# Patient Record
Sex: FEMALE | Race: WHITE | NOT HISPANIC OR LATINO | Employment: OTHER | URBAN - METROPOLITAN AREA
[De-identification: names, ages, dates, MRNs, and addresses within clinical notes are randomized per-mention and may not be internally consistent; named-entity substitution may affect disease eponyms.]

---

## 2017-01-10 RX ORDER — ALPRAZOLAM 0.5 MG/1
0.5 TABLET ORAL 2 TIMES DAILY PRN
Qty: 60 TABLET | Refills: 0 | Status: SHIPPED | OUTPATIENT
Start: 2017-01-10 | End: 2017-02-08 | Stop reason: SDUPTHER

## 2017-01-27 ENCOUNTER — TELEPHONE (OUTPATIENT)
Dept: INTERNAL MEDICINE | Facility: CLINIC | Age: 82
End: 2017-01-27

## 2017-01-27 NOTE — TELEPHONE ENCOUNTER
----- Message from Kayley James sent at 1/27/2017 10:46 AM CST -----  Contact: self/[phone]  Pt called in regards to getting a Rx for diarrhea. She did not want to make and appointment.        Please advise

## 2017-01-27 NOTE — TELEPHONE ENCOUNTER
----- Message from Kayley James sent at 1/27/2017 10:46 AM CST -----  Contact: self/[phone]  Pt called in regards to getting a Rx for diarrhea. She did not want to make and appointment.        Please advise

## 2017-01-27 NOTE — TELEPHONE ENCOUNTER
Pt states she started having diarrhea on yesterday and would like something to be sent to pharmacy . Pt states she had 4 watery specimens . Pt states she tried kaopectate  And pepto bismol, nothing is helping . Pt refused urgent care appt

## 2017-02-08 RX ORDER — ALPRAZOLAM 0.5 MG/1
0.5 TABLET ORAL 2 TIMES DAILY PRN
Qty: 60 TABLET | Refills: 0 | Status: SHIPPED | OUTPATIENT
Start: 2017-02-08 | End: 2017-03-08 | Stop reason: SDUPTHER

## 2017-03-08 RX ORDER — ALPRAZOLAM 0.5 MG/1
0.5 TABLET ORAL 2 TIMES DAILY PRN
Qty: 60 TABLET | Refills: 0 | Status: SHIPPED | OUTPATIENT
Start: 2017-03-08 | End: 2017-04-13 | Stop reason: SDUPTHER

## 2017-03-20 ENCOUNTER — HOSPITAL ENCOUNTER (INPATIENT)
Facility: HOSPITAL | Age: 82
LOS: 1 days | Discharge: HOME HEALTH | DRG: 065 | End: 2017-03-21
Attending: EMERGENCY MEDICINE | Admitting: PSYCHIATRY & NEUROLOGY
Payer: MEDICARE

## 2017-03-20 DIAGNOSIS — I63.341 STROKE DUE TO THROMBOSIS OF RIGHT CEREBELLAR ARTERY: ICD-10-CM

## 2017-03-20 DIAGNOSIS — R42 VERTIGO: Primary | ICD-10-CM

## 2017-03-20 DIAGNOSIS — R42 DIZZINESS: ICD-10-CM

## 2017-03-20 DIAGNOSIS — R11.2 NAUSEA AND VOMITING, INTRACTABILITY OF VOMITING NOT SPECIFIED, UNSPECIFIED VOMITING TYPE: ICD-10-CM

## 2017-03-20 LAB
ABO + RH BLD: NORMAL
ALBUMIN SERPL BCP-MCNC: 3.6 G/DL
ALP SERPL-CCNC: 66 U/L
ALT SERPL W/O P-5'-P-CCNC: 21 U/L
ANION GAP SERPL CALC-SCNC: 12 MMOL/L
AORTIC VALVE REGURGITATION: ABNORMAL
AORTIC VALVE STENOSIS: ABNORMAL
AST SERPL-CCNC: 23 U/L
BACTERIA #/AREA URNS AUTO: ABNORMAL /HPF
BASOPHILS # BLD AUTO: 0.05 K/UL
BASOPHILS NFR BLD: 0.4 %
BILIRUB SERPL-MCNC: 0.2 MG/DL
BILIRUB UR QL STRIP: NEGATIVE
BLD GP AB SCN CELLS X3 SERPL QL: NORMAL
BUN SERPL-MCNC: 31 MG/DL
CALCIUM SERPL-MCNC: 9.3 MG/DL
CHLORIDE SERPL-SCNC: 106 MMOL/L
CHOLEST/HDLC SERPL: 6.9 {RATIO}
CLARITY UR REFRACT.AUTO: ABNORMAL
CO2 SERPL-SCNC: 19 MMOL/L
COLOR UR AUTO: YELLOW
CREAT SERPL-MCNC: 0.8 MG/DL (ref 0.5–1.4)
CREAT SERPL-MCNC: 1 MG/DL
DIASTOLIC DYSFUNCTION: YES
DIFFERENTIAL METHOD: NORMAL
EOSINOPHIL # BLD AUTO: 0.2 K/UL
EOSINOPHIL NFR BLD: 1.9 %
ERYTHROCYTE [DISTWIDTH] IN BLOOD BY AUTOMATED COUNT: 13.9 %
EST. GFR  (AFRICAN AMERICAN): >60 ML/MIN/1.73 M^2
EST. GFR  (NON AFRICAN AMERICAN): 52.6 ML/MIN/1.73 M^2
ESTIMATED AVG GLUCOSE: 120 MG/DL
ESTIMATED PA SYSTOLIC PRESSURE: 27
GLUCOSE SERPL-MCNC: 125 MG/DL
GLUCOSE UR QL STRIP: NEGATIVE
HBA1C MFR BLD HPLC: 5.8 %
HCT VFR BLD AUTO: 37.4 %
HDL/CHOLESTEROL RATIO: 14.4 %
HDLC SERPL-MCNC: 229 MG/DL
HDLC SERPL-MCNC: 33 MG/DL
HGB BLD-MCNC: 12.6 G/DL
HGB UR QL STRIP: ABNORMAL
INR PPP: 0.9
KETONES UR QL STRIP: NEGATIVE
LDLC SERPL CALC-MCNC: 138.6 MG/DL
LEUKOCYTE ESTERASE UR QL STRIP: ABNORMAL
LIPASE SERPL-CCNC: 43 U/L
LYMPHOCYTES # BLD AUTO: 2.9 K/UL
LYMPHOCYTES NFR BLD: 25.2 %
MAGNESIUM SERPL-MCNC: 2 MG/DL
MCH RBC QN AUTO: 29.1 PG
MCHC RBC AUTO-ENTMCNC: 33.7 %
MCV RBC AUTO: 86 FL
MICROSCOPIC COMMENT: ABNORMAL
MITRAL VALVE MOBILITY: ABNORMAL
MITRAL VALVE REGURGITATION: ABNORMAL
MONOCYTES # BLD AUTO: 0.8 K/UL
MONOCYTES NFR BLD: 6.9 %
NEUTROPHILS # BLD AUTO: 7.4 K/UL
NEUTROPHILS NFR BLD: 64 %
NITRITE UR QL STRIP: POSITIVE
NONHDLC SERPL-MCNC: 196 MG/DL
PH UR STRIP: 5 [PH] (ref 5–8)
PLATELET # BLD AUTO: 250 K/UL
PMV BLD AUTO: 10.3 FL
POC PTINR: 1 (ref 0.9–1.2)
POC PTWBT: 12.3 SEC (ref 9.7–14.3)
POCT GLUCOSE: 123 MG/DL (ref 70–110)
POTASSIUM SERPL-SCNC: 4.7 MMOL/L
PROT SERPL-MCNC: 7.9 G/DL
PROT UR QL STRIP: NEGATIVE
PROTHROMBIN TIME: 10.1 SEC
RBC # BLD AUTO: 4.33 M/UL
RBC #/AREA URNS AUTO: 3 /HPF (ref 0–4)
RETIRED EF AND QEF - SEE NOTES: 68 (ref 55–65)
SAMPLE: NORMAL
SAMPLE: NORMAL
SODIUM SERPL-SCNC: 137 MMOL/L
SP GR UR STRIP: 1.02 (ref 1–1.03)
SQUAMOUS #/AREA URNS AUTO: 2 /HPF
TRICUSPID VALVE REGURGITATION: ABNORMAL
TRIGL SERPL-MCNC: 287 MG/DL
URN SPEC COLLECT METH UR: ABNORMAL
UROBILINOGEN UR STRIP-ACNC: NEGATIVE EU/DL
WBC # BLD AUTO: 11.58 K/UL
WBC #/AREA URNS AUTO: 23 /HPF (ref 0–5)

## 2017-03-20 PROCEDURE — 93306 TTE W/DOPPLER COMPLETE: CPT | Mod: 26,,, | Performed by: INTERNAL MEDICINE

## 2017-03-20 PROCEDURE — G8988 SELF CARE GOAL STATUS: HCPCS | Mod: CK

## 2017-03-20 PROCEDURE — 99291 CRITICAL CARE FIRST HOUR: CPT

## 2017-03-20 PROCEDURE — 83690 ASSAY OF LIPASE: CPT

## 2017-03-20 PROCEDURE — 80061 LIPID PANEL: CPT

## 2017-03-20 PROCEDURE — 85610 PROTHROMBIN TIME: CPT

## 2017-03-20 PROCEDURE — 86900 BLOOD TYPING SEROLOGIC ABO: CPT

## 2017-03-20 PROCEDURE — 96374 THER/PROPH/DIAG INJ IV PUSH: CPT

## 2017-03-20 PROCEDURE — 25000003 PHARM REV CODE 250: Performed by: NURSE PRACTITIONER

## 2017-03-20 PROCEDURE — 86850 RBC ANTIBODY SCREEN: CPT

## 2017-03-20 PROCEDURE — 20600001 HC STEP DOWN PRIVATE ROOM

## 2017-03-20 PROCEDURE — 63700000 PHARM REV CODE 250 ALT 637 W/O HCPCS: Performed by: NURSE PRACTITIONER

## 2017-03-20 PROCEDURE — 83036 HEMOGLOBIN GLYCOSYLATED A1C: CPT

## 2017-03-20 PROCEDURE — 93010 ELECTROCARDIOGRAM REPORT: CPT | Mod: ,,, | Performed by: INTERNAL MEDICINE

## 2017-03-20 PROCEDURE — 97165 OT EVAL LOW COMPLEX 30 MIN: CPT

## 2017-03-20 PROCEDURE — 63600175 PHARM REV CODE 636 W HCPCS: Performed by: NURSE PRACTITIONER

## 2017-03-20 PROCEDURE — 25000003 PHARM REV CODE 250: Performed by: STUDENT IN AN ORGANIZED HEALTH CARE EDUCATION/TRAINING PROGRAM

## 2017-03-20 PROCEDURE — 96375 TX/PRO/DX INJ NEW DRUG ADDON: CPT

## 2017-03-20 PROCEDURE — 96361 HYDRATE IV INFUSION ADD-ON: CPT

## 2017-03-20 PROCEDURE — 81001 URINALYSIS AUTO W/SCOPE: CPT

## 2017-03-20 PROCEDURE — A9585 GADOBUTROL INJECTION: HCPCS | Performed by: EMERGENCY MEDICINE

## 2017-03-20 PROCEDURE — G8987 SELF CARE CURRENT STATUS: HCPCS | Mod: CN

## 2017-03-20 PROCEDURE — 25500020 PHARM REV CODE 255: Performed by: EMERGENCY MEDICINE

## 2017-03-20 PROCEDURE — 82565 ASSAY OF CREATININE: CPT

## 2017-03-20 PROCEDURE — 93005 ELECTROCARDIOGRAM TRACING: CPT

## 2017-03-20 PROCEDURE — 97162 PT EVAL MOD COMPLEX 30 MIN: CPT

## 2017-03-20 PROCEDURE — 99223 1ST HOSP IP/OBS HIGH 75: CPT | Mod: ,,, | Performed by: PSYCHIATRY & NEUROLOGY

## 2017-03-20 PROCEDURE — 85025 COMPLETE CBC W/AUTO DIFF WBC: CPT

## 2017-03-20 PROCEDURE — 99291 CRITICAL CARE FIRST HOUR: CPT | Mod: ,,, | Performed by: EMERGENCY MEDICINE

## 2017-03-20 PROCEDURE — 83735 ASSAY OF MAGNESIUM: CPT

## 2017-03-20 PROCEDURE — 80053 COMPREHEN METABOLIC PANEL: CPT

## 2017-03-20 PROCEDURE — 93306 TTE W/DOPPLER COMPLETE: CPT

## 2017-03-20 PROCEDURE — 82962 GLUCOSE BLOOD TEST: CPT

## 2017-03-20 PROCEDURE — 63600175 PHARM REV CODE 636 W HCPCS

## 2017-03-20 RX ORDER — FERROUS SULFATE 325(65) MG
325 TABLET, DELAYED RELEASE (ENTERIC COATED) ORAL DAILY
Status: DISCONTINUED | OUTPATIENT
Start: 2017-03-20 | End: 2017-03-21 | Stop reason: HOSPADM

## 2017-03-20 RX ORDER — PANTOPRAZOLE SODIUM 40 MG/1
40 TABLET, DELAYED RELEASE ORAL DAILY
Status: DISCONTINUED | OUTPATIENT
Start: 2017-03-20 | End: 2017-03-21 | Stop reason: HOSPADM

## 2017-03-20 RX ORDER — ATORVASTATIN CALCIUM 20 MG/1
40 TABLET, FILM COATED ORAL DAILY
Status: DISCONTINUED | OUTPATIENT
Start: 2017-03-20 | End: 2017-03-21 | Stop reason: HOSPADM

## 2017-03-20 RX ORDER — ALPRAZOLAM 0.5 MG/1
0.5 TABLET ORAL 2 TIMES DAILY PRN
Status: DISCONTINUED | OUTPATIENT
Start: 2017-03-20 | End: 2017-03-21 | Stop reason: HOSPADM

## 2017-03-20 RX ORDER — FENOFIBRATE 160 MG/1
160 TABLET ORAL DAILY
Status: DISCONTINUED | OUTPATIENT
Start: 2017-03-20 | End: 2017-03-21 | Stop reason: HOSPADM

## 2017-03-20 RX ORDER — ASPIRIN 325 MG
325 TABLET, DELAYED RELEASE (ENTERIC COATED) ORAL DAILY
Status: DISCONTINUED | OUTPATIENT
Start: 2017-03-20 | End: 2017-03-21 | Stop reason: HOSPADM

## 2017-03-20 RX ORDER — LABETALOL HYDROCHLORIDE 5 MG/ML
10 INJECTION, SOLUTION INTRAVENOUS
Status: DISCONTINUED | OUTPATIENT
Start: 2017-03-20 | End: 2017-03-20

## 2017-03-20 RX ORDER — ONDANSETRON 2 MG/ML
8 INJECTION INTRAMUSCULAR; INTRAVENOUS
Status: COMPLETED | OUTPATIENT
Start: 2017-03-20 | End: 2017-03-20

## 2017-03-20 RX ORDER — LEVOTHYROXINE SODIUM 75 UG/1
75 TABLET ORAL DAILY
Status: DISCONTINUED | OUTPATIENT
Start: 2017-03-20 | End: 2017-03-21 | Stop reason: HOSPADM

## 2017-03-20 RX ORDER — SODIUM CHLORIDE 0.9 % (FLUSH) 0.9 %
3 SYRINGE (ML) INJECTION EVERY 8 HOURS
Status: DISCONTINUED | OUTPATIENT
Start: 2017-03-20 | End: 2017-03-21 | Stop reason: HOSPADM

## 2017-03-20 RX ORDER — GADOBUTROL 604.72 MG/ML
10 INJECTION INTRAVENOUS
Status: COMPLETED | OUTPATIENT
Start: 2017-03-20 | End: 2017-03-20

## 2017-03-20 RX ORDER — MECLIZINE HYDROCHLORIDE 25 MG/1
25 TABLET ORAL 3 TIMES DAILY PRN
Status: DISCONTINUED | OUTPATIENT
Start: 2017-03-20 | End: 2017-03-21 | Stop reason: HOSPADM

## 2017-03-20 RX ORDER — SODIUM CHLORIDE 9 MG/ML
INJECTION, SOLUTION INTRAVENOUS CONTINUOUS
Status: DISCONTINUED | OUTPATIENT
Start: 2017-03-20 | End: 2017-03-21 | Stop reason: HOSPADM

## 2017-03-20 RX ORDER — CHOLECALCIFEROL (VITAMIN D3) 25 MCG
5000 TABLET ORAL DAILY
Status: DISCONTINUED | OUTPATIENT
Start: 2017-03-20 | End: 2017-03-21 | Stop reason: HOSPADM

## 2017-03-20 RX ORDER — ENOXAPARIN SODIUM 100 MG/ML
40 INJECTION SUBCUTANEOUS EVERY 24 HOURS
Status: DISCONTINUED | OUTPATIENT
Start: 2017-03-20 | End: 2017-03-21 | Stop reason: HOSPADM

## 2017-03-20 RX ORDER — ACETAMINOPHEN 325 MG/1
650 TABLET ORAL EVERY 6 HOURS PRN
Status: DISCONTINUED | OUTPATIENT
Start: 2017-03-20 | End: 2017-03-21 | Stop reason: HOSPADM

## 2017-03-20 RX ORDER — ONDANSETRON 8 MG/1
8 TABLET, ORALLY DISINTEGRATING ORAL EVERY 8 HOURS PRN
Status: DISCONTINUED | OUTPATIENT
Start: 2017-03-20 | End: 2017-03-21 | Stop reason: HOSPADM

## 2017-03-20 RX ADMIN — FENOFIBRATE 160 MG: 160 TABLET ORAL at 03:03

## 2017-03-20 RX ADMIN — MECLIZINE 25 MG: 12.5 TABLET ORAL at 05:03

## 2017-03-20 RX ADMIN — ALPRAZOLAM 0.5 MG: 0.5 TABLET ORAL at 09:03

## 2017-03-20 RX ADMIN — ATORVASTATIN CALCIUM 40 MG: 20 TABLET, FILM COATED ORAL at 09:03

## 2017-03-20 RX ADMIN — VITAMIN D, TAB 1000IU (100/BT) 5000 UNITS: 25 TAB at 09:03

## 2017-03-20 RX ADMIN — SODIUM CHLORIDE, PRESERVATIVE FREE 3 ML: 5 INJECTION INTRAVENOUS at 10:03

## 2017-03-20 RX ADMIN — ACETAMINOPHEN 650 MG: 325 TABLET ORAL at 07:03

## 2017-03-20 RX ADMIN — PANTOPRAZOLE SODIUM 40 MG: 40 TABLET, DELAYED RELEASE ORAL at 09:03

## 2017-03-20 RX ADMIN — REGULAR STRENGTH 325 MG: 325 TABLET ORAL at 09:03

## 2017-03-20 RX ADMIN — MECLIZINE 25 MG: 12.5 TABLET ORAL at 09:03

## 2017-03-20 RX ADMIN — ONDANSETRON 4 MG: 2 INJECTION INTRAMUSCULAR; INTRAVENOUS at 07:03

## 2017-03-20 RX ADMIN — ONDANSETRON 8 MG: 8 TABLET, ORALLY DISINTEGRATING ORAL at 03:03

## 2017-03-20 RX ADMIN — SODIUM CHLORIDE: 0.9 INJECTION, SOLUTION INTRAVENOUS at 09:03

## 2017-03-20 RX ADMIN — ALPRAZOLAM 0.5 MG: 0.5 TABLET ORAL at 11:03

## 2017-03-20 RX ADMIN — GADOBUTROL 10 ML: 604.72 INJECTION INTRAVENOUS at 10:03

## 2017-03-20 RX ADMIN — LEVOTHYROXINE SODIUM 75 MCG: 75 TABLET ORAL at 09:03

## 2017-03-20 RX ADMIN — ENOXAPARIN SODIUM 40 MG: 100 INJECTION SUBCUTANEOUS at 04:03

## 2017-03-20 RX ADMIN — FERROUS SULFATE TAB EC 325 MG (65 MG FE EQUIVALENT) 325 MG: 325 (65 FE) TABLET DELAYED RESPONSE at 09:03

## 2017-03-20 NOTE — ED NOTES
MRI called to report patient is refusing to lay back on MRI table. Myah ADAMS Called, aware and is on her way to speak to patient.

## 2017-03-20 NOTE — IP AVS SNAPSHOT
[facility]  [address]  [facility] [address]  Phone: [phone]           Patient Discharge Instructions     Our goal is to set you up for success. This packet includes information on your condition, medications, and your home care. It will help you to care for yourself so you don't get sicker and need to go back to the hospital.     Please ask your nurse if you have any questions.        There are many details to remember when preparing to leave the hospital. Here is what you will need to do:    1. Take your medicine. If you are prescribed medications, review your Medication List in the following pages. You may have new medications to  at the pharmacy and others that you'll need to stop taking. Review the instructions for how and when to take your medications. Talk with your doctor or nurses if you are unsure of what to do.     2. Go to your follow-up appointments. Specific follow-up information is listed in the following pages. Your may be contacted by a transition nurse or clinical provider about future appointments. Be sure we have all of the phone numbers to reach you, if needed. Please contact your provider's office if you are unable to make an appointment.     3. Watch for warning signs. Your doctor or nurse will give you detailed warning signs to watch for and when to call for assistance. These instructions may also include educational information about your condition. If you experience any of warning signs to your health, call your doctor.               [facility]  Unless otherwise directed by your provider, please contact [facility], our nurse care line that is available for 24/7 assistance.     [phone] (toll-free)    Registered nurses in the [facility] provide clinical advisement, health education, appointment booking, and other advisory services.                    ** Verify the list of medication(s) below is accurate and up  to date. Carry this with you in case of emergency. If your medications have changed, please notify your healthcare provider.             Medication List      START taking these medications        Additional Info                      aspirin 325 MG EC tablet   Commonly known as:  ECOTRIN   Refills:  0   Dose:  325 mg    Last time this was given:  325 mg on 3/21/2017  9:03 AM   Instructions:  Take 1 tablet (325 mg total) by mouth once daily.     Begin Date    AM    Noon    PM    Bedtime       atorvastatin 40 MG tablet   Commonly known as:  LIPITOR   Quantity:  30 tablet   Refills:  3   Dose:  40 mg    Last time this was given:  40 mg on 3/21/2017  9:03 AM   Instructions:  Take 1 tablet (40 mg total) by mouth once daily.     Begin Date    AM    Noon    PM    Bedtime       ciprofloxacin HCl 500 MG tablet   Commonly known as:  CIPRO   Quantity:  13 tablet   Refills:  0   Dose:  500 mg   Indications:  Urinary Tract Infection    Last time this was given:  500 mg on 3/21/2017 10:39 AM   Instructions:  Take 1 tablet (500 mg total) by mouth every 12 (twelve) hours.     Begin Date    AM    Noon    PM    Bedtime       meclizine 25 mg tablet   Commonly known as:  ANTIVERT   Quantity:  10 tablet   Refills:  0   Dose:  25 mg    Last time this was given:  25 mg on 3/20/2017  5:29 PM   Instructions:  Take 1 tablet (25 mg total) by mouth 3 (three) times daily as needed for Dizziness.     Begin Date    AM    Noon    PM    Bedtime       ondansetron 8 MG Tbdl   Commonly known as:  ZOFRAN-ODT   Quantity:  10 tablet   Refills:  0   Dose:  8 mg    Last time this was given:  8 mg on 3/20/2017  3:22 PM   Instructions:  Take 1 tablet (8 mg total) by mouth every 8 (eight) hours as needed.     Begin Date    AM    Noon    PM    Bedtime         CONTINUE taking these medications        Additional Info                      alprazolam 0.5 MG tablet   Commonly known as:  XANAX   Quantity:  60 tablet   Refills:  0   Dose:  0.5 mg    Last time this was  given:  0.5 mg on 3/21/2017  9:09 AM   Instructions:  Take 1 tablet (0.5 mg total) by mouth 2 (two) times daily as needed for Anxiety.     Begin Date    AM    Noon    PM    Bedtime       amlodipine 5 MG tablet   Commonly known as:  NORVASC   Refills:  3    Instructions:  TAKE 1 TABLET DAILY AS DIRECTED.     Begin Date    AM    Noon    PM    Bedtime       cholecalciferol (vitamin D3) 5,000 unit Tab   Refills:  0   Dose:  5000 Units    Last time this was given:  5,000 Units on 3/21/2017  9:03 AM   Instructions:  Take 5,000 Units by mouth once daily.     Begin Date    AM    Noon    PM    Bedtime       fenofibrate 160 MG Tab   Quantity:  90 tablet   Refills:  3   Dose:  160 mg    Last time this was given:  160 mg on 3/21/2017  9:03 AM   Instructions:  Take 1 tablet (160 mg total) by mouth once daily.     Begin Date    AM    Noon    PM    Bedtime       ferrous sulfate 325 (65 FE) MG EC tablet   Refills:  0   Dose:  325 mg    Last time this was given:  325 mg on 3/21/2017  9:03 AM   Instructions:  Take 325 mg by mouth once daily.     Begin Date    AM    Noon    PM    Bedtime       levothyroxine 75 MCG tablet   Commonly known as:  SYNTHROID   Refills:  1   Dose:  75 mcg    Last time this was given:  75 mcg on 3/21/2017  6:04 AM   Instructions:  Take 75 mcg by mouth once daily.     Begin Date    AM    Noon    PM    Bedtime       lisinopril 40 MG tablet   Commonly known as:  PRINIVIL,ZESTRIL   Refills:  0   Dose:  40 mg    Instructions:  Take 40 mg by mouth once daily.     Begin Date    AM    Noon    PM    Bedtime       metoprolol succinate 50 MG 24 hr tablet   Commonly known as:  TOPROL-XL   Refills:  0   Dose:  50 mg    Instructions:  Take 50 mg by mouth once daily.     Begin Date    AM    Noon    PM    Bedtime       pantoprazole 40 MG tablet   Commonly known as:  PROTONIX   Refills:  0   Dose:  40 mg    Last time this was given:  40 mg on 3/21/2017  9:03 AM   Instructions:  Take 40 mg by mouth once daily.     Begin Date     AM    Noon    PM    Bedtime            Where to Get Your Medications      These medications were sent to [facility]/pharmacy #[id number] - [place] - [address] AT [facility]  [address] [place] [address]     Phone:  [phone]     atorvastatin 40 MG tablet    ciprofloxacin HCl 500 MG tablet    meclizine 25 mg tablet    ondansetron 8 MG Tbdl         You can get these medications from any pharmacy     You don't need a prescription for these medications     aspirin 325 MG EC tablet                  Please bring to all follow up appointments:    1. A copy of your discharge instructions.  2. All medicines you are currently taking in their original bottles.  3. Identification and insurance card.    Please arrive 15 minutes ahead of scheduled appointment time.    Please call 24 hours in advance if you must reschedule your appointment and/or time.        Your Scheduled Appointments     Apr 21, 2017  2:00 PM CDT   New Patient with AMBER Dasilva - Neurology ([address] )    [address]  [facility] [address] [phone]              Follow-up Information     Follow up with Sherlyn Taylor PA-C On 4/21/2017.    Specialty:  Neurology    Why:  @ 2:00    Contact information:    [address]  [facility] [id number] [phone]          Follow up with [facility].    Specialty:  Home Health Services    Why:  Home Health    Contact information:    [address] [facility]  [address]  [facility] [id number] [phone]          Discharge Instructions     Future Orders    Activity as tolerated     Call 911 for any of the following:     Comments:    Call 911  right away if any of the following warning signs come on suddenly, even if the symptoms only last for a few minutes. With stroke, timing is very important.   - Warning Signs of Stroke:  - Weakness: You may feel a sudden weakness, tingling or loss of feeling on one side of your face or body.  - Vision  "Problems: You may have sudden double vision or trouble seeing in one or both eyes.  - Speech Problems: You may have sudden trouble talking, slured speech, or problems understanding others.  - Headache: You may have sudden, severe headache.  - Movement Problems: You may experience dizziness, a feeling of spinning, a loss of balance, a feeling of falling or blackouts.    Diet Cardiac     Comments:    See Stroke Patient Education Guide Booklet for details.        Primary Diagnosis     Your primary diagnosis was:  Stroke      Admission Information     Date & Time Provider Department CSN    3/20/2017  6:44 AM Keith Del Valle MD [facility] [id number]      Care Providers     Provider Role Specialty Primary office phone    Keith Del Valle MD Attending Provider Interventional Neurology [phone]    Octaviano Santos MD Team Attending  Neuro Critical Care [phone]    Percy Weir MD Team Attending  Neuro Critical Care [phone]    Keith Del Valle MD Team Attending  Interventional Neurology [phone]    Guille Haynes MD Team Attending  Neurology [phone]      Your Vitals Were     BP Pulse Temp Resp Height Weight    145/65 (BP Location: Left arm, Patient Position: Lying, BP Method: Automatic) 77 98.5 °F (36.9 °C) (Oral) 18 4' 9" (1.448 m) 67.3 kg (148 lb 5.9 oz)    SpO2 BMI             94% 32.11 kg/m2         Recent Lab Values        3/20/2017                           7:04 AM           A1C 5.8           Comment for A1C at  7:04 AM on 3/20/2017:  According to ADA guidelines, hemoglobin A1C <7.0% represents  optimal control in non-pregnant diabetic patients.  Different  metrics may apply to specific populations.   Standards of Medical Care in Diabetes - 2016.  For the purpose of screening for the presence of diabetes:  <5.7%     Consistent with the absence of diabetes  5.7-6.4%  Consistent with increasing risk for diabetes   (prediabetes)  >or=6.5%  Consistent with " diabetes  Currently no consensus exists for use of hemoglobin A1C  for diagnosis of diabetes for children.        Allergies as of 3/21/2017        Reactions    Sulfa (Sulfonamide Antibiotics) Hives      Advance Directives     An advance directive is a document which, in the event you are no longer able to make decisions for yourself, tells your healthcare team what kind of treatment you do or do not want to receive, or who you would like to make those decisions for you.  If you do not currently have an advance directive, [facility] encourages you to create one.  For more information call:  [phone] ([phone]), [phone] ([phone]),  or log on to [email or web address].        Language Assistance Services     ATTENTION: Language assistance services are available, free of charge. Please call [phone].      ATENCIÓN: Si habla español, tiene a leon disposición servicios gratuitos de asistencia lingüística. Llame al [phone].     CHÚ Ý: N?u b?n nói Ti?ng Vi?t, có các d?ch v? h? tr? ngôn ng? mi?n phí dành cho b?n. G?i s? [phone].        Stroke Education              [facility] Sign-Up     Activating your [facility] account is as easy as 1-2-3!     1) Visit [email or web address], select Sign Up Now, enter this activation code and your date of birth, then select Next.  [email or web address]  Expires: 5/5/2017  4:26 PM      2) Create a username and password to use when you visit [facility] in the future and select a security question in case you lose your password and select Next.    3) Enter your e-mail address and click Sign Up!    Additional Information  If you have questions, please e-mail [email or web address] or call [phone] to talk to our [facility] staff. Remember, [facility] is NOT to be used for urgent needs. For medical emergencies, dial 911.          [facility] complies with applicable Federal civil rights laws and does not discriminate on the basis of race, color, national  origin, age, disability, or sex.

## 2017-03-20 NOTE — ED PROVIDER NOTES
Encounter Date: 3/20/2017    SCRIBE #1 NOTE: I, Henny Jodie , am scribing for, and in the presence of,  Dr. Tang . I have scribed the following portions of the note - the EKG reading and the Resident attestation.       History     Chief Complaint   Patient presents with    Vomiting     pt presents to the ed with nausea emesis and syncope      Review of patient's allergies indicates:   Allergen Reactions    Sulfa (sulfonamide antibiotics) Hives     HPI Comments:  81 yo female with medical history of HTN, aortic stenosis,  hypothyroidism, sciatica presents to ED via EMS with dizziness, nausea and vomiting. Patient not responding to questions for 2 minutes. Once patient aroused, she became dizzy at 5am today while walking to go use the bathroom. After using the bathroom, patient started vomiting. Patient complaining of weakness, dizziness, nausea and vomiting. Patient denies chest pain and shortness of breath. Denies fever, chills, chest pain, cough, congestion, back pain.     The history is provided by the patient.     Past Medical History:   Diagnosis Date    Aortic stenosis     History of bleeding ulcers 2015    Hypertension     Hypothyroidism     Sciatica of left side     Vitamin D deficiency      Past Surgical History:   Procedure Laterality Date    APPENDECTOMY      [facility]/12/2016    [facility]    PARATHYROIDECTOMY Right 2012    THYROID SURGERY Right 2012    Partial thyroidectomy     Family History   Problem Relation Age of Onset    Heart disease Father     Heart disease Sister     Heart disease Brother     Diabetes Son      Social History   Substance Use Topics    Smoking status: Never Smoker    Smokeless tobacco: None    Alcohol use None     Review of Systems   Constitutional: Positive for fatigue. Negative for fever.   HENT: Negative for congestion.    Eyes: Negative for visual disturbance.   Respiratory: Negative for cough and shortness of breath.    Cardiovascular: Negative for  chest pain.   Gastrointestinal: Positive for nausea and vomiting.   Genitourinary: Negative for dysuria and hematuria.   Musculoskeletal: Negative for back pain.   Skin: Negative for rash.   Neurological: Positive for dizziness.   Psychiatric/Behavioral: The patient is not nervous/anxious.        Physical Exam   Initial Vitals   BP Pulse Resp Temp SpO2   03/20/17 0643 03/20/17 0643 03/20/17 0643 03/20/17 0643 03/20/17 0643   178/110 64 18 98.5 °F (36.9 °C) 98 %     Physical Exam    Vitals reviewed.  Constitutional: She appears well-developed and well-nourished. She appears lethargic.   HENT:   Head: Normocephalic and atraumatic.   Right Ear: External ear normal.   Left Ear: External ear normal.   Mouth/Throat: Oropharynx is clear and moist.   Eyes: Conjunctivae and EOM are normal. Pupils are equal, round, and reactive to light.   Nystagmus on right   Neck: Neck supple. No thyromegaly present.   Cardiovascular: Normal rate and intact distal pulses.   Pulmonary/Chest: No respiratory distress. She has no wheezes. She has no rhonchi. She exhibits no tenderness.   Abdominal: Soft. Bowel sounds are normal. She exhibits no distension. There is no tenderness. There is no guarding.   Lymphadenopathy:     She has no cervical adenopathy.   Neurological: She appears lethargic. No sensory deficit.   Patient did not respond to commands and stimulation for 2 minutes.  Decreased strength vs. Fatigued/not wanting to perform commands   Skin: Skin is warm. No rash noted.   Psychiatric: She is slowed and withdrawn.         ED Course   Procedures  Labs Reviewed   COMPREHENSIVE METABOLIC PANEL - Abnormal; Notable for the following:        Result Value    CO2 19 (*)     Glucose 125 (*)     BUN, Bld 31 (*)     eGFR if non  52.6 (*)     All other components within normal limits   LIPID PANEL - Abnormal; Notable for the following:     Cholesterol 229 (*)     Triglycerides 287 (*)     HDL 33 (*)     HDL/Chol Ratio 14.4 (*)      Total Cholesterol/HDL Ratio 6.9 (*)     All other components within normal limits    Narrative:     ADD ON PER DR BARAJAS LIPID, ORDER #[id number]   09:13  03/20/2017   .  ADD ON PER MALIKA BELTREGB, ORDER #[id number]   09:32  03/20/2017    CBC W/ AUTO DIFFERENTIAL   LIPASE   PROTIME-INR   MAGNESIUM   LIPID PANEL   HEMOGLOBIN A1C   HEMOGLOBIN A1C    Narrative:     ADD ON PER DR BARAJAS LIPID, ORDER #[id number]   09:13  03/20/2017   .  ADD ON PER CESAR BELTRE, ORDER #[id number]   09:32  03/20/2017    URINALYSIS   POCT GLUCOSE   TYPE & SCREEN   ISTAT PROCEDURE   ISTAT CREATININE     EKG Readings: (Independently Interpreted)   Sinus rhythm of 60 with no ischemic changes.      Imaging Results         MRI Brain W WO Contrast (Final result) Result time:  03/20/17 13:39:26    Procedure changed from MRI Brain Without Contrast        Final result by JAVED Linares (03/20/17 13:39:26)    Impression:        0.7 cm focus of restricted diffusion at the inferior margin of the vermis, consistent with an acute infarction.    Suboptimal MR angiogram degraded by motion as well as bolus timing.    Severe stenosis and/or focal occlusion involving the distal right vertebral artery as well as at least 2 segments of the proximal basilar artery. Further evaluation with CTA of the head and neck is recommended.    Mild chronic microvascular ischemic changes.    Preliminary report discussed with Dr. Franco by Dr. Ng on behalf of Dr. Liliane Carbajal at 11:20:34 on Monday, March 20, 2017.  ______________________________________     Electronically signed by resident: CLAYTON GN MD  Date:     03/20/17  Time:    11:32            As the supervising and teaching physician, I personally reviewed the images and resident's interpretation and I agree with the findings.          Electronically signed by: LILIANE CARBAJAL MD  Date:     03/20/17  Time:    13:39     Narrative:    MRI brain, MRA head, MRA neck    03/20/17 10:26:36    CLINICAL  INDICATION: 82 year old F with dizziness     TECHNIQUE: Multiplanar multisequence MR imaging of the brain was performed before and after the administration of 10 mL of gadolinium intravenous contrast.  Examination performed in conjunction with a 3-D time-of-flight noncontrast MR angiogram of the intracranial vasculature and a contrast enhanced MRA of the neck.  Multiple MIP reconstructions were performed. Examination mildly degraded by patient motion artifact.    COMPARISON: CT head 3/20/2017.    FINDINGS:    The ventricles are normal in size for age, without evidence of hydrocephalus.    There is a 0.7 cm focus of restricted diffusion with corresponding low ADC signal at the inferior margin of the vermis concerning for acute infarct. There is subtle T2/FLAIR hyperintensity at this site.    No additional areas of recent infarction identified. Mild chronic microvascular ischemic changes in the supratentorial white matter. No remote major vascular distribution infarct. No parenchymal mass lesion or hemorrhage.    No extra-axial blood or fluid collections.    The T2 skull base flow voids are preserved. Bone marrow signal intensity is unremarkable.    MRA:     Both examinations limited by patient motion artifact. There is also suboptimal bolus timing of contrast on the contrast enhanced MRA of the neck. Common and internal carotid arteries are normal in caliber.  No significant stenosis at either carotid bifurcation.    The cervical vertebral arteries are not well assessed on the MRA of the neck. The left vertebral artery appears dominant. Significant loss of flow related signal in the right vertebral artery with apparent high-grade stenosis on postcontrast images. There are 2 additional foci of focal signal loss within the proximal basilar artery concerning for an underlying severe stenosis or occlusion. There is reconstitution of enhancement in the distal aspect of the basal artery, likely retrograde flow via the  posterior communicating arteries.     The ACAs, MCAs and PCAs demonstrate no compelling evidence of high-grade stenosis, focal occlusion or intracranial aneurysm.            MRA Neck (Final result) Result time:  03/20/17 13:39:50    Final result by JAVED Linares (03/20/17 13:39:50)    Impression:        0.7 cm focus of restricted diffusion at the inferior margin of the vermis, consistent with an acute infarction.    Suboptimal MR angiogram degraded by motion as well as bolus timing.    Severe stenosis and/or focal occlusion involving the distal right vertebral artery as well as at least 2 segments of the proximal basilar artery. Further evaluation with CTA of the head and neck is recommended.    Mild chronic microvascular ischemic changes.    Preliminary report discussed with Dr. Franco by Dr. Ng on behalf of Dr. Liliane Carabjal at 11:20:34 on Monday, March 20, 2017.    ______________________________________     Electronically signed by resident: CLAYTON NG MD  Date:     03/20/17  Time:    11:32            As the supervising and teaching physician, I personally reviewed the images and resident's interpretation and I agree with the findings.          Electronically signed by: LILIANE CARBAJAL MD  Date:     03/20/17  Time:    13:39     Narrative:    MRI brain, MRA head, MRA neck    03/20/17 10:26:36    CLINICAL INDICATION: 82 year old F with dizziness     TECHNIQUE: Multiplanar multisequence MR imaging of the brain was performed before and after the administration of 10 mL of gadolinium intravenous contrast.  Examination performed in conjunction with a 3-D time-of-flight noncontrast MR angiogram of the intracranial vasculature and a contrast enhanced MRA of the neck.  Multiple MIP reconstructions were performed. Examination mildly degraded by patient motion artifact.    COMPARISON: CT head 3/20/2017.    FINDINGS:    The ventricles are normal in size for age, without evidence of  hydrocephalus.    There is a 0.7 cm focus of restricted diffusion with corresponding low ADC signal at the inferior margin of the vermis concerning for acute infarct. There is subtle T2/FLAIR hyperintensity at this site.    No additional areas of recent infarction identified. Mild chronic microvascular ischemic changes in the supratentorial white matter. No remote major vascular distribution infarct. No parenchymal mass lesion or hemorrhage.    No extra-axial blood or fluid collections.    The T2 skull base flow voids are preserved. Bone marrow signal intensity is unremarkable.    MRA:     Both examinations limited by patient motion artifact. There is also suboptimal bolus timing of contrast on the contrast enhanced MRA of the neck. Common and internal carotid arteries are normal in caliber.  No significant stenosis at either carotid bifurcation.    The cervical vertebral arteries are not well assessed on the MRA of the neck. The left vertebral artery appears dominant. Significant loss of flow related signal in the right vertebral artery with apparent high-grade stenosis on postcontrast images. There are 2 additional foci of focal signal loss within the proximal basilar artery concerning for an underlying severe stenosis or occlusion. There is reconstitution of enhancement in the distal aspect of the basal artery, likely retrograde flow via the posterior communicating arteries.     The ACAs, MCAs and PCAs demonstrate no compelling evidence of high-grade stenosis, focal occlusion or intracranial aneurysm.            MRA Brain (Final result) Result time:  03/20/17 13:40:04    Final result by JAVED Linares (03/20/17 13:40:04)    Impression:        0.7 cm focus of restricted diffusion at the inferior margin of the vermis, consistent with an acute infarction.    Suboptimal MR angiogram degraded by motion as well as bolus timing.    Severe stenosis and/or focal occlusion involving the distal right vertebral  artery as well as at least 2 segments of the proximal basilar artery. Further evaluation with CTA of the head and neck is recommended.    Mild chronic microvascular ischemic changes.    Preliminary report discussed with Dr. Franco by Dr. Ng on behalf of Dr. Liliane Carbajal at 11:20:34 on Monday, March 20, 2017.    ______________________________________     Electronically signed by resident: CLAYTON NG MD  Date:     03/20/17  Time:    11:32            As the supervising and teaching physician, I personally reviewed the images and resident's interpretation and I agree with the findings.          Electronically signed by: LILIANE CARBAJAL MD  Date:     03/20/17  Time:    13:40     Narrative:    MRI brain, MRA head, MRA neck    03/20/17 10:26:36    CLINICAL INDICATION: 82 year old F with dizziness     TECHNIQUE: Multiplanar multisequence MR imaging of the brain was performed before and after the administration of 10 mL of gadolinium intravenous contrast.  Examination performed in conjunction with a 3-D time-of-flight noncontrast MR angiogram of the intracranial vasculature and a contrast enhanced MRA of the neck.  Multiple MIP reconstructions were performed. Examination mildly degraded by patient motion artifact.    COMPARISON: CT head 3/20/2017.    FINDINGS:    The ventricles are normal in size for age, without evidence of hydrocephalus.    There is a 0.7 cm focus of restricted diffusion with corresponding low ADC signal at the inferior margin of the vermis concerning for acute infarct. There is subtle T2/FLAIR hyperintensity at this site.    No additional areas of recent infarction identified. Mild chronic microvascular ischemic changes in the supratentorial white matter. No remote major vascular distribution infarct. No parenchymal mass lesion or hemorrhage.    No extra-axial blood or fluid collections.    The T2 skull base flow voids are preserved. Bone marrow signal intensity is unremarkable.    MRA:      Both examinations limited by patient motion artifact. There is also suboptimal bolus timing of contrast on the contrast enhanced MRA of the neck. Common and internal carotid arteries are normal in caliber.  No significant stenosis at either carotid bifurcation.    The cervical vertebral arteries are not well assessed on the MRA of the neck. The left vertebral artery appears dominant. Significant loss of flow related signal in the right vertebral artery with apparent high-grade stenosis on postcontrast images. There are 2 additional foci of focal signal loss within the proximal basilar artery concerning for an underlying severe stenosis or occlusion. There is reconstitution of enhancement in the distal aspect of the basal artery, likely retrograde flow via the posterior communicating arteries.     The ACAs, MCAs and PCAs demonstrate no compelling evidence of high-grade stenosis, focal occlusion or intracranial aneurysm.            X-Ray Chest AP Portable (Final result) Result time:  03/20/17 08:10:50    Final result by Gale Escobar MD (03/20/17 08:10:50)    Impression:      Marked pulmonary underinflation.  RIGHT upper lung zone not well seen.  Atherosclerosis without convincing evidence of cardiac decompensation.  Please see above for further details.      Electronically signed by: Gale Escobar MD  Date:     03/20/17  Time:    08:10     Narrative:    Time of Procedure: 03/20/17 07:47:00  Accession # [id number]    Comparison:   Chest radiograph: 10/18/2007.  2/6/2007.  Chest CT: 8/30/2007.    Number of views: 1.    Findings:  X-ray beam attenuation and scatter occur in generous overlying soft tissues.  Intrathoracic structures are magnified by the patient's thick body wall.      Lungs are underinflated compared to chest radiographs of 2007 perhaps due in part to the patient's known kyphotic posture as seen on lateral chest radiograph of 2/2007.  True supine AP bedside chest radiograph or PA and lateral views  in the radiology department might provide additional information.    Study was obtained with the patient in RIGHT posterior oblique rotation.  Calcific atherosclerosis is present in the aorta and its branches.    Mediastinal structures obscure detail in the RIGHT upper lung zone.  Allowing for patient's position and under inflation I detect no convincing evidence of pulmonary disease elsewhere.  Pulmonary nodules have been previously identified, measuring up to 0.5 cm (chest CT 8/2007: RIGHT upper lobe apical segment, axial series 2 image 8 ).    I detect no pleural fluid, pneumothorax or cardiac decompensation.    Changes are present at the RIGHT acromioclavicular and glenohumeral joints LEFT shoulder is incompletely included on the study.            CT Head Without Contrast (Final result) Result time:  03/20/17 07:39:17    Final result by Jessica Newby MD (03/20/17 07:39:17)    Impression:        No acute intracranial abnormalities.  Specifically, no intracranial hemorrhage.    Chronic microvascular ischemic changes.      Electronically signed by: JESSICA NEWBY MD  Date:     03/20/17  Time:    07:39     Narrative:    Technique: 5-mm axial images were obtained through the head without the use of IV contrast. Coronal and sagittal reformats were performed.    Comparison: None.    Findings:    No CT findings to suggest acute major vascular distribution infarction.  No intra-or extra-axial hemorrhage.  No space-occupying mass lesion.  No midline shift or mass effect.  Ventricular system is normal in size and attenuation without evidence for hydrocephalus.  There is patchy periventricular white matter hypoattenuation, likely chronic microvascular ischemic changes.  Sellar region is unremarkable.  There is intracranial atherosclerotic disease.    Paranasal sinuses and mastoid air cells are clear. No osseous destruction.  Orbits are within normal limits.                 Medical Decision Making:   History:   Old  "Medical Records: I decided to obtain old medical records.  Old Records Summarized: records from clinic visits.  Clinical Tests:   Lab Tests: Ordered and Reviewed  Radiological Study: Ordered and Reviewed  Medical Tests: Ordered and Reviewed       APC / Resident Notes:   81yo female with medical history of HTN, aortic stenosis, hypothyroidism, sciatica presents to ED via EMS with dizziness, nausea and vomiting. Cardiac exam reveals regular rate. No wheezing, no rales, no rhonchi on auscultation. Abdomen is soft, non tender, non distended with normal bowel sounds x 4. Distal pulses intact. PERRL and EOM. Nystagmus noted to right eye. Oropharynx clear and moist. Distal pulses intact. Vitals stable.     Labs and imaging reviewed.   CBC wnl. BUN 30. Lipase 43. PT 10.1. INR .9. MG 2.0.     CT without contrast shows, "No acute intracranial abnormalities.  Specifically, no intracranial hemorrhage. Chronic microvascular ischemic changes."    Portable CXR showed, "Marked pulmonary underinflation.  RIGHT upper lung zone not well seen.  Atherosclerosis without convincing evidence of cardiac decompensation."    7:31 AM  Vascular neurology consulted.     DDX includes but is not limited CVA, gastritis, altered mental status, electrolyte abnormality.     Patient admitted to vascular neurology.     I have discussed and reviewed with my supervising physician.       Scribe Attestation:   Scribe #1: I performed the above scribed service and the documentation accurately describes the services I performed. I attest to the accuracy of the note.    Attending Attestation:   Physician Attestation Statement for Resident:  As the supervising MD   Physician Attestation Statement: I have personally seen and examined this patient.   I agree with the above history. -: 82 y.o. Female brought in by EMS for sudden onset of room spinning, N/V upon waking up.    As the supervising MD I agree with the above PE.   -: Patient has horizontal nystagmus when " looking to the right on testing. PERRL. Pt appears to have a non focal neuro exam although she is not very cooperative with this exam and when asked to hold her arms against gravity, she held them for about 1 second and then let them fall back on the stretcher. Wiggles all 10 toes to command but will not lift leg off the stretcher. Patient is somnolent but arousable, pale, holding an emesis bag.    As the supervising MD I agree with the above treatment, course, plan, and disposition.   -: Stroke code was called immediately for concern about cerebellar stroke vs vertebrobasilar syndrome. Discussed case with radiology who saw no acute findings on head CT. Discussed case with stroke team who arrived to evaluate he pt. in ED. It is unclear whether this represents a central vertigo or CVA. Vascular neurology plans to admit.   I have reviewed the following: CT reports, x-ray reports and EKG reports.        Attending Critical Care:   Critical Care Times:   Direct Patient Care (initial evaluation, reassessments, and time considering the case)................................................................15 minutes.   Additional History from reviewing old medical records or taking additional history from the family, EMS, PCP, etc.......................10 minutes.   Ordering, Reviewing, and Interpreting Diagnostic Studies...............................................................................................................5 minutes.   Documentation..................................................................................................................................................................................5 minutes.   Consultation with other Physicians. .................................................................................................................................................6 minutes.   ==============================================================  · Total Critical Care Time -  exclusive of procedural time: 41 minutes.  ==============================================================    Physician Attestation for Scribe:  Physician Attestation Statement for Scribe #1: I, Dr. Tang , reviewed documentation, as scribed by Henny Feliciano  in my presence, and it is both accurate and complete.                 ED Course     Clinical Impression:   The primary encounter diagnosis was Vertigo. Diagnoses of Dizziness and Nausea and vomiting, intractability of vomiting not specified, unspecified vomiting type were also pertinent to this visit.    Disposition:   Disposition: Admitted  Condition: Stable       Clara Roy PA-C  03/20/17 0765

## 2017-03-20 NOTE — ED NOTES
Pt brought in by EMS. EMS was called out for nausea, vomiting, syncopal episode. Pt was alert and talking to EMS upon arrival. Upon arrival to ED, pt is responsive to painful stimuli. Pt unable to answer questions, pt covered in feces and vomit. Pt gray in color.

## 2017-03-20 NOTE — PLAN OF CARE
Problem: Patient Care Overview  Goal: Plan of Care Review  Outcome: Ongoing (interventions implemented as appropriate)  Resting in bed. Alert, complaining of dizziness and mild nausea. Fall precautions maintained at all times. No falls/ injuries.  Patient passed OLY evaluation, cardiac diet ordered by MD.  Skin kept clean and dry

## 2017-03-20 NOTE — PT/OT/SLP EVAL
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       Past Medical History:   Diagnosis Date    Aortic stenosis     History of bleeding ulcers     Hypertension     Hypothyroidism     Sciatica of left side     Vitamin D deficiency       Past Surgical History:   Procedure Laterality Date    APPENDECTOMY      [facility]    PARATHYROIDECTOMY Right 2012    THYROID SURGERY Right     Partial thyroidectomy       Referring physician: FOREST Hudson  Date referred to OT: 3/20  General Precautions: Standard, aspiration, fall  Orthopedic Precautions: N/A  Braces: N/A    Do you have any cultural, spiritual, Advent conflicts, given your current situation?: Moravian     Patient History:  Prior level of function:   Patient resides in [place] with son in 2 story home with bedroom on the 2nd floor (since left hip replacement--2016).  PTA patient independent with feeding, grooming, toileting.  Patient was independent with dressing however describes it as being a struggle.  Assistance provided by an aide 2x week for sponge baths (Bathtub on 2nd floor).  Patient is right handed.  DME:  rolling walker and cane (reports no longer using the cane).  Hobbies:  reading, computer games.     Subjective:  Communicated with nurse prior to session.  Patient:  "I can't get up now.  I am so dizzy and nauseated.  I feel terrible.  I fell like I want to throw up but can't."  Pain Ratin/10   Pain Rating Post-Intervention: 0/10    Objective:  Patient found with: peripheral IV  Family not present.    Cognitive Exam:  Oriented to: Person, Place, Time and Situation  Follows Commands/attention: Follows " "one-step commands  Communication: clear/fluent  Memory:  No Deficits noted    Physical Exam:  Postural examination/scapula alignment: Rounded shoulder  Skin integrity: Visible skin intact  Edema: None noted     Sensation:   Intact  Light touch; bilaterally    Upper Extremity Range of Motion:  Right Upper Extremity: WNL  Left Upper Extremity: WNL    Upper Extremity Strength:  Right Upper Extremity: WNL  Left Upper Extremity: WNL    Functional Mobility:  Bed Mobility:  Rolling/Turning to Left: Modified independent  Rolling/Turning Right: Modified independent  Supine to Sit:  (Refused 2* nauseated and dizzy)    Transfers:  Sit <> Stand Assistance:  (Refused 2* nauseated and dizzy)    Activities of Daily Living:  Unable to participate in ADLs 2* dizziness and nauseated.     Additional Treatment:   Patient education provided on role of OT and need for further assessment for ADLs and transfers.  Patient alert and oriented x 3; able to follow 4/4 one step commands.  Patient attentive and interactive throughout the session.   Continued education, patient/ family training recommended.  Patient's functional status and disposition recommendation discussed with nurse.  White board updated in patient's room.  OT asked if there were any other questions; patient/ family had no further questions.      AM-PAC 6 CLICK ADL  How much help from another person does this patient currently need?  1 = Unable, Total/Dependent Assistance  2 = A lot, Maximum/Moderate Assistance  3 = A little, Minimum/Contact Guard/Supervision  4 = None, Modified [place]/Independent    Putting on and taking off regular lower body clothing? : 1  Bathing (including washing, rinsing, drying)?: 1  Toileting, which includes using toilet, bedpan, or urinal? : 1  Putting on and taking off regular upper body clothing?: 1  Taking care of personal grooming such as brushing teeth?: 1  Eating meals?: 1  Total Score: 6    AM-PAC Raw Score CMS "G-Code Modifier Level of " Impairment Assistance   6 % Total / Unable   7 - 9 CM 80 - 100% Maximal Assist   10 - 14 CL 60 - 80% Moderate Assist   15 - 19 CK 40 - 60% Moderate Assist   20 - 22 CJ 20 - 40% Minimal Assist   23 CI 1-20% SBA / CGA   24 CH 0% Independent/ Mod I       Patient left supine with all lines intact and call button in reach    Assessment:  Linda Hylton is a 82 y.o. 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 These performance deficits have resulted in activity limitations including but not limited to:  bed mobility, transfers, ascending/ descending stairs, walking short and long distances, walking around obstacles, transitional movement patterns (kneeling, bending); eating, upper body dressing, lower body dressing, brushing teeth, toileting, and bathing.   Patient's role as caretaker for self has been affected. Patient will benefit from skilled OT services to maximize level of independence with self-care skills and functional mobility.  Will benefit from further assessment of OOB activity, transfers and ADLs.    Rehab identified problem list/impairments: Rehab identified problem list/impairments: impaired functional mobilty, impaired balance    Rehab potential is good.    Activity tolerance: Good    Discharge recommendations: Discharge Facility/Level Of Care Needs:  (TBD once OOB activity assessed)     Barriers to discharge: Barriers to Discharge: None    Equipment recommendations: none     GOALS:   Occupational Therapy Goals        Problem: Occupational Therapy Goal    Goal Priority Disciplines Outcome Interventions   Occupational Therapy Goal     OT, PT/OT     Description:  Goals set 3/20 to be addressed for 7 days with expiration date, 3/27:  Patient will increase functional independence with ADLs by performing:    Patient will demonstrate rolling to the  right with modified independence.  Not met   Patient will demonstrate rolling to the left with modified independence.   Not met  Patient will demonstrate supine -sit with SBA.   Not met  Patient will demonstrate stand pivot transfers with SBA.   Not met  Patient will demonstrate grooming while standing with SBA.   Not met  Patient will demonstrate upper body dressing with SBA.   Not met  Patient will demonstrate lower body dressing with SBA.   Not met  Patient's family / caregiver will demonstrate independence and safety with assisting patient with self-care skills and functional mobility.     Not met                    PLAN:  Patient to be seen 6 x/week to address the above listed problems via self-care/home management, therapeutic activities, therapeutic exercises, sensory integration, cognitive retraining, neuromuscular re-education  Plan of Care expires: 04/18/17  Plan of Care reviewed with: patient    OT G-codes  Functional Assessment Tool Used: FIM  Score: 1  Functional Limitation: Self care (grooming seated)  Self Care Current Status (): CN  Self Care Goal Status (): EMILY Engel  03/20/2017

## 2017-03-20 NOTE — NURSING
Received patient from ED per stretcher. Alert and oriented x 4. Oriented to room/ unit. Call light placed within easy reach.

## 2017-03-20 NOTE — CONSULTS
PM&R consult received.  Reviewed patient history and current admission.  Full consult to follow.    JIM Carroll, FNP-C  Physical Medicine & Rehabilitation   03/20/2017  Spectralink: [id number]

## 2017-03-20 NOTE — PLAN OF CARE
PT Evaluation Complete. Pt will benefit from continued PT services; however, limited due to dizziness/nausea. Recommendations pending gait assessment/standing balance trial. Anticipate  PT.    Rochelle Ordaz, PT, DPT  [phone]  3/20/2017

## 2017-03-20 NOTE — PLAN OF CARE
Problem: Occupational Therapy Goal  Goal: Occupational Therapy Goal  Goals set 3/20 to be addressed for 7 days with expiration date, 3/27:  Patient will increase functional independence with ADLs by performing:    Patient will demonstrate rolling to the right with modified independence. Not met   Patient will demonstrate rolling to the left with modified independence. Not met  Patient will demonstrate supine -sit with SBA. Not met  Patient will demonstrate stand pivot transfers with SBA. Not met  Patient will demonstrate grooming while standing with SBA. Not met  Patient will demonstrate upper body dressing with SBA. Not met  Patient will demonstrate lower body dressing with SBA. Not met  Patients family / caregiver will demonstrate independence and safety with assisting patient with self-care skills and functional mobility. Not met  OT evaluation initiated.  Limited by dizziness and nausea.  EMILY Smart  3/20/2017

## 2017-03-20 NOTE — PT/OT/SLP EVAL
Speech Language Pathology      Linda Hylton  MRN: [id number]    Patient not seen today secondary to Unavailable (Echo at bedside). Will follow-up.    SANTIAGO Lynn, CCC-SLP

## 2017-03-20 NOTE — H&P
[facility]  Vascular Neurology  Comprehensive Stroke Center  History & Physical    Subjective:     History of Present Illness:  81 y/o female with history of Aortic Stenosis, HTN, Hypothyroidism, and bleeding ulcers who presents with acute onset of dizziness, nausea, and vomiting since 5:00am this morning.  The patient awoke normal and was able to go to bathroom without issues.  The symptoms started when she got up from the toilet.  She was able to use her walker and make it to the bed where she called 911.  In the ER, there was an episode where the patient was not communicating or responding.  Not clear if this was a true unresponsive episode or the patient's unwillingness to participate in exam.  Upon my exam, patient is awake alert and oriented participating in exam.  Patient denies weakness, numbness, speech, or vision symptoms.  She describes the dizziness as the room spinning.  Symptoms seem to improve when eyes are closed.         Past Medical History:   Diagnosis Date    Aortic stenosis     History of bleeding ulcers 2015    Hypertension     Hypothyroidism     Sciatica of left side     Vitamin D deficiency      Past Surgical History:   Procedure Laterality Date    APPENDECTOMY      [facility]    PARATHYROIDECTOMY Right 2012    THYROID SURGERY Right 2012    Partial thyroidectomy     Family History   Problem Relation Age of Onset    Heart disease Father     Heart disease Sister     Heart disease Brother     Diabetes Son      Social History   Substance Use Topics    Smoking status: Never Smoker    Smokeless tobacco: None    Alcohol use None     Review of patient's allergies indicates:   Allergen Reactions    Sulfa (sulfonamide antibiotics) Hives     Medications: I have reviewed the current medication administration record.      (Not in a hospital admission)    Review of Systems   Constitutional: Negative for chills and fever.   HENT: Negative for  congestion and sore throat.    Eyes: Negative for visual disturbance.   Respiratory: Negative for cough and shortness of breath.    Cardiovascular: Negative for chest pain.   Gastrointestinal: Positive for diarrhea, nausea and vomiting. Negative for abdominal pain.   Genitourinary: Negative for dysuria and frequency.   Musculoskeletal: Negative for arthralgias.   Neurological: Positive for dizziness. Negative for facial asymmetry, speech difficulty, weakness, numbness and headaches.   Psychiatric/Behavioral: Negative for agitation.     Objective:     Vital Signs (Most Recent):  Temp: 98.5 °F (36.9 °C) (03/20/17 0643)  Pulse: (!) 59 (03/20/17 0845)  Resp: 16 (03/20/17 0845)  BP: (!) 159/68 (03/20/17 0845)  SpO2: 98 % (03/20/17 0845)    Vital Signs Range (Last 24H):  Temp:  [98.5 °F (36.9 °C)]   Pulse:  [59-69]   Resp:  [16-18]   BP: (141-178)/()   SpO2:  [93 %-100 %]     Physical Exam   Constitutional: She appears well-developed and well-nourished.   HENT:   Head: Normocephalic and atraumatic.   Neck: Normal range of motion. Neck supple.   Cardiovascular: Normal rate.    Pulmonary/Chest: Effort normal.   Abdominal: Soft. There is no tenderness.   Neurological: GCS eye subscore is 4 - spontaneous. GCS verbal subscore is 5 - oriented. GCS motor subscore is 6 - obeys commands.   Skin: Skin is warm and dry.   Psychiatric: Her behavior is normal. Thought content normal.       Neurological Exam:   LOC: alert and follows requests  Language: No aphasia  Speech: No dysarthria  Orientation: Person, Place, Time  Memory: Age correct, Month correct  Visual Fields (CN II): Full  EOM (CN III, IV, VI): Full/intact  Pupils (CN III, IV, VI): PERRL  Facial Sensation (CN V): Symmetric  Facial Movement (CN VII): symmetric facial expression  Tongue (CN XII): to midline  Motor*: Arm Left:  Normal (5/5), Leg Left:   Normal (5/5), Arm Right:   Normal (5/5), Leg Right:   Normal (5/5)  Cerebellar*: Normal limb  Sensation: intact to light  touch, temperature and vibration  Tone: Arm-Left: normal; Leg-Left: normal; Arm-Right: normal; Leg-Right: normal    Stroke Team Times:   Last Known Normal Date and Time : 3/20/638208:00  Symptom Onset Date and Time:3/20/442826:00  Stroke Team Called Date and Time: 3/20/091339:50  Stroke Team Arrived Date and Time: 3/20/813262:00  CT Interpretation Time: 08:00    NIH Stroke Scale:  Interval: baseline (upon arrival/admit)  Level of Consciousness: 0 - alert  LOC Questions: 0 - answers both correctly  LOC Commands: 0 - performs both correctly  Best Gaze: 0 - normal  Visual: 0 - no visual loss  Facial Palsy: 0 - normal  Motor Left Arm: 0 - no drift  Motor Right Arm: 0 - no drift  Motor Left Le - no drift  Motor Right Le - no drift  Limb Ataxia: 0 - absent  Sensory: 0 - normal  Best Language: 0 - no aphasia  Dysarthria: 0 - normal articulation  Extinction and Inattention: 0 - no neglect  NIH Stroke Scale Total: 0  Miranda Coma Scale:  Best Eye Response: 4 - spontaneous  Best Motor Response: 6 - obeys commands  Best Verbal Response: 5 - oriented  Miranda Coma Scale Total: 15  Modified Valley Scale:   Timeline: Prior to symptoms onset  Modified Valley Score: 0 - no symptoms        Laboratory:  CMP:   Recent Labs  Lab 17  0704   CALCIUM 9.3   ALBUMIN 3.6   PROT 7.9      K 4.7   CO2 19*      BUN 31*   CREATININE 1.0   ALKPHOS 66   ALT 21   AST 23   BILITOT 0.2     CBC:   Recent Labs  Lab 17  0704   WBC 11.58   RBC 4.33   HGB 12.6   HCT 37.4      MCV 86   MCH 29.1   MCHC 33.7     Lipid Panel:   Recent Labs  Lab 17  0704   CHOL 229*   LDLCALC 138.6   HDL 33*   TRIG 287*     Coagulation:   Recent Labs  Lab 17  0704   INR 0.9     Hgb A1C: No results for input(s): HGBA1C in the last 168 hours.  TSH: No results for input(s): TSH in the last 168 hours.    Diagnostic Results:  Brain Imaging: CT Head. Date: 3/20/2017   No early infarct signs.  No hemorrhage.    MRI  Pending    Assessment/Plan:     83 y/o female with history of Aortic Stenosis, HTN, Hypothyroidism, and bleeding ulcers who presents with acute onset of dizziness, nausea, and vomiting since 5:00am this morning.  CT non contrast with no early infarct signs or hemorrhage.  Unclear of etiology at this time; difficult to distinguish if this is peripheral or central.  High suspicion of posterior circulation syndrome.  MRI Pending.  TPA not recommended given mild symptoms and no focal symptoms on exam.  We do not suspect a large vessel occlusion at this time.    Hypertension  -Stroke Risk factor  -BP Goals: < 220 SBP; < 100 DBP      Hypothyroidism  -Labs pending  -Continue home med Levothyroxine 75mcg daily      Dizziness  -ASA 325mg daily  -Atorvastatin 40mg daily (.6)  -PT/OT/SLP Evaluate and treat  -Imaging:  MRI/MRA head and neck; 2D Echo  -Labs: TSH, A1C  -VTE:  Enoxaparin 40mg SQ daily; SCDs  -Nursing:  Vital signs and Neuro checks every 4 hours; swallowing evaluation prior to PO  -IVF:  NS 75cc/Hr  -Meclizine 25mg 3 times daily as needed for dizziness      Nausea & vomiting  -Ondansetron 8mg every 8 hours as needed for nausea & vomiting        Thrombolysis Candidate? No  1. Contraindications: Mild symptoms; NIH 0; no focal deficits      Interventional Revascularization Candidate?  No; No large vessel occlusion    Research Candidate? Yes:  Stroke Mobile    Myah Hudson NP  [facility]  Department of Vascular Neurology   [facility]

## 2017-03-20 NOTE — PT/OT/SLP PROGRESS
Occupational Therapy      Linda Hylton  MRN: [id number]    Patient not seen today secondary to unavailable; away at MRI.     EMILY Wheeler  3/20/2017

## 2017-03-20 NOTE — ASSESSMENT & PLAN NOTE
-ASA 325mg daily  -Atorvastatin 40mg daily (.6)  -PT/OT/SLP Evaluate and treat  -Imaging:  MRI/MRA head and neck; 2D Echo  -Labs: TSH, A1C  -VTE:  Enoxaparin 40mg SQ daily; SCDs  -Nursing:  Vital signs and Neuro checks every 4 hours; swallowing evaluation prior to PO  -IVF:  NS 75cc/Hr  -Meclizine 25mg 3 times daily as needed for dizziness

## 2017-03-20 NOTE — PLAN OF CARE
Problem: Physical Therapy Goal  Goal: Physical Therapy Goal  Goals to be met by: 3/28/2017     Patient will increase functional independence with mobility by performin. Supine to sit with Stand-by Assistance  2. Sit to supine with Stand-by Assistance  3. Sit to stand transfer with Minimal Assistance  4. Bed to chair transfer with Minimal Assistance using Rolling Walker  5. Gait x25 feet with Stand-by Assistance using Rolling Walker.   6. Sitting at edge of bed x10 minutes with [place]  7. Stand for x10 minutes with Stand-by Assistance using Rolling Walker  8. Lower extremity exercise program x15 reps per handout, with supervision  Outcome: Ongoing (interventions implemented as appropriate)     PT Evaluation complete. Recommending  PT upon D/C.     Rochelle Ordaz, PT, DPT  [phone]  10/5/2016

## 2017-03-20 NOTE — PT/OT/SLP EVAL
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    Past Medical History:   Diagnosis Date    Aortic stenosis     History of bleeding ulcers     Hypertension     Hypothyroidism     Sciatica of left side     Vitamin D deficiency       Past Surgical History:   Procedure Laterality Date    APPENDECTOMY      [facility]GERY Left 2016    [facility]    PARATHYROIDECTOMY Right 2012    THYROID SURGERY Right 2012    Partial thyroidectomy     Referring physician: [place]  Date referred to PT: 3/20/2017    General Precautions: Standard, aspiration, fall    Do you have any cultural, spiritual, Scientology conflicts, given your current situation?: Voodoo    Patient History:  Lives With: child(smitha), adult  Living Arrangements: house  Living Environment Comment: Pt lives in 2SH with single step to enter with adult son. PTA pt was (I) with mobility and ADLs; pt reports her bedroom was moved to the first floor and she has access to a bathroom down the hallway. Pt reports no falls in the home. Pt uses a RW for all mobility. She has HH assist for bathing x2 times/week.  Equipment Currently Used at Home: none  DME owned (not currently used): single point cane    Previous Level of Function:  Ambulation Skills: needs device  Transfer Skills: needs device  ADL Skills: needs device  Work/Leisure Activity: needs device    Subjective:  Communicated with RN (Tanya) prior to session. PT attempted to see pt x3 instances this date.    Chief Complaint: "I am just too dizzy to walk. I don't think I will fall, but I am not sure. I don't want to throw up again."  Patient goals: "To go home."    Pain Ratin/10 (nausea)   Pain Rating " Post-Intervention: 0/10 (nausea)    Objective:   Patient found with: peripheral IV, telemetry     Cognitive Exam:  Oriented to: Person, Place and Situation    Follows Commands/attention: Follows multistep  commands  Communication: clear/fluent  Safety awareness/insight to disability: intact    Physical Exam:  Postural examination/scapula alignment: Rounded shoulder, Head forward and Posterior pelvic tilt    Skin integrity: Visible skin intact, Thin and Dry  Edema: None noted    Sensation:   Intact ; upon evaluation and per pt report, no changes in sensation to B LE.    Lower Extremity Range of Motion:  Right Lower Extremity: WFL  Left Lower Extremity: WFL    Lower Extremity Strength: No focal weakness noted  Right Lower Extremity: WFL  Left Lower Extremity: WFL     Gross motor coordination: WFL    Functional Mobility:  Bed Mobility:  Rolling/Turning Right: Minimum assistance  Scooting/Bridging: Minimum Assistance  Supine to Sit: Contact Guard Assistance (pt demo poor body mechanics; req increased assist)  Sit to Supine: Stand by Assistance    Transfers:  Sit <> Stand Assistance: Activity did not occur (pt declined 2/2 dizziness)    Gait:   Gait Distance: Deferred for subsequent session    Balance:   Static Sit: GOOD-: Takes MODERATE challenges from all directions but inconsistently  Dynamic Sit: FAIR+: Maintains balance through MINIMAL excursions of active trunk motion  Static Stand: ABHILASH    Pt tolerated sitting EOB x8 min with close SBA from PT; no evidence of postural instability noted. Pt attempting to sit still due to dizziness. Unable to assess head turns or postural control sitting EOB.    Therapeutic Activities and Exercises:  Pt agreeable to EOB sitting x2 min with PT for evaluation. Pt tolerated as above. PT and pt discussed mobility needs, recommendations and plan for follow up session. Pt in agreement for HH recommendations and Rehab/SNF recommendations if she is unable to walk without assist.  Questions/concerns addressed within PT scope of practice.     AM-PAC 6 CLICK MOBILITY  How much help from another person does this patient currently need?   1 = Unable, Total/Dependent Assistance  2 = A lot, Maximum/Moderate Assistance  3 = A little, Minimum/Contact Guard/Supervision  4 = None, Modified [place]/Independent    Turning over in bed (including adjusting bedclothes, sheets and blankets)?: 3  Sitting down on and standing up from a chair with arms (e.g., wheelchair, bedside commode, etc.): 3  Moving from lying on back to sitting on the side of the bed?: 3  Moving to and from a bed to a chair (including a wheelchair)?: 3  Need to walk in hospital room?: 3  Climbing 3-5 steps with a railing?: 3  Total Score: 18     AM-PAC Raw Score CMS G-Code Modifier Level of Impairment Assistance   6 % Total / Unable   7 - 9 CM 80 - 100% Maximal Assist   10 - 14 CL 60 - 80% Moderate Assist   15 - 19 CK 40 - 60% Moderate Assist   20 - 22 CJ 20 - 40% Minimal Assist   23 CI 1-20% SBA / CGA   24 CH 0% Independent/ Mod I     Patient left HOB elevated with all lines intact, call button in reach, bed alarm on and RN notified.    Assessment:   Linda Hylton is a 82 y.o. 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 PTA pt was mod (I) using RW for mobility and accepting intermittent assist for ADLs. Upon evaluation, pt presents with generalized instability and impaired balance 2/2 dizziness. Pt is not at her baseline and will benefit from continues PT services. Pt limited upon evaluation and unable to perform standing/gait assessment per pt preference. Patient will benefit from continued PT services to address:    Rehab identified problem list/impairments: Rehab identified problem list/impairments: impaired functional mobilty, gait instability, impaired balance    Rehab potential is good.    Activity tolerance: Good    Discharge recommendations: Discharge  Facility/Level Of Care Needs:  (Rehab vs HH pending gait assessment; anticipate HH; however, pt severely limited by nausea this date)     Barriers to discharge: Barriers to Discharge: None    Equipment recommendations: Equipment Needed After Discharge: none     GOALS:   Physical Therapy Goals        Problem: Physical Therapy Goal    Goal Priority Disciplines Outcome Goal Variances Interventions   Physical Therapy Goal     PT/OT, PT Ongoing (interventions implemented as appropriate)     Description:  Goals to be met by: 3/28/2017     Patient will increase functional independence with mobility by performin. Supine to sit with Stand-by Assistance  2. Sit to supine with Stand-by Assistance  3. Sit to stand transfer with Minimal Assistance  4. Bed to chair transfer with Minimal Assistance using Rolling Walker  5. Gait x25 feet with Stand-by Assistance using Rolling Walker.   6. Sitting at edge of bed x10 minutes with [place]  7. Stand for x10 minutes with Stand-by Assistance using Rolling Walker  8. Lower extremity exercise program x15 reps per handout, with supervision            PLAN:    Patient to be seen 6 x/week to address the above listed problems via gait training, therapeutic activities, therapeutic exercises, neuromuscular re-education  Plan of Care expires: 17  Plan of Care reviewed with: patient     Rochelle BRYAN Ordaz, PT, DPT  [phone]  3/20/2017

## 2017-03-20 NOTE — PLAN OF CARE
Patient resting in bed. Medicated with Zofran and Meclizine for complaints of nausea and dizziness with some relief of symptoms reported. She refused to get out of bed due to dizziness. Safety maintained at all times.  Afebrile. No complaints of pain. Kept rested and comfortable.

## 2017-03-20 NOTE — SUBJECTIVE & OBJECTIVE
Past Medical History:   Diagnosis Date    Aortic stenosis     History of bleeding ulcers 2015    Hypertension     Hypothyroidism     Sciatica of left side     Vitamin D deficiency      Past Surgical History:   Procedure Laterality Date    APPENDECTOMY      [facility]    PARATHYROIDECTOMY Right 2012    THYROID SURGERY Right 2012    Partial thyroidectomy     Family History   Problem Relation Age of Onset    Heart disease Father     Heart disease Sister     Heart disease Brother     Diabetes Son      Social History   Substance Use Topics    Smoking status: Never Smoker    Smokeless tobacco: None    Alcohol use None     Review of patient's allergies indicates:   Allergen Reactions    Sulfa (sulfonamide antibiotics) Hives     Medications: I have reviewed the current medication administration record.      (Not in a hospital admission)    Review of Systems   Constitutional: Negative for chills and fever.   HENT: Negative for congestion and sore throat.    Eyes: Negative for visual disturbance.   Respiratory: Negative for cough and shortness of breath.    Cardiovascular: Negative for chest pain.   Gastrointestinal: Positive for diarrhea, nausea and vomiting. Negative for abdominal pain.   Genitourinary: Negative for dysuria and frequency.   Musculoskeletal: Negative for arthralgias.   Neurological: Positive for dizziness. Negative for facial asymmetry, speech difficulty, weakness, numbness and headaches.   Psychiatric/Behavioral: Negative for agitation.     Objective:     Vital Signs (Most Recent):  Temp: 98.5 °F (36.9 °C) (03/20/17 0643)  Pulse: (!) 59 (03/20/17 0845)  Resp: 16 (03/20/17 0845)  BP: (!) 159/68 (03/20/17 0845)  SpO2: 98 % (03/20/17 0845)    Vital Signs Range (Last 24H):  Temp:  [98.5 °F (36.9 °C)]   Pulse:  [59-69]   Resp:  [16-18]   BP: (141-178)/()   SpO2:  [93 %-100 %]     Physical Exam   Constitutional: She appears well-developed and well-nourished.    HENT:   Head: Normocephalic and atraumatic.   Neck: Normal range of motion. Neck supple.   Cardiovascular: Normal rate.    Pulmonary/Chest: Effort normal.   Abdominal: Soft. There is no tenderness.   Neurological: GCS eye subscore is 4 - spontaneous. GCS verbal subscore is 5 - oriented. GCS motor subscore is 6 - obeys commands.   Skin: Skin is warm and dry.   Psychiatric: Her behavior is normal. Thought content normal.       Neurological Exam:   LOC: alert and follows requests  Language: No aphasia  Speech: No dysarthria  Orientation: Person, Place, Time  Memory: Age correct, Month correct  Visual Fields (CN II): Full  EOM (CN III, IV, VI): Full/intact  Pupils (CN III, IV, VI): PERRL  Facial Sensation (CN V): Symmetric  Facial Movement (CN VII): symmetric facial expression  Tongue (CN XII): to midline  Motor*: Arm Left:  Normal (5/5), Leg Left:   Normal (5/5), Arm Right:   Normal (5/5), Leg Right:   Normal (5/5)  Cerebellar*: Normal limb  Sensation: intact to light touch, temperature and vibration  Tone: Arm-Left: normal; Leg-Left: normal; Arm-Right: normal; Leg-Right: normal    Stroke Team Times:   Last Known Normal Date and Time : 3/20/272804:00  Symptom Onset Date and Time:3/20/701255:00  Stroke Team Called Date and Time: 3/20/330351:50  Stroke Team Arrived Date and Time: 3/20/055393:00  CT Interpretation Time: 08:00    NIH Stroke Scale:  Interval: baseline (upon arrival/admit)  Level of Consciousness: 0 - alert  LOC Questions: 0 - answers both correctly  LOC Commands: 0 - performs both correctly  Best Gaze: 0 - normal  Visual: 0 - no visual loss  Facial Palsy: 0 - normal  Motor Left Arm: 0 - no drift  Motor Right Arm: 0 - no drift  Motor Left Le - no drift  Motor Right Le - no drift  Limb Ataxia: 0 - absent  Sensory: 0 - normal  Best Language: 0 - no aphasia  Dysarthria: 0 - normal articulation  Extinction and Inattention: 0 - no neglect  NIH Stroke Scale Total: 0  Miranda Coma Scale:  Best Eye  Response: 4 - spontaneous  Best Motor Response: 6 - obeys commands  Best Verbal Response: 5 - oriented  Miranda Coma Scale Total: 15  Modified Perkins Scale:   Timeline: Prior to symptoms onset  Modified Ericka Score: 0 - no symptoms        Laboratory:  CMP:   Recent Labs  Lab 03/20/17  0704   CALCIUM 9.3   ALBUMIN 3.6   PROT 7.9      K 4.7   CO2 19*      BUN 31*   CREATININE 1.0   ALKPHOS 66   ALT 21   AST 23   BILITOT 0.2     CBC:   Recent Labs  Lab 03/20/17  0704   WBC 11.58   RBC 4.33   HGB 12.6   HCT 37.4      MCV 86   MCH 29.1   MCHC 33.7     Lipid Panel:   Recent Labs  Lab 03/20/17  0704   CHOL 229*   LDLCALC 138.6   HDL 33*   TRIG 287*     Coagulation:   Recent Labs  Lab 03/20/17  0704   INR 0.9     Hgb A1C: No results for input(s): HGBA1C in the last 168 hours.  TSH: No results for input(s): TSH in the last 168 hours.    Diagnostic Results:  Brain Imaging: CT Head. Date: 3/20/2017   No early infarct signs.  No hemorrhage.    MRI Pending

## 2017-03-21 VITALS
WEIGHT: 148.38 LBS | OXYGEN SATURATION: 94 % | RESPIRATION RATE: 18 BRPM | TEMPERATURE: 99 F | HEIGHT: 57 IN | BODY MASS INDEX: 32.01 KG/M2 | SYSTOLIC BLOOD PRESSURE: 145 MMHG | DIASTOLIC BLOOD PRESSURE: 65 MMHG | HEART RATE: 77 BPM

## 2017-03-21 LAB
ALBUMIN SERPL BCP-MCNC: 3.5 G/DL
ALP SERPL-CCNC: 63 U/L
ALT SERPL W/O P-5'-P-CCNC: 13 U/L
ANION GAP SERPL CALC-SCNC: 14 MMOL/L
APTT BLDCRRT: 25.1 SEC
AST SERPL-CCNC: 17 U/L
BASOPHILS # BLD AUTO: 0.02 K/UL
BASOPHILS NFR BLD: 0.2 %
BILIRUB SERPL-MCNC: 0.4 MG/DL
BUN SERPL-MCNC: 23 MG/DL
CALCIUM SERPL-MCNC: 9.6 MG/DL
CHLORIDE SERPL-SCNC: 106 MMOL/L
CK MB SERPL-MCNC: 5.1 NG/ML
CK MB SERPL-RTO: 4.9 %
CK SERPL-CCNC: 104 U/L
CO2 SERPL-SCNC: 21 MMOL/L
CREAT SERPL-MCNC: 0.8 MG/DL
DIFFERENTIAL METHOD: ABNORMAL
EOSINOPHIL # BLD AUTO: 0.1 K/UL
EOSINOPHIL NFR BLD: 1.1 %
ERYTHROCYTE [DISTWIDTH] IN BLOOD BY AUTOMATED COUNT: 14.1 %
EST. GFR  (AFRICAN AMERICAN): >60 ML/MIN/1.73 M^2
EST. GFR  (NON AFRICAN AMERICAN): >60 ML/MIN/1.73 M^2
GLUCOSE SERPL-MCNC: 77 MG/DL
HCT VFR BLD AUTO: 36.7 %
HGB BLD-MCNC: 11.7 G/DL
INR PPP: 0.9
LYMPHOCYTES # BLD AUTO: 2.2 K/UL
LYMPHOCYTES NFR BLD: 20.9 %
MAGNESIUM SERPL-MCNC: 1.8 MG/DL
MCH RBC QN AUTO: 28.1 PG
MCHC RBC AUTO-ENTMCNC: 31.9 %
MCV RBC AUTO: 88 FL
MONOCYTES # BLD AUTO: 0.8 K/UL
MONOCYTES NFR BLD: 7.8 %
NEUTROPHILS # BLD AUTO: 7.2 K/UL
NEUTROPHILS NFR BLD: 69.4 %
PHOSPHATE SERPL-MCNC: 3.7 MG/DL
PLATELET # BLD AUTO: 232 K/UL
PMV BLD AUTO: 10.5 FL
POTASSIUM SERPL-SCNC: 4.1 MMOL/L
PROT SERPL-MCNC: 7.3 G/DL
PROTHROMBIN TIME: 10.1 SEC
RBC # BLD AUTO: 4.16 M/UL
SODIUM SERPL-SCNC: 141 MMOL/L
TROPONIN I SERPL DL<=0.01 NG/ML-MCNC: 0.01 NG/ML
WBC # BLD AUTO: 10.32 K/UL

## 2017-03-21 PROCEDURE — 36415 COLL VENOUS BLD VENIPUNCTURE: CPT

## 2017-03-21 PROCEDURE — G8998 SWALLOW D/C STATUS: HCPCS | Mod: CH

## 2017-03-21 PROCEDURE — G8988 SELF CARE GOAL STATUS: HCPCS | Mod: CJ

## 2017-03-21 PROCEDURE — G8997 SWALLOW GOAL STATUS: HCPCS | Mod: CH

## 2017-03-21 PROCEDURE — 99232 SBSQ HOSP IP/OBS MODERATE 35: CPT | Mod: ,,, | Performed by: PSYCHIATRY & NEUROLOGY

## 2017-03-21 PROCEDURE — 85610 PROTHROMBIN TIME: CPT

## 2017-03-21 PROCEDURE — 63700000 PHARM REV CODE 250 ALT 637 W/O HCPCS: Performed by: NURSE PRACTITIONER

## 2017-03-21 PROCEDURE — 84484 ASSAY OF TROPONIN QUANT: CPT

## 2017-03-21 PROCEDURE — 25000003 PHARM REV CODE 250: Performed by: STUDENT IN AN ORGANIZED HEALTH CARE EDUCATION/TRAINING PROGRAM

## 2017-03-21 PROCEDURE — 85025 COMPLETE CBC W/AUTO DIFF WBC: CPT

## 2017-03-21 PROCEDURE — 99222 1ST HOSP IP/OBS MODERATE 55: CPT | Mod: ,,, | Performed by: NURSE PRACTITIONER

## 2017-03-21 PROCEDURE — 80053 COMPREHEN METABOLIC PANEL: CPT

## 2017-03-21 PROCEDURE — 25000003 PHARM REV CODE 250: Performed by: NURSE PRACTITIONER

## 2017-03-21 PROCEDURE — 85730 THROMBOPLASTIN TIME PARTIAL: CPT

## 2017-03-21 PROCEDURE — 83735 ASSAY OF MAGNESIUM: CPT

## 2017-03-21 PROCEDURE — 84100 ASSAY OF PHOSPHORUS: CPT

## 2017-03-21 PROCEDURE — 97535 SELF CARE MNGMENT TRAINING: CPT

## 2017-03-21 PROCEDURE — 92610 EVALUATE SWALLOWING FUNCTION: CPT

## 2017-03-21 PROCEDURE — 97530 THERAPEUTIC ACTIVITIES: CPT

## 2017-03-21 PROCEDURE — 82553 CREATINE MB FRACTION: CPT

## 2017-03-21 PROCEDURE — G8996 SWALLOW CURRENT STATUS: HCPCS | Mod: CH

## 2017-03-21 PROCEDURE — G8989 SELF CARE D/C STATUS: HCPCS | Mod: CK

## 2017-03-21 PROCEDURE — 92523 SPEECH SOUND LANG COMPREHEN: CPT

## 2017-03-21 PROCEDURE — 97802 MEDICAL NUTRITION INDIV IN: CPT

## 2017-03-21 PROCEDURE — 97112 NEUROMUSCULAR REEDUCATION: CPT

## 2017-03-21 RX ORDER — ASPIRIN 325 MG
325 TABLET, DELAYED RELEASE (ENTERIC COATED) ORAL DAILY
Refills: 0 | Status: ON HOLD | COMMUNITY
Start: 2017-03-21 | End: 2019-03-04 | Stop reason: HOSPADM

## 2017-03-21 RX ORDER — ONDANSETRON 8 MG/1
8 TABLET, ORALLY DISINTEGRATING ORAL EVERY 8 HOURS PRN
Qty: 10 TABLET | Refills: 0 | Status: SHIPPED | OUTPATIENT
Start: 2017-03-21 | End: 2017-04-10 | Stop reason: SDUPTHER

## 2017-03-21 RX ORDER — CIPROFLOXACIN 500 MG/1
500 TABLET ORAL EVERY 12 HOURS
Status: DISCONTINUED | OUTPATIENT
Start: 2017-03-21 | End: 2017-03-21 | Stop reason: HOSPADM

## 2017-03-21 RX ORDER — ATORVASTATIN CALCIUM 40 MG/1
40 TABLET, FILM COATED ORAL DAILY
Qty: 30 TABLET | Refills: 3 | Status: ON HOLD | OUTPATIENT
Start: 2017-03-21 | End: 2019-03-04 | Stop reason: HOSPADM

## 2017-03-21 RX ORDER — MECLIZINE HYDROCHLORIDE 25 MG/1
25 TABLET ORAL 3 TIMES DAILY PRN
Qty: 10 TABLET | Refills: 0 | Status: SHIPPED | OUTPATIENT
Start: 2017-03-21 | End: 2017-04-10 | Stop reason: SDUPTHER

## 2017-03-21 RX ORDER — CIPROFLOXACIN 500 MG/1
500 TABLET ORAL EVERY 12 HOURS
Qty: 13 TABLET | Refills: 0 | Status: SHIPPED | OUTPATIENT
Start: 2017-03-21 | End: 2017-03-28

## 2017-03-21 RX ADMIN — VITAMIN D, TAB 1000IU (100/BT) 5000 UNITS: 25 TAB at 09:03

## 2017-03-21 RX ADMIN — LEVOTHYROXINE SODIUM 75 MCG: 75 TABLET ORAL at 06:03

## 2017-03-21 RX ADMIN — ALPRAZOLAM 0.5 MG: 0.5 TABLET ORAL at 09:03

## 2017-03-21 RX ADMIN — ATORVASTATIN CALCIUM 40 MG: 20 TABLET, FILM COATED ORAL at 09:03

## 2017-03-21 RX ADMIN — FERROUS SULFATE TAB EC 325 MG (65 MG FE EQUIVALENT) 325 MG: 325 (65 FE) TABLET DELAYED RESPONSE at 09:03

## 2017-03-21 RX ADMIN — SODIUM CHLORIDE, PRESERVATIVE FREE 3 ML: 5 INJECTION INTRAVENOUS at 06:03

## 2017-03-21 RX ADMIN — ACETAMINOPHEN 650 MG: 325 TABLET ORAL at 09:03

## 2017-03-21 RX ADMIN — CIPROFLOXACIN HYDROCHLORIDE 500 MG: 500 TABLET, FILM COATED ORAL at 10:03

## 2017-03-21 RX ADMIN — REGULAR STRENGTH 325 MG: 325 TABLET ORAL at 09:03

## 2017-03-21 RX ADMIN — FENOFIBRATE 160 MG: 160 TABLET ORAL at 09:03

## 2017-03-21 RX ADMIN — PANTOPRAZOLE SODIUM 40 MG: 40 TABLET, DELAYED RELEASE ORAL at 09:03

## 2017-03-21 NOTE — PLAN OF CARE
RN: Patient free from falls Met      RN: Medication route is accurately determined Met      RN: Patient is free from risk of subluxation Met      RN: Diet order updated and/or patient verified to remain NPO within 6 hours of admit Met

## 2017-03-21 NOTE — PLAN OF CARE
Problem: SLP Goal  Goal: SLP Goal     Speech,lang, cognitive and swallow eval completed. Recommend continue regular diet and thin liquids. No further speech tx recommended.      SANTIAGO Grier, CCC-SLP  3/21/2017]

## 2017-03-21 NOTE — PLAN OF CARE
Problem: Patient Care Overview  Goal: Plan of Care Review  Recommendations     Recommendation/Intervention: Provided nutrition education and stroke nutrition therapy.   1. PO intake >/=75%   2. continue cardiac diet.   RD following     Goals: Pt to meet >/=75% of energy needs.  Nutrition Goal Status: new  Communication of RD Recs: reviewed with RN

## 2017-03-21 NOTE — PLAN OF CARE
03/21/17 1416   Discharge Assessment   Assessment Type Discharge Planning Assessment   Confirmed/corrected address and phone number on facesheet? Yes   Assessment information obtained from? Patient   Communicated expected length of stay with patient/caregiver yes   Prior to hospitilization cognitive status: Alert/Oriented   Prior to hospitalization functional status: Independent   Current Functional Status: Independent   Arrived From home or self-care   Lives With child(smitha), adult   Able to Return to Prior Arrangements yes   Is patient able to care for self after discharge? Yes   How many people do you have in your home that can help with your care after discharge? 1   Who are your caregiver(s) and their phone number(s)? (son Michael [phone] if needed)   Readmission Within The Last 30 Days no previous admission in last 30 days   Patient currently being followed by outpatient case management? No   Patient currently receives home health services? No   Does the patient currently use HME? No   Patient currently receives private duty nursing? N/A   Equipment Currently Used at Home walker, rolling   Do you have any problems affording any of your prescribed medications? No   Is the patient taking medications as prescribed? yes   Do you have any financial concerns preventing you from receiving the healthcare you need? No   Does the patient have transportation to healthcare appointments? Yes   Transportation Available car   On Dialysis? No   Does the patient receive services at the Coumadin Clinic? No   Are there any open cases? No   Discharge Plan A Home with family  ([facility])   Discharge Plan B Home with family   Patient/Family In Agreement With Plan yes

## 2017-03-21 NOTE — SUBJECTIVE & OBJECTIVE
NIH Stroke Scale:  Interval: 7 days or at discharge (whichever comes first)  Level of Consciousness: 0 - alert  LOC Questions: 0 - answers both correctly  LOC Commands: 0 - performs both correctly  Best Gaze: 0 - normal  Visual: 0 - no visual loss  Facial Palsy: 0 - normal  Motor Left Arm: 0 - no drift  Motor Right Arm: 0 - no drift  Motor Left Le - no drift  Motor Right Le - no drift  Limb Ataxia: 0 - absent  Sensory: 0 - normal  Best Language: 0 - no aphasia  Dysarthria: 0 - normal articulation  Extinction and Inattention: 0 - no neglect  NIH Stroke Scale Total: 0  Miranda Coma Scale:  Best Eye Response: 4 - spontaneous  Best Motor Response: 6 - obeys commands  Best Verbal Response: 5 - oriented  [place] Coma Scale Total: 15  Modified Geneva Scale:   Timeline:  Modified Geneva Score: 0 - no symptoms

## 2017-03-21 NOTE — PLAN OF CARE
Problem: Physical Therapy Goal  Goal: Physical Therapy Goal  Goals to be met by: 3/28/2017     Patient will increase functional independence with mobility by performin. Supine to sit with Stand-by Assistance  2. Sit to supine with Stand-by Assistance  3. Sit to stand transfer with Minimal Assistance  4. Bed to chair transfer with Minimal Assistance using Rolling Walker  5. Gait x25 feet with Stand-by Assistance using Rolling Walker.   6. Sitting at edge of bed x10 minutes with [place]  7. Stand for x10 minutes with Stand-by Assistance using Rolling Walker  8. Lower extremity exercise program x15 reps per handout, with supervision   Outcome: Outcome(s) achieved Date Met:  17     Pt has met all the above goals with the exception of #8. Pt to D/C home with  this date. D/C Acute PT services 3/21/2017.     Rochelle Ordaz, PT, DPT  [phone]  3/21/2017

## 2017-03-21 NOTE — PT/OT/SLP PROGRESS
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  Patient:  "Thank you for your help."  "My throat is sore.  I was throwing up so much yesterday."  "I want to go home but I am not comfortable going home today."  "My son lives with me and can help.  He works from 2am-6am."  "My daughter is a ; she usually comes over around 3:30.  My other son and his wife will be moving in with me at the end of the month and staying with me until they find a house."  "I would like to use [email or web address]; but I think the name of that company has changed."  Pain Ratin/10   Pain Rating Post-Intervention: 0/10    Objective:  Patient found with: peripheral IV, telemetry  Family not present.     Functional Mobility:  Bed Mobility:  Rolling/Turning to Left: Modified independent  Rolling/Turning Right: Modified independent  Scooting/Bridging: Supervision  Supine to Sit: Minimum Assistance (from the right side)  Sit to Supine: Stand by Assistance    Transfers:   Sit <> Stand Assistance: Contact Guard Assistance  Sit <> Stand Assistive Device: No Assistive Device  Bed <> Chair Technique: Stand Pivot  Bed <> Chair Transfer Assistance: Minimum Assistance    Activities of Daily Living:  Feeding Level of Assistance: Independent  UE Dressing Level of Assistance: Stand by assistance  LE Dressing Level of Assistance: Minimum assistance  Grooming Position: Standing  Grooming Level of Assistance: Minimum " assistance     Additional Treatment:   Patient education provided on role of OT and need for HH upon discharge.  Patient education provided on bed mobility, ADLs and transfers. Patient verbalizing understanding via teach back method. Continued education, patient/ family training recommended. SBA with postural control while seated EOB. SBA with bridging while supine.  Patient's functional status and disposition recommendation discussed with stroke team in daily rounds.  White board updated in patient's room.  OT asked if there were any other questions; patient/ family had no further questions.      AM-PAC 6 CLICK ADL   How much help from another person does this patient currently need?   1 = Unable, Total/Dependent Assistance  2 = A lot, Maximum/Moderate Assistance  3 = A little, Minimum/Contact Guard/Supervision  4 = None, Modified [place]/Independent    Putting on and taking off regular lower body clothing? : 3  Bathing (including washing, rinsing, drying)?: 3  Toileting, which includes using toilet, bedpan, or urinal? : 3  Putting on and taking off regular upper body clothing?: 3  Taking care of personal grooming such as brushing teeth?: 3  Eating meals?: 4  Total Score: 19     AM-PAC Raw Score CMS G-Code Modifier Level of Impairment Assistance   6 % Total / Unable   7 - 9 CM 80 - 100% Maximal Assist   10 - 14 CL 60 - 80% Moderate Assist   15 - 19 CK 40 - 60% Moderate Assist   20 - 22 CJ 20 - 40% Minimal Assist   23 CI 1-20% SBA / CGA   24 CH 0% Independent/ Mod I     Patient left supine with all lines intact and call button in reach    Assessment:  Linda Hylton is a 82 y.o. 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 These performance deficits have resulted in activity limitations including but not limited to: bed mobility, transfers, ascending/  descending stairs, walking short and long distances, walking around obstacles, transitional movement patterns (kneeling, bending); eating, upper body dressing, lower body dressing, brushing teeth, toileting, and bathing. Patient's role as mother, grand mother and caretaker for self has been affected. Patient will benefit from skilled OT services to maximize level of independence with self-care skills and functional mobility. Will benefit from HH.  Improvements noted with tolerance for mobility.    Rehab identified problem list/impairments: Rehab identified problem list/impairments: impaired sensation, impaired balance, gait instability, impaired functional mobilty, impaired self care skills    Rehab potential is good.    Activity tolerance: Good    Discharge recommendations: Discharge Facility/Level Of Care Needs: home health OT     Barriers to discharge: Barriers to Discharge: Decreased caregiver support, Inaccessible home environment    Equipment recommendations: 3-in-1 commode, bath bench     GOALS:   Occupational Therapy Goals        Problem: Occupational Therapy Goal    Goal Priority Disciplines Outcome Interventions   Occupational Therapy Goal     OT, PT/OT Ongoing (interventions implemented as appropriate)    Description:  Goals set 3/20 to be addressed for 7 days with expiration date, 3/27:  Patient will increase functional independence with ADLs by performing:    Patient will demonstrate rolling to the right with modified independence.  Not met   Patient will demonstrate rolling to the left with modified independence.   Not met  Patient will demonstrate supine -sit with SBA.   Not met  Patient will demonstrate stand pivot transfers with SBA.   Not met  Patient will demonstrate grooming while standing with SBA.   Not met  Patient will demonstrate upper body dressing with SBA.   Not met  Patient will demonstrate lower body dressing with SBA.   Not met  Patient's family / caregiver will demonstrate  independence and safety with assisting patient with self-care skills and functional mobility.     Not met                    Plan:  Patient to be seen 6 x/week to address the above listed problems via self-care/home management, therapeutic activities, therapeutic exercises, neuromuscular re-education, sensory integration  Plan of Care expires: 04/18/17  Plan of Care reviewed with: patient         Camille EMILY Guillermo  03/21/2017

## 2017-03-21 NOTE — SUBJECTIVE & OBJECTIVE
Neurologic Chief Complaint: Dizziness    Subjective:     Interval History: Patient is seen for follow-up neurological assessment and treatment recommendations: No acute issues or events overnight.  Symptoms resolved this morning.      HPI, Past Medical, Family, and Social History remains the same as documented in the initial encounter.     Review of Systems   Constitutional: Negative for fever.   Eyes: Negative for visual disturbance.   Respiratory: Negative for shortness of breath.    Cardiovascular: Negative for chest pain.   Gastrointestinal: Negative for nausea and vomiting.   Genitourinary: Negative for dysuria, frequency and hematuria.   Neurological: Negative for dizziness, speech difficulty and weakness.     Scheduled Meds:   aspirin  325 mg Oral Daily    atorvastatin  40 mg Oral Daily    ciprofloxacin HCl  500 mg Oral Q12H    enoxaparin  40 mg Subcutaneous Daily    fenofibrate  160 mg Oral Daily    ferrous sulfate  325 mg Oral Daily    levothyroxine  75 mcg Oral Daily    pantoprazole  40 mg Oral Daily    sodium chloride 0.9%  3 mL Intravenous Q8H    vitamin D  5,000 Units Oral Daily     Continuous Infusions:   sodium chloride 0.9% 75 mL/hr at 03/21/17 1600    sodium chloride 0.9%       PRN Meds:acetaminophen, alprazolam, meclizine, ondansetron, sodium chloride 0.9%    Objective:     Vital Signs (Most Recent):  Temp: 98.5 °F (36.9 °C) (03/21/17 1618)  Pulse: 77 (03/21/17 1618)  Resp: 18 (03/21/17 1618)  BP: (!) 145/65 (03/21/17 1618)  SpO2: (!) 94 % (03/21/17 1618)  BP Location: Left arm    Vital Signs Range (Last 24H):  Temp:  [98.2 °F (36.8 °C)-99.1 °F (37.3 °C)]   Pulse:  [62-90]   Resp:  [16-18]   BP: (132-169)/(58-73)   SpO2:  [94 %-96 %]   BP Location: Left arm    Physical Exam   Constitutional: She appears well-developed and well-nourished.   Cardiovascular: Normal rate.    Pulmonary/Chest: Effort normal.   Neurological: GCS eye subscore is 4 - spontaneous. GCS verbal subscore is 5 -  oriented. GCS motor subscore is 6 - obeys commands.   Skin: Skin is warm and dry.   Psychiatric: She has a normal mood and affect. Her behavior is normal. Judgment and thought content normal.       Neurological Exam:   LOC: alert and follows requests  Language: No aphasia  Speech: No dysarthria  Visual Fields (CN II): Full  EOM (CN III, IV, VI): Full/intact  Pupils (CN III, IV, VI): PERRL  Facial Sensation (CN V): Symmetric, Corneal reflex intact  Facial Movement (CN VII): symmetric facial expression  Motor*: Arm Left:  Normal (5/5), Leg Left:   Normal (5/5), Arm Right:   Normal (5/5), Leg Right:   Normal (5/5)  Sensation: intact to light touch, temperature and vibration    NIH Stroke Scale:    Level of Consciousness: 0 - alert  LOC Questions: 0 - answers both correctly  LOC Commands: 0 - performs both correctly  Best Gaze: 0 - normal  Visual: 0 - no visual loss  Facial Palsy: 0 - normal  Motor Left Arm: 0 - no drift  Motor Right Arm: 0 - no drift  Motor Left Le - no drift  Motor Right Le - no drift  Limb Ataxia: 0 - absent  Sensory: 0 - normal  Best Language: 0 - no aphasia  Dysarthria: 0 - normal articulation  Extinction and Inattention: 0 - no neglect  NIH Stroke Scale Total: 0  [place] Coma Scale:  Best Eye Response: 4 - spontaneous  Best Motor Response: 6 - obeys commands  Best Verbal Response: 5 - oriented  Miranda Coma Scale Total: 15      Laboratory:  CMP:   Recent Labs  Lab 17  0434   CALCIUM 9.6   ALBUMIN 3.5   PROT 7.3      K 4.1   CO2 21*      BUN 23   CREATININE 0.8   ALKPHOS 63   ALT 13   AST 17   BILITOT 0.4     CBC:   Recent Labs  Lab 17  0434   WBC 10.32   RBC 4.16   HGB 11.7*   HCT 36.7*      MCV 88   MCH 28.1   MCHC 31.9*     Lipid Panel:   Recent Labs  Lab 17  0704   CHOL 229*   LDLCALC 138.6   HDL 33*   TRIG 287*     Coagulation:   Recent Labs  Lab 17  0434   INR 0.9   APTT 25.1     Hgb A1C:   Recent Labs  Lab 17  0704   HGBA1C 5.8      TSH: No results for input(s): TSH in the last 168 hours.    Diagnostic Results:  I have personally reviewed: MRI Head. Date: 3/20/2017  Findings: Diffusion restricted lesion in the right vermis.  No hemorrhage    2D echo 3/20/2017    1 - Normal left ventricular systolic function (EF 65-70%).     2 - Left ventricular diastolic dysfunction.     3 - Normal right ventricular systolic function .     4 - Trivial to mild aortic stenosis, EDOUARD = 1.96 cm2, peak velocity = 2.3 m/s, mean gradient = 11.0 mmHg.     5 - Mild aortic regurgitation.     6 - The mitral valve is moderately sclerotic with evidence of chordal MARSHAL.     7 - Much of the gradient across the aortic valve is likely LVOT gradient.     8 - Moderate to severe mitral regurgitation.     9 - Trivial to mild tricuspid regurgitation.     10 - Trivial to mild pulmonic regurgitation.     11 - The estimated PA systolic pressure is greater than 27 mmHg.

## 2017-03-21 NOTE — PLAN OF CARE
POC reviewed with patient. Verbalized understanding. AAOx4. Pt remained free from falls and injuries this shift. No new skin breakdown noted. VSS. Afebrile. Dizziness and nausea subsided overnight. NS @ 75 cc/hr. Safety maintained. Telemetry monitoring in progress- NSR. Stroke booklet reviewed. Questions and concerns addressed. No acute events overnight. Pt progressing towards goals. Will continue to monitor.

## 2017-03-21 NOTE — CONSULTS
Consult Note  Physical Medicine & Rehab    Consult Requested By:  Keith Del Valle MD      Admit Date:  3/20/2017  Admitting Diagnosis:  Dizziness [R42], Vertigo [R42], Nausea and vomiting, intractability of vomiting not specified, unspecified vomiting type [R11.2]    Reason for Consult:  Assess rehab needs    SUBJECTIVE:   History of Present Illness:  Linda Hylton is an 82-year-old female with PMHx of vitamin D deficiency, HTN, aortic stenosis, hypothyroidism, hip replacement, and sciatica.  Patient [place] [facility] [place] with dizziness, weakness, and N/V.  CTH without acute pathology.  Not tPA candidate 2/2 mid symptoms.  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    Patient continues to complain of dizziness, which improves when closing her eyes.  Reports spots in vision and intermittent black zig zag lines in vision on R.   Denies diplopia and spinning sensation/room spinning.      Current Functional Status:  Evaluated by therapy.  Bed mobility mod I-SBA.  Sit to stand CGA and transfers Mouna.  No gait 2/2 dizziness.  UBD SBA and LBD Mouna.     Functional History: Patient lives in [place] with her son in a 2 story home with one step to enter.  Prior to admission, she required assistance with ADLs and bathing (home health aid twice per week) and ambulated with RW.  DME: RW.    Review of Systems  Constitutional: No fever or chills.  No malaise or fatigue.  Skin: No rashes or lesions.   Eyes: Positive visual changes.  ENT: No nasal congestion, sore throat, or ear pain.  No difficulty swallowing.   Respiratory: No shortness of breath or wheezing.  No cough.  Cardiovascular: No chest pain or palpitations.  No edema.   Gl: No nausea or vomiting.  No abdominal pain.  No incontinence of bowel.  No constipation.   : No incontinence of bladder.   Musculoskeletal: Positive arthralgias.  No bone pain.  No muscle weakness.  Neurological: No seizures or tremors.  No  weakness.  No sensory impairment.  Positive dizziness.   Behavioral/Psych: No changes in mood or hallucinations.    Past Medical History:   Diagnosis Date    Aortic stenosis     History of bleeding ulcers 2015    Hypertension     Hypothyroidism     Sciatica of left side     Vitamin D deficiency      Past Surgical History:   Procedure Laterality Date    APPENDECTOMY      [facility]    PARATHYROIDECTOMY Right 2012    THYROID SURGERY Right 2012    Partial thyroidectomy     Family History   Problem Relation Age of Onset    Heart disease Father     Heart disease Sister     Heart disease Brother     Diabetes Son      Social History   Substance Use Topics    Smoking status: Never Smoker    Smokeless tobacco: None    Alcohol use None     Review of patient's allergies indicates:   Allergen Reactions    Sulfa (sulfonamide antibiotics) Hives        Scheduled Medications:   aspirin  325 mg Oral Daily    atorvastatin  40 mg Oral Daily    ciprofloxacin HCl  500 mg Oral Q12H    enoxaparin  40 mg Subcutaneous Daily    fenofibrate  160 mg Oral Daily    ferrous sulfate  325 mg Oral Daily    levothyroxine  75 mcg Oral Daily    pantoprazole  40 mg Oral Daily    sodium chloride 0.9%  3 mL Intravenous Q8H    vitamin D  5,000 Units Oral Daily     PRN Medications:  acetaminophen, alprazolam, meclizine, ondansetron, sodium chloride 0.9%    OBJECTIVE:     Vital Signs (Most Recent)  Temp: 98.2 °F (36.8 °C) (03/21/17 0750)  Pulse: 84 (03/21/17 0900)  Resp: 18 (03/21/17 0750)  BP: (!) 169/73 (03/21/17 0750)  SpO2: (!) 94 % (03/21/17 0750)    Vital Signs Range (Last 24H):  Temp:  [96.3 °F (35.7 °C)-99.1 °F (37.3 °C)]   Pulse:  [60-84]   Resp:  [13-22]   BP: (124-169)/(52-73)   SpO2:  [92 %-99 %]     Physical Exam:  Vital signs reviewed.   General appearance:  No apparent distress.  Appears well-developed and well-nourished.  Skin:  Color and texture normal.  Warm and dry.  No jaundice.  No  visible rashes or visible lesions.  HEENT:  Normocephalic and atraumatic.  No scleral icterus.  No difficulty hearing.  No nasal discharge.  No dysphonia.  Pulmonary:  Normal respiratory effort.  No cough.  Cardiac:  Normal heart rate.  Extremities: No calf tenderness.  Distal pulses intact.  No edema.    Abdomen:  Soft, non-tender and non-distended.  Musculoskeletal:  Moves all extremities spontaneously.  ROM: Normal.    Neurologic:  -  Mental Status:  AAOx3.  Follows commands.  Answers correct age and .  Recent and remote memory intact.  Recalls 3/3 objects.  No neglect.    -  Speech and language:  No aphasia or dysarthria.    -  Vision:  No hemianopsia or ptosis.    -  Facial movement (CN VII): symmetrical.   -  Coordination:  Finger to nose exam:  RUE normal, LUE normal.   -  Motor:  No pronator drift. RUE: 5/5.  LUE: 5/5.  RLE: 5/5.  LLE: 5/5.  -  Tone:  Normal.   -  Sensory:  Intact to light touch and pin prick.  Behavioral/Psych: Calm and cooperative.    Diagnostic Results:  CT: Reviewed  MRI: Reviewed  Labs: Reviewed    ASSESSMENT/PLAN:      Linda Hylton is a 82 y.o. female admitted on 3/20/2017 for dizziness, weakness, and N/V.  CTH without acute pathology.  Not tPA candidate 2/2 mid symptoms.  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    PM&R consulted to assess rehab needs.     Functional status: Bed mobility mod I-SBA.  Sit to stand CGA and transfers Mouna.  No gait 2/2 dizziness.  UBD SBA and LBD Mouna.   Cognitive/Speech/Language status:  Evaluation pending.   Nutrition/Swallow Status:  Tolerating regaulr diet and thin liquids.    -  Reviewed discharge options with patient (inpatient rehab, SNF, home health therapy, and outpatient therapy).  Encourage participation with therapy.  -  Recommendations  · PT and OT evaluate and treat.   · SLP cognitive and speech evaluate and treat.   · Mobility, OOB in chair at least 3 hours per day, and early  ambulation as appropriate.  · Establish consistent sleep-wake cycle and monitor for sleep disturbances.  · DVT prophylaxis:  Lovenox 40 mg daily scheduled.    Patient with rehab goals; however, likely near baseline (required assistance with ADLs and ambulated house hold distances with walker).  Will follow patient's progress for final recommendations.    Thank you for consult.    JIM Carroll, FNP-C    Physical Medicine & Rehabilitation   03/21/2017  Spectralink: [id number]    Collaborating Physician : Dana Baldwin MD

## 2017-03-21 NOTE — PT/OT/SLP EVAL
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 : Yes  Keep patient NPO?: No   General Precautions: Standard, fall, aspiration          Social Hx: Patient lives with her son    Prior diet: regular/thin    Occupational/hobbies/homemaking: retired ; 2 years of college    Subjective:  " My throat hurts when I swallow"  Patient goals: to go home      Pain Ratin/10              Pain Rating Post-Intervention: 0/10    Objective:        Oral Musculature Evaluation  Oral Musculature: WFL  Dentition: present and adequate  Mucosal Quality: good  Mandibular Strength and Mobility: WFL  Oral Labial Strength and Mobility: WFL  Lingual Strength and Mobility: WFL  Buccal Strength and Mobility: WFL  Volitional Cough: strong  Voice Prior to PO Intake: clear     Cognitive Status:  Behavioral Observations: alert and appropriate-  Memory and Orientation: ox4; good recall of recent/remote events; recalled up to 5 digits/words; after 5 min, recalled 3/3 objects  Attention: good  Problem " Solving: wfl and able to generate multiple solutions to problems; compared.contrasted items; completed categorizational task and sequenced up to 4 items  Pragmatics: wfl  Executive Function: wfl    Language: yes    Auditory Comprehension: wfl for complex questions/commands    Verbal Expression: wfl; namned 20 items during word fluency task which is wnl    Motor Speech: wfl    Voice: wfl    Augmentative Alternative Communication: no    Reading: wfl    Writing: wfl    Visual-Spatial: none    Bedside Swallow Eval:  Consistencies Assessed: Thin liquids cup of coffee, Soft solids eggs and Solids hash browns  Oral Phase: WFL  Pharyngeal Phase: no overt clinical signs/symptoms of pharyngeal dysphagia    White board updated.    FIM:  Social Interaction: 6 Complete [place]--The patient interacts appropriately with staff, other patients, and family members (e.g., controls temper, accepts criticism, is aware that words and actions have an impact on others), and does not require medication for   Problem Solvin Modified [place]--In most situations, the patient recognizes a present problem, and with only mild difficulty makes appropriate decisions, initiates and carries out a sequence of steps to solve complex problems, or requires more than a   Comprehension: 7 Complete [place]--The patient understand complex or abstract directions and conversation, and understand either spoken or written language (not necessarily English).   Expression: 6 Modified [place]--In most situations, the patient expresses complex or abstract ideas relatively clearly or with only jimmy difficulty.  The patient does not need any prompting, but (s)he may require an augmentative communication device or system.   Memory: 6 Modified [place]--The patient appears to have only mild difficulty recognizing people frequently encountered, remembering daily routines, and responding to requests of others.  The patient may use   self-initiated or environmental cues, prompts,     Assessment:  Linda Hylton is a 82 y.o. 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 She present with no deficits in speech,lang, cognition or swallowing. No further Speech tx recommended.          Discharge recommendations: Discharge Facility/Level Of Care Needs: home     Goals:   SLP Goals        Problem: SLP Goal    Goal Priority Disciplines Outcome   SLP Goal     SLP               Plan:   Patient to be seen     Plan of Care expires:    Plan of Care reviewed with: patient  SLP Follow-up?: No              SANTIAGO Grier, CCC-SLP  03/21/2017

## 2017-03-21 NOTE — CONSULTS
[facility]  Adult Nutrition  Consult Note    SUMMARY     Recommendations    Recommendation/Intervention: Provided nutrition education and stroke nutrition therapy.   1. PO intake >/=75%   2. continue cardiac diet.   RD following    Goals: Pt to meet >/=75% of energy needs.  Nutrition Goal Status: new  Communication of RD Recs: reviewed with RN    Reason for Assessment    Reason for Assessment: nurse/nurse practitioner consult  Diagnosis: stroke/CVA  Relevent Medical History: HTN, aortic stenosis, hypothyroidism, vitamin D deficiency   Interdisciplinary Rounds: attended     General Information Comments: Family present in the room. Daughter in law states pt's son prepares meals. Provided stroke MNT, pt and family verbalized understanding.     Nutrition Prescription Ordered    Current Diet Order: cardiac diet   Nutrition Order Comments: hyperlipidemia      Evaluation of Received Nutrients/Fluid Intake    Oral Fluid (mL): 360      Nutrition Risk Screen     Nutrition Risk Screen: no indicators present    Nutrition/Diet History    Patient Reported Diet/Restrictions/Preferences: low salt  Typical Food/Fluid Intake: Pt reports good appetite.   Food Preferences:  (no cultural or Gnosticism needs identified at this time. )  Factors Affecting Nutritional Intake:  (slight dizziness)    Labs/Tests/Procedures/Meds    Pertinent Labs Reviewed: reviewed  Pertinent Labs Comments: chol 22, HDL 33,   Pertinent Medications Reviewed: reviewed  Pertinent Medications Comments: statin, Vitamin D, ferrous sulfate    Physical Findings    Overall Physical Appearance: obese  Skin: intact    Anthropometrics    Height (inches): 56.69 in  Weight Method: Bed Scale  Weight (kg): 67.3 kg     Ideal Body Weight (IBW), Female: 83.45 lb  % Ideal Body Weight, Female (lb): 177.8 lb     BMI (kg/m2): 32.46  BMI Grade: 30 - 34.9- obesity - grade I    Estimated/Assessed Needs    Weight Used For Calorie Calculations: 67.3 kg (148 lb 5.9  oz)   Height (cm): 144 cm     Energy Need Method: Kcal/kg (8023-0452 (20-25kcal/kg))  RMR ([place]-St. Jeor Equation): 1007.89     Weight Used For Protein Calculations: 67.3 kg (148 lb 5.9 oz)  Protein Requirements: 67  Fluid Need Method: RDA Method    Monitor and Evaluation    Food and Nutrient Intake: food and beverage intake  Food and Nutrient Adminstration: diet order  Knowledge/Beliefs/Attitudes: food and nutrition knowledge/skill, beliefs and attitudes (pt believes she consumes too little sodium. )  Physical Activity and Function: nutrition-related ADLs and IADLs  Anthropometric Measurements: weight change  Biochemical Data, Medical Tests and Procedures: electrolyte and renal panel, gastrointestinal profile, lipid profile, glucose/endocrine profile  Nutrition-Focused Physical Findings: overall appearance    Nutrition Risk    Level of Risk: moderate    Nutrition Follow-Up    RD Follow-up?: Yes    Nutrition Diagnosis    Altered nutrition relate labs r/t hyperlipemia AEB chol 229 and .     Rochelle Valencia, RD, LDN

## 2017-03-21 NOTE — PLAN OF CARE
Problem: Occupational Therapy Goal  Goal: Occupational Therapy Goal  Goals set 3/20 to be addressed for 7 days with expiration date, 3/27:  Patient will increase functional independence with ADLs by performing:    Patient will demonstrate rolling to the right with modified independence. Not met   Patient will demonstrate rolling to the left with modified independence. Not met  Patient will demonstrate supine -sit with SBA. Not met  Patient will demonstrate stand pivot transfers with SBA. Not met  Patient will demonstrate grooming while standing with SBA. Not met  Patient will demonstrate upper body dressing with SBA. Not met  Patient will demonstrate lower body dressing with SBA. Not met  Patients family / caregiver will demonstrate independence and safety with assisting patient with self-care skills and functional mobility. Not met       Goals remain appropriate.  EMILY Smart  3/21/2017

## 2017-03-21 NOTE — PT/OT/SLP DISCHARGE
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     Assessment:   Patient appropriate for care in another setting.  GOALS:   Occupational Therapy Goals        Problem: Occupational Therapy Goal    Goal Priority Disciplines Outcome Interventions   Occupational Therapy Goal     OT, PT/OT Ongoing (interventions implemented as appropriate)    Description:  Goals set 3/20 to be addressed for 7 days with expiration date, 3/27:  Patient will increase functional independence with ADLs by performing:    Patient will demonstrate rolling to the right with modified independence.  Not met   Patient will demonstrate rolling to the left with modified independence.   Not met  Patient will demonstrate supine -sit with SBA.   Not met  Patient will demonstrate stand pivot transfers with SBA.   Not met  Patient will demonstrate grooming while standing with SBA.   Not met  Patient will demonstrate upper body dressing with SBA.   Not met  Patient will demonstrate lower body dressing with SBA.   Not met  Patient's family / caregiver will demonstrate independence and safety with assisting patient with self-care skills and functional mobility.     Not met                  Reasons for Discontinuation of Therapy Services  Transfer to alternate level of care.      Plan:  Patient Discharged to: Home with Home Health Service   EMILY Smart  3/21/2017    .

## 2017-03-21 NOTE — DISCHARGE SUMMARY
[facility]  Vascular Neurology  Comprehensive Stroke Center  Discharge Summary     Summary:     Admit Date: 3/20/2017  6:44 AM    Discharge Date and Time: 3/21/2017  6:07 PM    Attending Physician: No att. providers found     Discharge Provider: Myah Hudson NP    History of Present Illness: 81 y/o female with history of Aortic Stenosis, HTN, Hypothyroidism, and bleeding ulcers who presents with acute onset of dizziness, nausea, and vomiting since 5:00am this morning.  The patient awoke normal and was able to go to bathroom without issues.  The symptoms started when she got up from the toilet.  She was able to use her walker and make it to the bed where she called 911.  In the ER, there was an episode where the patient was not communicating or responding.  Not clear if this was a true unresponsive episode or the patient's unwillingness to participate in exam.  Upon my exam, patient is awake alert and oriented participating in exam.  Patient denies weakness, numbness, speech, or vision symptoms.  She describes the dizziness as the room spinning.  Symptoms seem to improve when eyes are closed.    Hospital Course (synopsis of major diagnoses, care, treatment, and services provided during the course of the hospital stay): 81 y/o female with history of Aortic Stenosis, HTN, Hypothyroidism, and bleeding ulcers who presents with acute onset of dizziness, nausea, and vomiting.  MRI positive for acute infarct in the right vermis of the cerebellum.  Symptoms resolved this morning.  Suspect small vessel disease.  Patient seen by PT/OT/SLP who recommend home with home health.  She was discharged with meclizine and zofran to manage symptoms at home if they return.  She was instructed to take  and Atorvastatin 40mg.  Patient also noted to have UTI.  She was started on Cipro 500mg x 7 days.    NIH Stroke Scale:  Interval: 7 days or at discharge (whichever comes first)  Level of Consciousness: 0 -  alert  LOC Questions: 0 - answers both correctly  LOC Commands: 0 - performs both correctly  Best Gaze: 0 - normal  Visual: 0 - no visual loss  Facial Palsy: 0 - normal  Motor Left Arm: 0 - no drift  Motor Right Arm: 0 - no drift  Motor Left Le - no drift  Motor Right Le - no drift  Limb Ataxia: 0 - absent  Sensory: 0 - normal  Best Language: 0 - no aphasia  Dysarthria: 0 - normal articulation  Extinction and Inattention: 0 - no neglect  NIH Stroke Scale Total: 0  [facility] Coma Scale:  Best Eye Response: 4 - spontaneous  Best Motor Response: 6 - obeys commands  Best Verbal Response: 5 - oriented  [facility] Coma Scale Total: 15  Modified Ericka Scale:   Timeline:  Modified Ericka Score: 0 - no symptoms          Assessment/Plan:     Interventions: None    Complications: None    Research Candidate?:  Yes:  Stroke Mobile    Neurological deficit at discharge: None     Disposition: [facility]-Health Care [facility]    Final Active Diagnoses:    Diagnosis Date Noted POA    PRINCIPAL PROBLEM:  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  Service: Vascular Neurology      Recommendations:     Post-discharge complication risks: Falls    Stroke Education given to: patient and family    Follow-up in Stroke Clinic in 30 days    Discharge Plan:  Antithrombotics: Aspirin 325mg  Statin: Atorvastatin 40mg  Aggresive risk factor modification:  Hypertension    Follow Up:  Follow-up Information     Follow up with Sherlyn Taylor PA-C On 2017.    Specialty:  Neurology    Why:  @ 2:00    Contact information:    [address]  [place] [id number] [phone]          Follow up with [facility]  [place].    Specialty:  Home Health Services    Why:  Home Health    Contact information:    [address]  [address]  [place] [address]  [phone]          Patient Instructions:     Diet Cardiac   Order Comments: See Stroke Patient Education Guide Booklet for details.     Call 911 for any of the following:   Order Comments: Call 911  right away if any of the following warning signs come on suddenly, even if the symptoms only last for a few minutes. With stroke, timing is very important.   - Warning Signs of Stroke:  - Weakness: You may feel a sudden weakness, tingling or loss of feeling on one side of your face or body.  - Vision Problems: You may have sudden double vision or trouble seeing in one or both eyes.  - Speech Problems: You may have sudden trouble talking, slured speech, or problems understanding others.  - Headache: You may have sudden, severe headache.  - Movement Problems: You may experience dizziness, a feeling of spinning, a loss of balance, a feeling of falling or blackouts.     Activity as tolerated       Medications:  Reconciled Home Medications:   Discharge Medication List as of 3/21/2017  4:26 PM      START taking these medications    Details   aspirin (ECOTRIN) 325 MG EC tablet Take 1 tablet (325 mg total) by mouth once daily., Starting 3/21/2017, Until Wed 3/21/18, OTC      atorvastatin (LIPITOR) 40 MG tablet Take 1 tablet (40 mg total) by mouth once daily., Starting 3/21/2017, Until Wed 3/21/18, Normal      ciprofloxacin HCl (CIPRO) 500 MG tablet Take 1 tablet (500 mg total) by mouth every 12 (twelve) hours., Starting 3/21/2017, Until Tue 3/28/17, Normal      meclizine (ANTIVERT) 25 mg tablet Take 1 tablet (25 mg total) by mouth 3 (three) times daily as needed for Dizziness., Starting 3/21/2017, Until Discontinued, Normal      ondansetron (ZOFRAN-ODT) 8 MG TbDL Take 1 tablet (8 mg total) by mouth every 8 (eight) hours as needed., Starting 3/21/2017, Until Discontinued, Normal          CONTINUE these medications which have NOT CHANGED    Details   alprazolam (XANAX) 0.5 MG tablet Take 1 tablet (0.5 mg total) by mouth 2 (two) times daily as needed for Anxiety., Starting 3/8/2017, Until Fri 4/7/17, Normal      amlodipine (NORVASC) 5 MG tablet TAKE 1 TABLET DAILY AS DIRECTED., Historical Med      cholecalciferol, vitamin D3, 5,000 unit Tab Take 5,000 Units by mouth once daily., Until Discontinued, Historical Med      fenofibrate 160 MG Tab Take 1 tablet (160 mg total) by mouth once daily., Starting 11/29/2016, Until Wed 11/29/17, Normal      ferrous sulfate 325 (65 FE) MG EC tablet Take 325 mg by mouth once daily., Until Discontinued, Historical Med      levothyroxine (SYNTHROID) 75 MCG tablet Take 75 mcg by mouth once daily., Starting 8/28/2016, Until Discontinued, Historical Med      lisinopril (PRINIVIL,ZESTRIL) 40 MG tablet Take 40 mg by mouth once daily. , Starting 7/5/2016, Until Discontinued, Historical Med      metoprolol succinate (TOPROL-XL) 50 MG 24 hr tablet Take 50 mg by mouth once daily. , Starting 7/25/2016, Until Discontinued, Historical Med      pantoprazole (PROTONIX) 40 MG tablet Take 40 mg by mouth once daily., Until Discontinued, Historical Med             Myah Hudson NP  [facility] Stroke Center  Department of Vascular Neurology   [facility]

## 2017-03-21 NOTE — PLAN OF CARE
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       Future Appointments  Date Time Provider Department Center   4/21/2017 2:00 PM Sherlyn Taylor PA-C [facility] NEURO [place]     Follow-up Information     Follow up with Sherlyn Taylor PA-C On 4/21/2017.    Specialty:  Neurology    Why:  @ 2:00    Contact information:    [address]  [facility] [address]  [phone]          [facility]    Specialty:  Home Health Services    Why:  Home Health    Contact information:    [address]  [address]  [place] [address]  [phone]              I have seen and examined this patient face to face today. My clinical findings that support the need for the home health skilled services and home bound status are the following:  Weakness/numbness causing balance and gait disturbance due to Stroke making it taxing to leave home.    Allergies:  Review of patient's allergies indicates:   Allergen Reactions    Sulfa (sulfonamide antibiotics) Hives       Diet: cardiac diet    Activities: activity as tolerated    Nursing:   SN to complete comprehensive assessment including routine vital signs. Instruct on disease process and s/s of complications to report to MD. Review/verify medication list sent home with the patient at time of discharge  and instruct patient/caregiver as needed. Frequency may be adjusted depending on start of care date.    Notify  MD if SBP > 160 or < 90; DBP > 90 or < 50; HR > 120 or < 50; Temp > 101;       CONSULTS:    Physical Therapy to evaluate and treat. Evaluate for home safety and equipment needs; Establish/upgrade home exercise program. Perform / instruct on therapeutic exercises, gait training, transfer training, and Range of Motion.  Occupational Therapy to evaluate and treat. Evaluate home environment for safety and equipment needs. Perform/Instruct on transfers, ADL training, ROM, and therapeutic exercises.      Medications: Review discharge medications with patient and family and provide education.      Current Discharge Medication List      START taking these medications    Details   aspirin (ECOTRIN) 325 MG EC tablet Take 1 tablet (325 mg total) by mouth once daily.  Refills: 0      atorvastatin (LIPITOR) 40 MG tablet Take 1 tablet (40 mg total) by mouth once daily.  Qty: 30 tablet, Refills: 3      ciprofloxacin HCl (CIPRO) 500 MG tablet Take 1 tablet (500 mg total) by mouth every 12 (twelve) hours.  Qty: 13 tablet, Refills: 0      meclizine (ANTIVERT) 25 mg tablet Take 1 tablet (25 mg total) by mouth 3 (three) times daily as needed for Dizziness.  Qty: 10 tablet, Refills: 0      ondansetron (ZOFRAN-ODT) 8 MG TbDL Take 1 tablet (8 mg total) by mouth every 8 (eight) hours as needed.  Qty: 10 tablet, Refills: 0         CONTINUE these medications which have NOT CHANGED    Details   alprazolam (XANAX) 0.5 MG tablet Take 1 tablet (0.5 mg total) by mouth 2 (two) times daily as needed for Anxiety.  Qty: 60 tablet, Refills: 0      amlodipine (NORVASC) 5 MG tablet TAKE 1 TABLET DAILY AS DIRECTED.  Refills: 3      cholecalciferol, vitamin D3, 5,000 unit Tab Take 5,000 Units by mouth once daily.      fenofibrate 160 MG Tab Take 1 tablet (160 mg total) by mouth once daily.  Qty: 90 tablet, Refills: 3    Associated Diagnoses: Elevated triglycerides with high cholesterol      ferrous sulfate 325 (65 FE) MG EC tablet Take 325 mg by mouth  once daily.      levothyroxine (SYNTHROID) 75 MCG tablet Take 75 mcg by mouth once daily.  Refills: 1      lisinopril (PRINIVIL,ZESTRIL) 40 MG tablet Take 40 mg by mouth once daily.       metoprolol succinate (TOPROL-XL) 50 MG 24 hr tablet Take 50 mg by mouth once daily.       pantoprazole (PROTONIX) 40 MG tablet Take 40 mg by mouth once daily.             I certify that this patient is confined to her home and needs intermittent skilled nursing care, physical therapy and occupational therapy.

## 2017-03-21 NOTE — PROGRESS NOTES
[facility]  Vascular Neurology  Comprehensive Stroke Center  Progress Note      Neurologic Chief Complaint: Dizziness    Subjective:     Interval History: Patient is seen for follow-up neurological assessment and treatment recommendations: No acute issues or events overnight.  Symptoms resolved this morning.      HPI, Past Medical, Family, and Social History remains the same as documented in the initial encounter.     Review of Systems   Constitutional: Negative for fever.   Eyes: Negative for visual disturbance.   Respiratory: Negative for shortness of breath.    Cardiovascular: Negative for chest pain.   Gastrointestinal: Negative for nausea and vomiting.   Genitourinary: Negative for dysuria, frequency and hematuria.   Neurological: Negative for dizziness, speech difficulty and weakness.     Scheduled Meds:   aspirin  325 mg Oral Daily    atorvastatin  40 mg Oral Daily    ciprofloxacin HCl  500 mg Oral Q12H    enoxaparin  40 mg Subcutaneous Daily    fenofibrate  160 mg Oral Daily    ferrous sulfate  325 mg Oral Daily    levothyroxine  75 mcg Oral Daily    pantoprazole  40 mg Oral Daily    sodium chloride 0.9%  3 mL Intravenous Q8H    vitamin D  5,000 Units Oral Daily     Continuous Infusions:   sodium chloride 0.9% 75 mL/hr at 03/21/17 1600    sodium chloride 0.9%       PRN Meds:acetaminophen, alprazolam, meclizine, ondansetron, sodium chloride 0.9%    Objective:     Vital Signs (Most Recent):  Temp: 98.5 °F (36.9 °C) (03/21/17 1618)  Pulse: 77 (03/21/17 1618)  Resp: 18 (03/21/17 1618)  BP: (!) 145/65 (03/21/17 1618)  SpO2: (!) 94 % (03/21/17 1618)  BP Location: Left arm    Vital Signs Range (Last 24H):  Temp:  [98.2 °F (36.8 °C)-99.1 °F (37.3 °C)]   Pulse:  [62-90]   Resp:  [16-18]   BP: (132-169)/(58-73)   SpO2:  [94 %-96 %]   BP Location: Left arm    Physical Exam   Constitutional: She appears well-developed and well-nourished.   Cardiovascular: Normal rate.    Pulmonary/Chest:  Effort normal.   Neurological: GCS eye subscore is 4 - spontaneous. GCS verbal subscore is 5 - oriented. GCS motor subscore is 6 - obeys commands.   Skin: Skin is warm and dry.   Psychiatric: She has a normal mood and affect. Her behavior is normal. Judgment and thought content normal.       Neurological Exam:   LOC: alert and follows requests  Language: No aphasia  Speech: No dysarthria  Visual Fields (CN II): Full  EOM (CN III, IV, VI): Full/intact  Pupils (CN III, IV, VI): PERRL  Facial Sensation (CN V): Symmetric, Corneal reflex intact  Facial Movement (CN VII): symmetric facial expression  Motor*: Arm Left:  Normal (5/5), Leg Left:   Normal (5/5), Arm Right:   Normal (5/5), Leg Right:   Normal (5/5)  Sensation: intact to light touch, temperature and vibration    NIH Stroke Scale:    Level of Consciousness: 0 - alert  LOC Questions: 0 - answers both correctly  LOC Commands: 0 - performs both correctly  Best Gaze: 0 - normal  Visual: 0 - no visual loss  Facial Palsy: 0 - normal  Motor Left Arm: 0 - no drift  Motor Right Arm: 0 - no drift  Motor Left Le - no drift  Motor Right Le - no drift  Limb Ataxia: 0 - absent  Sensory: 0 - normal  Best Language: 0 - no aphasia  Dysarthria: 0 - normal articulation  Extinction and Inattention: 0 - no neglect  NIH Stroke Scale Total: 0  [place] Coma Scale:  Best Eye Response: 4 - spontaneous  Best Motor Response: 6 - obeys commands  Best Verbal Response: 5 - oriented  [place] Coma Scale Total: 15      Laboratory:  CMP:   Recent Labs  Lab 17  0434   CALCIUM 9.6   ALBUMIN 3.5   PROT 7.3      K 4.1   CO2 21*      BUN 23   CREATININE 0.8   ALKPHOS 63   ALT 13   AST 17   BILITOT 0.4     CBC:   Recent Labs  Lab 17  0434   WBC 10.32   RBC 4.16   HGB 11.7*   HCT 36.7*      MCV 88   MCH 28.1   MCHC 31.9*     Lipid Panel:   Recent Labs  Lab 17  0704   CHOL 229*   LDLCALC 138.6   HDL 33*   TRIG 287*     Coagulation:   Recent Labs  Lab  03/21/17  0434   INR 0.9   APTT 25.1     Hgb A1C:   Recent Labs  Lab 03/20/17  0704   HGBA1C 5.8     TSH: No results for input(s): TSH in the last 168 hours.    Diagnostic Results:  I have personally reviewed: MRI Head. Date: 3/20/2017  Findings: Diffusion restricted lesion in the right vermis.  No hemorrhage    2D echo 3/20/2017    1 - Normal left ventricular systolic function (EF 65-70%).     2 - Left ventricular diastolic dysfunction.     3 - Normal right ventricular systolic function .     4 - Trivial to mild aortic stenosis, EDOUARD = 1.96 cm2, peak velocity = 2.3 m/s, mean gradient = 11.0 mmHg.     5 - Mild aortic regurgitation.     6 - The mitral valve is moderately sclerotic with evidence of chordal MARSHAL.     7 - Much of the gradient across the aortic valve is likely LVOT gradient.     8 - Moderate to severe mitral regurgitation.     9 - Trivial to mild tricuspid regurgitation.     10 - Trivial to mild pulmonic regurgitation.     11 - The estimated PA systolic pressure is greater than 27 mmHg.     Assessment/Plan:     81 y/o female with history of Aortic Stenosis, HTN, Hypothyroidism, and bleeding ulcers who presents with acute onset of dizziness, nausea, and vomiting.  MRI positive for acute infarct in the right vermis of the cerebellum.  Symptoms improved this morning.  Suspect small vessel disease.  Patient seen by PT/OT/SLP who recommend home with home health.    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  -ASA 325mg daily  -Atorvastatin 40mg daily (.6)  -PT/OT/SLP Evaluate and treat  -Imaging:  MRI/MRA head and neck; 2D Echo  -Labs: TSH, A1C  -VTE:  Enoxaparin 40mg SQ daily; SCDs  -Nursing:  Vital signs and Neuro checks every 4 hours; swallowing evaluation prior to PO  -IVF:  NS 75cc/Hr  -Meclizine 25mg 3 times daily as needed for dizziness      Hypertension  -Stroke Risk factor  -BP Goals: < 220 SBP; < 100 DBP      Hypothyroidism  -Continue home med Levothyroxine 75mcg daily      Nausea &  vomiting  -Ondansetron 8mg every 8 hours as needed for nausea & vomiting        Myah Hudson NP  [facility] Stroke Center  Department of Vascular Neurology   [facility]

## 2017-03-21 NOTE — PT/OT/SLP PROGRESS
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    "I am doing better. I haven't been up to walk. I am afraid of falling." pt states "It feels good to be up, but I am nervous."    Pain Ratin/10  Pain Rating Post-Intervention: 0/10    Objective:   Patient found with: telemetry, peripheral IV    Functional Mobility:  Bed Mobility:   Rolling/Turning Right: Modified independent  Scooting/Bridging: Modified Independent  Supine to Sit: Modified Independent  Sit to Supine: Stand by Assistance (VCs for sequencing; pt demo fear of falling; reqs encouragement)    Transfers:  Sit <> Stand Assistance: Modified Independent (use of RW; from EOB x2 trials; from toilet)  Sit <> Stand Assistive Device: Rolling Walker    Gait:   Gait Distance: x30 feet in hospital room into bathroom with mod (I)-SBA for directional guidance. VCs for relaxed shoulders/upright posture and increased step length; pt reports baseline gait.  Assistance 1: Modified Independent, Stand by Assistance  Gait Assistive Device: Rolling walker  Gait Pattern: reciprocal  Gait Deviation(s): decreased elicia, decreased weight-shifting ability, forward lean    Balance:   Static Sit: GOOD-: Takes MODERATE challenges from all directions but inconsistently  Dynamic Sit: GOOD: Maintains balance through MODERATE excursions of active trunk movement  Static Stand: FAIR+: Takes MINIMAL challenges from all directions  Dynamic stand: FAIR+: " Needs CLOSE SUPERVISION during gait and is able to right self with minor LOB using RW    Pt tolerated EOB sitting x10 min with ability to don socks with supervision and good technique. No instability noted; however, pt reports fear of falling with static sitting and scooting. Education provided on appropriate scooting mechanics.    Therapeutic Activities and Exercises:  PT arrived to pt's room to find pt resting in bed. Agreeable to PT session. Pt assisted to bathroom and able to perform self care with supervision only and good safety awareness. Pt returned to EOB. Requesting to eat lunch. PT reviewed need to remain sitting up during day; pt with preference to return to bed to eat. Questions/concerns addressed within PT scope of practice.     PT communicated with RN and Stroke NP (Tristan) regarding mobility needs, performance, and expectations of motor recovery and HH needs.     AM-PAC 6 CLICK MOBILITY  How much help from another person does this patient currently need?   1 = Unable, Total/Dependent Assistance  2 = A lot, Maximum/Moderate Assistance  3 = A little, Minimum/Contact Guard/Supervision  4 = None, Modified Becker/Independent    Turning over in bed (including adjusting bedclothes, sheets and blankets)?: 4  Sitting down on and standing up from a chair with arms (e.g., wheelchair, bedside commode, etc.): 4  Moving from lying on back to sitting on the side of the bed?: 4  Moving to and from a bed to a chair (including a wheelchair)?: 4  Need to walk in hospital room?: 3  Climbing 3-5 steps with a railing?: 3  Total Score: 22    AM-PAC Raw Score CMS G-Code Modifier Level of Impairment Assistance   6 % Total / Unable   7 - 9 CM 80 - 100% Maximal Assist   10 - 14 CL 60 - 80% Moderate Assist   15 - 19 CK 40 - 60% Moderate Assist   20 - 22 CJ 20 - 40% Minimal Assist   23 CI 1-20% SBA / CGA   24 CH 0% Independent/ Mod I     Patient left HOB elevated with all lines intact, call button in reach and RN  notified. NP notified.    Assessment:  Linda Hylton is a 82 y.o. 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 No overt LOB noted throughout session. Pt reports fear of falling and is a high fall risk due to this and visual impairments. Pt appropriate for D/C home with HH this date. D/C Acute PT services 3/21/2017.    Rehab identified problem list/impairments: Rehab identified problem list/impairments: impaired endurance, gait instability, impaired functional mobilty, impaired balance, visual deficits    Rehab potential is good.    Activity tolerance: Good    Discharge recommendations: Discharge Facility/Level Of Care Needs: home health PT     Barriers to discharge: Barriers to Discharge: None    Equipment recommendations: Equipment Needed After Discharge: 3-in-1 commode, bath bench     GOALS:   Physical Therapy Goals     Not on file      Multidisciplinary Problems (Resolved)        Problem: Physical Therapy Goal    Goal Priority Disciplines Outcome Goal Variances Interventions   Physical Therapy Goal   (Resolved)     PT/OT, PT Outcome(s) achieved     Description:  Goals to be met by: 3/28/2017     Patient will increase functional independence with mobility by performin. Supine to sit with Stand-by Assistance  2. Sit to supine with Stand-by Assistance  3. Sit to stand transfer with Minimal Assistance  4. Bed to chair transfer with Minimal Assistance using Rolling Walker  5. Gait x25 feet with Stand-by Assistance using Rolling Walker.   6. Sitting at edge of bed x10 minutes with Teller  7. Stand for x10 minutes with Stand-by Assistance using Rolling Walker  8. Lower extremity exercise program x15 reps per handout, with supervision              PLAN:    D/C Acute PT services 3/21/2017. Home with HH and family support.  Plan of  Care reviewed with: patient     Rochelle Ordaz, PT, DPT  [phone]  3/21/2017

## 2017-03-23 ENCOUNTER — TELEPHONE (OUTPATIENT)
Dept: NEUROSURGERY | Facility: HOSPITAL | Age: 82
End: 2017-03-23

## 2017-03-23 ENCOUNTER — PATIENT OUTREACH (OUTPATIENT)
Dept: ADMINISTRATIVE | Facility: CLINIC | Age: 82
End: 2017-03-23
Payer: MEDICARE

## 2017-03-23 NOTE — TELEPHONE ENCOUNTER
Called number on file.  Spoke with patient.  Risk factors specific to patient for stroke discussed with teach back implemented.  Patient verbalized understanding of discharge instructions and medications.  Patient was asked about discharge appointments and follow up care.  Follow appointment scheduled for 4/21/2017 at 1400. Warning sings discussed with teach back discussion method implemented.  Notified to seek immediate medical help via 911 if new or worsening stroke symptoms occur.  Patient relayed no new questions or comments at this time.  Instructed to call [facility] with any further questions.

## 2017-03-23 NOTE — PATIENT INSTRUCTIONS
Discharge Instructions for Stroke  You have been diagnosed with a stroke, or with a TIA (transient ischemic attack). Or you have been identified as having a high risk for stroke. During a stroke, blood stops flowing to part of your brain. This can damage areas in the brain that control other parts of the body. Symptoms after a stroke depend on which part of the brain has been affected.  Stroke risk factors  Once youve had a stroke, youre at greater risk for another one. Listed below are some other factors that can increase your risk for a stroke:  · High blood pressure  · High cholesterol  · Cigarette or cigar smoking  · Diabetes  · Carotid or other artery disease  · Atrial fibrillation, atrial flutter, or other heart disease  · Not being physically active  · Obesity  · Certain blood disorders (such as sickle cell anemia)  · Excessive alcohol use  · Abuse of street drugs  · Race  · Gender  · Family history of stroke  · Diet high in salty, fried, or greasy foods  Changes in daily living  Doing your regular tasks may be difficult after youve had a stroke, but you can learn new ways to manage your daily activities. In fact, doing daily activities may help you to regain muscle strength and bring back function to affected limbs. Be patient, give yourself time to adjust, and appreciate the progress you make.  Daily activities  You may be at risk of falling. Make changes to your home to help you walk more easily. A therapist will decide if you need an assistive device to walk safely.  You may need to see an occupational therapist or physical therapist to learn new ways of doing things. For example, you may need to make adjustments when bathing or dressing:  Tips for showering or bathing  · Test the water temperature with a hand or foot that was not affected by the stroke.  · Use grab bars, a shower seat, a hand-held showerhead, and a long-handled brush.  Tips for getting dressed  · Dress while sitting, starting with  the affected side or limb.  · Wear shirts that pull easily over your head. Wear pants or skirts with elastic waistbands.  · Use zippers with loops attached to the pull tabs.  Lifestyle changes  · Take your medicines exactly as directed. Dont skip doses.  · Begin an exercise program. Ask your provider how to get started. Also ask how much activity you should try to get on a daily or weekly basis. You can benefit from simple activities such as walking or gardening.  · Limit alcohol intake. Men should have no more than 2 alcoholic drinks a day. Women should limit themselves to 1 alcoholic drink per day.  · Know your cholesterol level. Follow your providers recommendations about how to keep cholesterol under control.  · If you are a smoker, quit now. Join a stop-smoking program to improve your chances of success. Ask your provider about medicines or other methods to help you quit.  · Learn stress management techniques to help you deal with stress in your home and work life.  Diet  Your healthcare provider will give you information on dietary changes that you may need to make, based on your situation. Your provider may recommend that you see a registered dietitian for help with diet changes. Changes may include:  · Reducing fat and cholesterol intake  · Reducing salt (sodium) intake, especially if you have high blood pressure  · Eating more fresh vegetables and fruits  · Eating more lean proteins, such as fish, poultry, and beans and peas (legumes)  · Eating less red meat and processed meats  · Using low-fat dairy products  · Limiting vegetable oils and nut oils  · Limiting sweets and processed foods such as chips, cookies, and baked goods  Follow-up care  · Keep your medical appointments. Close follow-up is important to stroke rehabilitation and recovery.  · Some medicines require blood tests to check for progress or problems. Keep follow-up appointments for any blood tests ordered by your providers.  When to call  911  Call 911 right away if you have any of the following symptoms of stroke:  · Weakness, tingling, or loss of feeling on one side of your face or body  · Sudden double vision or trouble seeing in one or both eyes  · Sudden trouble talking or slurred speech  · Trouble understanding others  · Sudden, severe headache  · Dizziness, loss of balance, or a sense of falling  · Blackouts or seizures      F.A.S.T. is an easy way to remember the signs of stroke. When you see these signs, you know that you need to call 911 fast.  F.A.S.T. stands for:  · F is for face drooping. One side of the face is drooping or numb. When the person smiles, the smile is uneven.  · A is for arm weakness. One arm is weak or numb. When the person lifts both arms at the same time, one arm may drift downward.  · S is for speech difficulty. You may notice slurred speech or trouble speaking. The person can't repeat a simple sentence correctly when asked.  · T is for time to call 911. If someone shows any of these symptoms, even if they go away, call 911 immediately. Make note of the time the symptoms first appeared.  Date Last Reviewed: 8/26/2015 © 2000-2016 [employer]. [address] [place] [address], [place] [address]. All rights reserved. This information is not intended as a substitute for professional medical care. Always follow your healthcare professional's instructions.

## 2017-03-24 ENCOUNTER — HOSPITAL ENCOUNTER (OUTPATIENT)
Facility: HOSPITAL | Age: 82
Discharge: HOME | End: 2017-03-25
Attending: EMERGENCY MEDICINE | Admitting: PSYCHIATRY & NEUROLOGY
Payer: MEDICARE

## 2017-03-24 DIAGNOSIS — I65.23 ASYMPTOMATIC BILATERAL CAROTID ARTERY STENOSIS: Chronic | ICD-10-CM

## 2017-03-24 DIAGNOSIS — G45.9 TRANSIENT ISCHEMIC ATTACK: ICD-10-CM

## 2017-03-24 DIAGNOSIS — I10 ESSENTIAL HYPERTENSION: Primary | ICD-10-CM

## 2017-03-24 PROBLEM — G45.8 ACUTE CEREBROVASCULAR INSUFFICIENCY TRANSIENT FOCAL NEUROLOGIC DEFICIT: Status: ACTIVE | Noted: 2017-03-24

## 2017-03-24 PROBLEM — I70.0 AORTIC ARCH ATHEROSCLEROSIS: Chronic | Status: ACTIVE | Noted: 2017-03-24

## 2017-03-24 PROBLEM — I67.2 INTRACRANIAL ATHEROSCLEROSIS: Chronic | Status: ACTIVE | Noted: 2017-03-24

## 2017-03-24 LAB
ABO + RH BLD: NORMAL
ALBUMIN SERPL BCP-MCNC: 3.4 G/DL
ALP SERPL-CCNC: 54 U/L
ALT SERPL W/O P-5'-P-CCNC: 17 U/L
ANION GAP SERPL CALC-SCNC: 10 MMOL/L
AST SERPL-CCNC: 18 U/L
BASOPHILS # BLD AUTO: 0.07 K/UL
BASOPHILS NFR BLD: 0.7 %
BILIRUB SERPL-MCNC: 0.3 MG/DL
BILIRUB UR QL STRIP: NEGATIVE
BLD GP AB SCN CELLS X3 SERPL QL: NORMAL
BUN SERPL-MCNC: 23 MG/DL
CALCIUM SERPL-MCNC: 8.7 MG/DL
CHLORIDE SERPL-SCNC: 106 MMOL/L
CHOLEST/HDLC SERPL: 4.8 {RATIO}
CLARITY UR REFRACT.AUTO: CLEAR
CO2 SERPL-SCNC: 23 MMOL/L
COLOR UR AUTO: ABNORMAL
CREAT SERPL-MCNC: 1.1 MG/DL
CREAT SERPL-MCNC: 1.1 MG/DL (ref 0.5–1.4)
DIFFERENTIAL METHOD: ABNORMAL
EOSINOPHIL # BLD AUTO: 0.2 K/UL
EOSINOPHIL NFR BLD: 1.8 %
ERYTHROCYTE [DISTWIDTH] IN BLOOD BY AUTOMATED COUNT: 14.1 %
EST. GFR  (AFRICAN AMERICAN): 54 ML/MIN/1.73 M^2
EST. GFR  (NON AFRICAN AMERICAN): 46.9 ML/MIN/1.73 M^2
GLUCOSE SERPL-MCNC: 100 MG/DL
GLUCOSE UR QL STRIP: NEGATIVE
HCT VFR BLD AUTO: 34.6 %
HDL/CHOLESTEROL RATIO: 20.7 %
HDLC SERPL-MCNC: 140 MG/DL
HDLC SERPL-MCNC: 29 MG/DL
HGB BLD-MCNC: 11.6 G/DL
HGB UR QL STRIP: NEGATIVE
INR PPP: 1
KETONES UR QL STRIP: NEGATIVE
LDLC SERPL CALC-MCNC: 85.2 MG/DL
LEUKOCYTE ESTERASE UR QL STRIP: NEGATIVE
LYMPHOCYTES # BLD AUTO: 1.9 K/UL
LYMPHOCYTES NFR BLD: 20.2 %
MCH RBC QN AUTO: 28.6 PG
MCHC RBC AUTO-ENTMCNC: 33.5 %
MCV RBC AUTO: 85 FL
MONOCYTES # BLD AUTO: 0.9 K/UL
MONOCYTES NFR BLD: 9.8 %
NEUTROPHILS # BLD AUTO: 6.2 K/UL
NEUTROPHILS NFR BLD: 66.3 %
NITRITE UR QL STRIP: NEGATIVE
NONHDLC SERPL-MCNC: 111 MG/DL
PH UR STRIP: 5 [PH] (ref 5–8)
PLATELET # BLD AUTO: 225 K/UL
PMV BLD AUTO: 10.1 FL
POC PTINR: 1 (ref 0.9–1.2)
POC PTWBT: 12.4 SEC (ref 9.7–14.3)
POCT GLUCOSE: 107 MG/DL (ref 70–110)
POTASSIUM SERPL-SCNC: 4 MMOL/L
PROT SERPL-MCNC: 7 G/DL
PROT UR QL STRIP: NEGATIVE
PROTHROMBIN TIME: 10.9 SEC
RBC # BLD AUTO: 4.05 M/UL
SAMPLE: NORMAL
SAMPLE: NORMAL
SODIUM SERPL-SCNC: 139 MMOL/L
SP GR UR STRIP: >1.03 (ref 1–1.03)
TRIGL SERPL-MCNC: 129 MG/DL
TSH SERPL DL<=0.005 MIU/L-ACNC: 2.15 UIU/ML
URN SPEC COLLECT METH UR: ABNORMAL
UROBILINOGEN UR STRIP-ACNC: NEGATIVE EU/DL
WBC # BLD AUTO: 9.36 K/UL

## 2017-03-24 PROCEDURE — A9585 GADOBUTROL INJECTION: HCPCS | Performed by: PSYCHIATRY & NEUROLOGY

## 2017-03-24 PROCEDURE — 85610 PROTHROMBIN TIME: CPT | Mod: 91

## 2017-03-24 PROCEDURE — 25000003 PHARM REV CODE 250: Performed by: NURSE PRACTITIONER

## 2017-03-24 PROCEDURE — 81003 URINALYSIS AUTO W/O SCOPE: CPT

## 2017-03-24 PROCEDURE — 96374 THER/PROPH/DIAG INJ IV PUSH: CPT

## 2017-03-24 PROCEDURE — G8978 MOBILITY CURRENT STATUS: HCPCS | Mod: CK

## 2017-03-24 PROCEDURE — 87086 URINE CULTURE/COLONY COUNT: CPT

## 2017-03-24 PROCEDURE — 92523 SPEECH SOUND LANG COMPREHEN: CPT

## 2017-03-24 PROCEDURE — G8996 SWALLOW CURRENT STATUS: HCPCS | Mod: CH

## 2017-03-24 PROCEDURE — 99220 PR INITIAL OBSERVATION CARE,LEVL III: CPT | Mod: GC,,, | Performed by: PSYCHIATRY & NEUROLOGY

## 2017-03-24 PROCEDURE — 93010 ELECTROCARDIOGRAM REPORT: CPT | Mod: 76,,, | Performed by: INTERNAL MEDICINE

## 2017-03-24 PROCEDURE — 63700000 PHARM REV CODE 250 ALT 637 W/O HCPCS: Performed by: PHYSICIAN ASSISTANT

## 2017-03-24 PROCEDURE — P9612 CATHETERIZE FOR URINE SPEC: HCPCS

## 2017-03-24 PROCEDURE — 82962 GLUCOSE BLOOD TEST: CPT

## 2017-03-24 PROCEDURE — 80061 LIPID PANEL: CPT

## 2017-03-24 PROCEDURE — 80053 COMPREHEN METABOLIC PANEL: CPT

## 2017-03-24 PROCEDURE — 93005 ELECTROCARDIOGRAM TRACING: CPT

## 2017-03-24 PROCEDURE — 97161 PT EVAL LOW COMPLEX 20 MIN: CPT

## 2017-03-24 PROCEDURE — 82565 ASSAY OF CREATININE: CPT

## 2017-03-24 PROCEDURE — G8997 SWALLOW GOAL STATUS: HCPCS | Mod: CH

## 2017-03-24 PROCEDURE — 85025 COMPLETE CBC W/AUTO DIFF WBC: CPT

## 2017-03-24 PROCEDURE — 93010 ELECTROCARDIOGRAM REPORT: CPT | Mod: ,,, | Performed by: INTERNAL MEDICINE

## 2017-03-24 PROCEDURE — 86901 BLOOD TYPING SEROLOGIC RH(D): CPT

## 2017-03-24 PROCEDURE — 25500020 PHARM REV CODE 255: Performed by: PSYCHIATRY & NEUROLOGY

## 2017-03-24 PROCEDURE — 84443 ASSAY THYROID STIM HORMONE: CPT

## 2017-03-24 PROCEDURE — 99291 CRITICAL CARE FIRST HOUR: CPT | Mod: ,,, | Performed by: EMERGENCY MEDICINE

## 2017-03-24 PROCEDURE — 25000003 PHARM REV CODE 250: Performed by: PHYSICIAN ASSISTANT

## 2017-03-24 PROCEDURE — G0378 HOSPITAL OBSERVATION PER HR: HCPCS

## 2017-03-24 PROCEDURE — 25500020 PHARM REV CODE 255: Performed by: EMERGENCY MEDICINE

## 2017-03-24 PROCEDURE — 97530 THERAPEUTIC ACTIVITIES: CPT

## 2017-03-24 PROCEDURE — 99285 EMERGENCY DEPT VISIT HI MDM: CPT

## 2017-03-24 PROCEDURE — G8979 MOBILITY GOAL STATUS: HCPCS | Mod: CJ

## 2017-03-24 PROCEDURE — 99900035 HC TECH TIME PER 15 MIN (STAT)

## 2017-03-24 PROCEDURE — 86900 BLOOD TYPING SEROLOGIC ABO: CPT

## 2017-03-24 PROCEDURE — 85610 PROTHROMBIN TIME: CPT

## 2017-03-24 PROCEDURE — 63600175 PHARM REV CODE 636 W HCPCS: Performed by: EMERGENCY MEDICINE

## 2017-03-24 PROCEDURE — 92610 EVALUATE SWALLOWING FUNCTION: CPT

## 2017-03-24 RX ORDER — FENOFIBRATE 160 MG/1
160 TABLET ORAL DAILY
Status: DISCONTINUED | OUTPATIENT
Start: 2017-03-24 | End: 2017-03-25 | Stop reason: HOSPADM

## 2017-03-24 RX ORDER — ONDANSETRON 2 MG/ML
4 INJECTION INTRAMUSCULAR; INTRAVENOUS
Status: COMPLETED | OUTPATIENT
Start: 2017-03-24 | End: 2017-03-24

## 2017-03-24 RX ORDER — ATORVASTATIN CALCIUM 20 MG/1
40 TABLET, FILM COATED ORAL DAILY
Status: DISCONTINUED | OUTPATIENT
Start: 2017-03-24 | End: 2017-03-25 | Stop reason: HOSPADM

## 2017-03-24 RX ORDER — HEPARIN SODIUM 5000 [USP'U]/ML
5000 INJECTION, SOLUTION INTRAVENOUS; SUBCUTANEOUS EVERY 8 HOURS
Status: DISCONTINUED | OUTPATIENT
Start: 2017-03-24 | End: 2017-03-25 | Stop reason: HOSPADM

## 2017-03-24 RX ORDER — ONDANSETRON 8 MG/1
8 TABLET, ORALLY DISINTEGRATING ORAL EVERY 8 HOURS PRN
Status: DISCONTINUED | OUTPATIENT
Start: 2017-03-24 | End: 2017-03-25 | Stop reason: HOSPADM

## 2017-03-24 RX ORDER — ATORVASTATIN CALCIUM 20 MG/1
40 TABLET, FILM COATED ORAL DAILY
Status: DISCONTINUED | OUTPATIENT
Start: 2017-03-24 | End: 2017-03-24

## 2017-03-24 RX ORDER — GADOBUTROL 604.72 MG/ML
7 INJECTION INTRAVENOUS
Status: COMPLETED | OUTPATIENT
Start: 2017-03-24 | End: 2017-03-24

## 2017-03-24 RX ORDER — SODIUM CHLORIDE 0.9 % (FLUSH) 0.9 %
3 SYRINGE (ML) INJECTION EVERY 8 HOURS
Status: DISCONTINUED | OUTPATIENT
Start: 2017-03-24 | End: 2017-03-25 | Stop reason: HOSPADM

## 2017-03-24 RX ORDER — MECLIZINE HYDROCHLORIDE 25 MG/1
25 TABLET ORAL 3 TIMES DAILY PRN
Status: DISCONTINUED | OUTPATIENT
Start: 2017-03-24 | End: 2017-03-25 | Stop reason: HOSPADM

## 2017-03-24 RX ORDER — ONDANSETRON 2 MG/ML
8 INJECTION INTRAMUSCULAR; INTRAVENOUS
Status: DISCONTINUED | OUTPATIENT
Start: 2017-03-24 | End: 2017-03-24

## 2017-03-24 RX ORDER — CIPROFLOXACIN 500 MG/1
500 TABLET ORAL EVERY 12 HOURS
Status: DISCONTINUED | OUTPATIENT
Start: 2017-03-24 | End: 2017-03-25 | Stop reason: HOSPADM

## 2017-03-24 RX ORDER — ASPIRIN 325 MG
325 TABLET, DELAYED RELEASE (ENTERIC COATED) ORAL DAILY
Status: DISCONTINUED | OUTPATIENT
Start: 2017-03-24 | End: 2017-03-25 | Stop reason: HOSPADM

## 2017-03-24 RX ORDER — ASPIRIN 325 MG
325 TABLET, DELAYED RELEASE (ENTERIC COATED) ORAL DAILY
Status: DISCONTINUED | OUTPATIENT
Start: 2017-03-24 | End: 2017-03-24

## 2017-03-24 RX ORDER — ACETAMINOPHEN 325 MG/1
650 TABLET ORAL EVERY 6 HOURS PRN
Status: DISCONTINUED | OUTPATIENT
Start: 2017-03-24 | End: 2017-03-25 | Stop reason: HOSPADM

## 2017-03-24 RX ORDER — ASPIRIN 81 MG/1
81 TABLET ORAL DAILY
Status: DISCONTINUED | OUTPATIENT
Start: 2017-03-24 | End: 2017-03-24

## 2017-03-24 RX ORDER — ONDANSETRON 2 MG/ML
4 INJECTION INTRAMUSCULAR; INTRAVENOUS
Status: DISCONTINUED | OUTPATIENT
Start: 2017-03-24 | End: 2017-03-24

## 2017-03-24 RX ORDER — LABETALOL HYDROCHLORIDE 5 MG/ML
10 INJECTION, SOLUTION INTRAVENOUS
Status: DISCONTINUED | OUTPATIENT
Start: 2017-03-24 | End: 2017-03-25 | Stop reason: HOSPADM

## 2017-03-24 RX ORDER — ONDANSETRON 2 MG/ML
4 INJECTION INTRAMUSCULAR; INTRAVENOUS
Status: ACTIVE | OUTPATIENT
Start: 2017-03-24 | End: 2017-03-24

## 2017-03-24 RX ORDER — CLOPIDOGREL BISULFATE 75 MG/1
75 TABLET ORAL DAILY
Status: DISCONTINUED | OUTPATIENT
Start: 2017-03-24 | End: 2017-03-25 | Stop reason: HOSPADM

## 2017-03-24 RX ADMIN — FENOFIBRATE 160 MG: 160 TABLET ORAL at 11:03

## 2017-03-24 RX ADMIN — CIPROFLOXACIN HYDROCHLORIDE 500 MG: 500 TABLET, FILM COATED ORAL at 09:03

## 2017-03-24 RX ADMIN — CLOPIDOGREL 75 MG: 75 TABLET, FILM COATED ORAL at 11:03

## 2017-03-24 RX ADMIN — IOHEXOL 100 ML: 350 INJECTION, SOLUTION INTRAVENOUS at 09:03

## 2017-03-24 RX ADMIN — REGULAR STRENGTH 325 MG: 325 TABLET ORAL at 11:03

## 2017-03-24 RX ADMIN — GADOBUTROL 7 ML: 604.72 INJECTION INTRAVENOUS at 04:03

## 2017-03-24 RX ADMIN — SODIUM CHLORIDE, PRESERVATIVE FREE 3 ML: 5 INJECTION INTRAVENOUS at 02:03

## 2017-03-24 RX ADMIN — ONDANSETRON 8 MG: 8 TABLET, ORALLY DISINTEGRATING ORAL at 11:03

## 2017-03-24 RX ADMIN — ACETAMINOPHEN 650 MG: 325 TABLET ORAL at 11:03

## 2017-03-24 RX ADMIN — ONDANSETRON 4 MG: 2 INJECTION INTRAMUSCULAR; INTRAVENOUS at 08:03

## 2017-03-24 RX ADMIN — CIPROFLOXACIN HYDROCHLORIDE 500 MG: 500 TABLET, FILM COATED ORAL at 11:03

## 2017-03-24 RX ADMIN — HEPARIN SODIUM 5000 UNITS: 5000 INJECTION, SOLUTION INTRAVENOUS; SUBCUTANEOUS at 02:03

## 2017-03-24 RX ADMIN — MECLIZINE HYDROCHLORIDE 25 MG: 25 TABLET ORAL at 05:03

## 2017-03-24 RX ADMIN — SODIUM CHLORIDE, PRESERVATIVE FREE 3 ML: 5 INJECTION INTRAVENOUS at 09:03

## 2017-03-24 RX ADMIN — HEPARIN SODIUM 5000 UNITS: 5000 INJECTION, SOLUTION INTRAVENOUS; SUBCUTANEOUS at 09:03

## 2017-03-24 RX ADMIN — ATORVASTATIN CALCIUM 40 MG: 20 TABLET, FILM COATED ORAL at 11:03

## 2017-03-24 NOTE — PROGRESS NOTES
Notified PAUL Salgado of pt left dorsalis pedis pulse absent even with doppler, right foot 2+ . Tibial pulse 2+ with doppler on L foot. L foot cooler than R foot. Pt does not c/o any numbess/tingling to left foot. Pt able to move left foot appropriately. No new orders given. Will continue to monitor.    1525: PAUL Meza checked pt left dorsalis pedis pulse in MRI-reporting pt has very faint pulse in left foot. Will order U/S of lower extremity to follow up. This RN will assess pulse when pt arrives back from MRI.

## 2017-03-24 NOTE — ED NOTES
DONALD Hernandez aware pt did not receive PO meds due to drowsiness/ failed OLY at this time, and that MRI is postponed.

## 2017-03-24 NOTE — PLAN OF CARE
RD provided patient with diet education however on her way for MRI and could not provide full education. Left handout with patient's belongings and asked patient to review information; informed pt RD will follow-up/be available to address any knowledge deficits as appropriate. Pt may not be appropriate for education (81 yo) but will follow-up with patient.

## 2017-03-24 NOTE — PT/OT/SLP EVAL
"Physical Therapy  Evaluation    Linda Hylton   MRN: [id number]   Admitting Diagnosis: Transient ischemic attack    PT Received On: 17  PT Start Time: 1325     PT Stop Time: 1350    PT Total Time (min): 25 min       Billable Minutes:  Evaluation 15 and Therapeutic Activity 10    Diagnosis: Transient ischemic attack    Past Medical History:   Diagnosis Date    Aortic stenosis     History of bleeding ulcers     Hypertension     Hypothyroidism     Sciatica of left side     Vitamin D deficiency       Past Surgical History:   Procedure Laterality Date    APPENDECTOMY      [facility]GERY Left 2016    [facility]    PARATHYROIDECTOMY Right 2012    THYROID SURGERY Right     Partial thyroidectomy       Referring physician: Rose Navarro PA-C  Date referred to PT: 3/34/17    General Precautions: Standard, fall  Orthopedic Precautions: N/A   Braces: N/A       Patient History:  Pt states living in [place], [place] in a [facility] with no STEVE with her adult son. Pt states being independent with all ADLs and functional mobility PTA with use of RW. Pt states her bed and half bathroom are on the 1st floor; she has a HH aide/RN to provide bathing 2x/wk and she washes off the other days. Pt states getting HHPT.   Equipment used at home:  Rolling Walker and 4 wheeled walker.  Equipment owned but not using:  Straight Cane and wheelchair.  Activity level: sedentary.   Work: retired.   Drive: no.   Cooking/Cleaning/Managing Home: no.   Hand dominance: right.   Richelle practiced: [employer].  Hobbies: reading    Previous Level of Function:  Ambulation Skills: needs device  Transfer Skills: needs device  ADL Skills: independent    Subjective:  Communicated with DONALD Hernandez prior to session.  Pt agrees to participate in session.  Chief Complaint: none stated  Patient goals: "I want to know my results, I don't want to go home too soon again."    Pain Ratin/10   Pain Rating Post-Intervention: 0/10    Objective:   Patient " found with: telemetry, oxygen (2L nasal cannula)     Cognitive Exam:  Oriented to: Person, Place, Time and Situation    Follows Commands/attention: Follows multistep  commands  Communication: clear/fluent  Safety awareness/insight to disability: intact    Physical Exam:  Postural examination/scapula alignment: Rounded shoulder, Head forward and Kyphosis    Skin integrity: Visible skin intact  Edema: None noted    Sensation:   Intact  light/touch BLE    Lower Extremity Range of Motion:  Right Lower Extremity: WFL  Left Lower Extremity: WFL    Lower Extremity Strength:  Right Lower Extremity: grossly 4/5  Left Lower Extremity: grossly 4/5     Fine motor coordination:  Intact  Left hand, finger to nose and Right hand, finger to nose    Functional Mobility:  Bed Mobility:    Rolling to the right: use of bedrails Stand-by Assistance   Supine <> Sit: From right sidelying. Stand-by Assistance   Sit <> Supine: To right sidelying. Stand-by Assistance   Scooting to HOB: Stand-by Assistance via supine bridges     Transfers:    Sit <> Stand: x 1 from EOB, x1 from toilet with Contact Guard Assistance with Rolling Walker    Stand <> Sit: x 1 to toilet x1 to EOB with Contact Guard Assistance with Rolling Walker   Comments: Pt with forward flexed posture.     Gait:   Distance Ambulated: 20ft within room (to bathroom and back to bed)    Assistance level: Contact Guard Assistance   Assistive Device used:  Rolling Walker   Gait Pattern: 3-point gait and swing-through gait   Gait Deviation(s): decreased elicia   Impairments due to: decreased strength and balance.    Therapeutic Activities and Exercises:  Pt requesting to use bathroom, pt able to toilet and perform perineal care independently.     AM-PAC 6 CLICK MOBILITY  How much help from another person does this patient currently need?   1 = Unable, Total/Dependent Assistance  2 = A lot, Maximum/Moderate Assistance  3 = A little, Minimum/Contact Guard/Supervision  4 = None, Modified  [place]/Independent    Turning over in bed (including adjusting bedclothes, sheets and blankets)?: 3  Sitting down on and standing up from a chair with arms (e.g., wheelchair, bedside commode, etc.): 3  Moving from lying on back to sitting on the side of the bed?: 3  Moving to and from a bed to a chair (including a wheelchair)?: 3  Need to walk in hospital room?: 3  Climbing 3-5 steps with a railing?: 3  Total Score: 18     AM-PAC Raw Score CMS G-Code Modifier Level of Impairment Assistance   6 % Total / Unable   7 - 9 CM 80 - 100% Maximal Assist   10 - 14 CL 60 - 80% Moderate Assist   15 - 19 CK 40 - 60% Moderate Assist   20 - 22 CJ 20 - 40% Minimal Assist   23 CI 1-20% SBA / CGA   24 CH 0% Independent/ Mod I     Patient left supine with all lines intact, call button in reach and RN notified.    Assessment:   Linda Hylton is a 82 y.o. female with a medical diagnosis of Transient ischemic attack. Pt presents with decreased functional mobility and balance. Ms. Hylton's deficits affect their ability to safely and independently participate in self-care tasks and functional mobility. Due to her physical therapy diagnosis of debility and deconditioning, they continue to benefit from acute PT services to address the following limitations: high fall risk, weakness, instability, the need for supervised instructions of exercise, and the decreased ability to perform functional activities which they were able to complete PTA. Education was provided to patient regarding importance of continued participation in therapy, patient's progress and discharge disposition. Pt would benefit from HHPT upon discharge to improve quality of life and focus on recovery of impairments.     Rehab identified problem list/impairments: Rehab identified problem list/impairments: weakness, impaired functional mobilty, gait instability, impaired balance    Rehab potential is good.    Activity tolerance: Good    Discharge  recommendations: Discharge Facility/Level Of Care Needs: home health PT     Barriers to discharge: Barriers to Discharge: None    Equipment recommendations: Equipment Needed After Discharge: none (pt has needed equipment)     GOALS:   Physical Therapy Goals        Problem: Physical Therapy Goal    Goal Priority Disciplines Outcome Goal Variances Interventions   Physical Therapy Goal     PT/OT, PT Ongoing (interventions implemented as appropriate)     Description:  Goals to be met by: 3/31/17     Patient will increase functional independence with mobility by performing:    Supine to sit with Modified [place].  Sit to supine with Modified [place].   Sit to stand transfer with Modified [place] using Rolling Walker.  Bed to chair transfer via Stand Pivot with Modified [place] using Rolling Walker.  Gait  x 150 feet with Supervision using Rolling Walker.    Dynamic standing for 8 minutes with Supervision using No Assistive Device and Rolling Walker.  Able to tolerate exercise for 15-20 reps with independence.  Patient and family able to teachback stroke & positioning education independently.                PLAN:    Patient to be seen 5 x/week to address the above listed problems via gait training, therapeutic activities, therapeutic exercises, neuromuscular re-education  Plan of Care expires: 04/23/17  Plan of Care reviewed with: patient    Personal factors/comorbidities: prior stroke, obesity. Due to the listed comorbidities the patient cannot fully participate in PT POC.  Body systems elements affected: lower extremities and upper extremities, trunk; neuromuscular system  Clinical Presentation: stable  Functional Outcome Tools: AMPAC, ROM, MMT  Evaluation Complexity Level: Low    Kianna Roe PT, DPT  3/24/2017  [phone]

## 2017-03-24 NOTE — PLAN OF CARE
Problem: Physical Therapy Goal  Goal: Physical Therapy Goal  Goals to be met by: 3/31/17     Patient will increase functional independence with mobility by performing:    Supine to sit with Modified [place].  Sit to supine with Modified [place].   Sit to stand transfer with Modified [place] using Rolling Walker.  Bed to chair transfer via Stand Pivot with Modified [place] using Rolling Walker.  Gait x 150 feet with Supervision using Rolling Walker.   Dynamic standing for 8 minutes with Supervision using No Assistive Device and Rolling Walker.  Able to tolerate exercise for 15-20 reps with independence.  Patient and family able to teachback stroke & positioning education independently.  Outcome: Ongoing (interventions implemented as appropriate)  Goals initiated at [facility]. Pt would benefit from HHPT upon discharge.  Kianna Roe PT, DPT  3/24/2017  [phone]

## 2017-03-24 NOTE — ED TRIAGE NOTES
"Pt reports dizziness and not feeling well this AM, called EMS. EMS reports slurred speech and L sided facial droop. These symptoms have resolved. Pt reports feeling nauseated and that she tries to speak, but it "doesn't come out right."  "

## 2017-03-24 NOTE — H&P
[facility]  Vascular Neurology  Comprehensive Stroke Center  History & Physical    Subjective:     History of Present Illness:  Patient is a 82 year old female with pmh of HTN, HLD, aortic stenosis, mitral valve regurgitation and prior stroke (03/20/17) who presented to the ED with dizziness.  Symptoms started at 6am.  She took zofran and meclizine that was prescribed to her at discharge for a cerebellar stroke.  This is the first time she needed to take this medicine since discharge.  Symptoms didn't resolve.  EMS said during transport she began to have aphasia, dysarthria and right facial droop lasting 5 minutes.  She continues to have vertigo and nausea.  She denies headache, trauma and seizure      At baseline patient uses a walker after hip surgery 1 year ago but can take care of her own affairs.    On prior admission cipro started for UTI.             Past Medical History:   Diagnosis Date    Aortic stenosis     History of bleeding ulcers 2015    Hypertension     Hypothyroidism     Sciatica of left side     Vitamin D deficiency      Past Surgical History:   Procedure Laterality Date    APPENDECTOMY      [facility]    PARATHYROIDECTOMY Right 2012    THYROID SURGERY Right 2012    Partial thyroidectomy     Family History   Problem Relation Age of Onset    Heart disease Father     Heart disease Sister     Heart disease Brother     Diabetes Son      Social History   Substance Use Topics    Smoking status: Never Smoker    Smokeless tobacco: None    Alcohol use None     Review of patient's allergies indicates:   Allergen Reactions    Sulfa (sulfonamide antibiotics) Hives     Medications: I have reviewed the current medication administration record.      (Not in a hospital admission)    Review of Systems   Constitutional: Negative for chills and fever.   HENT: Negative for drooling and rhinorrhea.    Eyes: Negative for photophobia and visual disturbance.    Respiratory: Negative for cough and shortness of breath.    Cardiovascular: Negative for chest pain and palpitations.   Gastrointestinal: Negative for diarrhea and vomiting.   Endocrine: Negative for polyuria.   Genitourinary: Negative for frequency and urgency.   Musculoskeletal: Positive for arthralgias and gait problem.   Skin: Negative for pallor and rash.   Neurological: Negative for speech difficulty, weakness and headaches.   Psychiatric/Behavioral: Negative for agitation and behavioral problems.     Objective:     Vital Signs (Most Recent):  Temp: 98.1 °F (36.7 °C) (03/24/17 0741)  Pulse: (!) 53 (03/24/17 0756)  Resp: 16 (03/24/17 0756)  BP: 110/80 (03/24/17 0741)  SpO2: (!) 91 % (03/24/17 0756)    Vital Signs Range (Last 24H):  Temp:  [98.1 °F (36.7 °C)]   Pulse:  [53-54]   Resp:  [16]   BP: (110)/(80)   SpO2:  [91 %-99 %]     Physical Exam   Constitutional: She appears well-developed and well-nourished.   HENT:   Head: Normocephalic and atraumatic.   Eyes: EOM are normal. Pupils are equal, round, and reactive to light.   Neck: Normal range of motion.   Cardiovascular: Normal rate.    Pulmonary/Chest: Effort normal. No respiratory distress.   Abdominal: Soft. She exhibits no distension.   Musculoskeletal:   Decreased ROM in BLE    Neurological:   See below     Skin: No rash noted. No erythema.   Psychiatric: She has a normal mood and affect.       Neurological Exam:   LOC: alert and follows requests  Language: No aphasia  Speech: No dysarthria  Orientation: Person, Place, Time  Memory: Recent memory intact, Remote memory intact, Age correct, Month correct  Visual Fields (CN II): Full  EOM (CN III, IV, VI): Full/intact  Oculocephalics: normal  Pupils (CN III, IV, VI): PERRL  Facial Sensation (CN V): Symmetric  Facial Movement (CN VII): symmetric facial expression  Hearing (CN VIII): intact bilaterally  Gag Reflex (CN IX, X): no gag reflex  Shoulder/Neck (CN XI): SCM-Left: Normal ; SCM-Right: Normal ;  Shoulder Shrug: Normal/Symetric  Tongue (CN XII): not examined   Reflexes: not examined  Motor*: Arm Left:  Normal (5/5), Leg Left:   Paretic:  4/5, Arm Right:   Normal (5/5), Leg Right:   Paretic:  4/5  Cerebellar*: normal finger to nose  Sensation: intact to light touch, temperature and vibration  Tone: Arm-Left: normal; Leg-Left: normal; Arm-Right: normal; Leg-Right: normal    NIH Stroke Scale:  Interval: baseline (upon arrival/admit)  Level of Consciousness: 0 - alert  LOC Questions: 0 - answers both correctly  LOC Commands: 0 - performs both correctly  Best Gaze: 0 - normal  Visual: 0 - no visual loss  Facial Palsy: 0 - normal  Motor Left Arm: 0 - no drift  Motor Right Arm: 0 - no drift  Motor Left Le - no drift  Motor Right Le - no drift  Limb Ataxia: 0 - absent  Sensory: 0 - normal  Best Language: 0 - no aphasia  Dysarthria: 0 - normal articulation  Extinction and Inattention: 0 - no neglect  NIH Stroke Scale Total: 0  Modified [place] Scale:   Timeline: Prior to symptoms onset  Modified [place] Score: 2 - slight disability    ABCD2 Scale for TIA:   Age > or = 60: 1 - yes  B/P or = 140/9 at Initial Evaluation: 0 - no  Clinical Features of TIA: 1 - speech disturbance without weakness  Duration of Symptoms: 0 - < 10 minutes  Diabetes Mellitus in History: 0 - no  ABCD2 Scale Total: 2      Laboratory:  CMP:   Recent Labs  Lab 17  0759   CALCIUM 8.7   ALBUMIN 3.4*   PROT 7.0      K 4.0   CO2 23      BUN 23   CREATININE 1.1   ALKPHOS 54*   ALT 17   AST 18   BILITOT 0.3     CBC:   Recent Labs  Lab 17  0759   WBC 9.36   RBC 4.05   HGB 11.6*   HCT 34.6*      MCV 85   MCH 28.6   MCHC 33.5     Lipid Panel:   Recent Labs  Lab 17  0759   CHOL 140   LDLCALC 85.2   HDL 29*   TRIG 129     Hgb A1C:   Recent Labs  Lab 17  0704   HGBA1C 5.8     TSH:   Recent Labs  Lab 17  0759   TSH 2.147       Diagnostic Results:  Brain Imaging:   CT head 17  -No acute intracranial  abnormalities     Echo 03/20/17  - normal [place]  -aortic stenosis  -severe mitral regurgitation        Assessment/Plan:   Patient is a 82 year old female with pmh of HTN, HLD, aortic stenosis, mitral valve regurgitation and prior stroke (03/20/17) who presented to the ED with dizziness.  Symptoms started at 6am this morning.  Patient had recent cerebellum stroke.  No limb ataxia on exam.        * Transient ischemic attack  Dizziness initial symptom  ABCD2 score 2   EMS noticed dysarthria and aphasia lasting five minutes   Admitting for further imaging of blood vessels     Antithrombotics: ASA 81mgqd and plavix 75mgqd  Atorvastatin 40mg qhs  DVT: heparin 5000units q 8hours  Diagnostic images pending: CTA head/neck, MRI brain         Essential hypertension  Risk factor for stroke     Hypothyroidism  Risk factor TSH     Aortic stenosis  Evident on echo    Nausea & vomiting  On meclizine and zofran      Thrombolysis Candidate? Interventional Revascularization Candidate? No symptoms resolved      Research Candidate? No     Rose Navarro PA-C  Comprehensive Stroke Center  Department of Vascular Neurology   [facility]

## 2017-03-24 NOTE — ED PROVIDER NOTES
Encounter Date: 3/24/2017    SCRIBE #1 NOTE: I, Henny August, am scribing for, and in the presence of, Dr. Gibson.       History     Chief Complaint   Patient presents with    Transient Ischemic Attack     Patient with report of dizziness.  Upon EMS arrival, patient speach slurred with word finding issues and left side weakness/facial droop.     Review of patient's allergies indicates:   Allergen Reactions    Sulfa (sulfonamide antibiotics) Hives     HPI Comments: Time patient was seen by the provider: 7:41 AM      The patient is a 82 y.o. female with hx of: HTN, aortic stenosis that presents to the ED with a complaint of dizziness. Pt was recently treated with tPA (4 days ago) for stroke symptoms and was also experiencing dizziness at that time. Per EMS, after their arrival and initial examination, pt exhibited slurred speech, facial droop, and word-finding difficulty. These resolved after approximately 5 minutes and are not present at time of arrival. Pt is without significant complaint at the time of my exam.         The history is provided by the patient.     Past Medical History:   Diagnosis Date    Aortic stenosis     History of bleeding ulcers 2015    Hypertension     Hypothyroidism     Sciatica of left side     Vitamin D deficiency      Past Surgical History:   Procedure Laterality Date    APPENDECTOMY      [facility]12[facility]    PARATHYROIDECTOMY Right 2012    THYROID SURGERY Right 2012    Partial thyroidectomy     Family History   Problem Relation Age of Onset    Heart disease Father     Heart disease Sister     Heart disease Brother     Diabetes Son      Social History   Substance Use Topics    Smoking status: Never Smoker    Smokeless tobacco: None    Alcohol use None     Review of Systems   Constitutional: Negative for fever.   HENT: Negative for nosebleeds.    Eyes: Negative for visual disturbance.   Respiratory: Negative for shortness of breath.    Cardiovascular:  Negative for chest pain.   Gastrointestinal: Negative for vomiting.   Genitourinary: Negative for flank pain.   Musculoskeletal: Negative for neck pain.   Skin: Negative for rash.   Neurological: Positive for dizziness.        Facial droop, slurred speech, and word finding difficulty en route per EMS. Now resolved.       Physical Exam   Initial Vitals   BP Pulse Resp Temp SpO2   -- -- -- -- --            Physical Exam    Nursing note and vitals reviewed.  Constitutional: She appears well-developed and well-nourished.   81 yo  F in no acute distress.   HENT:   Head: Normocephalic and atraumatic.   No intraoral lesions identified.   Eyes: EOM are normal. Pupils are equal, round, and reactive to light.   Neck: No tracheal deviation present.   Cardiovascular: Bradycardia present.    Pulmonary/Chest: No stridor. No respiratory distress.   Abdominal: Soft. She exhibits no distension. There is no tenderness.   Obese   Neurological: She is alert and oriented to person, place, and time. No cranial nerve deficit.   BLE weakness noted, consistent with chronic hx. No word finding difficulty.   Skin: Skin is warm and dry.         ED Course   Critical Care  Date/Time: 3/24/2017 7:40 AM  Performed by: MELANIA CASANOVA.  Authorized by: MELANIA CASANOVA   Direct patient critical care time: 15 minutes  Additional history critical care time: 5 minutes  Ordering / reviewing critical care time: 10 minutes  Documentation critical care time: 5 minutes  Consulting other physicians critical care time: 10 minutes  Total critical care time (exclusive of procedural time) : 45 minutes  Critical care was necessary to treat or prevent imminent or life-threatening deterioration of the following conditions: CNS failure or compromise.  Critical care was time spent personally by me on the following activities: pulse oximetry, obtaining history from patient or surrogate, ordering and performing treatments and interventions,  re-evaluation of patient's condition, interpretation of cardiac output measurements, development of treatment plan with patient or surrogate, discussions with consultants, evaluation of patient's response to treatment, ordering and review of laboratory studies, review of old charts, ordering and review of radiographic studies and examination of patient.        Labs Reviewed   CBC W/ AUTO DIFFERENTIAL - Abnormal; Notable for the following:        Result Value    Hemoglobin 11.6 (*)     Hematocrit 34.6 (*)     All other components within normal limits   COMPREHENSIVE METABOLIC PANEL - Abnormal; Notable for the following:     Albumin 3.4 (*)     Alkaline Phosphatase 54 (*)     eGFR if  54.0 (*)     eGFR if non  46.9 (*)     All other components within normal limits   LIPID PANEL - Abnormal; Notable for the following:     HDL 29 (*)     All other components within normal limits   PROTIME-INR   TSH   POCT GLUCOSE   TYPE & SCREEN   POCT GLUCOSE   ISTAT PROCEDURE   ISTAT CREATININE     EKG Readings: (Independently Interpreted)   Sinus bradycardia, LAD, Incomplete RBBB. Normal ST segments. 51 BPM.     Imaging Results         US Lower Extremity Arteries Bilateral (Final result) Result time:  03/24/17 19:42:49    Final result by Ye Wagner MD (03/24/17 19:42:49)    Impression:        Biphasic/biphasic waveforms throughout both lower extremities with doubling of velocities between the proximal and mid SFA on the left suggesting focal area of significant stenosis in the range of 50-70%.    ______________________________________     Electronically signed by resident: YE WAGNER MD  Date:     03/24/17  Time:    19:38            As the supervising and teaching physician, I personally reviewed the images and resident's interpretation and I agree with the findings.          Electronically signed by: MARYCARMEN RUANO MD  Date:     03/24/17  Time:    19:42     Narrative:    Clinical indication: Evaluate  for arterial stenosis.    Comparison: None.    Technique: Color Doppler evaluation of the arterial structures of the lower extremities was performed.    Findings:    Biphasic waveforms are identified throughout the arterial vasculature of both lower chest images.  Velocities are as follows given in centimeters per second:    Right lower extremity:    CFA: 117  SFA proximal: 112  SFA mid: 106  SFA distal: 107  Popliteal: 91  PTA: 63  RICH: 97    Left lower extremity:    CFA: 102  SFA proximal: 80  SFA mid: 180  SFA distal: 113  Popliteal: 93  PTA: 86  RICH: 80    There is a doubling of velocities between the SFA proximal and SFA mid segment on the left concerning for significant stenosis noting mild to moderate plaque identified in this region extending to the calf vessels without evidence of distal waveform dampening.    No incidental fluid collections.            MRI Brain W WO Contrast (Final result) Result time:  03/24/17 16:25:55    Final result by Juan Pablo Johnson DO (03/24/17 16:25:55)    Impression:     Evolving recent small right paramedian inferior cerebellar vermian infarction.      Superimposed age-appropriate generalized volume loss with scattered foci of T2/FLAIR signal abnormality within the supratentorial white matter  while nonspecific suggestive for moderate  degree of  chronic microvascular ischemic change.    No definite new infarction or enhancing mass lesion.    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    Technique: Sagittal and axial T1, axial T2, axial FLAIR, axial gradient, axial diffusion, and axial, sagittal, and coronal postcontrast T1 images of the whole brain. 7 ml of Gadavist injected intravenously.    Comparison: 03/20/2017    Findings: Age-appropriate generalized cerebral volume loss. Evolving small  size recent infarction right paramedian inferior cerebellar vermis. There is punctate diffusion hyperintensity in the left aspect of the bekah of the splenic same level which is likely artifactual without corresponding signal abnormality on additional sequences within this region. No definite new area restricted diffusion to suggest acute infarction. There are continued scattered numerous foci of  T2/FLAIR signal hyperintensity in the supratentorial white matter without corresponding diffusion signal abnormality or enhancement. These are nonspecific and suggestive for moderate degree of chronic microvascular ischemic change. There is stable tubular enhancement right superior frontal gyrus compatible with developmental venous anomaly. Ventricles stable in size and configuration without hydrocephalus. No midline shift or mass effect. No abnormal parenchymal gradient susceptibility. The major intracranial T2 flow-voids are present.            CTA Head and Neck (xpd) (Final result) Result time:  03/24/17 10:03:24    Procedure changed from CTA Head        Final result by Juan Pablo Johnson DO (03/24/17 10:03:24)    Impression:    Postcontrast CT head: No significant change. Evolving hypodensity in the right paramedian inferior cerebellar vermis compatible with known infarction. No evidence for definite abnormal parenchymal enhancement.      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    Developmental variant with persistent fetal circulation of the PCAs bilaterally.    CTA neck: Occlusion right vertebral artery origin with reconstitution of the V2 segment with interrupted high-grade stenosis or occlusion. Mild stenosis left vertebral artery origin and distal left vertebral artery although patent  throughout its course.    Atherosclerotic disease of the carotid bifurcations and proximal ICAs with less than 50% proximal ICA stenosis by NASCET criteria.    9 mm indeterminate soft tissue density nodule within the right upper lobe. According revised Fleischer Society criteria followup CT, PET CT or consider tissue sampling biopsy in 3 months recommended    Please see above for additional details.            Electronically signed by: ELIANA CR LIU  Date:     03/24/17  Time:    10:03     Narrative:       Procedure: Postcontrast CTA of the head and neck    Technique:5-mm axial images the head  postcontrast in addition 0.5 mm axial CTA images of the head and neck postcontrast with coronal and sagittal reformatted imaging. 100 cc of Omnipaque 350 IV contrast administered.       Comparison:Noncontrast CT head earlier today and MRI/ MRA 03/20/2017          Results:    CT head without contrast:  No new abnormal parenchymal attenuation or sulcal effacement.  Ventricles are stable in size and configuration without evidence for hydrocephalus.  There is no midline shift or mass-effect.  Allowing for limitation by CT technique there is no abnormal parenchymal enhancement.   Mild ethmoid air cell opacification with mild/moderate mucosal thickening inferior right maxillary antra. Remaining Visualized paranasal sinuses and mastoid air cells are clear.    CTA head:  Anterior circulation: The bilateral distal cervical, petrous, cavernous and supraclinoid segments of the ICAs are patent without significant focal stenosis or aneurysm.      Atherosclerotic plaquing of the cavernous segments of the ICAs.  The anterior and middle cerebral arteries are patent without significant focal stenosis or aneurysm.    Posterior circulation: The distal vertebral arteries are diminutive in caliber the right essentially ends in PICA. The left distal vertebral artery is small in caliber with superimposed atherosclerotic plaquing and mild stenosis.  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 Please note there is persistent fetal circulation the PCAs bilaterally without PCA significant stenosis or lesion..    CTA Neck:  Atherosclerotic plaquing of the arch and origin of the great vessels without significant focal stenosis. The origins of the right brachycephalic,  left common carotid and left subclavian arteries from the arch are patent with mild narrowing of the left subclavian artery origin.. The left vertebral artery origin is identified with atherosclerotic plaque and mild stenosis. Otherwise left vertebral artery is patent with mild distal stenosis proximal V4 segment. There is lack of visualization of the origin the right vertebral artery with occlusion, reconstituted proximal V2 segment with interrupted occlusion in the V2 segment and diminutive flow in the V3 and V4 segments ending in PICA..      Right carotid: The right common carotid artery, carotid bifurcation and extracranial portions of the internal carotid artery are patent without evidence for focal stenosis or occlusion.    Left carotid: The left common carotid artery, carotid bifurcation and extra cranial portions of the internal carotid artery are patent without evidence for focal stenosis or occlusion.    atherosclerotic plaquing of the carotid bifurcations and proximal ICAs with less  than 50% proximal ICA stenosis by NASCET criteria.    Pharynx/larynx: Please note evaluation somewhat limited by dental metal..  Allowing for limitation the nasopharynx, oral pharynx,, hypopharynx, larynx and visualized portion of the trachea are within normal limits.    The oral cavity and buccal space are  limited by artifact from dental metal.    Glands: The bilateral parotid, submandibular glands are  within normal limits. Several subcentimeter hypodense foci within the right lobe of the thyroid with absent left lobe of thyroid which likely postoperative.    No evidence for adenopathy throughout the neck by size criteria.    The evidence for acute fracture of the cervical spine. Multilevel degenerative change. Probable heterotopic ossification along the posterior soft tissues at the level the tips of the C7 and T1 spinous processes.    Atherosclerotic calcification of the aorta and in the distribution of the visualized coronary arteries.    Solitary soft tissue density nodule right upper lobe measuring 9 mm. According to provide Fleischer society criteria followup CT, PET CT or consider tissue sampling biopsy in 3 months.            X-Ray Chest AP Portable (Final result) Result time:  03/24/17 08:27:20    Final result by Arturo Mariano MD (03/24/17 08:27:20)    Impression:     No significant detrimental interval change in the appearance of the chest since 3/20/17.      Electronically signed by: Arturo Mariano MD  Date:     03/24/17  Time:    08:27     Narrative:    Comparison is made to 3/20/17.    Cardiomediastinal silhouette is magnified both by projection and by a poor inspiratory depth.  Allowing for these technical factors the heart is at the upper limit of normal to minimally enlarged, and the cardiomediastinal silhouette demonstrates no detrimental change since the examination referenced above.  Pulmonary vascularity is normal.  Lung zones are stable, and free of superimposed air space consolidation or volume loss.  No pleural fluid of any substantial volume is seen on either side.  Calcification in the wall of the thoracic aorta is again incidentally observed.            CT Head Without Contrast (Final result) Result time:  03/24/17 08:04:38    Final result by Juan Pablo Johnson DO (03/24/17 08:04:38)    Impression:     Subtle hypodensity right paramedian inferior cerebellar vermis most compatible with edema  related to recent to subacute age infarction as identified on recent MRI. No evidence for hemorrhagic conversion.    Superimposed Age-appropriate generalized cerebral volume loss with mild/moderate  degree of patchy decreased attenuation supratentorial white matter suggestive for chronic microvascular ischemic change similarly seen on the prior.     No evidence for acute intracranial hemorrhage . Clinical correlation and followup advised.      Electronically signed by: ELIANA HANKINS DO  Date:     03/24/17  Time:    08:04     Narrative:    CT brain without contrast.    Comparison: CT and MRI 03/20/2017    Technique: Multiple 5 mm axial images of the head were obtained without intravenous contrast.    Results: There is subtle hypodensity within the right paramedian inferior cerebellar vermis suggestive for edema related to small recent infarction identified on MRI. No evidence for hemorrhagic conversion. Superimposed Generalized cerebral volume loss appropriate for age. There is limitation by patient motion. Continued mild/moderate  degree of patchy decreased attenuation throughout the supratentorial white matter suggestive for chronic microvascular ischemic change.  No evidence for acute intracranial hemorrhage or sulcal effacement.  Ventricles are stable in size and configuration without evidence for hydrocephalus. No midline shift or mass effect.  Visualized paranasal sinuses and mastoid air cells are clear.                 Medical Decision Making:   History:   Old Medical Records: I decided to obtain old medical records.  Initial Assessment:   My initial differential diagnoses include, but are not limited to: ICH, TIA, ischemic stroke, hypertensive encephalopathy.  Independently Interpreted Test(s):   I have ordered and independently interpreted EKG Reading(s) - see prior notes  Clinical Tests:   Lab Tests: Reviewed and Ordered  Radiological Study: Reviewed and Ordered  Medical Tests: Reviewed and Ordered  Other:    I have discussed this case with another health care provider.            Scribe Attestation:   Scribe #1: I performed the above scribed service and the documentation accurately describes the services I performed. I attest to the accuracy of the note.    Attending Attestation:           Physician Attestation for Scribe:  Physician Attestation Statement for Scribe #1: I, Dr. Gibson, reviewed documentation, as scribed by Henny August in my presence, and it is both accurate and complete.                 ED Course     Clinical Impression:   The encounter diagnosis was Transient ischemic attack.    Disposition:   Disposition: Placed in Observation  Condition: Stable  Vascular neurology       Andrew Gibson MD  04/02/17 1624

## 2017-03-24 NOTE — ASSESSMENT & PLAN NOTE
Dizziness initial symptom  EMS noticed dysarthria and aphasia lasting five minutes   Admitting for further imaging of blood vessels     Antithrombotics: ASA 81mgqd and plavix 75mgqd  Atorvastatin 40mg qhs  DVT: heparin 5000units q 8hours  Diagnostic images pending: CTA head/neck, MRI brain

## 2017-03-24 NOTE — PT/OT/SLP EVAL
"Speech Language Pathology Evaluation    Linda Hylton   MRN: [id number]   Admitting Diagnosis: Transient ischemic attack    Diet recommendations: Solid Diet Level: Regular  Liquid Diet Level: Thin     SLP Treatment Date: 17  Speech Start Time: 1215     Speech Stop Time: 1231     Speech Total (min): 16 min       TREATMENT BILLABLE MINUTES:  Eval 8  and Eval Swallow and Oral Function 8    Diagnosis: Transient ischemic attack      Past Medical History:   Diagnosis Date    Aortic stenosis     History of bleeding ulcers 2015    Hypertension     Hypothyroidism     Sciatica of left side     Vitamin D deficiency      Past Surgical History:   Procedure Laterality Date    APPENDECTOMY      [facility]    PARATHYROIDECTOMY Right 2012    THYROID SURGERY Right     Partial thyroidectomy       Has the patient been evaluated by SLP for swallowing? : Yes  Keep patient NPO?: No   General Precautions: Standard,            Social Hx: Patient lives with son.    Prior diet: Regular.      Subjective:  "I could not talk for a while...now its fine"  Pain Ratin/10              Pain Rating Post-Intervention: 0/10    Objective:        Oral Musculature Evaluation  Oral Musculature: WFL  Dentition: present and adequate  Mucosal Quality: good  Mandibular Strength and Mobility: WFL  Oral Labial Strength and Mobility: WFL  Lingual Strength and Mobility: WFL  Velar Elevation: WFL  Buccal Strength and Mobility: WFL  Volitional Cough: strong  Volitional Swallow: no delay  Voice Prior to PO Intake: wfl     Cognitive Status:  Behavioral Observations: alert and appropriate-  Memory and Orientation: Pt. was oriented x3 with good recall of recent and remote temporal and general information.  Immediate/delayed verbal recall was wfl with pt. Repeating 7 digits and 5 words without difficulty.  Pt. Recalled 2/3 objects after a 5 minute delay    Attention: wfl.  Problem Solving: Responses to hypothetical verbal " problem solving tasks were accurate and complete.  Pt. Compared and contrasted objects and generated multiple solutions to problems.  Thought organization and categorization skills were wfl with pt. Naming 18 animals given one minute when 15-20 are wnl.  Pt. Responded to functional math and time calculations with 90% accuracy and sequenced 4 words presented auditorily on 2/2 trials.    Pragmatics: wfl  Executive Function: wfl        Auditory Comprehension: Pt. Responded to complex yes/no questions and multi step commands with 100% accuracy and no delays in responding.        Verbal Expression: Verbal language skills were wfl with no evidence of aphasia.  Pt. Expressed their thoughts coherently in conversation with no evidence of confusion or word finding deficits      Motor Speech: [facility]    Voice: [facility]    Reading: [facility]    Writing: [facility]      Bedside Swallow Eval:  Consistencies Assessed: Thin liquids 1/2 cup, Puree 1 teaspoon and Solids 1 cracker  Oral Phase: WFL  Pharyngeal Phase: no overt clinical  signs/symptoms of aspiration and no overt clinical signs/symptoms of pharyngeal dysphagia    Additional Treatment:      FIM:  Social Interaction: 7 Complete [place]--The patient interacts appropriately with staff, other patients, and family members (e.g., controls temper, accepts criticism, is aware that words and actions have an impact on others), and does not require medication for   Problem Solvin Complete [place]--The patient consistently recognizes problems when present, makes appropriate decisions, initiates and carries out a sequence of steps to solve complex problems until the task is completed, and self-corrects if errors are made.   Comprehension: 7 Complete [place]--The patient understand complex or abstract directions and conversation, and understand either spoken or written language (not necessarily English).   Expression: 7 Complete [place]--The patient expresses complex or abstract ideas  clearly and fluently (not necessarily in English).   Memory: 7 Complete [place]--The patient recognizes people frequently encountered, remebers daily routines, and executes requests of others without need for repetition.     Assessment:  Linda Hylton is a 82 y.o. female with speech, language and cognitive skills wfl.  Oral and pharyngeal phases of swallow were wnl.    Do you have any cultural, spiritual, Confucianist conflicts, given your current situation?: no    Discharge recommendations: Discharge Facility/Level Of Care Needs: home     Goals:   SLP Goals        Problem: SLP Goal    Goal Priority Disciplines Outcome   SLP Goal     SLP Ongoing (interventions implemented as appropriate)              Plan:   Patient to be seen     Plan of Care expires:    Plan of Care reviewed with: patient  SLP Follow-up?: No              Claudine Mensah MA, CCC-SLP  03/24/2017

## 2017-03-24 NOTE — IP AVS SNAPSHOT
[facility]  [address]  [facility] [address]  Phone: [phone]           Patient Discharge Instructions     Our goal is to set you up for success. This packet includes information on your condition, medications, and your home care. It will help you to care for yourself so you don't get sicker and need to go back to the hospital.     Please ask your nurse if you have any questions.        There are many details to remember when preparing to leave the hospital. Here is what you will need to do:    1. Take your medicine. If you are prescribed medications, review your Medication List in the following pages. You may have new medications to  at the pharmacy and others that you'll need to stop taking. Review the instructions for how and when to take your medications. Talk with your doctor or nurses if you are unsure of what to do.     2. Go to your follow-up appointments. Specific follow-up information is listed in the following pages. Your may be contacted by a transition nurse or clinical provider about future appointments. Be sure we have all of the phone numbers to reach you, if needed. Please contact your provider's office if you are unable to make an appointment.     3. Watch for warning signs. Your doctor or nurse will give you detailed warning signs to watch for and when to call for assistance. These instructions may also include educational information about your condition. If you experience any of warning signs to your health, call your doctor.               [facility]  Unless otherwise directed by your provider, please contact [facility], our nurse care line that is available for 24/7 assistance.     [phone] (toll-free)    Registered nurses in the [facility] provide clinical advisement, health education, appointment booking, and other advisory services.                    ** Verify the list of medication(s) below is accurate and up  to date. Carry this with you in case of emergency. If your medications have changed, please notify your healthcare provider.             Medication List      START taking these medications        Additional Info                      clopidogrel 75 mg tablet   Commonly known as:  PLAVIX   Quantity:  90 tablet   Refills:  3   Dose:  75 mg    Last time this was given:  75 mg on 3/25/2017  8:14 AM   Instructions:  Take 1 tablet (75 mg total) by mouth once daily.     Begin Date    AM    Noon    PM    Bedtime       scopolamine 1.5 mg (1 mg over 3 days)   Commonly known as:  TRANSDERM-SCOP   Quantity:  4 patch   Refills:  1   Dose:  1 patch    Instructions:  Place 1 patch (1.5 mg total) onto the skin every 72 hours. As needed for dizziness.     Begin Date    AM    Noon    PM    Bedtime         CONTINUE taking these medications        Additional Info                      alprazolam 0.5 MG tablet   Commonly known as:  XANAX   Quantity:  60 tablet   Refills:  0   Dose:  0.5 mg    Instructions:  Take 1 tablet (0.5 mg total) by mouth 2 (two) times daily as needed for Anxiety.     Begin Date    AM    Noon    PM    Bedtime       amlodipine 5 MG tablet   Commonly known as:  NORVASC   Refills:  3    Instructions:  TAKE 1 TABLET DAILY AS DIRECTED.     Begin Date    AM    Noon    PM    Bedtime       aspirin 325 MG EC tablet   Commonly known as:  ECOTRIN   Refills:  0   Dose:  325 mg    Last time this was given:  325 mg on 3/25/2017  8:13 AM   Instructions:  Take 1 tablet (325 mg total) by mouth once daily.     Begin Date    AM    Noon    PM    Bedtime       atorvastatin 40 MG tablet   Commonly known as:  LIPITOR   Quantity:  30 tablet   Refills:  3   Dose:  40 mg    Last time this was given:  40 mg on 3/25/2017  8:14 AM   Instructions:  Take 1 tablet (40 mg total) by mouth once daily.     Begin Date    AM    Noon    PM    Bedtime       cholecalciferol (vitamin D3) 5,000 unit Tab   Refills:  0   Dose:  5000 Units    Instructions:   Take 5,000 Units by mouth once daily.     Begin Date    AM    Noon    PM    Bedtime       ciprofloxacin HCl 500 MG tablet   Commonly known as:  CIPRO   Quantity:  13 tablet   Refills:  0   Dose:  500 mg   Indications:  Urinary Tract Infection    Last time this was given:  500 mg on 3/24/2017  9:40 PM   Instructions:  Take 1 tablet (500 mg total) by mouth every 12 (twelve) hours.     Begin Date    AM    Noon    PM    Bedtime       fenofibrate 160 MG Tab   Quantity:  90 tablet   Refills:  3   Dose:  160 mg    Last time this was given:  160 mg on 3/25/2017  8:13 AM   Instructions:  Take 1 tablet (160 mg total) by mouth once daily.     Begin Date    AM    Noon    PM    Bedtime       ferrous sulfate 325 (65 FE) MG EC tablet   Refills:  0   Dose:  325 mg    Instructions:  Take 325 mg by mouth once daily.     Begin Date    AM    Noon    PM    Bedtime       levothyroxine 75 MCG tablet   Commonly known as:  SYNTHROID   Refills:  1   Dose:  75 mcg    Instructions:  Take 75 mcg by mouth once daily.     Begin Date    AM    Noon    PM    Bedtime       lisinopril 40 MG tablet   Commonly known as:  PRINIVIL,ZESTRIL   Refills:  0   Dose:  40 mg    Instructions:  Take 40 mg by mouth once daily.     Begin Date    AM    Noon    PM    Bedtime       meclizine 25 mg tablet   Commonly known as:  ANTIVERT   Quantity:  10 tablet   Refills:  0   Dose:  25 mg    Last time this was given:  25 mg on 3/25/2017 12:50 PM   Instructions:  Take 1 tablet (25 mg total) by mouth 3 (three) times daily as needed for Dizziness.     Begin Date    AM    Noon    PM    Bedtime       metoprolol succinate 50 MG 24 hr tablet   Commonly known as:  TOPROL-XL   Refills:  0   Dose:  50 mg    Instructions:  Take 50 mg by mouth once daily.     Begin Date    AM    Noon    PM    Bedtime       ondansetron 8 MG Tbdl   Commonly known as:  ZOFRAN-ODT   Quantity:  10 tablet   Refills:  0   Dose:  8 mg    Last time this was given:  8 mg on 3/24/2017 11:25 PM   Instructions:   Take 1 tablet (8 mg total) by mouth every 8 (eight) hours as needed.     Begin Date    AM    Noon    PM    Bedtime            Where to Get Your Medications      These medications were sent to [facility]/pharmacy #[id number] - [place] - [address] [facility]  [address] [facility] [address]     Phone:  [phone]     clopidogrel 75 mg tablet    scopolamine 1.5 mg (1 mg over 3 days)                  Please bring to all follow up appointments:    1. A copy of your discharge instructions.  2. All medicines you are currently taking in their original bottles.  3. Identification and insurance card.    Please arrive 15 minutes ahead of scheduled appointment time.    Please call 24 hours in advance if you must reschedule your appointment and/or time.        Your Scheduled Appointments     Apr 21, 2017  2:00 PM CDT   New Patient with AMBER Dasilva - Neurology ([facility] )    [address]  [facility] [address] [phone]              Follow-up Information     Follow up with Carlos Gil MD In 4 weeks.    Specialty:  Neurology    Contact information:    [address]  [facility] [address] [phone]          [facility]    Specialty:  Home Health Services    Why:  Home Health     Contact information:    [address]  [address]  [facility] [address] [phone]          Follow up with Valerio Clinton MD In 2 weeks.    Specialty:  Vascular Surgery    Why:  with ISELA/arterial US/carotid US    Contact information:    [address]  [facility] [address] [phone]        Referrals     Future Orders    Ambulatory Referral to Vascular Surgery         Discharge Instructions     Future Orders    Activity as tolerated     Call 911 for any of the following:     Comments:    Call 911  right away if any of the following warning signs come on suddenly, even if the symptoms only last for a few minutes. With stroke, timing is  very important.   - Warning Signs of Stroke:  - Weakness: You may feel a sudden weakness, tingling or loss of feeling on one side of your face or body.  - Vision Problems: You may have sudden double vision or trouble seeing in one or both eyes.  - Speech Problems: You may have sudden trouble talking, slured speech, or problems understanding others.  - Headache: You may have sudden, severe headache.  - Movement Problems: You may experience dizziness, a feeling of spinning, a loss of balance, a feeling of falling or blackouts.    Diet Cardiac     Comments:    See Stroke Patient Education Guide Booklet for details.        Primary Diagnosis     Your primary diagnosis was:  Temporary Loss Of Blood Supply To Brain      Admission Information     Date & Time Provider Department CSN    3/24/2017  7:43 AM Carlos Gil MD [facility] [id number]      Care Providers     Provider Role Specialty Primary office phone    Carlos Gil MD Attending Provider Neurology [phone]    Octaviano Santos MD Team Attending  Neuro Critical Care [phone]    Percy Weir MD Team Attending  Neuro Critical Care [phone]    Keith Del Valle MD Team Attending  Interventional Neurology [phone]    Guille Haynes MD Team Attending  Neurology [phone]    Valerio Clinton MD Consulting Physician  Vascular Surgery  [phone]      Your Vitals Were     BP Pulse Temp Resp Last Period SpO2    117/57 (BP Location: Right arm, Patient Position: Lying, BP Method: Automatic) 113 98.4 °F (36.9 °C) (Oral) 18 (LMP Unknown) 95%      Recent Lab Values        3/20/2017                           7:04 AM           A1C 5.8           Comment for A1C at  7:04 AM on 3/20/2017:  According to ADA guidelines, hemoglobin A1C <7.0% represents  optimal control in non-pregnant diabetic patients.  Different  metrics may apply to specific populations.   Standards of Medical Care in Diabetes - 2016.  For the purpose of  screening for the presence of diabetes:  <5.7%     Consistent with the absence of diabetes  5.7-6.4%  Consistent with increasing risk for diabetes   (prediabetes)  >or=6.5%  Consistent with diabetes  Currently no consensus exists for use of hemoglobin A1C  for diagnosis of diabetes for children.        Pending Labs     Order Current Status    Urine culture In process      Allergies as of 3/25/2017        Reactions    Sulfa (Sulfonamide Antibiotics) Hives      Advance Directives     An advance directive is a document which, in the event you are no longer able to make decisions for yourself, tells your healthcare team what kind of treatment you do or do not want to receive, or who you would like to make those decisions for you.  If you do not currently have an advance directive, [facility] encourages you to create one.  For more information call:  [phone]), [phone] ([phone]),  or log on to [email or web address].        Language Assistance Services     ATTENTION: Language assistance services are available, free of charge. Please call [phone].      ATENCIÓN: Si habla español, tiene a leon disposición servicios gratuitos de asistencia lingüística. Llame al [phone].     CHÚ Ý: N?u b?n nói Ti?ng Vi?t, có các d?ch v? h? tr? ngôn ng? mi?n phí dành cho b?n. G?i s? [phone].        Stroke Education              [facility] Sign-Up     Activating your [facility] account is as easy as 1-2-3!     1) Visit [email or web address], select Sign Up Now, enter this activation code and your date of birth, then select Next.  [email or web address]  Expires: 5/5/2017  4:26 PM      2) Create a username and password to use when you visit [facility] in the future and select a security question in case you lose your password and select Next.    3) Enter your e-mail address and click Sign Up!    Additional Information  If you have questions, please e-mail [email or web address] or call [phone] to talk to our  [facility] staff. Remember, [facility] is NOT to be used for urgent needs. For medical emergencies, dial 911.          [facility] complies with applicable Federal civil rights laws and does not discriminate on the basis of race, color, national origin, age, disability, or sex.

## 2017-03-24 NOTE — SUBJECTIVE & OBJECTIVE
Past Medical History:   Diagnosis Date    Aortic stenosis     History of bleeding ulcers 2015    Hypertension     Hypothyroidism     Sciatica of left side     Vitamin D deficiency      Past Surgical History:   Procedure Laterality Date    APPENDECTOMY      [facility]    PARATHYROIDECTOMY Right 2012    THYROID SURGERY Right 2012    Partial thyroidectomy     Family History   Problem Relation Age of Onset    Heart disease Father     Heart disease Sister     Heart disease Brother     Diabetes Son      Social History   Substance Use Topics    Smoking status: Never Smoker    Smokeless tobacco: None    Alcohol use None     Review of patient's allergies indicates:   Allergen Reactions    Sulfa (sulfonamide antibiotics) Hives     Medications: I have reviewed the current medication administration record.      (Not in a hospital admission)    Review of Systems   Constitutional: Negative for chills and fever.   HENT: Negative for drooling and rhinorrhea.    Eyes: Negative for photophobia and visual disturbance.   Respiratory: Negative for cough and shortness of breath.    Cardiovascular: Negative for chest pain and palpitations.   Gastrointestinal: Negative for diarrhea and vomiting.   Endocrine: Negative for polyuria.   Genitourinary: Negative for frequency and urgency.   Musculoskeletal: Positive for arthralgias and gait problem.   Skin: Negative for pallor and rash.   Neurological: Negative for speech difficulty, weakness and headaches.   Psychiatric/Behavioral: Negative for agitation and behavioral problems.     Objective:     Vital Signs (Most Recent):  Temp: 98.1 °F (36.7 °C) (03/24/17 0741)  Pulse: (!) 53 (03/24/17 0756)  Resp: 16 (03/24/17 0756)  BP: 110/80 (03/24/17 0741)  SpO2: (!) 91 % (03/24/17 0756)    Vital Signs Range (Last 24H):  Temp:  [98.1 °F (36.7 °C)]   Pulse:  [53-54]   Resp:  [16]   BP: (110)/(80)   SpO2:  [91 %-99 %]     Physical Exam   Constitutional: She  appears well-developed and well-nourished.   HENT:   Head: Normocephalic and atraumatic.   Eyes: EOM are normal. Pupils are equal, round, and reactive to light.   Neck: Normal range of motion.   Cardiovascular: Normal rate.    Pulmonary/Chest: Effort normal. No respiratory distress.   Abdominal: Soft. She exhibits no distension.   Musculoskeletal:   Decreased ROM in BLE    Neurological:   See below     Skin: No rash noted. No erythema.   Psychiatric: She has a normal mood and affect.       Neurological Exam:   LOC: alert and follows requests  Language: No aphasia  Speech: No dysarthria  Orientation: Person, Place, Time  Memory: Recent memory intact, Remote memory intact, Age correct, Month correct  Visual Fields (CN II): Full  EOM (CN III, IV, VI): Full/intact  Oculocephalics: normal  Pupils (CN III, IV, VI): PERRL  Facial Sensation (CN V): Symmetric  Facial Movement (CN VII): symmetric facial expression  Hearing (CN VIII): intact bilaterally  Gag Reflex (CN IX, X): no gag reflex  Shoulder/Neck (CN XI): SCM-Left: Normal ; SCM-Right: Normal ; Shoulder Shrug: Normal/Symetric  Tongue (CN XII): not examined   Reflexes: not examined  Motor*: Arm Left:  Normal (5/5), Leg Left:   Paretic:  4/5, Arm Right:   Normal (5/5), Leg Right:   Paretic:  4/5  Cerebellar*: normal finger to nose  Sensation: intact to light touch, temperature and vibration  Tone: Arm-Left: normal; Leg-Left: normal; Arm-Right: normal; Leg-Right: normal    NIH Stroke Scale:  Interval: baseline (upon arrival/admit)  Level of Consciousness: 0 - alert  LOC Questions: 0 - answers both correctly  LOC Commands: 0 - performs both correctly  Best Gaze: 0 - normal  Visual: 0 - no visual loss  Facial Palsy: 0 - normal  Motor Left Arm: 0 - no drift  Motor Right Arm: 0 - no drift  Motor Left Le - no drift  Motor Right Le - no drift  Limb Ataxia: 0 - absent  Sensory: 0 - normal  Best Language: 0 - no aphasia  Dysarthria: 0 - normal articulation  Extinction and  Inattention: 0 - no neglect  NIH Stroke Scale Total: 0  Modified Ericka Scale:   Timeline: Prior to symptoms onset  Modified Ericka Score: 2 - slight disability    ABCD2 Scale for TIA:   Age > or = 60: 1 - yes  B/P or = 140/9 at Initial Evaluation: 0 - no  Clinical Features of TIA: 1 - speech disturbance without weakness  Duration of Symptoms: 0 - < 10 minutes  Diabetes Mellitus in History: 0 - no  ABCD2 Scale Total: 2      Laboratory:  CMP:   Recent Labs  Lab 03/24/17  0759   CALCIUM 8.7   ALBUMIN 3.4*   PROT 7.0      K 4.0   CO2 23      BUN 23   CREATININE 1.1   ALKPHOS 54*   ALT 17   AST 18   BILITOT 0.3     CBC:   Recent Labs  Lab 03/24/17  0759   WBC 9.36   RBC 4.05   HGB 11.6*   HCT 34.6*      MCV 85   MCH 28.6   MCHC 33.5     Lipid Panel:   Recent Labs  Lab 03/24/17  0759   CHOL 140   LDLCALC 85.2   HDL 29*   TRIG 129     Hgb A1C:   Recent Labs  Lab 03/20/17  0704   HGBA1C 5.8     TSH:   Recent Labs  Lab 03/24/17  0759   TSH 2.147       Diagnostic Results:  Brain Imaging:   CT head 03/24/17  -No acute intracranial abnormalities     Echo 03/20/17  - normal [place]  -aortic stenosis  -severe mitral regurgitation

## 2017-03-24 NOTE — PLAN OF CARE
Problem: Patient Care Overview  Goal: Plan of Care Review  Outcome: Ongoing (interventions implemented as appropriate)  POC reviewed with pt. Pt verbalized understanding. Pt remained free from falls, skin breakdown, and injury. Call light remained within reach and side rails up x2. Neuro checks and vital signs done every 4 hours. Refer to flowsheets for full assessment and VS info. Pt tolerating diet. No acute events this shift. Will continue to monitor.

## 2017-03-24 NOTE — PLAN OF CARE
Problem: SLP Goal  Goal: SLP Goal  Outcome: Ongoing (interventions implemented as appropriate)  Regular diet with thin liquids recommended.    Claudine Mensah MA/LORA-SLP  Speech Language Pathologist  Pager [phone]  3/24/2017

## 2017-03-25 VITALS
DIASTOLIC BLOOD PRESSURE: 57 MMHG | SYSTOLIC BLOOD PRESSURE: 117 MMHG | TEMPERATURE: 98 F | OXYGEN SATURATION: 95 % | HEART RATE: 113 BPM | RESPIRATION RATE: 18 BRPM

## 2017-03-25 LAB
ALBUMIN SERPL BCP-MCNC: 3.5 G/DL
ALP SERPL-CCNC: 70 U/L
ALT SERPL W/O P-5'-P-CCNC: 16 U/L
ANION GAP SERPL CALC-SCNC: 15 MMOL/L
APTT BLDCRRT: 23.6 SEC
AST SERPL-CCNC: 17 U/L
BASOPHILS # BLD AUTO: 0.06 K/UL
BASOPHILS NFR BLD: 0.6 %
BILIRUB SERPL-MCNC: 0.3 MG/DL
BUN SERPL-MCNC: 21 MG/DL
CALCIUM SERPL-MCNC: 9 MG/DL
CHLORIDE SERPL-SCNC: 107 MMOL/L
CK MB SERPL-MCNC: 1.6 NG/ML
CK MB SERPL-RTO: 2.1 %
CK SERPL-CCNC: 76 U/L
CO2 SERPL-SCNC: 20 MMOL/L
CREAT SERPL-MCNC: 1 MG/DL
DIFFERENTIAL METHOD: ABNORMAL
EOSINOPHIL # BLD AUTO: 0.2 K/UL
EOSINOPHIL NFR BLD: 2.5 %
ERYTHROCYTE [DISTWIDTH] IN BLOOD BY AUTOMATED COUNT: 14.3 %
EST. GFR  (AFRICAN AMERICAN): >60 ML/MIN/1.73 M^2
EST. GFR  (NON AFRICAN AMERICAN): 52.6 ML/MIN/1.73 M^2
GLUCOSE SERPL-MCNC: 90 MG/DL
HCT VFR BLD AUTO: 36.2 %
HGB BLD-MCNC: 12.1 G/DL
INR PPP: 1
LYMPHOCYTES # BLD AUTO: 1.9 K/UL
LYMPHOCYTES NFR BLD: 20.8 %
MAGNESIUM SERPL-MCNC: 1.7 MG/DL
MCH RBC QN AUTO: 28.8 PG
MCHC RBC AUTO-ENTMCNC: 33.4 %
MCV RBC AUTO: 86 FL
MONOCYTES # BLD AUTO: 1.1 K/UL
MONOCYTES NFR BLD: 11.6 %
NEUTROPHILS # BLD AUTO: 5.9 K/UL
NEUTROPHILS NFR BLD: 63.4 %
PHOSPHATE SERPL-MCNC: 3.4 MG/DL
PLATELET # BLD AUTO: 269 K/UL
PMV BLD AUTO: 10.4 FL
POCT GLUCOSE: 117 MG/DL (ref 70–110)
POTASSIUM SERPL-SCNC: 3.6 MMOL/L
PROT SERPL-MCNC: 7.4 G/DL
PROTHROMBIN TIME: 10.4 SEC
RBC # BLD AUTO: 4.2 M/UL
SODIUM SERPL-SCNC: 142 MMOL/L
TROPONIN I SERPL DL<=0.01 NG/ML-MCNC: 0.01 NG/ML
WBC # BLD AUTO: 9.3 K/UL

## 2017-03-25 PROCEDURE — 97165 OT EVAL LOW COMPLEX 30 MIN: CPT

## 2017-03-25 PROCEDURE — 90670 PCV13 VACCINE IM: CPT | Performed by: PSYCHIATRY & NEUROLOGY

## 2017-03-25 PROCEDURE — 99226 PR SUBSEQUENT OBSERVATION CARE,LEVEL III: CPT | Mod: GC,,, | Performed by: PSYCHIATRY & NEUROLOGY

## 2017-03-25 PROCEDURE — 84484 ASSAY OF TROPONIN QUANT: CPT

## 2017-03-25 PROCEDURE — 85610 PROTHROMBIN TIME: CPT

## 2017-03-25 PROCEDURE — 63700000 PHARM REV CODE 250 ALT 637 W/O HCPCS: Performed by: PHYSICIAN ASSISTANT

## 2017-03-25 PROCEDURE — 97116 GAIT TRAINING THERAPY: CPT

## 2017-03-25 PROCEDURE — 25000003 PHARM REV CODE 250: Performed by: PHYSICIAN ASSISTANT

## 2017-03-25 PROCEDURE — 63600175 PHARM REV CODE 636 W HCPCS: Performed by: PSYCHIATRY & NEUROLOGY

## 2017-03-25 PROCEDURE — G0009 ADMIN PNEUMOCOCCAL VACCINE: HCPCS | Performed by: PSYCHIATRY & NEUROLOGY

## 2017-03-25 PROCEDURE — 90471 IMMUNIZATION ADMIN: CPT | Performed by: PSYCHIATRY & NEUROLOGY

## 2017-03-25 PROCEDURE — 83735 ASSAY OF MAGNESIUM: CPT

## 2017-03-25 PROCEDURE — G8979 MOBILITY GOAL STATUS: HCPCS | Mod: CJ

## 2017-03-25 PROCEDURE — 85730 THROMBOPLASTIN TIME PARTIAL: CPT

## 2017-03-25 PROCEDURE — 84100 ASSAY OF PHOSPHORUS: CPT

## 2017-03-25 PROCEDURE — G8987 SELF CARE CURRENT STATUS: HCPCS | Mod: CK

## 2017-03-25 PROCEDURE — G8989 SELF CARE D/C STATUS: HCPCS | Mod: CK

## 2017-03-25 PROCEDURE — 85025 COMPLETE CBC W/AUTO DIFF WBC: CPT

## 2017-03-25 PROCEDURE — G8980 MOBILITY D/C STATUS: HCPCS | Mod: CK

## 2017-03-25 PROCEDURE — 36415 COLL VENOUS BLD VENIPUNCTURE: CPT

## 2017-03-25 PROCEDURE — 80053 COMPREHEN METABOLIC PANEL: CPT

## 2017-03-25 PROCEDURE — 82553 CREATINE MB FRACTION: CPT

## 2017-03-25 PROCEDURE — G8988 SELF CARE GOAL STATUS: HCPCS | Mod: CJ

## 2017-03-25 PROCEDURE — 97535 SELF CARE MNGMENT TRAINING: CPT

## 2017-03-25 PROCEDURE — G0378 HOSPITAL OBSERVATION PER HR: HCPCS

## 2017-03-25 RX ORDER — CLOPIDOGREL BISULFATE 75 MG/1
75 TABLET ORAL DAILY
Qty: 90 TABLET | Refills: 3 | Status: SHIPPED | OUTPATIENT
Start: 2017-03-25 | End: 2018-03-28 | Stop reason: SDUPTHER

## 2017-03-25 RX ORDER — SCOLOPAMINE TRANSDERMAL SYSTEM 1 MG/1
1 PATCH, EXTENDED RELEASE TRANSDERMAL
Qty: 4 PATCH | Refills: 1 | Status: SHIPPED | OUTPATIENT
Start: 2017-03-25 | End: 2017-04-10

## 2017-03-25 RX ADMIN — MECLIZINE HYDROCHLORIDE 25 MG: 25 TABLET ORAL at 03:03

## 2017-03-25 RX ADMIN — FENOFIBRATE 160 MG: 160 TABLET ORAL at 08:03

## 2017-03-25 RX ADMIN — SODIUM CHLORIDE, PRESERVATIVE FREE 3 ML: 5 INJECTION INTRAVENOUS at 01:03

## 2017-03-25 RX ADMIN — PNEUMOCOCCAL 13-VALENT CONJUGATE VACCINE 0.5 ML: 2.2; 2.2; 2.2; 2.2; 2.2; 4.4; 2.2; 2.2; 2.2; 2.2; 2.2; 2.2; 2.2 INJECTION, SUSPENSION INTRAMUSCULAR at 04:03

## 2017-03-25 RX ADMIN — CLOPIDOGREL 75 MG: 75 TABLET, FILM COATED ORAL at 08:03

## 2017-03-25 RX ADMIN — MECLIZINE HYDROCHLORIDE 25 MG: 25 TABLET ORAL at 12:03

## 2017-03-25 RX ADMIN — REGULAR STRENGTH 325 MG: 325 TABLET ORAL at 08:03

## 2017-03-25 RX ADMIN — ATORVASTATIN CALCIUM 40 MG: 20 TABLET, FILM COATED ORAL at 08:03

## 2017-03-25 RX ADMIN — HEPARIN SODIUM 5000 UNITS: 5000 INJECTION, SOLUTION INTRAVENOUS; SUBCUTANEOUS at 01:03

## 2017-03-25 RX ADMIN — SODIUM CHLORIDE, PRESERVATIVE FREE 3 ML: 5 INJECTION INTRAVENOUS at 05:03

## 2017-03-25 RX ADMIN — HEPARIN SODIUM 5000 UNITS: 5000 INJECTION, SOLUTION INTRAVENOUS; SUBCUTANEOUS at 05:03

## 2017-03-25 NOTE — PT/OT/SLP PROGRESS
"Physical Therapy  Treatment    Linda Hylton   MRN: [id number]   Admitting Diagnosis: Acute cerebrovascular insufficiency transient focal neurologic deficit    PT Received On: 17  PT Start Time: 1011     PT Stop Time: 1032    PT Total Time (min): 21 min       Billable Minutes:  Gait [id number]    Treatment Type: Treatment  PT/PTA: PT       General Precautions: Standard, fall  Orthopedic Precautions: N/A   Braces: N/A    Subjective:  Communicated with DONALD Corcoran prior to session.  "I've been running back and forth to the bathroom all day."    Pain Ratin/10  Pain Rating Post-Intervention: 0/10    Objective:   Patient found with: telemetry    Functional Mobility:  Bed Mobility:    Supine <> Sit: From right sidelying: Modified independence (increased time)   Sit <> Supine: To right sidelying. Modified [place] (increased time)     Transfers:    Sit <> Stand: x 1 from EOB, x1 from toilet with Standby Assistance with Rolling Walker    Stand <> Sit: x1 to EOB, x1 to toilet with Standby Assistance with Rolling Walker    Gait:   Distance Ambulated: 150ft    Assistance level: Contact Guard Assistance   Assistive Device used:  Rolling Walker   Gait Pattern: 3-point gait   Gait Deviation(s): decreased elicia and decreased step length   Impairments due to: decreased balance   Comments: pt with short step length of RLE. At end of gait trial, pt with increased RR and c/o SOB. Once sitting pt able to recover.     Therapeutic Activities and Exercises:  Pt requesting to use bathroom. Pt able to toilet and perform perineal care with independence.      AM-PAC 6 CLICK MOBILITY  How much help from another person does this patient currently need?   1 = Unable, Total/Dependent Assistance  2 = A lot, Maximum/Moderate Assistance  3 = A little, Minimum/Contact Guard/Supervision  4 = None, Modified [place]/Independent    Turning over in bed (including adjusting bedclothes, sheets and blankets)?: 3  Sitting down on and " standing up from a chair with arms (e.g., wheelchair, bedside commode, etc.): 3  Moving from lying on back to sitting on the side of the bed?: 3  Moving to and from a bed to a chair (including a wheelchair)?: 3  Need to walk in hospital room?: 3  Climbing 3-5 steps with a railing?: 3  Total Score: 18    AM-PAC Raw Score CMS G-Code Modifier Level of Impairment Assistance   6 % Total / Unable   7 - 9 CM 80 - 100% Maximal Assist   10 - 14 CL 60 - 80% Moderate Assist   15 - 19 CK 40 - 60% Moderate Assist   20 - 22 CJ 20 - 40% Minimal Assist   23 CI 1-20% SBA / CGA   24 CH 0% Independent/ Mod I     Patient left supine with all lines intact, call button in reach, RN notified and family present.    Assessment:  Linda Hylton is a 82 y.o. female with a medical diagnosis of Acute cerebrovascular insufficiency transient focal neurologic deficit. Patient is making progress towards goals, participating well in this session. Pt with noted improvement with mobility and ambulation distance. Ms. Hylton's deficits affect their ability to safely and independently participate in self-care tasks and functional mobility. Due to her physical therapy diagnosis of debility and deconditioning, they continue to benefit from acute PT services to address the following limitations: high fall risk, weakness, instability, the need for supervised instructions of exercise, and the decreased ability to perform functional activities which they were able to complete PTA. Education was provided to patient & family regarding importance of continued participation in therapy, patient's progress and discharge disposition. Pt would benefit from HHPT upon discharge to improve quality of life and focus on recovery of impairments.     Rehab identified problem list/impairments: Rehab identified problem list/impairments: weakness, impaired endurance, impaired functional mobilty, gait instability, impaired balance    Rehab potential is good.    Activity  tolerance: Good    Discharge recommendations: Discharge Facility/Level Of Care Needs: home health PT     Barriers to discharge: Barriers to Discharge: None    Equipment recommendations: Equipment Needed After Discharge: none     GOALS:   Physical Therapy Goals        Problem: Physical Therapy Goal    Goal Priority Disciplines Outcome Goal Variances Interventions   Physical Therapy Goal     PT/OT, PT Ongoing (interventions implemented as appropriate)     Description:  Goals to be met by: 3/31/17     Patient will increase functional independence with mobility by performing:    Supine to sit with Modified [place].  Sit to supine with Modified [place].   Sit to stand transfer with Modified [place] using Rolling Walker.  Bed to chair transfer via Stand Pivot with Modified [place] using Rolling Walker.  Gait  x 150 feet with Supervision using Rolling Walker.    Dynamic standing for 8 minutes with Supervision using No Assistive Device and Rolling Walker.  Able to tolerate exercise for 15-20 reps with independence.  Patient and family able to teachback stroke & positioning education independently.                PLAN:    Patient to be seen 5 x/week  to address the above listed problems via gait training, therapeutic activities, therapeutic exercises, neuromuscular re-education  Plan of Care expires: 04/23/17  Plan of Care reviewed with: patient, family     Kianna Roe PT, DPT  3/25/2017  [phone]

## 2017-03-25 NOTE — PLAN OF CARE
Plan of care reviewed with patient on day of care. Vital signs stable. No neurologic changes during shift. Patient remains free of falls, trauma, and injury. Bed in lowest position, wheels locked, call light within reach, side rails x3 belongings within reach. Patient oriented to unit and plan of care board. Stroke education provided. Telemetry monitoring continued. Patient educated to safety measures. No acute distress noted.

## 2017-03-25 NOTE — PLAN OF CARE
[facility]    HOME HEALTH ORDERS  FACE TO FACE ENCOUNTER    Patient Name: Linda Hylton  YOB: 1935    PCP: Primary Doctor No   PCP Address: None  PCP Phone Number: None  PCP Fax: None    Encounter Date: 03/25/2017    Admit to Home Health    Diagnoses:  Active Hospital Problems    Diagnosis  POA    *Acute cerebrovascular insufficiency transient focal neurologic deficit [G45.8]  Yes    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]  Yes     Chronic    Asymptomatic bilateral carotid artery stenosis [I65.23]  Yes     Chronic    Intracranial atherosclerosis [I67.2]  Yes     Chronic    Nausea & vomiting [R11.2]  Yes    Essential hypertension [I10]  Yes    Hypothyroidism [E03.9]  Yes    Aortic stenosis [I35.0]  Yes      Resolved Hospital Problems    Diagnosis Date Resolved POA   No resolved problems to display.       Future Appointments  Date Time Provider Department Center   4/21/2017 2:00 PM Sherlyn Taylor PA-C [facility] NEURO Christopher Bruno     Follow-up Information     Follow up with Carlos Gil MD In 4 weeks.    Specialty:  Neurology    Contact information:    [address]  [facility] [id number] [phone]              I have seen and examined this patient face to face today. My clinical findings that support the need for the home health skilled services and home bound status are the following:  Weakness/numbness causing balance and gait disturbance due to Stroke making it taxing to leave home.    Allergies:  Review of patient's allergies indicates:   Allergen Reactions    Sulfa (sulfonamide antibiotics) Hives       Diet: cardiac diet    Activities: activity as tolerated    Nursing:   SN to complete comprehensive assessment including routine vital signs. Instruct on disease process and s/s of complications to report to MD. Review/verify medication list sent home with the patient at time of discharge  and instruct patient/caregiver as needed. Frequency may be adjusted depending  on start of care date.    Notify MD if SBP > 160 or < 90; DBP > 90 or < 50; HR > 120 or < 50; Temp > 101;      CONSULTS:    Physical Therapy to evaluate and treat. Evaluate for home safety and equipment needs; Establish/upgrade home exercise program. Perform / instruct on therapeutic exercises, gait training, transfer training, and Range of Motion.  Occupational Therapy to evaluate and treat. Evaluate home environment for safety and equipment needs. Perform/Instruct on transfers, ADL training, ROM, and therapeutic exercises.    MISCELLANEOUS CARE:  N/A    WOUND CARE ORDERS  n/a      Medications: Review discharge medications with patient and family and provide education.      Current Discharge Medication List      START taking these medications    Details   clopidogrel (PLAVIX) 75 mg tablet Take 1 tablet (75 mg total) by mouth once daily.  Qty: 90 tablet, Refills: 3      scopolamine (TRANSDERM-SCOP) 1.5 mg (1 mg over 3 days) Place 1 patch (1.5 mg total) onto the skin every 72 hours. As needed for dizziness.  Qty: 4 patch, Refills: 1         CONTINUE these medications which have NOT CHANGED    Details   alprazolam (XANAX) 0.5 MG tablet Take 1 tablet (0.5 mg total) by mouth 2 (two) times daily as needed for Anxiety.  Qty: 60 tablet, Refills: 0      aspirin (ECOTRIN) 325 MG EC tablet Take 1 tablet (325 mg total) by mouth once daily.  Refills: 0      atorvastatin (LIPITOR) 40 MG tablet Take 1 tablet (40 mg total) by mouth once daily.  Qty: 30 tablet, Refills: 3      ciprofloxacin HCl (CIPRO) 500 MG tablet Take 1 tablet (500 mg total) by mouth every 12 (twelve) hours.  Qty: 13 tablet, Refills: 0      fenofibrate 160 MG Tab Take 1 tablet (160 mg total) by mouth once daily.  Qty: 90 tablet, Refills: 3    Associated Diagnoses: Elevated triglycerides with high cholesterol      meclizine (ANTIVERT) 25 mg tablet Take 1 tablet (25 mg total) by mouth 3 (three) times daily as needed for Dizziness.  Qty: 10 tablet, Refills: 0       ondansetron (ZOFRAN-ODT) 8 MG TbDL Take 1 tablet (8 mg total) by mouth every 8 (eight) hours as needed.  Qty: 10 tablet, Refills: 0      amlodipine (NORVASC) 5 MG tablet TAKE 1 TABLET DAILY AS DIRECTED.  Refills: 3      cholecalciferol, vitamin D3, 5,000 unit Tab Take 5,000 Units by mouth once daily.      ferrous sulfate 325 (65 FE) MG EC tablet Take 325 mg by mouth once daily.      levothyroxine (SYNTHROID) 75 MCG tablet Take 75 mcg by mouth once daily.  Refills: 1      lisinopril (PRINIVIL,ZESTRIL) 40 MG tablet Take 40 mg by mouth once daily.       metoprolol succinate (TOPROL-XL) 50 MG 24 hr tablet Take 50 mg by mouth once daily.              I certify that this patient is confined to her home and needs physical therapy and occupational therapy.

## 2017-03-25 NOTE — PLAN OF CARE
JAZMIN placed call to pt's son, Michael [phone] and he confirmed that she had [facility] prior to admission.     JAZMIN placed call to [facility] [phone], spoke to Delia who connected JAZMIN to on-call nurse Arthur confirmed that pt will be seen tomorrow and requested that orders be sent to [phone]. JAZMIN also sent HH order via [facility].

## 2017-03-25 NOTE — PT/OT/SLP EVAL
"Occupational Therapy  Evaluation    Linda Hylton   MRN: [id number]   Admitting Diagnosis: Acute cerebrovascular insufficiency transient focal neurologic deficit    OT Date of Treatment: 17   OT Start Time: 1320  OT Stop Time: 1337  OT Total Time (min): 17 min    Billable Minutes:  Evaluation 8  Self Care/Home Management 9    Diagnosis: Acute cerebrovascular insufficiency transient focal neurologic deficit       Past Medical History:   Diagnosis Date    Aortic stenosis     History of bleeding ulcers 2015    Hypertension     Hypothyroidism     Sciatica of left side     Vitamin D deficiency       Past Surgical History:   Procedure Laterality Date    APPENDECTOMY      [facility]GERY [facility] 2016    [facility]    PARATHYROIDECTOMY Right 2012    THYROID SURGERY Right 2012    Partial thyroidectomy       General Precautions: Standard, fall  Orthopedic Precautions: N/A  Braces: N/A    Do you have any cultural, spiritual, Sikh conflicts, given your current situation?: No     Patient History:  Living Environment  Lives With: child(smitha), adult  Living Arrangements: house  Living Environment Comment:  (Pt lives in a 2SH with single step to enter with adult son. PTA pt was I with mobility and ADLs, pt reports her bedroom is on the first floor and she has bathroom access on 1st floor.  She uses RW for all mobility.  Pt has HH assist with bathing 2x/week )  Equipment Currently Used at Home: walker, rolling    Prior level of function:   Bed Mobility/Transfers: independent  Grooming: independent  Bathing: needs device and assist  Upper Body Dressing: independent  Lower Body Dressing: independent  Toileting: independent  Home Management Skills: independent    Subjective:  Communicated with RN prior to session.  Pt agreeable to therapy.  Chief Complaint: "I am so nervous."  "I just want to go home and be in my little cubby."  Patient/Family stated goals: To go home    Pain Ratin/10       Pain Rating " Post-Intervention: 0/10    Objective:  Patient found with: telemetry    Cognitive Exam:  Oriented to: Person, Place, Time and Situation  Follows Commands/attention: Follows one-step commands  Communication: clear/fluent  Memory:  No Deficits noted  Safety awareness/insight to disability: intact  Coping skills/emotional control: very nervous, visibly shaking     Visual/perceptual:  Intact    Physical Exam:  Postural examination/scapula alignment: forward head, rounded shoulders  Skin integrity: Visible skin intact  Edema: None noted     Sensation:   Intact    Upper Extremity Range of Motion:  Right Upper Extremity: WFL   Left Upper Extremity: WFL     Upper Extremity Strength:  Right Upper Extremity: WFL 4/5  Left Upper Extremity: WFL 4/5   Strength: WFL    Fine motor coordination:   Intact    Gross motor coordination: WFL    Functional Mobility:  Bed Mobility:  Supine to Sit: Independent  Sit to Supine: Independent    Transfers:  Sit <> Stand Assistance: Stand By Assistance  Sit <> Stand Assistive Device: No Assistive Device  Toilet Transfer Technique: Other (see comments) (ambulated)  Toilet Transfer Assistance: Supervision  Toilet Transfer Assistive Device: Rolling Walker    Functional Ambulation: Supervision with FWW from bed to bathroom    Activities of Daily Living:  Feeding Level of Assistance: Independent  UE Dressing Level of Assistance: Set-up Assistance  LE Dressing Level of Assistance: Minimum assistance  Grooming Position: Standing at sink  Grooming Level of Assistance: Stand by assistance (increasing levels of nervousnes causing pt to shake)  Toileting level of assistance: supervision with use of RW and grab bar (pt has raised commode at home)    Balance:   Static Sit: NORMAL: No deviations seen in posture held statically  Dynamic Sit: NORMAL: No deviations seen in posture held dynamically  Static Stand: FAIR: Maintains without assist but unable to take challenges  Dynamic stand: POOR: N/A    AM-PAC  "6 CLICK ADL  How much help from another person does this patient currently need?  1 = Unable, Total/Dependent Assistance  2 = A lot, Maximum/Moderate Assistance  3 = A little, Minimum/Contact Guard/Supervision  4 = None, Modified [place]/Independent    Putting on and taking off regular lower body clothing? : 3  Bathing (including washing, rinsing, drying)?: 2  Toileting, which includes using toilet, bedpan, or urinal? : 3  Putting on and taking off regular upper body clothing?: 3  Taking care of personal grooming such as brushing teeth?: 3  Eating meals?: 3  Total Score: 17    AM-PAC Raw Score CMS "G-Code Modifier Level of Impairment Assistance   6 % Total / Unable   7 - 9 CM 80 - 100% Maximal Assist   10 - 14 CL 60 - 80% Moderate Assist   15 - 19 CK 40 - 60% Moderate Assist   20 - 22 CJ 20 - 40% Minimal Assist   23 CI 1-20% SBA / CGA   24 CH 0% Independent/ Mod I       Patient left up in chair with call button in reach    Assessment:  Linda Hylton is a 82 y.o. female with a medical diagnosis of Acute cerebrovascular insufficiency transient focal neurologic deficit and presents with decline in ADLs and functional mobility. Pt would benefit from skilled OT services in order to maximize independence with ADLs and facilitate safe discharge.    Rehab identified problem list/impairments: Rehab identified problem list/impairments: weakness, impaired endurance, impaired sensation, impaired self care skills, impaired functional mobilty, gait instability, impaired balance, visual deficits, impaired cognition, decreased coordination, decreased upper extremity function, decreased lower extremity function, decreased safety awareness, pain, abnormal tone, decreased ROM    Rehab potential is good.    Activity tolerance: Good    Discharge recommendations: Discharge Facility/Level Of Care Needs: home health OT     Barriers to discharge: Barriers to Discharge: None    Equipment recommendations: none     GOALS: "   Occupational Therapy Goals        Problem: Occupational Therapy Goal    Goal Priority Disciplines Outcome Interventions   Occupational Therapy Goal     OT, PT/OT Ongoing (interventions implemented as appropriate)    Description:  Goals to be met by: 4/25/2017    Patient will increase functional independence with ADLs by performing:    Feeding with Set-up Assistance.  UE Dressing with Set-up Assistance.  LE Dressing with Modified [place].  Grooming while standing with Minimal Assistance.  Toileting from toilet with modified [place] for hygiene and clothing management.   Stand pivot transfers with Modified [place].                PLAN:  Patient to be seen 5 x/week (Mon-Fri) to address the above listed problems via self-care/home management, therapeutic activities, therapeutic exercises, neuromuscular re-education  Plan of Care expires:  3/25/2017  Plan of Care reviewed with: patient    EMILY Quintero  03/25/2017

## 2017-03-25 NOTE — CONSULTS
Consult Note  Vascular Surgery    Consult Requested By: [facility] neuro  Reason for Consult: weaker left sided lower extremity pulses    SUBJECTIVE:     History of Present Illness:  Patient is a 82 y.o. female presents with h/o HTN, AS with dizziness and slurred speech facial droop and word-finding difficulty. She reports that she felt dizzy then experienced symptoms- which resolved within a few minutes.  Patient was recently admitted on 3/20-3/21 for dizziness, nausea, inability to speak (resolved by time admitted to hospital) was found to have a acute infarct in right vemis of cerebellum.  She was able to be discharged home the next day, but then re-presented to [facility] on 3/24 with the above symptoms: CTA of neck demonstrated occlusion of right vertebral artery origin with reconstitution of V2 segment, and mild stenosis of left vertebral artery at origine; <50% carotid stenosis bilaterally.  Her stroke is thought to be secondary to small vessel disease.  On exam yesterday patient was noted to have decreased LLE pulses, US done showing 50-75% stenosis of Left SFA thus vascular surgery consulted.  Patient walks with a walker short distances secondary to history of hip surgery; denies claudication, denies rest pain.  Was never a smoker, denies MI.    Scheduled Meds:   aspirin  325 mg Oral Daily    atorvastatin  40 mg Oral Daily    ciprofloxacin HCl  500 mg Oral Q12H    clopidogrel  75 mg Oral Daily    fenofibrate  160 mg Oral Daily    heparin (porcine)  5,000 Units Subcutaneous Q8H    sodium chloride 0.9%  3 mL Intravenous Q8H     Continuous Infusions:   sodium chloride 0.9%       PRN Meds:acetaminophen, labetalol, meclizine, ondansetron, pneumoc 13-juan conj-dip cr(PF), sodium chloride 0.9%    Review of patient's allergies indicates:   Allergen Reactions    Sulfa (sulfonamide antibiotics) Hives       Past Medical History:   Diagnosis Date    Aortic stenosis     History of bleeding ulcers 2015    Hypertension      Hypothyroidism     Sciatica of left side     Vitamin D deficiency      Past Surgical History:   Procedure Laterality Date    APPENDECTOMY      [facility]    PARATHYROIDECTOMY Right 2012    THYROID SURGERY Right 2012    Partial thyroidectomy     Family History   Problem Relation Age of Onset    Heart disease Father     Heart disease Sister     Heart disease Brother     Diabetes Son      Social History   Substance Use Topics    Smoking status: Never Smoker    Smokeless tobacco: None    Alcohol use None        Review of Systems:  Peripheral Vascular: Leg Claudication: none  Constitutional: no fever or chills  Eyes: no visual changes  Cardiovascular: no chest pain or palpitations  Gastrointestinal: no nausea or vomiting, tolerating diet  Genitourinary: no hematuria or dysuria  Hematologic/Lymphatic: no easy bruising or lymphadenopathy    OBJECTIVE:     Vital Signs (Most Recent)  Temp: 98.8 °F (37.1 °C) (03/25/17 1200)  Pulse: (!) 113 (03/25/17 1200)  Resp: 16 (03/25/17 1200)  BP: (!) 144/76 (03/25/17 1200)  SpO2: (!) 94 % (03/25/17 1200)    Vital Signs Range (Last 24H):  Temp:  [97.9 °F (36.6 °C)-98.8 °F (37.1 °C)]   Pulse:  []   Resp:  [16-18]   BP: (111-144)/(55-76)   SpO2:  [94 %-98 %]     Physical Exam:  General: well developed, well nourished, appears stated age, no distress  Head: normocephalic, atraumatic  Eyes:  conjunctivae/corneas clear.  Neck: supple, symmetrical, trachea midline and no carotid bruit  Lungs:  clear to auscultation bilaterally and normal respiratory effort  Heart: regular rate and rhythm, S1, S2 normal, no murmur, rub or gallop and systolic ejection murmur  Abdomen: soft, non-tender non-distented; bowel sounds normal; no masses,  no organomegaly and cannot palpate aorta  Extremities: warm, well perfused and no cyanosis or edema, or clubbing  Pulses: left lower extremity: 2+ femoral, weak 1+ PT, biphasic DP; right lower extremity: 2+ femoral, 2+  DP, biphasic PT  Neurologic:  No focal numbness or weakness  Alert and oriented. Thought content appropriate    Laboratory:  CBC:   Recent Labs  Lab 03/25/17 0426   WBC 9.30   RBC 4.20   HGB 12.1   HCT 36.2*        CMP:   Recent Labs  Lab 03/25/17 0426   GLU 90   CALCIUM 9.0   ALBUMIN 3.5   PROT 7.4      K 3.6   CO2 20*      BUN 21   CREATININE 1.0   ALKPHOS 70   ALT 16   AST 17   BILITOT 0.3     Coagulation:   Recent Labs  Lab 03/25/17 0426   INR 1.0   APTT 23.6       Diagnostic Results:  Procedure: Postcontrast CTA of the head and neck    Technique:5-mm axial images the head  postcontrast in addition 0.5 mm axial CTA images of the head and neck postcontrast with coronal and sagittal reformatted imaging. 100 cc of Omnipaque 350 IV contrast administered.     Comparison:Noncontrast CT head earlier today and MRI/ MRA 03/20/2017          Results:    CT head without contrast:  No new abnormal parenchymal attenuation or sulcal effacement.  Ventricles are stable in size and configuration without evidence for hydrocephalus.  There is no midline shift or mass-effect.  Allowing for limitation by CT technique there is no abnormal parenchymal enhancement.   Mild ethmoid air cell opacification with mild/moderate mucosal thickening inferior right maxillary antra. Remaining Visualized paranasal sinuses and mastoid air cells are clear.    CTA head:  Anterior circulation: The bilateral distal cervical, petrous, cavernous and supraclinoid segments of the ICAs are patent without significant focal stenosis or aneurysm.      Atherosclerotic plaquing of the cavernous segments of the ICAs.  The anterior and middle cerebral arteries are patent without significant focal stenosis or aneurysm.    Posterior circulation: The distal vertebral arteries are diminutive in caliber the right essentially ends in PICA. The left distal vertebral artery is small in caliber with superimposed atherosclerotic plaquing and mild  stenosis. 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 Please note there is persistent fetal circulation the PCAs bilaterally without PCA significant stenosis or lesion..    CTA Neck:  Atherosclerotic plaquing of the arch and origin of the great vessels without significant focal stenosis. The origins of the right brachycephalic,  left common carotid and left subclavian arteries from the arch are patent with mild narrowing of the left subclavian artery origin.. The left vertebral artery origin is identified with atherosclerotic plaque and mild stenosis. Otherwise left vertebral artery is patent with mild distal stenosis proximal V4 segment. There is lack of visualization of the origin the right vertebral artery with occlusion, reconstituted proximal V2 segment with interrupted occlusion in the V2 segment and diminutive flow in the V3 and V4 segments ending in PICA..      Right carotid: The right common carotid artery, carotid bifurcation and extracranial portions of the internal carotid artery are patent without evidence for focal stenosis or occlusion.    Left carotid: The left common carotid artery, carotid bifurcation and extra cranial portions of the internal carotid artery are patent without evidence for focal stenosis or occlusion.    atherosclerotic plaquing of the carotid bifurcations and proximal ICAs with less  than 50% proximal ICA stenosis by NASCET criteria.    Pharynx/larynx: Please note evaluation somewhat limited by dental metal..  Allowing for limitation the nasopharynx, oral pharynx,, hypopharynx, larynx and visualized portion of the trachea are within normal limits.    The oral cavity and buccal space are  limited by artifact from dental metal.    Glands: The bilateral parotid, submandibular glands  are within normal limits. Several subcentimeter hypodense foci within the right lobe of the thyroid with absent left lobe of thyroid which likely postoperative.    No evidence for adenopathy throughout the neck by size criteria.    The evidence for acute fracture of the cervical spine. Multilevel degenerative change. Probable heterotopic ossification along the posterior soft tissues at the level the tips of the C7 and T1 spinous processes.    Atherosclerotic calcification of the aorta and in the distribution of the visualized coronary arteries.    Solitary soft tissue density nodule right upper lobe measuring 9 mm. According to provide Fleischer society criteria followup CT, PET CT or consider tissue sampling biopsy in 3 months.       Impression      Postcontrast CT head: No significant change. Evolving hypodensity in the right paramedian inferior cerebellar vermis compatible with known infarction. No evidence for definite abnormal parenchymal enhancement.      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    Developmental variant with persistent fetal circulation of the PCAs bilaterally.    CTA neck: Occlusion right vertebral artery origin with reconstitution of the V2 segment with interrupted high-grade stenosis or occlusion. Mild stenosis left vertebral artery origin and distal left vertebral artery although patent throughout its course.    Atherosclerotic disease of the carotid bifurcations and proximal ICAs with less than 50% proximal ICA stenosis by NASCET criteria.    9 mm indeterminate soft tissue density nodule within the right upper lobe. According revised Fleischer Society criteria followup CT, PET CT or consider tissue sampling biopsy in 3 months recommended    Please see above for  additional details.            Electronically signed by: ELIANA HANKINS DO  Date: 03/24/17  Time: 10:03                  ASSESSMENT/PLAN:   Patient is a 82 y.o. female presents with h/o HTN, AS with dizziness and slurred speech facial droop and word-finding difficulty with h/o recent right vermis cerebellum stroke with 50-75% stenosis of L SFA, asymptomatic.    Patient with no symptoms from L SFA stenosis; no claudication, no rest pain  Recommend ASA, statin  Recommend exercise regimen  No acute intervention in hospital; can follow up with Dr. Clinton as an outpatient    Key Arevalo DO  PGY-6, Vascular fellow   [phone]

## 2017-03-25 NOTE — PLAN OF CARE
POC reviewed with pt, pt able to verbalize understanding and acceptance, aaox4. No acute changes this shift, pt free from fall or injury, no new skin breakdown noted. Pt having c/o anxiety and expresses concern about her HR being in the 90s, no available prns to give. Pt having watery loose green diarrhea and states it started from before her admission into the hospital. Attempting to collected stool sample, pt has been on abx for UTI. Pt having c/o HA, prn acetaminophen administered. VSS throughout shift, will continue to monitor.    Temp:  [97.9 °F (36.6 °C)-98.7 °F (37.1 °C)]   Pulse:  []   Resp:  [18]   BP: (118-124)/(55-59)   SpO2:  [94 %-97 %]

## 2017-03-25 NOTE — PROGRESS NOTES
[facility]  Vascular Neurology  Comprehensive Stroke Center  Progress Note      Neurologic Chief Complaint: dizziness s/p R paramedian cerebellar AIS    Subjective:     Interval History: Patient is seen for follow-up neurological assessment and treatment recommendations: Acute onset, transient blurred vision (which did not resolve with one or the other eye closed), as well as numbness and tingling to the second-fourth L fingers.  Symptoms resolved in approximately twenty minutes.    HPI, Past Medical, Family, and Social History remains the same as documented in the initial encounter.     Review of Systems   Constitutional: Negative for appetite change.   Respiratory: Negative for cough and shortness of breath.    Cardiovascular: Negative for chest pain.   Gastrointestinal: Positive for diarrhea. Negative for vomiting.   Skin: Negative for wound.   Neurological: Positive for dizziness and numbness. Negative for speech difficulty and weakness.   Psychiatric/Behavioral: Negative for agitation and behavioral problems.     Scheduled Meds:   aspirin  325 mg Oral Daily    atorvastatin  40 mg Oral Daily    ciprofloxacin HCl  500 mg Oral Q12H    clopidogrel  75 mg Oral Daily    fenofibrate  160 mg Oral Daily    heparin (porcine)  5,000 Units Subcutaneous Q8H    sodium chloride 0.9%  3 mL Intravenous Q8H     Continuous Infusions:   sodium chloride 0.9%       PRN Meds:acetaminophen, labetalol, meclizine, ondansetron, pneumoc 13-juan conj-dip cr(PF), sodium chloride 0.9%    Objective:     Vital Signs (Most Recent):  Temp: 98.8 °F (37.1 °C) (03/25/17 1200)  Pulse: (!) 112 (03/25/17 1400)  Resp: 16 (03/25/17 1200)  BP: (!) 144/76 (03/25/17 1200)  SpO2: (!) 94 % (03/25/17 1200)  BP Location: Right arm    Vital Signs Range (Last 24H):  Temp:  [97.9 °F (36.6 °C)-98.8 °F (37.1 °C)]   Pulse:  []   Resp:  [16-18]   BP: (111-144)/(55-76)   SpO2:  [94 %-98 %]   BP Location: Right arm    Physical Exam    Constitutional: She is oriented to person, place, and time. She appears well-developed and well-nourished.   HENT:   Head: Normocephalic and atraumatic.   Eyes: EOM are normal.   Pulmonary/Chest: Effort normal.   Musculoskeletal: Normal range of motion.   Neurological: She is alert and oriented to person, place, and time.   Skin: Skin is warm and dry.   Psychiatric: She has a normal mood and affect. Her behavior is normal. Judgment and thought content normal.   Nursing note and vitals reviewed.      Neurological Exam:   LOC: alert and follows requests  Language: No aphasia  Speech: No dysarthria  Orientation: Person, Place, Time  Memory: Recent memory intact, Remote memory intact, Age correct, Month correct  Visual Fields (CN II): Full  EOM (CN III, IV, VI): Full/intact  Oculocephalics: normal  Facial Sensation (CN V): Symmetric  Facial Movement (CN VII): symmetric facial expression  Hearing (CN VIII): intact bilaterally  Shoulder/Neck (CN XI): SCM-Left: Normal ; SCM-Right: Normal ; Shoulder Shrug: Normal/Symetric  Cerebellar*: normal finger to nose and heel to shin bilaterally  Sensation: intact to light touch throughout  Tone: Arm-Left: normal; Leg-Left: normal; Arm-Right: normal; Leg-Right: normal    NIH Stroke Scale:    Level of Consciousness: 0 - alert  LOC Questions: 0 - answers both correctly  LOC Commands: 0 - performs both correctly  Best Gaze: 0 - normal  Visual: 0 - no visual loss  Facial Palsy: 0 - normal  Motor Left Arm: 0 - no drift  Motor Right Arm: 0 - no drift  Motor Left Le - no drift  Motor Right Le - no drift  Limb Ataxia: 0 - absent  Sensory: 1 - mild to moderate loss  Best Language: 0 - no aphasia  Dysarthria: 0 - normal articulation  Extinction and Inattention: 0 - no neglect  NIH Stroke Scale Total: 1      Laboratory:  CMP:   Recent Labs  Lab 17  0426   CALCIUM 9.0   ALBUMIN 3.5   PROT 7.4      K 3.6   CO2 20*      BUN 21   CREATININE 1.0   ALKPHOS 70   ALT 16    AST 17   BILITOT 0.3     BMP:   Recent Labs  Lab 03/25/17  0426      K 3.6      CO2 20*   BUN 21   CREATININE 1.0   CALCIUM 9.0     CBC:   Recent Labs  Lab 03/25/17  0426   WBC 9.30   RBC 4.20   HGB 12.1   HCT 36.2*      MCV 86   MCH 28.8   MCHC 33.4     Lipid Panel:   Recent Labs  Lab 03/24/17  0759   CHOL 140   LDLCALC 85.2   HDL 29*   TRIG 129     Coagulation:   Recent Labs  Lab 03/25/17  0426   INR 1.0   APTT 23.6     Hgb A1C:   Recent Labs  Lab 03/20/17  0704   HGBA1C 5.8     TSH:   Recent Labs  Lab 03/24/17  0759   TSH 2.147       Diagnostic Results:  MRI Brain 3/24  Evolving recent small right paramedian inferior cerebellar vermian infarction.     US LE 3/24/17  Biphasic/biphasic waveforms throughout both lower extremities with doubling of velocities between the proximal and mid SFA on the left suggesting focal area of significant stenosis in the range of 50-70%.    Assessment/Plan:     Patient is a 82 year old female with pmh of HTN, HLD, aortic stenosis, mitral valve regurgitation and prior stroke (03/20/17) who presented to the ED with dizziness.  Symptoms started at 6am this morning.  Patient had recent cerebellum stroke.  No limb ataxia on exam.         * Acute cerebrovascular insufficiency transient focal neurologic deficit  Numerous events, including blurred vision/LUE numbness this am  EMS noticed dysarthria and aphasia, which lasted five minutes   Medical mangament    Antithrombotics: ASA 81mgqd and plavix 75mgqd  Atorvastatin 40mg qhs  DVT: heparin 5000units q 8hours    Essential hypertension  Risk factor for stroke   SBP <150; at goal    Hypothyroidism  Stroke RF  Continue synthroid    Aortic stenosis  Evident on echo    Nausea & vomiting  2/2 stroke  Improving  On meclizine and zofran for symptomatic improvement      Drea Crawford MD  Comprehensive Stroke Center  Department of Vascular Neurology   [facility]

## 2017-03-25 NOTE — PLAN OF CARE
Problem: Physical Therapy Goal  Goal: Physical Therapy Goal  Goals to be met by: 3/31/17     Patient will increase functional independence with mobility by performing:    Supine to sit with Modified [place].  Sit to supine with Modified [place].   Sit to stand transfer with Modified [place] using Rolling Walker.  Bed to chair transfer via Stand Pivot with Modified [place] using Rolling Walker.  Gait x 150 feet with Supervision using Rolling Walker.   Dynamic standing for 8 minutes with Supervision using No Assistive Device and Rolling Walker.  Able to tolerate exercise for 15-20 reps with independence.  Patient and family able to teachback stroke & positioning education independently.   Outcome: Ongoing (interventions implemented as appropriate)  Pt would benefit from HHPT upon discharge. Pt progressing well towards goals.  Kianna Roe PT, DPT  3/25/2017  [phone]

## 2017-03-25 NOTE — PROGRESS NOTES
Pt having c/o anxiety, states she usually takes xanax at home. Home medication reconciliation shows she has 0.5mg bid prn. Stroke team contacted to request a prn order for xanax, request denied. Pt given cool rag, lights dimmed, deep breaths encouraged, prn acetaminophen administered for HA. WCTM

## 2017-03-25 NOTE — NURSING
Patient is adequate for discharge. IV access has been discontinued, telemetry has been collected, and education has been provided. Discharge instructions have been reviewed and all questions answered. Patient will be transported via wheelchair. Will continue to monitor.

## 2017-03-25 NOTE — NURSING
Patient complains of tingling in left fingers (all four except for thumb) and blurred vision in left eye that has new onset as of this morning. Dr. Crawford with stroke notified and at bedside. . No complaints of headache. Patient oriented x4. VSS. Will continue to monitor.

## 2017-03-25 NOTE — ASSESSMENT & PLAN NOTE
Numerous events, including blurred vision/LUE numbness this am  EMS noticed dysarthria and aphasia, which lasted five minutes   Medical mangament    Antithrombotics: ASA 81mgqd and plavix 75mgqd  Atorvastatin 40mg qhs  DVT: heparin 5000units q 8hours

## 2017-03-25 NOTE — SUBJECTIVE & OBJECTIVE
Neurologic Chief Complaint: dizziness s/p R paramedian cerebellar AIS    Subjective:     Interval History: Patient is seen for follow-up neurological assessment and treatment recommendations: Acute onset, transient blurred vision (which did not resolve with one or the other eye closed), as well as numbness and tingling to the second-fourth L fingers.  Symptoms resolved in approximately twenty minutes.    HPI, Past Medical, Family, and Social History remains the same as documented in the initial encounter.     Review of Systems   Constitutional: Negative for appetite change.   Respiratory: Negative for cough and shortness of breath.    Cardiovascular: Negative for chest pain.   Gastrointestinal: Positive for diarrhea. Negative for vomiting.   Skin: Negative for wound.   Neurological: Positive for dizziness and numbness. Negative for speech difficulty and weakness.   Psychiatric/Behavioral: Negative for agitation and behavioral problems.     Scheduled Meds:   aspirin  325 mg Oral Daily    atorvastatin  40 mg Oral Daily    ciprofloxacin HCl  500 mg Oral Q12H    clopidogrel  75 mg Oral Daily    fenofibrate  160 mg Oral Daily    heparin (porcine)  5,000 Units Subcutaneous Q8H    sodium chloride 0.9%  3 mL Intravenous Q8H     Continuous Infusions:   sodium chloride 0.9%       PRN Meds:acetaminophen, labetalol, meclizine, ondansetron, pneumoc 13-juan conj-dip cr(PF), sodium chloride 0.9%    Objective:     Vital Signs (Most Recent):  Temp: 98.8 °F (37.1 °C) (03/25/17 1200)  Pulse: (!) 112 (03/25/17 1400)  Resp: 16 (03/25/17 1200)  BP: (!) 144/76 (03/25/17 1200)  SpO2: (!) 94 % (03/25/17 1200)  BP Location: Right arm    Vital Signs Range (Last 24H):  Temp:  [97.9 °F (36.6 °C)-98.8 °F (37.1 °C)]   Pulse:  []   Resp:  [16-18]   BP: (111-144)/(55-76)   SpO2:  [94 %-98 %]   BP Location: Right arm    Physical Exam   Constitutional: She is oriented to person, place, and time. She appears well-developed and  well-nourished.   HENT:   Head: Normocephalic and atraumatic.   Eyes: EOM are normal.   Pulmonary/Chest: Effort normal.   Musculoskeletal: Normal range of motion.   Neurological: She is alert and oriented to person, place, and time.   Skin: Skin is warm and dry.   Psychiatric: She has a normal mood and affect. Her behavior is normal. Judgment and thought content normal.   Nursing note and vitals reviewed.      Neurological Exam:   LOC: alert and follows requests  Language: No aphasia  Speech: No dysarthria  Orientation: Person, Place, Time  Memory: Recent memory intact, Remote memory intact, Age correct, Month correct  Visual Fields (CN II): Full  EOM (CN III, IV, VI): Full/intact  Oculocephalics: normal  Facial Sensation (CN V): Symmetric  Facial Movement (CN VII): symmetric facial expression  Hearing (CN VIII): intact bilaterally  Shoulder/Neck (CN XI): SCM-Left: Normal ; SCM-Right: Normal ; Shoulder Shrug: Normal/Symetric  Cerebellar*: normal finger to nose and heel to shin bilaterally  Sensation: intact to light touch throughout  Tone: Arm-Left: normal; Leg-Left: normal; Arm-Right: normal; Leg-Right: normal    NIH Stroke Scale:    Level of Consciousness: 0 - alert  LOC Questions: 0 - answers both correctly  LOC Commands: 0 - performs both correctly  Best Gaze: 0 - normal  Visual: 0 - no visual loss  Facial Palsy: 0 - normal  Motor Left Arm: 0 - no drift  Motor Right Arm: 0 - no drift  Motor Left Le - no drift  Motor Right Le - no drift  Limb Ataxia: 0 - absent  Sensory: 1 - mild to moderate loss  Best Language: 0 - no aphasia  Dysarthria: 0 - normal articulation  Extinction and Inattention: 0 - no neglect  NIH Stroke Scale Total: 1      Laboratory:  CMP:   Recent Labs  Lab 17  042   CALCIUM 9.0   ALBUMIN 3.5   PROT 7.4      K 3.6   CO2 20*      BUN 21   CREATININE 1.0   ALKPHOS 70   ALT 16   AST 17   BILITOT 0.3     BMP:   Recent Labs  Lab 17  0426      K 3.6      CO2  20*   BUN 21   CREATININE 1.0   CALCIUM 9.0     CBC:   Recent Labs  Lab 03/25/17  0426   WBC 9.30   RBC 4.20   HGB 12.1   HCT 36.2*      MCV 86   MCH 28.8   MCHC 33.4     Lipid Panel:   Recent Labs  Lab 03/24/17  0759   CHOL 140   LDLCALC 85.2   HDL 29*   TRIG 129     Coagulation:   Recent Labs  Lab 03/25/17  0426   INR 1.0   APTT 23.6     Hgb A1C:   Recent Labs  Lab 03/20/17  0704   HGBA1C 5.8     TSH:   Recent Labs  Lab 03/24/17  0759   TSH 2.147       Diagnostic Results:  MRI Brain 3/24  Evolving recent small right paramedian inferior cerebellar vermian infarction.     US LE 3/24/17  Biphasic/biphasic waveforms throughout both lower extremities with doubling of velocities between the proximal and mid SFA on the left suggesting focal area of significant stenosis in the range of 50-70%.

## 2017-03-25 NOTE — DISCHARGE SUMMARY
[facility]  Vascular Neurology  Comprehensive Stroke Center  Discharge Summary     Summary:     Admit Date: 3/24/2017  7:43 AM    Discharge Date and Time:  03/25/2017    Attending Physician: Carlos Gil MD     Discharge Provider: Drea Crawford MD    History of Present Illness: Patient is a 82 year old female with pmh of HTN, HLD, aortic stenosis, mitral valve regurgitation and prior stroke (03/20/17) who presented to the ED with dizziness. Symptoms started at 6am. She took zofran and meclizine that was prescribed to her at discharge for a cerebellar stroke. This is the first time she needed to take this medicine since discharge. Symptoms didn't resolve. EMS said during transport she began to have aphasia, dysarthria and right facial droop lasting 5 minutes. She continues to have vertigo and nausea. She denies headache, trauma and seizure      At baseline patient uses a walker after hip surgery 1 year ago but can take care of her own affairs.   On prior admission cipro started for UTI.     Hospital Course (synopsis of major diagnoses, care, treatment, and services provided during the course of the hospital stay): Patient was monitored overnight. She worked well with therapy.  There was some difficulty palpating L pedal pulse, and a LE US was performed, showing focal  50-70% stenosis of the L SFA.  Vascular surgery was consulted, and will f/u outpatient.  This morning, the patient experienced transient blurred vision and numbness and tingling to the second-fourth fingers of her L hand; the episode lasted approximately 20 minutes, during which time she was able to accurately count fingers on clinical exam.  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  The MRI was negative for new areas of ischemia.  She is stable for DC home with HH PT/OT.    Stroke Scales  At DC  Level of Consciousness: 0 - alert  LOC Questions: 0 - answers both correctly  LOC Commands: 0 - performs both correctly  Best Gaze: 0 - normal  Visual: 0 - no visual loss  Facial Palsy: 0 - normal  Motor Left Arm: 0 - no drift  Motor Right Arm: 0 - no drift  Motor Left Le - no drift  Motor Right Le - no drift  Limb Ataxia: 0 - absent  Sensory: 0 - normal  Best Language: 0 - no aphasia  Dysarthria: 0 - normal articulation  Extinction and Inattention: 0 - no neglect  NIH Stroke Scale Total: 0  Modified Ericka Scale:   Timeline: Prior to symptoms onset  Modified Cubero Score: 2 - slight disability     ABCD2 Scale for TIA:   Age > or = 60: 1 - yes  B/P or = 140/9 at Initial Evaluation: 0 - no  Clinical Features of TIA: 1 - speech disturbance without weakness  Duration of Symptoms: 0 - < 10 minutes  Diabetes Mellitus in History: 0 - no  ABCD2 Scale Total: 2  Assessment/Plan:     Interventions: None    Complications: None    Research Candidate?:  No-    Neurological deficit at discharge: None     Disposition: Home or Self Care    Final Active Diagnoses:    Diagnosis Date Noted POA    PRINCIPAL PROBLEM:  Acute cerebrovascular insufficiency transient focal neurologic deficit [G45.8] 2017 Yes    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] 2017 Yes     Chronic    Asymptomatic bilateral carotid artery stenosis [I65.23] 2017 Yes     Chronic    Intracranial atherosclerosis [I67.2] 2017 Yes     Chronic    Nausea & vomiting [R11.2] 2017 Yes    Essential hypertension [I10]  Yes    Hypothyroidism [E03.9]  Yes    Aortic stenosis [I35.0]  Yes      Problems Resolved During this Admission:    Diagnosis Date Noted  Date Resolved POA       Recommendations:     Post-discharge complication risks: None    Stroke Education given to: patient and family    Follow-up in Stroke Clinic in 30 days    Discharge Plan:  Antithrombotics: Aspirin 325mg, Clopidogrel 75mg  Statin: Atorvastatin 40mg  Aggressive risk factor modification:  Hypertension, High Cholesterol, Diet, Exercise and Obesity    Follow Up:  Follow-up Information     Follow up with Carlos Gil MD In 4 weeks.    Specialty:  Neurology    Contact information:    Lien BRUNO  [facility] [id number] [phone]          Patient Instructions:     Ambulatory Referral to Vascular Surgery   Referral Priority: Routine Referral Type: Consultation   Referral Reason: Specialty Services Required    Requested Specialty: Vascular Surgery    Number of Visits Requested: 1      Diet Cardiac   Order Comments: See Stroke Patient Education Guide Booklet for details.     Call 911 for any of the following:   Order Comments: Call 911  right away if any of the following warning signs come on suddenly, even if the symptoms only last for a few minutes. With stroke, timing is very important.   - Warning Signs of Stroke:  - Weakness: You may feel a sudden weakness, tingling or loss of feeling on one side of your face or body.  - Vision Problems: You may have sudden double vision or trouble seeing in one or both eyes.  - Speech Problems: You may have sudden trouble talking, slured speech, or problems understanding others.  - Headache: You may have sudden, severe headache.  - Movement Problems: You may experience dizziness, a feeling of spinning, a loss of balance, a feeling of falling or blackouts.     Activity as tolerated       Medications:  Reconciled Home Medications:   Current Discharge Medication List      START taking these medications    Details   clopidogrel (PLAVIX) 75 mg tablet Take 1 tablet (75 mg total) by mouth once daily.  Qty: 90 tablet, Refills: 3      scopolamine (TRANSDERM-SCOP)  1.5 mg (1 mg over 3 days) Place 1 patch (1.5 mg total) onto the skin every 72 hours. As needed for dizziness.  Qty: 4 patch, Refills: 1         CONTINUE these medications which have NOT CHANGED    Details   alprazolam (XANAX) 0.5 MG tablet Take 1 tablet (0.5 mg total) by mouth 2 (two) times daily as needed for Anxiety.  Qty: 60 tablet, Refills: 0      aspirin (ECOTRIN) 325 MG EC tablet Take 1 tablet (325 mg total) by mouth once daily.  Refills: 0      atorvastatin (LIPITOR) 40 MG tablet Take 1 tablet (40 mg total) by mouth once daily.  Qty: 30 tablet, Refills: 3      ciprofloxacin HCl (CIPRO) 500 MG tablet Take 1 tablet (500 mg total) by mouth every 12 (twelve) hours.  Qty: 13 tablet, Refills: 0      fenofibrate 160 MG Tab Take 1 tablet (160 mg total) by mouth once daily.  Qty: 90 tablet, Refills: 3    Associated Diagnoses: Elevated triglycerides with high cholesterol      meclizine (ANTIVERT) 25 mg tablet Take 1 tablet (25 mg total) by mouth 3 (three) times daily as needed for Dizziness.  Qty: 10 tablet, Refills: 0      ondansetron (ZOFRAN-ODT) 8 MG TbDL Take 1 tablet (8 mg total) by mouth every 8 (eight) hours as needed.  Qty: 10 tablet, Refills: 0      amlodipine (NORVASC) 5 MG tablet TAKE 1 TABLET DAILY AS DIRECTED.  Refills: 3      cholecalciferol, vitamin D3, 5,000 unit Tab Take 5,000 Units by mouth once daily.      ferrous sulfate 325 (65 FE) MG EC tablet Take 325 mg by mouth once daily.      levothyroxine (SYNTHROID) 75 MCG tablet Take 75 mcg by mouth once daily.  Refills: 1      lisinopril (PRINIVIL,ZESTRIL) 40 MG tablet Take 40 mg by mouth once daily.       metoprolol succinate (TOPROL-XL) 50 MG 24 hr tablet Take 50 mg by mouth once daily.              Drea Crawford MD  Comprehensive Stroke Center  Department of Vascular Neurology   [facility]

## 2017-03-25 NOTE — PLAN OF CARE
Problem: Occupational Therapy Goal  Goal: Occupational Therapy Goal  Goals to be met by: 4/25/2017    Patient will increase functional independence with ADLs by performing:    Feeding with Set-up Assistance.  UE Dressing with Set-up Assistance.  LE Dressing with Modified [place].  Grooming while standing with Minimal Assistance.  Toileting from toilet with modified [place] for hygiene and clothing management.   Stand pivot transfers with Modified [place].  Outcome: Ongoing (interventions implemented as appropriate)  Evaluation completed.  OT plan of care developed and reviewed with patient.

## 2017-03-26 LAB — BACTERIA UR CULT: NORMAL

## 2017-03-27 ENCOUNTER — TELEPHONE (OUTPATIENT)
Dept: NEUROSURGERY | Facility: HOSPITAL | Age: 82
End: 2017-03-27

## 2017-03-27 NOTE — TELEPHONE ENCOUNTER
Called number on file.  Spoke with patient.  Risk factors specific to patient for stroke discussed with teach back implemented.  Patient verbalized understanding of discharge instructions and medications.  Patient was asked about discharge appointments and follow up care.  Follow appointment scheduled for 4/10/2017 at 1400. Warning sings discussed with teach back discussion method implemented.  Notified to seek immediate medical help via 911 if new or worsening stroke symptoms occur.  Patient relayed no new questions or comments at this time.  Instructed to call [facility] with any further questions.

## 2017-03-28 NOTE — PT/OT/SLP DISCHARGE
Physical Therapy Discharge Summary    Linda Hylton  MRN: [id number]   Acute cerebrovascular insufficiency transient focal neurologic deficit   Patient Discharged from acute Physical Therapy on 3/28/17.  Please refer to prior PT noted date on 3/25/17 for functional status.     Assessment:   Patient appropriate for care in another setting.  GOALS:   Physical Therapy Goals        Problem: Physical Therapy Goal    Goal Priority Disciplines Outcome Goal Variances Interventions   Physical Therapy Goal     PT/OT, PT Ongoing (interventions implemented as appropriate)     Description:  Goals to be met by: 3/31/17     Patient will increase functional independence with mobility by performing:    Supine to sit with Modified [place].  Sit to supine with Modified [place].   Sit to stand transfer with Modified [place] using Rolling Walker.  Bed to chair transfer via Stand Pivot with Modified [place] using Rolling Walker.  Gait  x 150 feet with Supervision using Rolling Walker.    Dynamic standing for 8 minutes with Supervision using No Assistive Device and Rolling Walker.  Able to tolerate exercise for 15-20 reps with independence.  Patient and family able to teachback stroke & positioning education independently.              Reasons for Discontinuation of Therapy Services  Transfer to alternate level of care.      Plan:  Patient Discharged to: Home with Home Health Service.  Kianna Roe PT, DPT  3/28/2017  [phone]

## 2017-04-10 ENCOUNTER — OFFICE VISIT (OUTPATIENT)
Dept: NEUROLOGY | Facility: CLINIC | Age: 82
End: 2017-04-10
Payer: MEDICARE

## 2017-04-10 VITALS
BODY MASS INDEX: 32.01 KG/M2 | HEART RATE: 88 BPM | HEIGHT: 57 IN | SYSTOLIC BLOOD PRESSURE: 119 MMHG | DIASTOLIC BLOOD PRESSURE: 61 MMHG | WEIGHT: 148.38 LBS

## 2017-04-10 DIAGNOSIS — I67.2 INTRACRANIAL ATHEROSCLEROSIS: Chronic | ICD-10-CM

## 2017-04-10 DIAGNOSIS — I65.23 ASYMPTOMATIC BILATERAL CAROTID ARTERY STENOSIS: Chronic | ICD-10-CM

## 2017-04-10 DIAGNOSIS — I63.341 STROKE DUE TO THROMBOSIS OF RIGHT CEREBELLAR ARTERY: Primary | ICD-10-CM

## 2017-04-10 DIAGNOSIS — I10 ESSENTIAL HYPERTENSION: ICD-10-CM

## 2017-04-10 DIAGNOSIS — I70.0 AORTIC ARCH ATHEROSCLEROSIS: Chronic | ICD-10-CM

## 2017-04-10 DIAGNOSIS — G45.8 ACUTE CEREBROVASCULAR INSUFFICIENCY TRANSIENT FOCAL NEUROLOGIC DEFICIT: ICD-10-CM

## 2017-04-10 PROCEDURE — 99999 PR PBB SHADOW E&M-EST. PATIENT-LVL III: CPT | Mod: PBBFAC,,, | Performed by: PSYCHIATRY & NEUROLOGY

## 2017-04-10 PROCEDURE — 99214 OFFICE O/P EST MOD 30 MIN: CPT | Mod: S$PBB,,, | Performed by: PSYCHIATRY & NEUROLOGY

## 2017-04-10 PROCEDURE — 99213 OFFICE O/P EST LOW 20 MIN: CPT | Mod: PBBFAC | Performed by: PSYCHIATRY & NEUROLOGY

## 2017-04-10 RX ORDER — ONDANSETRON 8 MG/1
8 TABLET, ORALLY DISINTEGRATING ORAL EVERY 8 HOURS PRN
Qty: 20 TABLET | Refills: 0 | Status: SHIPPED | OUTPATIENT
Start: 2017-04-10 | End: 2018-01-10 | Stop reason: SDUPTHER

## 2017-04-10 RX ORDER — MECLIZINE HYDROCHLORIDE 25 MG/1
25 TABLET ORAL 3 TIMES DAILY PRN
Qty: 20 TABLET | Refills: 0 | Status: SHIPPED | OUTPATIENT
Start: 2017-04-10 | End: 2018-01-10 | Stop reason: SDUPTHER

## 2017-04-10 NOTE — PROGRESS NOTES
Vascular Neurology  Clinic Note    Reason For Visit (Chief Complaint): follow-up for stroke (3/20)  and subsequent TIA 3/24)    HPI: 82 y.o. right handed female with vertigo and dizziness after having a R vermal ischemic stroke on 3/20/17. Workup reveals extensive vertebrobasilar disease w/ anterior dominant circulation. She is currently on aspirin and plavix for secondary stroke prevention and blood pressure is well controlled    She currently is having improved but persistent dizzy episodes that have been present since the stroke. Her second episode diagnosed as TIA several days later may have been an exacerbation or severe attack of these sytmpoms or perhaps a new event consistent w/ TIA. Other than the dizziness she has no other complaints and is doing well.    Patient's son is present and they informed me that she has been doing well. He had questions regarding the need for blood pressure medications daily or whether they could be used PRN.     Brain Imaging:  MRI 3/20 - acute infarct of R vermis; 3/24 - negative  Vessel Imaging:  CTA -  aortic arch atherosclerosis, extracranial atherosclerotic disease, intracranial atherosclerotic disease. Anterior predominant intracranial circulation w/ bilateral fetal PCA  Hypoplastic R vertebral artery with disease throughout extracranial portion appears to end in PICADominant L vertebral artery w/ stenosis at origin  Basilar artery is diminutive w/ occlusive disease throughout course  Cardiac Evaluation:  TTE - 3/20 - mod/sev MR; EF 68% , nl LA  Other:   None  Relevant Labwork:    Recent Labs  Lab 03/20/17  0704 03/24/17  0759   HEMOGLOBIN A1C 5.8  --    LDL CHOLESTEROL 138.6 85.2   HDL 33 L 29 L   TRIGLYCERIDES 287 H 129   CHOLESTEROL 229 H 140       I independently viewed the above imaging studies in addition to reviewing the report.  I reviewed the above labwork.    Review of Systems  Msk: negative for muscle pain  Skin: negative for pruritis  Neuro: negative for  headache  All others negative    Past Medical History  Past Medical History:   Diagnosis Date    Aortic stenosis     History of bleeding ulcers 2015    Hypertension     Hypothyroidism     Sciatica of left side     Vitamin D deficiency      Family History  No relevant history   Social History  Never Smoker     Medication List with Changes/Refills   Current Medications    ALPRAZOLAM (XANAX) 0.5 MG TABLET    Take 1 tablet (0.5 mg total) by mouth 2 (two) times daily as needed for Anxiety.    AMLODIPINE (NORVASC) 5 MG TABLET    TAKE 1 TABLET DAILY AS DIRECTED.    ASPIRIN (ECOTRIN) 325 MG EC TABLET    Take 1 tablet (325 mg total) by mouth once daily.    ATORVASTATIN (LIPITOR) 40 MG TABLET    Take 1 tablet (40 mg total) by mouth once daily.    CHOLECALCIFEROL, VITAMIN D3, 5,000 UNIT TAB    Take 5,000 Units by mouth once daily.    CLOPIDOGREL (PLAVIX) 75 MG TABLET    Take 1 tablet (75 mg total) by mouth once daily.    FENOFIBRATE 160 MG TAB    Take 1 tablet (160 mg total) by mouth once daily.    FERROUS SULFATE 325 (65 FE) MG EC TABLET    Take 325 mg by mouth once daily.    LEVOTHYROXINE (SYNTHROID) 75 MCG TABLET    Take 75 mcg by mouth once daily.    LISINOPRIL (PRINIVIL,ZESTRIL) 40 MG TABLET    Take 40 mg by mouth once daily.     METOPROLOL SUCCINATE (TOPROL-XL) 50 MG 24 HR TABLET    Take 50 mg by mouth once daily.    Changed and/or Refilled Medications    Modified Medication Previous Medication    MECLIZINE (ANTIVERT) 25 MG TABLET meclizine (ANTIVERT) 25 mg tablet       Take 1 tablet (25 mg total) by mouth 3 (three) times daily as needed for Dizziness.    Take 1 tablet (25 mg total) by mouth 3 (three) times daily as needed for Dizziness.    ONDANSETRON (ZOFRAN-ODT) 8 MG TBDL ondansetron (ZOFRAN-ODT) 8 MG TbDL       Take 1 tablet (8 mg total) by mouth every 8 (eight) hours as needed.    Take 1 tablet (8 mg total) by mouth every 8 (eight) hours as needed.   Discontinued Medications    SCOPOLAMINE (TRANSDERM-SCOP) 1.5  "MG (1 MG OVER 3 DAYS)    Place 1 patch (1.5 mg total) onto the skin every 72 hours. As needed for dizziness.       EXAM  Vital Signs:Blood pressure 119/61, pulse 88, height 4' 9" (1.448 m), weight 67.3 kg (148 lb 5.9 oz).  General: well appearing without discomfort   Mental Status:alert, oriented to person - place - age - month   Language: able to name, repeat, comprehend commands   Cranial Nerves: EOMI, PERRL, no facial asymmetry, tongue to midline, palate midline  Motor: 5/5 power in all extremities, normal tone  Sensory: intact light touch bilaterally, intact proprioception bilaterally  Coordination: no ataxia on finger-to-nose or heel-to-shin testing; no truncal ataxia  Gait & Stance: normal    NIH Stroke Scale:  Interval: 3 week follow up.  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  Dispense: 20 tablet; Refill: " 0  -     ondansetron (ZOFRAN-ODT) 8 MG TbDL; Take 1 tablet (8 mg total) by mouth every 8 (eight) hours as needed.  Dispense: 20 tablet; Refill: 0    Intracranial atherosclerosis  Dual antiplatelet therapy and statin    Aortic arch atherosclerosis  Dual antiplatelet therapy and statin    Asymptomatic bilateral carotid artery stenosis  Dual antiplatelet therapy and statin    Essential hypertension  Goal BP < 140/90    MD Padmini  Vascular Neurology  Office [phone]  Fax [phone]

## 2017-04-13 RX ORDER — ALPRAZOLAM 0.5 MG/1
0.5 TABLET ORAL 2 TIMES DAILY PRN
Qty: 60 TABLET | Refills: 0 | Status: SHIPPED | OUTPATIENT
Start: 2017-04-13 | End: 2017-05-15 | Stop reason: SDUPTHER

## 2017-04-18 ENCOUNTER — HOME CARE VISIT (OUTPATIENT)
Dept: NEUROLOGY | Facility: HOSPITAL | Age: 82
End: 2017-04-18

## 2017-04-28 ENCOUNTER — HOSPITAL ENCOUNTER (OUTPATIENT)
Dept: VASCULAR SURGERY | Facility: CLINIC | Age: 82
Discharge: HOME | End: 2017-04-28
Attending: SURGERY
Payer: MEDICARE

## 2017-04-28 ENCOUNTER — OFFICE VISIT (OUTPATIENT)
Dept: VASCULAR SURGERY | Facility: CLINIC | Age: 82
End: 2017-04-28
Payer: MEDICARE

## 2017-04-28 VITALS
TEMPERATURE: 98 F | DIASTOLIC BLOOD PRESSURE: 58 MMHG | SYSTOLIC BLOOD PRESSURE: 111 MMHG | BODY MASS INDEX: 33.44 KG/M2 | WEIGHT: 155 LBS | HEART RATE: 65 BPM | HEIGHT: 57 IN

## 2017-04-28 DIAGNOSIS — I67.81 ACUTE CEREBROVASCULAR INSUFFICIENCY: ICD-10-CM

## 2017-04-28 DIAGNOSIS — I77.1 ARTERIAL STENOSIS: ICD-10-CM

## 2017-04-28 DIAGNOSIS — I65.09 VERTEBRAL ARTERY OCCLUSION, UNSPECIFIED LATERALITY: Primary | ICD-10-CM

## 2017-04-28 DIAGNOSIS — I63.9 CEREBROVASCULAR ACCIDENT (CVA), UNSPECIFIED MECHANISM: ICD-10-CM

## 2017-04-28 DIAGNOSIS — I77.9 PAOD (PERIPHERAL ARTERIAL OCCLUSIVE DISEASE): ICD-10-CM

## 2017-04-28 PROCEDURE — 93926 LOWER EXTREMITY STUDY: CPT | Mod: 26,S$PBB,, | Performed by: SURGERY

## 2017-04-28 PROCEDURE — 99204 OFFICE O/P NEW MOD 45 MIN: CPT | Mod: S$PBB,,, | Performed by: SURGERY

## 2017-04-28 PROCEDURE — 93880 EXTRACRANIAL BILAT STUDY: CPT | Mod: 26,S$PBB,, | Performed by: SURGERY

## 2017-04-28 PROCEDURE — 99213 OFFICE O/P EST LOW 20 MIN: CPT | Mod: PBBFAC,25 | Performed by: SURGERY

## 2017-04-28 PROCEDURE — 93923 UPR/LXTR ART STDY 3+ LVLS: CPT | Mod: 26,S$PBB,, | Performed by: SURGERY

## 2017-04-28 PROCEDURE — 99999 PR PBB SHADOW E&M-EST. PATIENT-LVL III: CPT | Mod: PBBFAC,,, | Performed by: SURGERY

## 2017-04-28 NOTE — PROGRESS NOTES
Linda Soria Concetta  04/28/2017    HPI:  Patient is a 82 y.o. female who is here today for a post admission follow up. She presented on 3/25/17 with dizziness, slurred speech, facial droop and word-finding difficulty. She reported that she felt dizzy then experienced symptoms- which resolved within a few minutes. Patient was recently admitted on 3/20-3/21 for dizziness, nausea, inability to speak (resolved by time admitted to hospital) was found to have a acute infarct in right vemis of cerebellum. She was able to be discharged home the next day, but then re-present[facility] to [facility] [place] [facility]/24 with the above symptoms: CTA of neck demonstrated occlusion of right vertebral artery origin with reconstitution of V2 segment, and mild stenosis of left vertebral artery at origine; <50% carotid stenosis bilaterally. Her stroke was thought to be secondary to small vessel disease. On her initial exam patient, was noted to have decreased LLE pulses, US done showing 50-75% stenosis of Left SFA thus vascular surgery consulted. Patient walks with a walker short distances secondary to history of hip surgery; denies claudication, denies rest pain. She has done well since her discharge, with no interval development of any claudication symptoms.     MI/stroke: CVA in 3/2017, likely multiple TIAs   Tobacco use: Never smoker.     Past Medical History:   Diagnosis Date    Aortic stenosis     History of bleeding ulcers 2015    Hypertension     Hypothyroidism     Sciatica of left side     Vitamin D deficiency      Past Surgical History:   Procedure Laterality Date    APPENDECTOMY      [facility]12[facility]2016    [facility]    PARATHYROIDECTOMY Right 2012    THYROID SURGERY Right 2012    Partial thyroidectomy     Family History   Problem Relation Age of Onset    Heart disease Father     Heart disease Sister     Heart disease Brother     Diabetes Son      Social History     Social History    Marital status:      Spouse name: N/A     Number of children: N/A    Years of education: N/A     Occupational History    Not on file.     Social History Main Topics    Smoking status: Never Smoker    Smokeless tobacco: Not on file    Alcohol use Not on file    Drug use: Not on file    Sexual activity: No     Other Topics Concern    Not on file     Social History Narrative     Current Outpatient Prescriptions on File Prior to Visit   Medication Sig    alprazolam (XANAX) 0.5 MG tablet Take 1 tablet (0.5 mg total) by mouth 2 (two) times daily as needed for Anxiety.    amlodipine (NORVASC) 5 MG tablet TAKE 1 TABLET DAILY AS DIRECTED.    aspirin (ECOTRIN) 325 MG EC tablet Take 1 tablet (325 mg total) by mouth once daily.    atorvastatin (LIPITOR) 40 MG tablet Take 1 tablet (40 mg total) by mouth once daily.    cholecalciferol, vitamin D3, 5,000 unit Tab Take 5,000 Units by mouth once daily.    clopidogrel (PLAVIX) 75 mg tablet Take 1 tablet (75 mg total) by mouth once daily.    fenofibrate 160 MG Tab Take 1 tablet (160 mg total) by mouth once daily.    ferrous sulfate 325 (65 FE) MG EC tablet Take 325 mg by mouth once daily.    levothyroxine (SYNTHROID) 75 MCG tablet Take 75 mcg by mouth once daily.    lisinopril (PRINIVIL,ZESTRIL) 40 MG tablet Take 40 mg by mouth once daily.     meclizine (ANTIVERT) 25 mg tablet Take 1 tablet (25 mg total) by mouth 3 (three) times daily as needed for Dizziness.    metoprolol succinate (TOPROL-XL) 50 MG 24 hr tablet Take 50 mg by mouth once daily.     ondansetron (ZOFRAN-ODT) 8 MG TbDL Take 1 tablet (8 mg total) by mouth every 8 (eight) hours as needed.     No current facility-administered medications on file prior to visit.        REVIEW OF SYSTEMS:  General: negative; ENT: negative; Allergy and Immunology: negative; Hematological and Lymphatic: Negative; Endocrine: negative; Respiratory: no cough, shortness of breath, or wheezing; Cardiovascular: no chest pain or dyspnea on exertion; Gastrointestinal:  no abdominal pain/back, change in bowel habits, or bloody stools; Genito-Urinary: no dysuria, trouble voiding, or hematuria; Musculoskeletal: negative  Neurological: no TIA or stroke symptoms    PHYSICAL EXAM:   Right Arm BP - Sittin/58 (17 1552)  Left Arm BP - Sittin/56 (17 1552)  Pulse: 65  Temp: 98.3 °F (36.8 °C)      General appearance:  Alert, well-appearing, and in no distress.  Oriented to person, place, and time   Neurological: Normal speech, no focal findings noted; CN II - XII grossly intact           Musculoskeletal: Digits/nail without cyanosis/clubbing.  Normal muscle strength/tone.                 Neck: Supple, no significant adenopathy; thyroid is not enlarged                  No carotid bruit can be auscultated                Chest:  Clear to auscultation, no wheezes, rales or rhonchi, symmetric air entry     No use of accessory muscles             Cardiac: Normal rate and regular rhythm, S1 and S2 normal; PMI non-displaced          Abdomen: Soft, nontender, nondistended, no masses or organomegaly     No rebound tenderness noted; bowel sounds normal     Pulsatile aortic mass is not palpable.     No groin adenopathy      Extremities: 1+ femoral pulses bilaterally; 1+ pedal pulses palpable.     No pedal edema     No ulcerations    LAB RESULTS:  Lab Results   Component Value Date    K 3.6 2017    K 4.0 2017    K 4.1 2017    CREATININE 1.0 2017    CREATININE 1.1 2017    CREATININE 0.8 2017     Lab Results   Component Value Date    WBC 9.30 2017    WBC 9.36 2017    WBC 10.32 2017    HCT 36.2 (L) 2017    HCT 34.6 (L) 2017    HCT 36.7 (L) 2017     2017     2017     2017     Lab Results   Component Value Date    HGBA1C 5.8 2017     IMAGING:  LLE Arterial duplex: SFA PSV = 102, 248, 116 cm/s in prox, mid, and distal segments respectively. - Suggestive of moderate  disease.   ABIs: Left - 0.98; Right - 1.31 with strong biphasic waveforms bilaterally - Suggestive mild PAD.     IMP/PLAN:  82 y.o. female with a history of HTN, CVA, and AS here for evaluation on PAD.     - Pt is asymptomatic with imaging suggestive of minimal to mild PAD bilaterally. Recommend continued medical management with ASA and statin.       STAFF ADDENDUM    I have reviewed the relevant data and the resident's assessment and agree with the findings and plan as outlined above.  No clinical indication for intervention.  The patient is symptom free.    Valerio Clinton MD  Vascular & Endovascular Surgery

## 2017-05-16 RX ORDER — ALPRAZOLAM 0.5 MG/1
0.5 TABLET ORAL 2 TIMES DAILY PRN
Qty: 60 TABLET | Refills: 0 | Status: SHIPPED | OUTPATIENT
Start: 2017-05-16 | End: 2017-06-15 | Stop reason: SDUPTHER

## 2017-06-06 ENCOUNTER — HOME CARE VISIT (OUTPATIENT)
Dept: NEUROLOGY | Facility: HOSPITAL | Age: 82
End: 2017-06-06

## 2017-06-16 RX ORDER — ALPRAZOLAM 0.5 MG/1
0.5 TABLET ORAL 2 TIMES DAILY PRN
Qty: 60 TABLET | Refills: 0 | Status: SHIPPED | OUTPATIENT
Start: 2017-06-16 | End: 2017-07-17 | Stop reason: SDUPTHER

## 2017-06-21 VITALS
OXYGEN SATURATION: 95 % | HEART RATE: 66 BPM | DIASTOLIC BLOOD PRESSURE: 62 MMHG | SYSTOLIC BLOOD PRESSURE: 102 MMHG | RESPIRATION RATE: 20 BRPM

## 2017-06-22 NOTE — PROGRESS NOTES
AAOx3.  Denies discomfort.  Lives w/ son who was not present for visit.  NIHSS-0; does not smoke; refrains from adding salt, salty and fast food; compliant w/ all medications; walks the mall for exercise.  Education provided on goal of stroke mobile, s/s of stroke, diet, exercise, medications.  Client and spouse verbalized understanding of all instructions provided.  Encouraged to utilize stroke team for any concern.

## 2017-07-05 RX ORDER — LEVOTHYROXINE SODIUM 75 UG/1
75 TABLET ORAL DAILY
Qty: 30 TABLET | Refills: 0 | Status: SHIPPED | OUTPATIENT
Start: 2017-07-05 | End: 2017-08-02 | Stop reason: SDUPTHER

## 2017-07-05 NOTE — TELEPHONE ENCOUNTER
----- Message from Ji Scruggs sent at 7/5/2017 10:25 AM CDT -----  Contact: self [phone]  Type: Rx    Name of medication(s): levothyroxine (SYNTHROID) 75 MCG tablet     Is this a refill? New rx? New     Who prescribed medication?    Pharmacy Name, Phone, & Location:  [facility] on file     Comments: please advise , Thanks !

## 2017-07-07 ENCOUNTER — HOME CARE VISIT (OUTPATIENT)
Dept: NEUROLOGY | Facility: HOSPITAL | Age: 82
End: 2017-07-07

## 2017-07-13 VITALS
OXYGEN SATURATION: 98 % | HEART RATE: 66 BPM | SYSTOLIC BLOOD PRESSURE: 104 MMHG | RESPIRATION RATE: 20 BRPM | DIASTOLIC BLOOD PRESSURE: 68 MMHG

## 2017-07-14 NOTE — PROGRESS NOTES
AAOx3 but forgetful.  Denies discomfort.  Lives w/ son who was not present for visit.  NIHSS-0; does not smoke; compliant w/ all medications; refrains from adding salt, but has salty and fast food occasionally; performs chair exercises 3x/wk.  Education provided on goal of stroke mobile, s/s of stroke, diet, exercise, medications.  Client verbalized understanding of all instructions provided.  Encouraged to utilize stroke team for any concern.

## 2017-07-18 RX ORDER — ALPRAZOLAM 0.5 MG/1
0.5 TABLET ORAL 2 TIMES DAILY PRN
Qty: 60 TABLET | Refills: 0 | Status: SHIPPED | OUTPATIENT
Start: 2017-07-18 | End: 2017-08-16 | Stop reason: SDUPTHER

## 2017-08-02 RX ORDER — LEVOTHYROXINE SODIUM 75 UG/1
75 TABLET ORAL DAILY
Qty: 30 TABLET | Refills: 6 | Status: SHIPPED | OUTPATIENT
Start: 2017-08-02 | End: 2017-08-07 | Stop reason: SDUPTHER

## 2017-08-04 ENCOUNTER — HOME CARE VISIT (OUTPATIENT)
Dept: NEUROLOGY | Facility: HOSPITAL | Age: 82
End: 2017-08-04

## 2017-08-07 RX ORDER — LEVOTHYROXINE SODIUM 75 UG/1
75 TABLET ORAL DAILY
Qty: 30 TABLET | Refills: 6 | Status: SHIPPED | OUTPATIENT
Start: 2017-08-07 | End: 2017-08-24 | Stop reason: SDUPTHER

## 2017-08-17 RX ORDER — ALPRAZOLAM 0.5 MG/1
0.5 TABLET ORAL 2 TIMES DAILY PRN
Qty: 60 TABLET | Refills: 0 | Status: SHIPPED | OUTPATIENT
Start: 2017-08-17 | End: 2017-09-18 | Stop reason: SDUPTHER

## 2017-08-22 NOTE — PROGRESS NOTES
"AAO w/ effective communication via phone.  Reports "feeling well".  Denies discomfort-relief measures reviewed. Education provided on goal of stroke mobile, s/s of stroke, diet, chair exercises, ambulation safety.  Denies use of added salt but does eat salty and fast food occasionally due to her "love of pizza".  Verbalized understanding of all instructions provided. Encouraged use of stroke team for any concern.    "

## 2017-08-24 RX ORDER — LEVOTHYROXINE SODIUM 75 UG/1
75 TABLET ORAL DAILY
Qty: 90 TABLET | Refills: 0 | Status: SHIPPED | OUTPATIENT
Start: 2017-08-24 | End: 2017-11-23 | Stop reason: SDUPTHER

## 2017-09-11 VITALS
SYSTOLIC BLOOD PRESSURE: 134 MMHG | RESPIRATION RATE: 20 BRPM | HEART RATE: 72 BPM | OXYGEN SATURATION: 96 % | DIASTOLIC BLOOD PRESSURE: 72 MMHG

## 2017-09-11 NOTE — PROGRESS NOTES
AAOx3 but forgetful.  Denies discomfort.  Lives w/ son but visit occurred alone.  NIHSS-0; does not smoke; refrains from adding salt, but has salty and fast food occasionally; compliant w/ all meds; chair exercises daily. Education provided on goal of stroke mobile, s/s of stroke, diet, exercise, medications.  Client verbalized understanding of all instructions provided.  Encouraged to utilize stroke team for any concern.

## 2017-09-18 RX ORDER — ALPRAZOLAM 0.5 MG/1
0.5 TABLET ORAL 2 TIMES DAILY PRN
Qty: 60 TABLET | Refills: 0 | Status: SHIPPED | OUTPATIENT
Start: 2017-09-18 | End: 2017-10-18 | Stop reason: SDUPTHER

## 2017-09-19 ENCOUNTER — HOME CARE VISIT (OUTPATIENT)
Dept: NEUROLOGY | Facility: HOSPITAL | Age: 82
End: 2017-09-19

## 2017-10-18 RX ORDER — ALPRAZOLAM 0.5 MG/1
0.5 TABLET ORAL 2 TIMES DAILY PRN
Qty: 60 TABLET | Refills: 0 | Status: SHIPPED | OUTPATIENT
Start: 2017-10-18 | End: 2017-11-17 | Stop reason: SDUPTHER

## 2017-10-25 VITALS
RESPIRATION RATE: 20 BRPM | HEART RATE: 72 BPM | SYSTOLIC BLOOD PRESSURE: 108 MMHG | OXYGEN SATURATION: 98 % | DIASTOLIC BLOOD PRESSURE: 62 MMHG

## 2017-10-26 NOTE — PROGRESS NOTES
AAO but forgetful.  Lives w/ son but paid caregiver was present for visit.  NIHSS-0; does not smoke; refrains from adding salt but has salty and fast food occasionally; compliant w/ all meds; HH PT for exercises w/ additional chair exercises daily.  Education provided on goal of stroke mobile, s/s of stroke, diet, exercise, medications.  Client verbalized understanding of all instructions provided. Encouraged to utilize stroke team for any concern but discharged from program.

## 2017-11-15 ENCOUNTER — TELEPHONE (OUTPATIENT)
Dept: INTERNAL MEDICINE | Facility: CLINIC | Age: 82
End: 2017-11-15

## 2017-11-15 NOTE — TELEPHONE ENCOUNTER
----- Message from Teresita Dawn sent at 11/14/2017  3:35 PM CST -----  Contact: self/[phone]  Patient called in regards needing to talk with Dr Barrett about if she can get a f/u appointment from her urgent care visit (earliest that I found was until January /18) . Please call and advise.       Thank you

## 2017-11-17 RX ORDER — ALPRAZOLAM 0.5 MG/1
0.5 TABLET ORAL 2 TIMES DAILY PRN
Qty: 60 TABLET | Refills: 0 | Status: SHIPPED | OUTPATIENT
Start: 2017-11-17 | End: 2017-12-20 | Stop reason: SDUPTHER

## 2017-11-23 RX ORDER — LEVOTHYROXINE SODIUM 75 UG/1
75 TABLET ORAL DAILY
Qty: 90 TABLET | Refills: 0 | Status: SHIPPED | OUTPATIENT
Start: 2017-11-23 | End: 2018-01-10 | Stop reason: SDUPTHER

## 2017-12-15 PROBLEM — R42 DIZZINESS: Status: ACTIVE | Noted: 2017-12-15

## 2017-12-18 ENCOUNTER — HOSPITAL ENCOUNTER (INPATIENT)
Facility: HOSPITAL | Age: 82
LOS: 2 days | Discharge: HOME HEALTH | DRG: 071 | End: 2017-12-20
Attending: EMERGENCY MEDICINE | Admitting: EMERGENCY MEDICINE
Payer: MEDICARE

## 2017-12-18 DIAGNOSIS — E78.2 MIXED HYPERLIPIDEMIA: ICD-10-CM

## 2017-12-18 DIAGNOSIS — E03.9 ACQUIRED HYPOTHYROIDISM: ICD-10-CM

## 2017-12-18 DIAGNOSIS — R19.7 DIARRHEA, UNSPECIFIED TYPE: ICD-10-CM

## 2017-12-18 DIAGNOSIS — D72.829 LEUKOCYTOSIS, UNSPECIFIED TYPE: ICD-10-CM

## 2017-12-18 DIAGNOSIS — E87.1 DEHYDRATION WITH HYPONATREMIA: ICD-10-CM

## 2017-12-18 DIAGNOSIS — R41.82 ALTERED MENTAL STATUS, UNSPECIFIED ALTERED MENTAL STATUS TYPE: ICD-10-CM

## 2017-12-18 DIAGNOSIS — E86.0 DEHYDRATION WITH HYPONATREMIA: ICD-10-CM

## 2017-12-18 DIAGNOSIS — I10 ESSENTIAL HYPERTENSION: ICD-10-CM

## 2017-12-18 DIAGNOSIS — G45.0 VERTEBRAL BASILAR INSUFFICIENCY: ICD-10-CM

## 2017-12-18 DIAGNOSIS — I67.2 INTRACRANIAL ATHEROSCLEROSIS: ICD-10-CM

## 2017-12-18 DIAGNOSIS — G93.40 ACUTE ENCEPHALOPATHY: Primary | ICD-10-CM

## 2017-12-18 DIAGNOSIS — R74.8 ELEVATED LIPASE: ICD-10-CM

## 2017-12-18 DIAGNOSIS — R41.82 ALTERED MENTAL STATUS: ICD-10-CM

## 2017-12-18 LAB
ALBUMIN SERPL BCP-MCNC: 3.6 G/DL
ALP SERPL-CCNC: 69 U/L
ALT SERPL W/O P-5'-P-CCNC: 11 U/L
AMPHET+METHAMPHET UR QL: NEGATIVE
ANION GAP SERPL CALC-SCNC: 8 MMOL/L
AST SERPL-CCNC: 15 U/L
BACTERIA #/AREA URNS AUTO: NORMAL /HPF
BARBITURATES UR QL SCN>200 NG/ML: NEGATIVE
BASOPHILS # BLD AUTO: 0.18 K/UL
BASOPHILS NFR BLD: 1.3 %
BENZODIAZ UR QL SCN>200 NG/ML: NORMAL
BILIRUB SERPL-MCNC: 0.2 MG/DL
BILIRUB UR QL STRIP: NEGATIVE
BNP SERPL-MCNC: 79 PG/ML
BUN SERPL-MCNC: 24 MG/DL
BZE UR QL SCN: NEGATIVE
CALCIUM SERPL-MCNC: 9.1 MG/DL
CANNABINOIDS UR QL SCN: NEGATIVE
CHLORIDE SERPL-SCNC: 100 MMOL/L
CLARITY UR REFRACT.AUTO: CLEAR
CO2 SERPL-SCNC: 24 MMOL/L
COLOR UR AUTO: YELLOW
CREAT SERPL-MCNC: 0.9 MG/DL
CREAT UR-MCNC: 46 MG/DL
DIFFERENTIAL METHOD: ABNORMAL
EOSINOPHIL # BLD AUTO: 0.4 K/UL
EOSINOPHIL NFR BLD: 2.8 %
ERYTHROCYTE [DISTWIDTH] IN BLOOD BY AUTOMATED COUNT: 14.2 %
EST. GFR  (AFRICAN AMERICAN): >60 ML/MIN/1.73 M^2
EST. GFR  (NON AFRICAN AMERICAN): 59.7 ML/MIN/1.73 M^2
ESTIMATED AVG GLUCOSE: 111 MG/DL
ETHANOL SERPL-MCNC: <10 MG/DL
GLUCOSE SERPL-MCNC: 102 MG/DL
GLUCOSE UR QL STRIP: NEGATIVE
HBA1C MFR BLD HPLC: 5.5 %
HCT VFR BLD AUTO: 34.1 %
HGB BLD-MCNC: 11.4 G/DL
HGB UR QL STRIP: ABNORMAL
IMM GRANULOCYTES # BLD AUTO: 0.22 K/UL
IMM GRANULOCYTES NFR BLD AUTO: 1.6 %
INR PPP: 1
KETONES UR QL STRIP: NEGATIVE
LACTATE SERPL-SCNC: 1.1 MMOL/L
LEUKOCYTE ESTERASE UR QL STRIP: ABNORMAL
LIPASE SERPL-CCNC: 327 U/L
LYMPHOCYTES # BLD AUTO: 2.5 K/UL
LYMPHOCYTES NFR BLD: 18.1 %
MAGNESIUM SERPL-MCNC: 1.9 MG/DL
MCH RBC QN AUTO: 28.3 PG
MCHC RBC AUTO-ENTMCNC: 33.4 G/DL
MCV RBC AUTO: 85 FL
METHADONE UR QL SCN>300 NG/ML: NEGATIVE
MICROSCOPIC COMMENT: NORMAL
MONOCYTES # BLD AUTO: 1.1 K/UL
MONOCYTES NFR BLD: 8.2 %
NEUTROPHILS # BLD AUTO: 9.3 K/UL
NEUTROPHILS NFR BLD: 68 %
NITRITE UR QL STRIP: NEGATIVE
NRBC BLD-RTO: 0 /100 WBC
OPIATES UR QL SCN: NEGATIVE
PCP UR QL SCN>25 NG/ML: NEGATIVE
PH UR STRIP: 5 [PH] (ref 5–8)
PLATELET # BLD AUTO: 325 K/UL
PMV BLD AUTO: 10.5 FL
POTASSIUM SERPL-SCNC: 4.3 MMOL/L
PROCALCITONIN SERPL IA-MCNC: 0.06 NG/ML
PROT SERPL-MCNC: 7.7 G/DL
PROT UR QL STRIP: NEGATIVE
PROTHROMBIN TIME: 10.5 SEC
RBC # BLD AUTO: 4.03 M/UL
RBC #/AREA URNS AUTO: 3 /HPF (ref 0–4)
SODIUM SERPL-SCNC: 132 MMOL/L
SP GR UR STRIP: 1.01 (ref 1–1.03)
SQUAMOUS #/AREA URNS AUTO: 1 /HPF
T4 FREE SERPL-MCNC: 1.09 NG/DL
TOXICOLOGY INFORMATION: NORMAL
TROPONIN I SERPL DL<=0.01 NG/ML-MCNC: 0.01 NG/ML
TSH SERPL DL<=0.005 MIU/L-ACNC: 4.72 UIU/ML
URN SPEC COLLECT METH UR: ABNORMAL
UROBILINOGEN UR STRIP-ACNC: NEGATIVE EU/DL
WBC # BLD AUTO: 13.67 K/UL
WBC #/AREA URNS AUTO: 2 /HPF (ref 0–5)

## 2017-12-18 PROCEDURE — 25000003 PHARM REV CODE 250: Performed by: HOSPITALIST

## 2017-12-18 PROCEDURE — 63600175 PHARM REV CODE 636 W HCPCS: Performed by: STUDENT IN AN ORGANIZED HEALTH CARE EDUCATION/TRAINING PROGRAM

## 2017-12-18 PROCEDURE — 84484 ASSAY OF TROPONIN QUANT: CPT

## 2017-12-18 PROCEDURE — 84439 ASSAY OF FREE THYROXINE: CPT

## 2017-12-18 PROCEDURE — 96367 TX/PROPH/DG ADDL SEQ IV INF: CPT

## 2017-12-18 PROCEDURE — 83605 ASSAY OF LACTIC ACID: CPT

## 2017-12-18 PROCEDURE — 84145 PROCALCITONIN (PCT): CPT

## 2017-12-18 PROCEDURE — 83735 ASSAY OF MAGNESIUM: CPT

## 2017-12-18 PROCEDURE — 83690 ASSAY OF LIPASE: CPT

## 2017-12-18 PROCEDURE — 93010 ELECTROCARDIOGRAM REPORT: CPT | Mod: ,,, | Performed by: INTERNAL MEDICINE

## 2017-12-18 PROCEDURE — 85610 PROTHROMBIN TIME: CPT

## 2017-12-18 PROCEDURE — 99285 EMERGENCY DEPT VISIT HI MDM: CPT | Mod: 25

## 2017-12-18 PROCEDURE — 36415 COLL VENOUS BLD VENIPUNCTURE: CPT

## 2017-12-18 PROCEDURE — 25000003 PHARM REV CODE 250: Performed by: STUDENT IN AN ORGANIZED HEALTH CARE EDUCATION/TRAINING PROGRAM

## 2017-12-18 PROCEDURE — 87040 BLOOD CULTURE FOR BACTERIA: CPT | Mod: 59

## 2017-12-18 PROCEDURE — 96365 THER/PROPH/DIAG IV INF INIT: CPT

## 2017-12-18 PROCEDURE — 4A00X4Z MEASUREMENT OF CENTRAL NERVOUS ELECTRICAL ACTIVITY, EXTERNAL APPROACH: ICD-10-PCS | Performed by: PSYCHIATRY & NEUROLOGY

## 2017-12-18 PROCEDURE — 93005 ELECTROCARDIOGRAM TRACING: CPT

## 2017-12-18 PROCEDURE — 83036 HEMOGLOBIN GLYCOSYLATED A1C: CPT

## 2017-12-18 PROCEDURE — 96366 THER/PROPH/DIAG IV INF ADDON: CPT

## 2017-12-18 PROCEDURE — 80307 DRUG TEST PRSMV CHEM ANLYZR: CPT

## 2017-12-18 PROCEDURE — 95816 EEG AWAKE AND DROWSY: CPT | Mod: 26,,, | Performed by: PSYCHIATRY & NEUROLOGY

## 2017-12-18 PROCEDURE — 80053 COMPREHEN METABOLIC PANEL: CPT

## 2017-12-18 PROCEDURE — 99285 EMERGENCY DEPT VISIT HI MDM: CPT | Mod: ,,, | Performed by: EMERGENCY MEDICINE

## 2017-12-18 PROCEDURE — 99222 1ST HOSP IP/OBS MODERATE 55: CPT | Mod: ,,, | Performed by: HOSPITALIST

## 2017-12-18 PROCEDURE — 81001 URINALYSIS AUTO W/SCOPE: CPT

## 2017-12-18 PROCEDURE — 95816 EEG AWAKE AND DROWSY: CPT

## 2017-12-18 PROCEDURE — 85025 COMPLETE CBC W/AUTO DIFF WBC: CPT

## 2017-12-18 PROCEDURE — 83880 ASSAY OF NATRIURETIC PEPTIDE: CPT

## 2017-12-18 PROCEDURE — 84443 ASSAY THYROID STIM HORMONE: CPT

## 2017-12-18 PROCEDURE — 80320 DRUG SCREEN QUANTALCOHOLS: CPT

## 2017-12-18 PROCEDURE — 11000001 HC ACUTE MED/SURG PRIVATE ROOM

## 2017-12-18 RX ORDER — SODIUM CHLORIDE 0.9 % (FLUSH) 0.9 %
5 SYRINGE (ML) INJECTION
Status: DISCONTINUED | OUTPATIENT
Start: 2017-12-18 | End: 2017-12-20 | Stop reason: HOSPADM

## 2017-12-18 RX ORDER — IBUPROFEN 200 MG
16 TABLET ORAL
Status: DISCONTINUED | OUTPATIENT
Start: 2017-12-18 | End: 2017-12-20 | Stop reason: HOSPADM

## 2017-12-18 RX ORDER — ASPIRIN 325 MG
325 TABLET, DELAYED RELEASE (ENTERIC COATED) ORAL DAILY
Status: DISCONTINUED | OUTPATIENT
Start: 2017-12-18 | End: 2017-12-20 | Stop reason: HOSPADM

## 2017-12-18 RX ORDER — IBUPROFEN 200 MG
24 TABLET ORAL
Status: DISCONTINUED | OUTPATIENT
Start: 2017-12-18 | End: 2017-12-20 | Stop reason: HOSPADM

## 2017-12-18 RX ORDER — CLOPIDOGREL BISULFATE 75 MG/1
75 TABLET ORAL DAILY
Status: DISCONTINUED | OUTPATIENT
Start: 2017-12-18 | End: 2017-12-20 | Stop reason: HOSPADM

## 2017-12-18 RX ORDER — ACETAMINOPHEN 500 MG
1000 TABLET ORAL EVERY 8 HOURS PRN
Status: DISCONTINUED | OUTPATIENT
Start: 2017-12-18 | End: 2017-12-20 | Stop reason: HOSPADM

## 2017-12-18 RX ORDER — SODIUM CHLORIDE 9 MG/ML
INJECTION, SOLUTION INTRAVENOUS CONTINUOUS
Status: DISCONTINUED | OUTPATIENT
Start: 2017-12-18 | End: 2017-12-19

## 2017-12-18 RX ORDER — GLUCAGON 1 MG
1 KIT INJECTION
Status: DISCONTINUED | OUTPATIENT
Start: 2017-12-18 | End: 2017-12-20 | Stop reason: HOSPADM

## 2017-12-18 RX ORDER — METOPROLOL SUCCINATE 50 MG/1
50 TABLET, EXTENDED RELEASE ORAL DAILY
Status: DISCONTINUED | OUTPATIENT
Start: 2017-12-18 | End: 2017-12-20 | Stop reason: HOSPADM

## 2017-12-18 RX ORDER — ONDANSETRON 4 MG/1
4 TABLET, ORALLY DISINTEGRATING ORAL EVERY 8 HOURS PRN
Status: DISCONTINUED | OUTPATIENT
Start: 2017-12-18 | End: 2017-12-20 | Stop reason: HOSPADM

## 2017-12-18 RX ORDER — ENOXAPARIN SODIUM 100 MG/ML
40 INJECTION SUBCUTANEOUS EVERY 24 HOURS
Status: DISCONTINUED | OUTPATIENT
Start: 2017-12-19 | End: 2017-12-20 | Stop reason: HOSPADM

## 2017-12-18 RX ORDER — SODIUM CHLORIDE 9 MG/ML
1000 INJECTION, SOLUTION INTRAVENOUS
Status: COMPLETED | OUTPATIENT
Start: 2017-12-18 | End: 2017-12-18

## 2017-12-18 RX ORDER — RAMELTEON 8 MG/1
8 TABLET ORAL NIGHTLY PRN
Status: DISCONTINUED | OUTPATIENT
Start: 2017-12-18 | End: 2017-12-20 | Stop reason: HOSPADM

## 2017-12-18 RX ORDER — LEVOTHYROXINE SODIUM 75 UG/1
75 TABLET ORAL DAILY
Status: DISCONTINUED | OUTPATIENT
Start: 2017-12-18 | End: 2017-12-20 | Stop reason: HOSPADM

## 2017-12-18 RX ORDER — ATORVASTATIN CALCIUM 20 MG/1
40 TABLET, FILM COATED ORAL DAILY
Status: DISCONTINUED | OUTPATIENT
Start: 2017-12-18 | End: 2017-12-20 | Stop reason: HOSPADM

## 2017-12-18 RX ADMIN — SODIUM CHLORIDE 1000 ML: 0.9 INJECTION, SOLUTION INTRAVENOUS at 08:12

## 2017-12-18 RX ADMIN — LEVOTHYROXINE SODIUM 75 MCG: 75 TABLET ORAL at 01:12

## 2017-12-18 RX ADMIN — SODIUM CHLORIDE 500 ML: 900 INJECTION, SOLUTION INTRAVENOUS at 01:12

## 2017-12-18 RX ADMIN — CEFTRIAXONE SODIUM 1 G: 500 INJECTION, POWDER, FOR SOLUTION INTRAMUSCULAR; INTRAVENOUS at 08:12

## 2017-12-18 RX ADMIN — METOPROLOL SUCCINATE 50 MG: 50 TABLET, EXTENDED RELEASE ORAL at 01:12

## 2017-12-18 RX ADMIN — ATORVASTATIN CALCIUM 40 MG: 20 TABLET, FILM COATED ORAL at 01:12

## 2017-12-18 RX ADMIN — ASPIRIN 325 MG: 325 TABLET, DELAYED RELEASE ORAL at 01:12

## 2017-12-18 RX ADMIN — SODIUM CHLORIDE: 0.9 INJECTION, SOLUTION INTRAVENOUS at 08:12

## 2017-12-18 RX ADMIN — ONDANSETRON 4 MG: 4 TABLET, ORALLY DISINTEGRATING ORAL at 02:12

## 2017-12-18 RX ADMIN — CLOPIDOGREL 75 MG: 75 TABLET, FILM COATED ORAL at 01:12

## 2017-12-18 RX ADMIN — VANCOMYCIN HYDROCHLORIDE 1000 MG: 1 INJECTION, POWDER, LYOPHILIZED, FOR SOLUTION INTRAVENOUS at 10:12

## 2017-12-18 NOTE — ED PROVIDER NOTES
Encounter Date: 12/18/2017       History     Chief Complaint   Patient presents with    Altered Mental Status     EMS reports pt son called 911 because he couldn't wake up his mother. Pt was laying on couch and uncooperative with EMS assessments; pt not answering questions or following commands while peering throught the side of both eyes      83 yo F w/ history of right sided cerebellar stroke w/ risidual deficits, HTN, AS, Hypothyroidism, just seen Friday for headache and vertigo and discharged with Augmentin representing w/ altered mental status after son found her lethargic and unreponsive this morning. Patient is difficult to assess given inconsistent history and short verbal responses to questions. Attempted to contact son. Other son by bedside to provide previous history collateral information. Patient denies any f/a/c, cp/sob. States she has had headache, blurry vision, nausea and non-bloody diarrhea (although reported by one of her sons to have intermittent, chronic issues with diarrhea). She denies any abdominal tenderness, urinary changes. She denies any new focal weakness, numbness or tingling.           Review of patient's allergies indicates:   Allergen Reactions    Sulfa (sulfonamide antibiotics) Hives     Past Medical History:   Diagnosis Date    Aortic stenosis     Clot 04/2017    blood clot in back of neck from CVA    History of bleeding ulcers 2015    Hypertension     Hypothyroidism     Sciatica of left side     Stroke     Vitamin D deficiency      Past Surgical History:   Procedure Laterality Date    APPENDECTOMY      [facility]    PARATHYROIDECTOMY Right 2012    THYROID SURGERY Right 2012    Partial thyroidectomy     Family History   Problem Relation Age of Onset    Heart disease Father     Heart disease Sister     Heart disease Brother     Diabetes Son      Social History   Substance Use Topics    Smoking status: Never Smoker    Smokeless tobacco:  Never Used    Alcohol use Not on file     Review of Systems   Unable to perform ROS: Mental status change   Constitutional: Negative for chills, diaphoresis, fatigue and fever.   HENT: Positive for sinus pressure.    Eyes: Positive for visual disturbance (blurry vision).   Respiratory: Negative for cough, choking, chest tightness, shortness of breath, wheezing and stridor.    Cardiovascular: Negative for chest pain, palpitations and leg swelling.   Gastrointestinal: Positive for diarrhea and nausea. Negative for abdominal pain, blood in stool and constipation.   Genitourinary: Negative for hematuria.   Neurological: Positive for headaches. Negative for dizziness, tremors, syncope and facial asymmetry.       Physical Exam     Initial Vitals [12/18/17 0634]   BP Pulse Resp Temp SpO2   (!) 222/100 96 18 97.5 °F (36.4 °C) 97 %      MAP       140.67         Physical Exam    Constitutional: She appears well-developed and well-nourished. She is not diaphoretic. She appears distressed (mild distress).   HENT:   Head: Normocephalic and atraumatic.   Nose: Nose normal.   Mouth/Throat: Oropharynx is clear and moist. No oropharyngeal exudate.   Eyes: Conjunctivae and EOM are normal. Pupils are equal, round, and reactive to light. No scleral icterus.   Neck: Normal range of motion. Neck supple.   No signs of meningeal irritation   Cardiovascular: Normal rate, regular rhythm and intact distal pulses. Exam reveals no gallop and no friction rub.    No murmur heard.  Grade 3 AS murmur   Pulmonary/Chest: Breath sounds normal. No respiratory distress. She has no wheezes. She has no rhonchi. She has no rales. She exhibits no tenderness.   Abdominal: Soft. Bowel sounds are normal. She exhibits no distension. There is no tenderness. There is no rebound and no guarding.   Musculoskeletal: Normal range of motion.   Neurological: She is alert. She has normal strength and normal reflexes. She displays normal reflexes. No cranial nerve  deficit or sensory deficit.   Oriented to person and place but not year. Following some commands properly. Does not squeeze hands to command but lifts arms and resists appropriately. Ataxia of upper and lower right side limbs   Skin: Skin is warm and dry. No erythema.   Psychiatric: She has a normal mood and affect.         ED Course   Procedures  Labs Reviewed   CBC W/ AUTO DIFFERENTIAL - Abnormal; Notable for the following:        Result Value    WBC 13.67 (*)     Hemoglobin 11.4 (*)     Hematocrit 34.1 (*)     Immature Granulocytes 1.6 (*)     Gran # 9.3 (*)     Immature Grans (Abs) 0.22 (*)     Mono # 1.1 (*)     All other components within normal limits   COMPREHENSIVE METABOLIC PANEL - Abnormal; Notable for the following:     Sodium 132 (*)     BUN, Bld 24 (*)     eGFR if non  59.7 (*)     All other components within normal limits   LIPASE - Abnormal; Notable for the following:     Lipase 327 (*)     All other components within normal limits   TSH - Abnormal; Notable for the following:     TSH 4.719 (*)     All other components within normal limits   URINALYSIS, REFLEX TO URINE CULTURE - Abnormal; Notable for the following:     Occult Blood UA 2+ (*)     Leukocytes, UA Trace (*)     All other components within normal limits    Narrative:     Preferred Collection Type->Urine, Clean Catch   B-TYPE NATRIURETIC PEPTIDE   LACTIC ACID, PLASMA   MAGNESIUM   PROTIME-INR   TROPONIN I   DRUG SCREEN PANEL, URINE EMERGENCY    Narrative:     Preferred Collection Type->Urine, Clean Catch   ALCOHOL,MEDICAL (ETHANOL)   T4, FREE   URINALYSIS MICROSCOPIC    Narrative:     Preferred Collection Type->Urine, Clean Catch   PROCALCITONIN   HEMOGLOBIN A1C          X-Rays:   Independently Interpreted Readings:   Head CT: No hemorrhage.  No skull fracture.  No acute stroke.     Medical Decision Making:   Initial Assessment:   83 yo F represents after discharge from ED on Friday for headache and dizziness related to  "sinus infection. She states that since then she has had worsening "ill" feelings and is altered on exam today. Per patient's family by bedside she is not at baseline. History and ROS is difficult to assess given patient's altered status. She intermittently will respond in full deliberate sentences and at other times will stare off and / or respond in 2 word responses. She and her family reports no history of seizures. She has no new focal deficits on exam. CT head is unremarkable. Cardiac enzymes are not elevated. Her urine is clean. CBC returned with a leukocytosis and lipase is elevated. However, she has no abdominal findings on exam and reports no abdominal tenderness. Beyond her diarrhea she has no GI symptoms and it is unclear how much of the diarrhea is pre-existing and how much is recent. At present we do not have a clear cause of her altered mental status and lab findings but given her recent sinus infection and potential for sepsis we have empirically treated her with vanc and rocephin and fluid resuscitated her. We will admit to inpatient medicine for further workup.   Differential Diagnosis:   Sepsis vs CVA vs Cardiac vs Seizure vs pancreatitis? vs other              Attending Attestation:   Physician Attestation Statement for Resident:  As the supervising MD   Physician Attestation Statement: I have personally seen and examined this patient.   I agree with the above history. -:   As the supervising MD I agree with the above PE.    As the supervising MD I agree with the above treatment, course, plan, and disposition.  I have reviewed and agree with the residents interpretation of the following: lab data and CT scans.  I have reviewed the following: old records at this facility.            Attending ED Notes:   Evaluation of AMS. Patient had recent evaluation and stroke work up that was negative. On exam, patient's mental status seems to wax/wane. No focal neuro deficits, low suspicion for stroke. She " follows commands and talks to her family. They report that this is not her baseline. Lab work shows a mildly elevated WBC and lipase level. Repeated abdominal exams are benign and she has no apparent tenderness. Consider cholangitis as a source, recommend US abdomen. Empiric antibiotics have been given. Will admit for further evaluation.          ED Course as of Dec 18 1326   Mon Dec 18, 2017   0810 Leukocytosis noted WBC: (!) 13.67 [MO]   0810 Lipase elevated  Lipase: (!) 327 [MO]   0811 hyponatremic Sodium: (!) 132 [MO]   0858 Rocephin and vanc started as suspicion of infection given leukocytosis. Source unclear at this time. Cholangitis? given elevated lipase.   [MO]      ED Course User Index  [MO] Lucio Contreras MD     Clinical Impression:   The primary encounter diagnosis was Altered mental status. Diagnoses of Leukocytosis, unspecified type and Elevated lipase were also pertinent to this visit.    Disposition:   Disposition: Admitted                        Lv Pan MD  12/18/17 1326

## 2017-12-18 NOTE — HPI
82 y.o. woman with hx of R cerebral vermal ischemic stroke in 3/2017 and extrensive vertebral basilar disease who presents with AMS. Per report, at baseline, pt is AO x 4, up to date on current events, reads daily ( avid reader per family), ambulates with a walker. Of note, was evaluated in the ED 3 days ago for worsening dizziness and vertigo.  She was ruled out for stoke but her mental status was intact. She was sent home with augmentin for sinusitis vs. URI. She was stable at home until this morning  when her son, whom she lives with, was unable t to arouse her from sleep. He then activated EMS as she was unarousable.  Per report, pt was uncooperative with EMS. During evaluation by ED staff, pt was noted to be lethargic but only answering questions with 1-2 word answers. Upon my evaluation, pt reports she does not remember events from this morning but remembers being in the ambulance. She reports a headache and blurry vision in both eyes (states she cannot make out my facial features but was able to count fingers). She also reports nausea and diarrhea x 3 days - last since stating abx. Otherwise, she denies any  focal weakness, numbness, tingling, abd pain, urinary symptoms, fevers, chills, cough.    Of note, prior to me entering the room, the family reports she has had 4-5 episodes of shaking her arms and legs followed by staring. No bladder or bowel incontinence during these episodes. Did not witness this upon my evaluation. Family reported, she has also been repeating her name intermittently like its rehearsed which was witnessed by me. .

## 2017-12-18 NOTE — ED NOTES
Patient assisted on and off of bed pan. Patient cleaned, pull up placed back on patient per request. Patient family updated on care.

## 2017-12-18 NOTE — ED TRIAGE NOTES
Linda Hylton, a 82 y.o. female presents to the ED after being uncooperative with ems and unarousable by the son this AM. Pt states that she has not been feeling well for the past several days and has been more tired. She is lethargic and oriented x4, however only answering in short 1-2 word responses. Denies any CP or SOB.      Chief Complaint   Patient presents with    Altered Mental Status     EMS reports pt son called 911 because he couldn't wake up his mother. Pt was laying on couch and uncooperative with EMS assessments; pt not answering questions or following commands while peering throught the side of both eyes      Review of patient's allergies indicates:   Allergen Reactions    Sulfa (sulfonamide antibiotics) Hives     Past Medical History:   Diagnosis Date    Aortic stenosis     History of bleeding ulcers 2015    Hypertension     Hypothyroidism     Sciatica of left side     Stroke     Vitamin D deficiency      Adult Physical Assessment  LOC: Linda Hylton, 82 y.o. female verified via two identifiers.  The patient is awake, lethargic, oriented and speaking appropriately at this time.  APPEARANCE: Patient resting comfortably and appears to be in no acute distress at this time. Patient is clean and well groomed, patient's clothing is properly fastened.  SKIN:The skin is warm and dry, color consistent with ethnicity, patient has normal skin turgor and moist mucus membranes, skin intact, no breakdown or brusing noted.  MUSCULOSKELETAL: Patient moving all extremities well, however very weak, no obvious swelling or deformities noted.  RESPIRATORY: Airway is open and patent, respirations are spontaneous, patient has a normal effort and rate, no accessory muscle use noted.  CARDIAC: Patient has a normal rate and rhythm, no periphreal edema noted in any extremity, capillary refill < 3 seconds in all extremities  ABDOMEN: Soft and non tender to palpation, no abdominal distention noted. Bowel sounds  present in all four quadrants.  NEUROLOGIC: Eyes open spontaneously, behavior appropriate to situation, follows commands, facial expression symmetrical, bilateral hand grasp equal and even, purposeful motor response noted, normal sensation in all extremities when touched with a finger.

## 2017-12-18 NOTE — ED NOTES
Bed: [facility]  Expected date:   Expected time:   Means of arrival:   Comments:

## 2017-12-19 PROBLEM — E86.0 DEHYDRATION WITH HYPONATREMIA: Status: ACTIVE | Noted: 2017-12-18

## 2017-12-19 PROBLEM — G45.8 ACUTE CEREBROVASCULAR INSUFFICIENCY TRANSIENT FOCAL NEUROLOGIC DEFICIT: Status: RESOLVED | Noted: 2017-03-24 | Resolved: 2017-12-19

## 2017-12-19 PROBLEM — I63.341 STROKE DUE TO THROMBOSIS OF RIGHT CEREBELLAR ARTERY: Status: RESOLVED | Noted: 2017-03-20 | Resolved: 2017-12-19

## 2017-12-19 PROBLEM — G93.40 ACUTE ENCEPHALOPATHY: Status: ACTIVE | Noted: 2017-12-18

## 2017-12-19 PROBLEM — G45.0 VERTEBRAL BASILAR INSUFFICIENCY: Status: ACTIVE | Noted: 2017-12-19

## 2017-12-19 PROBLEM — I67.2 INTRACRANIAL ATHEROSCLEROSIS: Status: ACTIVE | Noted: 2017-03-24

## 2017-12-19 LAB
ALBUMIN SERPL BCP-MCNC: 3.3 G/DL
ALBUMIN SERPL BCP-MCNC: 3.3 G/DL
ALP SERPL-CCNC: 54 U/L
ALP SERPL-CCNC: 54 U/L
ALT SERPL W/O P-5'-P-CCNC: 10 U/L
ALT SERPL W/O P-5'-P-CCNC: 10 U/L
ANION GAP SERPL CALC-SCNC: 7 MMOL/L
ANION GAP SERPL CALC-SCNC: 7 MMOL/L
AST SERPL-CCNC: 15 U/L
AST SERPL-CCNC: 15 U/L
BASOPHILS # BLD AUTO: 0.14 K/UL
BASOPHILS NFR BLD: 1.2 %
BILIRUB SERPL-MCNC: 0.3 MG/DL
BILIRUB SERPL-MCNC: 0.3 MG/DL
BUN SERPL-MCNC: 16 MG/DL
BUN SERPL-MCNC: 16 MG/DL
CALCIUM SERPL-MCNC: 8.7 MG/DL
CALCIUM SERPL-MCNC: 8.7 MG/DL
CHLORIDE SERPL-SCNC: 102 MMOL/L
CHLORIDE SERPL-SCNC: 102 MMOL/L
CO2 SERPL-SCNC: 25 MMOL/L
CO2 SERPL-SCNC: 25 MMOL/L
CREAT SERPL-MCNC: 0.7 MG/DL
CREAT SERPL-MCNC: 0.7 MG/DL
DIFFERENTIAL METHOD: ABNORMAL
EOSINOPHIL # BLD AUTO: 0.3 K/UL
EOSINOPHIL NFR BLD: 2.8 %
ERYTHROCYTE [DISTWIDTH] IN BLOOD BY AUTOMATED COUNT: 13.9 %
EST. GFR  (AFRICAN AMERICAN): >60 ML/MIN/1.73 M^2
EST. GFR  (AFRICAN AMERICAN): >60 ML/MIN/1.73 M^2
EST. GFR  (NON AFRICAN AMERICAN): >60 ML/MIN/1.73 M^2
EST. GFR  (NON AFRICAN AMERICAN): >60 ML/MIN/1.73 M^2
GLUCOSE SERPL-MCNC: 108 MG/DL
GLUCOSE SERPL-MCNC: 108 MG/DL
HCT VFR BLD AUTO: 32.2 %
HGB BLD-MCNC: 10.3 G/DL
IMM GRANULOCYTES # BLD AUTO: 0.19 K/UL
IMM GRANULOCYTES NFR BLD AUTO: 1.6 %
LYMPHOCYTES # BLD AUTO: 1.9 K/UL
LYMPHOCYTES NFR BLD: 15.9 %
MAGNESIUM SERPL-MCNC: 1.8 MG/DL
MCH RBC QN AUTO: 27.7 PG
MCHC RBC AUTO-ENTMCNC: 32 G/DL
MCV RBC AUTO: 87 FL
MONOCYTES # BLD AUTO: 0.9 K/UL
MONOCYTES NFR BLD: 7.9 %
NEUTROPHILS # BLD AUTO: 8.2 K/UL
NEUTROPHILS NFR BLD: 70.6 %
NRBC BLD-RTO: 0 /100 WBC
PLATELET # BLD AUTO: 278 K/UL
PMV BLD AUTO: 10.5 FL
POTASSIUM SERPL-SCNC: 4.4 MMOL/L
POTASSIUM SERPL-SCNC: 4.4 MMOL/L
PROT SERPL-MCNC: 7 G/DL
PROT SERPL-MCNC: 7 G/DL
RBC # BLD AUTO: 3.72 M/UL
SODIUM SERPL-SCNC: 134 MMOL/L
SODIUM SERPL-SCNC: 134 MMOL/L
WBC # BLD AUTO: 11.6 K/UL

## 2017-12-19 PROCEDURE — 11000001 HC ACUTE MED/SURG PRIVATE ROOM

## 2017-12-19 PROCEDURE — 99232 SBSQ HOSP IP/OBS MODERATE 35: CPT | Mod: ,,, | Performed by: INTERNAL MEDICINE

## 2017-12-19 PROCEDURE — 85025 COMPLETE CBC W/AUTO DIFF WBC: CPT

## 2017-12-19 PROCEDURE — 99223 1ST HOSP IP/OBS HIGH 75: CPT | Mod: GC,,, | Performed by: PSYCHIATRY & NEUROLOGY

## 2017-12-19 PROCEDURE — 80053 COMPREHEN METABOLIC PANEL: CPT

## 2017-12-19 PROCEDURE — 25500020 PHARM REV CODE 255: Performed by: INTERNAL MEDICINE

## 2017-12-19 PROCEDURE — A9585 GADOBUTROL INJECTION: HCPCS | Performed by: INTERNAL MEDICINE

## 2017-12-19 PROCEDURE — 25000003 PHARM REV CODE 250: Performed by: HOSPITALIST

## 2017-12-19 PROCEDURE — 36415 COLL VENOUS BLD VENIPUNCTURE: CPT

## 2017-12-19 PROCEDURE — 83735 ASSAY OF MAGNESIUM: CPT

## 2017-12-19 PROCEDURE — 63600175 PHARM REV CODE 636 W HCPCS: Performed by: HOSPITALIST

## 2017-12-19 RX ORDER — GADOBUTROL 604.72 MG/ML
8 INJECTION INTRAVENOUS
Status: COMPLETED | OUTPATIENT
Start: 2017-12-19 | End: 2017-12-19

## 2017-12-19 RX ORDER — LISINOPRIL 20 MG/1
20 TABLET ORAL DAILY
Status: DISCONTINUED | OUTPATIENT
Start: 2017-12-20 | End: 2017-12-19

## 2017-12-19 RX ORDER — LISINOPRIL 20 MG/1
40 TABLET ORAL DAILY
Status: DISCONTINUED | OUTPATIENT
Start: 2017-12-20 | End: 2017-12-20 | Stop reason: HOSPADM

## 2017-12-19 RX ADMIN — ASPIRIN 325 MG: 325 TABLET, DELAYED RELEASE ORAL at 08:12

## 2017-12-19 RX ADMIN — ONDANSETRON 4 MG: 4 TABLET, ORALLY DISINTEGRATING ORAL at 01:12

## 2017-12-19 RX ADMIN — GADOBUTROL 8 ML: 604.72 INJECTION INTRAVENOUS at 10:12

## 2017-12-19 RX ADMIN — ENOXAPARIN SODIUM 40 MG: 100 INJECTION SUBCUTANEOUS at 04:12

## 2017-12-19 RX ADMIN — METOPROLOL SUCCINATE 50 MG: 50 TABLET, EXTENDED RELEASE ORAL at 08:12

## 2017-12-19 RX ADMIN — ACETAMINOPHEN 1000 MG: 500 TABLET ORAL at 03:12

## 2017-12-19 RX ADMIN — CLOPIDOGREL 75 MG: 75 TABLET, FILM COATED ORAL at 08:12

## 2017-12-19 RX ADMIN — ACETAMINOPHEN 1000 MG: 500 TABLET ORAL at 11:12

## 2017-12-19 RX ADMIN — ATORVASTATIN CALCIUM 40 MG: 20 TABLET, FILM COATED ORAL at 08:12

## 2017-12-19 RX ADMIN — LEVOTHYROXINE SODIUM 75 MCG: 75 TABLET ORAL at 08:12

## 2017-12-19 NOTE — ASSESSMENT & PLAN NOTE
· Patient's blood pressure mildly elevated but patient's home medications of Lisinopril and Norvasc held on admit due to concern for dehydration.  · Goal for blood pressure is SBP < 150 and DBP < 90 as patient > or = 60 years of age with no diabetes or advanced kidney disease based on JNC 8 guidelines.   · Continue current treatment regimen of Toprol XL 50 mg po daily and restart Lisinopril 40 mg po daily in the am and see how BP responds prior to restarting home dose of Norvasc.  · Plan is to monitor patient's blood pressure routinely while patient is hospitalized.

## 2017-12-19 NOTE — ASSESSMENT & PLAN NOTE
Ms. 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 Initially not oriented or following commands in ED. Symptoms largely improved on my exam, pt oriented, following commands, and endorsing dizziness, HA, and blurry vision.    In setting of new onset confusion, blurry vision, and persistent dizziness, recommend repeat MRI Brain to definitively rule out acute infarct. Previous CTA with extensive atherosclerotic/stenotic disease, recommend repeat CTA H/N. Etiology possibly acute stroke vs TIA vs recrudescence of prior stroke. Continue DAPT and risk factor modification for secondary stroke prevention. Will follow.

## 2017-12-19 NOTE — SUBJECTIVE & OBJECTIVE
Past Medical History:   Diagnosis Date    Aortic stenosis     Clot 04/2017    blood clot in back of neck from CVA    History of bleeding ulcers 2015    Hypertension     Hypothyroidism     Sciatica of left side     Stroke     Vitamin D deficiency      Past Surgical History:   Procedure Laterality Date    APPENDECTOMY      [facility]    PARATHYROIDECTOMY Right 2012    THYROID SURGERY Right 2012    Partial thyroidectomy     Family History   Problem Relation Age of Onset    Heart disease Father     Heart disease Sister     Heart disease Brother     Diabetes Son      Social History   Substance Use Topics    Smoking status: Never Smoker    Smokeless tobacco: Never Used    Alcohol use Not on file     Review of patient's allergies indicates:   Allergen Reactions    Sulfa (sulfonamide antibiotics) Hives       Medications: I have reviewed the current medication administration record.    Prescriptions Prior to Admission   Medication Sig Dispense Refill Last Dose    ALPRAZolam (XANAX) 0.5 MG tablet TAKE 1 TABLET (0.5 MG TOTAL) BY MOUTH 2 (TWO) TIMES DAILY AS NEEDED FOR ANXIETY. 60 tablet 0 12/14/2017    amlodipine (NORVASC) 5 MG tablet TAKE 1 TABLET DAILY AS DIRECTED.  3 12/14/2017    amoxicillin-clavulanate 875-125mg (AUGMENTIN) 875-125 mg per tablet Take 1 tablet by mouth 2 (two) times daily. 20 tablet 0     aspirin (ECOTRIN) 325 MG EC tablet Take 1 tablet (325 mg total) by mouth once daily.  0 12/14/2017    atorvastatin (LIPITOR) 40 MG tablet Take 1 tablet (40 mg total) by mouth once daily. 30 tablet 3 Taking    cholecalciferol, vitamin D3, 5,000 unit Tab Take 5,000 Units by mouth once daily.   12/14/2017    clopidogrel (PLAVIX) 75 mg tablet Take 1 tablet (75 mg total) by mouth once daily. 90 tablet 3 12/14/2017    fenofibrate 160 MG Tab Take 1 tablet (160 mg total) by mouth once daily. 90 tablet 3 Taking    ferrous sulfate 325 (65 FE) MG EC tablet Take 325 mg by  mouth once daily.   Taking    levothyroxine (SYNTHROID) 75 MCG tablet TAKE 1 TABLET (75 MCG TOTAL) BY MOUTH ONCE DAILY. 90 tablet 0 12/14/2017    lisinopril (PRINIVIL,ZESTRIL) 40 MG tablet Take 40 mg by mouth once daily.    12/14/2017    meclizine (ANTIVERT) 25 mg tablet Take 1 tablet (25 mg total) by mouth 3 (three) times daily as needed for Dizziness. 20 tablet 0 12/15/2017    metoprolol succinate (TOPROL-XL) 50 MG 24 hr tablet Take 50 mg by mouth once daily.    12/14/2017    ondansetron (ZOFRAN-ODT) 8 MG TbDL Take 1 tablet (8 mg total) by mouth every 8 (eight) hours as needed. 20 tablet 0 12/14/2017       Review of Systems   Constitutional: Negative for diaphoresis and fever.   HENT: Negative for drooling and nosebleeds.    Eyes: Positive for visual disturbance. Negative for discharge.   Respiratory: Negative for choking and stridor.    Cardiovascular: Negative for chest pain and leg swelling.   Gastrointestinal: Positive for diarrhea. Negative for vomiting.   Genitourinary: Negative for frequency and hematuria.   Musculoskeletal: Negative for joint swelling and neck stiffness.   Skin: Negative for pallor and rash.   Neurological: Positive for dizziness and headaches. Negative for facial asymmetry, speech difficulty and weakness.   Psychiatric/Behavioral: Positive for confusion. Negative for agitation and behavioral problems.     Objective:     Vital Signs (Most Recent):  Temp: 97.2 °F (36.2 °C) (12/18/17 1652)  Pulse: 69 (12/18/17 1652)  Resp: 18 (12/18/17 1652)  BP: (!) 153/63 (12/18/17 1652)  SpO2: (!) 93 % (12/18/17 1652)    Vital Signs Range (Last 24H):  Temp:  [97.2 °F (36.2 °C)-98.7 °F (37.1 °C)]   Pulse:  [69-96]   Resp:  [14-18]   BP: (143-222)/()   SpO2:  [93 %-97 %]     Physical Exam   Constitutional: She is oriented to person, place, and time. She appears well-developed and well-nourished. No distress.   HENT:   Head: Normocephalic and atraumatic.   Eyes: Conjunctivae and EOM are normal.    Pulmonary/Chest: Effort normal. No respiratory distress.   Abdominal: There is tenderness.   Musculoskeletal: Normal range of motion. She exhibits no edema.   Neurological: She is alert and oriented to person, place, and time. No cranial nerve deficit or sensory deficit.   Skin: Skin is warm and dry.   Psychiatric: She has a normal mood and affect. Her behavior is normal. Judgment and thought content normal.       Neurological Exam:   LOC: alert  Attention Span: Good   Language: No aphasia  Articulation: No dysarthria  Orientation: Person, Place, Time   Visual Fields: Full  EOM (CN III, IV, VI): Full/intact  Facial Sensation (CN V): Normal  Facial Movement (CN VII): Symmetric facial expression    Motor: 5/5 in all extremities  Cebellar: No evidence of appendicular or axial ataxia  Sensation: Intact to light touch  Tone: Normal tone throughout      Laboratory:  CMP:   Recent Labs  Lab 12/18/17  0647   CALCIUM 9.1   ALBUMIN 3.6   PROT 7.7   *   K 4.3   CO2 24      BUN 24*   CREATININE 0.9   ALKPHOS 69   ALT 11   AST 15   BILITOT 0.2     CBC:   Recent Labs  Lab 12/18/17  0647   WBC 13.67*   RBC 4.03   HGB 11.4*   HCT 34.1*      MCV 85   MCH 28.3   MCHC 33.4     Lipid Panel: No results for input(s): CHOL, LDLCALC, HDL, TRIG in the last 168 hours.  Coagulation:   Recent Labs  Lab 12/18/17  0647   INR 1.0     Hgb A1C:   Recent Labs  Lab 12/18/17  1638   HGBA1C 5.5     TSH:   Recent Labs  Lab 12/18/17  0647   TSH 4.719*         Diagnostic Results:      Brain imaging:    CT Head 12/18/17  Noncontrast CT demonstrates no evidence of acute intracranial pathology.    MRI Brain PENDING      Vessel Imaging:    CTA H/N PENDING

## 2017-12-19 NOTE — PROGRESS NOTES
[facility] Hospital Medicine  Progress Note    Patient Name: Linda Hylton  MRN: [id number]  Patient Class: IP- Inpatient   Admission Date: 12/18/2017  Length of Stay: 1 days  Attending Physician: Altagracia Yang MD  Primary Care Provider: Primary Doctor [facility] Medicine Team: [facility] HOSP MED K Altagracia Yang MD    Subjective:     Principal Problem:Acute encephalopathy    HPI:  82 y.o. woman with hx of R cerebral vermal ischemic stroke in 3/2017 and extrensive vertebral basilar disease who presents with AMS. Per report, at baseline, pt is AO x 4, up to date on current events, reads daily ( avid reader per family), ambulates with a walker. Of note, was evaluated in the ED 3 days ago for worsening dizziness and vertigo.  She was ruled out for stoke but her mental status was intact. She was sent home with augmentin for sinusitis vs. URI. She was stable at home until this morning  when her son, whom she lives with, was unable t to arouse her from sleep. He then activated EMS as she was unarousable.  Per report, pt was uncooperative with EMS. During evaluation by ED staff, pt was noted to be lethargic but only answering questions with 1-2 word answers. Upon my evaluation, pt reports she does not remember events from this morning but remembers being in the ambulance. She reports a headache and blurry vision in both eyes (states she cannot make out my facial features but was able to count fingers). She also reports nausea and diarrhea x 3 days - last since stating abx. Otherwise, she denies any  focal weakness, numbness, tingling, abd pain, urinary symptoms, fevers, chills, cough.    Of note, prior to me entering the room, the family reports she has had 4-5 episodes of shaking her arms and legs followed by staring. No bladder or bowel incontinence during these episodes. Did not witness this upon my evaluation. Family reported, she has also been repeating her name intermittently like  its rehearsed which was witnessed by me. .       Hospital Course:  Patient started on IVF's due to concern for dehydration as patient was having nausea and diarrhea and poor po intake prior to admit. Vascular [facility] was consulted and concerned for a possible another acute stroke vs. Seizure vs. TIA. Patient also underwent infectious work-up with U/A, CXR and blood cultures and pro calcitonin drawn and all were negative to suggest infectious cause. Augmentin patient was on as outpatient was discontinued. Patient had EEG ordered and done on 12/18 that showed mild generalized slowing seen, suggestive of mild diffuse or multifocal cerebral dysfunction but no epileptiform activity or electrographic seizures seen. Patient underwent MRI of brain with and without contrast that showed no evidence of recent infarction or other acute intracranial pathology. Generalized cerebral volume loss with mild to moderate chronic microvascular ischemic change. The vertebrobasilar system is not well-evaluated although appears small in caliber with intermittent enhancement compatible with vertebrobasilar atherosclerotic disease as seen on prior CTA. Patient on am of 12/19 was back to normal cognitive baseline. Spoke with family at bedside on afternoon on 12/19 who also confirmed patient was back to normal in her cognitive function. Patient continued to have nausea and diarrhea on 12/19 and felt relate to Augmentin patient was on as outpatient but was starting to improve and responding to symtpomatic treatment with anti-emetics and anti-diarrheal. Vascular Neurology after reviewing MRI and EEG felt transient neurological symptoms related to possible vertebrobasilar TIA vs. presyncope but recommended no change in her stroke management and to continue patient on her home DAPT and statin with no changes. Patient most likely had presyncope as prior to admit patient was having poor oral intake with nausea and diarrhea and likely got  hypovolemic and with her known vertebrobasilar insufficiency likely triggered a neurologic response due to brain hypoperfusion After hydration, patient's neurologic symptoms improved suggesting this was most likely diagnosis. Findings discussed with patient and family and patient to be observed overnight and if neurologic symptoms do not return then plan for home discharge on 12/20/2017.         Interval History: Patient reports she continues to have nausea and diarrhea but improved from yesterday. Patient denies any dizziness. Patient following commands and oriented x 4 this am.     Review of Systems   Constitutional: Negative for chills and fever.   Respiratory: Negative for cough and shortness of breath.    Cardiovascular: Negative for chest pain and palpitations.   Gastrointestinal: Positive for diarrhea and nausea. Negative for abdominal pain and vomiting.   Genitourinary: Negative for dysuria.   Neurological: Negative for dizziness, speech difficulty and light-headedness.   Psychiatric/Behavioral: Negative for confusion and hallucinations.     Objective:     Vital Signs (Most Recent):  Temp: 98.2 °F (36.8 °C) (12/19/17 1533)  Pulse: 68 (12/19/17 1535)  Resp: 18 (12/19/17 1533)  BP: (!) 173/74 (12/19/17 1533)  SpO2: 95 % (12/19/17 1533) Vital Signs (24h Range):  Temp:  [97.2 °F (36.2 °C)-99.5 °F (37.5 °C)] 98.2 °F (36.8 °C)  Pulse:  [68-89] 68  Resp:  [14-18] 18  SpO2:  [93 %-98 %] 95 %  BP: (123-180)/() 173/74     Weight: 73.3 kg (161 lb 9.6 oz)  Body mass index is 34.97 kg/m².    Intake/Output Summary (Last 24 hours) at 12/19/17 1616  Last data filed at 12/19/17 1334   Gross per 24 hour   Intake              500 ml   Output                0 ml   Net              500 ml      Physical Exam   Constitutional: She is oriented to person, place, and time. She appears well-developed and well-nourished. No distress.   HENT:   Mouth/Throat: Oropharynx is clear and moist.   Eyes: Conjunctivae are normal.   Neck:  Neck supple. No JVD present.   Cardiovascular: Normal rate, regular rhythm and normal heart sounds.  Exam reveals no gallop and no friction rub.    No murmur heard.  Pulmonary/Chest: Effort normal and breath sounds normal. She has no wheezes.   Abdominal: Soft. Bowel sounds are normal. She exhibits no distension. There is no tenderness.   Musculoskeletal: She exhibits no edema.   Neurological: She is alert and oriented to person, place, and time.   Skin: Skin is warm. Capillary refill takes less than 2 seconds. No erythema.   Psychiatric: She has a normal mood and affect. Her behavior is normal. Thought content normal.   Nursing note and vitals reviewed.      Significant Labs:   Blood Culture:   Recent Labs  Lab 12/18/17 1945 12/18/17 1946   LABBLOO No Growth to date No Growth to date     CBC:   Recent Labs  Lab 12/18/17  0647 12/19/17  0452   WBC 13.67* 11.60   HGB 11.4* 10.3*   HCT 34.1* 32.2*    278     CMP:   Recent Labs  Lab 12/18/17  0647 12/19/17  0452   * 134*  134*   K 4.3 4.4  4.4    102  102   CO2 24 25  25    108  108   BUN 24* 16  16   CREATININE 0.9 0.7  0.7   CALCIUM 9.1 8.7  8.7   PROT 7.7 7.0  7.0   ALBUMIN 3.6 3.3*  3.3*   BILITOT 0.2 0.3  0.3   ALKPHOS 69 54*  54*   AST 15 15  15   ALT 11 10  10   ANIONGAP 8 7*  7*   EGFRNONAA 59.7* >60.0  >60.0     Urine Studies:   Recent Labs  Lab 12/18/17  0732   COLORU Yellow   APPEARANCEUA Clear   PHUR 5.0   SPECGRAV 1.015   PROTEINUA Negative   GLUCUA Negative   KETONESU Negative   BILIRUBINUA Negative   OCCULTUA 2+*   NITRITE Negative   UROBILINOGEN Negative   LEUKOCYTESUR Trace*   RBCUA 3   WBCUA 2   BACTERIA Rare   SQUAMEPITHEL 1       Significant Imaging: I have reviewed all pertinent imaging results/findings within the past 24 hours.    Assessment/Plan:      * Acute encephalopathy    Much improved. Uncertain etiology but suspect presyncope related to dehydration in patient with known vertebrobasilar  insufficiency causing transient neurologic symptoms.  No evidence to suggest infectious cause as U/A and CXR with no evidence of infection and patient with normal pro calcitonin level. So far blood cultures are also no growth and patient afebrile and no systemic symptoms to suggest infectious cause.   - Vascular neurology consulted and MRI and EEG ordered as per their recs and EEG with no evidence of seizure and MRI of brain with no evidence of acute stroke and vascular Neurology recommends to continue patient on statin therapy and DART as taking prior to this admit with no changes.   - Delirium precautions.           Dehydration with hyponatremia    Improved. Patient with mild hyponatremia noted on admit and related to recent diarrhea, nausea and poor oral intake and likely that was caused by Augmentin as Augmentin known to cause GI side effects. Sodium improved from admit with IVF's from 132 on 12/18 to 134 on 12/19. IVF's discontinued and patient encouraged to drink oral fluids to maintain hydration and treating nausea and diarrhea symptomatically.         Diarrhea    Improved. Likely related to Augmentin as side effect. Augmentin discontinued and patient is symptomatic improved in past 24 hours. If patient has further diarrhea then will collect stool for C. Difficile.         Vertebral basilar insufficiency    Intracranial atherosclerosis    Patient with known VBI and intracranial stenosis. Do not want tight BP control in patient with known VBI and okay to allow permissive HTN with -170. Continue ASA and Plavix and Lipitor 40 mg po daily to treat.           Essential hypertension    · Patient's blood pressure mildly elevated but patient's home medications of Lisinopril and Norvasc held on admit due to concern for dehydration.  · Goal for blood pressure is SBP < 150 and DBP < 90 as patient > or = 60 years of age with no diabetes or advanced kidney disease based on JNC 8 guidelines.   · Continue current  treatment regimen of Toprol XL 50 mg po daily and restart Lisinopril 40 mg po daily in the am and see how BP responds prior to restarting home dose of Norvasc.  · Plan is to monitor patient's blood pressure routinely while patient is hospitalized.           Acquired hypothyroidism    Controlled. Continue Levothyroxine 75 mcg po daily to treat.           Mixed hyperlipidemia    Controlled. Continue Lipitor and Fenofibrate to treat.            VTE Risk Mitigation         Ordered     enoxaparin injection 40 mg  Daily     Route:  Subcutaneous        12/18/17 1213     Medium Risk of VTE  Once      12/18/17 1213            Altagracia Yang MD  Department of Hospital Medicine   [facility]

## 2017-12-19 NOTE — H&P
[facility] Hospital Medicine  History & Physical    Patient Name: Linda Hylton  MRN: [id number]  Admission Date: 12/18/2017  Attending Physician: Annette Haro MD   Primary Care Provider: Primary Doctor [facility] Medicine Team: [facility] MED  Annette Haro MD     Patient information was obtained from patient, relative(s) and ER records.     Subjective:     Principal Problem:Altered mental status    Chief Complaint:   Chief Complaint   Patient presents with    Altered Mental Status     EMS reports pt son called 911 because he couldn't wake up his mother. Pt was laying on couch and uncooperative with EMS assessments; pt not answering questions or following commands while peering throught the side of both eyes         HPI: 82 y.o. woman with hx of R cerebral vermal ischemic stroke in 3/2017 and extrensive vertebral basilar disease who presents with AMS. Per report, at baseline, pt is AO x 4, up to date on current events, reads daily ( avid reader per family), ambulates with a walker. Of note, was evaluated in the ED 3 days ago for worsening dizziness and vertigo.  She was ruled out for stoke but her mental status was intact. She was sent home with augmentin for sinusitis vs. URI. She was stable at home until this morning  when her son, whom she lives with, was unable t to arouse her from sleep. He then activated EMS as she was unarousable.  Per report, pt was uncooperative with EMS. During evaluation by ED staff, pt was noted to be lethargic but only answering questions with 1-2 word answers. Upon my evaluation, pt reports she does not remember events from this morning but remembers being in the ambulance. She reports a headache and blurry vision in both eyes (states she cannot make out my facial features but was able to count fingers). She also reports nausea and diarrhea x 3 days - last since stating abx. Otherwise, she denies any  focal weakness, numbness, tingling, abd pain,  urinary symptoms, fevers, chills, cough.    Of note, prior to me entering the room, the family reports she has had 4-5 episodes of shaking her arms and legs followed by staring. No bladder or bowel incontinence during these episodes. Did not witness this upon my evaluation. Family reported, she has also been repeating her name intermittently like its rehearsed which was witnessed by me. .       Past Medical History:   Diagnosis Date    Aortic stenosis     Clot 04/2017    blood clot in back of neck from CVA    History of bleeding ulcers 2015    Hypertension     Hypothyroidism     Sciatica of left side     Stroke     Vitamin D deficiency        Past Surgical History:   Procedure Laterality Date    APPENDECTOMY      [facility]12/2016    [facility]    PARATHYROIDECTOMY Right 2012    THYROID SURGERY Right 2012    Partial thyroidectomy       Review of patient's allergies indicates:   Allergen Reactions    Sulfa (sulfonamide antibiotics) Hives       No current facility-administered medications on file prior to encounter.      Current Outpatient Prescriptions on File Prior to Encounter   Medication Sig    ALPRAZolam (XANAX) 0.5 MG tablet TAKE 1 TABLET (0.5 MG TOTAL) BY MOUTH 2 (TWO) TIMES DAILY AS NEEDED FOR ANXIETY.    amlodipine (NORVASC) 5 MG tablet TAKE 1 TABLET DAILY AS DIRECTED.    amoxicillin-clavulanate 875-125mg (AUGMENTIN) 875-125 mg per tablet Take 1 tablet by mouth 2 (two) times daily.    aspirin (ECOTRIN) 325 MG EC tablet Take 1 tablet (325 mg total) by mouth once daily.    atorvastatin (LIPITOR) 40 MG tablet Take 1 tablet (40 mg total) by mouth once daily.    cholecalciferol, vitamin D3, 5,000 unit Tab Take 5,000 Units by mouth once daily.    clopidogrel (PLAVIX) 75 mg tablet Take 1 tablet (75 mg total) by mouth once daily.    fenofibrate 160 MG Tab Take 1 tablet (160 mg total) by mouth once daily.    ferrous sulfate 325 (65 FE) MG EC tablet Take 325 mg by mouth once daily.     levothyroxine (SYNTHROID) 75 MCG tablet TAKE 1 TABLET (75 MCG TOTAL) BY MOUTH ONCE DAILY.    lisinopril (PRINIVIL,ZESTRIL) 40 MG tablet Take 40 mg by mouth once daily.     meclizine (ANTIVERT) 25 mg tablet Take 1 tablet (25 mg total) by mouth 3 (three) times daily as needed for Dizziness.    metoprolol succinate (TOPROL-XL) 50 MG 24 hr tablet Take 50 mg by mouth once daily.     ondansetron (ZOFRAN-ODT) 8 MG TbDL Take 1 tablet (8 mg total) by mouth every 8 (eight) hours as needed.     Family History     Problem Relation (Age of Onset)    Diabetes Son    Heart disease Father, Sister, Brother        Social History Main Topics    Smoking status: Never Smoker    Smokeless tobacco: Never Used    Alcohol use Not on file    Drug use: Unknown    Sexual activity: No     Review of Systems   Constitutional: Negative for activity change, appetite change, chills, fever and unexpected weight change.   HENT: Negative for rhinorrhea and sore throat.    Eyes: Positive for visual disturbance. Negative for pain.   Respiratory: Negative for cough and shortness of breath.    Cardiovascular: Negative for chest pain and palpitations.   Gastrointestinal: Negative for abdominal pain, diarrhea and nausea.   Genitourinary: Negative for dysuria, hematuria and urgency.   Musculoskeletal: Negative for gait problem and neck stiffness.   Skin: Negative for rash.   Neurological: Positive for dizziness, weakness, light-headedness and headaches. Negative for syncope.     Objective:     Vital Signs (Most Recent):  Temp: 98.9 °F (37.2 °C) (12/18/17 2000)  Pulse: 73 (12/18/17 2000)  Resp: 16 (12/18/17 2000)  BP: (!) 123/58 (12/18/17 2000)  SpO2: 96 % (12/18/17 2000) Vital Signs (24h Range):  Temp:  [97.2 °F (36.2 °C)-98.9 °F (37.2 °C)] 98.9 °F (37.2 °C)  Pulse:  [69-96] 73  Resp:  [14-18] 16  SpO2:  [93 %-97 %] 96 %  BP: (123-222)/() 123/58     Weight: 73.3 kg (161 lb 9.6 oz)  Body mass index is 34.97 kg/m².    Physical Exam    Constitutional: She is oriented to person, place, and time. She appears well-developed and well-nourished.   Cardiovascular: Normal rate, regular rhythm and normal heart sounds.  Exam reveals no gallop and no friction rub.    No murmur heard.  Pulmonary/Chest: Effort normal and breath sounds normal. No respiratory distress. She has no wheezes. She has no rales.   Abdominal: Soft. Bowel sounds are normal. She exhibits no distension. There is no tenderness.   Musculoskeletal: Normal range of motion.   Neurological: She is alert and oriented to person, place, and time. She displays normal reflexes. No cranial nerve deficit (able to count fingers, visual fields grossly intact, diffculty reading piece of paper close up  ). Coordination normal.   Skin: Skin is warm and dry.           Significant Labs:   CBC:   Recent Labs  Lab 12/18/17  0647   WBC 13.67*   HGB 11.4*   HCT 34.1*        CMP:   Recent Labs  Lab 12/18/17  0647   *   K 4.3      CO2 24      BUN 24*   CREATININE 0.9   CALCIUM 9.1   PROT 7.7   ALBUMIN 3.6   BILITOT 0.2   ALKPHOS 69   AST 15   ALT 11   ANIONGAP 8   EGFRNONAA 59.7*       Significant Imaging: I have reviewed and interpreted all pertinent imaging results/findings within the past 24 hours.    Assessment/Plan:     * Altered mental status    Unclear etiology. Pt with hx of stroke and extensive vertebral basilar disease. AMS has improved throughout the day. Pt's symptoms may be new acute stroke vs. TIA vs. Re-expression of her previous stroke 2/2 to dehydration vs. Seizure with previous infarct as a risk factor/focus vs. Med related ( pt on xanax)  - will r/o infection - but procal negative thus less likely   - f/u urine cx, blood cxs  - vascular neurology consult; appreciate recs   - MRI brain,  CTA head/neck pending   - IVF for dehydration ( elevated BUN/Cr ratio, hyponatremia)   - EEG         Hyponatremia    Mildly hyponatremic   Likely related to dehydration context of  diarrhea   Will given IVF         Diarrhea    Pt reprots much diareha in setting of abx use   - will check c diff given abx use   - may be side effect of abx as pt suggests         Mixed hyperlipidemia    Cont fenofibrate         Intracranial atherosclerosis    Cont DAPT with ASA, plavix         Hypothyroidism    TFT's acceptable   Cont levothyroxine 75 qd           Essential hypertension    Continue home meds - amlodipine 5 , toprol xl, lisinopril 40           VTE Risk Mitigation         Ordered     enoxaparin injection 40 mg  Daily     Route:  Subcutaneous        12/18/17 1213     Medium Risk of VTE  Once      12/18/17 1213             Annette Haro MD  Department of Hospital Medicine   [facility]

## 2017-12-19 NOTE — SUBJECTIVE & OBJECTIVE
Interval History: Patient reports she continues to have nausea and diarrhea but improved from yesterday. Patient denies any dizziness. Patient following commands and oriented x 4 this am.     Review of Systems   Constitutional: Negative for chills and fever.   Respiratory: Negative for cough and shortness of breath.    Cardiovascular: Negative for chest pain and palpitations.   Gastrointestinal: Positive for diarrhea and nausea. Negative for abdominal pain and vomiting.   Genitourinary: Negative for dysuria.   Neurological: Negative for dizziness, speech difficulty and light-headedness.   Psychiatric/Behavioral: Negative for confusion and hallucinations.     Objective:     Vital Signs (Most Recent):  Temp: 98.2 °F (36.8 °C) (12/19/17 1533)  Pulse: 68 (12/19/17 1535)  Resp: 18 (12/19/17 1533)  BP: (!) 173/74 (12/19/17 1533)  SpO2: 95 % (12/19/17 1533) Vital Signs (24h Range):  Temp:  [97.2 °F (36.2 °C)-99.5 °F (37.5 °C)] 98.2 °F (36.8 °C)  Pulse:  [68-89] 68  Resp:  [14-18] 18  SpO2:  [93 %-98 %] 95 %  BP: (123-180)/() 173/74     Weight: 73.3 kg (161 lb 9.6 oz)  Body mass index is 34.97 kg/m².    Intake/Output Summary (Last 24 hours) at 12/19/17 1616  Last data filed at 12/19/17 1334   Gross per 24 hour   Intake              500 ml   Output                0 ml   Net              500 ml      Physical Exam   Constitutional: She is oriented to person, place, and time. She appears well-developed and well-nourished. No distress.   HENT:   Mouth/Throat: Oropharynx is clear and moist.   Eyes: Conjunctivae are normal.   Neck: Neck supple. No JVD present.   Cardiovascular: Normal rate, regular rhythm and normal heart sounds.  Exam reveals no gallop and no friction rub.    No murmur heard.  Pulmonary/Chest: Effort normal and breath sounds normal. She has no wheezes.   Abdominal: Soft. Bowel sounds are normal. She exhibits no distension. There is no tenderness.   Musculoskeletal: She exhibits no edema.   Neurological: She  is alert and oriented to person, place, and time.   Skin: Skin is warm. Capillary refill takes less than 2 seconds. No erythema.   Psychiatric: She has a normal mood and affect. Her behavior is normal. Thought content normal.   Nursing note and vitals reviewed.      Significant Labs:   Blood Culture:   Recent Labs  Lab 12/18/17 1945 12/18/17 1946   LABBLOO No Growth to date No Growth to date     CBC:   Recent Labs  Lab 12/18/17 0647 12/19/17  0452   WBC 13.67* 11.60   HGB 11.4* 10.3*   HCT 34.1* 32.2*    278     CMP:   Recent Labs  Lab 12/18/17 0647 12/19/17  0452   * 134*  134*   K 4.3 4.4  4.4    102  102   CO2 24 25  25    108  108   BUN 24* 16  16   CREATININE 0.9 0.7  0.7   CALCIUM 9.1 8.7  8.7   PROT 7.7 7.0  7.0   ALBUMIN 3.6 3.3*  3.3*   BILITOT 0.2 0.3  0.3   ALKPHOS 69 54*  54*   AST 15 15  15   ALT 11 10  10   ANIONGAP 8 7*  7*   EGFRNONAA 59.7* >60.0  >60.0     Urine Studies:   Recent Labs  Lab 12/18/17  0732   COLORU Yellow   APPEARANCEUA Clear   PHUR 5.0   SPECGRAV 1.015   PROTEINUA Negative   GLUCUA Negative   KETONESU Negative   BILIRUBINUA Negative   OCCULTUA 2+*   NITRITE Negative   UROBILINOGEN Negative   LEUKOCYTESUR Trace*   RBCUA 3   WBCUA 2   BACTERIA Rare   SQUAMEPITHEL 1       Significant Imaging: I have reviewed all pertinent imaging results/findings within the past 24 hours.

## 2017-12-19 NOTE — HPI
Ms. Hylton is an 81 yo woman with PMHx Right cerebellar stroke (3/20/17) with associated intermittent dizziness and subsequent TIA (3/24/17), extensive vertebrobasilar disease w/ anterior dominant circulation, HTN, HLD, Hypothyroidism, and anemia, recently consulted to our service on 12/15/17 for dizziness more persistent than her baseline in setting of URI symptoms, recently treated with abx and steroids. MRI at that time negative for acute stroke, pt was d/cd with script for Augmentin.     Ms. Hylton represents today after son could not arouse her at home this morning and called EMS. In ED, pt was not oriented, not responding appropriately or following commands, would halt speech and simply repeat her own name. Family reported some BUE movements concerning for seizure-like activity to Medicine staff as well. At that time, pt with c/o headache, blurry vision, nausea, and non-bloody diarrhea.     On my interview, pt does not recall the mornings events, however is pleasant, able to give a history. She endorses dizziness, HA, and blurry vision, but denies weakness, numbness/tingling, speech changes. Pt amenable to MRI. She is on DAPT for secondary stroke prevention at home. Denies alcohol, tobacco, or drug abuse.

## 2017-12-19 NOTE — ASSESSMENT & PLAN NOTE
Intracranial atherosclerosis    Patient with known VBI and intracranial stenosis. Do not want tight BP control in patient with known VBI and okay to allow permissive HTN with -170. Continue ASA and Plavix and Lipitor 40 mg po daily to treat.

## 2017-12-19 NOTE — CONSULTS
[facility]  Vascular Neurology  Comprehensive Stroke Center  Consult Note    Inpatient consult to Vascular (Stroke) Neurology  Consult performed by: TRINIDAD GOMEZ  Consult ordered by: SILVIO CARLOS        Assessment/Plan:     Patient is a 82 y.o. year old female with:    Intracranial atherosclerosis    Ms. 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 Initially not oriented or following commands in ED. Symptoms largely improved on my exam, pt oriented, following commands, and endorsing dizziness, HA, and blurry vision.    In setting of new onset confusion, blurry vision, and persistent dizziness, recommend repeat MRI Brain to definitively rule out acute infarct. Previous CTA with extensive  atherosclerotic/stenotic disease, recommend repeat CTA H/N. Etiology possibly acute stroke vs TIA vs recrudescence of prior stroke. Continue DAPT and risk factor modification for secondary stroke prevention. Will follow.        Mixed hyperlipidemia    Stroke risk factor  Lipid panel  Atorvastatin 40        Essential hypertension    Stroke risk factor            STROKE DOCUMENTATION          NIH Scale:  1a. Level Of Consciousness: 0-->Alert: keenly responsive  1b. LOC Questions: 0-->Answers both questions correctly  1c. LOC Commands: 0-->Performs both tasks correctly  2. Best Gaze: 0-->Normal  3. Visual: 0-->No visual loss  4. Facial Palsy: 0-->Normal symmetrical movements  5a. Motor Arm, Left: 0-->No drift: limb holds 90 (or 45) degrees for full 10 secs  5b. Motor Arm, Right: 0-->No drift: limb holds 90 (or 45) degrees for full 10 secs  6a. Motor Leg, Left: 0-->No drift: leg holds 30 degree position for full 5 secs  6b. Motor Leg, Right:  0-->No drift: leg holds 30 degree position for full 5 secs  7. Limb Ataxia: 0-->Absent  8. Sensory: 0-->Normal: no sensory loss  9. Best Language: 0-->No aphasia: normal  10. Dysarthria: 0-->Normal  11. Extinction and Inattention (formerly Neglect): 0-->No abnormality  Total (NIH Stroke Scale): 0    Modified Ericka Score: 2  Miranda Coma Scale:    ABCD2 Score:    [id number] Score:   HAS -BLED Score:   ICH Score:   Hunt & De Classification:       Thrombolysis Candidate? No, Strong suspicion for stroke mimic or alternative diagnosis       Interventional Revascularization Candidate?   Is the patient eligible for mechanical endovascular reperfusion (TIGIST)?  No; No significant neurological deficit      Hemorrhagic change of an Ischemic Stroke: Does this patient have an ischemic stroke with hemorrhagic changes? No     Subjective:     History of Present Illness:  Ms. 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 MRI at that time negative for acute stroke, pt was d/cd with script for Augmentin.     Ms. Hylton represents today after son could not arouse her at home this morning and called EMS. In ED, pt was not oriented, not responding appropriately or following commands, would halt speech and simply repeat her own name. Family reported some BUE movements concerning for seizure-like activity to Medicine staff as well. At that time, pt with c/o headache, blurry vision, nausea, and non-bloody diarrhea.     On my interview, pt does not recall the mornings events, however is pleasant, able to give a history. She endorses dizziness, HA, and blurry vision, but denies weakness,  numbness/tingling, speech changes. Pt amenable to MRI. She is on DAPT for secondary stroke prevention at home. Denies alcohol, tobacco, or drug abuse.        Past Medical History:   Diagnosis Date    Aortic stenosis     Clot 04/2017    blood clot in back of neck from CVA    History of bleeding ulcers 2015    Hypertension     Hypothyroidism     Sciatica of left side     Stroke     Vitamin D deficiency      Past Surgical History:   Procedure Laterality Date    APPENDECTOMY      [facility]    PARATHYROIDECTOMY Right 2012    THYROID SURGERY Right 2012    Partial thyroidectomy     Family History   Problem Relation Age of Onset    Heart disease Father     Heart disease Sister     Heart disease Brother     Diabetes Son      Social History   Substance Use Topics    Smoking status: Never Smoker    Smokeless tobacco: Never Used    Alcohol use Not on file     Review of patient's allergies indicates:   Allergen Reactions    Sulfa (sulfonamide antibiotics) Hives       Medications: I have reviewed the current medication administration record.    Prescriptions Prior to Admission   Medication Sig Dispense Refill Last Dose    ALPRAZolam (XANAX) 0.5 MG tablet TAKE 1 TABLET (0.5 MG TOTAL) BY MOUTH 2 (TWO) TIMES DAILY AS NEEDED FOR ANXIETY. 60 tablet 0 12/14/2017    amlodipine (NORVASC) 5 MG tablet TAKE 1 TABLET DAILY AS DIRECTED.  3 12/14/2017    amoxicillin-clavulanate 875-125mg (AUGMENTIN) 875-125 mg per tablet Take 1 tablet by mouth 2 (two) times daily. 20 tablet 0     aspirin (ECOTRIN) 325 MG EC tablet Take 1 tablet (325 mg total) by mouth once daily.  0 12/14/2017    atorvastatin (LIPITOR) 40 MG tablet Take 1 tablet (40 mg total) by mouth once daily. 30 tablet 3 Taking    cholecalciferol, vitamin D3, 5,000 unit Tab Take 5,000 Units by mouth once daily.   12/14/2017    clopidogrel (PLAVIX) 75 mg tablet Take 1 tablet (75 mg total) by mouth once daily. 90 tablet 3 12/14/2017     fenofibrate 160 MG Tab Take 1 tablet (160 mg total) by mouth once daily. 90 tablet 3 Taking    ferrous sulfate 325 (65 FE) MG EC tablet Take 325 mg by mouth once daily.   Taking    levothyroxine (SYNTHROID) 75 MCG tablet TAKE 1 TABLET (75 MCG TOTAL) BY MOUTH ONCE DAILY. 90 tablet 0 12/14/2017    lisinopril (PRINIVIL,ZESTRIL) 40 MG tablet Take 40 mg by mouth once daily.    12/14/2017    meclizine (ANTIVERT) 25 mg tablet Take 1 tablet (25 mg total) by mouth 3 (three) times daily as needed for Dizziness. 20 tablet 0 12/15/2017    metoprolol succinate (TOPROL-XL) 50 MG 24 hr tablet Take 50 mg by mouth once daily.    12/14/2017    ondansetron (ZOFRAN-ODT) 8 MG TbDL Take 1 tablet (8 mg total) by mouth every 8 (eight) hours as needed. 20 tablet 0 12/14/2017       Review of Systems   Constitutional: Negative for diaphoresis and fever.   HENT: Negative for drooling and nosebleeds.    Eyes: Positive for visual disturbance. Negative for discharge.   Respiratory: Negative for choking and stridor.    Cardiovascular: Negative for chest pain and leg swelling.   Gastrointestinal: Positive for diarrhea. Negative for vomiting.   Genitourinary: Negative for frequency and hematuria.   Musculoskeletal: Negative for joint swelling and neck stiffness.   Skin: Negative for pallor and rash.   Neurological: Positive for dizziness and headaches. Negative for facial asymmetry, speech difficulty and weakness.   Psychiatric/Behavioral: Positive for confusion. Negative for agitation and behavioral problems.     Objective:     Vital Signs (Most Recent):  Temp: 97.2 °F (36.2 °C) (12/18/17 1652)  Pulse: 69 (12/18/17 1652)  Resp: 18 (12/18/17 1652)  BP: (!) 153/63 (12/18/17 1652)  SpO2: (!) 93 % (12/18/17 1652)    Vital Signs Range (Last 24H):  Temp:  [97.2 °F (36.2 °C)-98.7 °F (37.1 °C)]   Pulse:  [69-96]   Resp:  [14-18]   BP: (143-222)/()   SpO2:  [93 %-97 %]     Physical Exam   Constitutional: She is oriented to person, place, and  time. She appears well-developed and well-nourished. No distress.   HENT:   Head: Normocephalic and atraumatic.   Eyes: Conjunctivae and EOM are normal.   Pulmonary/Chest: Effort normal. No respiratory distress.   Abdominal: There is tenderness.   Musculoskeletal: Normal range of motion. She exhibits no edema.   Neurological: She is alert and oriented to person, place, and time. No cranial nerve deficit or sensory deficit.   Skin: Skin is warm and dry.   Psychiatric: She has a normal mood and affect. Her behavior is normal. Judgment and thought content normal.       Neurological Exam:   LOC: alert  Attention Span: Good   Language: No aphasia  Articulation: No dysarthria  Orientation: Person, Place, Time   Visual Fields: Full  EOM (CN III, IV, VI): Full/intact  Facial Sensation (CN V): Normal  Facial Movement (CN VII): Symmetric facial expression    Motor: 5/5 in all extremities  Cebellar: No evidence of appendicular or axial ataxia  Sensation: Intact to light touch  Tone: Normal tone throughout      Laboratory:  CMP:   Recent Labs  Lab 12/18/17  0647   CALCIUM 9.1   ALBUMIN 3.6   PROT 7.7   *   K 4.3   CO2 24      BUN 24*   CREATININE 0.9   ALKPHOS 69   ALT 11   AST 15   BILITOT 0.2     CBC:   Recent Labs  Lab 12/18/17  0647   WBC 13.67*   RBC 4.03   HGB 11.4*   HCT 34.1*      MCV 85   MCH 28.3   MCHC 33.4     Lipid Panel: No results for input(s): CHOL, LDLCALC, HDL, TRIG in the last 168 hours.  Coagulation:   Recent Labs  Lab 12/18/17  0647   INR 1.0     Hgb A1C:   Recent Labs  Lab 12/18/17  1638   HGBA1C 5.5     TSH:   Recent Labs  Lab 12/18/17  0647   TSH 4.719*         Diagnostic Results:      Brain imaging:    CT Head 12/18/17  Noncontrast CT demonstrates no evidence of acute intracranial pathology.    MRI Brain PENDING      Vessel Imaging:    CTA H/N PENDING      Melissa Butler PA-C  [facility]  Department of Vascular Neurology   [facility]

## 2017-12-19 NOTE — PROGRESS NOTES
"Pt having diarrhea.  States she started taking antibiotics for sinusitis on Saturday.  States "I always get diarrhea when I take antibiotics."  Dr. Haro paged to notify.  "

## 2017-12-19 NOTE — PLAN OF CARE
Extended Emergency Contact Information  Primary Emergency Contact: Michael Hylton   [place]  Mobile Phone: [phone]  Relation: Son    Primary Doctor No    Future Appointments  Date Time Provider Department Center   3/9/2018 11:00 AM Yakov Barrett MD [facility] Christopher ZAMAN     Payor: MEDICARE / Plan: MEDICARE PART A & B / Product Type: Government /       [facility]/pharmacy #[id number] - [place], [place] - [address] [facility]  [address]  [facility] [address]  Phone: [phone] Fax: [phone]       12/19/17 1622   Discharge Assessment   Assessment Type Discharge Planning Assessment   Confirmed/corrected address and phone number on facesheet? Yes   Assessment information obtained from? Caregiver;Medical Record   Expected Length of Stay (days) 2   Communicated expected length of stay with patient/caregiver yes   Prior to hospitilization cognitive status: Alert/Oriented   Prior to hospitalization functional status: Assistive Equipment   Current cognitive status: Alert/Oriented   Current Functional Status: Assistive Equipment   Lives With child(smitha), adult   Able to Return to Prior Arrangements yes   Is patient able to care for self after discharge? Yes   Patient's perception of discharge disposition home health   Readmission Within The Last 30 Days no previous admission in last 30 days   Patient currently being followed by outpatient case management? No   Patient currently receives any other outside agency services? No   Equipment Currently Used at Home walker, rolling   Do you have any problems affording any of your prescribed medications? No   Is the patient taking medications as prescribed? yes   Does the patient have transportation home? No   Does the patient receive services at the [facility]madin Clinic? No   Discharge Plan A Home with family;Home Health   Discharge Plan B Home with family   Patient/Family In Agreement With Plan yes

## 2017-12-19 NOTE — ASSESSMENT & PLAN NOTE
Improved. Patient with mild hyponatremia noted on admit and related to recent diarrhea, nausea and poor oral intake and likely that was caused by Augmentin as Augmentin known to cause GI side effects. Sodium improved from admit with IVF's from 132 on 12/18 to 134 on 12/19. IVF's discontinued and patient encouraged to drink oral fluids to maintain hydration and treating nausea and diarrhea symptomatically.

## 2017-12-19 NOTE — PROGRESS NOTES
Pt arrived to MRI via stretcher on continuous tele, HR 73, pt on RA, pt alert, tele room notified that pt will be off portable tele & placed on MRI compatible monitor, will place pt back on portable tele post MRI

## 2017-12-19 NOTE — ASSESSMENT & PLAN NOTE
Improved. Likely related to Augmentin as side effect. Augmentin discontinued and patient is symptomatic improved in past 24 hours. If patient has further diarrhea then will collect stool for C. Difficile.

## 2017-12-19 NOTE — PROCEDURES
ROUTINE ELECTROENCEPHALOGRAM REPORT      Linda Hylton  [id number]  1935    DATE OF SERVICE: 12/18/17    REQUESTING PROVIDER: Annette Haro MD    REASON FOR CONSULT: 81yo F with multiple medical co-morbidities, including prior CVA, admitted with AMS    PRIOR EEG: none    MEDICATIONS:   Current Facility-Administered Medications   Medication    acetaminophen tablet 1,000 mg    aspirin EC tablet 325 mg    atorvastatin tablet 40 mg    clopidogrel tablet 75 mg    dextrose 50% injection 12.5 g    dextrose 50% injection 25 g    enoxaparin injection 40 mg    glucagon (human recombinant) injection 1 mg    glucose chewable tablet 16 g    glucose chewable tablet 24 g    influenza (FLUZONE HIGH-DOSE) vaccine 0.5 mL    levothyroxine tablet 75 mcg    metoprolol succinate (TOPROL-XL) 24 hr tablet 50 mg    ondansetron disintegrating tablet 4 mg    ramelteon tablet 8 mg    sodium chloride 0.9% flush 5 mL     METHODOLOGY   Electroencephalographic (EEG) recording is with electrodes placed according to the International 10-20 placement system.  Thirty two (32) channels of digital signal (sampling rate of 512/sec) including T1 and T2 was simultaneously recorded from the scalp and may include  EKG, EMG, and/or eye monitors.  Recording band pass was 0.1 to 512 hz.  Digital video recording of the patient is simultaneously recorded with the EEG.  The patient is instructed report clinical symptoms which may occur during the recording session.  EEG and video recording is stored and archived in digital format. Activation procedures which include photic stimulation, hyperventilation and instructing patients to perform simple task are done in selected patients.    The EEG is displayed on a monitor screen and can be reviewed using different montages.  Computer assisted analysis is employed to detect spike and electrographic seizure activity.   The entire record is submitted for computer analysis.  The entire recording is  visually reviewed and the times identified by computer analysis as being spikes or seizures are reviewed again.  Compresses spectral analysis (CSA) is also performed on the activity recorded from each individual channel.  This is displayed as a power display of frequencies from 0 to 30 Hz over time.   The CSA is reviewed looking for asymmetries in power between homologous areas of the scalp and then compared with the original EEG recording.     [employer] software was also utilized in the review of this study.  This software suite analyzes the EEG recording in multiple domains.  Coherence and rhythmicity is computed to identify EEG sections which may contain organized seizures.  Each channel undergoes analysis to detect presence of spike and sharp waves which have special and morphological characteristic of epileptic activity.  The routine EEG recording is converted from spacial into frequency domain.  This is then displayed comparing homologous areas to identify areas of significant asymmetry.  Algorithm to identify non-cortically generated artifact is used to separate eye movement, EMG and other artifact from the EEG    EEG FINGINGS  Background activity:   The background rhythm was characterized by theta-alpha (7-8 Hz) activity with a 8 Hz posterior dominant alpha rhythm at 30-70 microvolts.   Symmetry and continuity: The background was continuous and symmetric    Sleep:   Not seen.    Activation procedures:  Not done; responsive to verbal stimulation    Abnormal activity:   No epileptiform discharges, periodic discharges, lateralized rhythmic delta activity or electrographic seizures were seen.    IMPRESSION:   This is an abnormal routine EEG due to the mild generalized slowing seen, suggestive of mild diffuse or multifocal cerebral dysfunction.    No epileptiform activity or electrographic seizures seen.    CLINICAL CORRELATION IS RECOMMENDED.    Shweta Contreras MD, KATHLEEN(), [facility]  Neurology-Epilepsy.

## 2017-12-19 NOTE — HOSPITAL COURSE
Patient started on IVF's due to concern for dehydration as patient was having nausea and diarrhea and poor po intake prior to admit. Vascular Nueurollogy was consulted and concerned for a possible another acute stroke vs. Seizure vs. TIA. Patient also underwent infectious work-up with U/A, CXR and blood cultures and pro calcitonin drawn and all were negative to suggest infectious cause. Augmentin patient was on as outpatient was discontinued. Patient had EEG ordered and done on 12/18 that showed mild generalized slowing seen, suggestive of mild diffuse or multifocal cerebral dysfunction but no epileptiform activity or electrographic seizures seen. Patient underwent MRI of brain with and without contrast that showed no evidence of recent infarction or other acute intracranial pathology. Generalized cerebral volume loss with mild to moderate chronic microvascular ischemic change. The vertebrobasilar system is not well-evaluated although appears small in caliber with intermittent enhancement compatible with vertebrobasilar atherosclerotic disease as seen on prior CTA. Patient on am of 12/19 was back to normal cognitive baseline. Spoke with family at bedside on afternoon on 12/19 who also confirmed patient was back to normal in her cognitive function. Patient continued to have nausea and diarrhea on 12/19 and felt relate to Augmentin patient was on as outpatient but was starting to improve and responding to symtpomatic treatment with anti-emetics and anti-diarrheal. Vascular Neurology after reviewing MRI and EEG felt transient neurological symptoms related to possible vertebrobasilar TIA vs. presyncope but recommended no change in her stroke management and to continue patient on her home DAPT and statin with no changes. Patient most likely had presyncope as prior to admit patient was having poor oral intake with nausea and diarrhea and likely got hypovolemic and with her known vertebrobasilar insufficiency likely  triggered a neurologic response due to brain hypoperfusion After hydration, patient's neurologic symptoms improved suggesting this was most likely diagnosis. Findings discussed with patient and family and patient to be observed overnight and if neurologic symptoms do not return then plan for home discharge on 12/20/2017. Patient noted to have mild confusion overnight on 12/19 to 12/20 but able to answer orientation questions nad mild confusion felt related to hospitalization and not manifestation of new neurologic symptoms as was present on admit. Patient able to relate who the President of [place] was and able to report to me date and year and why in hospital but was confused about time as she thought it was 6:00 pm and was actually 10:30 am. Patient also perseverating about a party planned for tonight and worried about getting home for party. Overall patient neurologically much improved from admit and plan home discharge on 12/20 with reduction in BP medications to allow for more permissive HTN in patient with known vertebrobasilar insufficiency. Will discontinue Norvasc on admit and continue Lisinopril 40 mg po daily + Toprol Xl 50 mg po daily with goal of -160 range. Diarrhea resolved on discharge after stopping Augmentin.

## 2017-12-19 NOTE — ASSESSMENT & PLAN NOTE
Pt reprots much diareha in setting of abx use   - will check c diff given abx use   - may be side effect of abx as pt suggests

## 2017-12-19 NOTE — SUBJECTIVE & OBJECTIVE
Past Medical History:   Diagnosis Date    Aortic stenosis     Clot 04/2017    blood clot in back of neck from CVA    History of bleeding ulcers 2015    Hypertension     Hypothyroidism     Sciatica of left side     Stroke     Vitamin D deficiency        Past Surgical History:   Procedure Laterality Date    APPENDECTOMY      [facility]/12/2016    [facility]    PARATHYROIDECTOMY Right 2012    THYROID SURGERY Right 2012    Partial thyroidectomy       Review of patient's allergies indicates:   Allergen Reactions    Sulfa (sulfonamide antibiotics) Hives       No current facility-administered medications on file prior to encounter.      Current Outpatient Prescriptions on File Prior to Encounter   Medication Sig    ALPRAZolam (XANAX) 0.5 MG tablet TAKE 1 TABLET (0.5 MG TOTAL) BY MOUTH 2 (TWO) TIMES DAILY AS NEEDED FOR ANXIETY.    amlodipine (NORVASC) 5 MG tablet TAKE 1 TABLET DAILY AS DIRECTED.    amoxicillin-clavulanate 875-125mg (AUGMENTIN) 875-125 mg per tablet Take 1 tablet by mouth 2 (two) times daily.    aspirin (ECOTRIN) 325 MG EC tablet Take 1 tablet (325 mg total) by mouth once daily.    atorvastatin (LIPITOR) 40 MG tablet Take 1 tablet (40 mg total) by mouth once daily.    cholecalciferol, vitamin D3, 5,000 unit Tab Take 5,000 Units by mouth once daily.    clopidogrel (PLAVIX) 75 mg tablet Take 1 tablet (75 mg total) by mouth once daily.    fenofibrate 160 MG Tab Take 1 tablet (160 mg total) by mouth once daily.    ferrous sulfate 325 (65 FE) MG EC tablet Take 325 mg by mouth once daily.    levothyroxine (SYNTHROID) 75 MCG tablet TAKE 1 TABLET (75 MCG TOTAL) BY MOUTH ONCE DAILY.    lisinopril (PRINIVIL,ZESTRIL) 40 MG tablet Take 40 mg by mouth once daily.     meclizine (ANTIVERT) 25 mg tablet Take 1 tablet (25 mg total) by mouth 3 (three) times daily as needed for Dizziness.    metoprolol succinate (TOPROL-XL) 50 MG 24 hr tablet Take 50 mg by mouth once daily.     ondansetron  (ZOFRAN-ODT) 8 MG TbDL Take 1 tablet (8 mg total) by mouth every 8 (eight) hours as needed.     Family History     Problem Relation (Age of Onset)    Diabetes Son    Heart disease Father, Sister, Brother        Social History Main Topics    Smoking status: Never Smoker    Smokeless tobacco: Never Used    Alcohol use Not on file    Drug use: Unknown    Sexual activity: No     Review of Systems   Constitutional: Negative for activity change, appetite change, chills, fever and unexpected weight change.   HENT: Negative for rhinorrhea and sore throat.    Eyes: Positive for visual disturbance. Negative for pain.   Respiratory: Negative for cough and shortness of breath.    Cardiovascular: Negative for chest pain and palpitations.   Gastrointestinal: Negative for abdominal pain, diarrhea and nausea.   Genitourinary: Negative for dysuria, hematuria and urgency.   Musculoskeletal: Negative for gait problem and neck stiffness.   Skin: Negative for rash.   Neurological: Positive for dizziness, weakness, light-headedness and headaches. Negative for syncope.     Objective:     Vital Signs (Most Recent):  Temp: 98.9 °F (37.2 °C) (12/18/17 2000)  Pulse: 73 (12/18/17 2000)  Resp: 16 (12/18/17 2000)  BP: (!) 123/58 (12/18/17 2000)  SpO2: 96 % (12/18/17 2000) Vital Signs (24h Range):  Temp:  [97.2 °F (36.2 °C)-98.9 °F (37.2 °C)] 98.9 °F (37.2 °C)  Pulse:  [69-96] 73  Resp:  [14-18] 16  SpO2:  [93 %-97 %] 96 %  BP: (123-222)/() 123/58     Weight: 73.3 kg (161 lb 9.6 oz)  Body mass index is 34.97 kg/m².    Physical Exam   Constitutional: She is oriented to person, place, and time. She appears well-developed and well-nourished.   Cardiovascular: Normal rate, regular rhythm and normal heart sounds.  Exam reveals no gallop and no friction rub.    No murmur heard.  Pulmonary/Chest: Effort normal and breath sounds normal. No respiratory distress. She has no wheezes. She has no rales.   Abdominal: Soft. Bowel sounds are normal.  She exhibits no distension. There is no tenderness.   Musculoskeletal: Normal range of motion.   Neurological: She is alert and oriented to person, place, and time. She displays normal reflexes. No cranial nerve deficit (able to count fingers, visual fields grossly intact, diffculty reading piece of paper close up  ). Coordination normal.   Skin: Skin is warm and dry.           Significant Labs:   CBC:   Recent Labs  Lab 12/18/17  0647   WBC 13.67*   HGB 11.4*   HCT 34.1*        CMP:   Recent Labs  Lab 12/18/17  0647   *   K 4.3      CO2 24      BUN 24*   CREATININE 0.9   CALCIUM 9.1   PROT 7.7   ALBUMIN 3.6   BILITOT 0.2   ALKPHOS 69   AST 15   ALT 11   ANIONGAP 8   EGFRNONAA 59.7*       Significant Imaging: I have reviewed and interpreted all pertinent imaging results/findings within the past 24 hours.

## 2017-12-19 NOTE — ASSESSMENT & PLAN NOTE
Much improved. Uncertain etiology but suspect presyncope related to dehydration in patient with known vertebrobasilar insufficiency causing transient neurologic symptoms.  No evidence to suggest infectious cause as U/A and CXR with no evidence of infection and patient with normal pro calcitonin level. So far blood cultures are also no growth and patient afebrile and no systemic symptoms to suggest infectious cause.   - Vascular neurology consulted and MRI and EEG ordered as per their recs and EEG with no evidence of seizure and MRI of brain with no evidence of acute stroke and vascular Neurology recommends to continue patient on statin therapy and DART as taking prior to this admit with no changes.   - Delirium precautions.

## 2017-12-19 NOTE — ASSESSMENT & PLAN NOTE
Unclear etiology. Pt with hx of stroke and extensive vertebral basilar disease. AMS has improved throughout the day. Pt's symptoms may be new acute stroke vs. TIA vs. Re-expression of her previous stroke 2/2 to dehydration vs. Seizure with previous infarct as a risk factor/focus vs. Med related ( pt on xanax)  - will r/o infection - but procal negative thus less likely   - f/u urine cx, blood cxs  - vascular neurology consult; appreciate recs   - MRI brain,  CTA head/neck pending   - IVF for dehydration ( elevated BUN/Cr ratio, hyponatremia)   - EEG

## 2017-12-19 NOTE — PLAN OF CARE
Problem: Patient Care Overview  Goal: Plan of Care Review  Outcome: Ongoing (interventions implemented as appropriate)  Patient remained free from falls or injury. All care explained. Tele intact. Patient confusion lessened throughout shift. Questions addressed. Bed in low and locked position. Call light within reach. Will continue to monitor.

## 2017-12-20 VITALS
HEART RATE: 76 BPM | WEIGHT: 161.63 LBS | DIASTOLIC BLOOD PRESSURE: 67 MMHG | TEMPERATURE: 99 F | SYSTOLIC BLOOD PRESSURE: 150 MMHG | RESPIRATION RATE: 18 BRPM | BODY MASS INDEX: 34.87 KG/M2 | HEIGHT: 57 IN | OXYGEN SATURATION: 98 %

## 2017-12-20 PROBLEM — R19.7 DIARRHEA: Status: RESOLVED | Noted: 2017-12-18 | Resolved: 2017-12-20

## 2017-12-20 PROBLEM — E86.0 DEHYDRATION WITH HYPONATREMIA: Status: RESOLVED | Noted: 2017-12-18 | Resolved: 2017-12-20

## 2017-12-20 PROBLEM — E87.1 DEHYDRATION WITH HYPONATREMIA: Status: RESOLVED | Noted: 2017-12-18 | Resolved: 2017-12-20

## 2017-12-20 LAB
ALBUMIN SERPL BCP-MCNC: 3.6 G/DL
ALP SERPL-CCNC: 53 U/L
ALT SERPL W/O P-5'-P-CCNC: 9 U/L
ANION GAP SERPL CALC-SCNC: 10 MMOL/L
AST SERPL-CCNC: 17 U/L
BASOPHILS # BLD AUTO: 0.1 K/UL
BASOPHILS NFR BLD: 0.7 %
BILIRUB SERPL-MCNC: 0.3 MG/DL
BUN SERPL-MCNC: 15 MG/DL
CALCIUM SERPL-MCNC: 9.2 MG/DL
CHLORIDE SERPL-SCNC: 98 MMOL/L
CO2 SERPL-SCNC: 24 MMOL/L
CREAT SERPL-MCNC: 0.7 MG/DL
DIFFERENTIAL METHOD: ABNORMAL
EOSINOPHIL # BLD AUTO: 0.2 K/UL
EOSINOPHIL NFR BLD: 1.1 %
ERYTHROCYTE [DISTWIDTH] IN BLOOD BY AUTOMATED COUNT: 13.7 %
EST. GFR  (AFRICAN AMERICAN): >60 ML/MIN/1.73 M^2
EST. GFR  (NON AFRICAN AMERICAN): >60 ML/MIN/1.73 M^2
GLUCOSE SERPL-MCNC: 99 MG/DL
HCT VFR BLD AUTO: 32 %
HGB BLD-MCNC: 10.7 G/DL
IMM GRANULOCYTES # BLD AUTO: 0.25 K/UL
IMM GRANULOCYTES NFR BLD AUTO: 1.8 %
LYMPHOCYTES # BLD AUTO: 2.2 K/UL
LYMPHOCYTES NFR BLD: 15.2 %
MCH RBC QN AUTO: 27.8 PG
MCHC RBC AUTO-ENTMCNC: 33.4 G/DL
MCV RBC AUTO: 83 FL
MONOCYTES # BLD AUTO: 0.9 K/UL
MONOCYTES NFR BLD: 6.4 %
NEUTROPHILS # BLD AUTO: 10.6 K/UL
NEUTROPHILS NFR BLD: 74.8 %
NRBC BLD-RTO: 0 /100 WBC
PLATELET # BLD AUTO: 294 K/UL
PMV BLD AUTO: 10.7 FL
POTASSIUM SERPL-SCNC: 3.9 MMOL/L
PROT SERPL-MCNC: 7.6 G/DL
RBC # BLD AUTO: 3.85 M/UL
SODIUM SERPL-SCNC: 132 MMOL/L
WBC # BLD AUTO: 14.15 K/UL

## 2017-12-20 PROCEDURE — 63600175 PHARM REV CODE 636 W HCPCS: Performed by: HOSPITALIST

## 2017-12-20 PROCEDURE — 85025 COMPLETE CBC W/AUTO DIFF WBC: CPT

## 2017-12-20 PROCEDURE — 25000003 PHARM REV CODE 250: Performed by: NURSE PRACTITIONER

## 2017-12-20 PROCEDURE — 25000003 PHARM REV CODE 250: Performed by: INTERNAL MEDICINE

## 2017-12-20 PROCEDURE — 36415 COLL VENOUS BLD VENIPUNCTURE: CPT

## 2017-12-20 PROCEDURE — 90471 IMMUNIZATION ADMIN: CPT | Performed by: HOSPITALIST

## 2017-12-20 PROCEDURE — 90662 IIV NO PRSV INCREASED AG IM: CPT | Performed by: HOSPITALIST

## 2017-12-20 PROCEDURE — 99239 HOSP IP/OBS DSCHRG MGMT >30: CPT | Mod: ,,, | Performed by: INTERNAL MEDICINE

## 2017-12-20 PROCEDURE — 80053 COMPREHEN METABOLIC PANEL: CPT

## 2017-12-20 PROCEDURE — 25000003 PHARM REV CODE 250: Performed by: HOSPITALIST

## 2017-12-20 PROCEDURE — 3E0234Z INTRODUCTION OF SERUM, TOXOID AND VACCINE INTO MUSCLE, PERCUTANEOUS APPROACH: ICD-10-PCS | Performed by: INTERNAL MEDICINE

## 2017-12-20 PROCEDURE — G0008 ADMIN INFLUENZA VIRUS VAC: HCPCS | Performed by: HOSPITALIST

## 2017-12-20 RX ORDER — SIMETHICONE 80 MG
1 TABLET,CHEWABLE ORAL 3 TIMES DAILY PRN
Status: DISCONTINUED | OUTPATIENT
Start: 2017-12-20 | End: 2017-12-20 | Stop reason: HOSPADM

## 2017-12-20 RX ADMIN — SIMETHICONE CHEW TAB 80 MG 80 MG: 80 TABLET ORAL at 06:12

## 2017-12-20 RX ADMIN — INFLUENZA A VIRUSA/MICHIGAN/45/2015 X-275 (H1N1) ANTIGEN (FORMALDEHYDE INACTIVATED), INFLUENZA A VIRUS A/HONG KONG/4801/2014 X-263B (H3N2) ANTIGEN (FORMALDEHYDE INACTIVATED), AND INFLUENZA B VIRUS B/BRISBANE/60/2008 ANTIGEN (FORMALDEHYDE INACTIVATED) 0.5 ML: 60; 60; 60 INJECTION, SUSPENSION INTRAMUSCULAR at 11:12

## 2017-12-20 NOTE — PLAN OF CARE
12/20/17 1350   Final Note   Assessment Type Final Discharge Note   Discharge Disposition Home-Health   What phone number can be called within the next 1-3 days to see how you are doing after discharge? [phone]   Hospital Follow Up  Appt(s) scheduled? Yes   Discharge plans and expectations educations in teach back method with documentation complete? Yes   Right Care Referral Info   Post Acute Recommendation Home-care

## 2017-12-20 NOTE — ASSESSMENT & PLAN NOTE
Ms. 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 Initially not oriented or following commands in ED. Symptoms largely improved on my exam.    MRI and EEG negative, pt with nonfocal neuro exam. Differential inclues transient neurological symptoms vs possible vertebrobasilar TIA vs seizure vs Presyncope. Presyncope seems most likely as prior to admit, pt with poor PO intake, nausea, diarrhea. Was likely volume down in setting of known vertebrobasilar insufficiency which triggered a neurologic response due to brain hypoperfusion. Upon rehydration, pt's neurologic symptoms drastically improved.   Continue maximal medical mgmt with DAPT and statin, as well as risk factor modification for secondary stroke prevention. Encourage adequate hydration and less strict BP control.   Will sign off at this time. Please call with any questions or if pt develops new neurologic symptoms.

## 2017-12-20 NOTE — ASSESSMENT & PLAN NOTE
Ms. 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 Initially not oriented or following commands in ED. Symptoms largely improved on my exam, pt oriented, following commands, and endorsing dizziness, HA, and blurry vision.    In setting of new onset confusion, blurry vision, and persistent dizziness, recommend repeat MRI Brain to definitively rule out acute infarct. Previous CTA with extensive atherosclerotic/stenotic disease, recommend repeat CTA H/N. Etiology possibly acute stroke vs TIA vs recrudescence of prior stroke. Continue DAPT and risk factor modification for secondary stroke prevention. Will follow.      ................Vascular Neurology after reviewing MRI and EEG felt transient neurological symptoms related to possible vertebrobasilar TIA vs. presyncope but recommended no change in her stroke management and to continue patient on her home DAPT and statin with no changes. Patient most likely had presyncope as prior to admit patient was having poor oral intake with nausea and diarrhea and likely got hypovolemic and with her known vertebrobasilar insufficiency likely triggered a neurologic response due to brain hypoperfusion After hydration, patient's neurologic symptoms improved suggesting this was most likely diagnosis. Findings discussed with patient and family and patient to be observed overnight and if neurologic symptoms do not return then plan for home discharge on 12/20/2017.

## 2017-12-20 NOTE — ASSESSMENT & PLAN NOTE
· Patient's blood pressure mildly elevated but patient's on Lisinopril and Toprol XL but okay with Vascular Neurology as okay with allowing permissive HTN in patient with known VBI.   · Goal for blood pressure is -160 range as do not want tight BP control.   · Continue current treatment regimen of Toprol XL 50 mg po daily and Lisinopril 40 mg po daily on discharge as taking at home previously but will discontinue Norvasc on discharge.

## 2017-12-20 NOTE — PLAN OF CARE
"Problem: Patient Care Overview  Goal: Plan of Care Review  Outcome: Ongoing (interventions implemented as appropriate)  SR up x2, call bell in reach, bed in low locked position, skid proof socks on. Patient is confused stating " I need to get to the bed" when she is laying in the hospital bed, she states she is at home on sofa. Later patient was getting ready for a party which is about to start and she ordered food already. Patient is reoriented that she is at the hospital.Patient remained free of fall and injuries during the shift.  Care plan explained to patient. Will continue to monitor.       "

## 2017-12-20 NOTE — ASSESSMENT & PLAN NOTE
Much improved from admission but patient still with mild disorientation but no further lethargy as on initial presentation to hospital. Uncertain etiology but suspect presyncope related to dehydration in patient with known vertebrobasilar insufficiency causing transient neurologic symptoms. No evidence to suggest infectious cause as U/A and CXR with no evidence of infection and patient with normal pro calcitonin level. So far blood cultures are also no growth and patient afebrile and no systemic symptoms to suggest infectious cause.   - Vascular neurology consulted and MRI and EEG ordered as per their recs and EEG with no evidence of seizure and MRI of brain with no evidence of acute stroke and Vascular Neurology recommends to continue patient on statin therapy and DAPT as taking prior to this admit with no changes and to allow patient to have permissive HTN with -160 range on discharge sio plan to discontinue Norvasc for HTN on discharge.

## 2017-12-20 NOTE — SUBJECTIVE & OBJECTIVE
Neurologic Chief Complaint: Encephalopathy in setting of Vertebrobasilar disease    Subjective:     Interval History: Patient is seen for follow-up neurological assessment and treatment recommendations: RODOLFO. MRI with no acute infarct. Pt's neurologic symptoms improved, back to baseline per family.    HPI, Past Medical, Family, and Social History remains the same as documented in the initial encounter.     Review of Systems   Constitutional: Negative for chills and fever.   Gastrointestinal: Positive for diarrhea and nausea.   Neurological: Negative for dizziness, facial asymmetry, speech difficulty and weakness.   Psychiatric/Behavioral: Negative for behavioral problems and confusion.     Scheduled Meds:   aspirin  325 mg Oral Daily    atorvastatin  40 mg Oral Daily    clopidogrel  75 mg Oral Daily    enoxaparin  40 mg Subcutaneous Daily    levothyroxine  75 mcg Oral Daily    [START ON 12/20/2017] lisinopril  40 mg Oral Daily    metoprolol succinate  50 mg Oral Daily     Continuous Infusions:  PRN Meds:acetaminophen, dextrose 50%, dextrose 50%, glucagon (human recombinant), glucose, glucose, influenza, ondansetron, ramelteon, sodium chloride 0.9%    Objective:     Vital Signs (Most Recent):  Temp: 98.2 °F (36.8 °C) (12/19/17 1533)  Pulse: 68 (12/19/17 1535)  Resp: 18 (12/19/17 1533)  BP: (!) 173/74 (12/19/17 1533)  SpO2: 95 % (12/19/17 1533)  BP Location: Left arm    Vital Signs Range (Last 24H):  Temp:  [98.2 °F (36.8 °C)-99.5 °F (37.5 °C)]   Pulse:  [68-89]   Resp:  [14-18]   BP: (123-180)/()   SpO2:  [94 %-98 %]   BP Location: Left arm    Physical Exam   Constitutional: She is oriented to person, place, and time. She appears well-developed and well-nourished. No distress.   HENT:   Head: Normocephalic and atraumatic.   Pulmonary/Chest: Effort normal. No respiratory distress.   Musculoskeletal: Normal range of motion. She exhibits no edema.   Neurological: She is alert and oriented to person, place,  and time. No cranial nerve deficit or sensory deficit. Coordination normal.   Skin: Skin is warm and dry.   Psychiatric: She has a normal mood and affect. Her behavior is normal.   Vitals reviewed.      Neurological Exam:   LOC: alert  Attention Span: Good   Language: No aphasia  Articulation: No dysarthria  Orientation: Person, Place, Time   Visual Fields: Full  EOM (CN III, IV, VI): Full/intact  Facial Sensation (CN V): Normal  Facial Movement (CN VII): Symmetric facial expression    Motor: 5/5 in all extremities  Cebellar: No evidence of appendicular or axial ataxia  Sensation: Intact to light touch  Tone: Normal tone throughout    Laboratory:  CMP:   Recent Labs  Lab 12/19/17  0452   CALCIUM 8.7  8.7   ALBUMIN 3.3*  3.3*   PROT 7.0  7.0   *  134*   K 4.4  4.4   CO2 25  25     102   BUN 16  16   CREATININE 0.7  0.7   ALKPHOS 54*  54*   ALT 10  10   AST 15  15   BILITOT 0.3  0.3     CBC:   Recent Labs  Lab 12/19/17  0452   WBC 11.60   RBC 3.72*   HGB 10.3*   HCT 32.2*      MCV 87   MCH 27.7   MCHC 32.0     Lipid Panel: No results for input(s): CHOL, LDLCALC, HDL, TRIG in the last 168 hours.     Coagulation:   Recent Labs  Lab 12/18/17  0647   INR 1.0     Platelet Aggregation Study: No results for input(s): PLTAGG, PLTAGINTERP, PLTAGREGLACO, ADPPLTAGGREG in the last 168 hours.     Hgb A1C:   Recent Labs  Lab 12/18/17  1638   HGBA1C 5.5     TSH:   Recent Labs  Lab 12/18/17  0647   TSH 4.719*       Diagnostic Results:        Brain imaging:     CT Head 12/18/17  Noncontrast CT demonstrates no evidence of acute intracranial pathology.     MRI Brain 12/19/17  No evidence of recent infarction or other acute intracranial pathology.  Generalized cerebral volume loss with mild to moderate chronic microvascular ischemic change.  The vertebrobasilar system is not well-evaluated although appears small in caliber with intermittent enhancement compatible with vertebrobasilar atherosclerotic  disease as seen on prior CTA.        Vessel Imaging:    No new imaging

## 2017-12-20 NOTE — ASSESSMENT & PLAN NOTE
Hyponatremia improved and dehydration resolved. Patient with mild hyponatremia noted on admit and related to recent diarrhea, nausea and poor oral intake and likely that was caused by Augmentin as Augmentin known to cause GI side effects. Sodium improved from admit with IVF's from 132 on 12/18 to 134 on 12/19. IVF's discontinued and patient encouraged to drink oral fluids to maintain hydration. Patient on discharge encouraged to make sure drinks adequate fluids at least 1.5 liters a day on discharge.

## 2017-12-20 NOTE — PROGRESS NOTES
[facility] Hospital Medicine  Progress Note    Patient Name: Linda Hylton  MRN: [id number]  Patient Class: IP- Inpatient   Admission Date: 12/18/2017  Length of Stay: 2 days  Attending Physician: Altagracia Yang MD  Primary Care Provider: Ykaov Barrett MD    [facility] Medicine Team: [facility] K Altagracia Yang MD    Subjective:     Principal Problem:Acute encephalopathy    HPI:  82 y.o. woman with hx of R cerebral vermal ischemic stroke in 3/2017 and extrensive vertebral basilar disease who presents with AMS. Per report, at baseline, pt is AO x 4, up to date on current events, reads daily ( avid reader per family), ambulates with a walker. Of note, was evaluated in the ED 3 days ago for worsening dizziness and vertigo.  She was ruled out for stoke but her mental status was intact. She was sent home with augmentin for sinusitis vs. URI. She was stable at home until this morning  when her son, whom she lives with, was unable t to arouse her from sleep. He then activated EMS as she was unarousable.  Per report, pt was uncooperative with EMS. During evaluation by ED staff, pt was noted to be lethargic but only answering questions with 1-2 word answers. Upon my evaluation, pt reports she does not remember events from this morning but remembers being in the ambulance. She reports a headache and blurry vision in both eyes (states she cannot make out my facial features but was able to count fingers). She also reports nausea and diarrhea x 3 days - last since stating abx. Otherwise, she denies any  focal weakness, numbness, tingling, abd pain, urinary symptoms, fevers, chills, cough.    Of note, prior to me entering the room, the family reports she has had 4-5 episodes of shaking her arms and legs followed by staring. No bladder or bowel incontinence during these episodes. Did not witness this upon my evaluation. Family reported, she has also been repeating her name intermittently like  its rehearsed which was witnessed by me. .       Hospital Course:  Patient started on IVF's due to concern for dehydration as patient was having nausea and diarrhea and poor po intake prior to admit. Vascular Nueurollogy was consulted and concerned for a possible another acute stroke vs. Seizure vs. TIA. Patient also underwent infectious work-up with U/A, CXR and blood cultures and pro calcitonin drawn and all were negative to suggest infectious cause. Augmentin patient was on as outpatient was discontinued. Patient had EEG ordered and done on 12/18 that showed mild generalized slowing seen, suggestive of mild diffuse or multifocal cerebral dysfunction but no epileptiform activity or electrographic seizures seen. Patient underwent MRI of brain with and without contrast that showed no evidence of recent infarction or other acute intracranial pathology. Generalized cerebral volume loss with mild to moderate chronic microvascular ischemic change. The vertebrobasilar system is not well-evaluated although appears small in caliber with intermittent enhancement compatible with vertebrobasilar atherosclerotic disease as seen on prior CTA. Patient on am of 12/19 was back to normal cognitive baseline. Spoke with family at bedside on afternoon on 12/19 who also confirmed patient was back to normal in her cognitive function. Patient continued to have nausea and diarrhea on 12/19 and felt relate to Augmentin patient was on as outpatient but was starting to improve and responding to symtpomatic treatment with anti-emetics and anti-diarrheal. Vascular Neurology after reviewing MRI and EEG felt transient neurological symptoms related to possible vertebrobasilar TIA vs. presyncope but recommended no change in her stroke management and to continue patient on her home DAPT and statin with no changes. Patient most likely had presyncope as prior to admit patient was having poor oral intake with nausea and diarrhea and likely got  hypovolemic and with her known vertebrobasilar insufficiency likely triggered a neurologic response due to brain hypoperfusion After hydration, patient's neurologic symptoms improved suggesting this was most likely diagnosis. Findings discussed with patient and family and patient to be observed overnight and if neurologic symptoms do not return then plan for home discharge on 12/20/2017. Patient noted to have mild confusion overnight on 12/19 to 12/20 but able to answer orientation questions nad mild confusion felt related to hospitalization and not manifestation of new neurologic symptoms as was present on admit. Patient able to relate who the President [employer] was and able to report to me date and year and why in hospital but was confused about time as she thought it was 6:00 pm and was actually 10:30 am. Patient also perseverating about a party planned for tonight and worried about getting home for party. Overall patient neurologically much improved from admit and plan home discharge on 12/20 with reduction in BP medications to allow for more permissive HTN in patient with known vertebrobasilar insufficiency. Will discontinue Norvasc on admit and continue Lisinopril 40 mg po daily + Toprol Xl 50 mg po daily with goal of -160 range. Diarrhea resolved on discharge after stopping Augmentin.       Interval History: Patient mildly confused this am as thought it was 6:00 pm and worried a party this evening she had to go to. After focusing patient, she was able to respond correctly to date and year and able to state why she was in hospital. Patient denies any further diarrhea and states nausea improved.     Review of Systems   Constitutional: Negative for chills and fever.   Respiratory: Negative for cough and shortness of breath.    Cardiovascular: Negative for chest pain and palpitations.   Gastrointestinal: Positive for nausea. Negative for abdominal pain and diarrhea.   Genitourinary: Negative for dysuria.    Musculoskeletal: Negative for back pain.   Neurological: Negative for dizziness, speech difficulty and light-headedness.   Psychiatric/Behavioral: Positive for confusion. Negative for hallucinations.     Objective:     Vital Signs (Most Recent):  Temp: 99.2 °F (37.3 °C) (12/20/17 0825)  Pulse: 77 (12/20/17 0900)  Resp: 20 (12/20/17 0825)  BP: (!) 151/67 (12/20/17 0825)  SpO2: 95 % (12/20/17 0825) Vital Signs (24h Range):  Temp:  [98.2 °F (36.8 °C)-99.2 °F (37.3 °C)] 99.2 °F (37.3 °C)  Pulse:  [68-77] 77  Resp:  [16-20] 20  SpO2:  [93 %-95 %] 95 %  BP: (133-175)/(63-77) 151/67     Weight: 73.3 kg (161 lb 9.6 oz)  Body mass index is 34.97 kg/m².    Intake/Output Summary (Last 24 hours) at 12/20/17 1122  Last data filed at 12/19/17 1334   Gross per 24 hour   Intake              200 ml   Output                0 ml   Net              200 ml      Physical Exam   Constitutional: She appears well-developed and well-nourished. No distress.   HENT:   Mouth/Throat: Oropharynx is clear and moist.   Eyes: Conjunctivae are normal.   Neck: No JVD present.   Cardiovascular: Normal rate, regular rhythm and normal heart sounds.  Exam reveals no gallop and no friction rub.    No murmur heard.  Pulmonary/Chest: Effort normal and breath sounds normal. She has no wheezes.   Abdominal: Soft. Bowel sounds are normal. She exhibits no distension. There is no tenderness.   Musculoskeletal: She exhibits no edema.   Neurological: She is alert.   Patient oriented to person, place, situation but not time   Skin: Skin is warm. Capillary refill takes less than 2 seconds. No erythema.   Psychiatric: She has a normal mood and affect. Her behavior is normal.   Nursing note and vitals reviewed.      Significant Labs:   Blood Culture:   Recent Labs  Lab 12/18/17 1945 12/18/17 1946   LABBLOO No Growth to date  No Growth to date No Growth to date  No Growth to date     CBC:   Recent Labs  Lab 12/19/17  0452 12/20/17  0647   WBC 11.60 14.15*   HGB  10.3* 10.7*   HCT 32.2* 32.0*    294     CMP:   Recent Labs  Lab 12/19/17  0452 12/20/17  0647   *  134* 132*   K 4.4  4.4 3.9     102 98   CO2 25  25 24     108 99   BUN 16  16 15   CREATININE 0.7  0.7 0.7   CALCIUM 8.7  8.7 9.2   PROT 7.0  7.0 7.6   ALBUMIN 3.3*  3.3* 3.6   BILITOT 0.3  0.3 0.3   ALKPHOS 54*  54* 53*   AST 15  15 17   ALT 10  10 9*   ANIONGAP 7*  7* 10   EGFRNONAA >60.0  >60.0 >60.0     Significant Imaging: I have reviewed all pertinent imaging results/findings within the past 24 hours.    Assessment/Plan:      * Acute encephalopathy    Much improved from admission but patient still with mild disorientation but no further lethargy as on initial presentation to hospital. Uncertain etiology but suspect presyncope related to dehydration in patient with known vertebrobasilar insufficiency causing transient neurologic symptoms. No evidence to suggest infectious cause as U/A and CXR with no evidence of infection and patient with normal pro calcitonin level. So far blood cultures are also no growth and patient afebrile and no systemic symptoms to suggest infectious cause.   - Vascular neurology consulted and MRI and EEG ordered as per their recs and EEG with no evidence of seizure and MRI of brain with no evidence of acute stroke and Vascular Neurology recommends to continue patient on statin therapy and DAPT as taking prior to this admit with no changes and to allow patient to have permissive HTN with -160 range on discharge sio plan to discontinue Norvasc for HTN on discharge.           Dehydration with hyponatremia    Hyponatremia improved and dehydration resolved. Patient with mild hyponatremia noted on admit and related to recent diarrhea, nausea and poor oral intake and likely that was caused by Augmentin as Augmentin known to cause GI side effects. Sodium improved from admit with IVF's from 132 on 12/18 to 134 on 12/19. IVF's discontinued and  patient encouraged to drink oral fluids to maintain hydration. Patient on discharge encouraged to make sure drinks adequate fluids at least 1.5 liters a day on discharge.         Vertebral basilar insufficiency    Intracranial atherosclerosis  Patient with known VBI and intracranial stenosis. Do not want tight BP control in patient with known VBI and okay to allow permissive HTN with -160. Continue ASA and Plavix and Lipitor 40 mg po daily to treat on discharge.           Essential hypertension    · Patient's blood pressure mildly elevated but patient's on Lisinopril and Toprol XL but okay with Vascular Neurology as okay with allowing permissive HTN in patient with known VBI.   · Goal for blood pressure is -160 range as do not want tight BP control.   · Continue current treatment regimen of Toprol XL 50 mg po daily and Lisinopril 40 mg po daily on discharge as taking at home previously but will discontinue Norvasc on discharge.         Acquired hypothyroidism    Controlled. Continue Levothyroxine 75 mcg po daily to treat on discharge.           Mixed hyperlipidemia    Controlled. Continue Lipitor and Fenofibrate to treat on discharge.        Intracranial atherosclerosis    Cont DAPT with ASA, plavix           VTE Risk Mitigation         Ordered     enoxaparin injection 40 mg  Daily     Route:  Subcutaneous        12/18/17 1213     Medium Risk of VTE  Once      12/18/17 1213        DISCHARGE PLANNING:  Future Appointments  Date Time Provider Department Center   3/9/2018 11:00 AM Yakov Barrett MD [facility]       Discharge Disposition: Home-Health Care [facility]    TIME SPENT: I spent 32 minutes evaluating, treating, counseling, and coordinating care for the patient for discharge.       Altagracia Yang MD  Department of Hospital Medicine   [facility]

## 2017-12-20 NOTE — PROGRESS NOTES
[facility]  Vascular Neurology  Comprehensive Stroke Center  Progress Note    Assessment/Plan:     * Acute encephalopathy    Ms. 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 Initially not oriented or following commands in ED. Symptoms largely improved on my exam.    MRI and EEG negative, pt with nonfocal neuro exam. Differential inclues transient neurological symptoms vs possible vertebrobasilar TIA vs seizure vs Presyncope. Presyncope seems most likely as prior to admit, pt with poor PO intake, nausea, diarrhea. Was likely volume down in setting of known vertebrobasilar insufficiency which triggered a neurologic response due to brain hypoperfusion. Upon rehydration, pt's neurologic symptoms drastically improved.   Continue maximal medical mgmt with DAPT and statin, as well as risk factor modification for secondary stroke prevention. Encourage adequate hydration and less strict BP control.   Will sign off at this time. Please call with any questions or if pt develops new neurologic symptoms.         Mixed hyperlipidemia    Stroke risk factor  Atorvastatin 40        Intracranial atherosclerosis    Vertebrobasilar dz and intracranial stenosis seen on CTA from 4/2017   Continue DAPT and Lipitor 40 mg po daily        Essential hypertension    Stroke risk factor  Known vertebrobasilar dz and intracranial stenosis, Recommend permissive HTN  Management per primary             12-19-17: MRI with no acute infarct. Pt's neurologic symptoms improved, back to baseline per family.    STROKE DOCUMENTATION        NIH Scale:  1a. Level Of Consciousness: 0-->Alert: keenly responsive  1b. LOC Questions: 0-->Answers both questions correctly  1c. LOC  Commands: 0-->Performs both tasks correctly  2. Best Gaze: 0-->Normal  3. Visual: 0-->No visual loss  4. Facial Palsy: 0-->Normal symmetrical movements  5a. Motor Arm, Left: 0-->No drift: limb holds 90 (or 45) degrees for full 10 secs  5b. Motor Arm, Right: 0-->No drift: limb holds 90 (or 45) degrees for full 10 secs  6a. Motor Leg, Left: 0-->No drift: leg holds 30 degree position for full 5 secs  6b. Motor Leg, Right: 0-->No drift: leg holds 30 degree position for full 5 secs  7. Limb Ataxia: 0-->Absent  8. Sensory: 0-->Normal: no sensory loss  9. Best Language: 0-->No aphasia: normal  10. Dysarthria: 0-->Normal  11. Extinction and Inattention (formerly Neglect): 0-->No abnormality  Total (NIH Stroke Scale): 0       Modified [place] Score: 2  Imranda Coma Scale:    ABCD2 Score:    [id number] Score:   HAS -BLED Score:   ICH Score:   Hunt & De Classification:      Hemorrhagic change of an Ischemic Stroke: Does this patient have an ischemic stroke with hemorrhagic changes? No     Neurologic Chief Complaint: Encephalopathy in setting of Vertebrobasilar disease    Subjective:     Interval History: Patient is seen for follow-up neurological assessment and treatment recommendations: [place]. MRI with no acute infarct. Pt's neurologic symptoms improved, back to baseline per family.    HPI, Past Medical, Family, and Social History remains the same as documented in the initial encounter.     Review of Systems   Constitutional: Negative for chills and fever.   Gastrointestinal: Positive for diarrhea and nausea.   Neurological: Negative for dizziness, facial asymmetry, speech difficulty and weakness.   Psychiatric/Behavioral: Negative for behavioral problems and confusion.     Scheduled Meds:   aspirin  325 mg Oral Daily    atorvastatin  40 mg Oral Daily    clopidogrel  75 mg Oral Daily    enoxaparin  40 mg Subcutaneous Daily    levothyroxine  75 mcg Oral Daily    [START ON 12/20/2017] lisinopril  40 mg Oral Daily     metoprolol succinate  50 mg Oral Daily     Continuous Infusions:  PRN Meds:acetaminophen, dextrose 50%, dextrose 50%, glucagon (human recombinant), glucose, glucose, influenza, ondansetron, ramelteon, sodium chloride 0.9%    Objective:     Vital Signs (Most Recent):  Temp: 98.2 °F (36.8 °C) (12/19/17 1533)  Pulse: 68 (12/19/17 1535)  Resp: 18 (12/19/17 1533)  BP: (!) 173/74 (12/19/17 1533)  SpO2: 95 % (12/19/17 1533)  BP Location: Left arm    Vital Signs Range (Last 24H):  Temp:  [98.2 °F (36.8 °C)-99.5 °F (37.5 °C)]   Pulse:  [68-89]   Resp:  [14-18]   BP: (123-180)/()   SpO2:  [94 %-98 %]   BP Location: Left arm    Physical Exam   Constitutional: She is oriented to person, place, and time. She appears well-developed and well-nourished. No distress.   HENT:   Head: Normocephalic and atraumatic.   Pulmonary/Chest: Effort normal. No respiratory distress.   Musculoskeletal: Normal range of motion. She exhibits no edema.   Neurological: She is alert and oriented to person, place, and time. No cranial nerve deficit or sensory deficit. Coordination normal.   Skin: Skin is warm and dry.   Psychiatric: She has a normal mood and affect. Her behavior is normal.   Vitals reviewed.      Neurological Exam:   LOC: alert  Attention Span: Good   Language: No aphasia  Articulation: No dysarthria  Orientation: Person, Place, Time   Visual Fields: Full  EOM (CN III, IV, VI): Full/intact  Facial Sensation (CN V): Normal  Facial Movement (CN VII): Symmetric facial expression    Motor: 5/5 in all extremities  Cebellar: No evidence of appendicular or axial ataxia  Sensation: Intact to light touch  Tone: Normal tone throughout    Laboratory:  CMP:   Recent Labs  Lab 12/19/17  0452   CALCIUM 8.7  8.7   ALBUMIN 3.3*  3.3*   PROT 7.0  7.0   *  134*   K 4.4  4.4   CO2 25  25     102   BUN 16  16   CREATININE 0.7  0.7   ALKPHOS 54*  54*   ALT 10  10   AST 15  15   BILITOT 0.3  0.3     CBC:   Recent Labs  Lab  12/19/17  0452   WBC 11.60   RBC 3.72*   HGB 10.3*   HCT 32.2*      MCV 87   MCH 27.7   MCHC 32.0     Lipid Panel: No results for input(s): CHOL, LDLCALC, HDL, TRIG in the last 168 hours.     Coagulation:   Recent Labs  Lab 12/18/17  0647   INR 1.0     Platelet Aggregation Study: No results for input(s): PLTAGG, PLTAGINTERP, PLTAGREGLACO, ADPPLTAGGREG in the last 168 hours.     Hgb A1C:   Recent Labs  Lab 12/18/17  1638   HGBA1C 5.5     TSH:   Recent Labs  Lab 12/18/17  0647   TSH 4.719*       Diagnostic Results:        Brain imaging:     CT Head 12/18/17  Noncontrast CT demonstrates no evidence of acute intracranial pathology.     MRI Brain 12/19/17  No evidence of recent infarction or other acute intracranial pathology.  Generalized cerebral volume loss with mild to moderate chronic microvascular ischemic change.  The vertebrobasilar system is not well-evaluated although appears small in caliber with intermittent enhancement compatible with vertebrobasilar atherosclerotic disease as seen on prior CTA.        Vessel Imaging:    No new imaging      Melissa Butler PA-C  Comprehensive Stroke Center  Department of Vascular Neurology   [facility]

## 2017-12-20 NOTE — PLAN OF CARE
2:06 PM  SW received home health orders. Pt's preference is [facility]. Faxed orders to [facility]. Called [facility] and informed them pt discharged today.     Roxann Waters LMSW   [facility]  Ext [phone]

## 2017-12-20 NOTE — ASSESSMENT & PLAN NOTE
Resolved. Likely related to Augmentin as side effect as diarrhea resolved after Augmentin discontinued.

## 2017-12-20 NOTE — DISCHARGE SUMMARY
[facility] Hospital Medicine  Discharge Summary      Patient Name: Linda Hylton  MRN: [id number]  Admission Date: 12/18/2017  Hospital Length of Stay: 2 days  Discharge Date and Time: 12/20/2017 12:33 PM  Attending Physician: Altagracia Yang MD   Discharging Provider: Altagracia Yang MD  Primary Care Provider: Yakov Barrett MD  Hospital Medicine Team: [facility] MED K Altagracia Yang MD    HPI:   82 y.o. woman with hx of R cerebral vermal ischemic stroke in 3/2017 and extrensive vertebral basilar disease who presents with AMS. Per report, at baseline, pt is AO x 4, up to date on current events, reads daily ( avid reader per family), ambulates with a walker. Of note, was evaluated in the ED 3 days ago for worsening dizziness and vertigo.  She was ruled out for stoke but her mental status was intact. She was sent home with augmentin for sinusitis vs. URI. She was stable at home until this morning  when her son, whom she lives with, was unable t to arouse her from sleep. He then activated EMS as she was unarousable.  Per report, pt was uncooperative with EMS. During evaluation by ED staff, pt was noted to be lethargic but only answering questions with 1-2 word answers. Upon my evaluation, pt reports she does not remember events from this morning but remembers being in the ambulance. She reports a headache and blurry vision in both eyes (states she cannot make out my facial features but was able to count fingers). She also reports nausea and diarrhea x 3 days - last since stating abx. Otherwise, she denies any  focal weakness, numbness, tingling, abd pain, urinary symptoms, fevers, chills, cough.    Of note, prior to me entering the room, the family reports she has had 4-5 episodes of shaking her arms and legs followed by staring. No bladder or bowel incontinence during these episodes. Did not witness this upon my evaluation. Family reported, she has also been repeating her name  intermittently like its rehearsed which was witnessed by me. .         Hospital Course:   Patient started on IVF's due to concern for dehydration as patient was having nausea and diarrhea and poor po intake prior to admit. Vascular Nueurollogy was consulted and concerned for a possible another acute stroke vs. Seizure vs. TIA. Patient also underwent infectious work-up with U/A, CXR and blood cultures and pro calcitonin drawn and all were negative to suggest infectious cause. Augmentin patient was on as outpatient was discontinued. Patient had EEG ordered and done on 12/18 that showed mild generalized slowing seen, suggestive of mild diffuse or multifocal cerebral dysfunction but no epileptiform activity or electrographic seizures seen. Patient underwent MRI of brain with and without contrast that showed no evidence of recent infarction or other acute intracranial pathology. Generalized cerebral volume loss with mild to moderate chronic microvascular ischemic change. The vertebrobasilar system is not well-evaluated although appears small in caliber with intermittent enhancement compatible with vertebrobasilar atherosclerotic disease as seen on prior CTA. Patient on am of 12/19 was back to normal cognitive baseline. Spoke with family at bedside on afternoon on 12/19 who also confirmed patient was back to normal in her cognitive function. Patient continued to have nausea and diarrhea on 12/19 and felt relate to Augmentin patient was on as outpatient but was starting to improve and responding to symtpomatic treatment with anti-emetics and anti-diarrheal. Vascular Neurology after reviewing MRI and EEG felt transient neurological symptoms related to possible vertebrobasilar TIA vs. presyncope but recommended no change in her stroke management and to continue patient on her home DAPT and statin with no changes. Patient most likely had presyncope as prior to admit patient was having poor oral intake with nausea and  diarrhea and likely got hypovolemic and with her known vertebrobasilar insufficiency likely triggered a neurologic response due to brain hypoperfusion After hydration, patient's neurologic symptoms improved suggesting this was most likely diagnosis. Findings discussed with patient and family and patient to be observed overnight and if neurologic symptoms do not return then plan for home discharge on 12/20/2017. Patient noted to have mild confusion overnight on 12/19 to 12/20 but able to answer orientation questions nad mild confusion felt related to hospitalization and not manifestation of new neurologic symptoms as was present on admit. Patient able to relate who the President of [place] was and able to report to me date and year and why in hospital but was confused about time as she thought it was 6:00 pm and was actually 10:30 am. Patient also perseverating about a party planned for tonight and worried about getting home for party. Overall patient neurologically much improved from admit and plan home discharge on 12/20 with reduction in BP medications to allow for more permissive HTN in patient with known vertebrobasilar insufficiency. Will discontinue Norvasc on admit and continue Lisinopril 40 mg po daily + Toprol Xl 50 mg po daily with goal of -160 range. Diarrhea resolved on discharge after stopping Augmentin.        Consults:   Consults         Status Ordering Provider     Inpatient consult to Vascular (Stroke) Neurology  Once     Provider:  (Not yet assigned)    SILVIO Machuca          * Acute encephalopathy    Much improved from admission but patient still with mild disorientation but no further lethargy as on initial presentation to hospital. Uncertain etiology but suspect presyncope related to dehydration in patient with known vertebrobasilar insufficiency causing transient neurologic symptoms. No evidence to suggest infectious cause as U/A and CXR with no evidence of infection and patient  with normal pro calcitonin level. So far blood cultures are also no growth and patient afebrile and no systemic symptoms to suggest infectious cause.   - Vascular neurology consulted and MRI and EEG ordered as per their recs and EEG with no evidence of seizure and MRI of brain with no evidence of acute stroke and Vascular Neurology recommends to continue patient on statin therapy and DAPT as taking prior to this admit with no changes and to allow patient to have permissive HTN with -160 range on discharge sio plan to discontinue Norvasc for HTN on discharge.           Dehydration with hyponatremia    Hyponatremia improved and dehydration resolved. Patient with mild hyponatremia noted on admit and related to recent diarrhea, nausea and poor oral intake and likely that was caused by Augmentin as Augmentin known to cause GI side effects. Sodium improved from admit with IVF's from 132 on 12/18 to 134 on 12/19. IVF's discontinued and patient encouraged to drink oral fluids to maintain hydration. Patient on discharge encouraged to make sure drinks adequate fluids at least 1.5 liters a day on discharge.         Diarrhea-resolved as of 12/20/2017    Resolved. Likely related to Augmentin as side effect as diarrhea resolved after Augmentin discontinued.         Vertebral basilar insufficiency    Intracranial atherosclerosis  Patient with known VBI and intracranial stenosis. Do not want tight BP control in patient with known VBI and okay to allow permissive HTN with -160. Continue ASA and Plavix and Lipitor 40 mg po daily to treat on discharge.           Essential hypertension    · Patient's blood pressure mildly elevated but patient's on Lisinopril and Toprol XL but okay with Vascular Neurology as okay with allowing permissive HTN in patient with known VBI.   · Goal for blood pressure is -160 range as do not want tight BP control.   · Continue current treatment regimen of Toprol XL 50 mg po daily and  Lisinopril 40 mg po daily on discharge as taking at home previously but will discontinue Norvasc on discharge.         Acquired hypothyroidism    Controlled. Continue Levothyroxine 75 mcg po daily to treat on discharge.           Mixed hyperlipidemia    Controlled. Continue Lipitor and Fenofibrate to treat on discharge.        Intracranial atherosclerosis    Cont DAPT with ASA, plavix           Final Active Diagnoses:    Diagnosis Date Noted POA    PRINCIPAL PROBLEM:  Acute encephalopathy [G93.40] 12/18/2017 Yes    Dehydration with hyponatremia [E87.1] 12/18/2017 Yes    Vertebral basilar insufficiency [G45.0] 12/19/2017 Yes    Essential hypertension [I10]  Yes    Acquired hypothyroidism [E03.9]  Yes    Mixed hyperlipidemia [E78.2] 12/18/2017 Yes    Intracranial atherosclerosis [I67.2] 03/24/2017 Yes      Problems Resolved During this Admission:    Diagnosis Date Noted Date Resolved POA    Diarrhea [R19.7] 12/18/2017 12/20/2017 Yes       Discharged Condition: good    Disposition: Home-Health Care [facility]    Follow Up:  Follow-up Information     Yakov Barrett MD On 12/28/2017.    Specialty:  Internal Medicine  Why:  At 10:00 AM, Hospital Follow-up with PCP  Contact information:  [address] [facility] [address]  [phone]             [facility]    Specialty:  Home Health Services  Contact information:  [address]  [facility] [address]  [facility] [id number] [phone]                 Patient Instructions:     Diet general     Activity as tolerated     Call MD for:  temperature >100.4     Call MD for:  persistent nausea and vomiting or diarrhea     Call MD for:  severe uncontrolled pain     Call MD for:  redness, tenderness, or signs of infection (pain, swelling, redness, odor or green/yellow discharge around incision site)     Call MD for:  difficulty breathing or increased cough     Call MD for:  severe persistent headache     Call MD for:  worsening rash     Call MD for:  persistent  dizziness, light-headedness, or visual disturbances     Call MD for:  increased confusion or weakness         Significant Diagnostic Studies: Labs:   CMP   Recent Labs  Lab 12/19/17  0452 12/20/17  0647   *  134* 132*   K 4.4  4.4 3.9     102 98   CO2 25  25 24     108 99   BUN 16  16 15   CREATININE 0.7  0.7 0.7   CALCIUM 8.7  8.7 9.2   PROT 7.0  7.0 7.6   ALBUMIN 3.3*  3.3* 3.6   BILITOT 0.3  0.3 0.3   ALKPHOS 54*  54* 53*   AST 15  15 17   ALT 10  10 9*   ANIONGAP 7*  7* 10   ESTGFRAFRICA >60.0  >60.0 >60.0   EGFRNONAA >60.0  >60.0 >60.0    and CBC   Recent Labs  Lab 12/19/17  0452 12/20/17  0647   WBC 11.60 14.15*   HGB 10.3* 10.7*   HCT 32.2* 32.0*    294     Microbiology:   Blood Culture   Lab Results   Component Value Date    LABBLOO No Growth to date 12/18/2017    LABBLOO No Growth to date 12/18/2017     Recent Labs      12/18/17   0732   COLORU  Yellow   APPEARANCEUA  Clear   PHUR  5.0   PROTEINUA  Negative   GLUCUA  Negative   KETONESU  Negative   BILIRUBINUA  Negative   OCCULTUA  2+*   NITRITE  Negative   UROBILINOGEN  Negative   LEUKOCYTESUR  Trace*   RBCUA  3   WBCUA  2   BACTERIA  Rare   SQUAMEPITHEL  1        12/18/17  US Abdomen Limited   Cholelithiasis without evidence for acute cholecystitis.  Hepatomegaly and hepatic steatosis.       12/19/2017  MRI Brain W WO Contrast   No evidence of recent infarction or other acute intracranial pathology.    Generalized cerebral volume loss with mild to moderate chronic microvascular ischemic change.    The vertebrobasilar system is not well-evaluated although appears small in caliber with intermittent enhancement compatible with vertebrobasilar atherosclerotic disease as seen on prior CTA.      12/18/17  EEG  This is an abnormal routine EEG due to the mild generalized   slowing seen, suggestive of mild diffuse or multifocal cerebral   dysfunction.    No epileptiform activity or electrographic seizures  seen.    Medications:  Reconciled Home Medications:   Discharge Medication List as of 12/20/2017 11:51 AM      CONTINUE these medications which have NOT CHANGED    Details   ALPRAZolam (XANAX) 0.5 MG tablet TAKE 1 TABLET (0.5 MG TOTAL) BY MOUTH 2 (TWO) TIMES DAILY AS NEEDED FOR ANXIETY., Starting Fri 11/17/2017, Until Sun 12/17/2017, Normal      aspirin (ECOTRIN) 325 MG EC tablet Take 1 tablet (325 mg total) by mouth once daily., Starting 3/21/2017, Until Wed 3/21/18, OTC      atorvastatin (LIPITOR) 40 MG tablet Take 1 tablet (40 mg total) by mouth once daily., Starting 3/21/2017, Until Wed 3/21/18, Normal      cholecalciferol, vitamin D3, 5,000 unit Tab Take 5,000 Units by mouth once daily., Until Discontinued, Historical Med      clopidogrel (PLAVIX) 75 mg tablet Take 1 tablet (75 mg total) by mouth once daily., Starting 3/25/2017, Until Sun 3/25/18, Normal      fenofibrate 160 MG Tab Take 1 tablet (160 mg total) by mouth once daily., Starting 11/29/2016, Until Wed 11/29/17, Normal      ferrous sulfate 325 (65 FE) MG EC tablet Take 325 mg by mouth once daily., Until Discontinued, Historical Med      levothyroxine (SYNTHROID) 75 MCG tablet TAKE 1 TABLET (75 MCG TOTAL) BY MOUTH ONCE DAILY., Starting Thu 11/23/2017, Normal      lisinopril (PRINIVIL,ZESTRIL) 40 MG tablet Take 40 mg by mouth once daily. , Starting 7/5/2016, Until Discontinued, Historical Med      meclizine (ANTIVERT) 25 mg tablet Take 1 tablet (25 mg total) by mouth 3 (three) times daily as needed for Dizziness., Starting 4/10/2017, Until Discontinued, Normal      metoprolol succinate (TOPROL-XL) 50 MG 24 hr tablet Take 50 mg by mouth once daily. , Starting 7/25/2016, Until Discontinued, Historical Med      ondansetron (ZOFRAN-ODT) 8 MG TbDL Take 1 tablet (8 mg total) by mouth every 8 (eight) hours as needed., Starting 4/10/2017, Until Discontinued, Normal         STOP taking these medications       amlodipine (NORVASC) 5 MG tablet Comments:   Reason  for Stopping:         amoxicillin-clavulanate 875-125mg (AUGMENTIN) 875-125 mg per tablet Comments:   Reason for Stopping:               Indwelling Lines/Drains at time of discharge:   Lines/Drains/Airways          No matching active lines, drains, or airways          Time spent on the discharge of patient: 34 minutes  Patient was seen and examined on the date of discharge and determined to be suitable for discharge.         Altagracia Yang MD  Department of Hospital Medicine  [facility]

## 2017-12-20 NOTE — PROGRESS NOTES
Patient and son given written and verbal discharge instructions. IV discontinued with catheter intact. Pressure dressing applied. Telemetry unit removed. Transportation ordered for DC.

## 2017-12-20 NOTE — ASSESSMENT & PLAN NOTE
Stroke risk factor  Known vertebrobasilar dz and intracranial stenosis, Recommend permissive HTN  Management per primary

## 2017-12-20 NOTE — HOSPITAL COURSE
12-19-17: MRI with no acute infarct. Pt's neurologic symptoms improved, back to baseline per family.

## 2017-12-20 NOTE — ASSESSMENT & PLAN NOTE
Intracranial atherosclerosis  Patient with known VBI and intracranial stenosis. Do not want tight BP control in patient with known VBI and okay to allow permissive HTN with -160. Continue ASA and Plavix and Lipitor 40 mg po daily to treat on discharge.

## 2017-12-20 NOTE — ASSESSMENT & PLAN NOTE
Vertebrobasilar dz and intracranial stenosis seen on CTA from 4/2017   Continue DAPT and Lipitor 40 mg po daily

## 2017-12-20 NOTE — PLAN OF CARE
[facility]    HOME HEALTH ORDERS  FACE TO FACE ENCOUNTER    Patient Name: Linda Hylton   YOB: 1935     PCP: Yakov Barrett MD   PCP Address: [address] / [place] [address]   PCP Phone Number: [phone]   PCP Fax: [phone]     Encounter Date: 12/20/2017     Discharging Team: [facility]    Admit to Home Health    Diagnoses:  Active Hospital Problems    Diagnosis  POA    *Acute encephalopathy [G93.40]  Yes     Priority: 1 - High    Dehydration with hyponatremia [E87.1]  Yes     Priority: 2     Vertebral basilar insufficiency [G45.0]  Yes     Priority: 4     Essential hypertension [I10]  Yes     Priority: 5     Acquired hypothyroidism [E03.9]  Yes     Priority: 6     Mixed hyperlipidemia [E78.2]  Yes     Priority: 7     Intracranial atherosclerosis [I67.2]  Yes      Resolved Hospital Problems    Diagnosis Date Resolved POA    Diarrhea [R19.7] 12/20/2017 Yes     Priority: 3         Future Appointments  Date Time Provider Department Center   3/9/2018 11:00 AM Yakov Barrett MD [facility]        My clinical findings that support the need for the home health skilled services and home bound status are the following:  Weakness/numbness causing balance and gait disturbance due to Dehydration making it taxing to leave home.    Allergies:   Review of patient's allergies indicates:   Allergen Reactions    Sulfa (sulfonamide antibiotics) Hives        Diet: regular diet    Activities: activity as tolerated    Nursing:   SN to complete comprehensive assessment including routine vital signs. Instruct on disease process and s/s of complications to report to MD. Review/verify medication list sent home with the patient at time of discharge  and instruct patient/caregiver as needed. Frequency may be adjusted depending on start of care date.    Notify MD if SBP > 160 or < 90; DBP > 90 or < 50; HR > 120 or < 50; Temp > 101    CONSULTS:    Aide to provide assistance  with personal care, ADLs, and vital signs.    MISCELLANEOUS CARE:  N/A    WOUND CARE ORDERS  n/a    Medications: Review discharge medications with patient and family and provide education.      Current Discharge Medication List      CONTINUE these medications which have NOT CHANGED    Details   ALPRAZolam (XANAX) 0.5 MG tablet TAKE 1 TABLET (0.5 MG TOTAL) BY MOUTH 2 (TWO) TIMES DAILY AS NEEDED FOR ANXIETY.  Qty: 60 tablet, Refills: 0    Comments: Not to exceed 5 additional fills before 04/16/2018      aspirin (ECOTRIN) 325 MG EC tablet Take 1 tablet (325 mg total) by mouth once daily.  Refills: 0      atorvastatin (LIPITOR) 40 MG tablet Take 1 tablet (40 mg total) by mouth once daily.  Qty: 30 tablet, Refills: 3      cholecalciferol, vitamin D3, 5,000 unit Tab Take 5,000 Units by mouth once daily.      clopidogrel (PLAVIX) 75 mg tablet Take 1 tablet (75 mg total) by mouth once daily.  Qty: 90 tablet, Refills: 3      fenofibrate 160 MG Tab Take 1 tablet (160 mg total) by mouth once daily.  Qty: 90 tablet, Refills: 3    Associated Diagnoses: Elevated triglycerides with high cholesterol      ferrous sulfate 325 (65 FE) MG EC tablet Take 325 mg by mouth once daily.      levothyroxine (SYNTHROID) 75 MCG tablet TAKE 1 TABLET (75 MCG TOTAL) BY MOUTH ONCE DAILY.  Qty: 90 tablet, Refills: 0      lisinopril (PRINIVIL,ZESTRIL) 40 MG tablet Take 40 mg by mouth once daily.       meclizine (ANTIVERT) 25 mg tablet Take 1 tablet (25 mg total) by mouth 3 (three) times daily as needed for Dizziness.  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       ondansetron (ZOFRAN-ODT) 8 MG TbDL Take 1 tablet (8 mg total) by mouth every 8 (eight) hours as needed.  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    _________________________________  Altagracia Yang MD  12/20/2017

## 2017-12-20 NOTE — SUBJECTIVE & OBJECTIVE
Interval History: Patient mildly confused this am as thought it was 6:00 pm and worried a party this evening she had to go to. After focusing patient, she was able to respond correctly to date and year and able to state why she was in hospital. Patient denies any further diarrhea and states nausea improved.     Review of Systems   Constitutional: Negative for chills and fever.   Respiratory: Negative for cough and shortness of breath.    Cardiovascular: Negative for chest pain and palpitations.   Gastrointestinal: Positive for nausea. Negative for abdominal pain and diarrhea.   Genitourinary: Negative for dysuria.   Musculoskeletal: Negative for back pain.   Neurological: Negative for dizziness, speech difficulty and light-headedness.   Psychiatric/Behavioral: Positive for confusion. Negative for hallucinations.     Objective:     Vital Signs (Most Recent):  Temp: 99.2 °F (37.3 °C) (12/20/17 0825)  Pulse: 77 (12/20/17 0900)  Resp: 20 (12/20/17 0825)  BP: (!) 151/67 (12/20/17 0825)  SpO2: 95 % (12/20/17 0825) Vital Signs (24h Range):  Temp:  [98.2 °F (36.8 °C)-99.2 °F (37.3 °C)] 99.2 °F (37.3 °C)  Pulse:  [68-77] 77  Resp:  [16-20] 20  SpO2:  [93 %-95 %] 95 %  BP: (133-175)/(63-77) 151/67     Weight: 73.3 kg (161 lb 9.6 oz)  Body mass index is 34.97 kg/m².    Intake/Output Summary (Last 24 hours) at 12/20/17 1122  Last data filed at 12/19/17 1334   Gross per 24 hour   Intake              200 ml   Output                0 ml   Net              200 ml      Physical Exam   Constitutional: She appears well-developed and well-nourished. No distress.   HENT:   Mouth/Throat: Oropharynx is clear and moist.   Eyes: Conjunctivae are normal.   Neck: No JVD present.   Cardiovascular: Normal rate, regular rhythm and normal heart sounds.  Exam reveals no gallop and no friction rub.    No murmur heard.  Pulmonary/Chest: Effort normal and breath sounds normal. She has no wheezes.   Abdominal: Soft. Bowel sounds are normal. She  exhibits no distension. There is no tenderness.   Musculoskeletal: She exhibits no edema.   Neurological: She is alert.   Patient oriented to person, place, situation but not time   Skin: Skin is warm. Capillary refill takes less than 2 seconds. No erythema.   Psychiatric: She has a normal mood and affect. Her behavior is normal.   Nursing note and vitals reviewed.      Significant Labs:   Blood Culture:   Recent Labs  Lab 12/18/17 1945 12/18/17 1946   LABBLOO No Growth to date  No Growth to date No Growth to date  No Growth to date     CBC:   Recent Labs  Lab 12/19/17 0452 12/20/17  0647   WBC 11.60 14.15*   HGB 10.3* 10.7*   HCT 32.2* 32.0*    294     CMP:   Recent Labs  Lab 12/19/17 0452 12/20/17  0647   *  134* 132*   K 4.4  4.4 3.9     102 98   CO2 25  25 24     108 99   BUN 16  16 15   CREATININE 0.7  0.7 0.7   CALCIUM 8.7  8.7 9.2   PROT 7.0  7.0 7.6   ALBUMIN 3.3*  3.3* 3.6   BILITOT 0.3  0.3 0.3   ALKPHOS 54*  54* 53*   AST 15  15 17   ALT 10  10 9*   ANIONGAP 7*  7* 10   EGFRNONAA >60.0  >60.0 >60.0     Significant Imaging: I have reviewed all pertinent imaging results/findings within the past 24 hours.

## 2017-12-21 RX ORDER — ALPRAZOLAM 0.5 MG/1
0.5 TABLET ORAL 2 TIMES DAILY PRN
Qty: 60 TABLET | Refills: 0 | Status: SHIPPED | OUTPATIENT
Start: 2017-12-21 | End: 2018-01-22 | Stop reason: SDUPTHER

## 2017-12-22 ENCOUNTER — PATIENT OUTREACH (OUTPATIENT)
Dept: ADMINISTRATIVE | Facility: CLINIC | Age: 82
End: 2017-12-22

## 2017-12-22 NOTE — PATIENT INSTRUCTIONS
Discharge Instructions for Stroke  You have been diagnosed with a stroke, or with a TIA (transient ischemic attack). Or you have been identified as having a high risk for stroke. During a stroke, blood stops flowing to part of your brain. This can damage areas in the brain that control other parts of the body. Symptoms after a stroke depend on which part of the brain has been affected.  Stroke risk factors  Once youve had a stroke, youre at greater risk for another one. Listed below are some other factors that can increase your risk for a stroke:  · High blood pressure  · High cholesterol  · Cigarette or cigar smoking  · Diabetes  · Carotid or other artery disease  · Atrial fibrillation, atrial flutter, or other heart disease  · Not being physically active  · Obesity  · Certain blood disorders (such as sickle cell anemia)  · Excessive alcohol use  · Abuse of street drugs  · Race  · Gender  · Family history of stroke  · Diet high in salty, fried, or greasy foods  Changes in daily living  Doing your regular tasks may be difficult after youve had a stroke, but you can learn new ways to manage your daily activities. In fact, doing daily activities may help you to regain muscle strength and bring back function to affected limbs. Be patient, give yourself time to adjust, and appreciate the progress you make.  Daily activities  You may be at risk of falling. Make changes to your home to help you walk more easily. A therapist will decide if you need an assistive device to walk safely.  You may need to see an occupational therapist or physical therapist to learn new ways of doing things. For example, you may need to make adjustments when bathing or dressing:  Tips for showering or bathing  · Test the water temperature with a hand or foot that was not affected by the stroke.  · Use grab bars, a shower seat, a hand-held showerhead, and a long-handled brush.  Tips for getting dressed  · Dress while sitting, starting with  the affected side or limb.  · Wear shirts that pull easily over your head. Wear pants or skirts with elastic waistbands.  · Use zippers with loops attached to the pull tabs.  Lifestyle changes  · Take your medicines exactly as directed. Dont skip doses.  · Begin an exercise program. Ask your provider how to get started. Also ask how much activity you should try to get on a daily or weekly basis. You can benefit from simple activities such as walking or gardening.  · Limit alcohol intake. Men should have no more than 2 alcoholic drinks a day. Women should limit themselves to 1 alcoholic drink per day.  · Know your cholesterol level. Follow your providers recommendations about how to keep cholesterol under control.  · If you are a smoker, quit now. Join a stop-smoking program to improve your chances of success. Ask your provider about medicines or other methods to help you quit.  · Learn stress management techniques to help you deal with stress in your home and work life.  Diet  Your healthcare provider will give you information on dietary changes that you may need to make, based on your situation. Your provider may recommend that you see a registered dietitian for help with diet changes. Changes may include:  · Reducing fat and cholesterol intake  · Reducing salt (sodium) intake, especially if you have high blood pressure  · Eating more fresh vegetables and fruits  · Eating more lean proteins, such as fish, poultry, and beans and peas (legumes)  · Eating less red meat and processed meats  · Using low-fat dairy products  · Limiting vegetable oils and nut oils  · Limiting sweets and processed foods such as chips, cookies, and baked goods  Follow-up care  · Keep your medical appointments. Close follow-up is important to stroke rehabilitation and recovery.  · Some medicines require blood tests to check for progress or problems. Keep follow-up appointments for any blood tests ordered by your providers.  When to call  911  Call 911 right away if you have any of the following symptoms of stroke:  · Weakness, tingling, or loss of feeling on one side of your face or body  · Sudden double vision or trouble seeing in one or both eyes  · Sudden trouble talking or slurred speech  · Trouble understanding others  · Sudden, severe headache  · Dizziness, loss of balance, or a sense of falling  · Blackouts or seizures      F.A.S.T. is an easy way to remember the signs of stroke. When you see these signs, you know that you need to call 911 fast.  F.A.S.T. stands for:  · F is for face drooping. One side of the face is drooping or numb. When the person smiles, the smile is uneven.  · A is for arm weakness. One arm is weak or numb. When the person lifts both arms at the same time, one arm may drift downward.  · S is for speech difficulty. You may notice slurred speech or trouble speaking. The person can't repeat a simple sentence correctly when asked.  · T is for time to call 911. If someone shows any of these symptoms, even if they go away, call 911 immediately. Make note of the time the symptoms first appeared.  Date Last Reviewed: 8/26/2015 © 2000-2017 [employer]. [address] [place] [address], [place] [address]. All rights reserved. This information is not intended as a substitute for professional medical care. Always follow your healthcare professional's instructions.

## 2017-12-23 LAB
BACTERIA BLD CULT: NORMAL
BACTERIA BLD CULT: NORMAL

## 2017-12-26 PROCEDURE — 99285 EMERGENCY DEPT VISIT HI MDM: CPT | Mod: 25

## 2017-12-26 PROCEDURE — 96374 THER/PROPH/DIAG INJ IV PUSH: CPT

## 2017-12-26 PROCEDURE — 96361 HYDRATE IV INFUSION ADD-ON: CPT

## 2017-12-26 PROCEDURE — 99285 EMERGENCY DEPT VISIT HI MDM: CPT | Mod: ,,, | Performed by: PHYSICIAN ASSISTANT

## 2017-12-27 ENCOUNTER — HOSPITAL ENCOUNTER (OUTPATIENT)
Facility: HOSPITAL | Age: 82
Discharge: HOME | End: 2017-12-29
Attending: EMERGENCY MEDICINE | Admitting: HOSPITALIST
Payer: MEDICARE

## 2017-12-27 DIAGNOSIS — I10 ESSENTIAL HYPERTENSION: ICD-10-CM

## 2017-12-27 DIAGNOSIS — E78.2 MIXED HYPERLIPIDEMIA: ICD-10-CM

## 2017-12-27 DIAGNOSIS — G45.0 VERTEBRAL BASILAR INSUFFICIENCY: ICD-10-CM

## 2017-12-27 DIAGNOSIS — I67.2 INTRACRANIAL ATHEROSCLEROSIS: ICD-10-CM

## 2017-12-27 DIAGNOSIS — D50.9 IRON DEFICIENCY ANEMIA, UNSPECIFIED IRON DEFICIENCY ANEMIA TYPE: ICD-10-CM

## 2017-12-27 DIAGNOSIS — N30.00 ACUTE CYSTITIS WITHOUT HEMATURIA: Primary | ICD-10-CM

## 2017-12-27 DIAGNOSIS — E03.9 ACQUIRED HYPOTHYROIDISM: ICD-10-CM

## 2017-12-27 DIAGNOSIS — N39.0 URINARY TRACT INFECTION WITHOUT HEMATURIA, SITE UNSPECIFIED: ICD-10-CM

## 2017-12-27 DIAGNOSIS — R41.0 CONFUSION: ICD-10-CM

## 2017-12-27 DIAGNOSIS — R53.83 FATIGUE, UNSPECIFIED TYPE: ICD-10-CM

## 2017-12-27 DIAGNOSIS — R53.83 LETHARGY: ICD-10-CM

## 2017-12-27 LAB
ALBUMIN SERPL BCP-MCNC: 3.3 G/DL
ALP SERPL-CCNC: 56 U/L
ALT SERPL W/O P-5'-P-CCNC: 14 U/L
ANION GAP SERPL CALC-SCNC: 9 MMOL/L
AST SERPL-CCNC: 20 U/L
BACTERIA #/AREA URNS AUTO: ABNORMAL /HPF
BASOPHILS # BLD AUTO: 0.07 K/UL
BASOPHILS NFR BLD: 1 %
BILIRUB SERPL-MCNC: 0.6 MG/DL
BILIRUB UR QL STRIP: NEGATIVE
BUN SERPL-MCNC: 13 MG/DL
CALCIUM SERPL-MCNC: 8.8 MG/DL
CHLORIDE SERPL-SCNC: 98 MMOL/L
CLARITY UR REFRACT.AUTO: ABNORMAL
CO2 SERPL-SCNC: 27 MMOL/L
COLOR UR AUTO: YELLOW
CREAT SERPL-MCNC: 0.8 MG/DL
DIFFERENTIAL METHOD: ABNORMAL
EOSINOPHIL # BLD AUTO: 0.2 K/UL
EOSINOPHIL NFR BLD: 3.1 %
ERYTHROCYTE [DISTWIDTH] IN BLOOD BY AUTOMATED COUNT: 14.6 %
EST. GFR  (AFRICAN AMERICAN): >60 ML/MIN/1.73 M^2
EST. GFR  (NON AFRICAN AMERICAN): >60 ML/MIN/1.73 M^2
GLUCOSE SERPL-MCNC: 94 MG/DL
GLUCOSE UR QL STRIP: NEGATIVE
HCT VFR BLD AUTO: 30.5 %
HGB BLD-MCNC: 9.9 G/DL
HGB UR QL STRIP: ABNORMAL
HYALINE CASTS UR QL AUTO: 1 /LPF
IMM GRANULOCYTES # BLD AUTO: 0.04 K/UL
IMM GRANULOCYTES NFR BLD AUTO: 0.5 %
KETONES UR QL STRIP: NEGATIVE
LEUKOCYTE ESTERASE UR QL STRIP: ABNORMAL
LYMPHOCYTES # BLD AUTO: 1.7 K/UL
LYMPHOCYTES NFR BLD: 22.6 %
MCH RBC QN AUTO: 27.4 PG
MCHC RBC AUTO-ENTMCNC: 32.5 G/DL
MCV RBC AUTO: 85 FL
MICROSCOPIC COMMENT: ABNORMAL
MONOCYTES # BLD AUTO: 0.7 K/UL
MONOCYTES NFR BLD: 9.8 %
NEUTROPHILS # BLD AUTO: 4.6 K/UL
NEUTROPHILS NFR BLD: 63 %
NITRITE UR QL STRIP: NEGATIVE
NRBC BLD-RTO: 0 /100 WBC
PH UR STRIP: 6 [PH] (ref 5–8)
PLATELET # BLD AUTO: 235 K/UL
PMV BLD AUTO: 10.8 FL
POTASSIUM SERPL-SCNC: 3.5 MMOL/L
PROCALCITONIN SERPL IA-MCNC: 0.06 NG/ML
PROT SERPL-MCNC: 7 G/DL
PROT UR QL STRIP: NEGATIVE
RBC # BLD AUTO: 3.61 M/UL
RBC #/AREA URNS AUTO: 1 /HPF (ref 0–4)
SODIUM SERPL-SCNC: 134 MMOL/L
SP GR UR STRIP: 1.01 (ref 1–1.03)
SQUAMOUS #/AREA URNS AUTO: 8 /HPF
URN SPEC COLLECT METH UR: ABNORMAL
UROBILINOGEN UR STRIP-ACNC: 4 EU/DL
WBC # BLD AUTO: 7.34 K/UL
WBC #/AREA URNS AUTO: 20 /HPF (ref 0–5)

## 2017-12-27 PROCEDURE — 87427 SHIGA-LIKE TOXIN AG IA: CPT

## 2017-12-27 PROCEDURE — 63600175 PHARM REV CODE 636 W HCPCS: Performed by: PHYSICIAN ASSISTANT

## 2017-12-27 PROCEDURE — G0378 HOSPITAL OBSERVATION PER HR: HCPCS

## 2017-12-27 PROCEDURE — 80053 COMPREHEN METABOLIC PANEL: CPT

## 2017-12-27 PROCEDURE — 25000003 PHARM REV CODE 250: Performed by: PHYSICIAN ASSISTANT

## 2017-12-27 PROCEDURE — 87088 URINE BACTERIA CULTURE: CPT

## 2017-12-27 PROCEDURE — 87077 CULTURE AEROBIC IDENTIFY: CPT

## 2017-12-27 PROCEDURE — 85025 COMPLETE CBC W/AUTO DIFF WBC: CPT

## 2017-12-27 PROCEDURE — 36415 COLL VENOUS BLD VENIPUNCTURE: CPT

## 2017-12-27 PROCEDURE — 87186 SC STD MICRODIL/AGAR DIL: CPT | Mod: 59

## 2017-12-27 PROCEDURE — 87086 URINE CULTURE/COLONY COUNT: CPT

## 2017-12-27 PROCEDURE — 81001 URINALYSIS AUTO W/SCOPE: CPT

## 2017-12-27 PROCEDURE — 84145 PROCALCITONIN (PCT): CPT

## 2017-12-27 PROCEDURE — 87046 STOOL CULTR AEROBIC BACT EA: CPT

## 2017-12-27 PROCEDURE — 99219 PR INITIAL OBSERVATION CARE,LEVL II: CPT | Mod: ,,, | Performed by: PHYSICIAN ASSISTANT

## 2017-12-27 PROCEDURE — 87045 FECES CULTURE AEROBIC BACT: CPT

## 2017-12-27 PROCEDURE — A4216 STERILE WATER/SALINE, 10 ML: HCPCS | Performed by: PHYSICIAN ASSISTANT

## 2017-12-27 PROCEDURE — 87449 NOS EACH ORGANISM AG IA: CPT

## 2017-12-27 RX ORDER — LISINOPRIL 20 MG/1
40 TABLET ORAL
Status: COMPLETED | OUTPATIENT
Start: 2017-12-27 | End: 2017-12-27

## 2017-12-27 RX ORDER — SODIUM CHLORIDE 0.9 % (FLUSH) 0.9 %
3 SYRINGE (ML) INJECTION EVERY 8 HOURS
Status: DISCONTINUED | OUTPATIENT
Start: 2017-12-27 | End: 2017-12-29 | Stop reason: HOSPADM

## 2017-12-27 RX ORDER — ONDANSETRON 2 MG/ML
4 INJECTION INTRAMUSCULAR; INTRAVENOUS EVERY 8 HOURS PRN
Status: DISCONTINUED | OUTPATIENT
Start: 2017-12-27 | End: 2017-12-29 | Stop reason: HOSPADM

## 2017-12-27 RX ORDER — CLOPIDOGREL BISULFATE 75 MG/1
75 TABLET ORAL DAILY
Status: DISCONTINUED | OUTPATIENT
Start: 2017-12-27 | End: 2017-12-29 | Stop reason: HOSPADM

## 2017-12-27 RX ORDER — CEFTRIAXONE 1 G/1
1 INJECTION, POWDER, FOR SOLUTION INTRAMUSCULAR; INTRAVENOUS
Status: COMPLETED | OUTPATIENT
Start: 2017-12-27 | End: 2017-12-27

## 2017-12-27 RX ORDER — METOPROLOL SUCCINATE 50 MG/1
50 TABLET, EXTENDED RELEASE ORAL DAILY
Status: DISCONTINUED | OUTPATIENT
Start: 2017-12-27 | End: 2017-12-29 | Stop reason: HOSPADM

## 2017-12-27 RX ORDER — HYDRALAZINE HYDROCHLORIDE 25 MG/1
25 TABLET, FILM COATED ORAL EVERY 6 HOURS PRN
Status: DISCONTINUED | OUTPATIENT
Start: 2017-12-27 | End: 2017-12-29 | Stop reason: HOSPADM

## 2017-12-27 RX ORDER — ATORVASTATIN CALCIUM 20 MG/1
40 TABLET, FILM COATED ORAL DAILY
Status: DISCONTINUED | OUTPATIENT
Start: 2017-12-27 | End: 2017-12-29 | Stop reason: HOSPADM

## 2017-12-27 RX ORDER — ENOXAPARIN SODIUM 100 MG/ML
40 INJECTION SUBCUTANEOUS EVERY 24 HOURS
Status: DISCONTINUED | OUTPATIENT
Start: 2017-12-27 | End: 2017-12-29 | Stop reason: HOSPADM

## 2017-12-27 RX ORDER — LISINOPRIL 20 MG/1
40 TABLET ORAL DAILY
Status: DISCONTINUED | OUTPATIENT
Start: 2017-12-28 | End: 2017-12-29 | Stop reason: HOSPADM

## 2017-12-27 RX ORDER — ACETAMINOPHEN 325 MG/1
650 TABLET ORAL EVERY 6 HOURS PRN
Status: DISCONTINUED | OUTPATIENT
Start: 2017-12-27 | End: 2017-12-29 | Stop reason: HOSPADM

## 2017-12-27 RX ORDER — ASPIRIN 325 MG
325 TABLET, DELAYED RELEASE (ENTERIC COATED) ORAL DAILY
Status: DISCONTINUED | OUTPATIENT
Start: 2017-12-27 | End: 2017-12-29 | Stop reason: HOSPADM

## 2017-12-27 RX ORDER — METOPROLOL SUCCINATE 50 MG/1
50 TABLET, EXTENDED RELEASE ORAL DAILY
Status: DISCONTINUED | OUTPATIENT
Start: 2017-12-27 | End: 2017-12-27

## 2017-12-27 RX ORDER — FERROUS SULFATE 325(65) MG
325 TABLET, DELAYED RELEASE (ENTERIC COATED) ORAL DAILY
Status: DISCONTINUED | OUTPATIENT
Start: 2017-12-27 | End: 2017-12-29 | Stop reason: HOSPADM

## 2017-12-27 RX ORDER — ACETAMINOPHEN 500 MG
5000 TABLET ORAL DAILY
Status: DISCONTINUED | OUTPATIENT
Start: 2017-12-27 | End: 2017-12-29 | Stop reason: HOSPADM

## 2017-12-27 RX ORDER — LEVOTHYROXINE SODIUM 75 UG/1
75 TABLET ORAL DAILY
Status: DISCONTINUED | OUTPATIENT
Start: 2017-12-28 | End: 2017-12-29 | Stop reason: HOSPADM

## 2017-12-27 RX ORDER — ONDANSETRON 8 MG/1
8 TABLET, ORALLY DISINTEGRATING ORAL EVERY 8 HOURS PRN
Status: DISCONTINUED | OUTPATIENT
Start: 2017-12-27 | End: 2017-12-29 | Stop reason: HOSPADM

## 2017-12-27 RX ORDER — MECLIZINE HYDROCHLORIDE 25 MG/1
25 TABLET ORAL 3 TIMES DAILY PRN
Status: DISCONTINUED | OUTPATIENT
Start: 2017-12-27 | End: 2017-12-29 | Stop reason: HOSPADM

## 2017-12-27 RX ADMIN — CEFTRIAXONE SODIUM 1 G: 1 INJECTION, POWDER, FOR SOLUTION INTRAMUSCULAR; INTRAVENOUS at 06:12

## 2017-12-27 RX ADMIN — MECLIZINE HYDROCHLORIDE 25 MG: 25 TABLET ORAL at 10:12

## 2017-12-27 RX ADMIN — ENOXAPARIN SODIUM 40 MG: 100 INJECTION SUBCUTANEOUS at 10:12

## 2017-12-27 RX ADMIN — SODIUM CHLORIDE 500 ML: 0.9 INJECTION, SOLUTION INTRAVENOUS at 03:12

## 2017-12-27 RX ADMIN — REGULAR STRENGTH 325 MG: 325 TABLET ORAL at 12:12

## 2017-12-27 RX ADMIN — SODIUM CHLORIDE 500 ML: 900 INJECTION, SOLUTION INTRAVENOUS at 04:12

## 2017-12-27 RX ADMIN — CLOPIDOGREL 75 MG: 75 TABLET, FILM COATED ORAL at 12:12

## 2017-12-27 RX ADMIN — ATORVASTATIN CALCIUM 40 MG: 20 TABLET, FILM COATED ORAL at 12:12

## 2017-12-27 RX ADMIN — SODIUM CHLORIDE, PRESERVATIVE FREE 3 ML: 5 INJECTION INTRAVENOUS at 10:12

## 2017-12-27 RX ADMIN — FERROUS SULFATE TAB EC 325 MG (65 MG FE EQUIVALENT) 325 MG: 325 (65 FE) TABLET DELAYED RESPONSE at 12:12

## 2017-12-27 RX ADMIN — LISINOPRIL 40 MG: 20 TABLET ORAL at 04:12

## 2017-12-27 RX ADMIN — ACETAMINOPHEN 650 MG: 325 TABLET ORAL at 10:12

## 2017-12-27 RX ADMIN — METOPROLOL SUCCINATE 50 MG: 50 TABLET, EXTENDED RELEASE ORAL at 12:12

## 2017-12-27 NOTE — ED NOTES
Pt resting in bed. No acute distress noted. Respirations even and unlabored. Remains on cardiac monitor with continuous pulse ox and BP rounding. No new complaints voiced. Side rails up x2. Call light within reach. Will continue to monitor.

## 2017-12-27 NOTE — ASSESSMENT & PLAN NOTE
Known vertebral basilar insufficiency vs failure to thrive  - Alert and oriented x4; unable to report how/why she got to hospital  - CTH shows no evidence of acute hemorrhage or major vascular territory infarction; generalized cerebral volume loss with moderate chronic microvascular ischemic changes similar to prior exam.  - Afebrile without leukocytosis  - UA unremarkable  - Nonfocal neuro exam  - Able to follow commands  - Recent admission 12/18-12/20 with similar presentation: mild disorientation and lethargy on admit suspected to be 2/2 dehydration in patient with known vertebrobasilar insufficiency causing transient neurologic symptoms. EEG with no evidence of seizure and MRI of brain with no evidence of acute stroke   - CXR, utox, procalcitonin pending  - Neuro checks q4  - PT/OT consulted

## 2017-12-27 NOTE — HPI
"82 year old female with a PMHx of HTN, HLD, R cerebral vermal ischemic stroke in 3/2017, extrensive vertebral basilar disease, hypothyroidism, and iron deficiency anemia presenting to the ED for evaluation of fatigue. Pt c/o fatigue, weakness, blurry vision, and urinary frequency. Pt cannot recall how she ended up in ER. Pt alert and oriented to self, place, year and president. Pt reports her son lives with her. Pt has no other complaints; denies chest pain, SOB, abdominal pain, N/V/D, fever, chills, decreased PO intake, headache, and recent illness.    Of note, patient was recently admitted 12/18-12/20 with similar presentation and was evaluated by vascular neurology. Per notes, suspect patient's symptoms were 2/2 "patient most likely had presyncope as prior to admit patient was having poor oral intake with nausea and diarrhea and likely got hypovolemic and with her known vertebrobasilar insufficiency likely triggered a neurologic response due to brain hypoperfusion". Sx resolved with fluid resuscitation. No acute findings of labs or imaging.    In the ED, pt is hypertensive. Afebrile without leukocytosis. >95% on RA. UA shows 3+ leukocytes and 20 WBC. Pt received 1 dose of CTX and 1L of IVF.  "

## 2017-12-27 NOTE — SUBJECTIVE & OBJECTIVE
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    PARATHYROIDECTOMY Right 2012    THYROID SURGERY Right 2012    Partial thyroidectomy       Review of patient's allergies indicates:   Allergen Reactions    Sulfa (sulfonamide antibiotics) Hives       No current facility-administered medications on file prior to encounter.      Current Outpatient Prescriptions on File Prior to Encounter   Medication Sig    ALPRAZolam (XANAX) 0.5 MG tablet TAKE 1 TABLET (0.5 MG TOTAL) BY MOUTH 2 (TWO) TIMES DAILY AS NEEDED FOR ANXIETY.    aspirin (ECOTRIN) 325 MG EC tablet Take 1 tablet (325 mg total) by mouth once daily.    atorvastatin (LIPITOR) 40 MG tablet Take 1 tablet (40 mg total) by mouth once daily.    cholecalciferol, vitamin D3, 5,000 unit Tab Take 5,000 Units by mouth once daily.    clopidogrel (PLAVIX) 75 mg tablet Take 1 tablet (75 mg total) by mouth once daily.    fenofibrate 160 MG Tab Take 1 tablet (160 mg total) by mouth once daily.    ferrous sulfate 325 (65 FE) MG EC tablet Take 325 mg by mouth once daily.    levothyroxine (SYNTHROID) 75 MCG tablet TAKE 1 TABLET (75 MCG TOTAL) BY MOUTH ONCE DAILY.    lisinopril (PRINIVIL,ZESTRIL) 40 MG tablet Take 40 mg by mouth once daily.     meclizine (ANTIVERT) 25 mg tablet Take 1 tablet (25 mg total) by mouth 3 (three) times daily as needed for Dizziness.    metoprolol succinate (TOPROL-XL) 50 MG 24 hr tablet Take 50 mg by mouth once daily.     ondansetron (ZOFRAN-ODT) 8 MG TbDL Take 1 tablet (8 mg total) by mouth every 8 (eight) hours  as needed.     Family History     Problem Relation (Age of Onset)    Diabetes Son    Heart disease Father, Sister, Brother        Social History Main Topics    Smoking status: Never Smoker    Smokeless tobacco: Never Used    Alcohol use Not on file    Drug use: Unknown    Sexual activity: No     Review of Systems   Constitutional: Positive for fatigue. Negative for chills, diaphoresis and fever.   HENT: Negative for congestion, hearing loss, sore throat and trouble swallowing.    Eyes: Positive for visual disturbance. Negative for photophobia.   Respiratory: Negative for cough, chest tightness, shortness of breath and wheezing.    Cardiovascular: Negative for chest pain, palpitations and leg swelling.   Gastrointestinal: Negative for abdominal distention, abdominal pain, diarrhea, nausea and vomiting.   Endocrine: Negative for cold intolerance and heat intolerance.   Genitourinary: Positive for frequency. Negative for difficulty urinating, dysuria, flank pain and hematuria.   Musculoskeletal: Negative for back pain, gait problem, myalgias and neck pain.   Skin: Negative for rash and wound.   Allergic/Immunologic: Negative for immunocompromised state.   Neurological: Positive for weakness. Negative for dizziness, speech difficulty, light-headedness, numbness and headaches.   Psychiatric/Behavioral: Negative for agitation, confusion and dysphoric mood. The patient is not nervous/anxious.      Objective:     Vital Signs (Most Recent):  Temp: 97.7 °F (36.5 °C) (12/27/17 1645)  Pulse: 74 (12/27/17 1645)  Resp: 14 (12/27/17 1645)  BP: 129/68 (12/27/17 1645)  SpO2: (!) 94 % (12/27/17 1645) Vital Signs (24h Range):  Temp:  [97.7 °F (36.5 °C)-98.4 °F (36.9 °C)] 97.7 °F (36.5 °C)  Pulse:  [60-90] 74  Resp:  [14-23] 14  SpO2:  [94 %-100 %] 94 %  BP: (129-212)/() 129/68     Weight: 108.9 kg (240 lb)  Body mass index is 51.94 kg/m².    Physical Exam   Constitutional: She is oriented to person, place, and time. She  appears well-developed and well-nourished. No distress.   HENT:   Head: Normocephalic and atraumatic.   Eyes: Conjunctivae and EOM are normal. Right eye exhibits no discharge. Left eye exhibits no discharge. No scleral icterus.   Neck: Normal range of motion. Neck supple. No JVD present. No tracheal deviation present.   Cardiovascular: Normal rate, regular rhythm and intact distal pulses.    Pulmonary/Chest: Effort normal and breath sounds normal. No respiratory distress. She has no wheezes.   Abdominal: Soft. Bowel sounds are normal. She exhibits no distension and no mass. There is no tenderness. There is no guarding.   Musculoskeletal: Normal range of motion. She exhibits no edema, tenderness or deformity.   Neurological: She is alert and oriented to person, place, and time. No cranial nerve deficit. She exhibits normal muscle tone.   nonfocal neuro exam   Skin: Skin is warm and dry. She is not diaphoretic. No erythema.   Psychiatric: She has a normal mood and affect. Her speech is normal and behavior is normal. Thought content normal. Cognition and memory are impaired.   Vitals reviewed.        CRANIAL NERVES     CN III, IV, VI   Extraocular motions are normal.        Significant Labs: All pertinent labs within the past 24 hours have been reviewed.    Significant Imaging: I have reviewed all pertinent imaging results/findings within the past 24 hours.

## 2017-12-27 NOTE — ED NOTES
The patient was incontinent of urine, linens changed and perineal care given. Patient repositioned for comfort, will continue to monitor.

## 2017-12-27 NOTE — ED TRIAGE NOTES
Patient presents to the ED c/o fatigue. Patient denies any chest pain, SOB or any other symptom. Patient falling asleep while talking. Reports that she lives at home with her son and has been tired. Denies any other symptoms.

## 2017-12-27 NOTE — ED NOTES
Placed in EMS hallway per CN to wait for arrival of transport. All personal belongings w/ pt. Son notified of room placement

## 2017-12-27 NOTE — PROGRESS NOTES
Patient arrived to OBS #7. Patient transferred from stretcher to bed. 2LNC in place. Patient stated that she needed to have a BM. Patient placed on bedpan. Bed light provided and patient instructed to call when done.

## 2017-12-27 NOTE — ED PROVIDER NOTES
"Encounter Date: 12/26/2017       History     Chief Complaint   Patient presents with    Fatigue     Fatigue x1. Pt is awake and orietned x4.      82-year-old female presents to the ER via EMS for evaluation of fatigue, and lethargy.  She was here within the past week for similar symptoms.  She was admitted and evaluated by vascular neurology and internal medicine.    Her the discharge summary, the thought process was that the patient's symptoms were secondary to      "Patient most likely had presyncope as prior to admit patient was having poor oral intake with nausea and diarrhea and likely got hypovolemic and with her known vertebrobasilar insufficiency likely triggered a neurologic response due to brain hypoperfusion".     Her symptoms resolved after fluid resuscitation.  No acute on labs or imaging.     She presents tonight in similar fashion.  On my assessment she is arousable, but fall asleep mid conversation.  Alert and oriented.           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    Vertebral basilar insufficiency 12/19/2017    Vitamin D deficiency      Past Surgical History:   Procedure Laterality Date    APPENDECTOMY      [facility]    PARATHYROIDECTOMY Right 2012    THYROID SURGERY Right 2012    Partial thyroidectomy     Family History   Problem Relation Age of Onset    Heart disease Father     Heart disease Sister     Heart disease Brother     Diabetes Son      Social History   Substance Use Topics    Smoking status: Never Smoker    Smokeless tobacco: Never Used    Alcohol use Not on file     Review of Systems "   Constitutional: Positive for fatigue. Negative for fever.   HENT: Negative for sore throat.    Respiratory: Negative for shortness of breath.    Cardiovascular: Negative for chest pain.   Gastrointestinal: Negative for nausea.   Genitourinary: Negative for dysuria.   Musculoskeletal: Negative for back pain.   Skin: Negative for rash.   Neurological: Negative for weakness.   Hematological: Does not bruise/bleed easily.       Physical Exam     Initial Vitals [12/26/17 2210]   BP Pulse Resp Temp SpO2   134/78 84 18 98.4 °F (36.9 °C) 99 %      MAP       96.67         Physical Exam    Constitutional: Vital signs are normal. She appears well-developed and well-nourished.   HENT:   Head: Normocephalic and atraumatic.   Eyes: Conjunctivae are normal.   Cardiovascular: Normal rate and regular rhythm. Exam reveals no gallop and no friction rub.    No murmur heard.  Pulmonary/Chest: No respiratory distress. She has no wheezes. She has no rhonchi. She has no rales. She exhibits no tenderness.   Abdominal: Soft. Normal appearance and bowel sounds are normal.   Musculoskeletal: Normal range of motion.   Neurological: She is alert and oriented to person, place, and time.   No acute neuro deficits   Skin: Skin is warm and intact.   Psychiatric: She has a normal mood and affect. Her speech is normal and behavior is normal. Cognition and memory are normal.         ED Course   Procedures  Labs Reviewed   CBC W/ AUTO DIFFERENTIAL - Abnormal; Notable for the following:        Result Value    RBC 3.61 (*)     Hemoglobin 9.9 (*)     Hematocrit 30.5 (*)     RDW 14.6 (*)     All other components within normal limits   URINALYSIS, REFLEX TO URINE CULTURE - Abnormal; Notable for the following:     Appearance, UA Hazy (*)     Occult Blood UA 2+ (*)     Leukocytes, UA 3+ (*)     All other components within normal limits   URINALYSIS MICROSCOPIC - Abnormal; Notable for the following:     WBC, UA 20 (*)     All other components within normal  limits   COMPREHENSIVE METABOLIC PANEL - Abnormal; Notable for the following:     Sodium 134 (*)     Albumin 3.3 (*)     All other components within normal limits   CULTURE, URINE             Medical Decision Making:   ED Management:  82-year-old female with lethargy and fatigue.   Laboratory evaluation in the ER significant for a UTI  Plan: Will treat with Rocephin in the ER.   Unable to contact patient's family to get the story of how the patient has been doing at home since her discharge and the reason for her arrival to the ER tonight.   EMS reported that the patient had been doing well and then became confused tonight.   After receiving 1 L fluids, the patient is still arousable, but will fall asleep mid conversation.  She is alert and oriented to have random episodes of tangential speech that was observed by me.   To admit to internal medicine                   ED Course      Clinical Impression:   The primary encounter diagnosis was Confusion. Diagnoses of Urinary tract infection without hematuria, site unspecified and Lethargy were also pertinent to this visit.    Disposition:   Disposition: Placed in Observation  Condition: Stable                        Jacob Timmons PA-C  12/27/17 0623

## 2017-12-27 NOTE — ED NOTES
Pt resting in bed. No acute distress noted. Respirations even and unlabored. Remains on cardiac monitor with continuous pulse ox and BP rounding. Side rails up x2. Call light within reach. Will continue to monitor.

## 2017-12-27 NOTE — ASSESSMENT & PLAN NOTE
- Hypertensive on admit  - Resumed home Toprol 50 mg daily and lisinopril 40 mg daily  - Per [facility] neuro notes from last admission, allow for permissive HTN 2/2 known vertebrobasilar dz and intracranial stenosis  - Will continue to monitor

## 2017-12-27 NOTE — PLAN OF CARE
Problem: Patient Care Overview  Goal: Plan of Care Review  Outcome: Ongoing (interventions implemented as appropriate)  POC reviewed with pt. Pt progressing toward goals

## 2017-12-27 NOTE — NURSING
Pt arrived to OBS 7 from ED via stretcher. Pt aao x 3. Safety precautions maintained. Bed in low position, wheels locked. Side rails up x 2. Call light within reach. Pt oriented to  and call light bell.

## 2017-12-27 NOTE — ED NOTES
Patient assisted on bedpan to urinate. Patient tearful and states that it feels like she always has to urinate.

## 2017-12-27 NOTE — PROGRESS NOTES
Patient removed from bedpan. Patient cleaned. Barrier cream applied. Blue pads changed. Diaper placed per patient request. Bed locked in low with 2 side rails up. Call light within reach. Patient complaint of abdominal pain.

## 2017-12-27 NOTE — ED NOTES
Patient identifiers have been checked and are correct.      LOC: The patient is lethargic but arouses to speech. The patient is oriented x 3.  APPEARANCE: No acute distress noted.   PSYCHOSOCIAL: Patient is calm and cooperative. Patients insight and judgement are appropriate to situation. Appears clean, well maintained, with clothing appropriate to environment. No evidence of delusions, hallucinations, or psychosis.  SKIN: The skin is warm, dry, and intact. No bruising/discolorations noted.  RESPIRATORY: Airway is open and patent. Bilateral chest rise and fall. Respirations are spontaneous, even and unlabored. Normal effort and rate noted. No accessory muscle use noted.   CARDIAC: Patient has a normal rate. No peripheral edema noted. Capillary refill <3 seconds.   ABDOMEN: Soft and non tender to palpation. No distention noted. Bowel sounds present in all 4 quadrants.   URINARY: Patient reports routine urination without pain, frequency, or urgency. Voids independently. Reports urine appears rajiv/yellow in color.  EXTREMITIES: No redness, heat, swelling, deformity, or pain.  PULSES: 2+ and equal in all extremities.   NEUROLOGIC: Eyes open spontaneously. Tolerating saliva secretions well. Able to follow commands, demonstrating ability to actively and appropriately communicate within context of current conversation. Symmetrical facial muscles. Moving all extremities well with no noted weakness. Adequate muscle tone present.

## 2017-12-27 NOTE — ED NOTES
Waiting for OBS room assignment. Pt lying in bed, Pt remains  on cardiac monitor, continuous pulse ox, cycling blood pressures. Side rails up x2, call bell in reach, bed in low position with brake engaged. VS's stable . Offers no c/o's at this time.

## 2017-12-28 PROBLEM — N30.00 ACUTE CYSTITIS WITHOUT HEMATURIA: Status: ACTIVE | Noted: 2017-12-28

## 2017-12-28 LAB
ALBUMIN SERPL BCP-MCNC: 3.2 G/DL
ALP SERPL-CCNC: 57 U/L
ALT SERPL W/O P-5'-P-CCNC: 15 U/L
ANION GAP SERPL CALC-SCNC: 12 MMOL/L
AST SERPL-CCNC: 17 U/L
BASOPHILS # BLD AUTO: 0.1 K/UL
BASOPHILS NFR BLD: 1.2 %
BILIRUB SERPL-MCNC: 0.5 MG/DL
BUN SERPL-MCNC: 13 MG/DL
C DIFF GDH STL QL: NEGATIVE
C DIFF TOX A+B STL QL IA: NEGATIVE
CALCIUM SERPL-MCNC: 8.9 MG/DL
CHLORIDE SERPL-SCNC: 101 MMOL/L
CO2 SERPL-SCNC: 22 MMOL/L
CREAT SERPL-MCNC: 0.7 MG/DL
DIFFERENTIAL METHOD: ABNORMAL
E COLI SXT1 STL QL IA: NEGATIVE
E COLI SXT2 STL QL IA: NEGATIVE
EOSINOPHIL # BLD AUTO: 0.3 K/UL
EOSINOPHIL NFR BLD: 3.9 %
ERYTHROCYTE [DISTWIDTH] IN BLOOD BY AUTOMATED COUNT: 14.2 %
EST. GFR  (AFRICAN AMERICAN): >60 ML/MIN/1.73 M^2
EST. GFR  (NON AFRICAN AMERICAN): >60 ML/MIN/1.73 M^2
GLUCOSE SERPL-MCNC: 87 MG/DL
HCT VFR BLD AUTO: 32.4 %
HGB BLD-MCNC: 10.5 G/DL
IMM GRANULOCYTES # BLD AUTO: 0.06 K/UL
IMM GRANULOCYTES NFR BLD AUTO: 0.7 %
LYMPHOCYTES # BLD AUTO: 1.4 K/UL
LYMPHOCYTES NFR BLD: 16.6 %
MAGNESIUM SERPL-MCNC: 1.8 MG/DL
MCH RBC QN AUTO: 27.6 PG
MCHC RBC AUTO-ENTMCNC: 32.4 G/DL
MCV RBC AUTO: 85 FL
MONOCYTES # BLD AUTO: 0.7 K/UL
MONOCYTES NFR BLD: 7.7 %
NEUTROPHILS # BLD AUTO: 5.9 K/UL
NEUTROPHILS NFR BLD: 69.9 %
NRBC BLD-RTO: 0 /100 WBC
PHOSPHATE SERPL-MCNC: 2.7 MG/DL
PLATELET # BLD AUTO: 236 K/UL
PMV BLD AUTO: 10.6 FL
POTASSIUM SERPL-SCNC: 3.8 MMOL/L
PROT SERPL-MCNC: 6.9 G/DL
RBC # BLD AUTO: 3.81 M/UL
SODIUM SERPL-SCNC: 135 MMOL/L
WBC # BLD AUTO: 8.42 K/UL

## 2017-12-28 PROCEDURE — 80053 COMPREHEN METABOLIC PANEL: CPT

## 2017-12-28 PROCEDURE — 83735 ASSAY OF MAGNESIUM: CPT

## 2017-12-28 PROCEDURE — 97165 OT EVAL LOW COMPLEX 30 MIN: CPT

## 2017-12-28 PROCEDURE — G0378 HOSPITAL OBSERVATION PER HR: HCPCS

## 2017-12-28 PROCEDURE — 97116 GAIT TRAINING THERAPY: CPT

## 2017-12-28 PROCEDURE — 36415 COLL VENOUS BLD VENIPUNCTURE: CPT

## 2017-12-28 PROCEDURE — 97535 SELF CARE MNGMENT TRAINING: CPT

## 2017-12-28 PROCEDURE — 63600175 PHARM REV CODE 636 W HCPCS: Performed by: PHYSICIAN ASSISTANT

## 2017-12-28 PROCEDURE — 25000003 PHARM REV CODE 250: Performed by: HOSPITALIST

## 2017-12-28 PROCEDURE — 84100 ASSAY OF PHOSPHORUS: CPT

## 2017-12-28 PROCEDURE — 97161 PT EVAL LOW COMPLEX 20 MIN: CPT

## 2017-12-28 PROCEDURE — 85025 COMPLETE CBC W/AUTO DIFF WBC: CPT

## 2017-12-28 PROCEDURE — 99225 PR SUBSEQUENT OBSERVATION CARE,LEVEL II: CPT | Mod: ,,, | Performed by: PHYSICIAN ASSISTANT

## 2017-12-28 PROCEDURE — G8980 MOBILITY D/C STATUS: HCPCS | Mod: CK

## 2017-12-28 PROCEDURE — G8979 MOBILITY GOAL STATUS: HCPCS | Mod: CJ

## 2017-12-28 PROCEDURE — A4216 STERILE WATER/SALINE, 10 ML: HCPCS | Performed by: PHYSICIAN ASSISTANT

## 2017-12-28 PROCEDURE — G8978 MOBILITY CURRENT STATUS: HCPCS | Mod: CK

## 2017-12-28 PROCEDURE — G8987 SELF CARE CURRENT STATUS: HCPCS | Mod: CK

## 2017-12-28 PROCEDURE — 63600175 PHARM REV CODE 636 W HCPCS: Performed by: HOSPITALIST

## 2017-12-28 PROCEDURE — G8988 SELF CARE GOAL STATUS: HCPCS | Mod: CJ

## 2017-12-28 PROCEDURE — 25000003 PHARM REV CODE 250: Performed by: PHYSICIAN ASSISTANT

## 2017-12-28 RX ORDER — CEFTRIAXONE 1 G/1
1 INJECTION, POWDER, FOR SOLUTION INTRAMUSCULAR; INTRAVENOUS
Status: DISCONTINUED | OUTPATIENT
Start: 2017-12-28 | End: 2017-12-28

## 2017-12-28 RX ORDER — CEFTRIAXONE 1 G/1
1 INJECTION, POWDER, FOR SOLUTION INTRAMUSCULAR; INTRAVENOUS
Status: DISCONTINUED | OUTPATIENT
Start: 2017-12-28 | End: 2017-12-28 | Stop reason: SDUPTHER

## 2017-12-28 RX ORDER — CEFTRIAXONE 1 G/50ML
1 INJECTION, SOLUTION INTRAVENOUS
Status: DISCONTINUED | OUTPATIENT
Start: 2017-12-28 | End: 2017-12-29

## 2017-12-28 RX ADMIN — MECLIZINE HYDROCHLORIDE 25 MG: 25 TABLET ORAL at 10:12

## 2017-12-28 RX ADMIN — ATORVASTATIN CALCIUM 40 MG: 20 TABLET, FILM COATED ORAL at 08:12

## 2017-12-28 RX ADMIN — FERROUS SULFATE TAB EC 325 MG (65 MG FE EQUIVALENT) 325 MG: 325 (65 FE) TABLET DELAYED RESPONSE at 08:12

## 2017-12-28 RX ADMIN — LISINOPRIL 40 MG: 20 TABLET ORAL at 08:12

## 2017-12-28 RX ADMIN — ACETAMINOPHEN 650 MG: 325 TABLET ORAL at 09:12

## 2017-12-28 RX ADMIN — REGULAR STRENGTH 325 MG: 325 TABLET ORAL at 08:12

## 2017-12-28 RX ADMIN — LEVOTHYROXINE SODIUM 75 MCG: 75 TABLET ORAL at 08:12

## 2017-12-28 RX ADMIN — SODIUM CHLORIDE, PRESERVATIVE FREE 3 ML: 5 INJECTION INTRAVENOUS at 09:12

## 2017-12-28 RX ADMIN — ENOXAPARIN SODIUM 40 MG: 100 INJECTION SUBCUTANEOUS at 09:12

## 2017-12-28 RX ADMIN — CEFTRIAXONE SODIUM 1 G: 1 INJECTION, POWDER, FOR SOLUTION INTRAMUSCULAR; INTRAVENOUS at 09:12

## 2017-12-28 RX ADMIN — CLOPIDOGREL 75 MG: 75 TABLET, FILM COATED ORAL at 08:12

## 2017-12-28 RX ADMIN — CHOLECALCIFEROL TAB 125 MCG (5000 UNIT) 5000 UNITS: 125 TAB at 08:12

## 2017-12-28 RX ADMIN — SODIUM CHLORIDE, PRESERVATIVE FREE 3 ML: 5 INJECTION INTRAVENOUS at 06:12

## 2017-12-28 RX ADMIN — METOPROLOL SUCCINATE 50 MG: 50 TABLET, EXTENDED RELEASE ORAL at 08:12

## 2017-12-28 NOTE — PLAN OF CARE
Voicemail message left for the patient's son, Angel Hylton ([phone]), requestding a return call to discuss the patient's discharge plan. Will continue to follow.

## 2017-12-28 NOTE — ASSESSMENT & PLAN NOTE
- UA reveals 20 WBC, rare bacteria  - Received CTX x1 in the ED  - Urine cx showing Morganella Morganii and Ecoli (<50,000)  - Remains afebrile without leukocytosis   - Continue CTX while sensitivities pending

## 2017-12-28 NOTE — ASSESSMENT & PLAN NOTE
- Hypertensive on admit  - Resumed home Toprol 50 mg daily and lisinopril 40 mg daily  - Per [facility] neuro notes from last admission, allow for permissive HTN 2/2 known vertebrobasilar dz and intracranial stenosis  - Will continue to monitor

## 2017-12-28 NOTE — PT/OT/SLP EVAL
Occupational Therapy   Evaluation    Name: Linda Hylton  MRN: [id number]  Admitting Diagnosis:  Fatigue      Recommendations:     Discharge Recommendations: home health PT, home health OT  Discharge Equipment Recommendations:  none  Barriers to discharge:  None    History:     Occupational Profile:  Living Environment: Pt reports she lives with her son in a 2  with no STEVE. Pt's bed/bathroom are on 2nd level of home but pt has a day bed and half bath available on 1st level. Pt reported that HH RN comes to pt's home to assist pt with bathing and dressing. 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 Pt reports no recent falls.   Previous level of function: PTA, pt is able to dress self (I) at time but is difficult and requires assistance at times. Pt uses RW for functional mobility.   Roles and Routines: pt is a mother. Pt does not work or drive. 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  Pt found  "resting quietly in bed. Pt agreeable to OT session.   Pt stated, " I'm scared to stand. I feel like I'll fall."     Pain/Comfort:  · Pain Rating 1: 4/10  · Location 1:  (stomach )  · Pain Addressed 1: Pre-medicate for activity, Reposition, Distraction, Cessation of Activity  · Pain Rating Post-Intervention 1: 4/10    Objective:     Patient found with: telemetry, peripheral IV    General Precautions: Standard, fall, contact   Orthopedic Precautions:N/A   Braces: N/A     Occupational Performance:    Bed Mobility:    · Patient completed Rolling/Turning to Right with minimum assistance and with side rail  · Patient completed Scooting/Bridging with contact guard assistance and towards EOB   · Patient completed Supine to Sit with minimum assistance and with side rail  · Patient completed Sit to Supine with minimum assistance    Functional Mobility/Transfers:  · Patient completed Sit <> Stand Transfer with minimum assistance and HHA from therapist   with  no assistive device   · Patient completed Bed <> Chair Transfer using Stand Pivot technique with minimum assistance with HHA    · Pt declined to perform functional mobility within room 2/2 weakness and pain.     Activities of Daily Living:  · Grooming: minimum assistance Pt required (A) to open toothpaste cap. Pt able to apply toothpaste and complete oral care sitting UI with set-up   · UB Dressing: Pt declined to don/[facility] gown.    · LB Dressing: moderate assistance to don/dof socks sitting UIC. Pt able to bring B LE into figure 4 position with min A pt required mod A to don socks on B feet.   · Toileting: maximal assistance Pt found wearing briefs. Pt declined to ambulated to toilet. OT found clean BSC to leave in pt's room for staff to assist pt to BSC.     Cognitive/Visual Perceptual:  Cognitive/Psychosocial Skills:     -       Oriented to: Person, Place, Time and Situation   -       Follows Commands/attention:Follows two-step commands  -       Communication: " clear/fluent  -       Memory: No Deficits noted  -       Safety awareness/insight to disability: intact   -       Mood/Affect/Coping skills/emotional control: Appropriate to situation and Anxious  Visual/Perceptual:      -Impaired  acuity    Physical Exam:  Balance:    - Static sit: SBA   -  Dynamic sit: SBA  - Static standing: min A   - Dynamic Standing: mod A    Postural examination/scapula alignment:    -       Rounded shoulders  -       Forward head  -       Abnormal trunk flexion  Skin integrity: Visible skin intact  Edema:  None noted  Sensation:    -       Intact  light/touch BUEs  Dominant hand:    -       right  Upper Extremity Range of Motion:     -       Right Upper Extremity: WFL  -       Left Upper Extremity: WFL  Upper Extremity Strength:    -       Right Upper Extremity: Deficits: grossly 3+/5   -       Left Upper Extremity: Deficits: grossly 3+/5    Strength:  grossly 3+/5   Fine Motor Coordination:    -       Intact  Left hand thumb/finger opposition skills and Right hand thumb/finger opposition skills  Gross motor coordination:   WFL    Patient left up in chair with all lines intact, call button in reach and RN notified    AMPA 6 Click:  AMPA Total Score: 15    Treatment & Education:  Pt educated by therapist on:   - Pt educated on role of OT, POC, and goals for therapy.    -  Patient agreed to participate in session.   - Patient and family aware of patient's deficits and therapy progression.   -Educated pt on being appropriate to transfer with nsg and PCT x 1 person assist for stand pivot transfers  - Time provided for therapeutic counseling and discussion of health disposition.   - Importance of OOB ax's with staff member assistance and sitting OOB majority of day.   - Pt completed ADLs and functional mobility for treatment session as noted above   - Pt verbalized understanding. Pt expressed no further concerns/questions.  - whiteboard updated   Education:    Assessment:     Linda Soria  "Concetta is a 82 y.o. female with a medical diagnosis of Fatigue.  She presents with performance deficits affecting function are weakness, impaired endurance, impaired functional mobilty, impaired self care skills, pain, impaired cardiopulmonary response to activity, gait instability, impaired balance. Pt tolerated session fair. Pt refused to perform functional mobility within room due to pain and feeling anxious. At baseline pt reports she requires assist for ADLs and mod (I) for functional mobility. Pt will continue to benefit from skilled OT in order to maximize pt's safety and (I) with functional mobility and self-care tasks prior to returning to the home environment.       Rehab Prognosis:  Good; patient would benefit from acute skilled OT services to address these deficits and reach maximum level of function.         Clinical Decision Makin.  OT Low:  "Pt evaluation falls under low complexity for evaluation coding due to performance deficits noted in 1-3 areas as stated above and 0 co-morbities affecting current functional status. Data obtained from problem focused assessments. No modifications or assistance was required for completion of evaluation. Only brief occupational profile and history review completed."     Plan:     Patient to be seen 2 x/week to address the above listed problems via self-care/home management, community/work re-entry, therapeutic activities, therapeutic exercises  · Plan of Care Expires: 18  · Plan of Care Reviewed with: patient    This Plan of care has been discussed with the patient who was involved in its development and understands and is in agreement with the identified goals and treatment plan    GOALS:    Occupational Therapy Goals        Problem: Occupational Therapy Goal    Goal Priority Disciplines Outcome Interventions   Occupational Therapy Goal     OT, PT/OT Ongoing (interventions implemented as appropriate)    Description:  Goals to be met by: 18 "     Patient will increase functional independence with ADLs by performing:    UE Dressing with Supervision.  LE Dressing with Supervision.  Grooming while standing at sink with Stand-by Assistance.  Toileting from toilet with Stand-by Assistance for hygiene and clothing management.   Toilet transfer to toilet with Stand-by Assistance.                      Time Tracking:     OT Date of Treatment: 12/28/17  OT Start Time: 0901  OT Stop Time: 0925  OT Total Time (min): 24 min    Billable Minutes:Evaluation 14  Self Care/Home Management 10    Monica Cuellar OT  12/28/2017

## 2017-12-28 NOTE — SUBJECTIVE & OBJECTIVE
Interval History: No acute events overnight. This afternoon, pt sitting upright in bed eating lunch. Pt alert and oriented x4, able to answer detailed questions about home life, and report home medications. Discussed plan of care with family at bedside.     Review of Systems   Constitutional: Positive for fatigue. Negative for chills, diaphoresis and fever.   HENT: Negative for congestion, hearing loss, sore throat and trouble swallowing.    Eyes: Positive for visual disturbance. Negative for photophobia.   Respiratory: Negative for cough, chest tightness, shortness of breath and wheezing.    Cardiovascular: Negative for chest pain, palpitations and leg swelling.   Gastrointestinal: Negative for abdominal distention, abdominal pain, diarrhea, nausea and vomiting.   Genitourinary: Positive for frequency. Negative for difficulty urinating, dysuria, flank pain and hematuria.   Musculoskeletal: Negative for back pain, gait problem, myalgias and neck pain.   Neurological: Positive for weakness. Negative for dizziness, speech difficulty, light-headedness, numbness and headaches.   Psychiatric/Behavioral: Positive for confusion (intermittent). Negative for agitation and dysphoric mood. The patient is not nervous/anxious.      Objective:     Vital Signs (Most Recent):  Temp: 98.4 °F (36.9 °C) (12/28/17 1601)  Pulse: 66 (12/28/17 1602)  Resp: 17 (12/28/17 1601)  BP: (!) 117/56 (12/28/17 1602)  SpO2: 96 % (12/28/17 1602) Vital Signs (24h Range):  Temp:  [97.1 °F (36.2 °C)-98.7 °F (37.1 °C)] 98.4 °F (36.9 °C)  Pulse:  [61-79] 66  Resp:  [14-18] 17  SpO2:  [94 %-97 %] 96 %  BP: (100-180)/(49-95) 117/56     Weight: 108.9 kg (240 lb)  Body mass index is 51.94 kg/m².    Intake/Output Summary (Last 24 hours) at 12/28/17 1616  Last data filed at 12/28/17 0600   Gross per 24 hour   Intake              240 ml   Output                0 ml   Net              240 ml      Physical Exam   Constitutional: She is oriented to person, place,  and time. She appears well-developed and well-nourished. No distress.   Cardiovascular: Normal rate, regular rhythm and intact distal pulses.    Pulmonary/Chest: Effort normal and breath sounds normal. No respiratory distress. She has no wheezes.   Abdominal: Soft. Bowel sounds are normal. She exhibits no distension and no mass. There is no tenderness. There is no guarding.   Musculoskeletal: Normal range of motion. She exhibits no edema, tenderness or deformity.   Neurological: She is alert and oriented to person, place, and time. No cranial nerve deficit. She exhibits normal muscle tone.   nonfocal neuro exam   Skin: Skin is warm and dry. She is not diaphoretic. No erythema.   Psychiatric: She has a normal mood and affect. Her speech is normal and behavior is normal. Thought content normal. Cognition and memory are normal.   Vitals reviewed.      Significant Labs: All pertinent labs within the past 24 hours have been reviewed.    Significant Imaging: I have reviewed all pertinent imaging results/findings within the past 24 hours.

## 2017-12-28 NOTE — PLAN OF CARE
CM received a return call from the patient's son Angel Hylton ([phone]). Angel stated that he would like the patient to return home with [facility] but would like the patient to have an aide to assist with bathing. ORQUIDEA informed PAUL Vivar ([id number]) of above. Will continue to follow.

## 2017-12-28 NOTE — HOSPITAL COURSE
Pt admitted to observation for fatigue/confusion found to have UTI. Recent admission 12/18-12/20 with similar presentation: mild disorientation and lethargy on admit suspected to be 2/2 dehydration in patient with known vertebrobasilar insufficiency causing transient neurologic symptoms. EEG with no evidence of seizure and MRI of brain with no evidence of acute stroke. CTH on admit shows no evidence of acute hemorrhage or major vascular territory infarction; generalized cerebral volume loss with moderate chronic microvascular ischemic changes similar to prior exam. CXR unremarkable. Procalcitonin WNL. UA reveals 20 WBC, rare bacteria. Urine cx showing Morganella Morganii and Ecoli. Cdiff and stool cx negative. Pt discharged home with [facility] to complete 7 day course of cipro. Pt to follow up with PCP as an outpatient.

## 2017-12-28 NOTE — PLAN OF CARE
Problem: Occupational Therapy Goal  Goal: Occupational Therapy Goal  Goals to be met by: 1/11/18     Patient will increase functional independence with ADLs by performing:    UE Dressing with Supervision.  LE Dressing with Supervision.  Grooming while standing at sink with Stand-by Assistance.  Toileting from toilet with Stand-by Assistance for hygiene and clothing management.   Toilet transfer to toilet with Stand-by Assistance.    Outcome: Ongoing (interventions implemented as appropriate)  Initial eval completed   POC implemented and goals established     Monica Cuellar OT  12/28/2017

## 2017-12-28 NOTE — PLAN OF CARE
JAZMIN spoke with the pt's son peer the PA request.  They would like an aide added to HH.  JAZMIN called [facility] and the pt only has nursing.  JAZMIN updated the PA.  JAZMIN will f/u tomorrow.    Nicky Caro, [facility] [phone]

## 2017-12-28 NOTE — PLAN OF CARE
12/28/17 1005   Discharge Assessment   Assessment Type Discharge Planning Assessment   Confirmed/corrected address and phone number on facesheet? Yes   Assessment information obtained from? Patient   Expected Length of Stay (days) 2   Communicated expected length of stay with patient/caregiver yes   Prior to hospitilization cognitive status: Alert/Oriented   Prior to hospitalization functional status: Assistive Equipment   Current cognitive status: Alert/Oriented   Current Functional Status: Needs Assistance   Lives With child(smitha), adult  (sonAngel ([phone]))   Able to Return to Prior Arrangements unable to determine at this time (comments)   Is patient able to care for self after discharge? Unable to determine at this time (comments)   Patient's perception of discharge disposition home health   Readmission Within The Last 30 Days current reason for admission unrelated to previous admission   [place] [facility] [place] currently being followed by outpatient case management? No   Patient currently receives any other outside agency services? No   Equipment Currently Used at Home walker, rolling;rollator;bedside commode;shower chair;wheelchair   Do you have any problems affording any of your prescribed medications? No   Is the patient taking medications as prescribed? yes   Does the patient have transportation home? Yes   Transportation Available family or friend will provide  (sonAngel ([phone]))   Does the patient receive [facility] at [facility]? No   Discharge Plan A Home Health   Discharge Plan B Skilled Nursing Facility   Patient/Family In Agreement With Plan yes   Readmission Questionnaire   At the time of your discharge, did someone talk to you about what your health problems were? Yes   At the time of discharge, did someone talk to you about what to watch out for regarding worsening of your health problem? Yes   At the time of discharge, did someone talk  to you about what to do if you experienced worsening of your health problem? Yes   At the time of discharge, did someone talk to you about which medication to take when you left the hospital and which ones to stop taking? Yes   At the time of discharge, did someone talk to you about when and where to follow up with a doctor after you left the hospital? Yes   What do you believe caused you to be sick enough to be re-admitted? fatigue   How often do you need to have someone help you when you read instructions, pamphlets, or other written material from your doctor or pharmacy? Often   Do you have problems taking your medications as prescribed? No   Do you have any problems affording any of  your prescribed medications? No   Do you have problems obtaining/receiving your medications? No   Does the patient have transportation to healthcare appointments? Yes   Living Arrangements house   Does the patient have family/friends to help with healtcare needs after discharge? yes   Does your caregiver provide all the help you need? Yes   Are you currently feeling confused? No   Are you currently having problems thinking? No   Are you currently having memory problems? No   Have you felt down, depressed, or hopeless? 0   Have you felt little interest or pleasure in doing things? 0   In the last 7 days, my sleep quality was: fair     Patient awake & alert sitting in recliner when CM rounded. No family at the bedside when CM rounded. Patient was admitted with confusion. Patient lives with her son, Angel Hylton ([phone]), has equipment to assist with ambulation, & is currently receiving home health care (NSG) from [facility]. CM informed Anna ([phone]) w/[facility] of patient's hospitalization. Plan to discharge patient home with [facility] or SNF placement when medically stable. Awaiting PT/OT recommendations for discharge needs. Patient stated that Angel will provide transportation at time of discharge. Previously scheduled  appointment with Dr. Yakov Barrett (PCP) on 1/10/18 at 1000 noted. Will continue to follow.

## 2017-12-28 NOTE — PROGRESS NOTES
"[facility] Hospital Medicine  Progress Note    Patient Name: Linda Hylton  MRN: [id number]  Patient Class: OP- Observation   Admission Date: 12/27/2017  Length of Stay: 0 days  Attending Physician: Bere Landa MD  Primary Care Provider: Yakov Barrett MD    [facility] Medicine Team: [facility] MED F Angella Vivar PA-C    Subjective:     Principal Problem:Acute cystitis without hematuria    HPI:  82 year old female with a PMHx of HTN, HLD, R cerebral vermal ischemic stroke in 3/2017, extrensive vertebral basilar disease, hypothyroidism, and iron deficiency anemia presenting to the ED for evaluation of fatigue. Pt c/o fatigue, weakness, blurry vision, and urinary frequency. Pt cannot recall how she ended up in ER. Pt alert and oriented to self, place, year and president. Pt reports her son lives with her. Pt has no other complaints; denies chest pain, SOB, abdominal pain, N/V/D, fever, chills, decreased PO intake, headache, and recent illness.    Of note, patient was recently admitted 12/18-12/20 with similar presentation and was evaluated by vascular neurology. Per notes, suspect patient's symptoms were 2/2 "patient most likely had presyncope as prior to admit patient was having poor oral intake with nausea and diarrhea and likely got hypovolemic and with her known vertebrobasilar insufficiency likely triggered a neurologic response due to brain hypoperfusion". Sx resolved with fluid resuscitation. No acute findings of labs or imaging.    In the ED, pt is hypertensive. Afebrile without leukocytosis. >95% on RA. UA shows 3+ leukocytes and 20 WBC. Pt received 1 dose of CTX and 1L of IVF.    Hospital Course:  Pt admitted to observation for fatigue/confusion. Recent admission 12/18-12/20 with similar presentation: mild disorientation and lethargy on admit suspected to be 2/2 dehydration in patient with known vertebrobasilar insufficiency causing transient neurologic symptoms. EEG with no evidence " of seizure and MRI of brain with no evidence of acute stroke. CTH on admit shows no evidence of acute hemorrhage or major vascular territory infarction; generalized cerebral volume loss with moderate chronic microvascular ischemic changes similar to prior exam. Remains afebrile without leukocytosis. CXR unremarkable. Procalcitonin WNL. UA reveals 20 WBC, rare bacteria. Urine cx showing Morganella Morganii and Ecoli (<50,000). Cdiff and stool cx pending. Continue CTX. PT/OT consulted and rec HHC.     Interval History: No acute events overnight. This afternoon, pt sitting upright in bed eating lunch. Pt alert and oriented x4, able to answer detailed questions about home life, and report home medications. Discussed plan of care with family at bedside.     Review of Systems   Constitutional: Positive for fatigue. Negative for chills, diaphoresis and fever.   HENT: Negative for congestion, hearing loss, sore throat and trouble swallowing.    Eyes: Positive for visual disturbance. Negative for photophobia.   Respiratory: Negative for cough, chest tightness, shortness of breath and wheezing.    Cardiovascular: Negative for chest pain, palpitations and leg swelling.   Gastrointestinal: Negative for abdominal distention, abdominal pain, diarrhea, nausea and vomiting.   Genitourinary: Positive for frequency. Negative for difficulty urinating, dysuria, flank pain and hematuria.   Musculoskeletal: Negative for back pain, gait problem, myalgias and neck pain.   Neurological: Positive for weakness. Negative for dizziness, speech difficulty, light-headedness, numbness and headaches.   Psychiatric/Behavioral: Positive for confusion (intermittent). Negative for agitation and dysphoric mood. The patient is not nervous/anxious.      Objective:     Vital Signs (Most Recent):  Temp: 98.4 °F (36.9 °C) (12/28/17 1601)  Pulse: 66 (12/28/17 1602)  Resp: 17 (12/28/17 1601)  BP: (!) 117/56 (12/28/17 1602)  SpO2: 96 % (12/28/17 1602) Vital  Signs (24h Range):  Temp:  [97.1 °F (36.2 °C)-98.7 °F (37.1 °C)] 98.4 °F (36.9 °C)  Pulse:  [61-79] 66  Resp:  [14-18] 17  SpO2:  [94 %-97 %] 96 %  BP: (100-180)/(49-95) 117/56     Weight: 108.9 kg (240 lb)  Body mass index is 51.94 kg/m².    Intake/Output Summary (Last 24 hours) at 12/28/17 1616  Last data filed at 12/28/17 0600   Gross per 24 hour   Intake              240 ml   Output                0 ml   Net              240 ml      Physical Exam   Constitutional: She is oriented to person, place, and time. She appears well-developed and well-nourished. No distress.   Cardiovascular: Normal rate, regular rhythm and intact distal pulses.    Pulmonary/Chest: Effort normal and breath sounds normal. No respiratory distress. She has no wheezes.   Abdominal: Soft. Bowel sounds are normal. She exhibits no distension and no mass. There is no tenderness. There is no guarding.   Musculoskeletal: Normal range of motion. She exhibits no edema, tenderness or deformity.   Neurological: She is alert and oriented to person, place, and time. No cranial nerve deficit. She exhibits normal muscle tone.   nonfocal neuro exam   Skin: Skin is warm and dry. She is not diaphoretic. No erythema.   Psychiatric: She has a normal mood and affect. Her speech is normal and behavior is normal. Thought content normal. Cognition and memory are normal.   Vitals reviewed.      Significant Labs: All pertinent labs within the past 24 hours have been reviewed.    Significant Imaging: I have reviewed all pertinent imaging results/findings within the past 24 hours.    Assessment/Plan:      * Acute cystitis without hematuria    - UA reveals 20 WBC, rare bacteria  - Received CTX x1 in the ED  - Urine cx showing Morganella Morganii and Ecoli (<50,000)  - Remains afebrile without leukocytosis   - Continue CTX while sensitivities pending        Fatigue    - Alert and oriented x4; unable to report how/why she got to hospital  - CTH shows no evidence of acute  hemorrhage or major vascular territory infarction; generalized cerebral volume loss with moderate chronic microvascular ischemic changes similar to prior exam.  - Afebrile without leukocytosis  - Nonfocal neuro exam  - Able to follow commands  - Recent admission 12/18-12/20 with similar presentation: mild disorientation and lethargy on admit suspected to be 2/2 dehydration in patient with known vertebrobasilar insufficiency causing transient neurologic symptoms. EEG with no evidence of seizure and MRI of brain with no evidence of acute stroke   - CXR unremarkable, procalcitonin WNL  - Resolved 12/28  - Neuro checks q4        Vertebral basilar insufficiency    - Known VBI and intracranial stenosis  - Allow permissive HTN with -160        Intracranial atherosclerosis    - Continue DAPT with ASA, plavix         Essential hypertension    - Hypertensive on admit  - Resumed home Toprol 50 mg daily and lisinopril 40 mg daily  - Per [facility] neuro notes from last admission, allow for permissive HTN 2/2 known vertebrobasilar dz and intracranial stenosis  - Will continue to monitor        Iron deficiency anemia    - H/H stable  - No s/s overt bleeding  - Continue home iron supplementation        Mixed hyperlipidemia    - Continue home lipitor 40 mg daily        Acquired hypothyroidism    - Continue home synthroid          VTE Risk Mitigation         Ordered     enoxaparin injection 40 mg  Daily     Route:  Subcutaneous        12/27/17 1119     High Risk of VTE  Once      12/27/17 1119     Place sequential compression device  Until discontinued      12/27/17 1119     Place SEAN hose  Until discontinued      12/27/17 0856              Angella Vivar PA-C  Department of Hospital Medicine   [facility]  Discussed with staff: Dr. Landa

## 2017-12-28 NOTE — ASSESSMENT & PLAN NOTE
- Alert and oriented x4; unable to report how/why she got to hospital  - CTH shows no evidence of acute hemorrhage or major vascular territory infarction; generalized cerebral volume loss with moderate chronic microvascular ischemic changes similar to prior exam.  - Afebrile without leukocytosis  - Nonfocal neuro exam  - Able to follow commands  - Recent admission 12/18-12/20 with similar presentation: mild disorientation and lethargy on admit suspected to be 2/2 dehydration in patient with known vertebrobasilar insufficiency causing transient neurologic symptoms. EEG with no evidence of seizure and MRI of brain with no evidence of acute stroke   - CXR unremarkable, procalcitonin WNL  - Resolved 12/28  - Neuro checks q4

## 2017-12-28 NOTE — PLAN OF CARE
Problem: Physical Therapy Goal  Goal: Physical Therapy Goal  Goals to be met by: 18     Patient will increase functional independence with mobility by performin. Supine to sit with [place].  2. Sit to supine with [place].  3. Sit to stand transfer with [place].  4. Bed to chair transfer with Modified [place] using Rolling Walker.  5. Gait  x 300'' feet with Supervision using Rolling Walker.   6. Lower extremity exercise program x 20 reps per handout, with assistance as needed.    Outcome: Ongoing (interventions implemented as appropriate)  PT goals established.

## 2017-12-28 NOTE — PLAN OF CARE
Problem: Patient Care Overview  Goal: Plan of Care Review  Outcome: Ongoing (interventions implemented as appropriate)  Plan of care reviewed with patient. No distress noted at this time. Peripheral IV saline locked. Contact precautions maintained. No diarrhea noted so far this shift. Will continue to monitor closely.

## 2017-12-28 NOTE — PT/OT/SLP EVAL
"Physical Therapy Evaluation    Patient Name:  Linda Hylton   MRN:  [id number]    Recommendations:     Discharge Recommendations:  home health PT   Discharge Equipment Recommendations: none   Barriers to discharge: None    Assessment:     Linda Hylton is a 82 y.o. female admitted with a medical diagnosis of Acute cystitis without hematuria.  She presents with the following impairments/functional limitations:  weakness, impaired endurance, impaired cognition, impaired self care skills, impaired functional mobilty, gait instability, impaired balance, impaired joint extensibility, decreased lower extremity function, decreased safety awareness, impaired cardiopulmonary response to activity.  Pt presents with a PMHx of HTN, HLD, R cerebral vermal ischemic stroke in 3/2017, extrensive vertebral basilar disease, hypothyroidism, and iron deficiency anemia presenting to the ED for evaluation of fatigue. Pt c/o fatigue, weakness, blurry vision, and urinary frequency.  Upon evaluation, pt stated that she was hallucinating, though AxO x 4.  Pt currently requires S-CGA with all functional mobility assessed, concerns regarding safety awareness.  Recommendation for pt to receive skilled PT services in the home setting upon discharge.  Pt will benefit from acute skilled PT services to address these deficits and reach maximum level of function.      Recent Surgery: * No surgery found *      Plan:     During this hospitalization, patient to be seen 3 x/week to address the above listed problems via gait training, therapeutic activities, therapeutic exercises, neuromuscular re-education  · Plan of Care Expires:  01/28/18   Plan of Care Reviewed with: patient (Son came at the end of the treatment session)    Subjective     Communicated with nursing prior to session.  Patient found in supine upon PT entry to room, agreeable to evaluation.      "Lousy, I'm so dizzy, I can't see straight."  "...And I'm having hallucinations."  "I see " "nothing but red flowers."    Chief Complaint: Hallucinations and dizziness  Patient comments/goals: To return to safe gait  Pain/Comfort:  · Pain Rating 1: 0/10    Patients cultural, spiritual, Muslim conflicts given the current situation: no    Living Environment:  Pt lives with son, 2 story house, 1STE, pt is set-up to sleep and bathe on the 1st floor.  Pts 5 children are able to provide transportation.   Prior to admission, patients level of function was modified independent.  Pt amb with RW, requires A from  RN with bathing, A with dressing, pt is independent with transfers.  Patient has the following equipment: walker, rolling, wheelchair, bedside commode, shower chair (blood pressure cuff).  DME owned (not currently used): single point cane.  Upon discharge, patient will have assistance from son.    Objective:     Patient found with: telemetry, peripheral IV     General Precautions: Standard, fall, contact   Orthopedic Precautions:N/A   Braces: N/A     Exams:    · Cognitive Exam:  Patient is oriented to Person, Place, Time and Situation and follows 100% of multi-step commands   · Fine Motor Coordination:    · -       Intact  Left hand thumb/finger opposition skills, Right hand thumb/finger opposition skills, RLE heel shin and LLE heel shin  · Gross Motor Coordination:  WFL  · Postural Exam:  Patient presented with the following abnormalities:    · -       Rounded shoulders  · -       decreased cervical length, B sh elevation, B ulnar drift, B ankles in supination, toe clawing  · Sensation:    · -       Intact  light/touch B LEs  · Skin Integrity/Edema:      · -       Skin integrity: Visible skin intact  · -       Edema: None noted B LEs    · RUE ROM: WFL  · RUE Strength: WFL  · LUE ROM: WFL  · LUE Strength: WFL  · RLE ROM: WFL  · RLE Strength: WFL except 4/5 hip flex  · LLE ROM: WFL  · LLE Strength: WFL except 4/5 hip flex    Functional Mobility:    · Bed Mobility:     · Rolling Right: " supervision  · Scooting: Supervision to scoot ant sitting EOB, [place] to scoot to HOB in supine  · Bridging: minimum assistance  · Supine to Sit: supervision  · Sit to Supine: supervision    · Transfers:     · Sit to Stand:  supervision with rolling walker  · Bed to Chair: contact guard assistance with  rolling walker  using  Stand Pivot    · Gait: Pt amb 192', CGA, RW, with no episodes of LOB, no SOB noted    · Balance: CGA: dynamic standing balance    AM-PAC 6 CLICK MOBILITY  Total Score:18     Therapeutic Activities and Exercises:   Whiteboard updated    Patient left supine with all lines intact, call button in reach, nursing notified and son present.    GOALS:    Physical Therapy Goals        Problem: Physical Therapy Goal    Goal Priority Disciplines Outcome Goal Variances Interventions   Physical Therapy Goal     PT/OT, PT Ongoing (interventions implemented as appropriate)     Description:  Goals to be met by: 18     Patient will increase functional independence with mobility by performin. Supine to sit with [place].  2. Sit to supine with [place].  3. Sit to stand transfer with [place].  4. Bed to chair transfer with Modified [place] using Rolling Walker.  5. Gait  x 300'' feet with Supervision using Rolling Walker.   6. Lower extremity exercise program x 20 reps per handout, with assistance as needed.                      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    Vertebral basilar insufficiency 2017    Vitamin D deficiency        Past Surgical History:   Procedure Laterality Date    APPENDECTOMY      [facility] 2016    [facility]    PARATHYROIDECTOMY Right     THYROID SURGERY Right      Partial thyroidectomy       Clinical Decision Making:     History  Co-morbidities and personal factors that may impact the plan of care Examination  Body Structures and Functions, activity limitations and participation restrictions that may impact the plan of care Clinical Presentation   Decision Making/ Complexity Score   Co-morbidities:   [] Time since onset of injury / illness / exacerbation  [] Status of current condition  [x]Patient's cognitive status and safety concerns    [x] Multiple Medical Problems (see med hx)  Personal Factors:   [] Patient's age  [] Prior Level of function   [] Patient's home situation (environment and family support)  [] Patient's level of motivation  [] Expected progression of patient      HISTORY:(criteria)    [] 46024 - no personal factors/history    [x] 00107 - has 1-2 personal factor/comorbidity     [] 66297 - has >3 personal factor/comorbidity     Body Regions:  [] Objective examination findings  [] Head     [x]  Neck  [x] Trunk   [x] Upper Extremity  [x] Lower Extremity    Body Systems:  [x] For communication ability, affect, cognition, language, and learning style: the assessment of the ability to make needs known, consciousness, orientation (person, place, and time), expected emotional /behavioral responses, and learning preferences (eg, learning barriers, education  needs)  [] For the neuromuscular system: a general assessment of gross coordinated movement (eg, balance, gait, locomotion, transfers, and transitions) and motor function  (motor control and motor learning)  [x] For the musculoskeletal system: the assessment of gross symmetry, gross range of motion, gross strength, height, and weight  [] For the integumentary system: the assessment of pliability(texture), presence of scar formation, skin color, and skin integrity  [] For cardiovascular/pulmonary system: the assessment of heart rate, respiratory rate, blood pressure, and edema     Activity limitations:    [x]  Patient's cognitive status and saf ety concerns          [] Status of current condition      [] Weight bearing restriction  [] Cardiopulmunary Restriction    Participation Restrictions:   [] Goals and goal agreement with the patient     [] Rehab potential (prognosis) and probable outcome      Examination of Body System: (criteria)    [x] 15136 - addressing 1-2 elements    [] 96695 - addressing a total of 3 or more elements     [] 82607 -  Addressing a total of 4 or more elements         Clinical Presentation: (criteria)  Stable - 39598     On examination of body system using standardized tests and measures patient presents with 1-2 elements from any of the following: body structures and functions, activity limitations, and/or participation restrictions.  Leading to a clinical presentation that is considered stable and/or uncomplicated                              Clinical Decision Making  (Eval Complexity):  Low- 90155     Time Tracking:     PT Received On: 12/28/17  PT Start Time: 1349     PT Stop Time: 1425  PT Total Time (min): 36 min     Billable Minutes: Evaluation 15 and Gait Training 14      Lexi Rollins, PT  12/28/2017

## 2017-12-29 VITALS
TEMPERATURE: 97 F | OXYGEN SATURATION: 95 % | DIASTOLIC BLOOD PRESSURE: 71 MMHG | HEART RATE: 64 BPM | SYSTOLIC BLOOD PRESSURE: 126 MMHG | HEIGHT: 57 IN | RESPIRATION RATE: 18 BRPM | WEIGHT: 236.31 LBS | BODY MASS INDEX: 50.98 KG/M2

## 2017-12-29 LAB
ALBUMIN SERPL BCP-MCNC: 3.2 G/DL
ALP SERPL-CCNC: 54 U/L
ALT SERPL W/O P-5'-P-CCNC: 10 U/L
ANION GAP SERPL CALC-SCNC: 9 MMOL/L
AST SERPL-CCNC: 16 U/L
BACTERIA UR CULT: NORMAL
BACTERIA UR CULT: NORMAL
BASOPHILS # BLD AUTO: 0.09 K/UL
BASOPHILS NFR BLD: 1.3 %
BILIRUB SERPL-MCNC: 0.4 MG/DL
BUN SERPL-MCNC: 14 MG/DL
CALCIUM SERPL-MCNC: 8.7 MG/DL
CHLORIDE SERPL-SCNC: 100 MMOL/L
CO2 SERPL-SCNC: 23 MMOL/L
CREAT SERPL-MCNC: 0.7 MG/DL
DIFFERENTIAL METHOD: ABNORMAL
EOSINOPHIL # BLD AUTO: 0.3 K/UL
EOSINOPHIL NFR BLD: 4.4 %
ERYTHROCYTE [DISTWIDTH] IN BLOOD BY AUTOMATED COUNT: 14.1 %
EST. GFR  (AFRICAN AMERICAN): >60 ML/MIN/1.73 M^2
EST. GFR  (NON AFRICAN AMERICAN): >60 ML/MIN/1.73 M^2
GLUCOSE SERPL-MCNC: 84 MG/DL
HCT VFR BLD AUTO: 31.9 %
HGB BLD-MCNC: 10.4 G/DL
IMM GRANULOCYTES # BLD AUTO: 0.05 K/UL
IMM GRANULOCYTES NFR BLD AUTO: 0.7 %
LYMPHOCYTES # BLD AUTO: 1.7 K/UL
LYMPHOCYTES NFR BLD: 23.8 %
MAGNESIUM SERPL-MCNC: 1.6 MG/DL
MCH RBC QN AUTO: 27.7 PG
MCHC RBC AUTO-ENTMCNC: 32.6 G/DL
MCV RBC AUTO: 85 FL
MONOCYTES # BLD AUTO: 0.6 K/UL
MONOCYTES NFR BLD: 8.9 %
NEUTROPHILS # BLD AUTO: 4.2 K/UL
NEUTROPHILS NFR BLD: 60.9 %
NRBC BLD-RTO: 0 /100 WBC
PHOSPHATE SERPL-MCNC: 3.2 MG/DL
PLATELET # BLD AUTO: 252 K/UL
PMV BLD AUTO: 10.5 FL
POTASSIUM SERPL-SCNC: 3.7 MMOL/L
PROT SERPL-MCNC: 6.8 G/DL
RBC # BLD AUTO: 3.75 M/UL
SODIUM SERPL-SCNC: 132 MMOL/L
WBC # BLD AUTO: 6.97 K/UL

## 2017-12-29 PROCEDURE — 83735 ASSAY OF MAGNESIUM: CPT

## 2017-12-29 PROCEDURE — 99217 PR OBSERVATION CARE DISCHARGE: CPT | Mod: ,,, | Performed by: PHYSICIAN ASSISTANT

## 2017-12-29 PROCEDURE — 85025 COMPLETE CBC W/AUTO DIFF WBC: CPT

## 2017-12-29 PROCEDURE — A4216 STERILE WATER/SALINE, 10 ML: HCPCS | Performed by: PHYSICIAN ASSISTANT

## 2017-12-29 PROCEDURE — 36415 COLL VENOUS BLD VENIPUNCTURE: CPT

## 2017-12-29 PROCEDURE — 80053 COMPREHEN METABOLIC PANEL: CPT

## 2017-12-29 PROCEDURE — G0378 HOSPITAL OBSERVATION PER HR: HCPCS

## 2017-12-29 PROCEDURE — 25000003 PHARM REV CODE 250: Performed by: PHYSICIAN ASSISTANT

## 2017-12-29 PROCEDURE — 84100 ASSAY OF PHOSPHORUS: CPT

## 2017-12-29 RX ORDER — LOPERAMIDE HCL 2 MG
2 TABLET ORAL 4 TIMES DAILY PRN
Refills: 0 | COMMUNITY
Start: 2017-12-29 | End: 2018-01-08

## 2017-12-29 RX ORDER — CIPROFLOXACIN 250 MG/1
250 TABLET, FILM COATED ORAL EVERY 12 HOURS
Status: DISCONTINUED | OUTPATIENT
Start: 2017-12-29 | End: 2017-12-29 | Stop reason: HOSPADM

## 2017-12-29 RX ORDER — CIPROFLOXACIN 250 MG/1
250 TABLET, FILM COATED ORAL EVERY 12 HOURS
Qty: 13 TABLET | Refills: 0 | Status: SHIPPED | OUTPATIENT
Start: 2017-12-29 | End: 2018-03-09

## 2017-12-29 RX ADMIN — ONDANSETRON 8 MG: 8 TABLET, ORALLY DISINTEGRATING ORAL at 01:12

## 2017-12-29 RX ADMIN — CLOPIDOGREL 75 MG: 75 TABLET, FILM COATED ORAL at 09:12

## 2017-12-29 RX ADMIN — FERROUS SULFATE TAB EC 325 MG (65 MG FE EQUIVALENT) 325 MG: 325 (65 FE) TABLET DELAYED RESPONSE at 09:12

## 2017-12-29 RX ADMIN — ATORVASTATIN CALCIUM 40 MG: 20 TABLET, FILM COATED ORAL at 09:12

## 2017-12-29 RX ADMIN — METOPROLOL SUCCINATE 50 MG: 50 TABLET, EXTENDED RELEASE ORAL at 09:12

## 2017-12-29 RX ADMIN — SODIUM CHLORIDE, PRESERVATIVE FREE 3 ML: 5 INJECTION INTRAVENOUS at 01:12

## 2017-12-29 RX ADMIN — LISINOPRIL 40 MG: 20 TABLET ORAL at 01:12

## 2017-12-29 RX ADMIN — REGULAR STRENGTH 325 MG: 325 TABLET ORAL at 09:12

## 2017-12-29 RX ADMIN — MECLIZINE HYDROCHLORIDE 25 MG: 25 TABLET ORAL at 09:12

## 2017-12-29 RX ADMIN — CHOLECALCIFEROL TAB 125 MCG (5000 UNIT) 5000 UNITS: 125 TAB at 09:12

## 2017-12-29 RX ADMIN — LEVOTHYROXINE SODIUM 75 MCG: 75 TABLET ORAL at 09:12

## 2017-12-29 RX ADMIN — CIPROFLOXACIN HYDROCHLORIDE 250 MG: 250 TABLET, FILM COATED ORAL at 01:12

## 2017-12-29 NOTE — PROGRESS NOTES
Pt discharged to home. Discharge instructions given to pt and son who voiced understanding. New meds electronically sent to pt's pharmacy. IV removed catheter intact. Denies pain or discomfort. Respirations even and unlabored. No distress noted. Pt stable. Left unit in wheelchair with son at side.

## 2017-12-29 NOTE — ASSESSMENT & PLAN NOTE
Likely exacerbated by UTI  - Alert and oriented x4; unable to report how/why she got to hospital  - CTH shows no evidence of acute hemorrhage or major vascular territory infarction; generalized cerebral volume loss with moderate chronic microvascular ischemic changes similar to prior exam.  - Nonfocal neuro exam  - Able to follow commands  - Recent admission 12/18-12/20 with similar presentation: mild disorientation and lethargy on admit suspected to be 2/2 dehydration in patient with known vertebrobasilar insufficiency causing transient neurologic symptoms. EEG with no evidence of seizure and MRI of brain with no evidence of acute stroke   - CXR unremarkable, procalcitonin WNL  - Resolved 12/28

## 2017-12-29 NOTE — ASSESSMENT & PLAN NOTE
- UA reveals 20 WBC, rare bacteria  - Received CTX x1 in the ED  - Urine cx showing Morganella Morganii and Ecoli (<50,000)  - Remained afebrile without leukocytosis   - Pt discharged home with C to complete 7 day course of cipro

## 2017-12-29 NOTE — PLAN OF CARE
Problem: Patient Care Overview  Goal: Plan of Care Review  Pt on fall precautions. Yellow fall risk band and socks on pt. Instructed pt to call for assistance as needed and pt voiced understanding. Afebrile. No pressure ulcers noted. PT/OT

## 2017-12-29 NOTE — ASSESSMENT & PLAN NOTE
- Hypertensive on admit  - Resumed home Toprol 50 mg daily and lisinopril 40 mg daily  - Per [facility] neuro notes from last admission, allow for permissive HTN 2/2 known vertebrobasilar dz and intracranial stenosis

## 2017-12-29 NOTE — PLAN OF CARE
JAZMIN called [facility] and they will see the pt tomorrow.    Nicky Caro, [facility]  [phone]

## 2017-12-29 NOTE — PLAN OF CARE
Problem: Patient Care Overview  Goal: Plan of Care Review  Outcome: Ongoing (interventions implemented as appropriate)  Pt. Remains a fall risk,bed low and locked side rails up no falls sustained. No redness or breakdown noted pt. turns side to side independently. Pt. Has redness to rectal area and perineal area due to incontinence and frequent stools,barrier cream applied when changing pt. Pt. Afebrile WBC count WNL. Continue plan of care.

## 2017-12-29 NOTE — PLAN OF CARE
12/29/17 1550   Final Note   Assessment Type Final Discharge Note     Patient discharged home with [facility]  12/29/17.

## 2017-12-29 NOTE — PLAN OF CARE
JAZMIN sent HH referral to [facility].  JAZMIN is waiting to hear back from them.    Nicky Caro, [facility] x [phone]

## 2017-12-30 LAB — BACTERIA STL CULT: NORMAL

## 2018-01-02 NOTE — PT/OT/SLP DISCHARGE
Physical Therapy Discharge Summary    Name: Linda Hylton  MRN: [id number]   Principal Problem: Acute cystitis without hematuria     Patient Discharged from acute Physical Therapy on 17.  Please refer to prior PT noted date on 17 for functional status.     Assessment:     Patient appropriate for care in another setting.    Objective:     GOALS:    Physical Therapy Goals     Not on file          Multidisciplinary Problems (Resolved)        Problem: Physical Therapy Goal    Goal Priority Disciplines Outcome Goal Variances Interventions   Physical Therapy Goal   (Resolved)     PT/OT, PT Outcome(s) achieved     Description:  Goals to be met by: 18     Patient will increase functional independence with mobility by performin. Supine to sit with [place].  2. Sit to supine with [place].  3. Sit to stand transfer with [place].  4. Bed to chair transfer with Modified [place] using Rolling Walker.  5. Gait  x 300'' feet with Supervision using Rolling Walker.   6. Lower extremity exercise program x 20 reps per handout, with assistance as needed.                      Reasons for Discontinuation of Therapy Services  Transfer to alternate level of care.      Plan:     Patient Discharged to: Home with Home Health Service.  D/C'd with eval only.     Lexi Rollins, PT  2018

## 2018-01-10 ENCOUNTER — LAB VISIT (OUTPATIENT)
Dept: LAB | Facility: HOSPITAL | Age: 83
End: 2018-01-10
Attending: INTERNAL MEDICINE
Payer: MEDICARE

## 2018-01-10 ENCOUNTER — OFFICE VISIT (OUTPATIENT)
Dept: INTERNAL MEDICINE | Facility: CLINIC | Age: 83
End: 2018-01-10
Payer: MEDICARE

## 2018-01-10 VITALS
WEIGHT: 158 LBS | SYSTOLIC BLOOD PRESSURE: 121 MMHG | HEIGHT: 57 IN | HEART RATE: 87 BPM | BODY MASS INDEX: 34.09 KG/M2 | DIASTOLIC BLOOD PRESSURE: 86 MMHG

## 2018-01-10 DIAGNOSIS — G45.0 VERTEBRAL ARTERY INSUFFICIENCY: ICD-10-CM

## 2018-01-10 DIAGNOSIS — R42 DIZZINESS: ICD-10-CM

## 2018-01-10 DIAGNOSIS — I63.341 STROKE DUE TO THROMBOSIS OF RIGHT CEREBELLAR ARTERY: ICD-10-CM

## 2018-01-10 DIAGNOSIS — R44.1 HALLUCINATION, VISUAL: Primary | ICD-10-CM

## 2018-01-10 DIAGNOSIS — I34.0 NON-RHEUMATIC MITRAL REGURGITATION: ICD-10-CM

## 2018-01-10 DIAGNOSIS — I10 ESSENTIAL HYPERTENSION: ICD-10-CM

## 2018-01-10 DIAGNOSIS — E03.9 HYPOTHYROIDISM, UNSPECIFIED TYPE: ICD-10-CM

## 2018-01-10 DIAGNOSIS — R35.0 URINARY FREQUENCY: ICD-10-CM

## 2018-01-10 LAB
BACTERIA #/AREA URNS AUTO: NORMAL /HPF
BASOPHILS # BLD AUTO: 0.1 K/UL
BASOPHILS NFR BLD: 1.2 %
BILIRUB UR QL STRIP: NEGATIVE
CLARITY UR REFRACT.AUTO: CLEAR
COLOR UR AUTO: YELLOW
DIFFERENTIAL METHOD: ABNORMAL
EOSINOPHIL # BLD AUTO: 0.3 K/UL
EOSINOPHIL NFR BLD: 3.1 %
ERYTHROCYTE [DISTWIDTH] IN BLOOD BY AUTOMATED COUNT: 14.2 %
GLUCOSE UR QL STRIP: NEGATIVE
HCT VFR BLD AUTO: 36.3 %
HGB BLD-MCNC: 11.4 G/DL
HGB UR QL STRIP: NEGATIVE
KETONES UR QL STRIP: NEGATIVE
LEUKOCYTE ESTERASE UR QL STRIP: ABNORMAL
LYMPHOCYTES # BLD AUTO: 2.5 K/UL
LYMPHOCYTES NFR BLD: 28.5 %
MCH RBC QN AUTO: 26.8 PG
MCHC RBC AUTO-ENTMCNC: 31.4 G/DL
MCV RBC AUTO: 85 FL
MICROSCOPIC COMMENT: NORMAL
MONOCYTES # BLD AUTO: 1.2 K/UL
MONOCYTES NFR BLD: 13.6 %
NEUTROPHILS # BLD AUTO: 4.6 K/UL
NEUTROPHILS NFR BLD: 53.1 %
NITRITE UR QL STRIP: NEGATIVE
PH UR STRIP: 6 [PH] (ref 5–8)
PLATELET # BLD AUTO: 284 K/UL
PMV BLD AUTO: 10.8 FL
PROT UR QL STRIP: NEGATIVE
RBC # BLD AUTO: 4.26 M/UL
RBC #/AREA URNS AUTO: 0 /HPF (ref 0–4)
SP GR UR STRIP: 1 (ref 1–1.03)
SQUAMOUS #/AREA URNS AUTO: 1 /HPF
URN SPEC COLLECT METH UR: ABNORMAL
UROBILINOGEN UR STRIP-ACNC: NEGATIVE EU/DL
WBC # BLD AUTO: 8.68 K/UL
WBC #/AREA URNS AUTO: 2 /HPF (ref 0–5)

## 2018-01-10 PROCEDURE — 99213 OFFICE O/P EST LOW 20 MIN: CPT | Mod: PBBFAC | Performed by: INTERNAL MEDICINE

## 2018-01-10 PROCEDURE — 81001 URINALYSIS AUTO W/SCOPE: CPT

## 2018-01-10 PROCEDURE — 87088 URINE BACTERIA CULTURE: CPT

## 2018-01-10 PROCEDURE — 36415 COLL VENOUS BLD VENIPUNCTURE: CPT

## 2018-01-10 PROCEDURE — 87186 SC STD MICRODIL/AGAR DIL: CPT

## 2018-01-10 PROCEDURE — 85025 COMPLETE CBC W/AUTO DIFF WBC: CPT

## 2018-01-10 PROCEDURE — 87077 CULTURE AEROBIC IDENTIFY: CPT

## 2018-01-10 PROCEDURE — 99214 OFFICE O/P EST MOD 30 MIN: CPT | Mod: S$PBB,,, | Performed by: INTERNAL MEDICINE

## 2018-01-10 PROCEDURE — 99999 PR PBB SHADOW E&M-EST. PATIENT-LVL III: CPT | Mod: PBBFAC,,, | Performed by: INTERNAL MEDICINE

## 2018-01-10 PROCEDURE — 87086 URINE CULTURE/COLONY COUNT: CPT

## 2018-01-10 RX ORDER — LEVOTHYROXINE SODIUM 75 UG/1
75 TABLET ORAL DAILY
Qty: 90 TABLET | Refills: 0 | Status: SHIPPED | OUTPATIENT
Start: 2018-01-10 | End: 2018-09-12 | Stop reason: SDUPTHER

## 2018-01-10 RX ORDER — GABAPENTIN 300 MG/1
300 CAPSULE ORAL NIGHTLY
Refills: 1 | Status: ON HOLD | COMMUNITY
Start: 2017-11-06 | End: 2019-03-04 | Stop reason: HOSPADM

## 2018-01-10 RX ORDER — MECLIZINE HYDROCHLORIDE 25 MG/1
25 TABLET ORAL 3 TIMES DAILY PRN
Qty: 90 TABLET | Refills: 0 | Status: SHIPPED | OUTPATIENT
Start: 2018-01-10 | End: 2018-03-09 | Stop reason: SDUPTHER

## 2018-01-10 RX ORDER — ONDANSETRON 8 MG/1
8 TABLET, ORALLY DISINTEGRATING ORAL EVERY 8 HOURS PRN
Qty: 20 TABLET | Refills: 0 | Status: SHIPPED | OUTPATIENT
Start: 2018-01-10 | End: 2022-02-21

## 2018-01-10 NOTE — PROGRESS NOTES
CHIEF COMPLAINT:  Hospital followup.    HISTORY OF PRESENT ILLNESS:  The patient is an 82-year-old female who we see for   hypertension, hypothyroidism and hyperlipidemia who comes in today as a   hospital followup.  The patient was admitted twice to the hospital, the first   time was on 12/18/2017, where she presented with altered mental status.  An EEG   was performed at that time, which showed no evidence of seizure.  She underwent   an MRI of the brain, which showed no evidence of a recent infarct.  She did have   generalized cerebral volume loss with chronic microvascular ischemic changes.    She was found to have vertebrobasilar insufficiency.  Vascular Neurology   recommended to keep her blood pressure around 150 to 160 range.  She was on   amlodipine at that time.  This was discontinued.  She was continued on   lisinopril 40 mg and Toprol 50 mg.  The patient was seen again in the Emergency   Department on 12/27/2017, admitted and discharged on 12/29/2017.  She was   complaining of fatigue, weakness, blurry vision and urinary frequency.  The   patient was diagnosed with a urinary tract infection.  Urine culture showed   Morganella morganii as well as E. coli.  The patient was discharged home on a   7-day course of Cipro.  The patient reports that today, she finished her Cipro   yesterday; however, she is still having problems with hallucination.  She states   that looking at me she can see flowers on my face.  This is not continuous, it   is sometimes.  She knows that she is seeing these things that are not there.    She had one episode of visual hallucination where she thought she saw both of   her sons in her room talking.  This did not occur.  Her blood pressure at home   has been running on the low side, so she has only had one dose of lisinopril   since she returned home and only one dose of metoprolol.    REVIEW OF SYSTEMS:  No fever or chills, no visual change, but does state that   her vision is a  little bit blurry.  No chest pain, no shortness of breath.  The   patient does report some nausea.  She still complains of urinary frequency,   which she gets up every two hours.  Sometimes, she does leak a little urine.    She does report some dizziness on occasion.  She has been taking meclizine three   times a day as needed.  In regard to Xanax, it is prescribed for twice a day,   but she states she has been taking just one tablet a day but not necessarily   every day to help her sleep.    PHYSICAL EXAMINATION:  GENERAL APPEARANCE:  No acute distress.  HEENT:  Conjunctivae are clear.  Pupils are equal.  She does have bilateral lens   replacements.  TMs are clear.  Nasal septum is midline without discharge.    Oropharynx is without erythema.  PULMONARY:  Good inspiratory and expiratory breath sounds are heard.  Lungs are   clear to auscultation.  CARDIOVASCULAR:  S1, S2 with a 2/6 systolic ejection murmur, heard best at the   left lower sternal border.  EXTREMITIES:  Without edema.  ABDOMEN:  Nontender, nondistended without hepatosplenomegaly.    ASSESSMENT:  1.  Visual hallucinations.  2.  Dizziness.  3.  Hypertension, now with low blood pressure.  3.  Vertebral artery insufficiency.  4.  Mitral regurgitation.  5.  Urinary frequency.  6.  Hypothyroidism.    PLAN:  We will put the patient in for a cardiac echo, also get a UA and culture,   CBC.  The patient is to hold her lisinopril and her metoprolol for now.  I   checked her pressure myself twice, once in each arm and had 118/80 on the right,   120/82 on the left.      MANUEL/KEDAR  dd: 01/10/2018 14:23:45 (CST)  td: 01/11/2018 08:47:45 (CST)  Doc ID   #[id number]  Job ID #[id number]    CC:

## 2018-01-12 ENCOUNTER — TELEPHONE (OUTPATIENT)
Dept: INTERNAL MEDICINE | Facility: CLINIC | Age: 83
End: 2018-01-12

## 2018-01-12 NOTE — TELEPHONE ENCOUNTER
----- Message from Yakov Barrett MD sent at 1/11/2018  5:13 PM CST -----  Please contact and notify that her urine sample looked good. Please schedule a follow up with me in two weeks for a blood pressure check.

## 2018-01-14 LAB — BACTERIA UR CULT: NORMAL

## 2018-01-16 ENCOUNTER — TELEPHONE (OUTPATIENT)
Dept: INTERNAL MEDICINE | Facility: CLINIC | Age: 83
End: 2018-01-16

## 2018-01-16 RX ORDER — AMOXICILLIN 500 MG/1
500 CAPSULE ORAL 3 TIMES DAILY
Qty: 21 CAPSULE | Refills: 0 | Status: SHIPPED | OUTPATIENT
Start: 2018-01-16 | End: 2018-03-09

## 2018-01-16 NOTE — TELEPHONE ENCOUNTER
The pt states that the amoxicillin gives her diarrhea and she would like to have a prescription for Ciprofloxacin sent to her [facility] pharmacy.

## 2018-01-16 NOTE — TELEPHONE ENCOUNTER
----- Message from Yakov Barrett MD sent at 1/16/2018  6:55 AM CST -----  Please contact patient. Her urine culture did grow out a bacteria. I would like for her to take Amoxicillin 3 times a day for 7 days.

## 2018-01-17 NOTE — TELEPHONE ENCOUNTER
Spoke with patient she will fill the medication and try it, she had diarrhea one time in the past taking it.

## 2018-01-18 ENCOUNTER — HOSPITAL ENCOUNTER (OUTPATIENT)
Dept: CARDIOLOGY | Facility: CLINIC | Age: 83
Discharge: HOME | End: 2018-01-18
Attending: INTERNAL MEDICINE
Payer: MEDICARE

## 2018-01-18 DIAGNOSIS — I34.0 NON-RHEUMATIC MITRAL REGURGITATION: ICD-10-CM

## 2018-01-18 LAB
DIASTOLIC DYSFUNCTION: YES
ESTIMATED PA SYSTOLIC PRESSURE: 29.83
MITRAL VALVE MOBILITY: ABNORMAL
RETIRED EF AND QEF - SEE NOTES: 70 (ref 55–65)
TRICUSPID VALVE REGURGITATION: ABNORMAL

## 2018-01-18 PROCEDURE — 93306 TTE W/DOPPLER COMPLETE: CPT | Mod: PBBFAC | Performed by: INTERNAL MEDICINE

## 2018-01-22 RX ORDER — ALPRAZOLAM 0.5 MG/1
0.5 TABLET ORAL 2 TIMES DAILY PRN
Qty: 60 TABLET | Refills: 0 | Status: SHIPPED | OUTPATIENT
Start: 2018-01-22 | End: 2018-02-21 | Stop reason: SDUPTHER

## 2018-01-26 ENCOUNTER — OFFICE VISIT (OUTPATIENT)
Dept: INTERNAL MEDICINE | Facility: CLINIC | Age: 83
End: 2018-01-26
Payer: MEDICARE

## 2018-01-26 VITALS
DIASTOLIC BLOOD PRESSURE: 90 MMHG | HEART RATE: 80 BPM | WEIGHT: 159.81 LBS | SYSTOLIC BLOOD PRESSURE: 160 MMHG | BODY MASS INDEX: 34.48 KG/M2 | HEIGHT: 57 IN

## 2018-01-26 DIAGNOSIS — G45.0 VERTEBRAL ARTERY INSUFFICIENCY: Primary | ICD-10-CM

## 2018-01-26 DIAGNOSIS — I10 ESSENTIAL HYPERTENSION: ICD-10-CM

## 2018-01-26 DIAGNOSIS — R42 DIZZINESS: ICD-10-CM

## 2018-01-26 PROCEDURE — 99999 PR PBB SHADOW E&M-EST. PATIENT-LVL III: CPT | Mod: PBBFAC,,, | Performed by: INTERNAL MEDICINE

## 2018-01-26 PROCEDURE — 99214 OFFICE O/P EST MOD 30 MIN: CPT | Mod: S$PBB,,, | Performed by: INTERNAL MEDICINE

## 2018-01-26 PROCEDURE — 99213 OFFICE O/P EST LOW 20 MIN: CPT | Mod: PBBFAC | Performed by: INTERNAL MEDICINE

## 2018-01-26 RX ORDER — LISINOPRIL 5 MG/1
5 TABLET ORAL DAILY
Qty: 90 TABLET | Refills: 3 | Status: SHIPPED | OUTPATIENT
Start: 2018-01-26 | End: 2018-03-09

## 2018-01-26 NOTE — PROGRESS NOTES
CHIEF COMPLAINT:  Followup.    HISTORY OF PRESENT ILLNESS:  The patient is an 83-year-old female who we see for   hypertension as well as vertebrobasilar insufficiency and hypothyroidism who   comes in today for followup with dizziness as well as UTI.  The patient was   placed on amoxicillin for enterococcal UTI.  The patient completed the   antibiotics.  She did have problems with diarrhea from the medication.  The   patient also was taken off of her lisinopril 40 mg as well as metoprolol 50 mg.    According to Neurology's notes, they wanted her blood pressure around 150 to   around 160 systolic.  The patient does state that the dizziness has improved.    She is only taking meclizine twice a day instead of three times a day.  She was   having some visual hallucinations when she would see flowers or butterflies   around people, these have since improved.  She does state that she did see some   pink flowers about one week ago around her son and daughter-in-law while   watching TV, but none since.    REVIEW OF SYSTEMS:  Again, no dizziness, the hallucinations are improving.  The   patient has no other voiced complaint.    PHYSICAL EXAMINATION:  GENERAL APPEARANCE:  No acute distress.  VITAL SIGNS:  Repeat blood pressure was 160/90 taken by myself.  HEENT:  Trachea was midline without JVD.  PULMONARY:  Good inspiratory, expiratory breath sounds are heard.  Lungs are   clear to auscultation.  CARDIOVASCULAR:  S1, S2.  EXTREMITIES:  Without edema.  ABDOMEN:  Nontender, nondistended, without hepatosplenomegaly.    COMMENTS:  The patient looks much more awake and alert.  The patient was not as   timid about getting on the exam room table when she had been when we last saw   her.    ASSESSMENT:  1.  Vertebral artery insufficiency.  2.  Dizziness.  3.  Hypertension.    PLAN:  Did discuss with the patient and her sonAngel that I would like to start   her on lisinopril, but only 5 mg for now.  We will hold the metoprolol for  now.    If her heart rate tries to go up or pressure gets over 160, we will reinitiate   metoprolol 25 mg 1/2 tablet a day.  They will follow up with me otherwise in 1   month.  Also, discussed with her decreasing the meclizine to one-half tablet   twice a day.  If she has no problems with dizziness after a week to stop the   medication.      MANUEL/KEDAR  dd: 01/26/2018 14:04:40 (CST)  td: 01/27/2018 08:26:32 (CST)  Doc ID   #[id number]  Job ID #[id number]    CC:

## 2018-02-21 RX ORDER — ALPRAZOLAM 0.5 MG/1
0.5 TABLET ORAL 2 TIMES DAILY PRN
Qty: 60 TABLET | Refills: 0 | Status: SHIPPED | OUTPATIENT
Start: 2018-02-21 | End: 2018-03-23 | Stop reason: SDUPTHER

## 2018-03-09 ENCOUNTER — OFFICE VISIT (OUTPATIENT)
Dept: INTERNAL MEDICINE | Facility: CLINIC | Age: 83
End: 2018-03-09
Payer: MEDICARE

## 2018-03-09 ENCOUNTER — LAB VISIT (OUTPATIENT)
Dept: LAB | Facility: HOSPITAL | Age: 83
End: 2018-03-09
Attending: INTERNAL MEDICINE
Payer: MEDICARE

## 2018-03-09 VITALS
OXYGEN SATURATION: 95 % | SYSTOLIC BLOOD PRESSURE: 114 MMHG | HEART RATE: 71 BPM | HEIGHT: 57 IN | WEIGHT: 160.5 LBS | BODY MASS INDEX: 34.63 KG/M2 | DIASTOLIC BLOOD PRESSURE: 64 MMHG

## 2018-03-09 DIAGNOSIS — I10 ESSENTIAL HYPERTENSION: ICD-10-CM

## 2018-03-09 DIAGNOSIS — R35.0 URINARY FREQUENCY: ICD-10-CM

## 2018-03-09 DIAGNOSIS — Z00.00 ANNUAL PHYSICAL EXAM: ICD-10-CM

## 2018-03-09 DIAGNOSIS — R44.1 HALLUCINATION, VISUAL: ICD-10-CM

## 2018-03-09 DIAGNOSIS — E03.9 HYPOTHYROIDISM, UNSPECIFIED TYPE: ICD-10-CM

## 2018-03-09 DIAGNOSIS — G45.0 VERTEBRAL ARTERY INSUFFICIENCY: ICD-10-CM

## 2018-03-09 DIAGNOSIS — I63.341 STROKE DUE TO THROMBOSIS OF RIGHT CEREBELLAR ARTERY: ICD-10-CM

## 2018-03-09 DIAGNOSIS — Z00.00 ANNUAL PHYSICAL EXAM: Primary | ICD-10-CM

## 2018-03-09 LAB
ALBUMIN SERPL BCP-MCNC: 3.8 G/DL
ALP SERPL-CCNC: 52 U/L
ALT SERPL W/O P-5'-P-CCNC: 10 U/L
ANION GAP SERPL CALC-SCNC: 13 MMOL/L
AST SERPL-CCNC: 19 U/L
BACTERIA #/AREA URNS AUTO: ABNORMAL /HPF
BASOPHILS # BLD AUTO: 0.09 K/UL
BASOPHILS NFR BLD: 0.8 %
BILIRUB SERPL-MCNC: 0.2 MG/DL
BILIRUB UR QL STRIP: NEGATIVE
BUN SERPL-MCNC: 18 MG/DL
CALCIUM SERPL-MCNC: 9.4 MG/DL
CHLORIDE SERPL-SCNC: 98 MMOL/L
CHOLEST SERPL-MCNC: 195 MG/DL
CHOLEST/HDLC SERPL: 7 {RATIO}
CLARITY UR REFRACT.AUTO: CLEAR
CO2 SERPL-SCNC: 30 MMOL/L
COLOR UR AUTO: YELLOW
CREAT SERPL-MCNC: 1.1 MG/DL
DIFFERENTIAL METHOD: ABNORMAL
EOSINOPHIL # BLD AUTO: 0.4 K/UL
EOSINOPHIL NFR BLD: 3.4 %
ERYTHROCYTE [DISTWIDTH] IN BLOOD BY AUTOMATED COUNT: 16.1 %
EST. GFR  (AFRICAN AMERICAN): 54 ML/MIN/1.73 M^2
EST. GFR  (NON AFRICAN AMERICAN): 47 ML/MIN/1.73 M^2
GLUCOSE SERPL-MCNC: 71 MG/DL
GLUCOSE UR QL STRIP: NEGATIVE
HCT VFR BLD AUTO: 35.2 %
HDLC SERPL-MCNC: 28 MG/DL
HDLC SERPL: 14.4 %
HGB BLD-MCNC: 10.6 G/DL
HGB UR QL STRIP: NEGATIVE
HYALINE CASTS UR QL AUTO: 0 /LPF
KETONES UR QL STRIP: NEGATIVE
LDLC SERPL CALC-MCNC: 115.8 MG/DL
LEUKOCYTE ESTERASE UR QL STRIP: ABNORMAL
LYMPHOCYTES # BLD AUTO: 2.9 K/UL
LYMPHOCYTES NFR BLD: 27 %
MCH RBC QN AUTO: 23.1 PG
MCHC RBC AUTO-ENTMCNC: 30.1 G/DL
MCV RBC AUTO: 77 FL
MICROSCOPIC COMMENT: ABNORMAL
MONOCYTES # BLD AUTO: 0.9 K/UL
MONOCYTES NFR BLD: 8.6 %
NEUTROPHILS # BLD AUTO: 6.4 K/UL
NEUTROPHILS NFR BLD: 59.7 %
NITRITE UR QL STRIP: NEGATIVE
NONHDLC SERPL-MCNC: 167 MG/DL
PH UR STRIP: 6 [PH] (ref 5–8)
PLATELET # BLD AUTO: 323 K/UL
PMV BLD AUTO: 10.7 FL
POTASSIUM SERPL-SCNC: 4 MMOL/L
PROT SERPL-MCNC: 7.7 G/DL
PROT UR QL STRIP: ABNORMAL
RBC # BLD AUTO: 4.59 M/UL
RBC #/AREA URNS AUTO: 1 /HPF (ref 0–4)
SODIUM SERPL-SCNC: 141 MMOL/L
SP GR UR STRIP: 1.01 (ref 1–1.03)
SQUAMOUS #/AREA URNS AUTO: 3 /HPF
T4 SERPL-MCNC: 9.1 UG/DL
TRIGL SERPL-MCNC: 256 MG/DL
TSH SERPL DL<=0.005 MIU/L-ACNC: 3.37 UIU/ML
URN SPEC COLLECT METH UR: ABNORMAL
UROBILINOGEN UR STRIP-ACNC: NEGATIVE EU/DL
WBC # BLD AUTO: 10.74 K/UL
WBC #/AREA URNS AUTO: 7 /HPF (ref 0–5)

## 2018-03-09 PROCEDURE — 85025 COMPLETE CBC W/AUTO DIFF WBC: CPT

## 2018-03-09 PROCEDURE — 84436 ASSAY OF TOTAL THYROXINE: CPT

## 2018-03-09 PROCEDURE — 81001 URINALYSIS AUTO W/SCOPE: CPT

## 2018-03-09 PROCEDURE — 36415 COLL VENOUS BLD VENIPUNCTURE: CPT

## 2018-03-09 PROCEDURE — 99213 OFFICE O/P EST LOW 20 MIN: CPT | Mod: PBBFAC | Performed by: INTERNAL MEDICINE

## 2018-03-09 PROCEDURE — 80061 LIPID PANEL: CPT

## 2018-03-09 PROCEDURE — 99999 PR PBB SHADOW E&M-EST. PATIENT-LVL III: CPT | Mod: PBBFAC,,, | Performed by: INTERNAL MEDICINE

## 2018-03-09 PROCEDURE — 80053 COMPREHEN METABOLIC PANEL: CPT

## 2018-03-09 PROCEDURE — 84443 ASSAY THYROID STIM HORMONE: CPT

## 2018-03-09 PROCEDURE — 99397 PER PM REEVAL EST PAT 65+ YR: CPT | Mod: S$PBB,,, | Performed by: INTERNAL MEDICINE

## 2018-03-09 RX ORDER — MECLIZINE HYDROCHLORIDE 25 MG/1
25 TABLET ORAL 3 TIMES DAILY PRN
Qty: 90 TABLET | Refills: 3 | Status: SHIPPED | OUTPATIENT
Start: 2018-03-09 | End: 2018-05-22 | Stop reason: SDUPTHER

## 2018-03-09 NOTE — PROGRESS NOTES
CHIEF COMPLAINT:  Physical exam.    HISTORY OF PRESENT ILLNESS:  The patient is an 83-year-old female who we see for   hypertension, aortic stenosis, hypothyroidism as well as vertebrobasilar   insufficiency who comes in today for annual physical exam.  The patient is   supposed to be keeping her blood pressure between 150 to 160.  We lowered her   blood pressure medication.  She is currently on lisinopril 5 mg once a day.  Her   pressure has been running low at home with her readings in the 120s.  Over the   past several days, the dizziness has been worse.  Meclizine does help with this   problem.    REVIEW OF SYSTEMS:  Weight has been staying about the same.  No visual change.    She does report some blurred vision on occasion.  The patient is still having   some visual hallucinations.  She now sees flowers when she looks at objects, for   example looking at me, I had flowers all over my lab coat.  No auditory   changes, no dysphagia, no chest pain, no shortness of breath, no nausea.  On   clarification, she does report some nausea, but no vomiting.  She is taking a   probiotic called Digestive Advantage, which is helping her bowels a great deal.    No dysuria.  No weakness in the arms or legs.  No skin changes.  The patient   has not had a colonoscopy in a while, nor does she want one.  She has not had a   mammogram in a while and she does not want this either.  The patient does   complain of urinary frequency.  She actually leaks.  When she has the urge to go   to the bathroom and she does not get the right away, she will urinate on   herself.    PHYSICAL EXAMINATION:  GENERAL APPEARANCE:  No acute distress.  HEENT:  Conjunctivae clear.  Pupils are equal.  She does have bilateral   cataracts.  TMs are clear.  Nasal septum is midline without discharge.    Oropharynx is without erythema.  NECK:  Trachea is midline without JVD, without thyromegaly.  PULMONARY:  Good inspiratory, expiratory breath sounds are heard.   Lungs are   clear to auscultation.  CARDIOVASCULAR:  S1, S2.  Rhythm appeared to be normal.  EXTREMITIES:  With trace edema.  ABDOMEN:  Nontender.    ASSESSMENT:  1.  Physical exam.  2.  Vertebrobasilar insufficiency.  3.  Hypertension, now with hypotension.  4.  Hypothyroidism.  5.  History of a stroke involving the right cerebral artery.  6.  Visual hallucinations.  7.  Urinary frequency.    PLAN:  We will stop lisinopril for now.  We will check a urine culture, TSH, T4,   lipid panel, CBC and CMP.      JDS/HN  dd: 03/09/2018 14:13:57 (CST)  td: 03/10/2018 08:49:58 (CST)  Doc ID   #[id number]  Job ID #[id number]    CC:

## 2018-03-10 DIAGNOSIS — D50.9 MICROCYTIC ANEMIA: Primary | ICD-10-CM

## 2018-03-10 DIAGNOSIS — R82.90 ABNORMAL URINE: Primary | ICD-10-CM

## 2018-03-19 ENCOUNTER — TELEPHONE (OUTPATIENT)
Dept: INTERNAL MEDICINE | Facility: CLINIC | Age: 83
End: 2018-03-19

## 2018-03-19 RX ORDER — CLOPIDOGREL BISULFATE 75 MG/1
75 TABLET ORAL DAILY
Qty: 90 TABLET | Refills: 3 | Status: CANCELLED | OUTPATIENT
Start: 2018-03-19

## 2018-03-23 RX ORDER — ALPRAZOLAM 0.5 MG/1
0.5 TABLET ORAL 2 TIMES DAILY PRN
Qty: 60 TABLET | Refills: 0 | Status: SHIPPED | OUTPATIENT
Start: 2018-03-23 | End: 2018-04-20 | Stop reason: SDUPTHER

## 2018-03-28 ENCOUNTER — OFFICE VISIT (OUTPATIENT)
Dept: INTERNAL MEDICINE | Facility: CLINIC | Age: 83
End: 2018-03-28
Payer: MEDICARE

## 2018-03-28 VITALS
BODY MASS INDEX: 34.87 KG/M2 | HEIGHT: 57 IN | WEIGHT: 161.63 LBS | SYSTOLIC BLOOD PRESSURE: 120 MMHG | DIASTOLIC BLOOD PRESSURE: 70 MMHG | HEART RATE: 69 BPM

## 2018-03-28 DIAGNOSIS — R35.0 URINARY FREQUENCY: ICD-10-CM

## 2018-03-28 DIAGNOSIS — D64.9 ANEMIA, UNSPECIFIED TYPE: ICD-10-CM

## 2018-03-28 DIAGNOSIS — N39.41 URGE INCONTINENCE OF URINE: ICD-10-CM

## 2018-03-28 DIAGNOSIS — E78.2 ELEVATED TRIGLYCERIDES WITH HIGH CHOLESTEROL: ICD-10-CM

## 2018-03-28 DIAGNOSIS — I10 ESSENTIAL HYPERTENSION: ICD-10-CM

## 2018-03-28 DIAGNOSIS — G45.0 VERTEBRAL ARTERY INSUFFICIENCY: Primary | ICD-10-CM

## 2018-03-28 LAB
BACTERIA #/AREA URNS AUTO: NORMAL /HPF
BILIRUB UR QL STRIP: NEGATIVE
CLARITY UR REFRACT.AUTO: ABNORMAL
COLOR UR AUTO: YELLOW
GLUCOSE UR QL STRIP: NEGATIVE
HGB UR QL STRIP: NEGATIVE
HYALINE CASTS UR QL AUTO: 1 /LPF
KETONES UR QL STRIP: NEGATIVE
LEUKOCYTE ESTERASE UR QL STRIP: NEGATIVE
MICROSCOPIC COMMENT: NORMAL
NITRITE UR QL STRIP: NEGATIVE
NON-SQ EPI CELLS #/AREA URNS AUTO: <1 /HPF
PH UR STRIP: 5 [PH] (ref 5–8)
PROT UR QL STRIP: ABNORMAL
RBC #/AREA URNS AUTO: 3 /HPF (ref 0–4)
SP GR UR STRIP: 1.02 (ref 1–1.03)
SQUAMOUS #/AREA URNS AUTO: 3 /HPF
URN SPEC COLLECT METH UR: ABNORMAL
UROBILINOGEN UR STRIP-ACNC: NEGATIVE EU/DL
WBC #/AREA URNS AUTO: 5 /HPF (ref 0–5)

## 2018-03-28 PROCEDURE — 81001 URINALYSIS AUTO W/SCOPE: CPT

## 2018-03-28 PROCEDURE — 99999 PR PBB SHADOW E&M-EST. PATIENT-LVL IV: CPT | Mod: PBBFAC,,, | Performed by: INTERNAL MEDICINE

## 2018-03-28 PROCEDURE — 99214 OFFICE O/P EST MOD 30 MIN: CPT | Mod: PBBFAC | Performed by: INTERNAL MEDICINE

## 2018-03-28 PROCEDURE — 99214 OFFICE O/P EST MOD 30 MIN: CPT | Mod: S$PBB,,, | Performed by: INTERNAL MEDICINE

## 2018-03-28 RX ORDER — CLOPIDOGREL BISULFATE 75 MG/1
75 TABLET ORAL DAILY
Qty: 90 TABLET | Refills: 3 | Status: ON HOLD | OUTPATIENT
Start: 2018-03-28 | End: 2019-03-04 | Stop reason: HOSPADM

## 2018-03-28 RX ORDER — FENOFIBRATE 160 MG/1
160 TABLET ORAL DAILY
Qty: 90 TABLET | Refills: 3 | Status: ON HOLD | OUTPATIENT
Start: 2018-03-28 | End: 2019-03-04 | Stop reason: HOSPADM

## 2018-03-29 NOTE — PROGRESS NOTES
CHIEF COMPLAINT:  Followup on blood pressure.    HISTORY OF PRESENT ILLNESS:  The patient is an 83-year-old female who we see for   hypertension, hypothyroidism, CVA with left-sided weakness, as well as   vertebral basilar insufficiency, who comes in today for followup of blood   pressure.  She was on lisinopril 5 mg once a day on last visit.  This was   discontinued.  Her readings have been in the 120s systolic.  Her dizziness was   worse.  When the patient was admitted to the hospital, it was recommended that   her blood pressure be kept between 150 and 160.  Her son is with her today.    Reports that her pressure had been in the 120s to 130s with the diastolic in the   80s, off all medication for blood pressure, but she had a few days over the   past week where the pressure popped up.  Last night, it went up to 178/85.  She   had lisinopril 40 mg as well as lisinopril 5 mg.  Her son gave her lisinopril 40   mg tablet last night.    REVIEW OF SYSTEMS:  The patient reports no fever or chills.  On ____, she does   report having a cough.  Her temperature did go up to 99.7.  No ear pain.  No   sinus pain or pressure.  No nausea or vomiting.  No abdominal pain.  No   diarrhea.  She does have urinary urgency.    PHYSICAL EXAMINATION:  VITAL SIGNS:  Repeat blood pressure taken by myself was 120/70.  GENERAL APPEARANCE:  No acute distress.  HEENT:  Trachea was midline without JVD.  PULMONARY:  Good inspiratory and expiratory breath sounds were heard.  Lungs   were clear to auscultation.  CARDIOVASCULAR:  S1 and S2 with a II out of VI systolic ejection murmur, heard   best at the right upper sternal border.  EXTREMITIES:  Without edema.  ABDOMEN:  Nontender, nondistended, without hepatosplenomegaly.    COMMENTS:  Did discuss with the patient about her dizziness.  She reports that   she is starting to see flowers when she looks at objects.  Looking at me, she   sees my lab coat has flowers on it.  She does report some  dizziness.  She cannot   tell whether it is worse today with the low blood pressure or not.    ASSESSMENT:  1.  Vertebral basilar insufficiency.  2.  Hypertension.  3.  Urinary frequency.  4.  Urge incontinence.    PLAN:  We will go ahead and put the patient in for UA and culture.  She does   have a mild anemia.  We are going to check stools x1 for that.  Her son was   instructed if her pressure got up to around 170, not to use lisinopril 40 mg and   he can gave her lisinopril 5 mg.  Again discussed the goal is to get her   pressure around 150.  We will also refer the patient to Urogynecology.      MANUEL/KEDAR  dd: 03/29/2018 06:19:27 (CDT)  td: 03/30/2018 02:03:49 (CDT)  Doc ID   #[id number]  Job ID #[id number]    CC:

## 2018-04-20 RX ORDER — ALPRAZOLAM 0.5 MG/1
0.5 TABLET ORAL 2 TIMES DAILY PRN
Qty: 60 TABLET | Refills: 0 | Status: SHIPPED | OUTPATIENT
Start: 2018-04-20 | End: 2018-05-25 | Stop reason: SDUPTHER

## 2018-04-27 ENCOUNTER — OFFICE VISIT (OUTPATIENT)
Dept: URGENT CARE | Facility: CLINIC | Age: 83
End: 2018-04-27
Payer: MEDICARE

## 2018-04-27 VITALS
BODY MASS INDEX: 34.73 KG/M2 | DIASTOLIC BLOOD PRESSURE: 74 MMHG | HEART RATE: 82 BPM | RESPIRATION RATE: 18 BRPM | OXYGEN SATURATION: 96 % | TEMPERATURE: 98 F | WEIGHT: 161 LBS | HEIGHT: 57 IN | SYSTOLIC BLOOD PRESSURE: 151 MMHG

## 2018-04-27 DIAGNOSIS — B37.2 CANDIDAL SKIN INFECTION: Primary | ICD-10-CM

## 2018-04-27 DIAGNOSIS — J32.9 SINUSITIS, UNSPECIFIED CHRONICITY, UNSPECIFIED LOCATION: ICD-10-CM

## 2018-04-27 PROCEDURE — 99214 OFFICE O/P EST MOD 30 MIN: CPT | Mod: S$GLB,,, | Performed by: PHYSICIAN ASSISTANT

## 2018-04-27 RX ORDER — AMOXICILLIN AND CLAVULANATE POTASSIUM 875; 125 MG/1; MG/1
1 TABLET, FILM COATED ORAL 2 TIMES DAILY
Qty: 20 TABLET | Refills: 0 | Status: SHIPPED | OUTPATIENT
Start: 2018-04-27 | End: 2018-05-07

## 2018-04-27 RX ORDER — NYSTATIN 100000 [USP'U]/G
POWDER TOPICAL
Qty: 60 G | Refills: 0 | Status: SHIPPED | OUTPATIENT
Start: 2018-04-27 | End: 2024-03-30

## 2018-04-27 NOTE — PROGRESS NOTES
"Subjective:       Patient ID: Linda Hylton is a 83 y.o. female.    Vitals:  height is 4' 9" (1.448 m) and weight is 73 kg (161 lb). Her temperature is 98.3 °F (36.8 °C). Her blood pressure is 151/74 (abnormal) and her pulse is 82. Her respiration is 18 and oxygen saturation is 96%.     Chief Complaint: Sinus Problem    This is a 83 y.o. 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  Her symptoms have been going on for over 2 week.  She denies fever.  She has also been having a rash/redness to the skin folds of her abdomen for a few weeks.  Has been using OTC antifungal.        Sinus Problem   This is a new problem. The current episode started 1 to 4 weeks ago. The problem has been gradually worsening since onset. There has been no fever. Her pain is at a severity of 0/10. She is experiencing no pain. Associated symptoms include congestion, coughing and sinus pressure. Pertinent negatives include no chills, ear pain, headaches, hoarse voice, shortness of breath or sore throat. Past treatments include oral decongestants (mucinex, sudefed). The treatment provided no relief.     Review of Systems   Constitution: Positive for malaise/fatigue. Negative for chills and fever.   HENT: Positive for congestion and sinus pressure. Negative for ear pain, hoarse voice and sore " throat.    Eyes: Negative for discharge and redness.   Cardiovascular: Negative for chest pain, dyspnea on exertion and leg swelling.   Respiratory: Positive for cough. Negative for shortness of breath, sputum production and wheezing.    Musculoskeletal: Negative for myalgias.   Gastrointestinal: Negative for abdominal pain and nausea.   Neurological: Negative for headaches.       Objective:      Physical Exam   Constitutional: She is oriented to person, place, and time. She appears well-developed and well-nourished.   HENT:   Head: Normocephalic and atraumatic.   Right Ear: Hearing, tympanic membrane, external ear and ear canal normal.   Left Ear: Hearing, tympanic membrane, external ear and ear canal normal.   Nose: Mucosal edema and rhinorrhea present. Right sinus exhibits maxillary sinus tenderness. Right sinus exhibits no frontal sinus tenderness. Left sinus exhibits maxillary sinus tenderness. Left sinus exhibits no frontal sinus tenderness.   Mouth/Throat: Uvula is midline and oropharynx is clear and moist.   Eyes: Conjunctivae are normal.   Neck: Normal range of motion. Neck supple. No thyromegaly present.   Cardiovascular: Normal rate and regular rhythm.  Exam reveals no gallop and no friction rub.    No murmur heard.  Pulmonary/Chest: Effort normal and breath sounds normal. She has no wheezes. She has no rales.   Musculoskeletal: Normal range of motion.   Lymphadenopathy:     She has no cervical adenopathy.   Neurological: She is alert and oriented to person, place, and time.   Skin: Skin is warm and dry. There is erythema (and macerated skin the the folds of her panus).   Psychiatric: She has a normal mood and affect. Her behavior is normal. Judgment and thought content normal.   Nursing note and vitals reviewed.      Assessment:       1. Candidal skin infection    2. Sinusitis, unspecified chronicity, unspecified location        Plan:         Candidal skin infection  -     nystatin (MYCOSTATIN) powder;  Apply to affected area 3 times daily  Dispense: 60 g; Refill: 0    Sinusitis, unspecified chronicity, unspecified location  -     amoxicillin-clavulanate 875-125mg (AUGMENTIN) 875-125 mg per tablet; Take 1 tablet by mouth 2 (two) times daily.  Dispense: 20 tablet; Refill: 0      Linda was seen today for sinus problem.    Diagnoses and all orders for this visit:    Candidal skin infection  -     nystatin (MYCOSTATIN) powder; Apply to affected area 3 times daily    Sinusitis, unspecified chronicity, unspecified location  -     amoxicillin-clavulanate 875-125mg (AUGMENTIN) 875-125 mg per tablet; Take 1 tablet by mouth 2 (two) times daily.      Patient Instructions   - Rest.    - Drink plenty of fluids.    - Tylenol or Ibuprofen as directed as needed for fever/pain.    - Take over-the-counter claritin, zyrtec, allegra, or xyzal as directed.  - Use over the counter Flonase as directed for sinus congestion and postnasal drip.  - use nasal saline prior to Flonase.  - Use Ocean Spray Nasal Saline 1-3 puffs each nostril every 2-3 hours then blow out onto tissue. This is to irrigate the nasal passage way to clear the sinus openings. Use until sinus problem resolved.   - Follow up with your PCP or specialty clinic as directed in the next 1-2 weeks if not improved or as needed.  You can call [phone] to schedule an appointment with the appropriate provider.    - Go to the ED if your symptoms worsen.    Candida Skin Infection (Adult)  Candida is type of yeast. It grows naturally on the skin and in the mouth. If it grows out of control, it can cause an infection. Candida can cause infections in the genital area, skin folds, in the mouth, and under the breasts. Anyone can get this infection. It is more common in a person with a weak immune system, such as from diabetes, HIV, or cancer. Its also more common in someone who has been on antibiotic therapy. And its more common people who are overweight or who have  incontinence. Wearing tight-fitting clothing and taking part in activities with lots of skin-to-skin contact can also put you at risk.  Candida causes the skin to become bright red and inflamed. The border of the infected part of the skin is often raised. The infection causes pain and itching. Sometimes the skin peels and bleeds. In the mouth, candida is called thrush, and may cause white thickened areas.  A Candida rash is most often treated with an antifungal cream or ointment. The rash will clear a few days after starting the medicine. Infections that dont go away may need a prescription medicine. In rare cases, a bacterial infection can also occur.  Home care  Your healthcare provider will recommend an antifungal cream or ointment for the rash. He or she may also prescribe a medicine for the itch. Follow all instructions for using these medicines. Dont use cornstarch powder. Cornstarch can cause the Candida infection to get worse.  General care:  · Keep your skin clean by washing the area twice a day.  · Use the cream as directed until your rash is gone. Once the skin has healed, keep it dry to prevent another infection.   · If you are overweight, talk with your healthcare provider about a plan to lose excess weight.  · Avoid clothes that fit tightly.  Follow-up care  Follow up with your healthcare provider, or as advised. Your rash will clear in 7 to 14 days. Call your healthcare provider if the rash is not gone after 14 days.  When to seek medical advice  Call your healthcare provider right away if any of these occur:  · Pain or redness that gets worse or spreads  · Fluid coming from the skin  · Yellow crusts on the skin  · Fever of 100.4°F (38°C) or higher, or as directed by your healthcare provider  Date Last Reviewed: 9/1/2016  © 5257-7615 [employer]. [address] [place] [address], [place] [address]. All rights reserved. This information is not intended as a substitute for professional medical  care. Always follow your healthcare professional's instructions.        Sinusitis (Antibiotic Treatment)    The sinuses are air-filled spaces within the bones of the face. They connect to the inside of the nose. Sinusitis is an inflammation of the tissue lining the sinus cavity. Sinus inflammation can occur during a cold. It can also be due to allergies to pollens and other particles in the air. Sinusitis can cause symptoms of sinus congestion and fullness. A sinus infection causes fever, headache and facial pain. There is often green or yellow drainage from the nose or into the back of the throat (post-nasal drip). You have been given antibiotics to treat this condition.  Home care:  · Take the full course of antibiotics as instructed. Do not stop taking them, even if you feel better.  · Drink plenty of water, hot tea, and other liquids. This may help thin mucus. It also may promote sinus drainage.  · Heat may help soothe painful areas of the face. Use a towel soaked in hot water. Or,  the shower and direct the hot spray onto your face. Using a vaporizer along with a menthol rub at night may also help.   · An expectorant containing guaifenesin may help thin the mucus and promote drainage from the sinuses.  · Over-the-counter decongestants may be used unless a similar medicine was prescribed. Nasal sprays work the fastest. Use one that contains phenylephrine or oxymetazoline. First blow the nose gently. Then use the spray. Do not use these medicines more often than directed on the label or symptoms may get worse. You may also use tablets containing pseudoephedrine. Avoid products that combine ingredients, because side effects may be increased. Read labels. You can also ask the pharmacist for help. (NOTE: Persons with high blood pressure should not use decongestants. They can raise blood pressure.)  · Over-the-counter antihistamines may help if allergies contributed to your sinusitis.    · Do not use nasal  rinses or irrigation during an acute sinus infection, unless told to by your health care provider. Rinsing may spread the infection to other sinuses.  · Use acetaminophen or ibuprofen to control pain, unless another pain medicine was prescribed. (If you have chronic liver or kidney disease or ever had a stomach ulcer, talk with your doctor before using these medicines. Aspirin should never be used in anyone under 18 years of age who is ill with a fever. It may cause severe liver damage.)  · Don't smoke. This can worsen symptoms.  Follow-up care  Follow up with your healthcare provider or our staff if you are not improving within the next week.  When to seek medical advice  Call your healthcare provider if any of these occur:  · Facial pain or headache becoming more severe  · Stiff neck  · Unusual drowsiness or confusion  · Swelling of the forehead or eyelids  · Vision problems, including blurred or double vision  · Fever of 100.4ºF (38ºC) or higher, or as directed by your healthcare provider  · Seizure  · Breathing problems  · Symptoms not resolving within 10 days  Date Last Reviewed: 4/13/2015 © 2000-2017 [employer]. [address] [place] [address], [place] [address]. All rights reserved. This information is not intended as a substitute for professional medical care. Always follow your healthcare professional's instructions.

## 2018-04-27 NOTE — PATIENT INSTRUCTIONS
- Rest.    - Drink plenty of fluids.    - Tylenol or Ibuprofen as directed as needed for fever/pain.    - Take over-the-counter claritin, zyrtec, allegra, or xyzal as directed.  - Use over the counter Flonase as directed for sinus congestion and postnasal drip.  - use nasal saline prior to Flonase.  - Use Ocean Spray Nasal Saline 1-3 puffs each nostril every 2-3 hours then blow out onto tissue. This is to irrigate the nasal passage way to clear the sinus openings. Use until sinus problem resolved.   - Follow up with your PCP or specialty clinic as directed in the next 1-2 weeks if not improved or as needed.  You can call [phone] to schedule an appointment with the appropriate provider.    - Go to the ED if your symptoms worsen.    Candida Skin Infection (Adult)  Candida is type of yeast. It grows naturally on the skin and in the mouth. If it grows out of control, it can cause an infection. Candida can cause infections in the genital area, skin folds, in the mouth, and under the breasts. Anyone can get this infection. It is more common in a person with a weak immune system, such as from diabetes, HIV, or cancer. Its also more common in someone who has been on antibiotic therapy. And its more common people who are overweight or who have incontinence. Wearing tight-fitting clothing and taking part in activities with lots of skin-to-skin contact can also put you at risk.  Candida causes the skin to become bright red and inflamed. The border of the infected part of the skin is often raised. The infection causes pain and itching. Sometimes the skin peels and bleeds. In the mouth, candida is called thrush, and may cause white thickened areas.  A Candida rash is most often treated with an antifungal cream or ointment. The rash will clear a few days after starting the medicine. Infections that dont go away may need a prescription medicine. In rare cases, a bacterial infection can also occur.  Home care  Your  healthcare provider will recommend an antifungal cream or ointment for the rash. He or she may also prescribe a medicine for the itch. Follow all instructions for using these medicines. Dont use cornstarch powder. Cornstarch can cause the Candida infection to get worse.  General care:  · Keep your skin clean by washing the area twice a day.  · Use the cream as directed until your rash is gone. Once the skin has healed, keep it dry to prevent another infection.   · If you are overweight, talk with your healthcare provider about a plan to lose excess weight.  · Avoid clothes that fit tightly.  Follow-up care  Follow up with your healthcare provider, or as advised. Your rash will clear in 7 to 14 days. Call your healthcare provider if the rash is not gone after 14 days.  When to seek medical advice  Call your healthcare provider right away if any of these occur:  · Pain or redness that gets worse or spreads  · Fluid coming from the skin  · Yellow crusts on the skin  · Fever of 100.4°F (38°C) or higher, or as directed by your healthcare provider  Date Last Reviewed: 9/1/2016 © 2000-2017 [employer]. [address] [place] [address]. All rights reserved. This information is not intended as a substitute for professional medical care. Always follow your healthcare professional's instructions.        Sinusitis (Antibiotic Treatment)    The sinuses are air-filled spaces within the bones of the face. They connect to the inside of the nose. Sinusitis is an inflammation of the tissue lining the sinus cavity. Sinus inflammation can occur during a cold. It can also be due to allergies to pollens and other particles in the air. Sinusitis can cause symptoms of sinus congestion and fullness. A sinus infection causes fever, headache and facial pain. There is often green or yellow drainage from the nose or into the back of the throat (post-nasal drip). You have been given antibiotics to treat this  condition.  Home care:  · Take the full course of antibiotics as instructed. Do not stop taking them, even if you feel better.  · Drink plenty of water, hot tea, and other liquids. This may help thin mucus. It also may promote sinus drainage.  · Heat may help soothe painful areas of the face. Use a towel soaked in hot water. Or,  the shower and direct the hot spray onto your face. Using a vaporizer along with a menthol rub at night may also help.   · An expectorant containing guaifenesin may help thin the mucus and promote drainage from the sinuses.  · Over-the-counter decongestants may be used unless a similar medicine was prescribed. Nasal sprays work the fastest. Use one that contains phenylephrine or oxymetazoline. First blow the nose gently. Then use the spray. Do not use these medicines more often than directed on the label or symptoms may get worse. You may also use tablets containing pseudoephedrine. Avoid products that combine ingredients, because side effects may be increased. Read labels. You can also ask the pharmacist for help. (NOTE: Persons with high blood pressure should not use decongestants. They can raise blood pressure.)  · Over-the-counter antihistamines may help if allergies contributed to your sinusitis.    · Do not use nasal rinses or irrigation during an acute sinus infection, unless told to by your health care provider. Rinsing may spread the infection to other sinuses.  · Use acetaminophen or ibuprofen to control pain, unless another pain medicine was prescribed. (If you have chronic liver or kidney disease or ever had a stomach ulcer, talk with your doctor before using these medicines. Aspirin should never be used in anyone under 18 years of age who is ill with a fever. It may cause severe liver damage.)  · Don't smoke. This can worsen symptoms.  Follow-up care  Follow up with your healthcare provider or our staff if you are not improving within the next week.  When to seek  medical advice  Call your healthcare provider if any of these occur:  · Facial pain or headache becoming more severe  · Stiff neck  · Unusual drowsiness or confusion  · Swelling of the forehead or eyelids  · Vision problems, including blurred or double vision  · Fever of 100.4ºF (38ºC) or higher, or as directed by your healthcare provider  · Seizure  · Breathing problems  · Symptoms not resolving within 10 days  Date Last Reviewed: 4/13/2015  © 6800-2691 [employer]. [address] [place] [address]. All rights reserved. This information is not intended as a substitute for professional medical care. Always follow your healthcare professional's instructions.

## 2018-05-22 DIAGNOSIS — I63.341 STROKE DUE TO THROMBOSIS OF RIGHT CEREBELLAR ARTERY: ICD-10-CM

## 2018-05-22 RX ORDER — MECLIZINE HYDROCHLORIDE 25 MG/1
25 TABLET ORAL 3 TIMES DAILY PRN
Qty: 90 TABLET | Refills: 3 | Status: ON HOLD | OUTPATIENT
Start: 2018-05-22 | End: 2019-03-04 | Stop reason: HOSPADM

## 2018-05-26 RX ORDER — ALPRAZOLAM 0.5 MG/1
0.5 TABLET ORAL 2 TIMES DAILY PRN
Qty: 60 TABLET | Refills: 0 | Status: SHIPPED | OUTPATIENT
Start: 2018-05-26 | End: 2018-06-25

## 2018-05-29 ENCOUNTER — TELEPHONE (OUTPATIENT)
Dept: INTERNAL MEDICINE | Facility: CLINIC | Age: 83
End: 2018-05-29

## 2018-05-29 NOTE — TELEPHONE ENCOUNTER
----- Message from Alysha Turner sent at 5/29/2018  2:00 PM CDT -----  Contact: Home Health Nurse [facility]  [phone]      ----- Message -----  From: Yamilet Mendez  Sent: 5/29/2018   1:46 PM  To: [facility] Staff    Would like a call back regarding pt BP readings. States the pt has been getting elevated BP reading I the morning and would like to discuss this matter.

## 2018-06-07 ENCOUNTER — TELEPHONE (OUTPATIENT)
Dept: ADMINISTRATIVE | Facility: CLINIC | Age: 83
End: 2018-06-07

## 2018-06-07 NOTE — TELEPHONE ENCOUNTER
Home Health Recert 05/16/2018 to 07/14/2018 [facility] Dr Yakov Barrett 05/16/2018 to 07/14/2018 :  Services.

## 2018-06-22 ENCOUNTER — TELEPHONE (OUTPATIENT)
Dept: INTERNAL MEDICINE | Facility: CLINIC | Age: 83
End: 2018-06-22

## 2018-06-22 NOTE — TELEPHONE ENCOUNTER
----- Message from Ji Scruggs sent at 6/22/2018  1:20 PM CDT -----  Contact: [phone]  Type: Sooner appointment than  is able to schedule    When is the first available appointment? August    What is the nature of the appointment? Patient is having some blood pressure issues in the past. No symptoms today    What appointment type: follow-up    Comments: Please advise, thanks

## 2018-06-23 RX ORDER — ALPRAZOLAM 0.5 MG/1
0.5 TABLET ORAL 2 TIMES DAILY PRN
Qty: 60 TABLET | Refills: 0 | Status: SHIPPED | OUTPATIENT
Start: 2018-06-23 | End: 2018-07-21 | Stop reason: SDUPTHER

## 2018-07-08 RX ORDER — LEVOTHYROXINE SODIUM 75 UG/1
75 TABLET ORAL DAILY
Qty: 90 TABLET | Refills: 1 | Status: ON HOLD | OUTPATIENT
Start: 2018-07-08 | End: 2019-03-04 | Stop reason: HOSPADM

## 2018-07-12 ENCOUNTER — OFFICE VISIT (OUTPATIENT)
Dept: INTERNAL MEDICINE | Facility: CLINIC | Age: 83
End: 2018-07-12
Payer: MEDICARE

## 2018-07-12 VITALS
BODY MASS INDEX: 34.84 KG/M2 | WEIGHT: 161 LBS | OXYGEN SATURATION: 98 % | HEART RATE: 74 BPM | TEMPERATURE: 98 F | DIASTOLIC BLOOD PRESSURE: 76 MMHG | SYSTOLIC BLOOD PRESSURE: 124 MMHG

## 2018-07-12 DIAGNOSIS — N39.3 STRESS INCONTINENCE IN FEMALE: ICD-10-CM

## 2018-07-12 DIAGNOSIS — I10 HYPERTENSION, ESSENTIAL: Primary | ICD-10-CM

## 2018-07-12 PROCEDURE — 99215 OFFICE O/P EST HI 40 MIN: CPT | Mod: PBBFAC | Performed by: NURSE PRACTITIONER

## 2018-07-12 PROCEDURE — 99999 PR PBB SHADOW E&M-EST. PATIENT-LVL V: CPT | Mod: PBBFAC,,, | Performed by: NURSE PRACTITIONER

## 2018-07-12 PROCEDURE — 99213 OFFICE O/P EST LOW 20 MIN: CPT | Mod: S$PBB,,, | Performed by: NURSE PRACTITIONER

## 2018-07-12 NOTE — PROGRESS NOTES
"Subjective:       Patient ID: Linda Hylton is a 83 y.o. female.    Chief Complaint: Hypertension    HPI:  84 yo female that presents to clinic today for blood pressure check and complaint of urinary incontinence.    States that she checks her blood pressure at home with automated cuff with readings usually ranging in the 120s-150s/70s-80s.  States that she is currently off all daily blood pressure medications.      States that sometimes the top number will get up into the 170s at which point her son gives her 40mg of lisinopril.  Also reports that if she feels like her "heart is beating fast or pulse rate is up" she will take a 50mg metoprolol succinate.  States that she does not do this everyday.    States that she sometimes gets "a little dizzy after eating breakfast" but this usually resolves during the day.  No complaints of blurred vision, chest pain, SOB, n/v or syncope.  States that she does drink a a few cups of water during the day but son states that "it is a struggle to get her to drink more water."    Review of Systems   Constitutional: Negative for activity change, appetite change, fatigue and fever.   Respiratory: Negative for apnea, cough, shortness of breath and wheezing.    Cardiovascular: Negative for chest pain, palpitations and leg swelling.   Gastrointestinal: Negative for abdominal distention, abdominal pain, constipation, diarrhea, nausea and vomiting.   Genitourinary: Negative for dysuria, flank pain, frequency, hematuria and urgency.        Admits incontinence   Musculoskeletal: Negative for arthralgias, back pain, myalgias, neck pain and neck stiffness.   Skin: Negative for color change and rash.   Neurological: Negative for dizziness, light-headedness, numbness and headaches.   Psychiatric/Behavioral: Negative for behavioral problems.       Objective:      Physical Exam   Constitutional: She is oriented to person, place, and time. She appears well-developed and well-nourished. No " distress.   Eyes: Conjunctivae and EOM are normal. Pupils are equal, round, and reactive to light.   Neck: Normal range of motion. Neck supple. No thyromegaly present.   Cardiovascular: Normal rate, regular rhythm and intact distal pulses.    Murmur heard.  Pulmonary/Chest: Effort normal and breath sounds normal. No respiratory distress. She has no wheezes. She has no rales.   Abdominal: Soft. Bowel sounds are normal. She exhibits no distension and no mass. There is no tenderness.   Lymphadenopathy:     She has no cervical adenopathy.   Neurological: She is alert and oriented to person, place, and time. No cranial nerve deficit or sensory deficit.   Skin: Skin is warm and dry. No erythema.   Psychiatric: Her behavior is normal.       Assessment:       1. Hypertension, essential    2. Stress incontinence in female        Plan:     1.  HTN  -Vitals are stable in clinic.  -Manual blood pressure recheck of 124/76 in clinic.  -Patient does not require any daily blood pressure medication at this time.  -Encouraged to refrain from using metoprolol.  -Instructed son that if systolic blood pressure goes above 170, that he can give 5mg of lisinopril and not 40mg.  -Encouraged to increase water intake to at least six 8oz glasses of water a day.    2.  Stress incontinence  -Order has already been placed for patient to be evaluated by urogynecology.  -Patient unable to give urine sample in clinic today.

## 2018-07-21 RX ORDER — ALPRAZOLAM 0.5 MG/1
0.5 TABLET ORAL 2 TIMES DAILY PRN
Qty: 60 TABLET | Refills: 0 | Status: SHIPPED | OUTPATIENT
Start: 2018-07-21 | End: 2018-08-20 | Stop reason: SDUPTHER

## 2018-07-23 ENCOUNTER — TELEPHONE (OUTPATIENT)
Dept: INTERNAL MEDICINE | Facility: CLINIC | Age: 83
End: 2018-07-23

## 2018-07-23 NOTE — TELEPHONE ENCOUNTER
----- Message from Yakov Barrett MD sent at 7/21/2018  9:16 AM CDT -----  Please schedule a follow up appointment with me.

## 2018-07-24 ENCOUNTER — TELEPHONE (OUTPATIENT)
Dept: ADMINISTRATIVE | Facility: CLINIC | Age: 83
End: 2018-07-24

## 2018-08-20 RX ORDER — ALPRAZOLAM 0.5 MG/1
0.5 TABLET ORAL DAILY PRN
Qty: 30 TABLET | Refills: 0 | Status: SHIPPED | OUTPATIENT
Start: 2018-08-20 | End: 2018-09-12

## 2018-08-21 NOTE — TELEPHONE ENCOUNTER
----- Message from Yakov Barrett MD sent at 8/20/2018  7:27 PM CDT -----  Please notify that I am decreasing her Xanax to once a day. She needs a follow up appointment with me.

## 2018-08-28 ENCOUNTER — TELEPHONE (OUTPATIENT)
Dept: UROGYNECOLOGY | Facility: CLINIC | Age: 83
End: 2018-08-28

## 2018-08-28 NOTE — TELEPHONE ENCOUNTER
Spoke to pt regarding her appointment this morning with Dr. Lenz. Pt states she did not anything about the appointment, and does not remember being referred to the office. I offered to reschedule the appointment, pt declined.

## 2018-09-12 ENCOUNTER — OFFICE VISIT (OUTPATIENT)
Dept: INTERNAL MEDICINE | Facility: CLINIC | Age: 83
End: 2018-09-12
Payer: MEDICARE

## 2018-09-12 VITALS
HEART RATE: 100 BPM | TEMPERATURE: 99 F | SYSTOLIC BLOOD PRESSURE: 142 MMHG | BODY MASS INDEX: 35.01 KG/M2 | DIASTOLIC BLOOD PRESSURE: 88 MMHG | HEIGHT: 57 IN | OXYGEN SATURATION: 97 % | WEIGHT: 162.25 LBS

## 2018-09-12 DIAGNOSIS — I10 HYPERTENSION, ESSENTIAL: Primary | ICD-10-CM

## 2018-09-12 DIAGNOSIS — B37.9 CANDIDA INFECTION: ICD-10-CM

## 2018-09-12 DIAGNOSIS — F41.9 ANXIETY: ICD-10-CM

## 2018-09-12 DIAGNOSIS — E03.9 HYPOTHYROIDISM, UNSPECIFIED TYPE: ICD-10-CM

## 2018-09-12 DIAGNOSIS — D64.9 ANEMIA, UNSPECIFIED TYPE: ICD-10-CM

## 2018-09-12 PROCEDURE — 99213 OFFICE O/P EST LOW 20 MIN: CPT | Mod: PBBFAC | Performed by: INTERNAL MEDICINE

## 2018-09-12 PROCEDURE — 99999 PR PBB SHADOW E&M-EST. PATIENT-LVL III: CPT | Mod: PBBFAC,,, | Performed by: INTERNAL MEDICINE

## 2018-09-12 PROCEDURE — 99214 OFFICE O/P EST MOD 30 MIN: CPT | Mod: S$PBB,,, | Performed by: INTERNAL MEDICINE

## 2018-09-12 RX ORDER — ALPRAZOLAM 0.5 MG/1
0.5 TABLET ORAL 2 TIMES DAILY PRN
Qty: 60 TABLET | Refills: 0 | Status: SHIPPED | OUTPATIENT
Start: 2018-09-12 | End: 2018-10-12 | Stop reason: SDUPTHER

## 2018-09-12 RX ORDER — CLOTRIMAZOLE AND BETAMETHASONE DIPROPIONATE 10; .64 MG/G; MG/G
CREAM TOPICAL 2 TIMES DAILY
Qty: 45 G | Refills: 1 | Status: ON HOLD | OUTPATIENT
Start: 2018-09-12 | End: 2019-03-04 | Stop reason: HOSPADM

## 2018-09-12 NOTE — PROGRESS NOTES
CHIEF COMPLAINT:  Rash on lower abdomen plus anxiety.    HISTORY OF PRESENT ILLNESS:  The patient is an 83-year-old female who we see for   hypertension, hypothyroidism as well as hyperlipidemia who comes in today with   complaints of anxiety.  She is currently on Xanax 0.5 mg one p.o. daily as   needed for anxiety.  However, the patient would like to go back to twice a day.    She recently lost her brother.  This was her younger brother who was in very   good shape.  He fell down the stairs at his home causing brain trauma and   subsequently .  This has her very upset.  She also reports that other things   are going on in the family and would like to go back to twice a day if needed.    The patient also reports a rash on her abdomen where she has a skin fold.  She   did go to an Urgent [facility] by [place] and was prescribed nystatin   powder.  This did not work too well.  She had tried some Ver[employer]'s Butt Paste,   which worked better.    REVIEW OF SYSTEMS:  No chest pain, no shortness of breath.  The patient had   problems what she was seeing, things like flowers on people, cards, etc.  This   has not happened for quite a while.    PHYSICAL EXAMINATION:  GENERAL APPEARANCE:  No acute distress.  PULMONARY:  Good inspiratory and expiratory breath sounds are heard.  Lungs are   clear to auscultation.  CARDIOVASCULAR:  S1, S2.  EXTREMITIES:  With trace edema.  ABDOMEN:  Nontender and nondistended without hepatosplenomegaly.  SKIN:  The patient's skin fold was evaluated.  The patient appears to have a   Candida infection, appears to be mild.  She does have some nystatin powder in   place, which has caked up.    ASSESSMENT:  1.  Hypertension, currently stable.  2.  Hypothyroidism.  3.  Candida infection.  4.  Anxiety.    PLAN:  I did discuss with the patient that she is overdue for CBC, CMP, TSH and   T4.  I also discussed with her that I would like to change her nystatin cream to   Lotrisone to see if  that helps.  I also discussed with her and her son, Angel,   that I was going to increase the Xanax to twice a day; however, if she started   to have visual hallucinations, she is to contact me and we will go back to once   a day.      MANUEL/KEDAR  dd: 09/12/2018 18:51:56 (CDT)  td: 09/13/2018 14:51:44 (CDT)  Doc ID   #[id number]  Job ID #[id number]    CC:

## 2018-09-19 ENCOUNTER — LAB VISIT (OUTPATIENT)
Dept: LAB | Facility: HOSPITAL | Age: 83
End: 2018-09-19
Attending: INTERNAL MEDICINE
Payer: MEDICARE

## 2018-09-19 DIAGNOSIS — N39.0 ACUTE UTI: ICD-10-CM

## 2018-09-19 DIAGNOSIS — D72.829 LEUKOCYTOSIS, UNSPECIFIED TYPE: ICD-10-CM

## 2018-09-19 DIAGNOSIS — D72.823 LEUKEMOID REACTION: Primary | ICD-10-CM

## 2018-09-19 DIAGNOSIS — I10 HYPERTENSION, ESSENTIAL: ICD-10-CM

## 2018-09-19 DIAGNOSIS — D64.9 ANEMIA, UNSPECIFIED TYPE: ICD-10-CM

## 2018-09-19 DIAGNOSIS — E03.9 HYPOTHYROIDISM, UNSPECIFIED TYPE: ICD-10-CM

## 2018-09-19 LAB
ALBUMIN SERPL BCP-MCNC: 3.8 G/DL
ALP SERPL-CCNC: 49 U/L
ALT SERPL W/O P-5'-P-CCNC: 14 U/L
ANION GAP SERPL CALC-SCNC: 11 MMOL/L
AST SERPL-CCNC: 20 U/L
BASOPHILS # BLD AUTO: 0.08 K/UL
BASOPHILS NFR BLD: 0.6 %
BILIRUB SERPL-MCNC: 0.2 MG/DL
BUN SERPL-MCNC: 26 MG/DL
CALCIUM SERPL-MCNC: 9.6 MG/DL
CHLORIDE SERPL-SCNC: 102 MMOL/L
CO2 SERPL-SCNC: 25 MMOL/L
CREAT SERPL-MCNC: 1.2 MG/DL
DIFFERENTIAL METHOD: ABNORMAL
EOSINOPHIL # BLD AUTO: 0.5 K/UL
EOSINOPHIL NFR BLD: 3.6 %
ERYTHROCYTE [DISTWIDTH] IN BLOOD BY AUTOMATED COUNT: 16 %
EST. GFR  (AFRICAN AMERICAN): 48 ML/MIN/1.73 M^2
EST. GFR  (NON AFRICAN AMERICAN): 42 ML/MIN/1.73 M^2
GLUCOSE SERPL-MCNC: 113 MG/DL
HCT VFR BLD AUTO: 34.1 %
HGB BLD-MCNC: 10.5 G/DL
LYMPHOCYTES # BLD AUTO: 2.8 K/UL
LYMPHOCYTES NFR BLD: 20.7 %
MCH RBC QN AUTO: 24.6 PG
MCHC RBC AUTO-ENTMCNC: 30.8 G/DL
MCV RBC AUTO: 80 FL
MONOCYTES # BLD AUTO: 1.3 K/UL
MONOCYTES NFR BLD: 9.2 %
NEUTROPHILS # BLD AUTO: 8.8 K/UL
NEUTROPHILS NFR BLD: 64.9 %
PLATELET # BLD AUTO: 375 K/UL
PMV BLD AUTO: 10.6 FL
POTASSIUM SERPL-SCNC: 3.8 MMOL/L
PROT SERPL-MCNC: 7.6 G/DL
RBC # BLD AUTO: 4.27 M/UL
SODIUM SERPL-SCNC: 138 MMOL/L
T4 SERPL-MCNC: 8.6 UG/DL
TSH SERPL DL<=0.005 MIU/L-ACNC: 3.57 UIU/ML
WBC # BLD AUTO: 13.59 K/UL

## 2018-09-19 PROCEDURE — 84443 ASSAY THYROID STIM HORMONE: CPT

## 2018-09-19 PROCEDURE — 85025 COMPLETE CBC W/AUTO DIFF WBC: CPT

## 2018-09-19 PROCEDURE — 80053 COMPREHEN METABOLIC PANEL: CPT

## 2018-09-19 PROCEDURE — 84436 ASSAY OF TOTAL THYROXINE: CPT

## 2018-09-19 PROCEDURE — 36415 COLL VENOUS BLD VENIPUNCTURE: CPT

## 2018-09-20 ENCOUNTER — TELEPHONE (OUTPATIENT)
Dept: INTERNAL MEDICINE | Facility: CLINIC | Age: 83
End: 2018-09-20

## 2018-09-20 NOTE — TELEPHONE ENCOUNTER
Pt. Informed. She states that she doesn't recall being told to take iron. She is taking vitamin B? She was informed to take iron. Sh will come by to  urine container.

## 2018-09-20 NOTE — TELEPHONE ENCOUNTER
----- Message from Yakov Barrett MD sent at 9/19/2018  3:42 PM CDT -----  Please call the patient regarding her abnormal result.Her blood count shows that she is a little anemic. Please see if she is taking her iron tablet. She is supposed to be taking an iron tablet once a day.

## 2018-09-21 ENCOUNTER — TELEPHONE (OUTPATIENT)
Dept: ADMINISTRATIVE | Facility: CLINIC | Age: 83
End: 2018-09-21

## 2018-09-27 NOTE — PLAN OF CARE
[facility]  [address] [address] [facility]  [place] [address]  [phone]  Dept: [phone]    PRE-OP EVALUATION:  Today's date: 2018    Lisette Owens (: 1966) presents for pre-operative evaluation assessment as requested by Dr. Barraza.  She requires evaluation and anesthesia risk assessment prior to undergoing surgery/procedure for treatment of right foot pain .    Proposed Surgery/ Procedure: Right foot removal of first tarsometatarsal joint dorsal bossing  Date of Surgery/ Procedure: 10/2/18  Time of Surgery/ Procedure: 815am  Hospital/Surgical Facility: [facility]  Fax number for surgical facility:   Primary Physician: Kolton Yin  Type of Anesthesia Anticipated: to be determined    Patient has a Health Care Directive or Living Will:  YES     1. NO - Do you have a history of heart attack, stroke, stent, bypass or surgery on an artery in the head, neck, heart or legs?  2. NO - Do you ever have any pain or discomfort in your chest?  3. NO - Do you have a history of  Heart Failure?  4. NO - Are you troubled by shortness of breath when: walking on the level, up a slight hill or at night?  5. NO - Do you currently have a cold, bronchitis or other respiratory infection?  6. NO - Do you have a cough, shortness of breath or wheezing?  7. NO - Do you sometimes get pains in the calves of your legs when you walk?  8. NO - Do you or anyone in your family have previous history of blood clots?  9. NO - Do you or does anyone in your family have a serious bleeding problem such as prolonged bleeding following surgeries or cuts?  10. NO - Have you ever had problems with anemia or been told to take iron pills?  11. NO - Have you had any abnormal blood loss such as black, tarry or bloody stools, or abnormal vaginal bleeding?  12. NO - Have you ever had a blood transfusion?  13. NO - Have you or any of your relatives ever had problems with anesthesia?  14. NO - Do you have sleep apnea,  [facility]    HOME HEALTH ORDERS  FACE TO FACE ENCOUNTER    Patient Name: Linda Hylton  YOB: 1935    PCP: Yakov Barrett MD   PCP Address: [address] [facility] [address]  PCP Phone Number: [phone]  PCP Fax: [phone]    Encounter Date: 12/29/2017    Admit to Home Health    Diagnoses:  Active Hospital Problems    Diagnosis  POA    *Acute cystitis without hematuria [N30.00]  Yes     Priority: 1 - High    Fatigue [R53.83]  Yes     Priority: 2     Vertebral basilar insufficiency [G45.0]  Yes     Priority: 3     Intracranial atherosclerosis [I67.2]  Yes     Priority: 3     Essential hypertension [I10]  Yes     Priority: 3     Iron deficiency anemia [D50.9]  Yes     Priority: 4     Mixed hyperlipidemia [E78.2]  Yes     Priority: 4     Acquired hypothyroidism [E03.9]  Yes     Priority: 5       Resolved Hospital Problems    Diagnosis Date Resolved POA   No resolved problems to display.       Future Appointments  Date Time Provider Department Center   1/10/2018 10:00 AM Yakov Barrett MD [facility]   3/9/2018 11:00 AM Yakov Barrett MD [facility]     Follow-up Information     Yakov Barrett MD On 1/10/2018.    Specialty:  Internal Medicine  Why:  at 10:00 AM  Contact information:  [address] BRUNO  [facility] [address]  [phone]                   I have seen and examined this patient face to face today. My clinical findings that support the need for the home health skilled services and home bound status are the following:  Weakness/numbness causing balance and gait disturbance due to Stroke, Infection and Weakness/Debility making it taxing to leave home.    Allergies:  Review of patient's allergies indicates:   Allergen Reactions    Sulfa (sulfonamide antibiotics) Hives       Diet: cardiac diet    Activities: activity as tolerated    Nursing:   SN to complete comprehensive assessment including routine vital signs. Instruct on  disease process and s/s of complications to report to MD. Review/verify medication list sent home with the patient at time of discharge  and instruct patient/caregiver as needed. Frequency may be adjusted depending on start of care date.    Notify MD if SBP > 160 or < 90; DBP > 90 or < 50; HR > 120 or < 50; Temp > 101      CONSULTS:    Physical Therapy to evaluate and treat. Evaluate for home safety and equipment needs; Establish/upgrade home exercise program. Perform / instruct on therapeutic exercises, gait training, transfer training, and Range of Motion.  Occupational Therapy to evaluate and treat. Evaluate home environment for safety and equipment needs. Perform/Instruct on transfers, ADL training, ROM, and therapeutic exercises.   to evaluate for community resources/long-range planning.  Aide to provide assistance with personal care, ADLs, and vital signs.    MISCELLANEOUS CARE:  N/A    WOUND CARE ORDERS  n/a      Medications: Review discharge medications with patient and family and provide education.      Current Discharge Medication List      CONTINUE these medications which have NOT CHANGED    Details   ALPRAZolam (XANAX) 0.5 MG tablet TAKE 1 TABLET (0.5 MG TOTAL) BY MOUTH 2 (TWO) TIMES DAILY AS NEEDED FOR ANXIETY.  Qty: 60 tablet, Refills: 0    Comments: Not to exceed 5 additional fills before 05/16/2018      aspirin (ECOTRIN) 325 MG EC tablet Take 1 tablet (325 mg total) by mouth once daily.  Refills: 0      atorvastatin (LIPITOR) 40 MG tablet Take 1 tablet (40 mg total) by mouth once daily.  Qty: 30 tablet, Refills: 3      cholecalciferol, vitamin D3, 5,000 unit Tab Take 5,000 Units by mouth once daily.      clopidogrel (PLAVIX) 75 mg tablet Take 1 tablet (75 mg total) by mouth once daily.  Qty: 90 tablet, Refills: 3      fenofibrate 160 MG Tab Take 1 tablet (160 mg total) by mouth once daily.  Qty: 90 tablet, Refills: 3    Associated Diagnoses: Elevated triglycerides with high cholesterol  excessive snoring or daytime drowsiness?  15. NO - Do you have any prosthetic heart valves?  16. NO - Do you have prosthetic joints?  17. NO - Is there any chance that you may be pregnant?      HPI:     HPI related to upcoming procedure: chronic right foot pain due to arthritic changes.      ASTHMA - Patient has a longstanding history of moderate-severe Asthma . Patient has been doing well overall noting NO SYMPTOMS and continues on medication regimen consisting of see med list without adverse reactions or side effects.                                                                                                                                               .  DEPRESSION - Patient has a long history of Depression of moderate severity requiring medication for control with recent symptoms being stable..Current symptoms of depression include none.                                                                                                                                                                                    .  DIABETES - Patient has a longstanding history of DiabetesType Type II . Patient is being treated with diet, oral agents and exercise and denies significant side effects. Control has been good. Complicating factors include but are not limited to: hyperlipidemia.                                                                                                                                MEDICAL HISTORY:     Patient Active Problem List    Diagnosis Date Noted     Type 2 diabetes mellitus without complication, without long-term current use of insulin (H) 03/13/2017     Priority: Medium     Acute cystitis with hematuria 12/08/2016     Priority: Medium     Depression with anxiety 04/28/2015     Priority: Medium     Hypersomnia 11/14/2014     Priority: Medium     Esophageal reflux (GERD) 11/14/2014     Priority: Medium     Vitamin D deficiency 01/07/2014     Priority: Medium     Problem list name       ferrous sulfate 325 (65 FE) MG EC tablet Take 325 mg by mouth once daily.      levothyroxine (SYNTHROID) 75 MCG tablet TAKE 1 TABLET (75 MCG TOTAL) BY MOUTH ONCE DAILY.  Qty: 90 tablet, Refills: 0      lisinopril (PRINIVIL,ZESTRIL) 40 MG tablet Take 40 mg by mouth once daily.       meclizine (ANTIVERT) 25 mg tablet Take 1 tablet (25 mg total) by mouth 3 (three) times daily as needed for Dizziness.  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       ondansetron (ZOFRAN-ODT) 8 MG TbDL Take 1 tablet (8 mg total) by mouth every 8 (eight) hours as needed.  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      Angella Vivar PA-C  Department of Hospital Medicine   updated by automated process. Provider to review       PTSD (post-traumatic stress disorder) 01/06/2014     Priority: Medium     Moderate persistent asthma 06/30/2011     Priority: Medium     Seasonal allergic rhinitis      Priority: Medium     Allergic rhinitis due to animal dander      Priority: Medium     Rhinitis, allergic to other allergen      Priority: Medium     IMO update changed this record. Please review for accuracy       House dust mite allergy      Priority: Medium     CARDIOVASCULAR SCREENING; LDL GOAL LESS THAN 160 10/31/2010     Priority: Medium     Dry eye syndrome 10/26/2010     Priority: Medium      Past Medical History:   Diagnosis Date     Allergic rhinitis due to animal dander      Amblyopia     LT     Arthritis      Colon polyp      Endometriosis      House dust mite allergy      Moderate persistent asthma      Rhinitis, allergic to other allergen      Seasonal allergic rhinitis 7/25/05 skin tests pos. for: cat/dog/horse/DM/M/T/G/RW per Dr. Granado    pt. did IT from 7/06 to 11/5/07 to: cat/dog/DM/M/T/G/W dc'd per self.      Type 2 diabetes mellitus without complication, without long-term current use of insulin (H) 3/13/2017     Past Surgical History:   Procedure Laterality Date     HYSTEROSCOPY       Current Outpatient Prescriptions   Medication Sig Dispense Refill     albuterol (2.5 MG/3ML) 0.083% neb solution Take 1 vial (2.5 mg) by nebulization every 4 hours as needed for shortness of breath / dyspnea 1 Box 1     albuterol (PROAIR HFA/PROVENTIL HFA/VENTOLIN HFA) 108 (90 Base) MCG/ACT Inhaler Inhale 2 puffs into the lungs every 4 hours as needed for shortness of breath / dyspnea 1 Inhaler 11     ALPRAZolam (XANAX) 0.5 MG tablet Take 1-2 tablets (0.5-1 mg) by mouth 3 times daily as needed for anxiety 30 tablet 1     aspirin  MG EC tablet Take 1 tablet (325 mg) by mouth daily 90 tablet 3     diltiazem (TIAZAC) 240 MG 24 hr ER beaded capsule Take 1 capsule (240 mg) by mouth daily 90  "capsule 1     DULoxetine (CYMBALTA) 60 MG EC capsule Take 1 capsule (60 mg) by mouth 2 times daily 180 capsule 3     fish oil-omega-3 fatty acids (FISH OIL) 1000 MG capsule Take 2 g by mouth 3 times daily Reported on 2/14/2017       fluticasone (FLONASE) 50 MCG/ACT spray Spray 1-2 sprays into both nostrils daily 16 g 5     fluticasone-salmeterol (ADVAIR) 500-50 MCG/DOSE diskus inhaler Inhale 1 puff into the lungs 2 times daily 3 Inhaler 11     lisinopril (PRINIVIL/ZESTRIL) 5 MG tablet Take 1 tablet (5 mg) by mouth daily 90 tablet 3     metFORMIN (GLUCOPHAGE-XR) 500 MG 24 hr tablet Take 2 tablets (1,000 mg) by mouth daily (with dinner) 180 tablet 1     montelukast (SINGULAIR) 10 MG tablet Take 1 tablet (10 mg) by mouth At Bedtime 90 tablet 3     nitroglycerin (NITRODUR) 0.4 MG/HR Place 1 patch onto the skin daily Reported on 2/14/2017       OTC products: None, except as noted above    Allergies   Allergen Reactions     Ampicillin Rash     Cipro [Quinolones] Anaphylaxis     Macrobid [Nitrofuran Derivatives] GI Disturbance     Ampicillin Rash     childhood reaction     Ciprofloxacin Itching and Rash      Latex Allergy: NO    Social History   Substance Use Topics     Smoking status: Never Smoker     Smokeless tobacco: Never Used     Alcohol use No     History   Drug Use No       REVIEW OF SYSTEMS:   Constitutional, neuro, ENT, endocrine, pulmonary, cardiac, gastrointestinal, genitourinary, musculoskeletal, integument and psychiatric systems are negative, except as otherwise noted.    EXAM:   /62  Pulse 68  Temp 97.8  F (36.6  C) (Tympanic)  Resp 18  Ht 5' 6\" (1.676 m)  Wt 194 lb (88 kg)  LMP 05/15/2014 (Approximate)  SpO2 99%  BMI 31.31 kg/m2    GENERAL APPEARANCE: healthy, alert and no distress     EYES: EOMI, PERRL     HENT: ear canals and TM's normal and nose and mouth without ulcers or lesions     NECK: no adenopathy, no asymmetry, masses, or scars and thyroid normal to palpation     RESP: lungs clear " to auscultation - no rales, rhonchi or wheezes     CV: regular rates and rhythm, normal S1 S2, no S3 or S4 and no murmur, click or rub     ABDOMEN:  soft, nontender, no HSM or masses and bowel sounds normal     SKIN: no suspicious lesions or rashes     NEURO: Normal strength and tone, sensory exam grossly normal, mentation intact and speech normal     PSYCH: mentation appears normal. and affect normal/bright     LYMPHATICS: }No cervical adenopathy    DIAGNOSTICS:   EKG: appears normal, NSR, normal axis, normal intervals, no acute ST/T changes c/w ischemia, no LVH by voltage criteria    Recent Labs   Lab Test  05/03/18   0802  08/18/17   1500  03/13/17   1449   11/29/16   1711  06/21/16   1518  04/15/16   1605   HGB   --    --   13.8   --   13.1  13.7  12.9   PLT   --    --   253   --    --   251  278   NA   --   139   --    --   139   --   142   POTASSIUM   --   4.3   --    --   3.6   --   3.7   CR   --   0.92   --    --    --    --   0.81   A1C  6.0*  5.6  8.0*   < >   --    --    --     < > = values in this interval not displayed.        IMPRESSION:       The proposed surgical procedure is considered INTERMEDIATE risk.    REVISED CARDIAC RISK INDEX  The patient has the following serious cardiovascular risks for perioperative complications such as (MI, PE, VFib and 3  AV Block):  No serious cardiac risks  INTERPRETATION: 2 risks: Class III (moderate risk - 6.6% complication rate)    The patient has the following additional risks for perioperative complications:  The ASCVD Risk score ([place] ANGELIA Jr, et al., 2013) failed to calculate for the following reasons:    The patient has a prior MCI or stroke diagnosis      ICD-10-CM    1. Preop general physical exam Z01.818 EKG 12-lead complete w/read - Clinics   2. Type 2 diabetes mellitus without complication, without long-term current use of insulin (H) E11.9 Basic metabolic panel  (Ca, Cl, CO2, Creat, Gluc, K, Na, BUN)     CBC with platelets differential        RECOMMENDATIONS:       Npo overnight prior to surgery   Diabetes Medication Use    -----Hold usual oral and non-insulin diabetic meds (e.g. Metformin, Actos, Glipizide) while NPO.       Anticoagulant or Antiplatelet Medication Use  ASPIRIN: Discontinue ASA 7-10 days prior to procedure to reduce bleeding risk.  It should be resumed post-operatively.        ACE Inhibitor or Angiotensin Receptor Blocker (ARB) Use  Ace inhibitor or Angiotensin Receptor Blocker (ARB) and should HOLD this medication for the 24 hours prior to surgery.    Patient to hold all meds the morning of surgery.   APPROVAL GIVEN to proceed with proposed procedure, without further diagnostic evaluation       Signed Electronically by: Kolton Yin PA-C    Copy of this evaluation report is provided to requesting physician.    [place] Preop Guidelines    Revised Cardiac Risk Index

## 2018-10-12 DIAGNOSIS — F41.9 ANXIETY: ICD-10-CM

## 2018-10-12 RX ORDER — ALPRAZOLAM 0.5 MG/1
0.5 TABLET ORAL 2 TIMES DAILY PRN
Qty: 60 TABLET | Refills: 0 | Status: SHIPPED | OUTPATIENT
Start: 2018-10-12 | End: 2018-11-12 | Stop reason: SDUPTHER

## 2018-11-09 DIAGNOSIS — F41.9 ANXIETY: ICD-10-CM

## 2018-11-09 RX ORDER — ALPRAZOLAM 0.5 MG/1
0.5 TABLET ORAL 2 TIMES DAILY PRN
Qty: 60 TABLET | Refills: 0 | Status: CANCELLED | OUTPATIENT
Start: 2018-11-09 | End: 2019-01-08

## 2018-11-12 DIAGNOSIS — F41.9 ANXIETY: ICD-10-CM

## 2018-11-12 NOTE — TELEPHONE ENCOUNTER
----- Message from Teresita Dawn sent at 11/12/2018  1:20 PM CST -----  Contact: self/[phone]  Patient claled in regards needing to remind Dr Barrett that she still waiting for rx   alprazolam 0.5 mg Oral 2 times daily PRN approval. Thank you

## 2018-11-13 RX ORDER — ALPRAZOLAM 0.5 MG/1
0.5 TABLET ORAL 2 TIMES DAILY PRN
Qty: 60 TABLET | Refills: 0 | Status: SHIPPED | OUTPATIENT
Start: 2018-11-13 | End: 2018-12-13 | Stop reason: SDUPTHER

## 2018-11-13 NOTE — TELEPHONE ENCOUNTER
"----- Message from Teresita Dawn sent at 11/13/2018 11:17 AM CST -----  Contact: self/[phone]  RX request - refill or new RX.  Is this a refill or new RX: refill   RX name and strength: ALPRAZolam (XANAX) 0.5 MG tablet  Directions:   Is this a 30 day or 90 day RX:    Local pharmacy or mail order pharmacy:  Local pharmacy  Pharmacy name and phone # (DON'T enter "on file" or "in chart"): [id number] - [place], [place] - [address] AT [facility] [phone] (Phone)  [phone] (Fax)  Comments:  Patient want to f/u on refill request.         "

## 2018-11-13 NOTE — TELEPHONE ENCOUNTER
Spoke with pharmacist last fill on Xanax was 10/12/18 last month.    Refill Request.  Please advise,  Thank You.

## 2018-11-15 ENCOUNTER — TELEPHONE (OUTPATIENT)
Dept: ADMINISTRATIVE | Facility: CLINIC | Age: 83
End: 2018-11-15

## 2018-11-15 NOTE — TELEPHONE ENCOUNTER
Home Health Recert with [facility]-Dr. Yakov Barrett. Pt received  services.

## 2018-11-27 NOTE — H&P
"[facility] Hospital Medicine  History & Physical    Patient Name: Linda Hylton  MRN: [id number]  Admission Date: 12/27/2017  Attending Physician: TAMI Curry MD   Primary Care Provider: Yakov Barrett MD    [facility] Medicine Team: [facility] F Angella Vivar PA-C     Patient information was obtained from patient, past medical records and ER records.     Subjective:     Principal Problem:Fatigue    Chief Complaint:   Chief Complaint   Patient presents with    Fatigue     Fatigue x1. Pt is awake and orietned x4.         HPI: 82 year old female with a PMHx of HTN, HLD, R cerebral vermal ischemic stroke in 3/2017, extrensive vertebral basilar disease, hypothyroidism, and iron deficiency anemia presenting to the ED for evaluation of fatigue. Pt c/o fatigue, weakness, blurry vision, and urinary frequency. Pt cannot recall how she ended up in ER. Pt alert and oriented to self, place, year and president. Pt reports her son lives with her. Pt has no other complaints; denies chest pain, SOB, abdominal pain, N/V/D, fever, chills, decreased PO intake, headache, and recent illness.    Of note, patient was recently admitted 12/18-12/20 with similar presentation and was evaluated by vascular neurology. Per notes, suspect patient's symptoms were 2/2 "patient most likely had presyncope as prior to admit patient was having poor oral intake with nausea and diarrhea and likely got hypovolemic and with her known vertebrobasilar insufficiency likely triggered a neurologic response due to brain hypoperfusion". Sx resolved with fluid resuscitation. No acute findings of labs or imaging.    In the ED, pt is hypertensive. Afebrile without leukocytosis. >95% on RA. UA shows 3+ leukocytes and 20 WBC. Pt received 1 dose of CTX and 1L of IVF.    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      Current Outpatient Prescriptions on File Prior to Encounter   Medication Sig    ALPRAZolam (XANAX) 0.5 MG tablet TAKE 1 TABLET (0.5 MG TOTAL) BY MOUTH 2 (TWO) TIMES DAILY AS NEEDED FOR ANXIETY.    aspirin (ECOTRIN) 325 MG EC tablet Take 1 tablet (325 mg total) by mouth once daily.    atorvastatin (LIPITOR) 40 MG tablet Take 1 tablet (40 mg total) by mouth once daily.    cholecalciferol, vitamin D3, 5,000 unit Tab Take 5,000 Units by mouth once daily.    clopidogrel (PLAVIX) 75 mg tablet Take 1 tablet (75 mg total) by mouth once daily.    fenofibrate 160 MG Tab Take 1 tablet (160 mg total) by mouth once daily.    ferrous sulfate 325 (65 FE) MG EC tablet Take 325 mg by mouth once daily.    levothyroxine (SYNTHROID) 75 MCG tablet TAKE 1 TABLET (75 MCG TOTAL) BY MOUTH ONCE DAILY.    lisinopril (PRINIVIL,ZESTRIL) 40 MG tablet Take 40 mg by mouth once daily.     meclizine (ANTIVERT) 25 mg tablet Take 1 tablet (25 mg total) by mouth 3 (three) times daily as needed for Dizziness.    metoprolol succinate (TOPROL-XL) 50 MG 24 hr tablet Take 50 mg by mouth once daily.     ondansetron (ZOFRAN-ODT) 8 MG TbDL Take 1 tablet (8 mg total) by mouth every 8 (eight) hours as needed.     Family History     Problem Relation (Age of Onset)    Diabetes Son    Heart disease Father, Sister, Brother        Social History Main Topics    Smoking  status: Never Smoker    Smokeless tobacco: Never Used    Alcohol use Not on file    Drug use: Unknown    Sexual activity: No     Review of Systems   Constitutional: Positive for fatigue. Negative for chills, diaphoresis and fever.   HENT: Negative for congestion, hearing loss, sore throat and trouble swallowing.    Eyes: Positive for visual disturbance. Negative for photophobia.   Respiratory: Negative for cough, chest tightness, shortness of breath and wheezing.    Cardiovascular: Negative for chest pain, palpitations and leg swelling.   Gastrointestinal: Negative for abdominal distention, abdominal pain, diarrhea, nausea and vomiting.   Endocrine: Negative for cold intolerance and heat intolerance.   Genitourinary: Positive for frequency. Negative for difficulty urinating, dysuria, flank pain and hematuria.   Musculoskeletal: Negative for back pain, gait problem, myalgias and neck pain.   Skin: Negative for rash and wound.   Allergic/Immunologic: Negative for immunocompromised state.   Neurological: Positive for weakness. Negative for dizziness, speech difficulty, light-headedness, numbness and headaches.   Psychiatric/Behavioral: Negative for agitation, confusion and dysphoric mood. The patient is not nervous/anxious.      Objective:     Vital Signs (Most Recent):  Temp: 97.7 °F (36.5 °C) (12/27/17 1645)  Pulse: 74 (12/27/17 1645)  Resp: 14 (12/27/17 1645)  BP: 129/68 (12/27/17 1645)  SpO2: (!) 94 % (12/27/17 1645) Vital Signs (24h Range):  Temp:  [97.7 °F (36.5 °C)-98.4 °F (36.9 °C)] 97.7 °F (36.5 °C)  Pulse:  [60-90] 74  Resp:  [14-23] 14  SpO2:  [94 %-100 %] 94 %  BP: (129-212)/() 129/68     Weight: 108.9 kg (240 lb)  Body mass index is 51.94 kg/m².    Physical Exam   Constitutional: She is oriented to person, place, and time. She appears well-developed and well-nourished. No distress.   HENT:   Head: Normocephalic and atraumatic.   Eyes: Conjunctivae and EOM are normal. Right eye exhibits no  discharge. Left eye exhibits no discharge. No scleral icterus.   Neck: Normal range of motion. Neck supple. No JVD present. No tracheal deviation present.   Cardiovascular: Normal rate, regular rhythm and intact distal pulses.    Pulmonary/Chest: Effort normal and breath sounds normal. No respiratory distress. She has no wheezes.   Abdominal: Soft. Bowel sounds are normal. She exhibits no distension and no mass. There is no tenderness. There is no guarding.   Musculoskeletal: Normal range of motion. She exhibits no edema, tenderness or deformity.   Neurological: She is alert and oriented to person, place, and time. No cranial nerve deficit. She exhibits normal muscle tone.   nonfocal neuro exam   Skin: Skin is warm and dry. She is not diaphoretic. No erythema.   Psychiatric: She has a normal mood and affect. Her speech is normal and behavior is normal. Thought content normal. Cognition and memory are impaired.   Vitals reviewed.        CRANIAL NERVES     CN III, IV, VI   Extraocular motions are normal.        Significant Labs: All pertinent labs within the past 24 hours have been reviewed.    Significant Imaging: I have reviewed all pertinent imaging results/findings within the past 24 hours.    Assessment/Plan:     * Fatigue    Known vertebral basilar insufficiency vs failure to thrive  - Alert and oriented x4; unable to report how/why she got to hospital  - CTH shows no evidence of acute hemorrhage or major vascular territory infarction; generalized cerebral volume loss with moderate chronic microvascular ischemic changes similar to prior exam.  - Afebrile without leukocytosis  - UA unremarkable  - Nonfocal neuro exam  - Able to follow commands  - Recent admission 12/18-12/20 with similar presentation: mild disorientation and lethargy on admit suspected to be 2/2 dehydration in patient with known vertebrobasilar insufficiency causing transient neurologic symptoms. EEG with no evidence of seizure and MRI of brain  with no evidence of acute stroke   - CXR, utox, procalcitonin pending  - Neuro checks q4  - PT/OT consulted        Vertebral basilar insufficiency    - Known VBI and intracranial stenosis  - Allow permissive HTN with -160        Intracranial atherosclerosis    - Continue DAPT with ASA, plavix         Mixed hyperlipidemia    - Continue home lipitor 40 mg daily        Essential hypertension    - Hypertensive on admit  - Resumed home Toprol 50 mg daily and lisinopril 40 mg daily  - Per [facility] neuro notes from last admission, allow for permissive HTN 2/2 known vertebrobasilar dz and intracranial stenosis  - Will continue to monitor        Iron deficiency anemia    - H/H stable  - No s/s overt bleeding  - Continue home iron supplementation        Acquired hypothyroidism    - Continue home synthroid          VTE Risk Mitigation         Ordered     enoxaparin injection 40 mg  Daily     Route:  Subcutaneous        12/27/17 1119     High Risk of VTE  Once      12/27/17 1119     Place sequential compression device  Until discontinued      12/27/17 1119     Place SEAN hose  Until discontinued      12/27/17 0856             Angella Vivar PA-C  Department of Hospital Medicine   [facility]   denies pain/discomfort

## 2018-12-13 DIAGNOSIS — F41.9 ANXIETY: ICD-10-CM

## 2018-12-13 RX ORDER — ALPRAZOLAM 0.5 MG/1
0.5 TABLET ORAL 2 TIMES DAILY PRN
Qty: 60 TABLET | Refills: 0 | Status: SHIPPED | OUTPATIENT
Start: 2018-12-13 | End: 2019-01-11 | Stop reason: SDUPTHER

## 2019-01-08 NOTE — PT/OT/SLP DISCHARGE
Occupational Therapy Discharge Summary    Linda Hylton  MRN: [id number]   Principal Problem: Acute cerebrovascular insufficiency transient focal neurologic deficit      Patient Discharged from acute Occupational Therapy on 3/25/2017.  Please refer to prior OT note for functional status.    Assessment:      Patient appropriate for care in another setting.    Objective:     GOALS:   Multidisciplinary Problems     Occupational Therapy Goals        Problem: Occupational Therapy Goal    Goal Priority Disciplines Outcome Interventions   Occupational Therapy Goal     OT, PT/OT Ongoing (interventions implemented as appropriate)    Description:  Goals to be met by: 4/25/2017    Patient will increase functional independence with ADLs by performing:    Feeding with Set-up Assistance.  UE Dressing with Set-up Assistance.  LE Dressing with Modified Champion.  Grooming while standing with Minimal Assistance.  Toileting from toilet with modified Champion for hygiene and clothing management.   Stand pivot transfers with Modified Champion.                      Reasons for Discontinuation of Therapy Services  Transfer to alternate level of care.      Plan:     Patient Discharged to: Home with Home Health Service    EMILY Reynolds  1/8/2019

## 2019-01-11 DIAGNOSIS — F41.9 ANXIETY: ICD-10-CM

## 2019-01-12 RX ORDER — ALPRAZOLAM 0.5 MG/1
0.5 TABLET ORAL 2 TIMES DAILY PRN
Qty: 60 TABLET | Refills: 0 | Status: SHIPPED | OUTPATIENT
Start: 2019-01-12 | End: 2019-02-08 | Stop reason: SDUPTHER

## 2019-01-31 ENCOUNTER — EXTERNAL CHRONIC CARE MANAGEMENT (OUTPATIENT)
Dept: PRIMARY CARE CLINIC | Facility: CLINIC | Age: 84
End: 2019-01-31
Payer: MEDICARE

## 2019-01-31 PROCEDURE — 99490 CHRNC CARE MGMT STAFF 1ST 20: CPT | Mod: S$PBB,,, | Performed by: INTERNAL MEDICINE

## 2019-01-31 PROCEDURE — 99490 CHRNC CARE MGMT STAFF 1ST 20: CPT | Mod: PBBFAC | Performed by: INTERNAL MEDICINE

## 2019-01-31 PROCEDURE — 99490 PR CHRONIC CARE MGMT, 1ST 20 MIN: ICD-10-PCS | Mod: S$PBB,,, | Performed by: INTERNAL MEDICINE

## 2019-02-04 ENCOUNTER — TELEPHONE (OUTPATIENT)
Dept: ADMINISTRATIVE | Facility: CLINIC | Age: 84
End: 2019-02-04

## 2019-02-04 NOTE — TELEPHONE ENCOUNTER
Home Health Recert with [facility]-Dr. Yakov Barrett. Pt received  services.

## 2019-02-06 DIAGNOSIS — I10 ESSENTIAL HYPERTENSION: ICD-10-CM

## 2019-02-08 DIAGNOSIS — F41.9 ANXIETY: ICD-10-CM

## 2019-02-08 RX ORDER — ALPRAZOLAM 0.5 MG/1
0.5 TABLET ORAL 2 TIMES DAILY PRN
Qty: 60 TABLET | Refills: 0 | Status: ON HOLD | OUTPATIENT
Start: 2019-02-08 | End: 2019-03-04 | Stop reason: HOSPADM

## 2019-02-11 RX ORDER — LISINOPRIL 5 MG/1
5 TABLET ORAL DAILY
Qty: 90 TABLET | Refills: 2 | OUTPATIENT
Start: 2019-02-11 | End: 2020-02-11

## 2019-02-11 NOTE — TELEPHONE ENCOUNTER
Spoke with pt, she stated that she did not order the Lisinopril and is aware that she is not currently taking it.

## 2019-02-15 ENCOUNTER — TELEPHONE (OUTPATIENT)
Dept: INTERNAL MEDICINE | Facility: CLINIC | Age: 84
End: 2019-02-15

## 2019-02-15 NOTE — TELEPHONE ENCOUNTER
----- Message from Chelly Martínez sent at 2/15/2019 10:09 AM CST -----  Contact: [facility] [phone]  Prescription Request:     Name of medication: lisinopril (PRINIVIL,ZESTRIL) 5 MG tablet     Reason for request: New    Pharmacy: [id number] - [facility] [place], [place] - [address] AT [facility]    Please advise.    Thank You

## 2019-02-18 NOTE — TELEPHONE ENCOUNTER
Pt no longer take Lisinopril, spoke with pt and confirmed. Attempted to contact pharmacy to inform was on hold for a long time. Called back and left a message for pharmacist.

## 2019-02-19 ENCOUNTER — OFFICE VISIT (OUTPATIENT)
Dept: URGENT CARE | Facility: CLINIC | Age: 84
End: 2019-02-19
Payer: MEDICARE

## 2019-02-19 VITALS
WEIGHT: 160 LBS | BODY MASS INDEX: 34.52 KG/M2 | TEMPERATURE: 97 F | HEIGHT: 57 IN | RESPIRATION RATE: 19 BRPM | SYSTOLIC BLOOD PRESSURE: 149 MMHG | HEART RATE: 92 BPM | OXYGEN SATURATION: 100 % | DIASTOLIC BLOOD PRESSURE: 73 MMHG

## 2019-02-19 DIAGNOSIS — R30.0 DYSURIA: Primary | ICD-10-CM

## 2019-02-19 DIAGNOSIS — R35.0 URINE FREQUENCY: ICD-10-CM

## 2019-02-19 LAB
BILIRUB UR QL STRIP: NEGATIVE
GLUCOSE UR QL STRIP: NEGATIVE
KETONES UR QL STRIP: NEGATIVE
LEUKOCYTE ESTERASE UR QL STRIP: NEGATIVE
PH, POC UA: 5.5 (ref 5–8)
POC BLOOD, URINE: NEGATIVE
POC NITRATES, URINE: NEGATIVE
PROT UR QL STRIP: POSITIVE
SP GR UR STRIP: 1.02 (ref 1–1.03)
UROBILINOGEN UR STRIP-ACNC: NORMAL (ref 0.1–1.1)

## 2019-02-19 PROCEDURE — 81003 URINALYSIS AUTO W/O SCOPE: CPT | Mod: QW,S$GLB,, | Performed by: FAMILY MEDICINE

## 2019-02-19 PROCEDURE — 81003 POCT URINALYSIS, DIPSTICK, AUTOMATED, W/O SCOPE: ICD-10-PCS | Mod: QW,S$GLB,, | Performed by: FAMILY MEDICINE

## 2019-02-19 PROCEDURE — 99214 OFFICE O/P EST MOD 30 MIN: CPT | Mod: 25,S$GLB,, | Performed by: FAMILY MEDICINE

## 2019-02-19 PROCEDURE — 99214 PR OFFICE/OUTPT VISIT, EST, LEVL IV, 30-39 MIN: ICD-10-PCS | Mod: 25,S$GLB,, | Performed by: FAMILY MEDICINE

## 2019-02-19 RX ORDER — NITROFURANTOIN 25; 75 MG/1; MG/1
100 CAPSULE ORAL 2 TIMES DAILY
Qty: 14 CAPSULE | Refills: 0 | Status: ON HOLD | OUTPATIENT
Start: 2019-02-19 | End: 2019-03-04 | Stop reason: HOSPADM

## 2019-02-19 NOTE — PATIENT INSTRUCTIONS
"  Dysuria     Painful urination (dysuria) is often caused by a problem in the urinary tract.   Dysuria is pain felt during urination. It is often described as a burning. Learn more about this problem and how it can be treated.  What causes dysuria?  Possible causes include:  · Infection with a bacteria or virus such as a urinary tract infection (UTI or a sexually transmitted infection (STI)  · Sensitivity or allergy to chemicals such as those found in lotions and other products  · Prostate or bladder problems  · Radiation therapy to the pelvic area  How is dysuria diagnosed?  Your healthcare provider will examine you. He or she will ask about your symptoms and health. After talking with you and doing a physical exam, your healthcare provider may know what is causing your dysuria. He or she will usually request  a sample of your urine. Tests of your urine, or a "urinalysis," are done. A urinalysis may include:  · Looking at the urine sample (visual exam)  · Checking for substances (chemical exam)  · Looking at a small amount under a microscope (microscopic exam)  Some parts of the urinalysis may be done in the provider's office and some in a lab. And, the urine sample may be checked for bacteria and yeast (urine culture). Your healthcare provider will tell you more about these tests if they are needed.  How is dysuria treated?  Treatment depends on the cause. If you have a bacterial infection, you may need antibiotics. You may be given medicines to make it easier for you to urinate and help relieve pain. Your healthcare provider can tell you more about your treatment options. Untreated, symptoms may get worse.  When to call your healthcare provider  Call the healthcare provider right away if you have any of the following:  · Fever of 100.4°F (38°C) or higher   · No improvement after three days of treatment  · Trouble urinating because of pain  · New or increased discharge from the vagina or penis  · Rash or joint " pain  · Increased back or abdominal pain  · Enlarged painful lymph nodes (lumps) in the groin   Date Last Reviewed: 1/1/2017  © 4308-4021 [employer]. [address] [place] [address], [place] [address]. All rights reserved. This information is not intended as a substitute for professional medical care. Always follow your healthcare professional's instructions.      Follow up with your doctor in a few days.  Return to the urgent care or go to the ER if symptoms get worse.    Bhavesh Dixon MD

## 2019-02-19 NOTE — PROGRESS NOTES
"Subjective:       Patient ID: Linda Hylton is a 84 y.o. female.    Vitals:  height is 4' 9" (1.448 m) and weight is 72.6 kg (160 lb). Her oral temperature is 97.2 °F (36.2 °C). Her blood pressure is 149/73 (abnormal) and her pulse is 92. Her respiration is 19 and oxygen saturation is 100%.     Chief Complaint: Urinary Tract Infection    Urinary Tract Infection    This is a new problem. The current episode started yesterday. The problem occurs every urination. The problem has been unchanged. The patient is experiencing no pain. There has been no fever. She is not sexually active. There is no history of pyelonephritis. Pertinent negatives include no chills, frequency, hematuria, nausea, urgency, vomiting or rash. Associated symptoms comments: Large amount at a time. Treatments tried: Azo. The treatment provided moderate relief.       Constitution: Negative for chills and fever.   Neck: Negative for painful lymph nodes.   Gastrointestinal: Negative for abdominal pain, nausea and vomiting.   Genitourinary: Negative for dysuria, frequency, urgency, urine decreased, hematuria, history of kidney stones, painful menstruation, irregular menstruation, missed menses, heavy menstrual bleeding, ovarian cysts, genital trauma, vaginal pain, vaginal discharge, vaginal bleeding, vaginal odor, painful intercourse, genital sore, painful ejaculation and pelvic pain.   Musculoskeletal: Negative for back pain.   Skin: Negative for rash and lesion.   Hematologic/Lymphatic: Negative for swollen lymph nodes.       Objective:      Physical Exam   Constitutional: She is oriented to person, place, and time. She appears well-developed and well-nourished.   HENT:   Head: Normocephalic and atraumatic.   Eyes: EOM are normal. Pupils are equal, round, and reactive to light.   Neck: Normal range of motion. Neck supple. No thyromegaly present.   Cardiovascular: Normal rate, regular rhythm and normal heart sounds.   No murmur " heard.  Pulmonary/Chest: Breath sounds normal. No respiratory distress. She has no wheezes. She has no rales.   Abdominal: Soft. Bowel sounds are normal. She exhibits no distension. There is no tenderness. There is no rebound and no guarding.   Genitourinary:   Genitourinary Comments: No bladder tenderness, no cva tenderness.   Lymphadenopathy:     She has no cervical adenopathy.   Neurological: She is alert and oriented to person, place, and time. No cranial nerve deficit.   Skin: Capillary refill takes less than 2 seconds.   Psychiatric: She has a normal mood and affect. Her behavior is normal. Judgment and thought content normal.   Vitals reviewed.      Assessment:       1. Dysuria    2. Urine frequency        Plan:         Dysuria  -     POCT Urinalysis, Dipstick, Automated, W/O Scope  -     nitrofurantoin, macrocrystal-monohydrate, (MACROBID) 100 MG capsule; Take 1 capsule (100 mg total) by mouth 2 (two) times daily. for 7 days  Dispense: 14 capsule; Refill: 0  -     Urine culture    Urine frequency  -     nitrofurantoin, macrocrystal-monohydrate, (MACROBID) 100 MG capsule; Take 1 capsule (100 mg total) by mouth 2 (two) times daily. for 7 days  Dispense: 14 capsule; Refill: 0          Patient Instructions       Dysuria     Painful urination (dysuria) is often caused by a problem in the urinary tract.   Dysuria is pain felt during urination. It is often described as a burning. Learn more about this problem and how it can be treated.  What causes dysuria?  Possible causes include:  · Infection with a bacteria or virus such as a urinary tract infection (UTI or a sexually transmitted infection (STI)  · Sensitivity or allergy to chemicals such as those found in lotions and other products  · Prostate or bladder problems  · Radiation therapy to the pelvic area  How is dysuria diagnosed?  Your healthcare provider will examine you. He or she will ask about your symptoms and health. After talking with you and doing a  "physical exam, your healthcare provider may know what is causing your dysuria. He or she will usually request  a sample of your urine. Tests of your urine, or a "urinalysis," are done. A urinalysis may include:  · Looking at the urine sample (visual exam)  · Checking for substances (chemical exam)  · Looking at a small amount under a microscope (microscopic exam)  Some parts of the urinalysis may be done in the provider's office and some in a lab. And, the urine sample may be checked for bacteria and yeast (urine culture). Your healthcare provider will tell you more about these tests if they are needed.  How is dysuria treated?  Treatment depends on the cause. If you have a bacterial infection, you may need antibiotics. You may be given medicines to make it easier for you to urinate and help relieve pain. Your healthcare provider can tell you more about your treatment options. Untreated, symptoms may get worse.  When to call your healthcare provider  Call the healthcare provider right away if you have any of the following:  · Fever of 100.4°F (38°C) or higher   · No improvement after three days of treatment  · Trouble urinating because of pain  · New or increased discharge from the vagina or penis  · Rash or joint pain  · Increased back or abdominal pain  · Enlarged painful lymph nodes (lumps) in the groin   Date Last Reviewed: 1/1/2017  © 1373-2449 [employer]. [address] [place] [address]. All rights reserved. This information is not intended as a substitute for professional medical care. Always follow your healthcare professional's instructions.      Follow up with your doctor in a few days.  Return to the urgent care or go to the ER if symptoms get worse.    Bhavesh Dixon MD      "

## 2019-02-20 ENCOUNTER — HOSPITAL ENCOUNTER (INPATIENT)
Facility: HOSPITAL | Age: 84
LOS: 12 days | Discharge: NURSING, REHAB OR LONG-TERM CARE | DRG: 064 | End: 2019-03-04
Attending: EMERGENCY MEDICINE | Admitting: PSYCHIATRY & NEUROLOGY
Payer: MEDICARE

## 2019-02-20 DIAGNOSIS — R42 DIZZINESS: ICD-10-CM

## 2019-02-20 DIAGNOSIS — I63.411 EMBOLIC STROKE INVOLVING RIGHT MIDDLE CEREBRAL ARTERY: ICD-10-CM

## 2019-02-20 DIAGNOSIS — I63.412 EMBOLIC STROKE INVOLVING LEFT MIDDLE CEREBRAL ARTERY: Primary | ICD-10-CM

## 2019-02-20 DIAGNOSIS — I63.9 STROKE: ICD-10-CM

## 2019-02-20 DIAGNOSIS — D62 ACUTE BLOOD LOSS ANEMIA: ICD-10-CM

## 2019-02-20 PROBLEM — R29.810 FACIAL DROOP: Status: ACTIVE | Noted: 2019-02-20

## 2019-02-20 PROBLEM — Z86.73 HISTORY OF STROKE: Status: ACTIVE | Noted: 2019-02-20

## 2019-02-20 PROBLEM — E87.1 HYPONATREMIA: Status: ACTIVE | Noted: 2019-02-20

## 2019-02-20 PROBLEM — I63.429 STROKE DUE TO EMBOLISM OF ANTERIOR CEREBRAL ARTERY: Status: ACTIVE | Noted: 2019-02-20

## 2019-02-20 PROBLEM — N39.0 UTI (URINARY TRACT INFECTION): Status: ACTIVE | Noted: 2019-02-20

## 2019-02-20 PROBLEM — G93.6 CYTOTOXIC CEREBRAL EDEMA: Status: ACTIVE | Noted: 2019-02-20

## 2019-02-20 LAB
ALBUMIN SERPL BCP-MCNC: 3.3 G/DL
ALP SERPL-CCNC: 63 U/L
ALT SERPL W/O P-5'-P-CCNC: 19 U/L
ANION GAP SERPL CALC-SCNC: 11 MMOL/L
APTT BLDCRRT: 24 SEC
AST SERPL-CCNC: 20 U/L
BASOPHILS # BLD AUTO: 0.07 K/UL
BASOPHILS NFR BLD: 0.6 %
BILIRUB SERPL-MCNC: 0.2 MG/DL
BILIRUB UR QL STRIP: NEGATIVE
BNP SERPL-MCNC: 111 PG/ML
BUN SERPL-MCNC: 28 MG/DL
CALCIUM SERPL-MCNC: 9.4 MG/DL
CHLORIDE SERPL-SCNC: 94 MMOL/L
CHOLEST SERPL-MCNC: 177 MG/DL
CHOLEST/HDLC SERPL: 5.9 {RATIO}
CK MB SERPL-MCNC: 2.8 NG/ML
CK MB SERPL-RTO: 3 %
CK SERPL-CCNC: 94 U/L
CLARITY UR REFRACT.AUTO: CLEAR
CO2 SERPL-SCNC: 23 MMOL/L
COLOR UR AUTO: NORMAL
CREAT SERPL-MCNC: 1 MG/DL
CREAT SERPL-MCNC: 1 MG/DL (ref 0.5–1.4)
DIFFERENTIAL METHOD: ABNORMAL
EOSINOPHIL # BLD AUTO: 0.3 K/UL
EOSINOPHIL NFR BLD: 3 %
ERYTHROCYTE [DISTWIDTH] IN BLOOD BY AUTOMATED COUNT: 14.5 %
EST. GFR  (AFRICAN AMERICAN): 59.8 ML/MIN/1.73 M^2
EST. GFR  (NON AFRICAN AMERICAN): 51.9 ML/MIN/1.73 M^2
ESTIMATED AVG GLUCOSE: 105 MG/DL
GLUCOSE SERPL-MCNC: 141 MG/DL
GLUCOSE UR QL STRIP: NEGATIVE
HBA1C MFR BLD HPLC: 5.3 %
HCT VFR BLD AUTO: 25.7 %
HDLC SERPL-MCNC: 30 MG/DL
HDLC SERPL: 16.9 %
HGB BLD-MCNC: 8 G/DL
HGB UR QL STRIP: NEGATIVE
IMM GRANULOCYTES # BLD AUTO: 0.26 K/UL
IMM GRANULOCYTES NFR BLD AUTO: 2.3 %
INR PPP: 0.9
INR PPP: 1
KETONES UR QL STRIP: NEGATIVE
LDLC SERPL CALC-MCNC: 96.6 MG/DL
LEUKOCYTE ESTERASE UR QL STRIP: NEGATIVE
LYMPHOCYTES # BLD AUTO: 1.6 K/UL
LYMPHOCYTES NFR BLD: 14.2 %
MAGNESIUM SERPL-MCNC: 1.7 MG/DL
MCH RBC QN AUTO: 28.5 PG
MCHC RBC AUTO-ENTMCNC: 31.1 G/DL
MCV RBC AUTO: 92 FL
MONOCYTES # BLD AUTO: 1 K/UL
MONOCYTES NFR BLD: 8.6 %
NEUTROPHILS # BLD AUTO: 8 K/UL
NEUTROPHILS NFR BLD: 71.3 %
NITRITE UR QL STRIP: NEGATIVE
NONHDLC SERPL-MCNC: 147 MG/DL
NRBC BLD-RTO: 0 /100 WBC
PH UR STRIP: 6 [PH] (ref 5–8)
PHOSPHATE SERPL-MCNC: 4.1 MG/DL
PLATELET # BLD AUTO: 276 K/UL
PMV BLD AUTO: 10.9 FL
POC PTINR: 1 (ref 0.9–1.2)
POC PTWBT: 12.5 SEC (ref 9.7–14.3)
POCT GLUCOSE: 165 MG/DL (ref 70–110)
POTASSIUM SERPL-SCNC: 4.1 MMOL/L
PROT SERPL-MCNC: 6.4 G/DL
PROT UR QL STRIP: NEGATIVE
PROTHROMBIN TIME: 10 SEC
PROTHROMBIN TIME: 10.5 SEC
RBC # BLD AUTO: 2.81 M/UL
SAMPLE: NORMAL
SAMPLE: NORMAL
SODIUM SERPL-SCNC: 128 MMOL/L
SODIUM SERPL-SCNC: 130 MMOL/L
SP GR UR STRIP: 1.01 (ref 1–1.03)
TRIGL SERPL-MCNC: 252 MG/DL
TROPONIN I SERPL DL<=0.01 NG/ML-MCNC: 0.02 NG/ML
TSH SERPL DL<=0.005 MIU/L-ACNC: 2.99 UIU/ML
URN SPEC COLLECT METH UR: NORMAL
WBC # BLD AUTO: 11.2 K/UL

## 2019-02-20 PROCEDURE — 99291 CRITICAL CARE FIRST HOUR: CPT | Mod: 25

## 2019-02-20 PROCEDURE — 80061 LIPID PANEL: CPT

## 2019-02-20 PROCEDURE — 12000002 HC ACUTE/MED SURGE SEMI-PRIVATE ROOM

## 2019-02-20 PROCEDURE — 99900035 HC TECH TIME PER 15 MIN (STAT)

## 2019-02-20 PROCEDURE — 85610 PROTHROMBIN TIME: CPT | Mod: 91

## 2019-02-20 PROCEDURE — 96372 THER/PROPH/DIAG INJ SC/IM: CPT | Mod: 59

## 2019-02-20 PROCEDURE — 99291 PR CRITICAL CARE, E/M 30-74 MINUTES: ICD-10-PCS | Mod: ,,, | Performed by: EMERGENCY MEDICINE

## 2019-02-20 PROCEDURE — 99223 1ST HOSP IP/OBS HIGH 75: CPT | Mod: AI,,, | Performed by: PSYCHIATRY & NEUROLOGY

## 2019-02-20 PROCEDURE — 93010 ELECTROCARDIOGRAM REPORT: CPT | Mod: ,,, | Performed by: INTERNAL MEDICINE

## 2019-02-20 PROCEDURE — 93010 EKG 12-LEAD: ICD-10-PCS | Mod: ,,, | Performed by: INTERNAL MEDICINE

## 2019-02-20 PROCEDURE — 82962 GLUCOSE BLOOD TEST: CPT

## 2019-02-20 PROCEDURE — 83880 ASSAY OF NATRIURETIC PEPTIDE: CPT

## 2019-02-20 PROCEDURE — 96361 HYDRATE IV INFUSION ADD-ON: CPT

## 2019-02-20 PROCEDURE — 82550 ASSAY OF CK (CPK): CPT

## 2019-02-20 PROCEDURE — 84443 ASSAY THYROID STIM HORMONE: CPT

## 2019-02-20 PROCEDURE — 85025 COMPLETE CBC W/AUTO DIFF WBC: CPT

## 2019-02-20 PROCEDURE — 84484 ASSAY OF TROPONIN QUANT: CPT

## 2019-02-20 PROCEDURE — 83930 ASSAY OF BLOOD OSMOLALITY: CPT

## 2019-02-20 PROCEDURE — 84295 ASSAY OF SERUM SODIUM: CPT

## 2019-02-20 PROCEDURE — 85610 PROTHROMBIN TIME: CPT

## 2019-02-20 PROCEDURE — 93005 ELECTROCARDIOGRAM TRACING: CPT

## 2019-02-20 PROCEDURE — 82565 ASSAY OF CREATININE: CPT

## 2019-02-20 PROCEDURE — 82553 CREATINE MB FRACTION: CPT

## 2019-02-20 PROCEDURE — 80053 COMPREHEN METABOLIC PANEL: CPT

## 2019-02-20 PROCEDURE — 96360 HYDRATION IV INFUSION INIT: CPT

## 2019-02-20 PROCEDURE — 83735 ASSAY OF MAGNESIUM: CPT

## 2019-02-20 PROCEDURE — 84100 ASSAY OF PHOSPHORUS: CPT

## 2019-02-20 PROCEDURE — 99223 PR INITIAL HOSPITAL CARE,LEVL III: ICD-10-PCS | Mod: AI,,, | Performed by: PSYCHIATRY & NEUROLOGY

## 2019-02-20 PROCEDURE — 85730 THROMBOPLASTIN TIME PARTIAL: CPT

## 2019-02-20 PROCEDURE — 83036 HEMOGLOBIN GLYCOSYLATED A1C: CPT

## 2019-02-20 PROCEDURE — 99291 CRITICAL CARE FIRST HOUR: CPT | Mod: ,,, | Performed by: EMERGENCY MEDICINE

## 2019-02-20 PROCEDURE — 81003 URINALYSIS AUTO W/O SCOPE: CPT

## 2019-02-20 RX ORDER — ASPIRIN 325 MG
325 TABLET, DELAYED RELEASE (ENTERIC COATED) ORAL DAILY
Status: DISCONTINUED | OUTPATIENT
Start: 2019-02-21 | End: 2019-02-23

## 2019-02-20 RX ORDER — GABAPENTIN 300 MG/1
300 CAPSULE ORAL NIGHTLY
Status: DISCONTINUED | OUTPATIENT
Start: 2019-02-20 | End: 2019-03-04 | Stop reason: HOSPADM

## 2019-02-20 RX ORDER — HEPARIN SODIUM 5000 [USP'U]/ML
5000 INJECTION, SOLUTION INTRAVENOUS; SUBCUTANEOUS EVERY 8 HOURS
Status: DISCONTINUED | OUTPATIENT
Start: 2019-02-21 | End: 2019-02-22

## 2019-02-20 RX ORDER — ATORVASTATIN CALCIUM 20 MG/1
80 TABLET, FILM COATED ORAL DAILY
Status: DISCONTINUED | OUTPATIENT
Start: 2019-02-21 | End: 2019-03-04 | Stop reason: HOSPADM

## 2019-02-20 RX ORDER — CLOPIDOGREL BISULFATE 75 MG/1
75 TABLET ORAL DAILY
Status: DISCONTINUED | OUTPATIENT
Start: 2019-02-21 | End: 2019-02-25

## 2019-02-20 RX ORDER — HYDRALAZINE HYDROCHLORIDE 20 MG/ML
10 INJECTION INTRAMUSCULAR; INTRAVENOUS EVERY 8 HOURS PRN
Status: DISCONTINUED | OUTPATIENT
Start: 2019-02-20 | End: 2019-02-23

## 2019-02-20 RX ORDER — FERROUS SULFATE 325(65) MG
325 TABLET, DELAYED RELEASE (ENTERIC COATED) ORAL DAILY
Status: DISCONTINUED | OUTPATIENT
Start: 2019-02-21 | End: 2019-03-04 | Stop reason: HOSPADM

## 2019-02-20 RX ORDER — ONDANSETRON 2 MG/ML
4 INJECTION INTRAMUSCULAR; INTRAVENOUS EVERY 12 HOURS PRN
Status: DISCONTINUED | OUTPATIENT
Start: 2019-02-20 | End: 2019-03-04 | Stop reason: HOSPADM

## 2019-02-20 RX ORDER — ACETAMINOPHEN 500 MG
5000 TABLET ORAL DAILY
Status: DISCONTINUED | OUTPATIENT
Start: 2019-02-21 | End: 2019-03-04 | Stop reason: HOSPADM

## 2019-02-20 RX ORDER — ACETAMINOPHEN 325 MG/1
650 TABLET ORAL EVERY 6 HOURS PRN
Status: DISCONTINUED | OUTPATIENT
Start: 2019-02-20 | End: 2019-03-04 | Stop reason: HOSPADM

## 2019-02-20 RX ORDER — NITROFURANTOIN 25; 75 MG/1; MG/1
100 CAPSULE ORAL 2 TIMES DAILY
Status: DISCONTINUED | OUTPATIENT
Start: 2019-02-20 | End: 2019-02-21

## 2019-02-20 RX ORDER — ONDANSETRON 8 MG/1
8 TABLET, ORALLY DISINTEGRATING ORAL EVERY 8 HOURS PRN
Status: DISCONTINUED | OUTPATIENT
Start: 2019-02-20 | End: 2019-03-04 | Stop reason: HOSPADM

## 2019-02-20 RX ORDER — ATORVASTATIN CALCIUM 10 MG/1
40 TABLET, FILM COATED ORAL DAILY
Status: DISCONTINUED | OUTPATIENT
Start: 2019-02-21 | End: 2019-02-20

## 2019-02-20 RX ORDER — SODIUM CHLORIDE 0.9 % (FLUSH) 0.9 %
3 SYRINGE (ML) INJECTION EVERY 8 HOURS
Status: DISCONTINUED | OUTPATIENT
Start: 2019-02-20 | End: 2019-03-04 | Stop reason: HOSPADM

## 2019-02-20 RX ADMIN — IOHEXOL 75 ML: 350 INJECTION, SOLUTION INTRAVENOUS at 11:02

## 2019-02-21 PROBLEM — R91.1 NODULE OF APEX OF RIGHT LUNG: Status: ACTIVE | Noted: 2019-02-21

## 2019-02-21 LAB
ALBUMIN SERPL BCP-MCNC: 3.1 G/DL
ALP SERPL-CCNC: 54 U/L
ALT SERPL W/O P-5'-P-CCNC: 19 U/L
ANION GAP SERPL CALC-SCNC: 10 MMOL/L
ASCENDING AORTA: 2.92 CM
AST SERPL-CCNC: 28 U/L
AV INDEX (PROSTH): 0.72
AV MEAN GRADIENT: 14.14 MMHG
AV PEAK GRADIENT: 22.85 MMHG
AV VALVE AREA: 2.18 CM2
AV VELOCITY RATIO: 0.62
BACTERIA UR CULT: NORMAL
BACTERIA UR CULT: NORMAL
BASOPHILS # BLD AUTO: 0.08 K/UL
BASOPHILS NFR BLD: 0.7 %
BILIRUB SERPL-MCNC: 0.2 MG/DL
BSA FOR ECHO PROCEDURE: 1.71 M2
BUN SERPL-MCNC: 24 MG/DL
CALCIUM SERPL-MCNC: 9.1 MG/DL
CHLORIDE SERPL-SCNC: 100 MMOL/L
CO2 SERPL-SCNC: 19 MMOL/L
CREAT SERPL-MCNC: 0.8 MG/DL
CV ECHO LV RWT: 0.61 CM
DIFFERENTIAL METHOD: ABNORMAL
DOP CALC AO PEAK VEL: 2.39 M/S
DOP CALC AO VTI: 50.74 CM
DOP CALC LVOT AREA: 3.05 CM2
DOP CALC LVOT DIAMETER: 1.97 CM
DOP CALC LVOT PEAK VEL: 1.47 M/S
DOP CALC LVOT STROKE VOLUME: 110.53 CM3
DOP CALCLVOT PEAK VEL VTI: 36.28 CM
E WAVE DECELERATION TIME: 334.4 MSEC
E/A RATIO: 0.76
E/E' RATIO: 15.85
ECHO LV POSTERIOR WALL: 0.93 CM (ref 0.6–1.1)
EOSINOPHIL # BLD AUTO: 0.3 K/UL
EOSINOPHIL NFR BLD: 2.9 %
ERYTHROCYTE [DISTWIDTH] IN BLOOD BY AUTOMATED COUNT: 14.6 %
EST. GFR  (AFRICAN AMERICAN): >60 ML/MIN/1.73 M^2
EST. GFR  (NON AFRICAN AMERICAN): >60 ML/MIN/1.73 M^2
FRACTIONAL SHORTENING: 29 % (ref 28–44)
GLUCOSE SERPL-MCNC: 123 MG/DL
HCT VFR BLD AUTO: 21.4 %
HCT VFR BLD AUTO: 25.3 %
HGB BLD-MCNC: 6.5 G/DL
HGB BLD-MCNC: 8 G/DL
IMM GRANULOCYTES # BLD AUTO: 0.24 K/UL
IMM GRANULOCYTES NFR BLD AUTO: 2.2 %
INTERVENTRICULAR SEPTUM: 0.75 CM (ref 0.6–1.1)
LA MAJOR: 6.03 CM
LA MINOR: 4.69 CM
LA WIDTH: 3.19 CM
LEFT ATRIUM SIZE: 4.17 CM
LEFT ATRIUM VOLUME INDEX: 36.5 ML/M2
LEFT ATRIUM VOLUME: 59.66 CM3
LEFT INTERNAL DIMENSION IN SYSTOLE: 2.19 CM (ref 2.1–4)
LEFT VENTRICLE DIASTOLIC VOLUME INDEX: 22.69 ML/M2
LEFT VENTRICLE DIASTOLIC VOLUME: 37.12 ML
LEFT VENTRICLE MASS INDEX: 40.2 G/M2
LEFT VENTRICLE SYSTOLIC VOLUME INDEX: 9.8 ML/M2
LEFT VENTRICLE SYSTOLIC VOLUME: 16.08 ML
LEFT VENTRICULAR INTERNAL DIMENSION IN DIASTOLE: 3.07 CM (ref 3.5–6)
LEFT VENTRICULAR MASS: 65.69 G
LV LATERAL E/E' RATIO: 14.71
LV SEPTAL E/E' RATIO: 17.17
LYMPHOCYTES # BLD AUTO: 1.7 K/UL
LYMPHOCYTES NFR BLD: 15.8 %
MAGNESIUM SERPL-MCNC: 1.8 MG/DL
MAGNESIUM SERPL-MCNC: 1.8 MG/DL
MCH RBC QN AUTO: 28.6 PG
MCHC RBC AUTO-ENTMCNC: 30.4 G/DL
MCV RBC AUTO: 94 FL
MONOCYTES # BLD AUTO: 1 K/UL
MONOCYTES NFR BLD: 9.4 %
MV PEAK A VEL: 1.36 M/S
MV PEAK E VEL: 1.03 M/S
NEUTROPHILS # BLD AUTO: 7.4 K/UL
NEUTROPHILS NFR BLD: 69 %
NRBC BLD-RTO: 0 /100 WBC
OSMOLALITY SERPL: 285 MOSM/KG
OSMOLALITY SERPL: 286 MOSM/KG
OSMOLALITY UR: 189 MOSM/KG
PHOSPHATE SERPL-MCNC: 3.9 MG/DL
PHOSPHATE SERPL-MCNC: 3.9 MG/DL
PLATELET # BLD AUTO: 237 K/UL
PMV BLD AUTO: 10.8 FL
POTASSIUM SERPL-SCNC: 4.6 MMOL/L
PROT SERPL-MCNC: 6.4 G/DL
RA MAJOR: 4.65 CM
RA WIDTH: 2.9 CM
RBC # BLD AUTO: 2.27 M/UL
RIGHT VENTRICULAR END-DIASTOLIC DIMENSION: 2.64 CM
SINUS: 2.97 CM
SODIUM SERPL-SCNC: 129 MMOL/L
SODIUM SERPL-SCNC: 131 MMOL/L
SODIUM UR-SCNC: 33 MMOL/L
STJ: 2.7 CM
TDI LATERAL: 0.07
TDI SEPTAL: 0.06
TDI: 0.07
TROPONIN I SERPL DL<=0.01 NG/ML-MCNC: 0.01 NG/ML
TROPONIN I SERPL DL<=0.01 NG/ML-MCNC: 0.02 NG/ML
WBC # BLD AUTO: 10.67 K/UL

## 2019-02-21 PROCEDURE — 92610 EVALUATE SWALLOWING FUNCTION: CPT

## 2019-02-21 PROCEDURE — 84295 ASSAY OF SERUM SODIUM: CPT

## 2019-02-21 PROCEDURE — 85018 HEMOGLOBIN: CPT

## 2019-02-21 PROCEDURE — A4216 STERILE WATER/SALINE, 10 ML: HCPCS | Performed by: NURSE PRACTITIONER

## 2019-02-21 PROCEDURE — 83930 ASSAY OF BLOOD OSMOLALITY: CPT

## 2019-02-21 PROCEDURE — 83735 ASSAY OF MAGNESIUM: CPT

## 2019-02-21 PROCEDURE — 63600175 PHARM REV CODE 636 W HCPCS: Performed by: NURSE PRACTITIONER

## 2019-02-21 PROCEDURE — 84484 ASSAY OF TROPONIN QUANT: CPT

## 2019-02-21 PROCEDURE — 97161 PT EVAL LOW COMPLEX 20 MIN: CPT

## 2019-02-21 PROCEDURE — 92523 SPEECH SOUND LANG COMPREHEN: CPT

## 2019-02-21 PROCEDURE — 85025 COMPLETE CBC W/AUTO DIFF WBC: CPT

## 2019-02-21 PROCEDURE — 84300 ASSAY OF URINE SODIUM: CPT

## 2019-02-21 PROCEDURE — 80053 COMPREHEN METABOLIC PANEL: CPT

## 2019-02-21 PROCEDURE — 99233 SBSQ HOSP IP/OBS HIGH 50: CPT | Mod: ,,, | Performed by: PSYCHIATRY & NEUROLOGY

## 2019-02-21 PROCEDURE — 25000003 PHARM REV CODE 250: Performed by: FAMILY MEDICINE

## 2019-02-21 PROCEDURE — 25000003 PHARM REV CODE 250: Performed by: PHYSICIAN ASSISTANT

## 2019-02-21 PROCEDURE — 36415 COLL VENOUS BLD VENIPUNCTURE: CPT

## 2019-02-21 PROCEDURE — 25000003 PHARM REV CODE 250: Performed by: NURSE PRACTITIONER

## 2019-02-21 PROCEDURE — 99233 PR SUBSEQUENT HOSPITAL CARE,LEVL III: ICD-10-PCS | Mod: ,,, | Performed by: PSYCHIATRY & NEUROLOGY

## 2019-02-21 PROCEDURE — 20600001 HC STEP DOWN PRIVATE ROOM

## 2019-02-21 PROCEDURE — 85014 HEMATOCRIT: CPT

## 2019-02-21 PROCEDURE — 84100 ASSAY OF PHOSPHORUS: CPT

## 2019-02-21 PROCEDURE — 83935 ASSAY OF URINE OSMOLALITY: CPT

## 2019-02-21 PROCEDURE — 25500020 PHARM REV CODE 255: Performed by: EMERGENCY MEDICINE

## 2019-02-21 RX ORDER — SODIUM CHLORIDE 9 MG/ML
INJECTION, SOLUTION INTRAVENOUS CONTINUOUS
Status: ACTIVE | OUTPATIENT
Start: 2019-02-21 | End: 2019-02-21

## 2019-02-21 RX ADMIN — GABAPENTIN 300 MG: 300 CAPSULE ORAL at 09:02

## 2019-02-21 RX ADMIN — Medication 3 ML: at 12:02

## 2019-02-21 RX ADMIN — HEPARIN SODIUM 5000 UNITS: 5000 INJECTION, SOLUTION INTRAVENOUS; SUBCUTANEOUS at 09:02

## 2019-02-21 RX ADMIN — CHOLECALCIFEROL TAB 125 MCG (5000 UNIT) 5000 UNITS: 125 TAB at 09:02

## 2019-02-21 RX ADMIN — Medication 3 ML: at 02:02

## 2019-02-21 RX ADMIN — Medication 3 ML: at 09:02

## 2019-02-21 RX ADMIN — SODIUM CHLORIDE: 0.9 INJECTION, SOLUTION INTRAVENOUS at 09:02

## 2019-02-21 RX ADMIN — CLOPIDOGREL 75 MG: 75 TABLET, FILM COATED ORAL at 08:02

## 2019-02-21 RX ADMIN — HEPARIN SODIUM 5000 UNITS: 5000 INJECTION, SOLUTION INTRAVENOUS; SUBCUTANEOUS at 02:02

## 2019-02-21 RX ADMIN — GABAPENTIN 300 MG: 300 CAPSULE ORAL at 12:02

## 2019-02-21 RX ADMIN — Medication 3 ML: at 06:02

## 2019-02-21 RX ADMIN — ACETAMINOPHEN 650 MG: 325 TABLET ORAL at 04:02

## 2019-02-21 RX ADMIN — NITROFURANTOIN (MONOHYDRATE/MACROCRYSTALS) 100 MG: 75; 25 CAPSULE ORAL at 12:02

## 2019-02-21 RX ADMIN — FERROUS SULFATE TAB EC 325 MG (65 MG FE EQUIVALENT) 325 MG: 325 (65 FE) TABLET DELAYED RESPONSE at 08:02

## 2019-02-21 RX ADMIN — HEPARIN SODIUM 5000 UNITS: 5000 INJECTION, SOLUTION INTRAVENOUS; SUBCUTANEOUS at 06:02

## 2019-02-21 RX ADMIN — ASPIRIN 325 MG: 325 TABLET, DELAYED RELEASE ORAL at 08:02

## 2019-02-21 RX ADMIN — ATORVASTATIN CALCIUM 80 MG: 20 TABLET, FILM COATED ORAL at 08:02

## 2019-02-21 RX ADMIN — SODIUM CHLORIDE: 0.9 INJECTION, SOLUTION INTRAVENOUS at 10:02

## 2019-02-21 RX ADMIN — ACETAMINOPHEN 650 MG: 325 TABLET ORAL at 11:02

## 2019-02-21 RX ADMIN — NITROFURANTOIN (MONOHYDRATE/MACROCRYSTALS) 100 MG: 75; 25 CAPSULE ORAL at 08:02

## 2019-02-21 NOTE — SUBJECTIVE & OBJECTIVE
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    Vertebral basilar insufficiency 12/19/2017    Vitamin D deficiency      Past Surgical History:   Procedure Laterality Date    APPENDECTOMY      [facility]    PARATHYROIDECTOMY Right 2012    THYROID SURGERY Right 2012    Partial thyroidectomy     Family History   Problem Relation Age of Onset    Heart disease Father     Heart disease Sister     Heart disease Brother     Diabetes Son      Social History     Tobacco Use    Smoking status: Never Smoker    Smokeless tobacco: Never Used   Substance Use Topics    Alcohol use: Not on file    Drug use: No     Review of patient's allergies indicates:   Allergen Reactions    Sulfa (sulfonamide antibiotics) Hives       Medications: I have reviewed the current medication administration record.      (Not in a hospital admission)    Review of Systems   Constitutional: Negative for fever.   Eyes: Negative for visual disturbance.   Respiratory: Negative for shortness of breath.    Cardiovascular: Negative for chest pain.   Gastrointestinal: Negative for nausea and vomiting.   Genitourinary: Negative for urgency.   Musculoskeletal: Negative for gait problem.   Neurological: Positive for facial asymmetry and speech difficulty. Negative for dizziness, weakness and headaches.   Psychiatric/Behavioral: Negative for agitation and confusion.     Objective:     Vital Signs (Most Recent):  Temp: 98.2 °F (36.8 °C) (02/20/19 2047)  Pulse: 87 (02/20/19 2113)  Resp: 15 (02/20/19 2113)  BP: (!) 151/67 (02/20/19 2106)  SpO2: 98 % (02/20/19 2113)    Vital Signs Range (Last 24H):  Temp:  [98.2 °F (36.8 °C)]   Pulse:  [84-93]   Resp:  [15-19]   BP:  (145-209)/()   SpO2:  [92 %-98 %]     Physical Exam   Constitutional: She is oriented to person, place, and time. She appears well-developed.   HENT:   Head: Normocephalic and atraumatic.   Eyes: EOM are normal. Pupils are equal, round, and reactive to light.   Cardiovascular: Normal rate.   Pulmonary/Chest: Effort normal.   Abdominal: Soft. She exhibits no distension.   Musculoskeletal: Normal range of motion.   Neurological: She is alert and oriented to person, place, and time.   Skin: Skin is warm and dry. Capillary refill takes less than 2 seconds.   Psychiatric: She has a normal mood and affect.       Neurological Exam:   LOC: alert  Attention Span: Good   Language: No aphasia  Articulation: Dysarthria  Orientation: Person, Place, Time   Visual Fields: Full  EOM (CN III, IV, VI): Full/intact  Pupils (CN II, III): PERRL  Facial Movement (CN VII): Lower facial weakness on the Right  Motor: Arm left  Normal 5/5  Leg left  Normal 5/5  Arm right  Paresis: 4/5  Leg right Normal 5/5  Cebellar: No evidence of appendicular or axial ataxia  Sensation: Intact to light touch, temperature and vibration      Laboratory:  CMP:   Recent Labs   Lab 02/20/19 2055   CALCIUM 9.4   ALBUMIN 3.3*   PROT 6.4   *   K 4.1   CO2 23   CL 94*   BUN 28*   CREATININE 1.0   ALKPHOS 63   ALT 19   AST 20   BILITOT 0.2     CBC:   Recent Labs   Lab 02/20/19 2055   WBC 11.20   RBC 2.81*   HGB 8.0*   HCT 25.7*      MCV 92   MCH 28.5   MCHC 31.1*     Lipid Panel:   Recent Labs   Lab 02/20/19 2055   CHOL 177   LDLCALC 96.6   HDL 30*   TRIG 252*     Coagulation:   Recent Labs   Lab 02/20/19 2055   INR 1.0     Hgb A1C: No results for input(s): HGBA1C in the last 168 hours.  TSH: No results for input(s): TSH in the last 168 hours.    Diagnostic Results:      Brain imaging:  CT head 2/20  No acute abnormality noted     MRI pending     Vessel Imaging:  NA     Cardiac Evaluation:   NA

## 2019-02-21 NOTE — HOSPITAL COURSE
02/20/2019 MRI brain positive for stroke.  Admitted to Vascular Neurology.  02/21/2019- Na 130-->129. 250 ml NS bolus ordered. Repeat Na lab pending. CTA head/neck with recent infarcts in the left basal ganglia. 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 Echo EF 65% with mild left atrial enlargement. SLP recs for mechanical soft diet. PT/OT recs for possible rehab or SNF.   2/22: Hb 6.5; pt appears pale, c/o light-headedness but is neurologically stable. Ordered type & screen and 1U pRBCs, consent obtained. Will repeat CBC following transfusion. Started PPI and FOBT ordered (needs to be collected.) Monitoring Na.  2/23: Patient s/p 1U pRBCs with good response; no pallor or light-headedness today and Hb 8.8. Restarted SQH; will d/c ASA and continue with monotherapy (Plavix). FOBT+; will monitor Hb for now and plan for outpatient GI work-up if Hb remains stable. Monitoring BP.  2/24: Hb 9.5 today. WBC 13 however pt afebrile, denies acute complaints; monitoring. Pt c/o dizziness (her reported baseline) as well as mild blurred vision; no other neuro changes, visual fields are intact. Restarted home PRN Meclizine. Pt states she takes Lisinopril occasionally at home if her BP is high; started Lisinopril 5mg Daily. Miconazole powder ordered for areas of redness noted in abdominal folds. Pt is not in favor of d/c to SNF; attempted to call pt's son Angel to discuss if he is comfortable with plan of d/c to home. Attempting to touch base with PT regarding dispo as well.  02/25/2019: Hb 8.8. Patient had dark and tarry stool this morning. GI consulted. Will hold Plavix to have scope done on Friday. Pt's son updated on POC.   02/26/2019: Hb 9. Patient may be able to discharge and have endoscopy done as an outpatient if H&H remains stable. Patient confused with PT, and complained of intermittent confusion. EEG 1 hour monitoring ordered to rule out seizures.   02/27/19 SNF reviewing  patient, monitoring H&H due to recent melena, if remains stable then outpatient scope  02/28/2019 no focal deficits noted on exam today. SNF unable to transport patient back to hospital for outpatient EGD. Therefore, will plan to have EGD prior to discharge. Plans to contact GI to confirm EGD scheduled for tomorrow.   03/01/19 ordered 1L fluid restriction due to hyponatremia, patient went to EGD. 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 ENT consulted, no intervention necessary for telangiectasia.   03/02/2019 Hemoglobin stable. Sodium improved to 131 with fluid restriction. Holding plavix s/p EGD  03/03/2019 Sodium continues to improve, hemoglobin stable. Increased lisinopril to 10mg daily. Plavix restarted.  03/04/2019- No acute events overnight. Sodium improved to 134. H&H remains stable. Patient medically ready for discharge.

## 2019-02-21 NOTE — CONSULTS
[facility]  Vascular Neurology  Comprehensive Stroke Center  Consult Note    Inpatient consult to Vascular (Stroke) Neurology  Consult performed by: Bria Vargas NP  Consult ordered by: Yakov Falk MD  Reason for consult: stroke         Assessment/Plan:     Patient is a 84 y.o. year old female with:    Facial droop    84 year old female with past medical hx of HTN, hypothyroidism, previous stroke in 2017, and aortic stenosis presented to the ED today with complains of right facial droop. LKN this evening at 18:00. Patient denies unilateral weakness, vision changes, or aphasia. Per chart review patient was seen yesterday and diagonsed with a UTI and started on Macrobid. Patient previously diagnosed with acute infarct in the right vermis of the cerebellum March, 2017. 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 Patient currently on DAPT and Atorvastatin.     Patient with NIH 3. Discussed with VN staff about administering TPA. Patient in time window for TPA administration and exhibiting stroke symptoms consisting off right facial droop, RUE drift, and slurred speech. Risk and benefits were reviewed with patient. She opted to refuse IV TPA.    In this patient with risk factors for stroke recommending MRI brain to assess for acute infarct. However, in this patient with recent diagnosis of UTI and multiple medical complications acute encephalopathy is in the differential. If MRI positive admit for stroke work up. If negative we will sign off.     Antithrombotics for secondary stroke prevention: Antiplatelets: Aspirin: 81 mg daily  Clopidogrel: 75 mg daily    Statins for secondary stroke prevention and hyperlipidemia, if present:   Statins: Atorvastatin- 40 mg daily    Aggressive risk factor modification: HTN, HLD, Diet, Obesity     Diagnostics ordered/pending: MRI brain     VTE prophylaxis: Heparin 5000 units SQ every 8 hours    BP parameters:  Infarct: No intervention, SBP <220 until acute infarct ruled out      History of stroke    Stroke risk factor  3/2017 - diagnosed with R cerebellar infarct  Patient state she had no significant stroke deficits        Acute cystitis without hematuria    Seen yesterday at Urgent Care  Treating with Macrobid      Mixed hyperlipidemia    Stroke risk factor  LDL 96.6  Patient taking Atorvastatin 40 at home      Essential hypertension    Stroke risk factor  SBP <220 until acute infarct ruled out          STROKE DOCUMENTATION     Acute Stroke Times   Last Known Normal Date: 02/20/19  Last Known Normal Time: 1800  Symptom Onset Date: 02/20/19  Symptom Onset Time: 1800  Stroke Team Called Date: 02/20/19  Stroke Team Called Time: 2050  Stroke Team Arrival Date: 02/20/19  Stroke Team Arrival Time: 2052  CT Interpretation Time: 2100  Decision to Treat Time for Alteplase: 2110(Patient refused )  Decision to Treat Time for IR: (NA)    NIH Scale:  1a. Level of Consciousness: 0-->Alert, keenly responsive  1b. LOC Questions: 0-->Answers both questions correctly  1c. LOC Commands: 0-->Performs both tasks correctly  2. Best Gaze: 0-->Normal  3. Visual: 0-->No visual loss  4. Facial Palsy: 1-->Minor paralysis (flattened nasolabial fold, asymmetry on smiling)  5a. Motor Arm, Left: 0-->No drift, limb holds 90 (or 45) degrees for full 10 secs  5b. Motor Arm, Right: 1-->Drift, limb holds 90 (or 45) degrees, but drifts down before full 10 secs, does not hit bed or other support  6a. Motor Leg, Left: 0-->No drift, leg holds 30 degree position for full 5 secs  6b. Motor Leg, Right: 0-->No drift, leg holds 30 degree position for full 5 secs  7. Limb Ataxia: 0-->Absent  8. Sensory: 0-->Normal, no sensory loss  9. Best Language: 0-->No aphasia, normal  10. Dysarthria: 1-->Mild-to-moderate dysarthria, patient slurs at least some words and, at worst, can be understood with some difficulty  11. Extinction and Inattention (formerly Neglect): 0-->No  abnormality  Total (NIH Stroke Scale): 3    Modified Granite Score: 1  Miranda Coma Scale:    ABCD2 Score:    [id number] Score:   HAS -BLED Score:   ICH Score:   Hunt & De Classification:       Thrombolysis Candidate? Yes; refused IV-TPA      Interventional Revascularization Candidate?   Is the patient eligible for mechanical endovascular reperfusion (TIGIST)?  No;  Large core infarct      Hemorrhagic change of an Ischemic Stroke: Does this patient have an ischemic stroke with hemorrhagic changes? No     Subjective:     History of Present Illness:  84 year old female with past medical hx of HTN, hypothyroidism, previous stroke in 2017, and aortic stenosis presented to the ED today with complains of right facial droop. LKN this evening at 18:00. When prompting patient about what happened this evening she continues to say she is just not feeling well. During transport to CT stating she feels like she could pass out. Patient denies unilateral weakness, vision changes, or aphasia. Per chart review patient was seen yesterday and diagonsed with a UTI and started on Macrobid. Patient has been seen before for stroke. Diagnosed with acute infarct in the right vermis of the cerebellum March, 2017. 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 Patient currently on DAPT and Atorvastatin.             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    Vertebral basilar insufficiency 12/19/2017    Vitamin D deficiency      Past Surgical History:   Procedure Laterality Date    APPENDECTOMY      [facility]/12/2016    [facility]    PARATHYROIDECTOMY Right 2012    THYROID SURGERY Right 2012    Partial thyroidectomy     Family History    Problem Relation Age of Onset    Heart disease Father     Heart disease Sister     Heart disease Brother     Diabetes Son      Social History     Tobacco Use    Smoking status: Never Smoker    Smokeless tobacco: Never Used   Substance Use Topics    Alcohol use: Not on file    Drug use: No     Review of patient's allergies indicates:   Allergen Reactions    Sulfa (sulfonamide antibiotics) Hives       Medications: I have reviewed the current medication administration record.      (Not in a hospital admission)    Review of Systems   Constitutional: Negative for fever.   Eyes: Negative for visual disturbance.   Respiratory: Negative for shortness of breath.    Cardiovascular: Negative for chest pain.   Gastrointestinal: Negative for nausea and vomiting.   Genitourinary: Negative for urgency.   Musculoskeletal: Negative for gait problem.   Neurological: Positive for facial asymmetry and speech difficulty. Negative for dizziness, weakness and headaches.   Psychiatric/Behavioral: Negative for agitation and confusion.     Objective:     Vital Signs (Most Recent):  Temp: 98.2 °F (36.8 °C) (02/20/19 2047)  Pulse: 87 (02/20/19 2113)  Resp: 15 (02/20/19 2113)  BP: (!) 151/67 (02/20/19 2106)  SpO2: 98 % (02/20/19 2113)    Vital Signs Range (Last 24H):  Temp:  [98.2 °F (36.8 °C)]   Pulse:  [84-93]   Resp:  [15-19]   BP: (145-209)/()   SpO2:  [92 %-98 %]     Physical Exam   Constitutional: She is oriented to person, place, and time. She appears well-developed.   HENT:   Head: Normocephalic and atraumatic.   Eyes: EOM are normal. Pupils are equal, round, and reactive to light.   Cardiovascular: Normal rate.   Pulmonary/Chest: Effort normal.   Abdominal: Soft. She exhibits no distension.   Musculoskeletal: Normal range of motion.   Neurological: She is alert and oriented to person, place, and time.   Skin: Skin is warm and dry. Capillary refill takes less than 2 seconds.   Psychiatric: She has a normal mood and  affect.       Neurological Exam:   LOC: alert  Attention Span: Good   Language: No aphasia  Articulation: Dysarthria  Orientation: Person, Place, Time   Visual Fields: Full  EOM (CN III, IV, VI): Full/intact  Pupils (CN II, III): PERRL  Facial Movement (CN VII): Lower facial weakness on the Right  Motor: Arm left  Normal 5/5  Leg left  Normal 5/5  Arm right  Paresis: 4/5  Leg right Normal 5/5  Cebellar: No evidence of appendicular or axial ataxia  Sensation: Intact to light touch, temperature and vibration      Laboratory:  CMP:   Recent Labs   Lab 02/20/19 2055   CALCIUM 9.4   ALBUMIN 3.3*   PROT 6.4   *   K 4.1   CO2 23   CL 94*   BUN 28*   CREATININE 1.0   ALKPHOS 63   ALT 19   AST 20   BILITOT 0.2     CBC:   Recent Labs   Lab 02/20/19 2055   WBC 11.20   RBC 2.81*   HGB 8.0*   HCT 25.7*      MCV 92   MCH 28.5   MCHC 31.1*     Lipid Panel:   Recent Labs   Lab 02/20/19 2055   CHOL 177   LDLCALC 96.6   HDL 30*   TRIG 252*     Coagulation:   Recent Labs   Lab 02/20/19 2055   INR 1.0     Hgb A1C: No results for input(s): HGBA1C in the last 168 hours.  TSH: No results for input(s): TSH in the last 168 hours.    Diagnostic Results:      Brain imaging:  CT head 2/20  No acute abnormality noted     MRI pending     Vessel Imaging:  NA     Cardiac Evaluation:   BRE Vargas NP  [facility] St[facility]  Department of Vascular Neurology   [facility]

## 2019-02-21 NOTE — ED NOTES
Pt resting in bed. Call bell within reach. Pt on cont cardiac, bp, and pulse ox monitoring. Will continue to monitor.

## 2019-02-21 NOTE — H&P
[facility]  Vascular Neurology  Comprehensive Stroke Center  History & Physical    Consults  Assessment/Plan:     * Embolic stroke involving left middle cerebral artery    84 year old female with past medical hx of HTN, hypothyroidism, previous stroke in 2017, and aortic stenosis presented to the ED today with complains of right facial droop.  tPA offered, patient refused.     Antithrombotics for secondary stroke prevention: Antiplatelets: Aspirin: 81 mg daily  Clopidogrel: 75 mg daily    Statins for secondary stroke prevention and hyperlipidemia, if present:   Statins: Atorvastatin- 80 mg daily    Aggressive risk factor modification: HTN, HLD, Obesity     Rehab efforts: PT/OT/SLP to evaluate and treat    Diagnostics ordered/pending: CTA Head to assess vasculature , CTA Neck/Arch to assess vasculature, HgbA1C to assess blood glucose levels, TTE to assess cardiac function/status     VTE prophylaxis: Heparin 5000 units SQ every 8 hours  SCDs    BP parameters: Infarct: No intervention, SBP <220         Cytotoxic cerebral edema    -Area of cytotoxic cerebral edema identified when reviewing brain imaging in the territory of the left middle cerebral artery. There is no mass effect associated with it. We will continue to monitor the patients clinical exam for any worsening of symptoms which may indicate expansion of the stroke or the area of the edema resulting in the clinical change. The pattern is suggestive of embolic etiology.         Hyponatremia    -Na 128.  Last recorded Na in 2018 WNL.  -Repeat Na pending  -If repeat Na remains low will order urine Na, urine osmol, serum osmol     Facial droop    84 year old female with past medical hx of HTN, hypothyroidism, previous stroke in 2017, and aortic stenosis presented to the ED today with complains of right facial droop. LKN this evening at 18:00. Patient denies unilateral weakness, vision changes, or aphasia. Per chart review patient was seen  yesterday and diagonsed with a UTI and started on Macrobid. Patient previously diagnosed with acute infarct in the right vermis of the cerebellum March, 2017. 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 Patient currently on DAPT and Atorvastatin.     Patient with NIH 3. Discussed with VN staff about administering TPA. Patient in time window for TPA administration and exhibiting stroke symptoms consisting off right facial droop, RUE drift, and slurred speech. Risk and benefits were reviewed with patient. She opted to refuse IV TPA.    In this patient with risk factors for stroke recommending MRI brain to assess for acute infarct. However, in this patient with recent diagnosis of UTI and multiple medical complications acute encephalopathy is in the differential. If MRI positive admit for stroke work up. If negative we will sign off.     Antithrombotics for secondary stroke prevention: Antiplatelets: Aspirin: 81 mg daily  Clopidogrel: 75 mg daily    Statins for secondary stroke prevention and hyperlipidemia, if present:   Statins: Atorvastatin- 40 mg daily    Aggressive risk factor modification: HTN, HLD, Diet, Obesity     Diagnostics ordered/pending: MRI brain     VTE prophylaxis: Heparin 5000 units SQ every 8 hours    BP parameters: Infarct: No intervention, SBP <220 until acute infarct ruled out      History of stroke    Stroke risk factor  3/2017 - diagnosed with R cerebellar infarct  Patient state she had no significant stroke deficits        Acute cystitis without hematuria    -Seen yesterday at Urgent Care  -Treating with Macrobid.  Continue.  Last dose 2/26/19.     Mixed hyperlipidemia    Stroke risk factor  LDL 96.6  Patient taking Atorvastatin 40 at home   -LDL 96.  Atorvastatin increased to 80 mg     Essential hypertension    Stroke risk factor  SBP <220  -Resume home meds when appropriate            -MRI brain reveled acute stroke, L MCA territory.  Admitted  to Vascular Neurology.  -See consult note dated 02/20/2019 for full H&P    Sushma Desir, LARRY, NP  [facility] St[facility]  Department of Vascular Neurology   [facility]

## 2019-02-21 NOTE — PROGRESS NOTES
[facility]  Vascular Neurology  Comprehensive Stroke Center  Progress Note    Assessment/Plan:     * Embolic stroke involving left middle cerebral artery    84 year old female with past medical hx of HTN, hypothyroidism, previous stroke in 2017, and aortic stenosis presented to the ED today with complains of right facial droop.  tPA offered, patient refused. MRI brain with small acute infarcts in the left basal ganglia. 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 CTA imaging also shows occlusion of the right vertebral artery origin with reconstitution at the V2 segment, and interrupted occlusion or high-grade stenosis throughout the V2 segment. Possible embolic etiology.      Echo shows mild left atrial enlargment. Will likely discharge patient with 30 day cardiac event monitor.     Antithrombotics for secondary stroke prevention: Antiplatelets: Aspirin: 81 mg daily  Clopidogrel: 75 mg daily    Statins for secondary stroke prevention and hyperlipidemia, if present:   Statins: Atorvastatin- 80 mg daily    Aggressive risk factor modification: HTN, HLD, Obesity     Rehab efforts: PT/OT/SLP to evaluate and treat; possible home health or SNF    Diagnostics ordered/pending: none     VTE prophylaxis: Heparin 5000 units SQ every 8 hours  SCDs    BP parameters: Infarct: No intervention, SBP <220         Cytotoxic cerebral edema    -Area of cytotoxic cerebral edema identified when reviewing brain imaging in the territory of the left middle cerebral artery. There is no mass effect associated with it. We will continue to monitor the patients clinical exam for any worsening of symptoms which may indicate expansion of the stroke or the area of the edema resulting in the clinical change. The pattern is suggestive of embolic etiology.         Nodule of apex of right lung    CTA head/neck on 2/21/19 shows stable 9  mm noncalcified nodule in the right lung apex.   Evaluate patient's smoking status   Team to discuss with hospital medicine tomorrow at rounds for suggested treatment.        Hyponatremia    -Na 128.  Last recorded Na in 2018 WNL.  -Repeat Na 130 -->129  -Urine Na, urine osmol, serum osmol WNL  -250 ml NS bolus given 2/21/19  - Repeat Na pending                   Acute cystitis without hematuria    -Seen 2/19/19 at Urgent Care  -Treated with Macrobid. 2/19/19-2/21/19  - Discontinued after reviewing urinalysis, dipstick on 2/19/19. Negative for UTI.     Mixed hyperlipidemia    Stroke risk factor  LDL 96.6  Patient taking Atorvastatin 40 at home   Atorvastatin increased to 80 mg       Essential hypertension    Stroke risk factor  SBP <220  -Resume home meds when appropriate            02/20/2019 MRI brain positive for stroke.  Admitted to Vascular Neurology.  02/21/2019- Na 130-->129. 250 ml NS bolus ordered. Repeat Na lab pending. CTA head/neck with recent infarcts in the left basal ganglia. 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 Echo EF 65% with mild left atrial enlargement. SLP recs for mechanical soft diet. PT/OT recs for possible HH or SNF.       STROKE DOCUMENTATION   Acute Stroke Times   Last Known Normal Date: 02/20/19  Last Known Normal Time: 1800  Symptom Onset Date: 02/20/19  Symptom Onset Time: 1800  Stroke Team Called Date: 02/20/19  Stroke Team Called Time: 2050  Stroke Team Arrival Date: 02/20/19  Stroke Team Arrival Time: 2052  CT Interpretation Time: 2100  Decision to Treat Time for Alteplase: 2110(Patient refused )  Decision to Treat Time for IR: (NA)    NIH Scale:  1a. Level of Consciousness: 0-->Alert, keenly responsive  1b. LOC Questions: 0-->Answers both questions correctly  1c. LOC Commands: 0-->Performs both tasks correctly  2. Best Gaze: 0-->Normal  3. Visual: 0-->No visual loss  4. Facial Palsy: 0-->Normal symmetrical movements  5a. Motor  Arm, Left: 0-->No drift, limb holds 90 (or 45) degrees for full 10 secs  5b. Motor Arm, Right: 0-->No drift, limb holds 90 (or 45) degrees for full 10 secs  6a. Motor Leg, Left: 0-->No drift, leg holds 30 degree position for full 5 secs  6b. Motor Leg, Right: 0-->No drift, leg holds 30 degree position for full 5 secs  7. Limb Ataxia: 0-->Absent  8. Sensory: 0-->Normal, no sensory loss  9. Best Language: 0-->No aphasia, normal  10. Dysarthria: 0-->Normal  11. Extinction and Inattention (formerly Neglect): 0-->No abnormality  Total (NIH Stroke Scale): 0       Modified Stearns Score: 1  Miranda Coma Scale:    ABCD2 Score:    [id number] Score:   HAS -BLED Score:   ICH Score:   Hunt & De Classification:      Hemorrhagic change of an Ischemic Stroke: Does this patient have an ischemic stroke with hemorrhagic changes? No     Neurologic Chief Complaint: Right facial droop, RUE weakness and slurred speech.     Subjective:     Interval History: Na 130-->129. 250 ml NS bolus ordered. Repeat Na lab pending. CTA head/neck with recent infarcts in the left basal ganglia. 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 Echo EF 65% with mild left atrial enlargement. SLP recs for mechanical soft diet. PT/OT recs for possible rehab or SNF.     HPI, Past Medical, Family, and Social History remains the same as documented in the initial encounter.     Review of Systems   Constitutional: Negative for fever.   Respiratory: Negative for shortness of breath.    Cardiovascular: Negative for chest pain.   Neurological: Negative for weakness.     Scheduled Meds:   aspirin  325 mg Oral Daily    atorvastatin  80 mg Oral Daily    cholecalciferol (vitamin D3)  5,000 Units Oral Daily    clopidogrel  75 mg Oral Daily    ferrous sulfate  325 mg Oral Daily    gabapentin  300 mg Oral Nightly    heparin (porcine)  5,000 Units Subcutaneous Q8H    sodium chloride 0.9%  3 mL Intravenous Q8H      Continuous Infusions:   sodium chloride 0.9%       PRN Meds:acetaminophen, hydrALAZINE, ondansetron, ondansetron, sodium chloride 0.9%    Objective:     Vital Signs (Most Recent):  Temp: 97 °F (36.1 °C) (02/21/19 1443)  Pulse: 75 (02/21/19 1600)  Resp: 14 (02/21/19 1432)  BP: (!) 162/67 (02/21/19 1447)  SpO2: 95 % (02/21/19 1447)       Vital Signs Range (Last 24H):  Temp:  [96.9 °F (36.1 °C)-98.2 °F (36.8 °C)]   Pulse:  [63-93]   Resp:  [11-25]   BP: ()/()   SpO2:  [92 %-100 %]        Physical Exam   Constitutional: She appears well-developed and well-nourished.   Eyes: Pupils are equal, round, and reactive to light.   Cardiovascular: Normal rate.   Pulmonary/Chest: Effort normal.   Neurological:   See below   Nursing note and vitals reviewed.      Neurological Exam:   LOC: alert  Attention Span: Good   Language: No aphasia  Articulation: No dysarthria  Orientation: Person, Place, Time   Visual Fields: Full  EOM (CN III, IV, VI): Full/intact  Facial Sensation (CN V): Normal  Facial Movement (CN VII): Symmetric facial expression    Motor: Arm left  Normal 5/5  Leg left  Normal 5/5  Arm right  Normal 5/5  Leg right Normal 5/5  Sensation: Intact to light touch, temperature and vibration    Laboratory:  CMP:   Recent Labs   Lab 02/21/19  0507   CALCIUM 9.1   ALBUMIN 3.1*   PROT 6.4   *   K 4.6   CO2 19*      BUN 24*   CREATININE 0.8   ALKPHOS 54*   ALT 19   AST 28   BILITOT 0.2     CBC:   Recent Labs   Lab 02/21/19  0507 02/21/19  0848   WBC 10.67  --    RBC 2.27*  --    HGB 6.5* 8.0*   HCT 21.4* 25.3*     --    MCV 94  --    MCH 28.6  --    MCHC 30.4*  --      Lipid Panel:   Recent Labs   Lab 02/20/19  2055   CHOL 177   LDLCALC 96.6   HDL 30*   TRIG 252*     Coagulation:   Recent Labs   Lab 02/20/19 2244   INR 0.9   APTT 24.0     Hgb A1C:   Recent Labs   Lab 02/20/19 2244   HGBA1C 5.3     TSH:   Recent Labs   Lab 02/20/19 2055   TSH 2.994       Diagnostic Results     Brain Imaging    CT head 2/20/19  No acute abnormality noted     MRI 2/21/19  Small acute infarcts in the left basal ganglia.   Changes of chronic small vessel ischemic disease and cerebral volume loss.    Vessel Imaging   CTA 2/20/19  Recent infarcts in the left basal ganglia.    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    Cardiac Imaging   TTE 2/21/19  · Concentric left ventricular remodeling.  · Normal left ventricular systolic function. The estimated ejection fraction is 65%  · Grade I (mild) left ventricular diastolic dysfunction consistent with impaired relaxation.  · Normal left atrial pressure.  · Mild left atrial enlargement.  · Mild aortic regurgitation.  · Normal right ventricular systolic function.  · Mild aortic valve stenosis.  · Aortic valve area is 2.18 cm2; peak velocity is 2.39 m/s; mean gradient is 14.14 mmHg.      César Wong NP  [facility] Stroke [facility]  Department of Vascular Neurology   [facility]

## 2019-02-21 NOTE — ASSESSMENT & PLAN NOTE
-Seen 2/19/19 at Urgent Care  -Treated with Macrobid. 2/19/19-2/21/19  - Discontinued after reviewing urinalysis, dipstick on 2/19/19. Negative for UTI.

## 2019-02-21 NOTE — PLAN OF CARE
Problem: SLP Goal  Goal: SLP Goal  Speech-Language Pathology Goals  Goals to be met by 2/28/19  1. Patient will tolerate MECHANICAL SOFT DIET/THIN LIQUIDS with no s/s of aspiration.  2. Patient will participate in ongoing swallow assessment for potential diet upgrade.  3. Patient will recall and utilize three clear speech techniques per session with min assist from SLP.  4. Patient will participate in ongoing assessment of reading/writing/visuospatial abilities.   Patient seen for bedside swallow eval and speech/language/cognitive assessment. SLP recommending MECHANICAL SOFT DIET/THIN LIQUIDS.    Darion Chong CF-SLP  Speech-Language Pathology  Pager: [phone]

## 2019-02-21 NOTE — ASSESSMENT & PLAN NOTE
84 year old female with past medical hx of HTN, hypothyroidism, previous stroke in 2017, and aortic stenosis presented to the ED today with complains of right facial droop.  tPA offered, patient refused. MRI brain with small acute infarcts in the left basal ganglia. 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 CTA imaging also shows occlusion of the right vertebral artery origin with reconstitution at the V2 segment, and interrupted occlusion or high-grade stenosis throughout the V2 segment. Possible embolic etiology.      Echo shows mild left atrial enlargment. Will likely discharge patient with 30 day cardiac event monitor.     Antithrombotics for secondary stroke prevention: Antiplatelets: Aspirin: 81 mg daily  Clopidogrel: 75 mg daily    Statins for secondary stroke prevention and hyperlipidemia, if present:   Statins: Atorvastatin- 80 mg daily    Aggressive risk factor modification: HTN, HLD, Obesity     Rehab efforts: PT/OT/SLP to evaluate and treat; possible home health or SNF    Diagnostics ordered/pending: none     VTE prophylaxis: Heparin 5000 units SQ every 8 hours  SCDs    BP parameters: Infarct: No intervention, SBP <220

## 2019-02-21 NOTE — ED PROVIDER NOTES
Encounter Date: 2/20/2019       History     Chief Complaint   Patient presents with    Cerebrovascular Accident     Stroke code. LSN at 6pm. Right sided facial droop per family.      HPI   83 yo female with hx of CVA, HTN, hypothyroidism BIBEMS for R facial droop, dysarthria that was noticed around 6 pm by family. Patient agrees that her speech is not normal. Denies numbness, weakness, paresthesias. States that symptoms began while sitting in a chair at home. She felt generally weak but no focal weakness. She also felt like she was going to pass out but denies LOC.   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    Vertebral basilar insufficiency 12/19/2017    Vitamin D deficiency      Past Surgical History:   Procedure Laterality Date    APPENDECTOMY      [facility]12[facility]    PARATHYROIDECTOMY Right 2012    THYROID SURGERY Right 2012    Partial thyroidectomy     Family History   Problem Relation Age of Onset    Heart disease Father     Heart disease Sister     Heart disease Brother     Diabetes Son      Social History     Tobacco Use    Smoking status: Never Smoker    Smokeless tobacco: Never Used   Substance Use Topics    Alcohol use: Not on file    Drug use: No     Review of Systems   Unable to perform ROS: Acuity of condition       Physical Exam     Initial Vitals [02/20/19 2047]   BP Pulse Resp Temp SpO2   (!) 209/148 84 18 98.2 °F (36.8 °C) (!) 92 %      MAP       --         Physical Exam    Nursing note and vitals reviewed.  Constitutional: She appears well-nourished. She is not diaphoretic.   HENT:   Head: Normocephalic and  atraumatic.   Eyes: Conjunctivae and EOM are normal. Right eye exhibits no discharge. Left eye exhibits no discharge. No scleral icterus.   Neck: Normal range of motion. Neck supple.   Cardiovascular: Normal rate, regular rhythm and intact distal pulses. Exam reveals no gallop and no friction rub.    Murmur (systolic) heard.  Pulmonary/Chest: Breath sounds normal. No respiratory distress. She has no wheezes. She has no rhonchi. She has no rales.   Abdominal: Soft. Bowel sounds are normal. She exhibits no distension. There is no tenderness.   Musculoskeletal: Normal range of motion. She exhibits no edema or tenderness.   Neurological: She is alert. A cranial nerve deficit (R sided facial droop) is present. No sensory deficit.   RUE strength 4/5 proximally and decreased compared to LUE.   5/5 strength in BLE proximally and distally  Dysarthria noted.         ED Course   Critical Care  Date/Time: 2/20/2019 9:07 PM  Performed by: Ethan Zaragoza MD  Authorized by: Ethan Zaragoza MD   Direct patient critical care time: 15 minutes  Additional history critical care time: 6 minutes  Ordering / reviewing critical care time: 8 minutes  Documentation critical care time: 5 minutes  Consulting other physicians critical care time: 10 minutes  Total critical care time (exclusive of procedural time) : 44 minutes  Critical care time was exclusive of teaching time and separately billable procedures and treating other patients.  Critical care was necessary to treat or prevent imminent or life-threatening deterioration of the following conditions: CNS failure or compromise.  Critical care was time spent personally by me on the following activities: examination of patient, ordering and performing treatments and interventions, ordering and review of radiographic studies, re-evaluation of patient's condition, pulse oximetry, ordering and review of laboratory studies, obtaining history from patient or surrogate, evaluation of  patient's response to treatment, discussions with consultants and review of old charts.        Labs Reviewed   POCT GLUCOSE - Abnormal; Notable for the following components:       Result Value    POCT Glucose 165 (*)     All other components within normal limits   CBC W/ AUTO DIFFERENTIAL   COMPREHENSIVE METABOLIC PANEL   PROTIME-INR   TSH   LIPID PANEL   POCT GLUCOSE, HAND-HELD DEVICE          Imaging Results    None                APC / Resident Notes:   85 yo female with hx of HTN, hypothyroidism, HLD BIBEMS for R sided facial droop, dysarthria, and RUE weakness since approx 6pm. Hypertensive en route with SBP 220s. Stroke code activated and patient taken for STAT CT head. Stroke team has been emergently consulted.     Yakov Falk, PGY-3  8:52 PM     CT with no acute findings. Neuro has discussed TPA with patient who has declined. MRI ordered by neuro. Pending MRI and neuro recs.    Yakov Falk, PGY-3  10:20 PM    Admitted to neuro.    Yakov Falk, PGY-3  10:26 PM          Attending Attestation:   Physician Attestation Statement for Resident:  As the supervising MD   Physician Attestation Statement: I have personally seen and examined this patient.   I agree with the above history. -: Last normal 3h pta  R facial droop and slurred speech     As the supervising MD I agree with the above PE.   -: Dysarthria noted  4/5 strength RUE  Mild R facial droop noted   As the supervising MD I agree with the above treatment, course, plan, and disposition.   -: Stroke activation from EMS  Stroke staff notified at time of initial eval and pt straight to scanner to expedite evaluation                       Clinical Impression:       ICD-10-CM ICD-9-CM   1. Stroke I63.9 434.91   2. Embolic stroke involving left middle cerebral artery I63.412 434.11                                Yakov Falk MD  Resident  02/20/19 6800       Yakov Falk MD  Resident  02/20/19 1592

## 2019-02-21 NOTE — ED NOTES
Pt son called pt was able to talk with him on the phone, pt gave the okay to give son update on pt condition.

## 2019-02-21 NOTE — ASSESSMENT & PLAN NOTE
-Na 128.  Last recorded Na in 2018 WNL.  -Repeat Na pending  -If repeat Na remains low will order urine Na, urine osmol, serum osmol

## 2019-02-21 NOTE — HPI
84 year old female with past medical hx of HTN, hypothyroidism, previous stroke in 2017, and aortic stenosis presented to the ED today with complains of right facial droop. MONTYN this evening at 18:00. When prompting patient about what happened this evening she continues to say she is just not feeling well. During transport to CT stating she feels like she could pass out. Patient denies unilateral weakness, vision changes, or aphasia. Per chart review patient was seen yesterday and diagonsed with a UTI and started on Macrobid. Patient has been seen before for stroke. Diagnosed with acute infarct in the right vermis of the cerebellum March, 2017. 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 Patient currently on DAPT and Atorvastatin.

## 2019-02-21 NOTE — PT/OT/SLP EVAL
Speech Language Pathology Evaluation  Cognitive/Bedside Swallow    Patient Name:  Linda Hylton   MRN:  [id number]  Admitting Diagnosis: Embolic stroke involving left middle cerebral artery    Recommendations:                  General Recommendations:  Speech/language therapy and monitor diet tolerance and assess for potential upgrade  Diet recommendations:  Mechanical soft, Thin   Aspiration Precautions: 1 bite/sip at a time, Check for pocketing/oral residue, Feed only when awake/alert, HOB to 90 degrees, Meds whole 1 at a time, Small bites/sips and Standard aspiration precautions   General Precautions: Standard, aspiration  Communication strategies:  go to room if call light pushed    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    Prior Intubation HX:  None this admission    Modified Barium Swallow: None on file    Chest X-Rays 2/20/19: Possible nodule projected over the left upper lung zone laterally versus artifact noting patient is rotated.  Hypoventilatory exam limits evaluation.  Correlation advised, no large focal consolidation.    Prior diet: regular/thin.    Subjective     Patient alert and cooperative during session  Communicated with nurse prior to session     Pain/Comfort:  · Pain Rating 1: 0/10  · Pain Rating Post-Intervention 1: 0/10    Objective:     Cognitive Status:    Arousal/Alertness Appropriate  response to stimuli  Attention No obvious deficits observed throughout the session  Orientation Oriented x4  Memory Immediate Recall: WFL, Delayed Recall: independently recalled 3/4 unrelated objects with 5-minute interval, increased to 4/4 with mod assist and Long Term Recall: WFL  Problem Solving Categories: WFL and Compare/contrast: WFL  Simple calculation WFL      Receptive Language:   Comprehension:    Questions Complex yes/no: WFL  Commands  Multistep Basic Commands: WFL    Pragmatics:    WFL    Expressive Language:  Verbal:    Automatic Speech Sentence completion: WFL  Repetition Sentences: WFL  Naming Confrontation: WFL and Divergent responsive: WFL      Motor Speech:  Patient with mild dysarthria that became more prominent in connected speech. Patient's speech was 95% intelligible across contexts.     Voice:   WFL    Visual-Spatial:  TBA    Reading:   TBA     Written Expression:   TBA    Oral Musculature Evaluation  · Oral Musculature: WFL  · Dentition: present and adequate  · Secretion Management: adequate  · Mucosal Quality: good  · Mandibular Strength and Mobility: WFL  · Oral Labial Strength and Mobility: WFL  · Lingual Strength and Mobility: WFL  · Buccal Strength and Mobility: WFL  · Volitional Cough: elicited; strong  · Volitional Swallow: timely; adequate laryngeal elevation  · Voice Prior to PO Intake: WFL    Bedside Swallow Eval:   Consistencies Assessed:  · Thin liquids x6 (via cup edge and straw)  · Puree x3 (tsp applesauce)  · Solids x3 (mirian crackers)     Oral Phase:   · Prolonged mastication with solid trials    Pharyngeal Phase:   · no overt clinical signs/symptoms of aspiration  · no overt clinical signs/symptoms of pharyngeal dysphagia    Compensatory Strategies  · None    Treatment: Patient seen for bedside swallow eval and speech/language/cognitive assessment. She was sitting up in bed during session. Patient reported that she was at baseline for swallowing/speech/language/cog. She  presented with mild dysarthria that was more evident in connected speech. Patient had a stroke in 2017 which she reported no residual effects from. During PO trials, she tolerated all consistencies with no overt signs of aspiration. She did exhibit prolonged mastication with solid trial, but demonstrated good oral clearance when swallow was initiated. SLP educated patient regarding POC and she verbalized understanding. Patient left in bed with call light within reach. Diet recs communicated to RN. ST will f/u with patient for further swallow assessment, dysarthria exercises and further assessment of reading/writing/visuospatial skills.     Assessment:     Linda Hylton is a 84 y.o. female with an SLP diagnosis of Dysphagia and Dysarthria.      Goals:   Multidisciplinary Problems     SLP Goals        Problem: SLP Goal    Goal Priority Disciplines Outcome   SLP Goal     SLP    Description:  Speech-Language Pathology Goals  Goals to be met by 2/28/19  1. Patient will tolerate MECHANICAL SOFT DIET/THIN LIQUIDS with no s/s of aspiration.  2. Patient will participate in ongoing swallow assessment for potential diet upgrade.  3. Patient will recall and utilize three clear speech techniques per session with min assist from SLP.  4. Patient will participate in ongoing assessment of reading/writing/visuospatial abilities.                     Plan:     · Patient to be seen:  4 x/week   · Plan of Care expires:  03/23/19  · Plan of Care reviewed with:  patient   · SLP Follow-Up:  Yes       Discharge recommendations:  Discharge Facility/Level of Care Needs: (pending)   Barriers to Discharge:  Level of Skilled Assistance Needed     Time Tracking:     SLP Treatment Date:   02/21/19  Speech Start Time:  0849  Speech Stop Time:  0914     Speech Total Time (min):  25 min    Billable Minutes: Eval 15  and Eval Swallow and Oral Function 10    JOHNATHON Merrill-SLP  Speech-Language Pathology  Pager: [phone]   02/21/2019

## 2019-02-21 NOTE — PLAN OF CARE
Problem: Physical Therapy Goal  Goal: Physical Therapy Goal    Goals to be met by 2/28/2019    1. Pt will perform rolling to the R and L independently.   2. Pt will perform supine to sit from both sides of the bed independently.  3. Pt will perform sit to supine independently.  4. Pt will perform sit to stand transfers independently.    5. Pt will perform bed <> chair transfers with mod I using RW.  6. Pt will perform gait x 50 feet with RW and CGA.  7. Pt will sit EOB x 20 minutes with no LOB while performing dynamic UE tasks to prepare for functional tasks in sitting.   8. Pt will stand  x 20 minutes with no LOB while performing dynamic UE tasks to prepare for functional tasks in standing.     Outcome: Ongoing (interventions implemented as appropriate)  Patient participated well in therapy.  POC and goals are appropriate.  Please refer to the progress note for functional mobility.     Modesta Richardson, SPT  2/21/2019

## 2019-02-21 NOTE — ASSESSMENT & PLAN NOTE
Stroke risk factor  LDL 96.6  Patient taking Atorvastatin 40 at home   Atorvastatin increased to 80 mg

## 2019-02-21 NOTE — ASSESSMENT & PLAN NOTE
Stroke risk factor  3/2017 - diagnosed with R cerebellar infarct  Patient state she had no significant stroke deficits

## 2019-02-21 NOTE — ASSESSMENT & PLAN NOTE
84 year old female with past medical hx of HTN, hypothyroidism, previous stroke in 2017, and aortic stenosis presented to the ED today with complains of right facial droop.  tPA offered, patient refused.     Antithrombotics for secondary stroke prevention: Antiplatelets: Aspirin: 81 mg daily  Clopidogrel: 75 mg daily    Statins for secondary stroke prevention and hyperlipidemia, if present:   Statins: Atorvastatin- 80 mg daily    Aggressive risk factor modification: HTN, HLD, Obesity     Rehab efforts: PT/OT/SLP to evaluate and treat    Diagnostics ordered/pending: CTA Head to assess vasculature , CTA Neck/Arch to assess vasculature, HgbA1C to assess blood glucose levels, TTE to assess cardiac function/status     VTE prophylaxis: Heparin 5000 units SQ every 8 hours  SCDs    BP parameters: Infarct: No intervention, SBP <220

## 2019-02-21 NOTE — ED NOTES
Pt repositioned in bed, bedding changed, pure wick in place. Pt awake and talking appropriately. Pt on cardiac monitor, bp cuff and continuous pulse oximetry.

## 2019-02-21 NOTE — ASSESSMENT & PLAN NOTE
CTA head/neck on 2/21/19 shows stable 9 mm noncalcified nodule in the right lung apex.   Evaluate patient's smoking status   Team to discuss with hospital medicine tomorrow at rounds for suggested treatment.

## 2019-02-21 NOTE — ASSESSMENT & PLAN NOTE
-Na 128.  Last recorded Na in 2018 WNL.  -Repeat Na 130 -->129  -Urine Na, urine osmol, serum osmol WNL  -250 ml NS bolus given 2/21/19  - Repeat Na pending

## 2019-02-21 NOTE — ASSESSMENT & PLAN NOTE
Stroke risk factor  LDL 96.6  Patient taking Atorvastatin 40 at home   -LDL 96.  Atorvastatin increased to 80 mg

## 2019-02-21 NOTE — ASSESSMENT & PLAN NOTE
84 year old female with past medical hx of HTN, hypothyroidism, previous stroke in 2017, and aortic stenosis presented to the ED today with complains of right facial droop. LKN this evening at 18:00. Patient denies unilateral weakness, vision changes, or aphasia. Per chart review patient was seen yesterday and diagonsed with a UTI and started on Macrobid. Patient previously diagnosed with acute infarct in the right vermis of the cerebellum March, 2017. 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 Patient currently on DAPT and Atorvastatin.     Patient with NIH 3. Discussed with VN staff about administering TPA. Patient in time window for TPA administration and exhibiting stroke symptoms consisting off right facial droop, RUE drift, and slurred speech. Risk and benefits were reviewed with patient. She opted to refuse IV TPA.    In this patient with risk factors for stroke recommending MRI brain to assess for acute infarct. However, in this patient with recent diagnosis of UTI and multiple medical complications acute encephalopathy is in the differential. If MRI positive admit for stroke work up. If negative we will sign off.     Antithrombotics for secondary stroke prevention: Antiplatelets: Aspirin: 81 mg daily  Clopidogrel: 75 mg daily    Statins for secondary stroke prevention and hyperlipidemia, if present:   Statins: Atorvastatin- 40 mg daily    Aggressive risk factor modification: HTN, HLD, Diet, Obesity     Diagnostics ordered/pending: MRI brain     VTE prophylaxis: Heparin 5000 units SQ every 8 hours    BP parameters: Infarct: No intervention, SBP <220 until acute infarct ruled out

## 2019-02-21 NOTE — PT/OT/SLP EVAL
Physical Therapy Evaluation     Patient Name: Linda Hylton  MRN: [id number]   Diagnosis: Embolic stroke involving left middle cerebral artery    Recommendations:   Discharge Recommendations:  (pending)   Discharge Equipment Recommendations: (TBD)   Barriers to Discharge: inaccessible home    Assessment:   Linda Hylton is a 84 y.o. female admitted with a medical diagnosis of Embolic stroke involving left middle cerebral artery.  Prior to admit pt was using RW for mobility and had assistance from son for all ADLs.  she now presents with the following impairments/functional limitations: weakness, impaired endurance, impaired functional mobilty, gait instability, pain.   Due to these impairments, she now requires assistance for bed mobility and transfers for safety. PT unable to perform gait assessment as pt is self-limiting due to fear of falling. Pt would benefit from continued acute skilled PT in order to address deficits and examine current level of function vs PLOF. Discharge recommendation to home with HH vs  SNF pending conversation with son.    Problem List:  weakness, impaired endurance, impaired functional mobilty, gait instability, pain  Rehab Prognosis:  good.  The patient would benefit from acute skilled PT services to address these deficits and maximize their functional independence.    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    Vertebral basilar insufficiency 12/19/2017    Vitamin D deficiency        Past Surgical History:   Procedure Laterality Date    APPENDECTOMY      [facility]    PARATHYROIDECTOMY Right 2012    THYROID SURGERY Right 2012    Partial thyroidectomy       Subjective   Patient  "comments/goals: "I am sleepy."  Pain/Comfort:  ·  Pain Rating 1: 0/10  · Pain Rating Post-Intervention 1: 0/10     Recent Vital Signs: (Last documentation)  Pulse: 71 (02/21/19 1018)  BP: (!) 144/58 (02/21/19 1018)  SpO2: 97 % (02/21/19 1018)     Living Environment:  Home: The patient lives in 2 story home with son.  PLOF:  Mod I using RW; pt states son does all the cooking and cleaning  DME owned: walker, rolling    Assistance Available: Upon discharge, patient will have assistance from her son.    Objective:   The patient had blood pressure cuff, PureWick, pulse ox (continuous), telemetry    General Precautions: aspiration  Recent Surgery: * No surgery found *    Recent Vital Signs:   Pulse: 71 (02/21/19 1018)  BP: (!) 144/58 (02/21/19 1018)  SpO2: 97 % (02/21/19 1018)    Physical Examination:   The patient was found supine in bed.     Cognitive Function:  - Oriented to: person, place, time, situation  - Level of Alertness: alert, awake, pleasant  - Follows Commands/attention: Follows multistep  commands  - Communication: clear/fluent  - Safety awareness/insight to disability: intact  Musculoskeletal System  Lower Extremities:  PROM: WFL  Strength:  Muscle Group R LE L LE Comments   Hip ext 5/5 5/5    Hip flexion  4/5 4/5       Knee flexion  5/5 5/5       Knee ext. 5/5 4*/5 *pt complains of pain with resistance   Ankle DF 5/5 5/5    Ankle PF 5/5 5/5      Integumentary System: Visible skin intact    Neuromuscular System:  · Sensation: grossly intact bilaterally  · Coordination:    Finger to thumb opposition: intact   Finger to nose: intact   Heel to shin: intact    BALANCE:  Sitting: supervision - SBA  · Time: 5 minutes    Standing: CGA  · Time: 5 minutes    FUNCTIONAL MOBILITY ASSESSMENT:  Bed Mobility: performed with HOB 30 degrees  · Rolling/Turning R: min A  · Rolling/Turning L: min A  · Supine > sit: min A  · Sit > supine: min A for LE management due to height of bed  · Scooting EOB: " supervision    Transfers:  · Sit <> stand transfer: CGA  · Pt in ED, bed height resulted in pt sliding in order for feet to reach floor.    Gait:   Gait x 2 feet; with BUE support taking 2 steps forward and backward to bed  · Patient demonstrated self-limiting behavior due to fear of falling. She had difficulty advancing LLE > RLE, decreased foot clearance bilaterally, and complaints of hip pain in L stance.  · PT facilitated: upright posture    Stairs: NT    Therapeutic Activities, Education, or Exercises:  The therapist educated the patient on the role of PT and POC.  The therapist discussed the patients current mobility status, deficits, and level of assistance with mobility and ambulation. Time was provided for active listening, discussion of health disposition, and discussion of safe discharge recommendations. Therapist answered questions to patient/familys satisfaction within scope of practice.  Patient and family are aware of patient's deficits and therapy progression. White board updated to reflect current level of assistance.    FUNCTIONAL OUTCOME MEASURES:  [facility]  Turning over in bed (including adjusting bedclothes, sheets and blankets)?: 4  Sitting down on and standing up from a chair with arms (e.g., wheelchair, bedside commode, etc.): 3  Moving from lying on back to sitting on the side of the bed?: 4  Moving to and from a bed to a chair (including a wheelchair)?: 3  Need to walk in hospital room?: 2  Climbing 3-5 steps with a railing?: 2  Basic Mobility Total Score: 18    Goals:     Multidisciplinary Problems     Physical Therapy Goals        Problem: Physical Therapy Goal    Goal Priority Disciplines Outcome Goal Variances Interventions   Physical Therapy Goal     PT, PT/OT Ongoing (interventions implemented as appropriate)     Description:    Goals to be met by 2/28/2019    1. Pt will perform rolling to the R and L independently.   2. Pt will perform supine to sit from both sides of the bed  independently.  3. Pt will perform sit to supine independently.  4. Pt will perform sit to stand transfers independently.    5. Pt will perform bed <> chair transfers with mod I using RW.  6. Pt will perform gait x 50 feet with RW and CGA.  7. Pt will sit EOB x 20 minutes with no LOB while performing dynamic UE tasks to prepare for functional tasks in sitting.   8. Pt will stand  x 20 minutes with no LOB while performing dynamic UE tasks to prepare for functional tasks in standing.                       Plan:   During this hospitalization, patient will be seen 4 x/week for gait training, therapeutic exercises, therapeutic activities, neuromuscular re-education to address impairments and functional mobility deficits.   · Plan of Care Expires: 03/21/19   Plan of Care Reviewed with: patient    This plan of care has been discussed with the patient and/or family who were involved in its development and are in agreement with the identified goals and treatment plan.     Clinical Decision Making:   Comorbidities and personal factors that affect the PT plan of care or the patient's ability to participate or progress with therapy:  · HTN  · Hypothyroidism  · Aortic stenosis      Clinical Presentation: Stable and/or uncomplicated    Level of Complexity:     Low- 39716     Time Tracking:   PT Received On:  02/21/19  PT Start Time:   0941    PT Stop Time:  0957  PT Total Time (min): 16 min      Billable Minutes: Evaluation 16    NORBERTO Vera  2/21/2019

## 2019-02-21 NOTE — ED NOTES
Bed placed in low position, side rails up x 2, call light is within reach of patient or family, explanation of care provided to family and patient, alarms set and turned on for monitor, pulse ox and IV pump, plan of care: family to bedside, observe and reassure, position of comfort, respirations even and unlabored, patient offers no complaints at this time, awaiting additional orders, will continue to monitor.

## 2019-02-21 NOTE — SUBJECTIVE & OBJECTIVE
Neurologic Chief Complaint: Right facial droop, RUE weakness and slurred speech.     Subjective:     Interval History: Na 130-->129. 250 ml NS bolus ordered. Repeat Na lab pending. CTA head/neck with recent infarcts in the left basal ganglia. 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 Echo EF 65% with mild left atrial enlargement. SLP recs for mechanical soft diet. PT/OT recs for possible rehab or SNF.     HPI, Past Medical, Family, and Social History remains the same as documented in the initial encounter.     Review of Systems   Constitutional: Negative for fever.   Respiratory: Negative for shortness of breath.    Cardiovascular: Negative for chest pain.   Neurological: Negative for weakness.     Scheduled Meds:   aspirin  325 mg Oral Daily    atorvastatin  80 mg Oral Daily    cholecalciferol (vitamin D3)  5,000 Units Oral Daily    clopidogrel  75 mg Oral Daily    ferrous sulfate  325 mg Oral Daily    gabapentin  300 mg Oral Nightly    heparin (porcine)  5,000 Units Subcutaneous Q8H    sodium chloride 0.9%  3 mL Intravenous Q8H     Continuous Infusions:   sodium chloride 0.9%       PRN Meds:acetaminophen, hydrALAZINE, ondansetron, ondansetron, sodium chloride 0.9%    Objective:     Vital Signs (Most Recent):  Temp: 97 °F (36.1 °C) (02/21/19 1443)  Pulse: 75 (02/21/19 1600)  Resp: 14 (02/21/19 1432)  BP: (!) 162/67 (02/21/19 1447)  SpO2: 95 % (02/21/19 1447)       Vital Signs Range (Last 24H):  Temp:  [96.9 °F (36.1 °C)-98.2 °F (36.8 °C)]   Pulse:  [63-93]   Resp:  [11-25]   BP: ()/()   SpO2:  [92 %-100 %]        Physical Exam   Constitutional: She appears well-developed and well-nourished.   Eyes: Pupils are equal, round, and reactive to light.   Cardiovascular: Normal rate.   Pulmonary/Chest: Effort normal.   Neurological:   See below   Nursing note and vitals reviewed.      Neurological Exam:   LOC: alert  Attention Span: Good   Language:  No aphasia  Articulation: No dysarthria  Orientation: Person, Place, Time   Visual Fields: Full  EOM (CN III, IV, VI): Full/intact  Facial Sensation (CN V): Normal  Facial Movement (CN VII): Symmetric facial expression    Motor: Arm left  Normal 5/5  Leg left  Normal 5/5  Arm right  Normal 5/5  Leg right Normal 5/5  Sensation: Intact to light touch, temperature and vibration    Laboratory:  CMP:   Recent Labs   Lab 02/21/19  0507   CALCIUM 9.1   ALBUMIN 3.1*   PROT 6.4   *   K 4.6   CO2 19*      BUN 24*   CREATININE 0.8   ALKPHOS 54*   ALT 19   AST 28   BILITOT 0.2     CBC:   Recent Labs   Lab 02/21/19  0507 02/21/19  0848   WBC 10.67  --    RBC 2.27*  --    HGB 6.5* 8.0*   HCT 21.4* 25.3*     --    MCV 94  --    MCH 28.6  --    MCHC 30.4*  --      Lipid Panel:   Recent Labs   Lab 02/20/19 2055   CHOL 177   LDLCALC 96.6   HDL 30*   TRIG 252*     Coagulation:   Recent Labs   Lab 02/20/19 2244   INR 0.9   APTT 24.0     Hgb A1C:   Recent Labs   Lab 02/20/19 2244   HGBA1C 5.3     TSH:   Recent Labs   Lab 02/20/19 2055   TSH 2.994       Diagnostic Results     Brain Imaging   CT head 2/20/19  No acute abnormality noted     MRI 2/21/19  Small acute infarcts in the left basal ganglia.   Changes of chronic small vessel ischemic disease and cerebral volume loss.    Vessel Imaging   CTA 2/20/19  Recent infarcts in the left basal ganglia.    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    Cardiac Imaging   TTE 2/21/19  · Concentric left ventricular remodeling.  · Normal left ventricular systolic function. The estimated ejection fraction is 65%  · Grade I (mild) left ventricular  diastolic dysfunction consistent with impaired relaxation.  · Normal left atrial pressure.  · Mild left atrial enlargement.  · Mild aortic regurgitation.  · Normal right ventricular systolic function.  · Mild aortic valve stenosis.  · Aortic valve area is 2.18 cm2; peak velocity is 2.39 m/s; mean gradient is 14.14 mmHg.

## 2019-02-21 NOTE — ASSESSMENT & PLAN NOTE
-Area of cytotoxic cerebral edema identified when reviewing brain imaging in the territory of the left middle cerebral artery. There is no mass effect associated with it. We will continue to monitor the patients clinical exam for any worsening of symptoms which may indicate expansion of the stroke or the area of the edema resulting in the clinical change. The pattern is suggestive of embolic etiology.

## 2019-02-22 ENCOUNTER — TELEPHONE (OUTPATIENT)
Dept: URGENT CARE | Facility: CLINIC | Age: 84
End: 2019-02-22

## 2019-02-22 PROBLEM — I63.312 THROMBOTIC STROKE INVOLVING LEFT MIDDLE CEREBRAL ARTERY: Status: ACTIVE | Noted: 2019-02-20

## 2019-02-22 PROBLEM — D62 ACUTE BLOOD LOSS ANEMIA: Status: ACTIVE | Noted: 2019-02-22

## 2019-02-22 PROBLEM — N30.00 ACUTE CYSTITIS WITHOUT HEMATURIA: Status: RESOLVED | Noted: 2017-12-28 | Resolved: 2019-02-22

## 2019-02-22 LAB
ABO + RH BLD: NORMAL
ALBUMIN SERPL BCP-MCNC: 3 G/DL
ALP SERPL-CCNC: 53 U/L
ALT SERPL W/O P-5'-P-CCNC: 20 U/L
ANION GAP SERPL CALC-SCNC: 9 MMOL/L
AST SERPL-CCNC: 23 U/L
BASOPHILS # BLD AUTO: 0.07 K/UL
BASOPHILS # BLD AUTO: 0.09 K/UL
BASOPHILS NFR BLD: 0.6 %
BASOPHILS NFR BLD: 0.9 %
BILIRUB SERPL-MCNC: 0.3 MG/DL
BLD GP AB SCN CELLS X3 SERPL QL: NORMAL
BLD PROD TYP BPU: NORMAL
BLOOD UNIT EXPIRATION DATE: NORMAL
BLOOD UNIT TYPE CODE: 5100
BLOOD UNIT TYPE: NORMAL
BUN SERPL-MCNC: 21 MG/DL
CALCIUM SERPL-MCNC: 8.6 MG/DL
CHLORIDE SERPL-SCNC: 101 MMOL/L
CO2 SERPL-SCNC: 21 MMOL/L
CODING SYSTEM: NORMAL
CREAT SERPL-MCNC: 0.9 MG/DL
DIFFERENTIAL METHOD: ABNORMAL
DIFFERENTIAL METHOD: ABNORMAL
DISPENSE STATUS: NORMAL
EOSINOPHIL # BLD AUTO: 0.2 K/UL
EOSINOPHIL # BLD AUTO: 0.4 K/UL
EOSINOPHIL NFR BLD: 1.5 %
EOSINOPHIL NFR BLD: 3.5 %
ERYTHROCYTE [DISTWIDTH] IN BLOOD BY AUTOMATED COUNT: 15.1 %
ERYTHROCYTE [DISTWIDTH] IN BLOOD BY AUTOMATED COUNT: 16 %
EST. GFR  (AFRICAN AMERICAN): >60 ML/MIN/1.73 M^2
EST. GFR  (NON AFRICAN AMERICAN): 58.9 ML/MIN/1.73 M^2
GLUCOSE SERPL-MCNC: 99 MG/DL
HCT VFR BLD AUTO: 21.5 %
HCT VFR BLD AUTO: 26.5 %
HGB BLD-MCNC: 6.5 G/DL
HGB BLD-MCNC: 8.7 G/DL
IMM GRANULOCYTES # BLD AUTO: 0.26 K/UL
IMM GRANULOCYTES # BLD AUTO: 0.48 K/UL
IMM GRANULOCYTES NFR BLD AUTO: 2.6 %
IMM GRANULOCYTES NFR BLD AUTO: 4.4 %
LYMPHOCYTES # BLD AUTO: 1 K/UL
LYMPHOCYTES # BLD AUTO: 1.8 K/UL
LYMPHOCYTES NFR BLD: 17.4 %
LYMPHOCYTES NFR BLD: 9.2 %
MAGNESIUM SERPL-MCNC: 1.8 MG/DL
MCH RBC QN AUTO: 28.6 PG
MCH RBC QN AUTO: 28.6 PG
MCHC RBC AUTO-ENTMCNC: 30.2 G/DL
MCHC RBC AUTO-ENTMCNC: 32.8 G/DL
MCV RBC AUTO: 87 FL
MCV RBC AUTO: 95 FL
MONOCYTES # BLD AUTO: 0.9 K/UL
MONOCYTES # BLD AUTO: 1 K/UL
MONOCYTES NFR BLD: 8 %
MONOCYTES NFR BLD: 9.7 %
NEUTROPHILS # BLD AUTO: 6.6 K/UL
NEUTROPHILS # BLD AUTO: 8.3 K/UL
NEUTROPHILS NFR BLD: 65.9 %
NEUTROPHILS NFR BLD: 76.3 %
NRBC BLD-RTO: 0 /100 WBC
NRBC BLD-RTO: 0 /100 WBC
PHOSPHATE SERPL-MCNC: 3.9 MG/DL
PLATELET # BLD AUTO: 231 K/UL
PLATELET # BLD AUTO: 272 K/UL
PMV BLD AUTO: 11 FL
PMV BLD AUTO: 9.7 FL
POTASSIUM SERPL-SCNC: 4.4 MMOL/L
PROT SERPL-MCNC: 5.9 G/DL
RBC # BLD AUTO: 2.27 M/UL
RBC # BLD AUTO: 3.04 M/UL
SODIUM SERPL-SCNC: 131 MMOL/L
TRANS ERYTHROCYTES VOL PATIENT: NORMAL ML
WBC # BLD AUTO: 10.03 K/UL
WBC # BLD AUTO: 10.84 K/UL

## 2019-02-22 PROCEDURE — 36430 TRANSFUSION BLD/BLD COMPNT: CPT

## 2019-02-22 PROCEDURE — P9021 RED BLOOD CELLS UNIT: HCPCS

## 2019-02-22 PROCEDURE — 85025 COMPLETE CBC W/AUTO DIFF WBC: CPT | Mod: 91

## 2019-02-22 PROCEDURE — 25000003 PHARM REV CODE 250: Performed by: PHYSICIAN ASSISTANT

## 2019-02-22 PROCEDURE — 36415 COLL VENOUS BLD VENIPUNCTURE: CPT

## 2019-02-22 PROCEDURE — 80053 COMPREHEN METABOLIC PANEL: CPT

## 2019-02-22 PROCEDURE — 99233 SBSQ HOSP IP/OBS HIGH 50: CPT | Mod: ,,, | Performed by: PSYCHIATRY & NEUROLOGY

## 2019-02-22 PROCEDURE — A4216 STERILE WATER/SALINE, 10 ML: HCPCS | Performed by: NURSE PRACTITIONER

## 2019-02-22 PROCEDURE — 97165 OT EVAL LOW COMPLEX 30 MIN: CPT

## 2019-02-22 PROCEDURE — 86920 COMPATIBILITY TEST SPIN: CPT

## 2019-02-22 PROCEDURE — 20600001 HC STEP DOWN PRIVATE ROOM

## 2019-02-22 PROCEDURE — 25000003 PHARM REV CODE 250: Performed by: NURSE PRACTITIONER

## 2019-02-22 PROCEDURE — 84100 ASSAY OF PHOSPHORUS: CPT

## 2019-02-22 PROCEDURE — 83735 ASSAY OF MAGNESIUM: CPT

## 2019-02-22 PROCEDURE — 86850 RBC ANTIBODY SCREEN: CPT

## 2019-02-22 PROCEDURE — 92507 TX SP LANG VOICE COMM INDIV: CPT

## 2019-02-22 PROCEDURE — 99233 PR SUBSEQUENT HOSPITAL CARE,LEVL III: ICD-10-PCS | Mod: ,,, | Performed by: PSYCHIATRY & NEUROLOGY

## 2019-02-22 PROCEDURE — 97535 SELF CARE MNGMENT TRAINING: CPT

## 2019-02-22 PROCEDURE — 63600175 PHARM REV CODE 636 W HCPCS: Performed by: NURSE PRACTITIONER

## 2019-02-22 PROCEDURE — 82272 OCCULT BLD FECES 1-3 TESTS: CPT

## 2019-02-22 RX ORDER — HYDROCODONE BITARTRATE AND ACETAMINOPHEN 500; 5 MG/1; MG/1
TABLET ORAL
Status: DISCONTINUED | OUTPATIENT
Start: 2019-02-22 | End: 2019-03-04 | Stop reason: HOSPADM

## 2019-02-22 RX ORDER — PANTOPRAZOLE SODIUM 40 MG/1
40 TABLET, DELAYED RELEASE ORAL DAILY
Status: DISCONTINUED | OUTPATIENT
Start: 2019-02-22 | End: 2019-03-04 | Stop reason: HOSPADM

## 2019-02-22 RX ADMIN — Medication 3 ML: at 05:02

## 2019-02-22 RX ADMIN — FERROUS SULFATE TAB EC 325 MG (65 MG FE EQUIVALENT) 325 MG: 325 (65 FE) TABLET DELAYED RESPONSE at 08:02

## 2019-02-22 RX ADMIN — PANTOPRAZOLE SODIUM 40 MG: 40 TABLET, DELAYED RELEASE ORAL at 11:02

## 2019-02-22 RX ADMIN — HEPARIN SODIUM 5000 UNITS: 5000 INJECTION, SOLUTION INTRAVENOUS; SUBCUTANEOUS at 05:02

## 2019-02-22 RX ADMIN — GABAPENTIN 300 MG: 300 CAPSULE ORAL at 10:02

## 2019-02-22 RX ADMIN — ATORVASTATIN CALCIUM 80 MG: 20 TABLET, FILM COATED ORAL at 08:02

## 2019-02-22 RX ADMIN — Medication 3 ML: at 10:02

## 2019-02-22 RX ADMIN — Medication 3 ML: at 02:02

## 2019-02-22 RX ADMIN — HEPARIN SODIUM 5000 UNITS: 5000 INJECTION, SOLUTION INTRAVENOUS; SUBCUTANEOUS at 02:02

## 2019-02-22 RX ADMIN — ASPIRIN 325 MG: 325 TABLET, DELAYED RELEASE ORAL at 08:02

## 2019-02-22 RX ADMIN — CHOLECALCIFEROL TAB 125 MCG (5000 UNIT) 5000 UNITS: 125 TAB at 08:02

## 2019-02-22 RX ADMIN — CLOPIDOGREL 75 MG: 75 TABLET, FILM COATED ORAL at 08:02

## 2019-02-22 RX ADMIN — ACETAMINOPHEN 650 MG: 325 TABLET ORAL at 05:02

## 2019-02-22 NOTE — ASSESSMENT & PLAN NOTE
-Na 128 > 130 > 129 > 131  Last recorded Na in 2018 WNL.  -Urine Na, urine osmol, serum osmol WNL  -250 ml NS bolus given 2/21/19. Repeat Na 131.  Discussed with Hospital Medicine- Hyponatremia possibly 2/2 hypovolemia in the setting of pt's acute Hb drop. Will monitor Na for improvement in the setting of blood transfusion on 2/22.   Daily BMP

## 2019-02-22 NOTE — PROGRESS NOTES
[facility]  Vascular Neurology  Comprehensive Stroke Center  Progress Note    Assessment/Plan:     * Thrombotic stroke involving left middle cerebral artery    84 year old female with past medical hx of HTN, hypothyroidism, prior R vermis stroke in 3/2017, and aortic stenosis presented to the ED with complains of right facial droop. LKN 18:00, within window for tPA, however patient refused. Per chart review, patient was seen day PTA and diagonsed with a UTI and started on Macrobid. Repeat UA here with no evidence of infection so abx d/c'd.    MRI Brain is with small acute infarcts in the L basal ganglia. 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 Stroke etiology suspected likely small vessel disease at this time. Treating with DAPT.    Stroke team member discussed level of supervision at home with pt's son by bedside on 2/21. Patient lives with son who works overnight, and he typically takes care of patient's needs during the day but leaves patient at home alone overnight when he works.   At this time, we feel that SNF is the more safe and appropriate disposition for this pt who is now with decreased mobility from her previous baseline prior to admission.      Antithrombotics for secondary stroke prevention: Antiplatelets: Aspirin: 325 mg daily  Clopidogrel: 75 mg daily  Statins for secondary stroke prevention and hyperlipidemia, if present:   Statins: Atorvastatin- 80 mg daily  Aggressive risk factor modification: HTN, HLD, Obesity, prior stroke  Rehab efforts: PT/OT/SLP to evaluate and treat; Dispo SNF  Diagnostics ordered/pending: FOBT  VTE prophylaxis: SQH HELD acutely due to anemia; Place SCDs  BP parameters: Infarct: No intervention, SBP <220     Acute blood loss anemia    Hb 8 > 8 > 6.5  Pt appears pale and c/o light-headedness but is neurologically  stable.   Ordered type & screen and 1U pRBCs, consent obtained on 2/22/19  SQH held for now; SCDs ordered  Will continue to monitor and repeat CBC following transfusion.   Started PPI. FOBT ordered (needs to be collected)     Hyponatremia    -Na 128 > 130 > 129 > 131  Last recorded Na in 2018 WNL.  -Urine Na, urine osmol, serum osmol WNL  -250 ml NS bolus given 2/21/19. Repeat Na 131.  Discussed with Hospital Medicine- Hyponatremia possibly 2/2 hypovolemia in the setting of pt's acute Hb drop. Will monitor Na for improvement in the setting of blood transfusion on 2/22.   Daily BMP     Essential hypertension    Stroke risk factor  SBP <220  -Continuing to hold home meds for now  Will monitor     Cytotoxic cerebral edema    -Area of cytotoxic cerebral edema identified when reviewing brain imaging in the territory of the left middle cerebral artery. There is no mass effect associated with it. We will continue to monitor the patients clinical exam for any worsening of symptoms which may indicate expansion of the stroke or the area of the edema resulting in the clinical change. The pattern is suggestive of a small vessel etiology.     Facial droop    Initial presentation, due to stroke  Continue PT/OT     History of stroke    Stroke risk factor  3/2017 - diagnosed with R cerebellar infarct. Was on DAPT and Lipitor 40 outpatient.  Patient state she had no significant stroke deficits      Mixed hyperlipidemia    Stroke risk factor  LDL 96.6  Patient taking Atorvastatin 40 at home; Increased dose to 80 mg Daily     Nodule of apex of right lung    CTA head/neck on 2/21/19 shows stable 9 mm noncalcified nodule in the right lung apex.   Per family over the phone, pt has no tobacco use history.  Discussed with Hospital Medicine - No acute intervention needed at this time. PCP to monitor and consider CT Chest in 6 months.          02/20/2019 MRI brain positive for stroke.  Admitted to Vascular Neurology.  02/21/2019- Na  130-->129. 250 ml NS bolus ordered. Repeat Na lab pending. CTA head/neck with recent infarcts in the left basal ganglia. 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 Echo EF 65% with mild left atrial enlargement. SLP recs for mechanical soft diet. PT/OT recs for possible rehab or SNF.   2/22: Hb 6.5; pt appears pale, c/o light-headedness but is neurologically stable. Ordered type & screen and 1U pRBCs, consent obtained. Will repeat CBC following transfusion. Started PPI and FOBT ordered (needs to be collected.) Monitoring Na.    STROKE DOCUMENTATION   Acute Stroke Times   Last Known Normal Date: 02/20/19  Last Known Normal Time: 1800  Symptom Onset Date: 02/20/19  Symptom Onset Time: 1800  Stroke Team Called Date: 02/20/19  Stroke Team Called Time: 2050  Stroke Team Arrival Date: 02/20/19  Stroke Team Arrival Time: 2052  CT Interpretation Time: 2100  Decision to Treat Time for Alteplase: 2110(Patient refused )  Decision to Treat Time for IR: (NA)    NIH Scale:  1a. Level of Consciousness: 0-->Alert, keenly responsive  1b. LOC Questions: 0-->Answers both questions correctly  1c. LOC Commands: 0-->Performs both tasks correctly  2. Best Gaze: 0-->Normal  3. Visual: 0-->No visual loss  4. Facial Palsy: 1-->Minor paralysis (flattened nasolabial fold, asymmetry on smiling)  5a. Motor Arm, Left: 0-->No drift, limb holds 90 (or 45) degrees for full 10 secs  5b. Motor Arm, Right: 0-->No drift, limb holds 90 (or 45) degrees for full 10 secs  6a. Motor Leg, Left: 0-->No drift, leg holds 30 degree position for full 5 secs  6b. Motor Leg, Right: 0-->No drift, leg holds 30 degree position for full 5 secs  7. Limb Ataxia: 0-->Absent  8. Sensory: 0-->Normal, no sensory loss  9. Best Language: 0-->No aphasia, normal  10. Dysarthria: 0-->Normal  11. Extinction and Inattention (formerly Neglect): 0-->No abnormality  Total (NIH Stroke Scale): 1       Modified [place] Score: 1  [place]  Coma Scale:    ABCD2 Score:    [id number] Score:   HAS -BLED Score:   ICH Score:   Hunt & De Classification:      Hemorrhagic change of an Ischemic Stroke: Does this patient have an ischemic stroke with hemorrhagic changes? No     Neurologic Chief Complaint: Right facial droop, RUE weakness and slurred speech.     Subjective:     Interval History: NAEON. Hb 6.5; pt appears pale, c/o light-headedness but is neurologically stable. Ordered type & screen and 1U pRBCs, consent obtained. Will repeat CBC following transfusion. Started PPI and FOBT ordered (needs to be collected.) Monitoring Na.    HPI, Past Medical, Family, and Social History remains the same as documented in the initial encounter.     Review of Systems   Constitutional: Negative for fatigue and fever.   Skin: Positive for pallor. Negative for wound.   Neurological: Positive for facial asymmetry and light-headedness. Negative for speech difficulty and weakness.   Psychiatric/Behavioral: Negative for agitation and confusion.     Scheduled Meds:   aspirin  325 mg Oral Daily    atorvastatin  80 mg Oral Daily    cholecalciferol (vitamin D3)  5,000 Units Oral Daily    clopidogrel  75 mg Oral Daily    ferrous sulfate  325 mg Oral Daily    gabapentin  300 mg Oral Nightly    heparin (porcine)  5,000 Units Subcutaneous Q8H    pantoprazole  40 mg Oral Daily    sodium chloride 0.9%  3 mL Intravenous Q8H     Continuous Infusions:   sodium chloride 0.9%       PRN Meds:sodium chloride, acetaminophen, hydrALAZINE, ondansetron, ondansetron, sodium chloride 0.9%    Objective:     Vital Signs (Most Recent):  Temp: 97.8 °F (36.6 °C) (02/22/19 1420)  Pulse: 86 (02/22/19 1420)  Resp: 17 (02/22/19 1420)  BP: (!) 165/73 (02/22/19 1420)  SpO2: 96 % (02/22/19 1420)  BP Location: Left arm    Vital Signs Range (Last 24H):  Temp:  [97.3 °F (36.3 °C)-98.6 °F (37 °C)]   Pulse:  []   Resp:  []   BP: (123-168)/(58-73)   SpO2:  [92 %-99 %]   BP Location: Left  arm    Physical Exam   Constitutional: She appears well-developed and well-nourished. No distress.   HENT:   Head: Normocephalic and atraumatic.   Eyes: Conjunctivae and EOM are normal.   Cardiovascular: Normal rate.   Pulmonary/Chest: Effort normal. No respiratory distress.   Musculoskeletal: She exhibits no edema or deformity.   Neurological: She is alert. No sensory deficit. She exhibits normal muscle tone. Coordination normal.   See below   Skin: Skin is warm and dry. There is pallor.   Psychiatric: She has a normal mood and affect. Her behavior is normal.   Nursing note and vitals reviewed.      Neurological Exam:   LOC: alert  Attention Span: Good   Language: No aphasia  Articulation: No dysarthria  Orientation: Person, Place, Time   Visual Fields: Full  EOM (CN III, IV, VI): Full/intact  Facial Movement (CN VII): Lower facial weakness on the Right  Motor: Arm left  Normal 5/5  Leg left  Normal 5/5  Arm right  Normal 5/5  Leg right Normal 5/5  Cebellar: No evidence of appendicular or axial ataxia  Sensation: Intact to light touch, temperature and vibration  Tone: Normal tone throughout    Laboratory:  CMP:   Recent Labs   Lab 02/22/19  0508   CALCIUM 8.6*   ALBUMIN 3.0*   PROT 5.9*   *   K 4.4   CO2 21*      BUN 21   CREATININE 0.9   ALKPHOS 53*   ALT 20   AST 23   BILITOT 0.3     CBC:   Recent Labs   Lab 02/22/19  0508   WBC 10.03   RBC 2.27*   HGB 6.5*   HCT 21.5*      MCV 95   MCH 28.6   MCHC 30.2*     Lipid Panel:   Recent Labs   Lab 02/20/19 2055   CHOL 177   LDLCALC 96.6   HDL 30*   TRIG 252*     Coagulation:   Recent Labs   Lab 02/20/19  2244   INR 0.9   APTT 24.0     Hgb A1C:   Recent Labs   Lab 02/20/19 2244   HGBA1C 5.3     TSH:   Recent Labs   Lab 02/20/19 2055   TSH 2.994       Diagnostic Results     Brain Imaging     CT head 2/20/19  No acute abnormality noted     MRI 2/21/19    Small acute infarcts in the left basal ganglia.   Changes of chronic small vessel ischemic disease  and cerebral volume loss.      Vessel Imaging     CTA Head/Neck 2/20/19  Recent infarcts in the left basal ganglia.  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      Cardiac Imaging     TTE 2/21/19  · Concentric left ventricular remodeling.  · Normal left ventricular systolic function. The estimated ejection fraction is 65%  · Grade I (mild) left ventricular diastolic dysfunction consistent with impaired relaxation.  · Normal left atrial pressure.  · Mild left atrial enlargement.  · Mild aortic regurgitation.  · Normal right ventricular systolic function.  · Mild aortic valve stenosis.  · Aortic valve area is 2.18 cm2; peak velocity is 2.39 m/s; mean gradient is 14.14 mmHg.      Melissa Butler PA-C  Comprehensive Stroke Center  Department of Vascular Neurology   [facility]

## 2019-02-22 NOTE — TELEPHONE ENCOUNTER
Pt was called to give test results to but didn't answer. Voicemail was not set up and a message couldn't be left. Pt is currently admitted to the hospital.  ----- Message from Gordo Stapleton MD sent at 2/21/2019  4:32 PM CST -----  Call patient and let know urine culture negative

## 2019-02-22 NOTE — PLAN OF CARE
02/22/19 1416   Post-Acute Status   Post-Acute Authorization Placement   Post-Acute Placement Status Patient List Provided     1416-SNF list given and explained to patient. Patient states she does not wish to go to rehab, she states she already gets therapy at home.  She states she will talk it over with her family over the weekend.     1602-CM received call from patient's son Tavo. Explained the SNF recommendations and the process. He states that he will talk it over with the patient and try to talk her into going. Notified him that the list is in the room and CM will followup with him on Monday.

## 2019-02-22 NOTE — PLAN OF CARE
Problem: Occupational Therapy Goal  Goal: Occupational Therapy Goal  Goals to be met by: 3/1/19     Patient will increase functional independence with ADLs by performing:    Grooming while seated with Minimal Assistance.  Toileting from bedside commode with Supervision for hygiene and clothing management.   Supine to sit with Modified [place].  Stand pivot transfers with Modified [place].  Toilet transfer to bedside commode with Stand-by Assistance.  Upper extremity exercise program 3 sets x10 reps per handout, with independence.    Outcome: Ongoing (interventions implemented as appropriate)  OT Eval and POC set 2/22/19.

## 2019-02-22 NOTE — PT/OT/SLP PROGRESS
Physical Therapy      Patient Name:  Linda Hylton   MRN:  [id number]    Patient not seen today secondary to pt getting a blood transfusion and refused gait with PT. OT evaluated pt today, please refer to OT note for discharge recommendation.    Mindy Richardson, SPT   2/22/2019

## 2019-02-22 NOTE — ASSESSMENT & PLAN NOTE
Stroke risk factor  3/2017 - diagnosed with R cerebellar infarct. Was on DAPT and Lipitor 40 outpatient.  Patient state she had no significant stroke deficits

## 2019-02-22 NOTE — PLAN OF CARE
Problem: Occupational Therapy Goal  Goal: Occupational Therapy Goal  Goals to be met by: 3/1/19     Patient will increase functional independence with ADLs by performing:    Grooming while seated with Minimal Assistance.  Toileting from bedside commode with Supervision for hygiene and clothing management.   Supine to sit with Modified [place].  Stand pivot transfers with Modified [place].  Toilet transfer to bedside commode with Stand-by Assistance.  Upper extremity exercise program 3 sets x10 reps per handout, with independence.  Pt will ambulate functional, household distance with CGA and AE as needed.       Outcome: Ongoing (interventions implemented as appropriate)  OT Eval and POC established.

## 2019-02-22 NOTE — TELEPHONE ENCOUNTER
----- Message from Gordo Stapleton MD sent at 2/21/2019  4:32 PM CST -----  Call patient and let know urine culture negative

## 2019-02-22 NOTE — CONSULTS
Food & Nutrition  Education    Diet Education: Cardiac  Time Spent:  10 min  Learners:Pt      Nutrition Education provided with handouts: Cardiac TLC      Comments: pt difficult to arouse and fell asleep multiple times during visit. ABHILASH learning. Left edu handout at bedside. With review and assess pt knowledge at next f/u visit.  All questions and concerns answered. Dietitian's contact information provided.     LAB:   Recent Labs   Lab 02/20/19  2244  02/21/19  1519   CPK 94  --   --    CPKMB 2.8  --   --    TROPONINI 0.018   < > 0.012   MB 3.0  --   --     < > = values in this interval not displayed.     Lab Results   Component Value Date    CHOL 177 02/20/2019    CHOL 195 03/09/2018    CHOL 140 03/24/2017     Lab Results   Component Value Date    HDL 30 (L) 02/20/2019    HDL 28 (L) 03/09/2018    HDL 29 (L) 03/24/2017     Lab Results   Component Value Date    LDLCALC 96.6 02/20/2019    LDLCALC 115.8 03/09/2018    LDLCALC 85.2 03/24/2017     Lab Results   Component Value Date    TRIG 252 (H) 02/20/2019    TRIG 256 (H) 03/09/2018    TRIG 129 03/24/2017     Lab Results   Component Value Date    CHOLHDL 16.9 (L) 02/20/2019    CHOLHDL 14.4 (L) 03/09/2018    CHOLHDL 20.7 03/24/2017     CMP  Sodium   Date Value Ref Range Status   02/22/2019 131 (L) 136 - 145 mmol/L Final         Follow-Up: 1/wk    Please Re-consult as needed        Thanks!

## 2019-02-22 NOTE — PT/OT/SLP PROGRESS
"Speech Language Pathology Treatment    Patient Name:  Linda Hylton   MRN:  [id number]  Admitting Diagnosis: Embolic stroke involving left middle cerebral artery    Recommendations:                 General Recommendations:    Diet recommendations:  Regular, Liquid Diet Level: Thin   Aspiration Precautions: Standard aspiration precautions   General Precautions: Standard, aspiration  Communication strategies:  none    Subjective     "I dont want to go to a therapy place" per pt  Patient goals: home  Pain/Comfort:  · Pain Rating 1: 0/10  · Pain Rating Post-Intervention 1: 0/10    Objective:     Has the patient been evaluated by SLP for swallowing?   Yes  Keep patient NPO? No   Current Respiratory Status: room air      Pt. Seen at bedside and was alert/oriented to time and place with good recall of recent events.  She orally read sentences at midline with 100% accuracy and legibly wrote name/address and makayla graphic representation of a clock with out difficulty.  She responded to functional math and time calculations with 100% accuracy and responded to mental manipulation tasks with 100% accuracy.  She responded to category exclusion tasks in field of 5 with 100% accuracy.  Pt. Was observed swallowing a cup of water and eating a cracker with oral and pharyngeal phases of swallow wfl.   Education provided to pt. Re poc.        Assessment:     Linda Hylton is a 84 y.o. female with speech language and cognitive skills wfl.  Oral and pharyngeal phases of swallow were wfl.      Goals:   Multidisciplinary Problems     SLP Goals        Problem: SLP Goal    Goal Priority Disciplines Outcome   SLP Goal     SLP Ongoing (interventions implemented as appropriate)   Description:  Speech-Language Pathology Goals  Goals to be met by 2/28/19  1. Patient will tolerate MECHANICAL SOFT DIET/THIN LIQUIDS with no s/s of aspiration./met  2. Patient will participate in ongoing swallow assessment for potential diet upgrade./met  3. Patient " will recall and utilize three clear speech techniques per session with min assist from SLP./met  4. Patient will participate in ongoing assessment of reading/writing/visuospatial abilities.  /met                    Plan:     · Patient to be seen:  4 x/week   · Plan of Care expires:  03/23/19  · Plan of Care reviewed with:  patient   · SLP Follow-Up:  No       Discharge recommendations:  (home)     Time Tracking:     SLP Treatment Date:   02/22/19  Speech Start Time:  0830  Speech Stop Time:  0848     Speech Total Time (min):  18 min    Billable Minutes: Speech Therapy Individual 10 and Treatment Swallowing Dysfunction 8    Claudine Mensah MA, CCC-SLP  02/22/2019

## 2019-02-22 NOTE — ASSESSMENT & PLAN NOTE
CTA head/neck on 2/21/19 shows stable 9 mm noncalcified nodule in the right lung apex.   Per family over the phone, pt has no tobacco use history.  Discussed with Hospital Medicine - No acute intervention needed at this time. PCP to monitor and consider CT Chest in 6 months.

## 2019-02-22 NOTE — PLAN OF CARE
6:26 PM - Spoke with family at bedside. Patient lives with son who works overnight. Son typically takes care of patient's needs during the day and leaves patient home alone at night for work. Family states patient receives home health assistance for some ADLs such as bathing and cooking. Ambulates with rolling walker at home. Updated family on patient's POC. Notified family of nodule on right lung. Family states patient was never a smoker. Notified family team will discuss with hospital medicine during rounds. Patient's sons Tavo and Jean phone numbers provided in chart. All questions and concerns addressed. Patient and family verbalized understanding.       MAURY Jha-ARAM  [facility] - [id number]  Department of Vascular Neurology   [facility]

## 2019-02-22 NOTE — ASSESSMENT & PLAN NOTE
Hb 8 > 8 > 6.5  Pt appears pale and c/o light-headedness but is neurologically stable.   Ordered type & screen and 1U pRBCs, consent obtained on 2/22/19  [facility] held for now; SCDs ordered  Will continue to monitor and repeat CBC following transfusion.   Started PPI. FOBT ordered (needs to be collected)

## 2019-02-22 NOTE — PLAN OF CARE
Problem: Adult Inpatient Plan of Care  Goal: Plan of Care Review  Plan of care reviewed with pt. Pt voiced understanding. Pt AAOx4. Pt denies any c/o pain/discomfort during the shift. No apparent distress noted. Fall precautions maintained. Bed in lowest position, and locked. Call light within reach and advised to call for assistance. Side rails x 2 and slip resistant socks on at this time. One unit of blood given, VSS, pt tolerated well. Will continue to monitor.

## 2019-02-22 NOTE — SUBJECTIVE & OBJECTIVE
Neurologic Chief Complaint: Right facial droop, RUE weakness and slurred speech.     Subjective:     Interval History: NAEON. Hb 6.5; pt appears pale, c/o light-headedness but is neurologically stable. Ordered type & screen and 1U pRBCs, consent obtained. Will repeat CBC following transfusion. Started PPI and FOBT ordered (needs to be collected.) Monitoring Na.    HPI, Past Medical, Family, and Social History remains the same as documented in the initial encounter.     Review of Systems   Constitutional: Negative for fatigue and fever.   Skin: Positive for pallor. Negative for wound.   Neurological: Positive for facial asymmetry and light-headedness. Negative for speech difficulty and weakness.   Psychiatric/Behavioral: Negative for agitation and confusion.     Scheduled Meds:   aspirin  325 mg Oral Daily    atorvastatin  80 mg Oral Daily    cholecalciferol (vitamin D3)  5,000 Units Oral Daily    clopidogrel  75 mg Oral Daily    ferrous sulfate  325 mg Oral Daily    gabapentin  300 mg Oral Nightly    heparin (porcine)  5,000 Units Subcutaneous Q8H    pantoprazole  40 mg Oral Daily    sodium chloride 0.9%  3 mL Intravenous Q8H     Continuous Infusions:   sodium chloride 0.9%       PRN Meds:sodium chloride, acetaminophen, hydrALAZINE, ondansetron, ondansetron, sodium chloride 0.9%    Objective:     Vital Signs (Most Recent):  Temp: 97.8 °F (36.6 °C) (02/22/19 1420)  Pulse: 86 (02/22/19 1420)  Resp: 17 (02/22/19 1420)  BP: (!) 165/73 (02/22/19 1420)  SpO2: 96 % (02/22/19 1420)  BP Location: Left arm    Vital Signs Range (Last 24H):  Temp:  [97.3 °F (36.3 °C)-98.6 °F (37 °C)]   Pulse:  []   Resp:  []   BP: (123-168)/(58-73)   SpO2:  [92 %-99 %]   BP Location: Left arm    Physical Exam   Constitutional: She appears well-developed and well-nourished. No distress.   HENT:   Head: Normocephalic and atraumatic.   Eyes: Conjunctivae and EOM are normal.   Cardiovascular: Normal rate.   Pulmonary/Chest:  Effort normal. No respiratory distress.   Musculoskeletal: She exhibits no edema or deformity.   Neurological: She is alert. No sensory deficit. She exhibits normal muscle tone. Coordination normal.   See below   Skin: Skin is warm and dry. There is pallor.   Psychiatric: She has a normal mood and affect. Her behavior is normal.   Nursing note and vitals reviewed.      Neurological Exam:   LOC: alert  Attention Span: Good   Language: No aphasia  Articulation: No dysarthria  Orientation: Person, Place, Time   Visual Fields: Full  EOM (CN III, IV, VI): Full/intact  Facial Movement (CN VII): Lower facial weakness on the Right  Motor: Arm left  Normal 5/5  Leg left  Normal 5/5  Arm right  Normal 5/5  Leg right Normal 5/5  Cebellar: No evidence of appendicular or axial ataxia  Sensation: Intact to light touch, temperature and vibration  Tone: Normal tone throughout    Laboratory:  CMP:   Recent Labs   Lab 02/22/19  0508   CALCIUM 8.6*   ALBUMIN 3.0*   PROT 5.9*   *   K 4.4   CO2 21*      BUN 21   CREATININE 0.9   ALKPHOS 53*   ALT 20   AST 23   BILITOT 0.3     CBC:   Recent Labs   Lab 02/22/19  0508   WBC 10.03   RBC 2.27*   HGB 6.5*   HCT 21.5*      MCV 95   MCH 28.6   MCHC 30.2*     Lipid Panel:   Recent Labs   Lab 02/20/19  2055   CHOL 177   LDLCALC 96.6   HDL 30*   TRIG 252*     Coagulation:   Recent Labs   Lab 02/20/19  2244   INR 0.9   APTT 24.0     Hgb A1C:   Recent Labs   Lab 02/20/19  2244   HGBA1C 5.3     TSH:   Recent Labs   Lab 02/20/19  2055   TSH 2.994       Diagnostic Results     Brain Imaging     CT head 2/20/19  No acute abnormality noted     MRI 2/21/19    Small acute infarcts in the left basal ganglia.   Changes of chronic small vessel ischemic disease and cerebral volume loss.      Vessel Imaging     CTA Head/Neck 2/20/19  Recent infarcts in the left basal ganglia.  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      Cardiac Imaging     TTE 2/21/19  · Concentric left ventricular remodeling.  · Normal left ventricular systolic function. The estimated ejection fraction is 65%  · Grade I (mild) left ventricular diastolic dysfunction consistent with impaired relaxation.  · Normal left atrial pressure.  · Mild left atrial enlargement.  · Mild aortic regurgitation.  · Normal right ventricular systolic function.  · Mild aortic valve stenosis.  · Aortic valve area is 2.18 cm2; peak velocity is 2.39 m/s; mean gradient is 14.14 mmHg.

## 2019-02-22 NOTE — ASSESSMENT & PLAN NOTE
Stroke risk factor  LDL 96.6  Patient taking Atorvastatin 40 at home; Increased dose to 80 mg Daily

## 2019-02-22 NOTE — PLAN OF CARE
Problem: Adult Inpatient Plan of Care  Goal: Plan of Care Review  POC reviewed with pt. Verbalized understanding. VSS. C/o pain resolved with prn medication. Neuro exam remains unchanged. Fall precautions maintained. Pt advised to call staff for assistance, call light is within reach. Will continue to monitor, with all safety measures met.

## 2019-02-22 NOTE — PT/OT/SLP PROGRESS
Physical Therapy      Patient Name:  Linda Hylton   MRN:  [id number]    Therapy was attempted.  IV access was being placed for Blood transfusion.  Will follow up later if patient is appropriate at that time.     Jadyn Rosales, PT   2/22/2019

## 2019-02-22 NOTE — ASSESSMENT & PLAN NOTE
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 LKN 18:00, within window for tPA, however patient refused. Per chart review, patient was seen day PTA and diagonsed with a UTI and started on Macrobid. Repeat UA here with no evidence of infection so abx d/c'd.    MRI Brain is with small acute infarcts in the L basal ganglia. 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 Stroke etiology suspected likely small vessel disease at this time. Treating with DAPT.    Stroke team member discussed level of supervision at home with pt's son by bedside on 2/21. Patient lives with son who works overnight, and he typically takes care of patient's needs during the day but leaves patient at home alone overnight when he works.   At this time, we feel that SNF is the more safe and appropriate disposition for this pt who is now with decreased mobility from her previous baseline prior to admission.      Antithrombotics for secondary stroke prevention: Antiplatelets: Aspirin: 325 mg daily  Clopidogrel: 75 mg daily  Statins for secondary stroke prevention and hyperlipidemia, if present:   Statins: Atorvastatin- 80 mg daily  Aggressive risk factor modification: HTN, HLD, Obesity, prior stroke  Rehab efforts: PT/OT/SLP to evaluate and treat; Dispo SNF  Diagnostics ordered/pending: FOBT  VTE prophylaxis: SQH HELD acutely due to anemia; Place SCDs  BP parameters: Infarct: No intervention, SBP <220

## 2019-02-22 NOTE — ASSESSMENT & PLAN NOTE
-Area of cytotoxic cerebral edema identified when reviewing brain imaging in the territory of the left middle cerebral artery. There is no mass effect associated with it. We will continue to monitor the patients clinical exam for any worsening of symptoms which may indicate expansion of the stroke or the area of the edema resulting in the clinical change. The pattern is suggestive of a small vessel etiology.

## 2019-02-22 NOTE — PT/OT/SLP EVAL
Occupational Therapy   Evaluation    Name: Linda Hylton  MRN: [id number]  Admitting Diagnosis:  Embolic stroke involving left middle cerebral artery      Recommendations:     Discharge Recommendations: nursing facility, skilled  Discharge Equipment Recommendations:  TBD at next level of care  Barriers to discharge:  Inaccessible home environment and decreased caregiver support     Assessment:     Linda Hylton is a 84 y.o. female with a medical diagnosis of Embolic stroke involving left middle cerebral artery.  She presents with functional performance deficits impairing function which include the following: weakness, impaired endurance, impaired self care skills, decreased upper extremity function, decreased lower extremity function, decreased ROM, decreased coordination, pain, impaired functional mobilty, decreased safety awareness.  Pt required increased A during OT eval on this date.  Pt would continue to benefit from receiving skilled therapy services for addressing all identified problem areas to improve overall independence on d/c.     Rehab Prognosis: Fair; patient would benefit from acute skilled OT services to address these deficits and reach maximum level of function.       Plan:     Patient to be seen 3 x/week to address the above listed problems via self-care/home management, therapeutic activities, therapeutic exercises  · Plan of Care Expires: 03/22/19  · Plan of Care Reviewed with: patient    Subjective     Chief Complaint: L side pain   Patient/Family Comments/goals: Return back home     Occupational Profile:  Living Environment: Pt lives at home with her son.  Pt's son in with her during the day, and works in the evening so pt is home alone at this time.  Pt also reported having HH A on Tuesdays/Fridays for ADL tasks.  Pt reports home is a 2 story home with 0 STEVE.  Pt bathroom set up is reported to be half-bath downstairs and full bathroom upstairs.      Previous level of function: Pt required  increased A from her son and HH with daily ADL/IADL tasks.  Pt able to ambulate short distances with RW.    Equipment Used at Home:  walker, rolling, bedside commode, shower chair  Assistance upon Discharge: Pt's son will be able to A on d/c, but not 24/7.     Pain/Comfort:  Pain Rating 1: (pain not rated)  Location - Side 1: Left  Location - Orientation 1: generalized  Location 1: (body )  Pain Addressed 1: Reposition, Distraction  Pain Rating Post-Intervention 1: 0/10    Patients cultural, spiritual, [employer]aritan conflicts given the current situation: no    Objective:     Communicated with: RN prior to session.  Patient found supine with PureWick, blood pressure cuff, pulse ox (continuous), telemetry upon OT entry to room.    General Precautions: Standard, aspiration, fall   Orthopedic Precautions:N/A   Braces: N/A     Occupational Performance:    Bed Mobility:    · Patient completed Rolling/Turning to Right with stand by assistance  · Patient completed Supine to Sit with stand by assistance    Functional Mobility/Transfers:  · Patient completed Sit <> Stand Transfer with moderate assistance  with  no assistive device from EOB  · Patient completed Bed <> Chair Transfer using Stand Pivot technique with moderate assistance x2 persons for stability/balance with no assistive device  · Patient completed Toilet Transfer (standard toilet) Stand Pivot technique with moderate assistance x2 persons with  grab bars   · Functional Mobility: Pt ambulated functional household distance in personal room from EOB to restroom area for toileting task with Mod A x2 persons for balance/stability.      Activities of Daily Living:  · Toileting: supervision seated to perform toileting task. Required increased A in standing portion of task.    Cognitive/Visual Perceptual:  Cognitive/Psychosocial Skills:     -       Oriented to: Person, Place, Time and Situation   -       Follows Commands/attention:Follows multistep  commands  -        Communication: clear/fluent  -       Safety awareness/insight to disability: intact   Visual/Perceptual:      -Intact     Physical Exam:  Balance:    -       sitting balance good/dynamic standing impaired  Postural examination/scapula alignment:    -       Rounded shoulders  -       Forward head  -       Posterior pelvic tilt  Upper Extremity Range of Motion:     -       Right Upper Extremity: WFL  -       Left Upper Extremity: WFL  Upper Extremity Strength:    -       Right Upper Extremity: WFL  -       Left Upper Extremity: WFL   Strength:    -       Right Upper Extremity: WFL  -       Left Upper Extremity: WFL  Fine Motor Coordination:    -       Intact    AMPAC 6 Click ADL:  AMPAC Total Score: 14    Treatment & Education:  Pt noted to require increased A and max VC's for safety and proper technique with functional mobility/ambulation.  Pt verbalized using RW at all times for mobility in home environment.   Pt educated on OT role and POC.  Pt educated on importance of therapy and OOB activities.   Pt educated on safety with transfers and functional mobility with ADL tasks.     Education:    Patient left up in chair with all lines intact, call button in reach, chair alarm on and nurse notified    GOALS:   Multidisciplinary Problems     Occupational Therapy Goals        Problem: Occupational Therapy Goal    Goal Priority Disciplines Outcome Interventions   Occupational Therapy Goal     OT, PT/OT Ongoing (interventions implemented as appropriate)    Description:  Goals to be met by: 3/1/19     Patient will increase functional independence with ADLs by performing:    Grooming while seated with Minimal Assistance.  Toileting from bedside commode with Supervision for hygiene and clothing management.   Supine to sit with Modified Williamson.  Stand pivot transfers with Modified Williamson.  Toilet transfer to bedside commode with Stand-by Assistance.  Upper extremity exercise program 3 sets x10 reps per handout,  with independence.  Pt will ambulated functional household distance with CGA and AE as needed.                       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

## 2019-02-22 NOTE — PLAN OF CARE
Problem: SLP Goal  Goal: SLP Goal  Speech-Language Pathology Goals  Goals to be met by 2/28/19  1. Patient will tolerate MECHANICAL SOFT DIET/THIN LIQUIDS with no s/s of aspiration./met  2. Patient will participate in ongoing swallow assessment for potential diet upgrade./met  3. Patient will recall and utilize three clear speech techniques per session with min assist from SLP./met  4. Patient will participate in ongoing assessment of reading/writing/visuospatial abilities.  /met  Outcome: Ongoing (interventions implemented as appropriate)  Regular diet with thin liquids recommended.    Claudine Mensah MA/[facility]SLP  Speech Language Pathologist  Pager [phone]  2/22/2019

## 2019-02-22 NOTE — PLAN OF CARE
CM met with patient in reference to discharge planning. Patient states her adult son lives with her but he works at night. He does assist her at times when he is home. She states that she has an aide that comes by to help her with bathing and other ADLs 2-3 times per week. She lives in a 2 story home with no steps to get into home. She also states that she has HH but she does not remember the name of the HH company. Team recommends SNF. List provided to patient. She will talk it over with family.       PCP: Yakov Barrett MD  Pharmacy:   [id number] - [place], [place] - [address] [facility]  [address] [facility] [address]  Phone: [phone] Fax: [phone]         02/22/19 1410   Discharge Assessment   Assessment Type Discharge Planning Assessment   Confirmed/corrected address and phone number on facesheet? Yes   Assessment information obtained from? Patient   Expected Length of Stay (days) (TBD)   Communicated expected length of stay with patient/caregiver yes   Prior to hospitilization cognitive status: Alert/Oriented;No Deficits   Prior to hospitalization functional status: Assistive Equipment;Needs Assistance   Current cognitive status: Alert/Oriented   Current Functional Status: Assistive Equipment;Needs Assistance   Lives With child(smitha), adult   Able to Return to Prior Arrangements other (see comments)  (TBD)   Is patient able to care for self after discharge? Unable to determine at this time (comments)   Who are your caregiver(s) and their phone number(s)? Angel Hylton Son     [phone] lives w/pt    Patient's perception of discharge disposition home or selfcare   Readmission Within the Last 30 Days no previous admission in last 30 days   Patient currently being followed by outpatient case management? No   Patient currently receives any other outside agency services? No   Equipment Currently Used at Home bedside commode;shower chair;walker, rolling;cane,  straight;raised toilet   Do you have any problems affording any of your prescribed medications? No   Is the patient taking medications as prescribed? yes   Does the patient have transportation home? Yes   Transportation Anticipated family or friend will provide   Does the patient receive services at the [facility]? No   Discharge Plan A Skilled Nursing Facility   Discharge Plan B Rehab   DME Needed Upon Discharge  (TBD)   Patient/Family in Agreement with Plan yes

## 2019-02-23 LAB
ANION GAP SERPL CALC-SCNC: 9 MMOL/L
BASOPHILS # BLD AUTO: 0.09 K/UL
BASOPHILS # BLD AUTO: 0.1 K/UL
BASOPHILS # BLD AUTO: 0.13 K/UL
BASOPHILS NFR BLD: 0.8 %
BASOPHILS NFR BLD: 1 %
BASOPHILS NFR BLD: 1.2 %
BUN SERPL-MCNC: 15 MG/DL
CALCIUM SERPL-MCNC: 9.1 MG/DL
CHLORIDE SERPL-SCNC: 100 MMOL/L
CO2 SERPL-SCNC: 23 MMOL/L
CREAT SERPL-MCNC: 0.8 MG/DL
DIFFERENTIAL METHOD: ABNORMAL
EOSINOPHIL # BLD AUTO: 0.2 K/UL
EOSINOPHIL # BLD AUTO: 0.3 K/UL
EOSINOPHIL # BLD AUTO: 0.3 K/UL
EOSINOPHIL NFR BLD: 1.9 %
EOSINOPHIL NFR BLD: 2.6 %
EOSINOPHIL NFR BLD: 2.7 %
ERYTHROCYTE [DISTWIDTH] IN BLOOD BY AUTOMATED COUNT: 16.3 %
ERYTHROCYTE [DISTWIDTH] IN BLOOD BY AUTOMATED COUNT: 16.5 %
ERYTHROCYTE [DISTWIDTH] IN BLOOD BY AUTOMATED COUNT: 16.6 %
EST. GFR  (AFRICAN AMERICAN): >60 ML/MIN/1.73 M^2
EST. GFR  (NON AFRICAN AMERICAN): >60 ML/MIN/1.73 M^2
FERRITIN SERPL-MCNC: 29 NG/ML
GLUCOSE SERPL-MCNC: 89 MG/DL
HCT VFR BLD AUTO: 27.3 %
HCT VFR BLD AUTO: 28.3 %
HCT VFR BLD AUTO: 28.3 %
HGB BLD-MCNC: 8.7 G/DL
HGB BLD-MCNC: 8.8 G/DL
HGB BLD-MCNC: 8.9 G/DL
IMM GRANULOCYTES # BLD AUTO: 0.39 K/UL
IMM GRANULOCYTES # BLD AUTO: 0.41 K/UL
IMM GRANULOCYTES # BLD AUTO: 0.42 K/UL
IMM GRANULOCYTES NFR BLD AUTO: 3.7 %
IMM GRANULOCYTES NFR BLD AUTO: 3.7 %
IMM GRANULOCYTES NFR BLD AUTO: 3.9 %
IRON SERPL-MCNC: 32 UG/DL
LYMPHOCYTES # BLD AUTO: 1.5 K/UL
LYMPHOCYTES # BLD AUTO: 1.7 K/UL
LYMPHOCYTES # BLD AUTO: 1.8 K/UL
LYMPHOCYTES NFR BLD: 12.7 %
LYMPHOCYTES NFR BLD: 16.2 %
LYMPHOCYTES NFR BLD: 16.6 %
MCH RBC QN AUTO: 28 PG
MCH RBC QN AUTO: 28.1 PG
MCH RBC QN AUTO: 28.6 PG
MCHC RBC AUTO-ENTMCNC: 30.7 G/DL
MCHC RBC AUTO-ENTMCNC: 31.1 G/DL
MCHC RBC AUTO-ENTMCNC: 32.6 G/DL
MCV RBC AUTO: 88 FL
MCV RBC AUTO: 90 FL
MCV RBC AUTO: 91 FL
MONOCYTES # BLD AUTO: 1 K/UL
MONOCYTES # BLD AUTO: 1 K/UL
MONOCYTES # BLD AUTO: 1.1 K/UL
MONOCYTES NFR BLD: 9.2 %
MONOCYTES NFR BLD: 9.2 %
MONOCYTES NFR BLD: 9.4 %
NEUTROPHILS # BLD AUTO: 7 K/UL
NEUTROPHILS # BLD AUTO: 7.1 K/UL
NEUTROPHILS # BLD AUTO: 8.2 K/UL
NEUTROPHILS NFR BLD: 66.4 %
NEUTROPHILS NFR BLD: 67.3 %
NEUTROPHILS NFR BLD: 71.5 %
NRBC BLD-RTO: 0 /100 WBC
NRBC BLD-RTO: 1 /100 WBC
NRBC BLD-RTO: 1 /100 WBC
OB PNL STL: POSITIVE
PLATELET # BLD AUTO: 247 K/UL
PLATELET # BLD AUTO: 250 K/UL
PLATELET # BLD AUTO: 251 K/UL
PMV BLD AUTO: 10.4 FL
PMV BLD AUTO: 10.5 FL
PMV BLD AUTO: 10.8 FL
POTASSIUM SERPL-SCNC: 3.9 MMOL/L
RBC # BLD AUTO: 3.11 M/UL
RBC # BLD AUTO: 3.11 M/UL
RBC # BLD AUTO: 3.13 M/UL
SATURATED IRON: 7 %
SODIUM SERPL-SCNC: 132 MMOL/L
TOTAL IRON BINDING CAPACITY: 485 UG/DL
TRANSFERRIN SERPL-MCNC: 328 MG/DL
TRANSFERRIN SERPL-MCNC: 328 MG/DL
VIT B12 SERPL-MCNC: 833 PG/ML
WBC # BLD AUTO: 10.52 K/UL
WBC # BLD AUTO: 10.58 K/UL
WBC # BLD AUTO: 11.44 K/UL

## 2019-02-23 PROCEDURE — 20600001 HC STEP DOWN PRIVATE ROOM

## 2019-02-23 PROCEDURE — 36415 COLL VENOUS BLD VENIPUNCTURE: CPT

## 2019-02-23 PROCEDURE — 63600175 PHARM REV CODE 636 W HCPCS: Performed by: PHYSICIAN ASSISTANT

## 2019-02-23 PROCEDURE — 85025 COMPLETE CBC W/AUTO DIFF WBC: CPT | Mod: 91

## 2019-02-23 PROCEDURE — 82728 ASSAY OF FERRITIN: CPT

## 2019-02-23 PROCEDURE — 25000003 PHARM REV CODE 250: Performed by: PHYSICIAN ASSISTANT

## 2019-02-23 PROCEDURE — A4216 STERILE WATER/SALINE, 10 ML: HCPCS | Performed by: NURSE PRACTITIONER

## 2019-02-23 PROCEDURE — 83540 ASSAY OF IRON: CPT

## 2019-02-23 PROCEDURE — 25000003 PHARM REV CODE 250: Performed by: NURSE PRACTITIONER

## 2019-02-23 PROCEDURE — 99233 PR SUBSEQUENT HOSPITAL CARE,LEVL III: ICD-10-PCS | Mod: ,,, | Performed by: PSYCHIATRY & NEUROLOGY

## 2019-02-23 PROCEDURE — 99233 SBSQ HOSP IP/OBS HIGH 50: CPT | Mod: ,,, | Performed by: PSYCHIATRY & NEUROLOGY

## 2019-02-23 PROCEDURE — 80048 BASIC METABOLIC PNL TOTAL CA: CPT

## 2019-02-23 PROCEDURE — 82607 VITAMIN B-12: CPT

## 2019-02-23 RX ORDER — HYDRALAZINE HYDROCHLORIDE 20 MG/ML
10 INJECTION INTRAMUSCULAR; INTRAVENOUS EVERY 8 HOURS PRN
Status: DISCONTINUED | OUTPATIENT
Start: 2019-02-23 | End: 2019-03-04 | Stop reason: HOSPADM

## 2019-02-23 RX ORDER — HEPARIN SODIUM 5000 [USP'U]/ML
5000 INJECTION, SOLUTION INTRAVENOUS; SUBCUTANEOUS EVERY 8 HOURS
Status: DISCONTINUED | OUTPATIENT
Start: 2019-02-23 | End: 2019-02-27

## 2019-02-23 RX ORDER — MICONAZOLE NITRATE 2 %
POWDER (GRAM) TOPICAL 2 TIMES DAILY
Status: DISCONTINUED | OUTPATIENT
Start: 2019-02-23 | End: 2019-03-04 | Stop reason: HOSPADM

## 2019-02-23 RX ORDER — ALUMINUM HYDROXIDE, MAGNESIUM HYDROXIDE, AND SIMETHICONE 2400; 240; 2400 MG/30ML; MG/30ML; MG/30ML
30 SUSPENSION ORAL EVERY 6 HOURS PRN
Status: DISCONTINUED | OUTPATIENT
Start: 2019-02-23 | End: 2019-03-04 | Stop reason: HOSPADM

## 2019-02-23 RX ADMIN — GABAPENTIN 300 MG: 300 CAPSULE ORAL at 08:02

## 2019-02-23 RX ADMIN — CHOLECALCIFEROL TAB 125 MCG (5000 UNIT) 5000 UNITS: 125 TAB at 09:02

## 2019-02-23 RX ADMIN — ACETAMINOPHEN 650 MG: 325 TABLET ORAL at 03:02

## 2019-02-23 RX ADMIN — MICONAZOLE NITRATE: 20 POWDER TOPICAL at 11:02

## 2019-02-23 RX ADMIN — Medication 3 ML: at 02:02

## 2019-02-23 RX ADMIN — Medication 3 ML: at 11:02

## 2019-02-23 RX ADMIN — ATORVASTATIN CALCIUM 80 MG: 20 TABLET, FILM COATED ORAL at 09:02

## 2019-02-23 RX ADMIN — PANTOPRAZOLE SODIUM 40 MG: 40 TABLET, DELAYED RELEASE ORAL at 09:02

## 2019-02-23 RX ADMIN — CLOPIDOGREL 75 MG: 75 TABLET, FILM COATED ORAL at 09:02

## 2019-02-23 RX ADMIN — FERROUS SULFATE TAB EC 325 MG (65 MG FE EQUIVALENT) 325 MG: 325 (65 FE) TABLET DELAYED RESPONSE at 09:02

## 2019-02-23 RX ADMIN — HEPARIN SODIUM 5000 UNITS: 5000 INJECTION, SOLUTION INTRAVENOUS; SUBCUTANEOUS at 08:02

## 2019-02-23 RX ADMIN — ALUMINUM HYDROXIDE, MAGNESIUM HYDROXIDE, AND DIMETHICONE 30 ML: 400; 400; 40 SUSPENSION ORAL at 08:02

## 2019-02-23 RX ADMIN — Medication 3 ML: at 06:02

## 2019-02-23 RX ADMIN — ASPIRIN 325 MG: 325 TABLET, DELAYED RELEASE ORAL at 09:02

## 2019-02-23 RX ADMIN — HEPARIN SODIUM 5000 UNITS: 5000 INJECTION, SOLUTION INTRAVENOUS; SUBCUTANEOUS at 03:02

## 2019-02-23 NOTE — ASSESSMENT & PLAN NOTE
Stroke risk factor  SBP <180  -Continuing to hold home meds for now  BP with fluctuations over last 24 hrs; Will continue monitoring

## 2019-02-23 NOTE — ASSESSMENT & PLAN NOTE
-Na 128 > 130 > 129 > 131 > 132  Last recorded Na in 2018 WNL.  -Urine Na, urine osmol, serum osmol WNL  -250 ml NS bolus given 2/21/19. Repeat Na 131.  Discussed with Hospital Medicine- Hyponatremia possibly 2/2 hypovolemia in the setting of pt's acute Hb drop.   Continue daily BMP

## 2019-02-23 NOTE — ASSESSMENT & PLAN NOTE
Hb 8 > 8 > 6.5. Pt appears pale and c/o light-headedness but is neurologically stable.   Ordered type & screen and 1U pRBCs, consent obtained on 2/22/19. SQH held; SCDs ordered.  Started PPI. + FOBT result. Anemia workup largely WNL with TIBC mildly elevated and iron sat 7%.  Pt s/p 1U pRBCs on 2/22 with good response; Hb 8.8 with no pallor or further c/o light-headedness  Restarted SQH; will d/c ASA and continue with Plavix monotherapy for now   Will monitor Hb for now and plan for outpatient GI work-up if Hb remains stable. Pt reports hx prior colonoscopies; conveys understanding.

## 2019-02-23 NOTE — NURSING
Unit of blood started at 1220 at 75ml/hr. Pt informed s/s of blood reactions and to inform nurse of any reaction. VS checked at 15 minutes and again at 60 minutes.  VSS stable (see flowsheet). Pt tolerated well, will continue to monitor.

## 2019-02-23 NOTE — PLAN OF CARE
Problem: Adult Inpatient Plan of Care  Goal: Plan of Care Review  POC reviewed with pt. Verbalized understanding. VSS. No c/o pain or signs of distress noted. Neuro exam remains unchanged. Fall precautions maintained. Pt advised to call staff for assistance, call light is within reach. Will continue to monitor, with all safety measures met.

## 2019-02-23 NOTE — SUBJECTIVE & OBJECTIVE
Neurologic Chief Complaint: Right facial droop, RUE weakness and slurred speech.     Subjective:     Interval History: NAEON. Patient s/p 1U pRBCs with good response; no pallor or light-headedness today and Hb 8.8. Restarted SQH; will d/c ASA and continue with monotherapy (Plavix). FOBT+; will monitor Hb for now and plan for outpatient GI work-up if Hb remains stable. Monitoring BP.    HPI, Past Medical, Family, and Social History remains the same as documented in the initial encounter.     Review of Systems   Constitutional: Negative for fatigue and fever.   Skin: Negative for pallor and wound.   Neurological: Positive for facial asymmetry. Negative for speech difficulty, weakness and light-headedness.   Psychiatric/Behavioral: Negative for agitation and confusion.     Scheduled Meds:   atorvastatin  80 mg Oral Daily    cholecalciferol (vitamin D3)  5,000 Units Oral Daily    clopidogrel  75 mg Oral Daily    ferrous sulfate  325 mg Oral Daily    gabapentin  300 mg Oral Nightly    heparin (porcine)  5,000 Units Subcutaneous Q8H    pantoprazole  40 mg Oral Daily    sodium chloride 0.9%  3 mL Intravenous Q8H     Continuous Infusions:   sodium chloride 0.9%       PRN Meds:sodium chloride, acetaminophen, hydrALAZINE, ondansetron, ondansetron, sodium chloride 0.9%    Objective:     Vital Signs (Most Recent):  Temp: 98.2 °F (36.8 °C) (02/23/19 1219)  Pulse: 91 (02/23/19 1219)  Resp: 16 (02/23/19 1219)  BP: (!) 155/63 (02/23/19 1219)  SpO2: (!) 92 % (02/23/19 1219)  BP Location: Left arm    Vital Signs Range (Last 24H):  Temp:  [97.8 °F (36.6 °C)-99.2 °F (37.3 °C)]   Pulse:  []   Resp:  [16-18]   BP: (148-190)/(63-81)   SpO2:  [92 %-96 %]   BP Location: Left arm    Physical Exam   Constitutional: She appears well-developed and well-nourished. No distress.   HENT:   Head: Normocephalic and atraumatic.   Eyes: Conjunctivae and EOM are normal.   Cardiovascular: Normal rate.   Pulmonary/Chest: Effort normal. No  respiratory distress.   Musculoskeletal: She exhibits no edema or deformity.   Neurological: She is alert. No sensory deficit. She exhibits normal muscle tone. Coordination normal.   See below   Skin: Skin is warm and dry. No pallor.   Psychiatric: She has a normal mood and affect. Her behavior is normal.   Nursing note and vitals reviewed.      Neurological Exam:   LOC: alert  Attention Span: Good   Language: No aphasia  Articulation: No dysarthria  Orientation: Person, Place, Time   Visual Fields: Full  EOM (CN III, IV, VI): Full/intact  Facial Movement (CN VII): Lower facial weakness on the Right  Motor: Arm left  Normal 5/5  Leg left  Normal 5/5  Arm right  Normal 5/5  Leg right Normal 5/5  Cebellar: No evidence of appendicular or axial ataxia  Sensation: Intact to light touch, temperature and vibration  Tone: Normal tone throughout    Laboratory:  CMP:   Recent Labs   Lab 02/23/19  0655   CALCIUM 9.1   *   K 3.9   CO2 23      BUN 15   CREATININE 0.8     CBC:   Recent Labs   Lab 02/23/19  0655   WBC 10.58  10.52   RBC 3.11*  3.13*   HGB 8.7*  8.8*   HCT 28.3*  28.3*     247   MCV 91  90   MCH 28.0  28.1   MCHC 30.7*  31.1*     Lipid Panel:   Recent Labs   Lab 02/20/19  2055   CHOL 177   LDLCALC 96.6   HDL 30*   TRIG 252*     Coagulation:   Recent Labs   Lab 02/20/19  2244   INR 0.9   APTT 24.0     Hgb A1C:   Recent Labs   Lab 02/20/19 2244   HGBA1C 5.3     TSH:   Recent Labs   Lab 02/20/19 2055   TSH 2.994       Diagnostic Results     Brain Imaging     CT head 2/20/19  No acute abnormality noted     MRI 2/21/19    Small acute infarcts in the left basal ganglia.   Changes of chronic small vessel ischemic disease and cerebral volume loss.      Vessel Imaging     CTA Head/Neck 2/20/19  Recent infarcts in the left basal ganglia.  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      Cardiac Imaging     TTE 2/21/19  · Concentric left ventricular remodeling.  · Normal left ventricular systolic function. The estimated ejection fraction is 65%  · Grade I (mild) left ventricular diastolic dysfunction consistent with impaired relaxation.  · Normal left atrial pressure.  · Mild left atrial enlargement.  · Mild aortic regurgitation.  · Normal right ventricular systolic function.  · Mild aortic valve stenosis.  · Aortic valve area is 2.18 cm2; peak velocity is 2.39 m/s; mean gradient is 14.14 mmHg.

## 2019-02-23 NOTE — PROGRESS NOTES
[facility]  Vascular Neurology  Comprehensive Stroke Center  Progress Note    Assessment/Plan:     * Embolic stroke involving left middle cerebral artery    84 year old female with past medical hx of HTN, hypothyroidism, prior R vermis stroke in 3/2017, and aortic stenosis presented to the ED with complains of right facial droop. LKN 18:00, within window for tPA, however patient refused. Per chart review, patient was seen day PTA and diagonsed with a UTI and started on Macrobid. Repeat UA here with no evidence of infection so abx d/c'd.    MRI Brain is with small acute infarcts in the L basal ganglia. 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 Echo demonstrated mild LAE. Stroke etiology not completely clear, but appears embolic/ESUS on imaging. Will plan for 30-day cardiac event monitor following SNF discharge.     Stroke team member discussed level of supervision at home with pt's son by bedside on 2/21. Patient lives with son who works overnight, and he typically takes care of patient's needs during the day but leaves patient at home alone overnight when he works. At this time, we feel that SNF is the more safe and appropriate disposition for this pt who is now with decreased mobility from her previous baseline prior to admission.     Discussed with staff; though pt is with vascular abnormalities appreciated on CTA, will d/c ASA and elect for Plavix monotherapy in this pt due to acute anemia. Restarted SQH; planning for outpatient GI work-up if pt's Hb remains stable.       Antithrombotics for secondary stroke prevention: Antiplatelets: Clopidogrel: 75 mg daily  Statins for secondary stroke prevention and hyperlipidemia, if present:   Statins: Atorvastatin- 80 mg daily  Aggressive risk factor modification: HTN, HLD, Obesity, prior stroke  Rehab efforts:  PT/OT/SLP to evaluate and treat; Dispo SNF  Diagnostics ordered/pending: None  VTE prophylaxis: Heparin 5000 units SQ every 8 hours  Place SCDs  BP parameters: SBP < 180     Acute blood loss anemia    Hb 8 > 8 > 6.5. Pt appears pale and c/o light-headedness but is neurologically stable.   Ordered type & screen and 1U pRBCs, consent obtained on 2/22/19. SQH held; SCDs ordered.  Started PPI. + FOBT result. Anemia workup largely WNL with TIBC mildly elevated and iron sat 7%.  Pt s/p 1U pRBCs on 2/22 with good response; Hb 8.8 with no pallor or further c/o light-headedness  Restarted SQH; will d/c ASA and continue with Plavix monotherapy for now   Will monitor Hb for now and plan for outpatient GI work-up if Hb remains stable. Pt reports hx prior colonoscopies; conveys understanding.     Hyponatremia    -Na 128 > 130 > 129 > 131 > 132  Last recorded Na in 2018 WNL.  -Urine Na, urine osmol, serum osmol WNL  -250 ml NS bolus given 2/21/19. Repeat Na 131.  Discussed with Hospital Medicine- Hyponatremia possibly 2/2 hypovolemia in the setting of pt's acute Hb drop.   Continue daily BMP     Essential hypertension    Stroke risk factor  SBP <180  -Continuing to hold home meds for now  BP with fluctuations over last 24 hrs; Will continue monitoring     Cytotoxic cerebral edema    -Area of cytotoxic cerebral edema identified when reviewing brain imaging in the territory of the left middle cerebral artery. There is no mass effect associated with it. We will continue to monitor the patients clinical exam for any worsening of symptoms which may indicate expansion of the stroke or the area of the edema resulting in the clinical change. The pattern is suggestive of a small vessel etiology.     Facial droop    Initial presentation, due to stroke  Continue PT/OT     History of stroke    Stroke risk factor  3/2017 - diagnosed with R cerebellar infarct. Was on DAPT and Lipitor 40 outpatient.  Patient state she had no significant stroke  deficits      Mixed hyperlipidemia    Stroke risk factor  LDL 96.6  Patient taking Atorvastatin 40 at home; Increased dose to 80 mg Daily     Nodule of apex of right lung    CTA head/neck on 2/21/19 shows stable 9 mm noncalcified nodule in the right lung apex.   Per family over the phone, pt has no tobacco use history.  Discussed with Hospital Medicine - No acute intervention needed at this time. PCP to monitor and consider CT Chest in 6 months.          02/20/2019 MRI brain positive for stroke.  Admitted to Vascular Neurology.  02/21/2019- Na 130-->129. 250 ml NS bolus ordered. Repeat Na lab pending. CTA head/neck with recent infarcts in the left basal ganglia. 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 Echo EF 65% with mild left atrial enlargement. SLP recs for mechanical soft diet. PT/OT recs for possible rehab or SNF.   2/22: Hb 6.5; pt appears pale, c/o light-headedness but is neurologically stable. Ordered type & screen and 1U pRBCs, consent obtained. Will repeat CBC following transfusion. Started PPI and FOBT ordered (needs to be collected.) Monitoring Na.  2/23: Patient s/p 1U pRBCs with good response; no pallor or light-headedness today and Hb 8.8. Restarted SQH; will d/c ASA and continue with monotherapy (Plavix). FOBT+; will monitor Hb for now and plan for outpatient GI work-up if Hb remains stable. Monitoring BP.    STROKE DOCUMENTATION   Acute Stroke Times   Last Known Normal Date: 02/20/19  Last Known Normal Time: 1800  Symptom Onset Date: 02/20/19  Symptom Onset Time: 1800  Stroke Team Called Date: 02/20/19  Stroke Team Called Time: 2050  Stroke Team Arrival Date: 02/20/19  Stroke Team Arrival Time: 2052  CT Interpretation Time: 2100  Decision to Treat Time for Alteplase: 2110(Patient refused )  Decision to Treat Time for IR: (NA)    NIH Scale:  1a. Level of Consciousness: 0-->Alert, keenly responsive  1b. LOC Questions: 0-->Answers both questions  correctly  1c. LOC Commands: 0-->Performs both tasks correctly  2. Best Gaze: 0-->Normal  3. Visual: 0-->No visual loss  4. Facial Palsy: 1-->Minor paralysis (flattened nasolabial fold, asymmetry on smiling)  5a. Motor Arm, Left: 0-->No drift, limb holds 90 (or 45) degrees for full 10 secs  5b. Motor Arm, Right: 0-->No drift, limb holds 90 (or 45) degrees for full 10 secs  6a. Motor Leg, Left: 0-->No drift, leg holds 30 degree position for full 5 secs  6b. Motor Leg, Right: 0-->No drift, leg holds 30 degree position for full 5 secs  7. Limb Ataxia: 0-->Absent  8. Sensory: 0-->Normal, no sensory loss  9. Best Language: 0-->No aphasia, normal  10. Dysarthria: 0-->Normal  11. Extinction and Inattention (formerly Neglect): 0-->No abnormality  Total (NIH Stroke Scale): 1       Modified [place] Score: 1  [place] Coma Scale:    ABCD2 Score:    [id number] Score:   HAS -BLED Score:   ICH Score:   Hunt & De Classification:      Hemorrhagic change of an Ischemic Stroke: Does this patient have an ischemic stroke with hemorrhagic changes? No     Neurologic Chief Complaint: Right facial droop, RUE weakness and slurred speech.     Subjective:     Interval History: NAEON. Patient s/p 1U pRBCs with good response; no pallor or light-headedness today and Hb 8.8. Restarted SQH; will d/c ASA and continue with monotherapy (Plavix). FOBT+; will monitor Hb for now and plan for outpatient GI work-up if Hb remains stable. Monitoring BP.    HPI, Past Medical, Family, and Social History remains the same as documented in the initial encounter.     Review of Systems   Constitutional: Negative for fatigue and fever.   Skin: Negative for pallor and wound.   Neurological: Positive for facial asymmetry. Negative for speech difficulty, weakness and light-headedness.   Psychiatric/Behavioral: Negative for agitation and confusion.     Scheduled Meds:   atorvastatin  80 mg Oral Daily    cholecalciferol (vitamin D3)  5,000 Units Oral Daily     clopidogrel  75 mg Oral Daily    ferrous sulfate  325 mg Oral Daily    gabapentin  300 mg Oral Nightly    heparin (porcine)  5,000 Units Subcutaneous Q8H    pantoprazole  40 mg Oral Daily    sodium chloride 0.9%  3 mL Intravenous Q8H     Continuous Infusions:   sodium chloride 0.9%       PRN Meds:sodium chloride, acetaminophen, hydrALAZINE, ondansetron, ondansetron, sodium chloride 0.9%    Objective:     Vital Signs (Most Recent):  Temp: 98.2 °F (36.8 °C) (02/23/19 1219)  Pulse: 91 (02/23/19 1219)  Resp: 16 (02/23/19 1219)  BP: (!) 155/63 (02/23/19 1219)  SpO2: (!) 92 % (02/23/19 1219)  BP Location: Left arm    Vital Signs Range (Last 24H):  Temp:  [97.8 °F (36.6 °C)-99.2 °F (37.3 °C)]   Pulse:  []   Resp:  [16-18]   BP: (148-190)/(63-81)   SpO2:  [92 %-96 %]   BP Location: Left arm    Physical Exam   Constitutional: She appears well-developed and well-nourished. No distress.   HENT:   Head: Normocephalic and atraumatic.   Eyes: Conjunctivae and EOM are normal.   Cardiovascular: Normal rate.   Pulmonary/Chest: Effort normal. No respiratory distress.   Musculoskeletal: She exhibits no edema or deformity.   Neurological: She is alert. No sensory deficit. She exhibits normal muscle tone. Coordination normal.   See below   Skin: Skin is warm and dry. No pallor.   Psychiatric: She has a normal mood and affect. Her behavior is normal.   Nursing note and vitals reviewed.      Neurological Exam:   LOC: alert  Attention Span: Good   Language: No aphasia  Articulation: No dysarthria  Orientation: Person, Place, Time   Visual Fields: Full  EOM (CN III, IV, VI): Full/intact  Facial Movement (CN VII): Lower facial weakness on the Right  Motor: Arm left  Normal 5/5  Leg left  Normal 5/5  Arm right  Normal 5/5  Leg right Normal 5/5  Cebellar: No evidence of appendicular or axial ataxia  Sensation: Intact to light touch, temperature and vibration  Tone: Normal tone throughout    Laboratory:  CMP:   Recent Labs   Lab  02/23/19  0655   CALCIUM 9.1   *   K 3.9   CO2 23      BUN 15   CREATININE 0.8     CBC:   Recent Labs   Lab 02/23/19  0655   WBC 10.58  10.52   RBC 3.11*  3.13*   HGB 8.7*  8.8*   HCT 28.3*  28.3*     247   MCV 91  90   MCH 28.0  28.1   MCHC 30.7*  31.1*     Lipid Panel:   Recent Labs   Lab 02/20/19 2055   CHOL 177   LDLCALC 96.6   HDL 30*   TRIG 252*     Coagulation:   Recent Labs   Lab 02/20/19 2244   INR 0.9   APTT 24.0     Hgb A1C:   Recent Labs   Lab 02/20/19 2244   HGBA1C 5.3     TSH:   Recent Labs   Lab 02/20/19 2055   TSH 2.994       Diagnostic Results     Brain Imaging     CT head 2/20/19  No acute abnormality noted     MRI 2/21/19    Small acute infarcts in the left basal ganglia.   Changes of chronic small vessel ischemic disease and cerebral volume loss.      Vessel Imaging     CTA Head/Neck 2/20/19  Recent infarcts in the left basal ganglia.  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      Cardiac Imaging     TTE 2/21/19  · Concentric left ventricular remodeling.  · Normal left ventricular systolic function. The estimated ejection fraction is 65%  · Grade I (mild) left ventricular diastolic dysfunction consistent with impaired relaxation.  · Normal left atrial pressure.  · Mild left atrial enlargement.  · Mild aortic regurgitation.  · Normal right ventricular systolic function.  · Mild aortic valve stenosis.  · Aortic valve area is 2.18 cm2; peak velocity is 2.39 m/s; mean gradient is 14.14 mmHg.      Melissa Butler PA-C  [facility]  Department of Vascular Neurology   [facility]

## 2019-02-23 NOTE — ASSESSMENT & PLAN NOTE
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 LKN 18:00, within window for tPA, however patient refused. Per chart review, patient was seen day PTA and diagonsed with a UTI and started on Macrobid. Repeat UA here with no evidence of infection so abx d/c'd.    MRI Brain is with small acute infarcts in the L basal ganglia. 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 Echo demonstrated mild LAE. Stroke etiology not completely clear, but appears embolic/ESUS on imaging. Will plan for 30-day cardiac event monitor following SNF discharge.     Stroke team member discussed level of supervision at home with pt's son by bedside on 2/21. Patient lives with son who works overnight, and he typically takes care of patient's needs during the day but leaves patient at home alone overnight when he works. At this time, we feel that SNF is the more safe and appropriate disposition for this pt who is now with decreased mobility from her previous baseline prior to admission.     Discussed with staff; though pt is with vascular abnormalities appreciated on CTA, will d/c ASA and elect for Plavix monotherapy in this pt due to acute anemia. Restarted SQH; planning for outpatient GI work-up if pt's Hb remains stable.       Antithrombotics for secondary stroke prevention: Antiplatelets: Clopidogrel: 75 mg daily  Statins for secondary stroke prevention and hyperlipidemia, if present:   Statins: Atorvastatin- 80 mg daily  Aggressive risk factor modification: HTN, HLD, Obesity, prior stroke  Rehab efforts: PT/OT/SLP to evaluate and treat; Dispo SNF  Diagnostics ordered/pending: None  VTE prophylaxis: Heparin 5000 units SQ every 8 hours  Place [facility]  BP parameters: SBP < 180

## 2019-02-24 PROBLEM — D72.829 LEUKOCYTOSIS: Status: ACTIVE | Noted: 2019-02-24

## 2019-02-24 LAB
ANION GAP SERPL CALC-SCNC: 9 MMOL/L
BASOPHILS # BLD AUTO: 0.13 K/UL
BASOPHILS NFR BLD: 1 %
BUN SERPL-MCNC: 15 MG/DL
CALCIUM SERPL-MCNC: 9 MG/DL
CHLORIDE SERPL-SCNC: 98 MMOL/L
CO2 SERPL-SCNC: 22 MMOL/L
CREAT SERPL-MCNC: 0.8 MG/DL
DIFFERENTIAL METHOD: ABNORMAL
EOSINOPHIL # BLD AUTO: 0.4 K/UL
EOSINOPHIL NFR BLD: 2.7 %
ERYTHROCYTE [DISTWIDTH] IN BLOOD BY AUTOMATED COUNT: 16.1 %
EST. GFR  (AFRICAN AMERICAN): >60 ML/MIN/1.73 M^2
EST. GFR  (NON AFRICAN AMERICAN): >60 ML/MIN/1.73 M^2
GLUCOSE SERPL-MCNC: 146 MG/DL
HCT VFR BLD AUTO: 30.4 %
HGB BLD-MCNC: 9.5 G/DL
IMM GRANULOCYTES # BLD AUTO: 0.44 K/UL
IMM GRANULOCYTES NFR BLD AUTO: 3.4 %
LYMPHOCYTES # BLD AUTO: 2.1 K/UL
LYMPHOCYTES NFR BLD: 15.8 %
MCH RBC QN AUTO: 27.9 PG
MCHC RBC AUTO-ENTMCNC: 31.3 G/DL
MCV RBC AUTO: 89 FL
MONOCYTES # BLD AUTO: 1.3 K/UL
MONOCYTES NFR BLD: 10 %
NEUTROPHILS # BLD AUTO: 8.8 K/UL
NEUTROPHILS NFR BLD: 67.1 %
NRBC BLD-RTO: 0 /100 WBC
PLATELET # BLD AUTO: 273 K/UL
PMV BLD AUTO: 10.4 FL
POTASSIUM SERPL-SCNC: 3.9 MMOL/L
RBC # BLD AUTO: 3.4 M/UL
SODIUM SERPL-SCNC: 129 MMOL/L
WBC # BLD AUTO: 13.05 K/UL

## 2019-02-24 PROCEDURE — 63600175 PHARM REV CODE 636 W HCPCS: Performed by: PHYSICIAN ASSISTANT

## 2019-02-24 PROCEDURE — 25000003 PHARM REV CODE 250: Performed by: PHYSICIAN ASSISTANT

## 2019-02-24 PROCEDURE — 99233 PR SUBSEQUENT HOSPITAL CARE,LEVL III: ICD-10-PCS | Mod: ,,, | Performed by: PSYCHIATRY & NEUROLOGY

## 2019-02-24 PROCEDURE — 85025 COMPLETE CBC W/AUTO DIFF WBC: CPT

## 2019-02-24 PROCEDURE — 25000003 PHARM REV CODE 250: Performed by: NURSE PRACTITIONER

## 2019-02-24 PROCEDURE — A4216 STERILE WATER/SALINE, 10 ML: HCPCS | Performed by: NURSE PRACTITIONER

## 2019-02-24 PROCEDURE — 20600001 HC STEP DOWN PRIVATE ROOM

## 2019-02-24 PROCEDURE — 99233 SBSQ HOSP IP/OBS HIGH 50: CPT | Mod: ,,, | Performed by: PSYCHIATRY & NEUROLOGY

## 2019-02-24 PROCEDURE — 36415 COLL VENOUS BLD VENIPUNCTURE: CPT

## 2019-02-24 PROCEDURE — 80048 BASIC METABOLIC PNL TOTAL CA: CPT

## 2019-02-24 RX ORDER — LEVOTHYROXINE SODIUM 75 UG/1
75 TABLET ORAL DAILY
Status: DISCONTINUED | OUTPATIENT
Start: 2019-02-24 | End: 2019-03-04 | Stop reason: HOSPADM

## 2019-02-24 RX ORDER — MECLIZINE HYDROCHLORIDE 25 MG/1
25 TABLET ORAL 3 TIMES DAILY PRN
Status: DISCONTINUED | OUTPATIENT
Start: 2019-02-24 | End: 2019-03-04 | Stop reason: HOSPADM

## 2019-02-24 RX ORDER — LISINOPRIL 5 MG/1
5 TABLET ORAL DAILY
Status: DISCONTINUED | OUTPATIENT
Start: 2019-02-24 | End: 2019-03-03

## 2019-02-24 RX ADMIN — CLOPIDOGREL 75 MG: 75 TABLET, FILM COATED ORAL at 09:02

## 2019-02-24 RX ADMIN — ATORVASTATIN CALCIUM 80 MG: 20 TABLET, FILM COATED ORAL at 09:02

## 2019-02-24 RX ADMIN — HEPARIN SODIUM 5000 UNITS: 5000 INJECTION, SOLUTION INTRAVENOUS; SUBCUTANEOUS at 05:02

## 2019-02-24 RX ADMIN — HEPARIN SODIUM 5000 UNITS: 5000 INJECTION, SOLUTION INTRAVENOUS; SUBCUTANEOUS at 03:02

## 2019-02-24 RX ADMIN — GABAPENTIN 300 MG: 300 CAPSULE ORAL at 08:02

## 2019-02-24 RX ADMIN — CHOLECALCIFEROL TAB 125 MCG (5000 UNIT) 5000 UNITS: 125 TAB at 09:02

## 2019-02-24 RX ADMIN — MICONAZOLE NITRATE: 20 POWDER TOPICAL at 08:02

## 2019-02-24 RX ADMIN — LISINOPRIL 5 MG: 5 TABLET ORAL at 12:02

## 2019-02-24 RX ADMIN — PANTOPRAZOLE SODIUM 40 MG: 40 TABLET, DELAYED RELEASE ORAL at 09:02

## 2019-02-24 RX ADMIN — FERROUS SULFATE TAB EC 325 MG (65 MG FE EQUIVALENT) 325 MG: 325 (65 FE) TABLET DELAYED RESPONSE at 09:02

## 2019-02-24 RX ADMIN — Medication 3 ML: at 06:02

## 2019-02-24 RX ADMIN — ONDANSETRON 8 MG: 8 TABLET, ORALLY DISINTEGRATING ORAL at 03:02

## 2019-02-24 RX ADMIN — MICONAZOLE NITRATE: 20 POWDER TOPICAL at 09:02

## 2019-02-24 RX ADMIN — LEVOTHYROXINE SODIUM 75 MCG: 75 TABLET ORAL at 10:02

## 2019-02-24 RX ADMIN — HEPARIN SODIUM 5000 UNITS: 5000 INJECTION, SOLUTION INTRAVENOUS; SUBCUTANEOUS at 08:02

## 2019-02-24 RX ADMIN — MECLIZINE HYDROCHLORIDE 25 MG: 25 TABLET ORAL at 10:02

## 2019-02-24 RX ADMIN — Medication 3 ML: at 08:02

## 2019-02-24 RX ADMIN — Medication 3 ML: at 03:02

## 2019-02-24 NOTE — ASSESSMENT & PLAN NOTE
Hb 8 > 8 > 6.5. Pt appears pale and c/o light-headedness but is neurologically stable.   Ordered type & screen and 1U pRBCs, consent obtained on 2/22/19. SQH held; SCDs ordered.  Started PPI. + FOBT result. Anemia workup largely WNL with TIBC mildly elevated and iron sat 7%.  Pt s/p 1U pRBCs on 2/22 with good response; Hb 8.8 with pallor improved  Restarted SQH; will d/c ASA and continue with Plavix monotherapy for now   Hb 9.5 on 2/24.  Will monitor Hb for now and plan for outpatient GI work-up if Hb remains stable. Pt reports hx prior colonoscopies; conveys understanding.

## 2019-02-24 NOTE — ASSESSMENT & PLAN NOTE
Pt c/o dizziness/light-headedness which she states she has at baseline; Reports no acute change currently  Also c/o mild blurred vision but no other neuro changes appreciated and visual fields are intact  Restarted home PRN Meclizine  Will continue to monitor

## 2019-02-24 NOTE — ASSESSMENT & PLAN NOTE
Stroke risk factor  3/2017 - diagnosed with R cerebellar infarct. Was on DAPT and Lipitor 40 outpatient.  Patient states she had no significant stroke deficits (dizziness?)

## 2019-02-24 NOTE — ASSESSMENT & PLAN NOTE
Stroke risk factor  SBP <160  -Pt states she checks her BP twice daily at home and takes Lisinopril occasionally if her SBP < 150. Started Lisinopril 5mg Daily  Will continue monitoring

## 2019-02-24 NOTE — SUBJECTIVE & OBJECTIVE
Neurologic Chief Complaint: Right facial droop, RUE weakness and slurred speech.     Subjective:     Interval History: NAEON. Hb 9.5 today. WBC 13 however pt afebrile, denies acute complaints; monitoring. Pt c/o dizziness (her reported baseline) as well as mild blurred vision; no other neuro changes, visual fields are intact. Restarted home PRN Meclizine. Pt states she takes Lisinopril occasionally at home if her BP is high; started Lisinopril 5mg Daily. Miconazole powder ordered for areas of redness noted in abdominal folds. Pt is not in favor of d/c to SNF; attempted to call pt's son Angel to discuss if he is comfortable with plan of d/c to home. Attempting to touch base with PT regarding dispo as well.    HPI, Past Medical, Family, and Social History remains the same as documented in the initial encounter.     Review of Systems   Constitutional: Negative for fatigue and fever.   Skin: Negative for pallor and wound.   Neurological: Positive for facial asymmetry and light-headedness. Negative for speech difficulty and weakness.   Psychiatric/Behavioral: Negative for agitation and confusion.     Scheduled Meds:   atorvastatin  80 mg Oral Daily    cholecalciferol (vitamin D3)  5,000 Units Oral Daily    clopidogrel  75 mg Oral Daily    ferrous sulfate  325 mg Oral Daily    gabapentin  300 mg Oral Nightly    heparin (porcine)  5,000 Units Subcutaneous Q8H    levothyroxine  75 mcg Oral Daily    lisinopril  5 mg Oral Daily    miconazole NITRATE 2 %   Topical (Top) BID    pantoprazole  40 mg Oral Daily    sodium chloride 0.9%  3 mL Intravenous Q8H     Continuous Infusions:   sodium chloride 0.9%       PRN Meds:sodium chloride, acetaminophen, aluminum & magnesium hydroxide-simethicone, hydrALAZINE, meclizine, ondansetron, ondansetron, sodium chloride 0.9%    Objective:     Vital Signs (Most Recent):  Temp: 98.5 °F (36.9 °C) (02/24/19 1211)  Pulse: 93 (02/24/19 1211)  Resp: 18 (02/24/19 1211)  BP: 137/63  (02/24/19 1211)  SpO2: (!) 91 % (02/24/19 1211)  BP Location: Left arm    Vital Signs Range (Last 24H):  Temp:  [96.3 °F (35.7 °C)-98.5 °F (36.9 °C)]   Pulse:  [81-95]   Resp:  [18]   BP: (137-168)/(63-78)   SpO2:  [91 %-96 %]   BP Location: Left arm    Physical Exam   Constitutional: She appears well-developed and well-nourished. No distress.   HENT:   Head: Normocephalic and atraumatic.   Eyes: Conjunctivae and EOM are normal.   Cardiovascular: Normal rate.   Pulmonary/Chest: Effort normal. No respiratory distress.   Musculoskeletal: She exhibits no edema or deformity.   Neurological: She is alert. No sensory deficit. She exhibits normal muscle tone. Coordination normal.   See below   Skin: Skin is warm and dry. No pallor.   Psychiatric: She has a normal mood and affect. Her behavior is normal.   Nursing note and vitals reviewed.      Neurological Exam:   LOC: alert  Attention Span: Good   Language: No aphasia  Articulation: No dysarthria  Orientation: Person, Place, Time   Visual Fields: Full  EOM (CN III, IV, VI): Full/intact  Facial Movement (CN VII): Lower facial weakness on the Right  Motor: Arm left  Normal 5/5  Leg left  Normal 5/5  Arm right  Normal 5/5  Leg right Normal 5/5  Cebellar: No evidence of appendicular or axial ataxia  Sensation: Intact to light touch, temperature and vibration  Tone: Normal tone throughout    Laboratory:  CMP:   Recent Labs   Lab 02/24/19  1032   CALCIUM 9.0   *   K 3.9   CO2 22*   CL 98   BUN 15   CREATININE 0.8     CBC:   Recent Labs   Lab 02/24/19  0511   WBC 13.05*   RBC 3.40*   HGB 9.5*   HCT 30.4*      MCV 89   MCH 27.9   MCHC 31.3*     Lipid Panel:   Recent Labs   Lab 02/20/19 2055   CHOL 177   LDLCALC 96.6   HDL 30*   TRIG 252*     Coagulation:   Recent Labs   Lab 02/20/19 2244   INR 0.9   APTT 24.0     Hgb A1C:   Recent Labs   Lab 02/20/19 2244   HGBA1C 5.3     TSH:   Recent Labs   Lab 02/20/19 2055   TSH 2.994       Diagnostic Results     Brain  Imaging     CT head 2/20/19  No acute abnormality noted     MRI 2/21/19    Small acute infarcts in the left basal ganglia.   Changes of chronic small vessel ischemic disease and cerebral volume loss.      Vessel Imaging     CTA Head/Neck 2/20/19  Recent infarcts in the left basal ganglia.  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      Cardiac Imaging     TTE 2/21/19  · Concentric left ventricular remodeling.  · Normal left ventricular systolic function. The estimated ejection fraction is 65%  · Grade I (mild) left ventricular diastolic dysfunction consistent with impaired relaxation.  · Normal left atrial pressure.  · Mild left atrial enlargement.  · Mild aortic regurgitation.  · Normal right ventricular systolic function.  · Mild aortic valve stenosis.  · Aortic valve area is 2.18 cm2; peak velocity is 2.39 m/s; mean gradient is 14.14 mmHg.

## 2019-02-24 NOTE — NURSING
Bright red area noted to abdominal folds, odorous. FOREST Hunter notified new orders received. Area cleaned, dried miconizole powder applied will continue to monitor

## 2019-02-24 NOTE — PROGRESS NOTES
[facility]  Vascular Neurology  Comprehensive Stroke Center  Progress Note    Assessment/Plan:     * Embolic stroke involving left middle cerebral artery    84 year old female with past medical hx of HTN, hypothyroidism, prior R vermis stroke in 3/2017, and aortic stenosis presented to the ED with complains of right facial droop. LKN 18:00, within window for tPA, however patient refused. Per chart review, patient was seen day PTA and diagonsed with a UTI and started on Macrobid. Repeat UA here with no evidence of infection so abx d/c'd.    MRI Brain is with small acute infarcts in the L basal ganglia. 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 Echo demonstrated mild LAE. Stroke etiology not completely clear, but appears embolic/ESUS on imaging. Will plan for 30-day cardiac event monitor following SNF discharge.     Stroke team member discussed level of supervision at home with pt's son by bedside on 2/21. Patient lives with son who works overnight, and he typically takes care of patient's needs during the day but leaves patient at home alone overnight when he works. SNF may be a more safe and appropriate disposition for this pt who is now with decreased mobility from her previous baseline prior to admission, however pt is not in favor of this plan and may refuse.   Attempted to call pt's son Angel to discuss if he is comfortable with a plan of pt d/c to home; no answer, left VM. Attempting to touch base with PT regarding dispo as well; does not appear that pt has yet had OOB assessment this admission.    Discussed with staff; though pt is with vascular abnormalities appreciated on CTA, will d/c ASA and elect for Plavix monotherapy in this pt due to acute anemia. Restarted SQH; planning for outpatient GI work-up if pt's Hb remains stable.       Antithrombotics  for secondary stroke prevention: Antiplatelets: Clopidogrel: 75 mg daily  Statins for secondary stroke prevention and hyperlipidemia, if present:   Statins: Atorvastatin- 80 mg daily  Aggressive risk factor modification: HTN, HLD, Obesity, prior stroke  Rehab efforts: PT/OT/SLP to evaluate and treat; Dispo SNF (vs HH without 24-hr supervision?)  Diagnostics ordered/pending: None  VTE prophylaxis: Heparin 5000 units SQ every 8 hours  Place SCDs  BP parameters: SBP < 160     Acute blood loss anemia    Hb 8 > 8 > 6.5. Pt appears pale and c/o light-headedness but is neurologically stable.   Ordered type & screen and 1U pRBCs, consent obtained on 2/22/19. SQH held; SCDs ordered.  Started PPI. + FOBT result. Anemia workup largely WNL with TIBC mildly elevated and iron sat 7%.  Pt s/p 1U pRBCs on 2/22 with good response; Hb 8.8 with pallor improved  Restarted SQH; will d/c ASA and continue with Plavix monotherapy for now   Hb 9.5 on 2/24.  Will monitor Hb for now and plan for outpatient GI work-up if Hb remains stable. Pt reports hx prior colonoscopies; conveys understanding.     Hyponatremia    -Na 128 > 130 > 129 > 131 > 132 > 129  Last recorded Na in 2018 WNL.  -Urine Na, urine osmol, serum osmol WNL  -250 ml NS bolus given 2/21/19. Repeat Na 131.  Discussed with Hospital Medicine- Hyponatremia possibly 2/2 hypovolemia in the setting of pt's acute Hb drop.   Continue daily BMP; Will discuss any further recs with Hospital Medicine at AM rounds     Leukocytosis    WBC 13 however pt is afebrile, does not appear toxic, and denies acute complaints currently  Will monitor for now     Dizziness    Pt c/o dizziness/light-headedness which she states she has at baseline; Reports no acute change currently  Also c/o mild blurred vision but no other neuro changes appreciated and visual fields are intact  Restarted home PRN Meclizine  Will continue to monitor     Essential hypertension    Stroke risk factor  SBP <160  -Pt states she  checks her BP twice daily at home and takes Lisinopril occasionally if her SBP < 150. Started Lisinopril 5mg Daily  Will continue monitoring     Cytotoxic cerebral edema    -Area of cytotoxic cerebral edema identified when reviewing brain imaging in the territory of the left middle cerebral artery. There is no mass effect associated with it. We will continue to monitor the patients clinical exam for any worsening of symptoms which may indicate expansion of the stroke or the area of the edema resulting in the clinical change. The pattern is suggestive of an embolic/ESUS etiology.     Facial droop    Initial presentation, due to stroke  Continue PT/OT     History of stroke    Stroke risk factor  3/2017 - diagnosed with R cerebellar infarct. Was on DAPT and Lipitor 40 outpatient.  Patient states she had no significant stroke deficits (dizziness?)     Mixed hyperlipidemia    Stroke risk factor  LDL 96.6  Patient taking Atorvastatin 40 at home; Increased dose to 80 mg Daily     Nodule of apex of right lung    CTA head/neck on 2/21/19 shows stable 9 mm noncalcified nodule in the right lung apex.   Per family over the phone, pt has no tobacco use history.  Discussed with Hospital Medicine - No acute intervention needed at this time. PCP to monitor and consider CT Chest in 6 months.          02/20/2019 MRI brain positive for stroke.  Admitted to Vascular Neurology.  02/21/2019- Na 130-->129. 250 ml NS bolus ordered. Repeat Na lab pending. CTA head/neck with recent infarcts in the left basal ganglia. 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 Echo EF 65% with mild left atrial enlargement. SLP recs for mechanical soft diet. PT/OT recs for possible rehab or SNF.   2/22: Hb 6.5; pt appears pale, c/o light-headedness but is neurologically stable. Ordered type & screen and 1U pRBCs, consent obtained. Will repeat CBC following transfusion. Started PPI and FOBT ordered (needs to  be collected.) Monitoring Na.  2/23: Patient s/p 1U pRBCs with good response; no pallor or light-headedness today and Hb 8.8. Restarted SQH; will d/c ASA and continue with monotherapy (Plavix). FOBT+; will monitor Hb for now and plan for outpatient GI work-up if Hb remains stable. Monitoring BP.  2/24: Hb 9.5 today. WBC 13 however pt afebrile, denies acute complaints; monitoring. Pt c/o dizziness (her reported baseline) as well as mild blurred vision; no other neuro changes, visual fields are intact. Restarted home PRN Meclizine. Pt states she takes Lisinopril occasionally at home if her BP is high; started Lisinopril 5mg Daily. Miconazole powder ordered for areas of redness noted in abdominal folds. Pt is not in favor of d/c to SNF; attempted to call pt's son Angel to discuss if he is comfortable with plan of d/c to home. Attempting to touch base with PT regarding dispo as well.    STROKE DOCUMENTATION   Acute Stroke Times   Last Known Normal Date: 02/20/19  Last Known Normal Time: 1800  Symptom Onset Date: 02/20/19  Symptom Onset Time: 1800  Stroke Team Called Date: 02/20/19  Stroke Team Called Time: 2050  Stroke Team Arrival Date: 02/20/19  Stroke Team Arrival Time: 2052  CT Interpretation Time: 2100  Decision to Treat Time for Alteplase: 2110(Patient refused )  Decision to Treat Time for IR: (NA)    NIH Scale:  1a. Level of Consciousness: 0-->Alert, keenly responsive  1b. LOC Questions: 0-->Answers both questions correctly  1c. LOC Commands: 0-->Performs both tasks correctly  2. Best Gaze: 0-->Normal  3. Visual: 0-->No visual loss  4. Facial Palsy: 1-->Minor paralysis (flattened nasolabial fold, asymmetry on smiling)  5a. Motor Arm, Left: 0-->No drift, limb holds 90 (or 45) degrees for full 10 secs  5b. Motor Arm, Right: 0-->No drift, limb holds 90 (or 45) degrees for full 10 secs  6a. Motor Leg, Left: 0-->No drift, leg holds 30 degree position for full 5 secs  6b. Motor Leg, Right: 0-->No drift, leg holds 30  degree position for full 5 secs  7. Limb Ataxia: 0-->Absent  8. Sensory: 0-->Normal, no sensory loss  9. Best Language: 0-->No aphasia, normal  10. Dysarthria: 0-->Normal  11. Extinction and Inattention (formerly Neglect): 0-->No abnormality  Total (NIH Stroke Scale): 1       Modified Lanier Score: 1  [place] Coma Scale:    ABCD2 Score:    [id number] Score:   HAS -BLED Score:   ICH Score:   Hunt & De Classification:      Hemorrhagic change of an Ischemic Stroke: Does this patient have an ischemic stroke with hemorrhagic changes? No     Neurologic Chief Complaint: Right facial droop, RUE weakness and slurred speech.     Subjective:     Interval History: NAEON. Hb 9.5 today. WBC 13 however pt afebrile, denies acute complaints; monitoring. Pt c/o dizziness (her reported baseline) as well as mild blurred vision; no other neuro changes, visual fields are intact. Restarted home PRN Meclizine. Pt states she takes Lisinopril occasionally at home if her BP is high; started Lisinopril 5mg Daily. Miconazole powder ordered for areas of redness noted in abdominal folds. Pt is not in favor of d/c to SNF; attempted to call pt's son Angel to discuss if he is comfortable with plan of d/c to home. Attempting to touch base with PT regarding dispo as well.    HPI, Past Medical, Family, and Social History remains the same as documented in the initial encounter.     Review of Systems   Constitutional: Negative for fatigue and fever.   Skin: Negative for pallor and wound.   Neurological: Positive for facial asymmetry and light-headedness. Negative for speech difficulty and weakness.   Psychiatric/Behavioral: Negative for agitation and confusion.     Scheduled Meds:   atorvastatin  80 mg Oral Daily    cholecalciferol (vitamin D3)  5,000 Units Oral Daily    clopidogrel  75 mg Oral Daily    ferrous sulfate  325 mg Oral Daily    gabapentin  300 mg Oral Nightly    heparin (porcine)  5,000 Units Subcutaneous Q8H    levothyroxine  75  mcg Oral Daily    lisinopril  5 mg Oral Daily    miconazole NITRATE 2 %   Topical (Top) BID    pantoprazole  40 mg Oral Daily    sodium chloride 0.9%  3 mL Intravenous Q8H     Continuous Infusions:   sodium chloride 0.9%       PRN Meds:sodium chloride, acetaminophen, aluminum & magnesium hydroxide-simethicone, hydrALAZINE, meclizine, ondansetron, ondansetron, sodium chloride 0.9%    Objective:     Vital Signs (Most Recent):  Temp: 98.5 °F (36.9 °C) (02/24/19 1211)  Pulse: 93 (02/24/19 1211)  Resp: 18 (02/24/19 1211)  BP: 137/63 (02/24/19 1211)  SpO2: (!) 91 % (02/24/19 1211)  BP Location: Left arm    Vital Signs Range (Last 24H):  Temp:  [96.3 °F (35.7 °C)-98.5 °F (36.9 °C)]   Pulse:  [81-95]   Resp:  [18]   BP: (137-168)/(63-78)   SpO2:  [91 %-96 %]   BP Location: Left arm    Physical Exam   Constitutional: She appears well-developed and well-nourished. No distress.   HENT:   Head: Normocephalic and atraumatic.   Eyes: Conjunctivae and EOM are normal.   Cardiovascular: Normal rate.   Pulmonary/Chest: Effort normal. No respiratory distress.   Musculoskeletal: She exhibits no edema or deformity.   Neurological: She is alert. No sensory deficit. She exhibits normal muscle tone. Coordination normal.   See below   Skin: Skin is warm and dry. No pallor.   Psychiatric: She has a normal mood and affect. Her behavior is normal.   Nursing note and vitals reviewed.      Neurological Exam:   LOC: alert  Attention Span: Good   Language: No aphasia  Articulation: No dysarthria  Orientation: Person, Place, Time   Visual Fields: Full  EOM (CN III, IV, VI): Full/intact  Facial Movement (CN VII): Lower facial weakness on the Right  Motor: Arm left  Normal 5/5  Leg left  Normal 5/5  Arm right  Normal 5/5  Leg right Normal 5/5  Cebellar: No evidence of appendicular or axial ataxia  Sensation: Intact to light touch, temperature and vibration  Tone: Normal tone throughout    Laboratory:  CMP:   Recent Labs   Lab 02/24/19  1034    CALCIUM 9.0   *   K 3.9   CO2 22*   CL 98   BUN 15   CREATININE 0.8     CBC:   Recent Labs   Lab 02/24/19  0511   WBC 13.05*   RBC 3.40*   HGB 9.5*   HCT 30.4*      MCV 89   MCH 27.9   MCHC 31.3*     Lipid Panel:   Recent Labs   Lab 02/20/19 2055   CHOL 177   LDLCALC 96.6   HDL 30*   TRIG 252*     Coagulation:   Recent Labs   Lab 02/20/19 2244   INR 0.9   APTT 24.0     Hgb A1C:   Recent Labs   Lab 02/20/19 2244   HGBA1C 5.3     TSH:   Recent Labs   Lab 02/20/19 2055   TSH 2.994       Diagnostic Results     Brain Imaging     CT head 2/20/19  No acute abnormality noted     MRI 2/21/19    Small acute infarcts in the left basal ganglia.   Changes of chronic small vessel ischemic disease and cerebral volume loss.      Vessel Imaging     CTA Head/Neck 2/20/19  Recent infarcts in the left basal ganglia.  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      Cardiac Imaging     TTE 2/21/19  · Concentric left ventricular remodeling.  · Normal left ventricular systolic function. The estimated ejection fraction is 65%  · Grade I (mild) left ventricular diastolic dysfunction consistent with impaired relaxation.  · Normal left atrial pressure.  · Mild left atrial enlargement.  · Mild aortic regurgitation.  · Normal right ventricular systolic function.  · Mild aortic valve stenosis.  · Aortic valve area is 2.18 cm2; peak velocity is 2.39 m/s; mean gradient is 14.14 mmHg.      Melissa Butler PA-C  Comprehensive Stroke Center  Department of Vascular Neurology   [facility]

## 2019-02-24 NOTE — ASSESSMENT & PLAN NOTE
WBC 13 however pt is afebrile, does not appear toxic, and denies acute complaints currently  Will monitor for now

## 2019-02-24 NOTE — ASSESSMENT & PLAN NOTE
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 LKN 18:00, within window for tPA, however patient refused. Per chart review, patient was seen day PTA and diagonsed with a UTI and started on Macrobid. Repeat UA here with no evidence of infection so abx d/c'd.    MRI Brain is with small acute infarcts in the L basal ganglia. 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 Echo demonstrated mild LAE. Stroke etiology not completely clear, but appears embolic/ESUS on imaging. Will plan for 30-day cardiac event monitor following SNF discharge.     Stroke team member discussed level of supervision at home with pt's son by bedside on 2/21. Patient lives with son who works overnight, and he typically takes care of patient's needs during the day but leaves patient at home alone overnight when he works. SNF may be a more safe and appropriate disposition for this pt who is now with decreased mobility from her previous baseline prior to admission, however pt is not in favor of this plan and may refuse.   Attempted to call pt's son Angel to discuss if he is comfortable with a plan of pt d/c to home; no answer, left VM. Attempting to touch base with PT regarding dispo as well; does not appear that pt has yet had OOB assessment this admission.    Discussed with staff; though pt is with vascular abnormalities appreciated on CTA, will d/c ASA and elect for Plavix monotherapy in this pt due to acute anemia. Restarted SQH; planning for outpatient GI work-up if pt's Hb remains stable.       Antithrombotics for secondary stroke prevention: Antiplatelets: Clopidogrel: 75 mg daily  Statins for secondary stroke prevention and hyperlipidemia, if present:   Statins: Atorvastatin- 80 mg  daily  Aggressive risk factor modification: HTN, HLD, Obesity, prior stroke  Rehab efforts: PT/OT/SLP to evaluate and treat; Dispo SNF (vs  without 24-hr supervision?)  Diagnostics ordered/pending: None  VTE prophylaxis: Heparin 5000 units SQ every 8 hours  Place [facility]  BP parameters: SBP < 160

## 2019-02-24 NOTE — ASSESSMENT & PLAN NOTE
-Na 128 > 130 > 129 > 131 > 132 > 129  Last recorded Na in 2018 WNL.  -Urine Na, urine osmol, serum osmol WNL  -250 ml NS bolus given 2/21/19. Repeat Na 131.  Discussed with Hospital Medicine- Hyponatremia possibly 2/2 hypovolemia in the setting of pt's acute Hb drop.   Continue daily BMP; Will discuss any further recs with Hospital Medicine at AM rounds

## 2019-02-24 NOTE — ASSESSMENT & PLAN NOTE
-Area of cytotoxic cerebral edema identified when reviewing brain imaging in the territory of the left middle cerebral artery. There is no mass effect associated with it. We will continue to monitor the patients clinical exam for any worsening of symptoms which may indicate expansion of the stroke or the area of the edema resulting in the clinical change. The pattern is suggestive of an embolic/ESUS etiology.

## 2019-02-25 PROBLEM — K92.1 MELENA: Status: ACTIVE | Noted: 2019-02-25

## 2019-02-25 LAB
ANION GAP SERPL CALC-SCNC: 10 MMOL/L
BASOPHILS # BLD AUTO: 0.11 K/UL
BASOPHILS NFR BLD: 0.7 %
BUN SERPL-MCNC: 26 MG/DL
CALCIUM SERPL-MCNC: 9 MG/DL
CHLORIDE SERPL-SCNC: 97 MMOL/L
CO2 SERPL-SCNC: 22 MMOL/L
CREAT SERPL-MCNC: 1.2 MG/DL
DIFFERENTIAL METHOD: ABNORMAL
EOSINOPHIL # BLD AUTO: 0.4 K/UL
EOSINOPHIL NFR BLD: 2.7 %
ERYTHROCYTE [DISTWIDTH] IN BLOOD BY AUTOMATED COUNT: 16.3 %
EST. GFR  (AFRICAN AMERICAN): 48 ML/MIN/1.73 M^2
EST. GFR  (NON AFRICAN AMERICAN): 41.6 ML/MIN/1.73 M^2
GLUCOSE SERPL-MCNC: 99 MG/DL
HCT VFR BLD AUTO: 27.4 %
HGB BLD-MCNC: 8.8 G/DL
IMM GRANULOCYTES # BLD AUTO: 0.56 K/UL
IMM GRANULOCYTES NFR BLD AUTO: 3.7 %
LYMPHOCYTES # BLD AUTO: 2.4 K/UL
LYMPHOCYTES NFR BLD: 15.6 %
MAGNESIUM SERPL-MCNC: 1.9 MG/DL
MCH RBC QN AUTO: 28.2 PG
MCHC RBC AUTO-ENTMCNC: 32.1 G/DL
MCV RBC AUTO: 88 FL
MONOCYTES # BLD AUTO: 1.5 K/UL
MONOCYTES NFR BLD: 9.8 %
NEUTROPHILS # BLD AUTO: 10.2 K/UL
NEUTROPHILS NFR BLD: 67.5 %
NRBC BLD-RTO: 0 /100 WBC
PHOSPHATE SERPL-MCNC: 5 MG/DL
PLATELET # BLD AUTO: 294 K/UL
PMV BLD AUTO: 10.6 FL
POTASSIUM SERPL-SCNC: 4.2 MMOL/L
RBC # BLD AUTO: 3.12 M/UL
SODIUM SERPL-SCNC: 129 MMOL/L
WBC # BLD AUTO: 15.15 K/UL

## 2019-02-25 PROCEDURE — 85025 COMPLETE CBC W/AUTO DIFF WBC: CPT

## 2019-02-25 PROCEDURE — A4216 STERILE WATER/SALINE, 10 ML: HCPCS | Performed by: NURSE PRACTITIONER

## 2019-02-25 PROCEDURE — 63600175 PHARM REV CODE 636 W HCPCS: Performed by: NURSE PRACTITIONER

## 2019-02-25 PROCEDURE — 84100 ASSAY OF PHOSPHORUS: CPT

## 2019-02-25 PROCEDURE — 36415 COLL VENOUS BLD VENIPUNCTURE: CPT

## 2019-02-25 PROCEDURE — 86580 TB INTRADERMAL TEST: CPT | Performed by: NURSE PRACTITIONER

## 2019-02-25 PROCEDURE — 97110 THERAPEUTIC EXERCISES: CPT

## 2019-02-25 PROCEDURE — 97116 GAIT TRAINING THERAPY: CPT

## 2019-02-25 PROCEDURE — 25000003 PHARM REV CODE 250: Performed by: PHYSICIAN ASSISTANT

## 2019-02-25 PROCEDURE — 99233 PR SUBSEQUENT HOSPITAL CARE,LEVL III: ICD-10-PCS | Mod: ,,, | Performed by: PSYCHIATRY & NEUROLOGY

## 2019-02-25 PROCEDURE — 99222 1ST HOSP IP/OBS MODERATE 55: CPT | Mod: GC,,, | Performed by: INTERNAL MEDICINE

## 2019-02-25 PROCEDURE — 20600001 HC STEP DOWN PRIVATE ROOM

## 2019-02-25 PROCEDURE — 80048 BASIC METABOLIC PNL TOTAL CA: CPT

## 2019-02-25 PROCEDURE — 83735 ASSAY OF MAGNESIUM: CPT

## 2019-02-25 PROCEDURE — 99233 SBSQ HOSP IP/OBS HIGH 50: CPT | Mod: ,,, | Performed by: PSYCHIATRY & NEUROLOGY

## 2019-02-25 PROCEDURE — 97535 SELF CARE MNGMENT TRAINING: CPT

## 2019-02-25 PROCEDURE — 25000003 PHARM REV CODE 250: Performed by: NURSE PRACTITIONER

## 2019-02-25 PROCEDURE — 99222 PR INITIAL HOSPITAL CARE,LEVL II: ICD-10-PCS | Mod: GC,,, | Performed by: INTERNAL MEDICINE

## 2019-02-25 PROCEDURE — 63600175 PHARM REV CODE 636 W HCPCS: Performed by: PHYSICIAN ASSISTANT

## 2019-02-25 RX ADMIN — ATORVASTATIN CALCIUM 80 MG: 20 TABLET, FILM COATED ORAL at 10:02

## 2019-02-25 RX ADMIN — GABAPENTIN 300 MG: 300 CAPSULE ORAL at 09:02

## 2019-02-25 RX ADMIN — HEPARIN SODIUM 5000 UNITS: 5000 INJECTION, SOLUTION INTRAVENOUS; SUBCUTANEOUS at 03:02

## 2019-02-25 RX ADMIN — MICONAZOLE NITRATE: 20 POWDER TOPICAL at 10:02

## 2019-02-25 RX ADMIN — LISINOPRIL 5 MG: 5 TABLET ORAL at 10:02

## 2019-02-25 RX ADMIN — MECLIZINE HYDROCHLORIDE 25 MG: 25 TABLET ORAL at 09:02

## 2019-02-25 RX ADMIN — CLOPIDOGREL 75 MG: 75 TABLET, FILM COATED ORAL at 10:02

## 2019-02-25 RX ADMIN — LEVOTHYROXINE SODIUM 75 MCG: 75 TABLET ORAL at 10:02

## 2019-02-25 RX ADMIN — HEPARIN SODIUM 5000 UNITS: 5000 INJECTION, SOLUTION INTRAVENOUS; SUBCUTANEOUS at 05:02

## 2019-02-25 RX ADMIN — FERROUS SULFATE TAB EC 325 MG (65 MG FE EQUIVALENT) 325 MG: 325 (65 FE) TABLET DELAYED RESPONSE at 10:02

## 2019-02-25 RX ADMIN — Medication 5 UNITS: at 03:02

## 2019-02-25 RX ADMIN — Medication 3 ML: at 02:02

## 2019-02-25 RX ADMIN — Medication 3 ML: at 05:02

## 2019-02-25 RX ADMIN — MICONAZOLE NITRATE: 20 POWDER TOPICAL at 09:02

## 2019-02-25 RX ADMIN — PANTOPRAZOLE SODIUM 40 MG: 40 TABLET, DELAYED RELEASE ORAL at 10:02

## 2019-02-25 RX ADMIN — ONDANSETRON 8 MG: 8 TABLET, ORALLY DISINTEGRATING ORAL at 10:02

## 2019-02-25 RX ADMIN — MECLIZINE HYDROCHLORIDE 25 MG: 25 TABLET ORAL at 06:02

## 2019-02-25 RX ADMIN — CHOLECALCIFEROL TAB 125 MCG (5000 UNIT) 5000 UNITS: 125 TAB at 10:02

## 2019-02-25 RX ADMIN — Medication 3 ML: at 09:02

## 2019-02-25 RX ADMIN — ACETAMINOPHEN 650 MG: 325 TABLET ORAL at 06:02

## 2019-02-25 RX ADMIN — HEPARIN SODIUM 5000 UNITS: 5000 INJECTION, SOLUTION INTRAVENOUS; SUBCUTANEOUS at 09:02

## 2019-02-25 RX ADMIN — ONDANSETRON 8 MG: 8 TABLET, ORALLY DISINTEGRATING ORAL at 06:02

## 2019-02-25 NOTE — SUBJECTIVE & OBJECTIVE
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    Vertebral basilar insufficiency 12/19/2017    Vitamin D deficiency        Past Surgical History:   Procedure Laterality Date    APPENDECTOMY      HIP SURGERY Left 01/12/2016    [facility]    PARATHYROIDECTOMY Right 2012    THYROID SURGERY Right 2012    Partial thyroidectomy       Review of patient's allergies indicates:   Allergen Reactions    Sulfa (sulfonamide antibiotics) Hives     Family History     Problem Relation (Age of Onset)    Diabetes Son    Heart disease Father, Sister, Brother        Tobacco Use    Smoking status: Never Smoker    Smokeless tobacco: Never Used   Substance and Sexual Activity    Alcohol use: Not on file    Drug use: No    Sexual activity: No     Review of Systems   Constitutional: Negative for activity change, appetite change, chills and fever.   HENT: Negative for sore throat and trouble swallowing.    Respiratory: Negative for cough and shortness of breath.    Cardiovascular: Negative for chest pain and leg swelling.   Gastrointestinal: Positive for diarrhea. Negative for abdominal distention, abdominal pain, blood in stool, constipation, nausea and vomiting.   Genitourinary: Negative for decreased urine volume and difficulty urinating.   Musculoskeletal: Negative for arthralgias and back pain.   Skin: Negative for color change and pallor.   Neurological: Positive for dizziness. Negative for light-headedness.   Psychiatric/Behavioral: Negative for agitation and confusion.     Objective:     Vital Signs (Most Recent):  Temp: 98.4 °F (36.9 °C) (02/25/19 1205)  Pulse: 100 (02/25/19 1205)  Resp: 14 (02/25/19 0729)  BP: (!) 117/58 (02/25/19 1205)  SpO2: 96 % (02/25/19 1205) Vital Signs (24h  Range):  Temp:  [98 °F (36.7 °C)-99 °F (37.2 °C)] 98.4 °F (36.9 °C)  Pulse:  [] 100  Resp:  [14-18] 14  SpO2:  [91 %-96 %] 96 %  BP: (112-139)/(52-63) 117/58     Weight: 72.6 kg (160 lb 0 oz) (02/22/19 1117)  Body mass index is 34.62 kg/m².      Intake/Output Summary (Last 24 hours) at 2/25/2019 1523  Last data filed at 2/25/2019 1400  Gross per 24 hour   Intake 1430 ml   Output 975 ml   Net 455 ml       Lines/Drains/Airways     Peripheral Intravenous Line                 Peripheral IV - Single Lumen 02/20/19 Right Antecubital 5 days                Physical Exam   Constitutional: She is oriented to person, place, and time. She appears well-developed and well-nourished. No distress.   Eyes: No scleral icterus.   Cardiovascular: Normal rate and regular rhythm.   Pulmonary/Chest: Effort normal and breath sounds normal.   Abdominal: Soft. Bowel sounds are normal. She exhibits no distension. There is no tenderness. There is no guarding.   Musculoskeletal: She exhibits no edema.   Neurological: She is alert and oriented to person, place, and time.   Skin: Skin is warm and dry.   Psychiatric: She has a normal mood and affect.   Vitals reviewed.      Significant Labs:  CBC:   Recent Labs   Lab 02/23/19  1637 02/24/19  0511 02/25/19  0437   WBC 11.44 13.05* 15.15*   HGB 8.9* 9.5* 8.8*   HCT 27.3* 30.4* 27.4*    273 294     CMP:   Recent Labs   Lab 02/25/19  0437   GLU 99   CALCIUM 9.0   *   K 4.2   CO2 22*   CL 97   BUN 26*   CREATININE 1.2     Coagulation: No results for input(s): PT, INR, APTT in the last 48 hours.

## 2019-02-25 NOTE — PT/OT/SLP PROGRESS
Occupational Therapy   Treatment    Name: Linda Hylton  MRN: [id number]  Admitting Diagnosis:  Embolic stroke involving left middle cerebral artery       Recommendations:     Discharge Recommendations: nursing facility, skilled  Discharge Equipment Recommendations:  (TBD at next level of care)  Barriers to discharge:  Inaccessible home environment    Assessment:     Linda Hylton is a 84 y.o. female with a medical diagnosis of Embolic stroke involving left middle cerebral artery.  She presents with performance deficits affecting occupational function including the following: weakness, impaired self care skills, impaired balance, decreased safety awareness, decreased ROM, pain, impaired functional mobilty, impaired endurance, decreased upper extremity function, decreased lower extremity function, gait instability, impaired fine motor.  Pt continues to have pain limiting functional mobility, but is agreeable to therapy.  Pt would continue to benefit from receiving skilled therapy services for addressing noted deficit areas for improving overall independence on d/c.     Rehab Prognosis:  Fair; patient would benefit from acute skilled OT services to address these deficits and reach maximum level of function.       Plan:     Patient to be seen 3 x/week to address the above listed problems via self-care/home management, therapeutic activities, therapeutic exercises  · Plan of Care Expires: 03/22/19  · Plan of Care Reviewed with: patient    Subjective     Pain/Comfort:  · Pain Rating 1: (pain not rated)  · Location - Side 1: Bilateral  · Location - Orientation 1: generalized  · Location 1: perirectal  · Pain Addressed 1: Reposition, Distraction  · Pain Rating Post-Intervention 1: 0/10    Objective:     Communicated with: RN prior to session.  Patient found supine on bed pan with pulse ox (continuous), telemetry, bed alarm, PureWick, peripheral IV upon OT entry to room.    General Precautions: Standard, aspiration, fall    Orthopedic Precautions:N/A   Braces: N/A     Occupational Performance:     Bed Mobility:    · Patient completed Rolling/Turning to Right with stand by assistance  · Patient completed Scooting/Bridging with stand by assistance  · Patient completed Supine to Sit with stand by assistance     Functional Mobility/Transfers:  · Patient completed Sit <> Stand Transfer from EOB with contact guard assistance  with  rolling walker   · Patient completed Bed <> Chair Transfer using Stand Pivot technique from EOB with contact guard assistance with rolling walker  Functional Mobility: Pt performed limited functional mobility in personal room from EOB to bedside chair with RW and CGA (~6 ft.).  Pt provided VC's for upright posture/trunk extension and appropriate technique with stand-to-sit transfer at chair.    Activities of Daily Living:  · Toileting: total assistance in sidelying on bed for toileting and A with wiping buttocks region for cleansing      AMPAC 6 Click ADL: 13    Treatment & Education:  Pt educated on importance of OOB activity.   Pt performed 2 sets x 10 reps of the following BUE AROM exercises: shoulder flex/ext, shoulder forward press, elbow flex/ext   Pt provided BLE cushion support for therapeutic exercise in bedside chair.   Pt cued to take rest break in between sets, and educated on pursed lip breathing technique for energy conservation.          Patient left up in chair with all lines intact, call button in reach, chair alarm on and nurse notifiedEducation:      GOALS:   Multidisciplinary Problems     Occupational Therapy Goals        Problem: Occupational Therapy Goal    Goal Priority Disciplines Outcome Interventions   Occupational Therapy Goal     OT, PT/OT Ongoing (interventions implemented as appropriate)    Description:  Goals to be met by: 3/1/19     Patient will increase functional independence with ADLs by performing:    Grooming while seated with Minimal Assistance.  Toileting from bedside  commode with Supervision for hygiene and clothing management.   Supine to sit with Modified [place].  Stand pivot transfers with Modified [place].  Toilet transfer to bedside commode with Stand-by Assistance.  Upper extremity exercise program 3 sets x10 reps per handout, with independence.  Pt will ambulate functional, household distance with CGA and AE as needed.                         Time Tracking:     OT Date of Treatment: 02/25/19  OT Start Time: 1306  OT Stop Time: 1330  OT Total Time (min): 24 min    Billable Minutes:Self Care/Home Management 15  Therapeutic Exercise 9    ZHENG Crum  2/25/2019

## 2019-02-25 NOTE — PT/OT/SLP PROGRESS
"Physical Therapy Treatment    Patient Name: Linda Hylton  MRN: [id number]   Diagnosis: Embolic stroke involving left middle cerebral artery    Recommendations:     Discharge Recommendations:  ( SNF)   Discharge Equipment Recommendations: (TBD)   Barriers to Discharge: decreased caregiver supervision    Assessment:   Linda Hylton is a 84 y.o. female admitted with a medical diagnosis of Embolic stroke involving left middle cerebral artery.  Presents with the following functional limitations/impairments: weakness, impaired endurance, impaired self care skills, decreased upper extremity function, decreased lower extremity function, impaired functional mobilty, decreased ROM, decreased coordination, pain, decreased safety awareness.  Pt performed well in therapy although she complained of nausea and dizziness. During gait trials, pt demonstrated impairments resulting in increased risk for falls and limited endurance. She would benefit from skilled PT in order to improve strength and endurance for OOB activity. Discharge recommendation to [facility] for intensive rehabilitation.    Rehab Prognosis:  good; patient would benefit from acute skilled PT services to address these deficits and reach maximum level of function.      Subjective   PT communicated with nursing prior to therapy.     Patient comments/goals: "I feel dizzy, the room is spinning."  Pain/Comfort:  · Pain Rating 1: (pain not rated)  · Location - Side 1: Left  · Location 1: leg(related to pre-existing sciatica)  · Pain Addressed 1: Nurse notified, Distraction  · Pain Rating Post-Intervention 1: (pain not rated)    Recent Vital Signs: (Last documentation)  Pulse: 86 (02/25/19 0746)  BP: (!) 117/56 (02/25/19 0729)  SpO2: (!) 91 % (02/25/19 0729)     Objective:   General Precautions: aspiration, fall  Recent Surgery: * No surgery found *    The patient currently has pulse ox (continuous), telemetry, bed alarm.    The patient was found supine in bed with " nursing present.    Functional Mobility:  Bed Mobility:   · Supine to Sit with HOB elevated: mod A x1     Sitting Balance at Edge of Bed:  · Assistance Level Required: SBA  · Time: 10 minutes  · Postural deviations noted: rounded shoulders and forward head posture  · PT Encouraged/facilitated: full upright posture while maintaining balance without UE support in order to initiate in scooting.      Transfers:   · Sit <> Stand Transfer x4: min A  · Pt demonstrated good strength but unable to perform full anterior weight shift during initiation resulting in posterior weight shift when standing.  · PT provided assistance from low seat height and VCs to push from chair with UEs.    Gait:  Gait x 6; x12; x10 feet with min A for balance and RW  · Pt demonstrated wide MARY JO, decreased foot clearance bilaterally, L Trendelenberg, decreased step length on R, and decreased gait speed.  · PT encouraged or facilitated pt to walk within RW in order to better assist gait with use of BUEs and take a larger step bilaterally to facilitate more normal gait pattern.    Therapeutic Activities, Education, or Exercises:  Time was provided for active listening, discussion of health disposition, and discussion of safe discharge recommendations. Therapist answered questions to patient/familys satisfaction within scope of practice.  Patient and family are aware of patient's deficits and therapy progression. White board updated to reflect current level of assistance.    The patient was left sitting in chair with chair alarm on, nurse notified, and call bell within reach.     FUNCTIONAL OUTCOME MEASURES:  Turning over in bed (including adjusting bedclothes, sheets and blankets)?: 4  Sitting down on and standing up from a chair with arms (e.g., wheelchair, bedside commode, etc.): 3  Moving from lying on back to sitting on the side of the bed?: 4  Moving to and from a bed to a chair (including a wheelchair)?: 3  Need to walk in hospital room?:  3  Climbing 3-5 steps with a railing?: 2  Basic Mobility Total Score: 19    Goals:     Multidisciplinary Problems     Physical Therapy Goals        Problem: Physical Therapy Goal    Goal Priority Disciplines Outcome Goal Variances Interventions   Physical Therapy Goal     PT, PT/OT Ongoing (interventions implemented as appropriate)     Description:    Goals to be met by 2/28/2019    1. Pt will perform rolling to the R and L independently.   2. Pt will perform supine to sit from both sides of the bed independently.  3. Pt will perform sit to supine independently.  4. Pt will perform sit to stand transfers independently.    5. Pt will perform bed <> chair transfers with mod I using RW.  6. Pt will perform gait x 50 feet with RW and CGA.  7. Pt will sit EOB x 20 minutes with no LOB while performing dynamic UE tasks to prepare for functional tasks in sitting.   8. Pt will stand  x 20 minutes with no LOB while performing dynamic UE tasks to prepare for functional tasks in standing.                       Plan:   During this hospitalization, patient to be seen 4 x/week to address their physical therapy related impairments and improve their overall level of function.   · Plan of Care Expires: 03/21/19   Plan of Care Reviewed with: patient    This plan of care has been discussed with the patient and/or family who were involved in its development and are in agreement with the identified goals and treatment plan.     Time Tracking:     PT Received On:  02/25/19  PT Start Time:   0954    PT Stop Time:  1022  PT Total Time (min): 28 min     Billable Minutes: Gait Training 28     Modesta Richardson, NORBERTO  2/25/2019

## 2019-02-25 NOTE — ASSESSMENT & PLAN NOTE
83 yo F with HTN, hypothyroidism, prior stroke 2017, and aortic stenosis who presented on 2/20 with right facial droop. Patient found to have small acute infarcts in left basal ganglia. Per nursing, patient with black tarry stools and anemia. We would like to evaluate further with an EGD however patient last received plavix this morning. In order to proceed with a therapeutic EGD will need plavix to be held 5 days prior to procedure.     Recommendations:  - Ok to continue aspirin   - Hold plavix  - Plan for EGD Friday unless patient becomes unstable prior to then  - Continue PPI IV BID

## 2019-02-25 NOTE — SUBJECTIVE & OBJECTIVE
Neurologic Chief Complaint: Right facial droop, RUE weakness and slurred speech.     Subjective:     Interval History: No acute events overnight. Hb 8.8. Patient had dark and tarry stool this morning. GI consulted. Will hold Plavix to have scope done on Friday. WBCs trending upward. Pt's son updated on POC.     HPI, Past Medical, Family, and Social History remains the same as documented in the initial encounter.     Review of Systems   Constitutional: Negative for fatigue and fever.   Skin: Negative for pallor and wound.   Neurological: Positive for facial asymmetry and light-headedness. Negative for speech difficulty and weakness.   Psychiatric/Behavioral: Negative for agitation and confusion.     Scheduled Meds:   atorvastatin  80 mg Oral Daily    cholecalciferol (vitamin D3)  5,000 Units Oral Daily    ferrous sulfate  325 mg Oral Daily    gabapentin  300 mg Oral Nightly    heparin (porcine)  5,000 Units Subcutaneous Q8H    levothyroxine  75 mcg Oral Daily    lisinopril  5 mg Oral Daily    miconazole NITRATE 2 %   Topical (Top) BID    pantoprazole  40 mg Oral Daily    sodium chloride 0.9%  3 mL Intravenous Q8H    tuberculin  5 Units Intradermal Once     Continuous Infusions:   sodium chloride 0.9%       PRN Meds:sodium chloride, acetaminophen, aluminum & magnesium hydroxide-simethicone, hydrALAZINE, meclizine, ondansetron, ondansetron, sodium chloride 0.9%    Objective:     Vital Signs (Most Recent):  Temp: 98.4 °F (36.9 °C) (02/25/19 1205)  Pulse: 100 (02/25/19 1205)  Resp: 14 (02/25/19 0729)  BP: (!) 117/58 (02/25/19 1205)  SpO2: 96 % (02/25/19 1205)  BP Location: Left arm    Vital Signs Range (Last 24H):  Temp:  [98 °F (36.7 °C)-99 °F (37.2 °C)]   Pulse:  []   Resp:  [14-18]   BP: (112-139)/(52-63)   SpO2:  [91 %-96 %]   BP Location: Left arm    Physical Exam   Constitutional: She appears well-developed and well-nourished. No distress.   HENT:   Head: Normocephalic and atraumatic.   Eyes:  Conjunctivae and EOM are normal.   Cardiovascular: Normal rate.   Pulmonary/Chest: Effort normal. No respiratory distress.   Musculoskeletal: She exhibits no edema or deformity.   Neurological: She is alert. No sensory deficit. She exhibits normal muscle tone. Coordination normal.   See below   Skin: Skin is warm and dry. No pallor.   Psychiatric: She has a normal mood and affect. Her behavior is normal.   Nursing note and vitals reviewed.      Neurological Exam:   LOC: alert  Attention Span: Good   Language: No aphasia  Articulation: No dysarthria  Orientation: Person, Place, Time   Visual Fields: Full  EOM (CN III, IV, VI): Full/intact  Facial Movement (CN VII): Lower facial weakness on the Right  Motor: Arm left  Normal 5/5  Leg left  Normal 5/5  Arm right  Normal 5/5  Leg right Normal 5/5  Cebellar: No evidence of appendicular or axial ataxia  Sensation: Intact to light touch  Tone: Normal tone throughout    Laboratory:  CMP:   Recent Labs   Lab 02/25/19  0437   CALCIUM 9.0   *   K 4.2   CO2 22*   CL 97   BUN 26*   CREATININE 1.2     CBC:   Recent Labs   Lab 02/25/19  0437   WBC 15.15*   RBC 3.12*   HGB 8.8*   HCT 27.4*      MCV 88   MCH 28.2   MCHC 32.1     Lipid Panel:   Recent Labs   Lab 02/20/19  2055   CHOL 177   LDLCALC 96.6   HDL 30*   TRIG 252*     Coagulation:   Recent Labs   Lab 02/20/19  2244   INR 0.9   APTT 24.0     Hgb A1C:   Recent Labs   Lab 02/20/19  2244   HGBA1C 5.3     TSH:   Recent Labs   Lab 02/20/19  2055   TSH 2.994       Diagnostic Results     Brain Imaging     CT head 2/20/19  No acute abnormality noted     MRI 2/21/19    Small acute infarcts in the left basal ganglia.   Changes of chronic small vessel ischemic disease and cerebral volume loss.      Vessel Imaging     CTA Head/Neck 2/20/19  Recent infarcts in the left basal ganglia.  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      Cardiac Imaging     TTE 2/21/19  · Concentric left ventricular remodeling.  · Normal left ventricular systolic function. The estimated ejection fraction is 65%  · Grade I (mild) left ventricular diastolic dysfunction consistent with impaired relaxation.  · Normal left atrial pressure.  · Mild left atrial enlargement.  · Mild aortic regurgitation.  · Normal right ventricular systolic function.  · Mild aortic valve stenosis.  · Aortic valve area is 2.18 cm2; peak velocity is 2.39 m/s; mean gradient is 14.14 mmHg.

## 2019-02-25 NOTE — PLAN OF CARE
Problem: Occupational Therapy Goal  Goal: Occupational Therapy Goal  Goals to be met by: 3/1/19     Patient will increase functional independence with ADLs by performing:    Grooming while seated with Minimal Assistance.  Toileting from bedside commode with Supervision for hygiene and clothing management.   Supine to sit with Modified [place].  Stand pivot transfers with Modified [place].  Toilet transfer to bedside commode with Stand-by Assistance.  Upper extremity exercise program 3 sets x10 reps per handout, with independence.  Pt will ambulate functional, household distance with CGA and AE as needed.        Outcome: Ongoing (interventions implemented as appropriate)  Continue OT POC.

## 2019-02-25 NOTE — HPI
This is an 83 yo F with HTN, hypothyroidism, prior stroke 2017, and aortic stenosis who presented on 2/20 with right facial droop. Patient found to have small acute infarcts in left basal ganglia. GI consulted for concern for melena and anemia. Per nursing, patient has been having black, tarry stools for the past day. She has also had low blood counts since being here with Hg as low as 6.5. Patient has been on aspirin and plavix, both of which were stopped yesterday. She does not take any NSAIDs. She does not report any abdominal pain. She notes that her stools are dark but thinks it is because of her iron pills. Denies BRBPR. She reports having had a few colonoscopies (not done here) which were notable for polyps. She does complain of some dizziness today but is otherwise feeling well.

## 2019-02-25 NOTE — ASSESSMENT & PLAN NOTE
WBC 13 however pt is afebrile, does not appear toxic, and denies acute complaints currently    02/25- WBCs continue to trend upward now 15. Patient remains afebrile, not toxic appearing with no acute complaints.   Will monitor for now

## 2019-02-25 NOTE — PT/OT/SLP PROGRESS
Physical Therapy      Patient Name:  Linda Hylton   MRN:  [id number]    Patient not seen today secondary to pt eating breakfast and subjective complaints of nausea following meals. Will follow-up as schedule permits.    Mindy Richardson, SPT   2/25/2019

## 2019-02-25 NOTE — ASSESSMENT & PLAN NOTE
Patient had dark and tarry stool this morning. GI consulted. Will hold Plavix to have scope done on Friday. Pt's son updated on POC.

## 2019-02-25 NOTE — ASSESSMENT & PLAN NOTE
Hb 8 > 8 > 6.5. Pt appears pale and c/o light-headedness but is neurologically stable.   Ordered type & screen and 1U pRBCs, consent obtained on 2/22/19. SQH held; SCDs ordered.  Started PPI. + FOBT result. Anemia workup largely WNL with TIBC mildly elevated and iron sat 7%.  Pt s/p 1U pRBCs on 2/22 with good response; Hb 8.8 with pallor improved  Restarted SQH; will d/c ASA and continue with Plavix monotherapy for now   Hb 9.5 on 2/24.    02/25-Hb 8.8. Patient had dark and tarry stool this morning. GI consulted. Will hold Plavix to have scope done on Friday.

## 2019-02-25 NOTE — ASSESSMENT & PLAN NOTE
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 LKN 18:00, within window for tPA, however patient refused. Per chart review, patient was seen day PTA and diagonsed with a UTI and started on Macrobid. Repeat UA here with no evidence of infection so abx d/c'd.    MRI Brain is with small acute infarcts in the L basal ganglia. 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 Echo demonstrated mild LAE. Stroke etiology not completely clear, but appears embolic/ESUS on imaging. Will plan for 30-day cardiac event monitor following SNF discharge.     Stroke team member discussed level of supervision at home with pt's son by bedside on 2/21. Patient lives with son who works overnight, and he typically takes care of patient's needs during the day but leaves patient at home alone overnight when he works. SNF may be a more safe and appropriate disposition for this pt who is now with decreased mobility from her previous baseline prior to admission, however pt is not in favor of this plan and may refuse.  02/25- Patient now amendable to plan to discharge to facility.     02/25- Hb down from 9.5 to 8.8. Patient had dark and tarry stool this morning. GI consulted. Will hold Plavix to have scope done on Friday. Pt's son updated on POC.     Antithrombotics for secondary stroke prevention: Antiplatelets: On hold for possible GI scope   Statins for secondary stroke prevention and hyperlipidemia, if present:   Statins: Atorvastatin- 80 mg daily  Aggressive risk factor modification: HTN, HLD, Obesity, prior stroke  Rehab efforts: PT/OT/SLP to evaluate and treat; Dispo SNF (vs HH without 24-hr supervision?)  Diagnostics ordered/pending: None   VTE prophylaxis: Heparin 5000 units SQ  every 8 hours  [facility]   BP parameters: SBP < 160

## 2019-02-25 NOTE — PLAN OF CARE
Problem: Physical Therapy Goal  Goal: Physical Therapy Goal    Goals to be met by 2/28/2019    1. Pt will perform rolling to the R and L independently.   2. Pt will perform supine to sit from both sides of the bed independently.  3. Pt will perform sit to supine independently.  4. Pt will perform sit to stand transfers independently.    5. Pt will perform bed <> chair transfers with mod I using RW.  6. Pt will perform gait x 50 feet with RW and CGA.  7. Pt will sit EOB x 20 minutes with no LOB while performing dynamic UE tasks to prepare for functional tasks in sitting.   8. Pt will stand  x 20 minutes with no LOB while performing dynamic UE tasks to prepare for functional tasks in standing.      Outcome: Ongoing (interventions implemented as appropriate)  Patient participated well in therapy.  POC and goals remain appropriate.  Please refer to the progress note for functional mobility.     Pt is safe to perform transfers to chair with nursing using RW and min A for balance using stand step technique.     Modesta Richardson, SPT  2/25/2019

## 2019-02-25 NOTE — PLAN OF CARE
02/25/19 1032   Post-Acute Status   Post-Acute Authorization Placement   Post-Acute Placement Status Referrals Sent   CM spoke to patient's son Tavo in reference to discharge planning and SNF. Explained SNF to him and he is concerned about the financial part of it. He is worried that the insurance will not cover part of it and is concerned about being able for afford any co-pay. He would like referral sent to [facility] and have them contact him directly to talk about the financial part of SNF and how many days insurance covers, etc. If unable to afford co-pay, he states patient will need to return home and resume HH. Sending referral in [place] to [facility]. CM will contact them to see if they can check patient's benefits and then contact patient's son to notify.

## 2019-02-25 NOTE — ASSESSMENT & PLAN NOTE
Stroke risk factor  SBP <160  -Pt states she checks her BP twice daily at home and takes Lisinopril occasionally if her SBP < 150. Lisinopril 5mg started on 02/25.  -BP at goal for last 24 hours.  Will continue monitoring

## 2019-02-25 NOTE — CONSULTS
[facility]  Gastroenterology  Consult Note    Patient Name: Linda Hylton  MRN: [id number]  Admission Date: 2/20/2019  Hospital Length of Stay: 5 days  Code Status: Full Code   Attending Provider: Guille Haynes MD   Consulting Provider: Angelito Marie MD  Primary Care Physician: Yakov Barrett MD  Principal Problem:Embolic stroke involving left middle cerebral artery    Inpatient consult to Gastroenterology  Consult performed by: Angelito Marie MD  Consult ordered by: Tasha Man NP        Subjective:     HPI:  This is an 85 yo F with HTN, hypothyroidism, prior stroke 2017, and aortic stenosis who presented on 2/20 with right facial droop. Patient found to have small acute infarcts in left basal ganglia. GI consulted for concern for melena and anemia. Per nursing, patient has been having black, tarry stools for the past day. She has also had low blood counts since being here with Hg as low as 6.5. Patient has been on aspirin and plavix, both of which were stopped yesterday. She does not take any NSAIDs. She does not report any abdominal pain. She notes that her stools are dark but thinks it is because of her iron pills. Denies BRBPR. She reports having had a few colonoscopies (not done here) which were notable for polyps. She does complain of some dizziness today but is otherwise feeling well.        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    Vertebral basilar insufficiency 12/19/2017    Vitamin D deficiency        Past Surgical History:   Procedure Laterality Date    APPENDECTOMY      [facility]12/2016    [facility]    PARATHYROIDECTOMY Right 2012    THYROID SURGERY Right 2012    Partial thyroidectomy       Review of  patient's allergies indicates:   Allergen Reactions    Sulfa (sulfonamide antibiotics) Hives     Family History     Problem Relation (Age of Onset)    Diabetes Son    Heart disease Father, Sister, Brother        Tobacco Use    Smoking status: Never Smoker    Smokeless tobacco: Never Used   Substance and Sexual Activity    Alcohol use: Not on file    Drug use: No    Sexual activity: No     Review of Systems   Constitutional: Negative for activity change, appetite change, chills and fever.   HENT: Negative for sore throat and trouble swallowing.    Respiratory: Negative for cough and shortness of breath.    Cardiovascular: Negative for chest pain and leg swelling.   Gastrointestinal: Positive for diarrhea. Negative for abdominal distention, abdominal pain, blood in stool, constipation, nausea and vomiting.   Genitourinary: Negative for decreased urine volume and difficulty urinating.   Musculoskeletal: Negative for arthralgias and back pain.   Skin: Negative for color change and pallor.   Neurological: Positive for dizziness. Negative for light-headedness.   Psychiatric/Behavioral: Negative for agitation and confusion.     Objective:     Vital Signs (Most Recent):  Temp: 98.4 °F (36.9 °C) (02/25/19 1205)  Pulse: 100 (02/25/19 1205)  Resp: 14 (02/25/19 0729)  BP: (!) 117/58 (02/25/19 1205)  SpO2: 96 % (02/25/19 1205) Vital Signs (24h Range):  Temp:  [98 °F (36.7 °C)-99 °F (37.2 °C)] 98.4 °F (36.9 °C)  Pulse:  [] 100  Resp:  [14-18] 14  SpO2:  [91 %-96 %] 96 %  BP: (112-139)/(52-63) 117/58     Weight: 72.6 kg (160 lb 0 oz) (02/22/19 1117)  Body mass index is 34.62 kg/m².      Intake/Output Summary (Last 24 hours) at 2/25/2019 1523  Last data filed at 2/25/2019 1400  Gross per 24 hour   Intake 1430 ml   Output 975 ml   Net 455 ml       Lines/Drains/Airways     Peripheral Intravenous Line                 Peripheral IV - Single Lumen 02/20/19 Right Antecubital 5 days                Physical Exam    Constitutional: She is oriented to person, place, and time. She appears well-developed and well-nourished. No distress.   Eyes: No scleral icterus.   Cardiovascular: Normal rate and regular rhythm.   Pulmonary/Chest: Effort normal and breath sounds normal.   Abdominal: Soft. Bowel sounds are normal. She exhibits no distension. There is no tenderness. There is no guarding.   Musculoskeletal: She exhibits no edema.   Neurological: She is alert and oriented to person, place, and time.   Skin: Skin is warm and dry.   Psychiatric: She has a normal mood and affect.   Vitals reviewed.      Significant Labs:  CBC:   Recent Labs   Lab 02/23/19  1637 02/24/19  0511 02/25/19  0437   WBC 11.44 13.05* 15.15*   HGB 8.9* 9.5* 8.8*   HCT 27.3* 30.4* 27.4*    273 294     CMP:   Recent Labs   Lab 02/25/19  0437   GLU 99   CALCIUM 9.0   *   K 4.2   CO2 22*   CL 97   BUN 26*   CREATININE 1.2     Coagulation: No results for input(s): PT, INR, APTT in the last 48 hours.      Assessment/Plan:     Acute blood loss anemia    83 yo F with HTN, hypothyroidism, prior stroke 2017, and aortic stenosis who presented on 2/20 with right facial droop. Patient found to have small acute infarcts in left basal ganglia. Per nursing, patient with black tarry stools and anemia. We would like to evaluate further with an EGD however patient last received plavix this morning. In order to proceed with a therapeutic EGD will need plavix to be held 5 days prior to procedure.     Recommendations:  - Ok to continue aspirin   - Hold plavix  - Plan for EGD Friday unless patient becomes unstable prior to then  - Continue PPI IV BID         Thank you for your consult. I will follow-up with patient. Please contact us if you have any additional questions.    Angelito Marie MD  Gastroenterology  [facility]

## 2019-02-25 NOTE — PLAN OF CARE
02/25/19 1439   Post-Acute Status   Post-Acute Authorization Placement       142 Received and sent to [facility].    Deacon Eddy LMSW  [facility]   [phone]

## 2019-02-25 NOTE — PLAN OF CARE
02/25/19 1105   Post-Acute Status   Post-Acute Authorization Placement   Post-Acute Placement Status Referrals Sent       Pt chose [facility]. Faxed information to them. ![phone] called in awaiting for it to be sent to SW. Once received will send to NH.  SW in contact with CM and Medical staff. Will continue to follow and offer support as needed.     Deacon Eddy LMSW  [facility]   Ext. [phone]

## 2019-02-25 NOTE — PROGRESS NOTES
[facility]  Vascular Neurology  Comprehensive Stroke Center  Progress Note    Assessment/Plan:     * Embolic stroke involving left middle cerebral artery    84 year old female with past medical hx of HTN, hypothyroidism, prior R vermis stroke in 3/2017, and aortic stenosis presented to the ED with complains of right facial droop. LKN 18:00, within window for tPA, however patient refused. Per chart review, patient was seen day PTA and diagonsed with a UTI and started on Macrobid. Repeat UA here with no evidence of infection so abx d/c'd.    MRI Brain is with small acute infarcts in the L basal ganglia. 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 Echo demonstrated mild LAE. Stroke etiology not completely clear, but appears embolic/ESUS on imaging. Will plan for 30-day cardiac event monitor following SNF discharge.     Stroke team member discussed level of supervision at home with pt's son by bedside on 2/21. Patient lives with son who works overnight, and he typically takes care of patient's needs during the day but leaves patient at home alone overnight when he works. SNF may be a more safe and appropriate disposition for this pt who is now with decreased mobility from her previous baseline prior to admission, however pt is not in favor of this plan and may refuse.  02/25- Patient now amendable to plan to discharge to facility.     02/25- Hb down from 9.5 to 8.8. Patient had dark and tarry stool this morning. GI consulted. Will hold Plavix to have scope done on Friday. Pt's son updated on POC.     Antithrombotics for secondary stroke prevention: Antiplatelets: On hold for possible GI scope   Statins for secondary stroke prevention and hyperlipidemia, if present:   Statins: Atorvastatin- 80 mg daily  Aggressive risk factor modification: HTN, HLD, Obesity, prior  stroke  Rehab efforts: PT/OT/SLP to evaluate and treat; Dispo SNF (vs HH without 24-hr supervision?)  Diagnostics ordered/pending: None   VTE prophylaxis: Heparin 5000 units SQ every 8 hours  Place [facility]   BP parameters: SBP < 160     Melena     Patient had dark and tarry stool this morning. GI consulted. Will hold Plavix to have scope done on Friday. Pt's son updated on POC.      Leukocytosis    WBC 13 however pt is afebrile, does not appear toxic, and denies acute complaints currently    02/25- WBCs continue to trend upward now 15. Patient remains afebrile, not toxic appearing with no acute complaints.   Will monitor for now     Acute blood loss anemia    Hb 8 > 8 > 6.5. Pt appears pale and c/o light-headedness but is neurologically stable.   Ordered type & screen and 1U pRBCs, consent obtained on 2/22/19. SQH held; SCDs ordered.  Started PPI. + FOBT result. Anemia workup largely WNL with TIBC mildly elevated and iron sat 7%.  Pt s/p 1U pRBCs on 2/22 with good response; Hb 8.8 with pallor improved  Restarted SQH; will d/c ASA and continue with Plavix monotherapy for now   Hb 9.5 on 2/24.    02/25-Hb 8.8. Patient had dark and tarry stool this morning. GI consulted. Will hold Plavix to have scope done on Friday.      Nodule of apex of right lung    CTA head/neck on 2/21/19 shows stable 9 mm noncalcified nodule in the right lung apex.   Per family over the phone, pt has no tobacco use history.  Discussed with Hospital Medicine - No acute intervention needed at this time. PCP to monitor and consider CT Chest in 6 months.     Cytotoxic cerebral edema    -Area of cytotoxic cerebral edema identified when reviewing brain imaging in the territory of the left middle cerebral artery. There is no mass effect associated with it. We will continue to monitor the patients clinical exam for any worsening of symptoms which may indicate expansion of the stroke or the area of the edema resulting in the clinical change. The pattern is  suggestive of an embolic/ESUS etiology.     Hyponatremia    -Na 128 > 130 > 129 > 131 > 132 > 129  Last recorded Na in 2018 WNL.  -Urine Na, urine osmol, serum osmol WNL  -250 ml NS bolus given 2/21/19. Repeat Na 131.  Discussed with Hospital Medicine- Hyponatremia possibly 2/2 hypovolemia in the setting of pt's acute Hb drop.   Continue daily BMP; Will discuss any further recs with Hospital Medicine at AM rounds    02/25- Discussed hyponatremia with hospital medicine, patient may be hyponatremic at baseline. Will continue to monitor and appreciate recs from hospital medicine.     Facial droop    Initial presentation, due to stroke  Continue PT/OT     History of stroke    Stroke risk factor  3/2017 - diagnosed with R cerebellar infarct. Was on DAPT and Lipitor 40 outpatient.  Patient states she had no significant stroke deficits (dizziness?)     Mixed hyperlipidemia    Stroke risk factor  LDL 96.6  Patient taking Atorvastatin 40 at home; Increased dose to 80 mg Daily     Dizziness    Pt c/o dizziness/light-headedness which she states she has at baseline; Reports no acute change currently  Also c/o mild blurred vision but no other neuro changes appreciated and visual fields are intact  Restarted home PRN Meclizine  Will continue to monitor     Essential hypertension    Stroke risk factor  SBP <160  -Pt states she checks her BP twice daily at home and takes Lisinopril occasionally if her SBP < 150. Lisinopril 5mg started on 02/25.  -BP at goal for last 24 hours.  Will continue monitoring          02/20/2019 MRI brain positive for stroke.  Admitted to Vascular Neurology.  02/21/2019- Na 130-->129. 250 ml NS bolus ordered. Repeat Na lab pending. CTA head/neck with recent infarcts in the left basal ganglia. 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 Echo EF 65% with mild left atrial enlargement. SLP recs for mechanical soft diet. PT/OT recs for possible rehab or SNF.    2/22: Hb 6.5; pt appears pale, c/o light-headedness but is neurologically stable. Ordered type & screen and 1U pRBCs, consent obtained. Will repeat CBC following transfusion. Started PPI and FOBT ordered (needs to be collected.) Monitoring Na.  2/23: Patient s/p 1U pRBCs with good response; no pallor or light-headedness today and Hb 8.8. Restarted SQH; will d/c ASA and continue with monotherapy (Plavix). FOBT+; will monitor Hb for now and plan for outpatient GI work-up if Hb remains stable. Monitoring BP.  2/24: Hb 9.5 today. WBC 13 however pt afebrile, denies acute complaints; monitoring. Pt c/o dizziness (her reported baseline) as well as mild blurred vision; no other neuro changes, visual fields are intact. Restarted home PRN Meclizine. Pt states she takes Lisinopril occasionally at home if her BP is high; started Lisinopril 5mg Daily. Miconazole powder ordered for areas of redness noted in abdominal folds. Pt is not in favor of d/c to SNF; attempted to call pt's son Angel to discuss if he is comfortable with plan of d/c to home. Attempting to touch base with PT regarding dispo as well.  02/25/2019: Hb 8.8. Patient had dark and tarry stool this morning. GI consulted. Will hold Plavix to have scope done on Friday. Pt's son updated on POC.     STROKE DOCUMENTATION   Acute Stroke Times   Last Known Normal Date: 02/20/19  Last Known Normal Time: 1800  Symptom Onset Date: 02/20/19  Symptom Onset Time: 1800  Stroke Team Called Date: 02/20/19  Stroke Team Called Time: 2050  Stroke Team Arrival Date: 02/20/19  Stroke Team Arrival Time: 2052  CT Interpretation Time: 2100  Decision to Treat Time for Alteplase: 2110(Patient refused )  Decision to Treat Time for IR: (NA)    NIH Scale:          Modified Weston Score: 1  [place] Coma Scale:    ABCD2 Score:    [id number] Score:   HAS -BLED Score:   ICH Score:   Hunt & De Classification:      Hemorrhagic change of an Ischemic Stroke: Does this patient have an ischemic  stroke with hemorrhagic changes? No     Neurologic Chief Complaint: Right facial droop, RUE weakness and slurred speech.     Subjective:     Interval History: No acute events overnight. Hb 8.8. Patient had dark and tarry stool this morning. GI consulted. Will hold Plavix to have scope done on Friday. WBCs trending upward. Pt's son updated on POC.     HPI, Past Medical, Family, and Social History remains the same as documented in the initial encounter.     Review of Systems   Constitutional: Negative for fatigue and fever.   Skin: Negative for pallor and wound.   Neurological: Positive for facial asymmetry and light-headedness. Negative for speech difficulty and weakness.   Psychiatric/Behavioral: Negative for agitation and confusion.     Scheduled Meds:   atorvastatin  80 mg Oral Daily    cholecalciferol (vitamin D3)  5,000 Units Oral Daily    ferrous sulfate  325 mg Oral Daily    gabapentin  300 mg Oral Nightly    heparin (porcine)  5,000 Units Subcutaneous Q8H    levothyroxine  75 mcg Oral Daily    lisinopril  5 mg Oral Daily    miconazole NITRATE 2 %   Topical (Top) BID    pantoprazole  40 mg Oral Daily    sodium chloride 0.9%  3 mL Intravenous Q8H    tuberculin  5 Units Intradermal Once     Continuous Infusions:   sodium chloride 0.9%       PRN Meds:sodium chloride, acetaminophen, aluminum & magnesium hydroxide-simethicone, hydrALAZINE, meclizine, ondansetron, ondansetron, sodium chloride 0.9%    Objective:     Vital Signs (Most Recent):  Temp: 98.4 °F (36.9 °C) (02/25/19 1205)  Pulse: 100 (02/25/19 1205)  Resp: 14 (02/25/19 0729)  BP: (!) 117/58 (02/25/19 1205)  SpO2: 96 % (02/25/19 1205)  BP Location: Left arm    Vital Signs Range (Last 24H):  Temp:  [98 °F (36.7 °C)-99 °F (37.2 °C)]   Pulse:  []   Resp:  [14-18]   BP: (112-139)/(52-63)   SpO2:  [91 %-96 %]   BP Location: Left arm    Physical Exam   Constitutional: She appears well-developed and well-nourished. No distress.   HENT:   Head:  Normocephalic and atraumatic.   Eyes: Conjunctivae and EOM are normal.   Cardiovascular: Normal rate.   Pulmonary/Chest: Effort normal. No respiratory distress.   Musculoskeletal: She exhibits no edema or deformity.   Neurological: She is alert. No sensory deficit. She exhibits normal muscle tone. Coordination normal.   See below   Skin: Skin is warm and dry. No pallor.   Psychiatric: She has a normal mood and affect. Her behavior is normal.   Nursing note and vitals reviewed.      Neurological Exam:   LOC: alert  Attention Span: Good   Language: No aphasia  Articulation: No dysarthria  Orientation: Person, Place, Time   Visual Fields: Full  EOM (CN III, IV, VI): Full/intact  Facial Movement (CN VII): Lower facial weakness on the Right  Motor: Arm left  Normal 5/5  Leg left  Normal 5/5  Arm right  Normal 5/5  Leg right Normal 5/5  Cebellar: No evidence of appendicular or axial ataxia  Sensation: Intact to light touch  Tone: Normal tone throughout    Laboratory:  CMP:   Recent Labs   Lab 02/25/19  0437   CALCIUM 9.0   *   K 4.2   CO2 22*   CL 97   BUN 26*   CREATININE 1.2     CBC:   Recent Labs   Lab 02/25/19 0437   WBC 15.15*   RBC 3.12*   HGB 8.8*   HCT 27.4*      MCV 88   MCH 28.2   MCHC 32.1     Lipid Panel:   Recent Labs   Lab 02/20/19 2055   CHOL 177   LDLCALC 96.6   HDL 30*   TRIG 252*     Coagulation:   Recent Labs   Lab 02/20/19 2244   INR 0.9   APTT 24.0     Hgb A1C:   Recent Labs   Lab 02/20/19 2244   HGBA1C 5.3     TSH:   Recent Labs   Lab 02/20/19 2055   TSH 2.994       Diagnostic Results     Brain Imaging     CT head 2/20/19  No acute abnormality noted     MRI 2/21/19    Small acute infarcts in the left basal ganglia.   Changes of chronic small vessel ischemic disease and cerebral volume loss.      Vessel Imaging     CTA Head/Neck 2/20/19  Recent infarcts in the left basal ganglia.  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      Cardiac Imaging     TTE 2/21/19  · Concentric left ventricular remodeling.  · Normal left ventricular systolic function. The estimated ejection fraction is 65%  · Grade I (mild) left ventricular diastolic dysfunction consistent with impaired relaxation.  · Normal left atrial pressure.  · Mild left atrial enlargement.  · Mild aortic regurgitation.  · Normal right ventricular systolic function.  · Mild aortic valve stenosis.  · Aortic valve area is 2.18 cm2; peak velocity is 2.39 m/s; mean gradient is 14.14 mmHg.      Tasha Man NP  [facility]  Department of Vascular Neurology   [facility]

## 2019-02-25 NOTE — ASSESSMENT & PLAN NOTE
-Na 128 > 130 > 129 > 131 > 132 > 129  Last recorded Na in 2018 WNL.  -Urine Na, urine osmol, serum osmol WNL  -250 ml NS bolus given 2/21/19. Repeat Na 131.  Discussed with Hospital Medicine- Hyponatremia possibly 2/2 hypovolemia in the setting of pt's acute Hb drop.   Continue daily BMP; Will discuss any further recs with Hospital Medicine at AM rounds    02/25- Discussed hyponatremia with hospital medicine, patient may be hyponatremic at baseline. Will continue to monitor and appreciate recs from hospital medicine.

## 2019-02-26 LAB
ANION GAP SERPL CALC-SCNC: 9 MMOL/L
BASOPHILS # BLD AUTO: 0.11 K/UL
BASOPHILS NFR BLD: 0.9 %
BUN SERPL-MCNC: 33 MG/DL
CALCIUM SERPL-MCNC: 8.9 MG/DL
CHLORIDE SERPL-SCNC: 100 MMOL/L
CO2 SERPL-SCNC: 21 MMOL/L
CREAT SERPL-MCNC: 1.3 MG/DL
DIFFERENTIAL METHOD: ABNORMAL
EOSINOPHIL # BLD AUTO: 0.5 K/UL
EOSINOPHIL NFR BLD: 3.8 %
ERYTHROCYTE [DISTWIDTH] IN BLOOD BY AUTOMATED COUNT: 15.9 %
EST. GFR  (AFRICAN AMERICAN): 43.5 ML/MIN/1.73 M^2
EST. GFR  (NON AFRICAN AMERICAN): 37.8 ML/MIN/1.73 M^2
GLUCOSE SERPL-MCNC: 97 MG/DL
HCT VFR BLD AUTO: 28.3 %
HGB BLD-MCNC: 9 G/DL
IMM GRANULOCYTES # BLD AUTO: 0.36 K/UL
IMM GRANULOCYTES NFR BLD AUTO: 3 %
LYMPHOCYTES # BLD AUTO: 2.1 K/UL
LYMPHOCYTES NFR BLD: 17.8 %
MCH RBC QN AUTO: 28.5 PG
MCHC RBC AUTO-ENTMCNC: 31.8 G/DL
MCV RBC AUTO: 90 FL
MONOCYTES # BLD AUTO: 1.1 K/UL
MONOCYTES NFR BLD: 9.6 %
NEUTROPHILS # BLD AUTO: 7.7 K/UL
NEUTROPHILS NFR BLD: 64.9 %
NRBC BLD-RTO: 0 /100 WBC
PLATELET # BLD AUTO: 261 K/UL
PMV BLD AUTO: 10.6 FL
POTASSIUM SERPL-SCNC: 4.3 MMOL/L
RBC # BLD AUTO: 3.16 M/UL
SODIUM SERPL-SCNC: 130 MMOL/L
WBC # BLD AUTO: 11.91 K/UL

## 2019-02-26 PROCEDURE — 63600175 PHARM REV CODE 636 W HCPCS: Performed by: PHYSICIAN ASSISTANT

## 2019-02-26 PROCEDURE — 99233 PR SUBSEQUENT HOSPITAL CARE,LEVL III: ICD-10-PCS | Mod: ,,, | Performed by: PSYCHIATRY & NEUROLOGY

## 2019-02-26 PROCEDURE — 95813 PR EEG, EXTENDED, 61-119 MINS: ICD-10-PCS | Mod: 26,,, | Performed by: PSYCHIATRY & NEUROLOGY

## 2019-02-26 PROCEDURE — 95813 EEG EXTND MNTR 61-119 MIN: CPT | Mod: 26,,, | Performed by: PSYCHIATRY & NEUROLOGY

## 2019-02-26 PROCEDURE — 25000003 PHARM REV CODE 250: Performed by: NURSE PRACTITIONER

## 2019-02-26 PROCEDURE — 80048 BASIC METABOLIC PNL TOTAL CA: CPT

## 2019-02-26 PROCEDURE — 36415 COLL VENOUS BLD VENIPUNCTURE: CPT

## 2019-02-26 PROCEDURE — 85025 COMPLETE CBC W/AUTO DIFF WBC: CPT

## 2019-02-26 PROCEDURE — 25000003 PHARM REV CODE 250: Performed by: PHYSICIAN ASSISTANT

## 2019-02-26 PROCEDURE — 97116 GAIT TRAINING THERAPY: CPT

## 2019-02-26 PROCEDURE — 95813 EEG EXTND MNTR 61-119 MIN: CPT

## 2019-02-26 PROCEDURE — A4216 STERILE WATER/SALINE, 10 ML: HCPCS | Performed by: NURSE PRACTITIONER

## 2019-02-26 PROCEDURE — 20600001 HC STEP DOWN PRIVATE ROOM

## 2019-02-26 PROCEDURE — 99233 SBSQ HOSP IP/OBS HIGH 50: CPT | Mod: ,,, | Performed by: PSYCHIATRY & NEUROLOGY

## 2019-02-26 RX ADMIN — HEPARIN SODIUM 5000 UNITS: 5000 INJECTION, SOLUTION INTRAVENOUS; SUBCUTANEOUS at 06:02

## 2019-02-26 RX ADMIN — MICONAZOLE NITRATE: 20 POWDER TOPICAL at 09:02

## 2019-02-26 RX ADMIN — Medication 3 ML: at 09:02

## 2019-02-26 RX ADMIN — ATORVASTATIN CALCIUM 80 MG: 20 TABLET, FILM COATED ORAL at 08:02

## 2019-02-26 RX ADMIN — Medication 3 ML: at 06:02

## 2019-02-26 RX ADMIN — HEPARIN SODIUM 5000 UNITS: 5000 INJECTION, SOLUTION INTRAVENOUS; SUBCUTANEOUS at 01:02

## 2019-02-26 RX ADMIN — GABAPENTIN 300 MG: 300 CAPSULE ORAL at 09:02

## 2019-02-26 RX ADMIN — FERROUS SULFATE TAB EC 325 MG (65 MG FE EQUIVALENT) 325 MG: 325 (65 FE) TABLET DELAYED RESPONSE at 08:02

## 2019-02-26 RX ADMIN — LEVOTHYROXINE SODIUM 75 MCG: 75 TABLET ORAL at 08:02

## 2019-02-26 RX ADMIN — Medication 3 ML: at 02:02

## 2019-02-26 RX ADMIN — LISINOPRIL 5 MG: 5 TABLET ORAL at 08:02

## 2019-02-26 RX ADMIN — MICONAZOLE NITRATE: 20 POWDER TOPICAL at 01:02

## 2019-02-26 RX ADMIN — PANTOPRAZOLE SODIUM 40 MG: 40 TABLET, DELAYED RELEASE ORAL at 08:02

## 2019-02-26 RX ADMIN — ONDANSETRON 8 MG: 8 TABLET, ORALLY DISINTEGRATING ORAL at 10:02

## 2019-02-26 RX ADMIN — CHOLECALCIFEROL TAB 125 MCG (5000 UNIT) 5000 UNITS: 125 TAB at 08:02

## 2019-02-26 RX ADMIN — ONDANSETRON 8 MG: 8 TABLET, ORALLY DISINTEGRATING ORAL at 01:02

## 2019-02-26 NOTE — PLAN OF CARE
02/26/19 1016   Post-Acute Status   Post-Acute Authorization Placement       Called [facility]. They are still reviewing. Awaiting acceptance. SW in contact with CM and Medical staff. Will continue to follow and offer support as needed.     Victor Blanchard, LMSW [facility]   Ext. [phone]        Victor Blanchard, LMSW [facility]   [phone]

## 2019-02-26 NOTE — ASSESSMENT & PLAN NOTE
WBC 13 however pt is afebrile, does not appear toxic, and denies acute complaints currently    02/26- WBCs trending downward, today 11.9  Will monitor for now

## 2019-02-26 NOTE — PLAN OF CARE
Problem: Physical Therapy Goal  Goal: Physical Therapy Goal    Goals to be met by 2/28/2019    1. Pt will perform rolling to the R and L independently.   2. Pt will perform supine to sit from both sides of the bed independently.  3. Pt will perform sit to supine independently.  4. Pt will perform sit to stand transfers independently.    5. Pt will perform bed <> chair transfers with mod I using RW.  6. Pt will perform gait x 50 feet with RW and CGA.  7. Pt will sit EOB x 20 minutes with no LOB while performing dynamic UE tasks to prepare for functional tasks in sitting.   8. Pt will stand  x 20 minutes with no LOB while performing dynamic UE tasks to prepare for functional tasks in standing.      Outcome: Ongoing (interventions implemented as appropriate)  Patient participated well in therapy.  POC and goals remain appropriate.  Please refer to the progress note for functional mobility.     Pt is safe to perform transfers to chair and bedside commode with nursing using min A for balance with RW.     Modesta Richardson, SPT  2/26/2019

## 2019-02-26 NOTE — PT/OT/SLP PROGRESS
"Physical Therapy Treatment    Patient Name: Linda Hylton  MRN: [id number]   Diagnosis: Embolic stroke involving left middle cerebral artery    Recommendations:     Discharge Recommendations:  nursing facility, skilled   Discharge Equipment Recommendations: (TBD at next level of care)   Barriers to Discharge: increased level of caregiver support, inaccessible home environment    Assessment:   Linda Hylton is a 84 y.o. female admitted with a medical diagnosis of Embolic stroke involving left middle cerebral artery.  Presents with the following functional limitations/impairments: weakness, impaired self care skills, impaired endurance, impaired balance, decreased safety awareness, decreased upper extremity function, decreased lower extremity function, gait instability, impaired fine motor, decreased ROM, impaired functional mobilty. Ms. Hylton participated well in therapy as she was able to perform gait with RW, limited due to impaired endurance and LLE pain due to pt report of sciatic nerve pain. Pt demonstrated difficulty following directions and reported memory problems; PT reported to MD regarding concerns. She would benefit from continued skilled PT in order to address endurance limitations and improve functional independence.    Rehab Prognosis:  good; patient would benefit from acute skilled PT services to address these deficits and reach maximum level of function.      Subjective   PT communicated with nursing prior to therapy.     Patient comments/goals: "I was waiting for my dinner and the next thing I remember is that it was morning. I think there is something wrong with my head."    Pain/Comfort:  · Pain Rating 1: (pain not rated)  · Location - Side 1: Left  · Location 1: leg(sciatica)  · Pain Addressed 1: Reposition, Distraction  · Pain Rating Post-Intervention 1: (pain not rated)    Recent Vital Signs: (Last documentation)  Pulse: 80 (02/26/19 0838)  BP: (!) 144/63 (02/26/19 0838)  SpO2: 95 % (02/26/19 " 0838)     Objective:   General Precautions: aspiration, fall  Recent Surgery: Procedure(s) (LRB):  EGD (ESOPHAGOGASTRODUODENOSCOPY) (N/A)    The patient currently has telemetry, PureWick.    The patient was found sitting in chair.    Functional Mobility:  Bed Mobility: NT     Sitting Balance:  · Assistance Level Required: supervision-SBA  · Time: 10 minutes  · Postural deviations noted: forward head posture and rounded shoulders  · PT Encouraged/facilitated: pt to perform pursed lip breathing due to fatigue following gait trials      Transfers:   · Sit <> Stand Transfer x3: CGA-min A  · Pt able to scoot to edge of chair for feet flat on floor although she requires additional assistance from low surface height due to difficulty with anterior weight shift.     Gait:  Gait x 25 feet; 45 feet; 25 feet with RW and min A for balance  · Pt demonstrated decreased endurance requiring multiple rest breaks in standing and in sitting between gait trials.  · Gait deviations: wide base of support, L trendelenberg gait pattern, decreased foot clearance bilaterally, and decreased step length.  · PT encouraged or facilitated pt to remain fully upright and walk within the walker.  · Comments: pt reported heart pounding during gait trial with fatigue although she was able to walk x10 feet prior to sitting  · Manual HR: 104 bpm    Therapeutic Activities, Education, or Exercises:  Education provided on stroke and cognitive effects. Time was provided for active listening, discussion of health disposition, and discussion of safe discharge recommendations. Therapist answered questions to patient/familys satisfaction within scope of practice.  Patient and family are aware of patient's deficits and therapy progression. White board updated to reflect current level of assistance.    The patient was left sitting in chair with nurse notified of displaced Purewick.     FUNCTIONAL OUTCOME MEASURES:  Turning over in bed (including adjusting  bedclothes, sheets and blankets)?: 4  Sitting down on and standing up from a chair with arms (e.g., wheelchair, bedside commode, etc.): 3  Moving from lying on back to sitting on the side of the bed?: 4  Moving to and from a bed to a chair (including a wheelchair)?: 3  Need to walk in hospital room?: 3  Climbing 3-5 steps with a railing?: 2  Basic Mobility Total Score: 19    Goals:     Multidisciplinary Problems     Physical Therapy Goals        Problem: Physical Therapy Goal    Goal Priority Disciplines Outcome Goal Variances Interventions   Physical Therapy Goal     PT, PT/OT Ongoing (interventions implemented as appropriate)     Description:    Goals to be met by 2/28/2019    1. Pt will perform rolling to the R and L independently.   2. Pt will perform supine to sit from both sides of the bed independently.  3. Pt will perform sit to supine independently.  4. Pt will perform sit to stand transfers independently.    5. Pt will perform bed <> chair transfers with mod I using RW.  6. Pt will perform gait x 50 feet with RW and CGA.  7. Pt will sit EOB x 20 minutes with no LOB while performing dynamic UE tasks to prepare for functional tasks in sitting.   8. Pt will stand  x 20 minutes with no LOB while performing dynamic UE tasks to prepare for functional tasks in standing.                       Plan:   During this hospitalization, patient to be seen 4 x/week to address their physical therapy related impairments and improve their overall level of function.   · Plan of Care Expires: 03/21/19   Plan of Care Reviewed with: patient    This plan of care has been discussed with the patient and/or family who were involved in its development and are in agreement with the identified goals and treatment plan.     Time Tracking:     PT Received On:  02/26/19  PT Start Time:   1018    PT Stop Time:  1046  PT Total Time (min): 28 min     Billable Minutes: Gait Training 28     Modesta Richardson, NORBERTO  2/26/2019

## 2019-02-26 NOTE — SUBJECTIVE & OBJECTIVE
Neurologic Chief Complaint: Right facial droop, RUE weakness and slurred speech.     Subjective:     Interval History: Hb 9. Patient may be able to discharge and have endoscopy done as an outpatient if H&H remains stable. Patient confused with PT, and complained of intermittent confusion. EEG 1 hour monitoring ordered to rule out seizur    HPI, Past Medical, Family, and Social History remains the same as documented in the initial encounter.     Review of Systems   Constitutional: Negative for fatigue and fever.   Respiratory: Negative for chest tightness, shortness of breath and wheezing.    Cardiovascular: Negative for chest pain and palpitations.   Skin: Negative for pallor and wound.   Neurological: Positive for facial asymmetry (slight). Negative for speech difficulty and weakness.   Psychiatric/Behavioral: Positive for confusion. Negative for agitation.     Scheduled Meds:   atorvastatin  80 mg Oral Daily    cholecalciferol (vitamin D3)  5,000 Units Oral Daily    ferrous sulfate  325 mg Oral Daily    gabapentin  300 mg Oral Nightly    heparin (porcine)  5,000 Units Subcutaneous Q8H    levothyroxine  75 mcg Oral Daily    lisinopril  5 mg Oral Daily    miconazole NITRATE 2 %   Topical (Top) BID    pantoprazole  40 mg Oral Daily    sodium chloride 0.9%  3 mL Intravenous Q8H     Continuous Infusions:   sodium chloride 0.9%       PRN Meds:sodium chloride, acetaminophen, aluminum & magnesium hydroxide-simethicone, hydrALAZINE, meclizine, ondansetron, ondansetron, sodium chloride 0.9%    Objective:     Vital Signs (Most Recent):  Temp: 97.5 °F (36.4 °C) (02/26/19 1228)  Pulse: 89 (02/26/19 1228)  Resp: 18 (02/26/19 1228)  BP: (!) 113/53 (02/26/19 1228)  SpO2: (!) 92 % (02/26/19 1228)  BP Location: Left arm    Vital Signs Range (Last 24H):  Temp:  [97 °F (36.1 °C)-99 °F (37.2 °C)]   Pulse:  [74-89]   Resp:  [16-18]   BP: (103-147)/(53-67)   SpO2:  [92 %-96 %]   BP Location: Left arm    Physical Exam    Constitutional: She appears well-developed and well-nourished. No distress.   HENT:   Head: Normocephalic and atraumatic.   Eyes: Conjunctivae and EOM are normal.   Cardiovascular: Normal rate.   Pulmonary/Chest: Effort normal. No respiratory distress.   Musculoskeletal: She exhibits no edema or deformity.   Neurological: She is alert. No sensory deficit. She exhibits normal muscle tone. Coordination normal.   See below   Skin: Skin is warm and dry. No pallor.   Psychiatric: She has a normal mood and affect. Her behavior is normal.   Nursing note and vitals reviewed.      Neurological Exam:   LOC: alert  Attention Span: Good   Language: No aphasia  Articulation: No dysarthria  Orientation: Person, Place, Time   Visual Fields: Full  EOM (CN III, IV, VI): Full/intact  Facial Movement (CN VII): Lower facial weakness on the Right  Motor: Arm left  Normal 5/5  Leg left  Normal 5/5  Arm right  Normal 5/5  Leg right Normal 5/5  Cebellar: No evidence of appendicular or axial ataxia  Sensation: Intact to light touch  Tone: Normal tone throughout    Laboratory:  CMP:   Recent Labs   Lab 02/26/19 0437   CALCIUM 8.9   *   K 4.3   CO2 21*      BUN 33*   CREATININE 1.3     CBC:   Recent Labs   Lab 02/26/19 0437   WBC 11.91   RBC 3.16*   HGB 9.0*   HCT 28.3*      MCV 90   MCH 28.5   MCHC 31.8*     Lipid Panel:   Recent Labs   Lab 02/20/19 2055   CHOL 177   LDLCALC 96.6   HDL 30*   TRIG 252*     Coagulation:   Recent Labs   Lab 02/20/19 2244   INR 0.9   APTT 24.0     Hgb A1C:   Recent Labs   Lab 02/20/19  2244   HGBA1C 5.3     TSH:   Recent Labs   Lab 02/20/19 2055   TSH 2.994       Diagnostic Results     Brain Imaging     CT head 2/20/19  No acute abnormality noted     MRI 2/21/19    Small acute infarcts in the left basal ganglia.   Changes of chronic small vessel ischemic disease and cerebral volume loss.      Vessel Imaging     CTA Head/Neck 2/20/19  Recent infarcts in the left basal  ganglia.  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      Cardiac Imaging     TTE 2/21/19  · Concentric left ventricular remodeling.  · Normal left ventricular systolic function. The estimated ejection fraction is 65%  · Grade I (mild) left ventricular diastolic dysfunction consistent with impaired relaxation.  · Normal left atrial pressure.  · Mild left atrial enlargement.  · Mild aortic regurgitation.  · Normal right ventricular systolic function.  · Mild aortic valve stenosis.  · Aortic valve area is 2.18 cm2; peak velocity is 2.39 m/s; mean gradient is 14.14 mmHg.

## 2019-02-26 NOTE — PROGRESS NOTES
[facility]  Vascular Neurology  Comprehensive Stroke Center  Progress Note    Assessment/Plan:     * Embolic stroke involving left middle cerebral artery    84 year old female with past medical hx of HTN, hypothyroidism, prior R vermis stroke in 3/2017, and aortic stenosis presented to the ED with complains of right facial droop. LKN 18:00, within window for tPA, however patient refused. Per chart review, patient was seen day PTA and diagonsed with a UTI and started on Macrobid. Repeat UA here with no evidence of infection so abx d/c'd.    MRI Brain is with small acute infarcts in the L basal ganglia. 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 Echo demonstrated mild LAE. Stroke etiology not completely clear, but appears embolic/ESUS on imaging. Will plan for 30-day cardiac event monitor following SNF discharge.     Stroke team member discussed level of supervision at home with pt's son by bedside on 2/21. Patient lives with son who works overnight, and he typically takes care of patient's needs during the day but leaves patient at home alone overnight when he works. SNF may be a more safe and appropriate disposition for this pt who is now with decreased mobility from her previous baseline prior to admission, however pt is not in favor of this plan and may refuse.  02/25- Patient now amendable to plan to discharge to facility.     02/26/2019- Hb 9. H&H stable, patient may be discharged and have endoscopy done as outpatient. Patient confused with PT, and complained of intermittent confusion. EEG 1 hour monitoring ordered to rule out seizures.     Antithrombotics for secondary stroke prevention: Antiplatelets: On hold for possible GI scope   Statins for secondary stroke prevention and hyperlipidemia, if present:   Statins: Atorvastatin- 80 mg daily  Aggressive  risk factor modification: HTN, HLD, Obesity, prior stroke  Rehab efforts: PT/OT/SLP to evaluate and treat; Dispo SNF (vs HH without 24-hr supervision?)  Diagnostics ordered/pending: None   VTE prophylaxis: Heparin 5000 units SQ every 8 hours  Place SCDs   BP parameters: SBP < 160     Acute blood loss anemia    Hb 8 > 8 > 6.5. Pt appears pale and c/o light-headedness but is neurologically stable.   Ordered type & screen and 1U pRBCs, consent obtained on 2/22/19. SQH held; SCDs ordered.  Started PPI. + FOBT result. Anemia workup largely WNL with TIBC mildly elevated and iron sat 7%.  Pt s/p 1U pRBCs on 2/22 with good response; Hb 8.8 with pallor improved  Restarted SQH; will d/c ASA and continue with Plavix monotherapy for now   Hb 9.5 on 2/24.    02/25 on Hb 8.8. Patient had dark and tarry stool this morning. GI consulted. Will hold Plavix to have scope done on Friday.     02/26- Hb 9. Patient may be able to discharge and have endoscopy done as an outpatient if H&H remains stable. Patient confused with PT, and complained of intermittent confusion. EEG 1 hour monitoring ordered to rule out seizures       Essential hypertension    Stroke risk factor  SBP <160  -Pt states she checks her BP twice daily at home and takes Lisinopril occasionally if her SBP < 150. Lisinopril 5mg started on 02/25.  -BP at goal for last 24 hours.  Will continue monitoring     Mixed hyperlipidemia    Stroke risk factor  LDL 96.6  Patient taking Atorvastatin 40 at home; Increased dose to 80 mg Daily     Hyponatremia    -Na 128 > 130 > 129 > 131 > 132 > 129  Last recorded Na in 2018 WNL.  -Urine Na, urine osmol, serum osmol WNL  -250 ml NS bolus given 2/21/19. Repeat Na 131.  Discussed with Hospital Medicine- Hyponatremia possibly 2/2 hypovolemia in the setting of pt's acute Hb drop.   Continue daily BMP; Will discuss any further recs with Hospital Medicine at AM rounds    02/25- Discussed hyponatremia with hospital medicine, patient may be  hyponatremic at baseline. Will continue to monitor and appreciate recs from hospital medicine.     Dizziness    Pt c/o dizziness/light-headedness which she states she has at baseline; Reports no acute change currently  Also c/o mild blurred vision but no other neuro changes appreciated and visual fields are intact  Restarted home PRN Meclizine  Will continue to monitor     Melena     Patient had dark and tarry stool on 02/25/2019. GI consulted. Will hold Plavix to have scope done on Friday. Pt's son updated on POC.      Leukocytosis    WBC 13 however pt is afebrile, does not appear toxic, and denies acute complaints currently    02/26- WBCs trending downward, today 11.9  Will monitor for now     Nodule of apex of right lung    CTA head/neck on 2/21/19 shows stable 9 mm noncalcified nodule in the right lung apex.   Per family over the phone, pt has no tobacco use history.  Discussed with Hospital Medicine - No acute intervention needed at this time. PCP to monitor and consider CT Chest in 6 months.     Cytotoxic cerebral edema    -Area of cytotoxic cerebral edema identified when reviewing brain imaging in the territory of the left middle cerebral artery. There is no mass effect associated with it. We will continue to monitor the patients clinical exam for any worsening of symptoms which may indicate expansion of the stroke or the area of the edema resulting in the clinical change. The pattern is suggestive of an embolic/ESUS etiology.     Facial droop    Initial presentation, due to stroke  Continue PT/OT     History of stroke    Stroke risk factor  3/2017 - diagnosed with R cerebellar infarct. Was on DAPT and Lipitor 40 outpatient.  Patient states she had no significant stroke deficits (dizziness?)          02/20/2019 MRI brain positive for stroke.  Admitted to Vascular Neurology.  02/21/2019- Na 130-->129. 250 ml NS bolus ordered. Repeat Na lab pending. CTA head/neck with recent infarcts in the left basal ganglia.  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 Echo EF 65% with mild left atrial enlargement. SLP recs for mechanical soft diet. PT/OT recs for possible rehab or SNF.   2/22: Hb 6.5; pt appears pale, c/o light-headedness but is neurologically stable. Ordered type & screen and 1U pRBCs, consent obtained. Will repeat CBC following transfusion. Started PPI and FOBT ordered (needs to be collected.) Monitoring Na.  2/23: Patient s/p 1U pRBCs with good response; no pallor or light-headedness today and Hb 8.8. Restarted SQH; will d/c ASA and continue with monotherapy (Plavix). FOBT+; will monitor Hb for now and plan for outpatient GI work-up if Hb remains stable. Monitoring BP.  2/24: Hb 9.5 today. WBC 13 however pt afebrile, denies acute complaints; monitoring. Pt c/o dizziness (her reported baseline) as well as mild blurred vision; no other neuro changes, visual fields are intact. Restarted home PRN Meclizine. Pt states she takes Lisinopril occasionally at home if her BP is high; started Lisinopril 5mg Daily. Miconazole powder ordered for areas of redness noted in abdominal folds. Pt is not in favor of d/c to SNF; attempted to call pt's son Angel to discuss if he is comfortable with plan of d/c to home. Attempting to touch base with PT regarding dispo as well.  02/25/2019: Hb 8.8. Patient had dark and tarry stool this morning. GI consulted. Will hold Plavix to have scope done on Friday. Pt's son updated on POC.   02/26/2019: Hb 9. Patient may be able to discharge and have endoscopy done as an outpatient if H&H remains stable. Patient confused with PT, and complained of intermittent confusion. EEG 1 hour monitoring ordered to rule out seizures.     STROKE DOCUMENTATION   Acute Stroke Times   Last Known Normal Date: 02/20/19  Last Known Normal Time: 1800  Symptom Onset Date: 02/20/19  Symptom Onset Time: 1800  Stroke Team Called Date: 02/20/19  Stroke Team Called Time:  2050  Stroke Team Arrival Date: 02/20/19  Stroke Team Arrival Time: 2052  CT Interpretation Time: 2100  Decision to Treat Time for Alteplase: 2110(Patient refused )  Decision to Treat Time for IR: (NA)    NIH Scale:  1a. Level of Consciousness: 1-->Not alert, but arousable by minor stimulation to obey, answer, or respond  1b. LOC Questions: 0-->Answers both questions correctly  1c. LOC Commands: 0-->Performs both tasks correctly  2. Best Gaze: 0-->Normal  3. Visual: 0-->No visual loss  4. Facial Palsy: 1-->Minor paralysis (flattened nasolabial fold, asymmetry on smiling)  5a. Motor Arm, Left: 0-->No drift, limb holds 90 (or 45) degrees for full 10 secs  5b. Motor Arm, Right: 0-->No drift, limb holds 90 (or 45) degrees for full 10 secs  6a. Motor Leg, Left: 0-->No drift, leg holds 30 degree position for full 5 secs  6b. Motor Leg, Right: 0-->No drift, leg holds 30 degree position for full 5 secs  7. Limb Ataxia: 0-->Absent  8. Sensory: 0-->Normal, no sensory loss  9. Best Language: 0-->No aphasia, normal  10. Dysarthria: 0-->Normal  11. Extinction and Inattention (formerly Neglect): 0-->No abnormality  Total (NIH Stroke Scale): 2       Modified Ericka Score: 1  Miranda Coma Scale:    ABCD2 Score:    [id number] Score:   HAS -BLED Score:   ICH Score:   Hunt & De Classification:      Hemorrhagic change of an Ischemic Stroke: Does this patient have an ischemic stroke with hemorrhagic changes? No     Neurologic Chief Complaint: Right facial droop, RUE weakness and slurred speech.     Subjective:     Interval History: Hb 9. Patient may be able to discharge and have endoscopy done as an outpatient if H&H remains stable. Patient confused with PT, and complained of intermittent confusion. EEG 1 hour monitoring ordered to rule out seizur    HPI, Past Medical, Family, and Social History remains the same as documented in the initial encounter.     Review of Systems   Constitutional: Negative for fatigue and fever.    Respiratory: Negative for chest tightness, shortness of breath and wheezing.    Cardiovascular: Negative for chest pain and palpitations.   Skin: Negative for pallor and wound.   Neurological: Positive for facial asymmetry (slight). Negative for speech difficulty and weakness.   Psychiatric/Behavioral: Positive for confusion. Negative for agitation.     Scheduled Meds:   atorvastatin  80 mg Oral Daily    cholecalciferol (vitamin D3)  5,000 Units Oral Daily    ferrous sulfate  325 mg Oral Daily    gabapentin  300 mg Oral Nightly    heparin (porcine)  5,000 Units Subcutaneous Q8H    levothyroxine  75 mcg Oral Daily    lisinopril  5 mg Oral Daily    miconazole NITRATE 2 %   Topical (Top) BID    pantoprazole  40 mg Oral Daily    sodium chloride 0.9%  3 mL Intravenous Q8H     Continuous Infusions:   sodium chloride 0.9%       PRN Meds:sodium chloride, acetaminophen, aluminum & magnesium hydroxide-simethicone, hydrALAZINE, meclizine, ondansetron, ondansetron, sodium chloride 0.9%    Objective:     Vital Signs (Most Recent):  Temp: 97.5 °F (36.4 °C) (02/26/19 1228)  Pulse: 89 (02/26/19 1228)  Resp: 18 (02/26/19 1228)  BP: (!) 113/53 (02/26/19 1228)  SpO2: (!) 92 % (02/26/19 1228)  BP Location: Left arm    Vital Signs Range (Last 24H):  Temp:  [97 °F (36.1 °C)-99 °F (37.2 °C)]   Pulse:  [74-89]   Resp:  [16-18]   BP: (103-147)/(53-67)   SpO2:  [92 %-96 %]   BP Location: Left arm    Physical Exam   Constitutional: She appears well-developed and well-nourished. No distress.   HENT:   Head: Normocephalic and atraumatic.   Eyes: Conjunctivae and EOM are normal.   Cardiovascular: Normal rate.   Pulmonary/Chest: Effort normal. No respiratory distress.   Musculoskeletal: She exhibits no edema or deformity.   Neurological: She is alert. No sensory deficit. She exhibits normal muscle tone. Coordination normal.   See below   Skin: Skin is warm and dry. No pallor.   Psychiatric: She has a normal mood and affect. Her  behavior is normal.   Nursing note and vitals reviewed.      Neurological Exam:   LOC: alert  Attention Span: Good   Language: No aphasia  Articulation: No dysarthria  Orientation: Person, Place, Time   Visual Fields: Full  EOM (CN III, IV, VI): Full/intact  Facial Movement (CN VII): Lower facial weakness on the Right  Motor: Arm left  Normal 5/5  Leg left  Normal 5/5  Arm right  Normal 5/5  Leg right Normal 5/5  Cebellar: No evidence of appendicular or axial ataxia  Sensation: Intact to light touch  Tone: Normal tone throughout    Laboratory:  CMP:   Recent Labs   Lab 02/26/19 0437   CALCIUM 8.9   *   K 4.3   CO2 21*      BUN 33*   CREATININE 1.3     CBC:   Recent Labs   Lab 02/26/19 0437   WBC 11.91   RBC 3.16*   HGB 9.0*   HCT 28.3*      MCV 90   MCH 28.5   MCHC 31.8*     Lipid Panel:   Recent Labs   Lab 02/20/19 2055   CHOL 177   LDLCALC 96.6   HDL 30*   TRIG 252*     Coagulation:   Recent Labs   Lab 02/20/19 2244   INR 0.9   APTT 24.0     Hgb A1C:   Recent Labs   Lab 02/20/19 2244   HGBA1C 5.3     TSH:   Recent Labs   Lab 02/20/19 2055   TSH 2.994       Diagnostic Results     Brain Imaging     CT head 2/20/19  No acute abnormality noted     MRI 2/21/19    Small acute infarcts in the left basal ganglia.   Changes of chronic small vessel ischemic disease and cerebral volume loss.      Vessel Imaging     CTA Head/Neck 2/20/19  Recent infarcts in the left basal ganglia.  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      Cardiac Imaging     TTE 2/21/19  · Concentric left ventricular remodeling.  · Normal left ventricular systolic function. The estimated ejection  fraction is 65%  · Grade I (mild) left ventricular diastolic dysfunction consistent with impaired relaxation.  · Normal left atrial pressure.  · Mild left atrial enlargement.  · Mild aortic regurgitation.  · Normal right ventricular systolic function.  · Mild aortic valve stenosis.  · Aortic valve area is 2.18 cm2; peak velocity is 2.39 m/s; mean gradient is 14.14 mmHg.      Tasha Man NP  [facility]  Department of Vascular Neurology   [facility]

## 2019-02-26 NOTE — ASSESSMENT & PLAN NOTE
Patient had dark and tarry stool on 02/25/2019. GI consulted. Will hold Plavix to have scope done on Friday. Pt's son updated on POC.

## 2019-02-26 NOTE — ASSESSMENT & PLAN NOTE
Hb 8 > 8 > 6.5. Pt appears pale and c/o light-headedness but is neurologically stable.   Ordered type & screen and 1U pRBCs, consent obtained on 2/22/19. SQH held; SCDs ordered.  Started PPI. + FOBT result. Anemia workup largely WNL with TIBC mildly elevated and iron sat 7%.  Pt s/p 1U pRBCs on 2/22 with good response; Hb 8.8 with pallor improved  Restarted SQH; will d/c ASA and continue with Plavix monotherapy for now   Hb 9.5 on 2/24.    02/25 on Hb 8.8. Patient had dark and tarry stool this morning. GI consulted. Will hold Plavix to have scope done on Friday.     02/26- Hb 9. Patient may be able to discharge and have endoscopy done as an outpatient if H&H remains stable. Patient confused with PT, and complained of intermittent confusion. EEG 1 hour monitoring ordered to rule out seizures

## 2019-02-26 NOTE — ASSESSMENT & PLAN NOTE
Hb 8 > 8 > 6.5. Pt appears pale and c/o light-headedness but is neurologically stable.   Ordered type & screen and 1U pRBCs, consent obtained on 2/22/19. SQH held; SCDs ordered.  Started PPI. + FOBT result. Anemia workup largely WNL with TIBC mildly elevated and iron sat 7%.  Pt s/p 1U pRBCs on 2/22 with good response; Hb 8.8 with pallor improved  Restarted SQH; will d/c ASA and continue with Plavix monotherapy for now   Hb 9.5 on 2/24.    02/25 on Hb 8.8. Patient had dark and tarry stool this morning. GI consulted. Will hold Plavix to have scope done on Friday.   02/26- Hb 9. Patient may be able to discharge and have endoscopy done as an outpatient if H&H remains stable.

## 2019-02-26 NOTE — ASSESSMENT & PLAN NOTE
Patient had dark and tarry stool on 02/25/2019. GI consulted. Will hold Plavix to have scope done on Friday. Pt's son updated on POC.   May be able to have scope as outpatient if H&H remains stable

## 2019-02-26 NOTE — PLAN OF CARE
Problem: Adult Inpatient Plan of Care  Goal: Plan of Care Review  Outcome: Ongoing (interventions implemented as appropriate)  POC reviewed with patient this shift.  Quiet uneventful evening.  Respirations unlabored.  Skin w/d.  Tolerated meds whole with water without difficulty.  Able to verbalize wants/needs.  No c/o pain or discomfort.

## 2019-02-26 NOTE — ASSESSMENT & PLAN NOTE
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 LKN 18:00, within window for tPA, however patient refused. Per chart review, patient was seen day PTA and diagonsed with a UTI and started on Macrobid. Repeat UA here with no evidence of infection so abx d/c'd.    MRI Brain is with small acute infarcts in the L basal ganglia. 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 Echo demonstrated mild LAE. Stroke etiology not completely clear, but appears embolic/ESUS on imaging. Will plan for 30-day cardiac event monitor following SNF discharge.     Stroke team member discussed level of supervision at home with pt's son by bedside on 2/21. Patient lives with son who works overnight, and he typically takes care of patient's needs during the day but leaves patient at home alone overnight when he works. SNF may be a more safe and appropriate disposition for this pt who is now with decreased mobility from her previous baseline prior to admission, however pt is not in favor of this plan and may refuse.  02/25- Patient now amendable to plan to discharge to facility.     02/26/2019- Hb 9. H&H stable, patient may be discharged and have endoscopy done as outpatient. Patient confused with PT, and complained of intermittent confusion. EEG 1 hour monitoring ordered to rule out seizures.     Antithrombotics for secondary stroke prevention: Antiplatelets: On hold for possible GI scope   Statins for secondary stroke prevention and hyperlipidemia, if present:   Statins: Atorvastatin- 80 mg daily  Aggressive risk factor modification: HTN, HLD, Obesity, prior stroke  Rehab efforts: PT/OT/SLP to evaluate and treat; Dispo SNF (vs HH without 24-hr supervision?)  Diagnostics  ordered/pending: None   VTE prophylaxis: Heparin 5000 units SQ every 8 hours  Place [facility]   BP parameters: SBP < 160

## 2019-02-27 PROBLEM — R29.810 FACIAL DROOP: Status: RESOLVED | Noted: 2019-02-20 | Resolved: 2019-02-27

## 2019-02-27 PROBLEM — D72.829 LEUKOCYTOSIS: Status: RESOLVED | Noted: 2019-02-24 | Resolved: 2019-02-27

## 2019-02-27 LAB
ANION GAP SERPL CALC-SCNC: 9 MMOL/L
BASOPHILS # BLD AUTO: 0.12 K/UL
BASOPHILS NFR BLD: 1.2 %
BUN SERPL-MCNC: 23 MG/DL
CALCIUM SERPL-MCNC: 9 MG/DL
CHLORIDE SERPL-SCNC: 101 MMOL/L
CO2 SERPL-SCNC: 24 MMOL/L
CREAT SERPL-MCNC: 1.1 MG/DL
DIFFERENTIAL METHOD: ABNORMAL
EOSINOPHIL # BLD AUTO: 0.4 K/UL
EOSINOPHIL NFR BLD: 4.2 %
ERYTHROCYTE [DISTWIDTH] IN BLOOD BY AUTOMATED COUNT: 15.7 %
EST. GFR  (AFRICAN AMERICAN): 53.3 ML/MIN/1.73 M^2
EST. GFR  (NON AFRICAN AMERICAN): 46.2 ML/MIN/1.73 M^2
GLUCOSE SERPL-MCNC: 111 MG/DL
HCT VFR BLD AUTO: 28.3 %
HGB BLD-MCNC: 8.5 G/DL
IMM GRANULOCYTES # BLD AUTO: 0.19 K/UL
IMM GRANULOCYTES NFR BLD AUTO: 1.9 %
LYMPHOCYTES # BLD AUTO: 1.5 K/UL
LYMPHOCYTES NFR BLD: 14.9 %
MCH RBC QN AUTO: 27.7 PG
MCHC RBC AUTO-ENTMCNC: 30 G/DL
MCV RBC AUTO: 92 FL
MONOCYTES # BLD AUTO: 0.9 K/UL
MONOCYTES NFR BLD: 9 %
NEUTROPHILS # BLD AUTO: 6.9 K/UL
NEUTROPHILS NFR BLD: 68.8 %
NRBC BLD-RTO: 0 /100 WBC
PLATELET # BLD AUTO: 269 K/UL
PMV BLD AUTO: 11 FL
POTASSIUM SERPL-SCNC: 4.5 MMOL/L
RBC # BLD AUTO: 3.07 M/UL
SODIUM SERPL-SCNC: 134 MMOL/L
WBC # BLD AUTO: 9.98 K/UL

## 2019-02-27 PROCEDURE — 99233 PR SUBSEQUENT HOSPITAL CARE,LEVL III: ICD-10-PCS | Mod: ,,, | Performed by: PHYSICIAN ASSISTANT

## 2019-02-27 PROCEDURE — 25000003 PHARM REV CODE 250: Performed by: PHYSICIAN ASSISTANT

## 2019-02-27 PROCEDURE — 25000003 PHARM REV CODE 250: Performed by: NURSE PRACTITIONER

## 2019-02-27 PROCEDURE — 80048 BASIC METABOLIC PNL TOTAL CA: CPT

## 2019-02-27 PROCEDURE — A4216 STERILE WATER/SALINE, 10 ML: HCPCS | Performed by: NURSE PRACTITIONER

## 2019-02-27 PROCEDURE — 20600001 HC STEP DOWN PRIVATE ROOM

## 2019-02-27 PROCEDURE — 63600175 PHARM REV CODE 636 W HCPCS: Performed by: NURSE PRACTITIONER

## 2019-02-27 PROCEDURE — 97116 GAIT TRAINING THERAPY: CPT

## 2019-02-27 PROCEDURE — 85025 COMPLETE CBC W/AUTO DIFF WBC: CPT

## 2019-02-27 PROCEDURE — 36415 COLL VENOUS BLD VENIPUNCTURE: CPT

## 2019-02-27 PROCEDURE — 63600175 PHARM REV CODE 636 W HCPCS: Performed by: PHYSICIAN ASSISTANT

## 2019-02-27 PROCEDURE — 25000003 PHARM REV CODE 250: Performed by: STUDENT IN AN ORGANIZED HEALTH CARE EDUCATION/TRAINING PROGRAM

## 2019-02-27 PROCEDURE — 99233 SBSQ HOSP IP/OBS HIGH 50: CPT | Mod: ,,, | Performed by: PHYSICIAN ASSISTANT

## 2019-02-27 RX ORDER — SODIUM CHLORIDE 9 MG/ML
INJECTION, SOLUTION INTRAVENOUS CONTINUOUS
Status: DISCONTINUED | OUTPATIENT
Start: 2019-02-27 | End: 2019-03-01

## 2019-02-27 RX ORDER — ENOXAPARIN SODIUM 100 MG/ML
30 INJECTION SUBCUTANEOUS EVERY 24 HOURS
Status: DISCONTINUED | OUTPATIENT
Start: 2019-02-27 | End: 2019-03-04 | Stop reason: HOSPADM

## 2019-02-27 RX ORDER — RAMELTEON 8 MG/1
8 TABLET ORAL NIGHTLY PRN
Status: DISCONTINUED | OUTPATIENT
Start: 2019-02-27 | End: 2019-03-04 | Stop reason: HOSPADM

## 2019-02-27 RX ADMIN — ATORVASTATIN CALCIUM 80 MG: 20 TABLET, FILM COATED ORAL at 08:02

## 2019-02-27 RX ADMIN — ENOXAPARIN SODIUM 30 MG: 100 INJECTION SUBCUTANEOUS at 05:02

## 2019-02-27 RX ADMIN — CHOLECALCIFEROL TAB 125 MCG (5000 UNIT) 5000 UNITS: 125 TAB at 08:02

## 2019-02-27 RX ADMIN — LISINOPRIL 5 MG: 5 TABLET ORAL at 08:02

## 2019-02-27 RX ADMIN — Medication 3 ML: at 06:02

## 2019-02-27 RX ADMIN — ONDANSETRON 4 MG: 2 INJECTION INTRAMUSCULAR; INTRAVENOUS at 12:02

## 2019-02-27 RX ADMIN — Medication 10 ML: at 10:02

## 2019-02-27 RX ADMIN — SODIUM CHLORIDE: 0.9 INJECTION, SOLUTION INTRAVENOUS at 12:02

## 2019-02-27 RX ADMIN — FERROUS SULFATE TAB EC 325 MG (65 MG FE EQUIVALENT) 325 MG: 325 (65 FE) TABLET DELAYED RESPONSE at 08:02

## 2019-02-27 RX ADMIN — LEVOTHYROXINE SODIUM 75 MCG: 75 TABLET ORAL at 08:02

## 2019-02-27 RX ADMIN — MICONAZOLE NITRATE: 20 POWDER TOPICAL at 09:02

## 2019-02-27 RX ADMIN — GABAPENTIN 300 MG: 300 CAPSULE ORAL at 09:02

## 2019-02-27 RX ADMIN — RAMELTEON 8 MG: 8 TABLET, FILM COATED ORAL at 09:02

## 2019-02-27 RX ADMIN — ALUMINUM HYDROXIDE, MAGNESIUM HYDROXIDE, AND DIMETHICONE 30 ML: 400; 400; 40 SUSPENSION ORAL at 06:02

## 2019-02-27 RX ADMIN — PANTOPRAZOLE SODIUM 40 MG: 40 TABLET, DELAYED RELEASE ORAL at 08:02

## 2019-02-27 RX ADMIN — Medication 3 ML: at 12:02

## 2019-02-27 NOTE — PLAN OF CARE
"Problem: Fall Injury Risk  Goal: Absence of Fall and Fall-Related Injury  Outcome: Ongoing (interventions implemented as appropriate)  Upon initial assessment at roughly 0800 patient was AOx4 and this RN assisted patient to sit in chair. Later in morning when patient was working with physical therapy the PT tech brought to RN's attention that patient seemed "out of it," wasn't able to follow a sense of direction. Pt was asked to walk forward back to room and instead would go right towards the window in the hallway. When patient was back in room writer asked pt if she was feeling okay and she stated, "never better," and neuro assessment was unchanged. EEG completed this evening showing "mild to moderate generalized slowing seen, suggestive of mild to moderate diffuse or multifocal cerebral dysfunction." Helped pt to commode and had very small BM, dark brown. Will continue with plan of care.           "

## 2019-02-27 NOTE — PROCEDURES
EXTENDED  ELECTROENCEPHALOGRAM  REPORT    Linda Hylton  [id number]  1935    DATE OF SERVICE: 2/26/19    DATE OF ADMISSION: 2/20/2019  8:46 PM    ADMITTING/REQUESTING PROVIDER:  Jadyn Sharma MD    REASON FOR CONSULT: 85yo M admitted with CVA    METHODOLOGY   Electroencephalographic (EEG) recording is with electrodes placed according to the International 10-20 placement system.  Thirty two (32) channels of digital signal (sampling rate of 512/sec) including T1 and T2 was simultaneously recorded from the scalp and may include  EKG, EMG, and/or eye monitors.  Recording band pass was 0.1 to 512 hz.  Digital video recording of the patient is simultaneously recorded with the EEG.  The patient is instructed report clinical symptoms which may occur during the recording session.  EEG and video recording is stored and archived in digital format.  Activation procedures which include photic stimulation, hyperventilation and instructing patients to perform simple task are done in selected patients.   The EEG is displayed on a monitor screen and can be reviewed using different montages.  Computer assisted analysis is employed to detect spike and electrographic seizure activity.   The entire record is submitted for computer analysis.  The entire recording is visually reviewed and the times identified by computer analysis as being spikes or seizures are reviewed again.  Compresses spectral analysis (CSA) is also performed on the activity recorded from each individual channel.  This is displayed as a power display of frequencies from 0 to 30 Hz over time.   The CSA is reviewed looking for asymmetries in power between homologous areas of the scalp and then compared with the original EEG recording.     [employer] software was also utilized in the review of this study.  This software suite analyzes the EEG recording in multiple domains.  Coherence and rhythmicity is computed to identify EEG sections which may contain organized  seizures.  Each channel undergoes analysis to detect presence of spike and sharp waves which have special and morphological characteristic of epileptic activity.  The routine EEG recording is converted from spacial into frequency domain.  This is then displayed comparing homologous areas to identify areas of significant asymmetry.  Algorithm to identify non-cortically generated artifact is used to separate eye movement, EMG and other artifact from the EEG.      RECORDING TIMES  Start on 2/26/19, 15:48  Stop on 2/26/19, 18:36    A total of 2 hours and 48 minutes of EEG recording was obtained.    EEG FINDINGS  Background activity:   The background rhythm was characterized by theta-alpha (6-7 Hz) activity with a 7 Hz posterior dominant alpha rhythm at 30-70 microvolts.   Symmetry and continuity: the background was continuous and symmetric; electrode artifacts obscure parts of the study, especially the latter portions; intermittent bi,lateral EMG artifacts are also seen.    Sleep:    Not seen.    Activation procedures:   Hyperventilation and photic stimulation were not performed     Abnormal activity:   No epileptiform discharges, periodic discharges, lateralized rhythmic delta activity or electrographic seizures were seen.    IMPRESSION:   This is an abnormal extended (2 hours and 48 minutes) EEG due to the mild to moderate generalized slowing seen, suggestive of mild to moderate diffuse or multifocal cerebral dysfunction.  No epileptiform activity or electrographic seizures seen.    CLINICAL CORRELATION IS RECOMMENDED    Shweta Contreras MD, KATHLEEN(), MAIA BURNS.  Neurology-Epilepsy.  [facility].

## 2019-02-27 NOTE — PLAN OF CARE
1432-Per PAUL Plata-patient will have EGD Friday AM. ORQUIDEA updated Zena at [facility] with info.     1236-ORQUIDEA spoke to Zena at [facility] in reference to patient needing an EGD at a later time. She states that they will not be able to get patient to this appt. EGD would need to be scheduled patient is discharged from SNF or patient would need to have EGD prior to discharge from hospital.  Notified team.

## 2019-02-27 NOTE — SUBJECTIVE & OBJECTIVE
Neurologic Chief Complaint: Right facial droop, RUE weakness and slurred speech.     Subjective:     Interval History: Hb 9. Patient may be able to discharge and have endoscopy done as an outpatient if H&H remains stable. Patient confused with PT, and complained of intermittent confusion. EEG 1 hour monitoring ordered to rule out seizur    HPI, Past Medical, Family, and Social History remains the same as documented in the initial encounter.     Review of Systems   Constitutional: Negative for chills and fever.   Respiratory: Negative for cough.    Cardiovascular: Negative for chest pain.   Skin: Negative for pallor and wound.   Neurological: Positive for numbness (LUE). Negative for speech difficulty and weakness.   Psychiatric/Behavioral: Positive for confusion. Negative for agitation.     Scheduled Meds:   atorvastatin  80 mg Oral Daily    cholecalciferol (vitamin D3)  5,000 Units Oral Daily    enoxaparin  30 mg Subcutaneous Daily    ferrous sulfate  325 mg Oral Daily    gabapentin  300 mg Oral Nightly    levothyroxine  75 mcg Oral Daily    lisinopril  5 mg Oral Daily    miconazole NITRATE 2 %   Topical (Top) BID    pantoprazole  40 mg Oral Daily    sodium chloride 0.9%  3 mL Intravenous Q8H     Continuous Infusions:   sodium chloride 0.9%       PRN Meds:sodium chloride, acetaminophen, aluminum & magnesium hydroxide-simethicone, hydrALAZINE, meclizine, ondansetron, ondansetron, sodium chloride 0.9%    Objective:     Vital Signs (Most Recent):  Temp: 99 °F (37.2 °C) (02/27/19 0847)  Pulse: 79 (02/27/19 0847)  Resp: 18 (02/27/19 0847)  BP: (!) 159/69 (02/27/19 0847)  SpO2: 95 % (02/27/19 0847)  BP Location: Left arm    Vital Signs Range (Last 24H):  Temp:  [96.2 °F (35.7 °C)-99 °F (37.2 °C)]   Pulse:  [76-91]   Resp:  [18]   BP: (113-159)/(53-70)   SpO2:  [92 %-97 %]   BP Location: Left arm    Physical Exam   Constitutional: She is oriented to person, place, and time. She appears well-developed and  well-nourished. No distress.   HENT:   Head: Normocephalic and atraumatic.   Eyes: EOM are normal. Pupils are equal, round, and reactive to light.   Cardiovascular: Normal rate.   Pulmonary/Chest: Effort normal. No respiratory distress.   Neurological: She is alert and oriented to person, place, and time.   See below   Skin: Skin is warm and dry.   Vitals reviewed.      Neurological Exam:   LOC: alert  Attention Span: Good   Language: No aphasia  Articulation: No dysarthria  Orientation: Person, Place, Time   Visual Fields: Full  EOM (CN III, IV, VI): Full/intact  Facial Movement (CN VII): equal bilaterally  Motor: Arm left  Normal 5/5  Leg left  Normal 5/5  Arm right  Normal 5/5  Leg right Normal 5/5  Cebellar: No evidence of appendicular or axial ataxia  Sensation: decreased sensation in LUE to light touch  Tone: Normal tone throughout    Laboratory:  CMP:   Recent Labs   Lab 02/27/19  0631   CALCIUM 9.0   *   K 4.5   CO2 24      BUN 23   CREATININE 1.1     CBC:   Recent Labs   Lab 02/27/19  0631   WBC 9.98   RBC 3.07*   HGB 8.5*   HCT 28.3*      MCV 92   MCH 27.7   MCHC 30.0*     Lipid Panel:   Recent Labs   Lab 02/20/19  2055   CHOL 177   LDLCALC 96.6   HDL 30*   TRIG 252*     Coagulation:   Recent Labs   Lab 02/20/19  2244   INR 0.9   APTT 24.0     Hgb A1C:   Recent Labs   Lab 02/20/19  2244   HGBA1C 5.3     TSH:   Recent Labs   Lab 02/20/19 2055   TSH 2.994       Diagnostic Results     Brain Imaging     CT head 2/20/19  No acute abnormality noted     MRI 2/21/19    Small acute infarcts in the left basal ganglia.   Changes of chronic small vessel ischemic disease and cerebral volume loss.      Vessel Imaging     CTA Head/Neck 2/20/19  Recent infarcts in the left basal ganglia.  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      Cardiac Imaging     TTE 2/21/19  · Concentric left ventricular remodeling.  · Normal left ventricular systolic function. The estimated ejection fraction is 65%  · Grade I (mild) left ventricular diastolic dysfunction consistent with impaired relaxation.  · Normal left atrial pressure.  · Mild left atrial enlargement.  · Mild aortic regurgitation.  · Normal right ventricular systolic function.  · Mild aortic valve stenosis.  · Aortic valve area is 2.18 cm2; peak velocity is 2.39 m/s; mean gradient is 14.14 mmHg.

## 2019-02-27 NOTE — PLAN OF CARE
Problem: Physical Therapy Goal  Goal: Physical Therapy Goal    Goals to be met by 2/28/2019    1. Pt will perform rolling to the R and L independently.   2. Pt will perform supine to sit from both sides of the bed independently.  3. Pt will perform sit to supine independently.  4. Pt will perform sit to stand transfers independently.   5. Pt will perform bed <> chair transfers with mod I using RW.  6. Pt will perform gait x 50 feet with RW and CGA.   7. Pt will sit EOB x 20 minutes with no LOB while performing dynamic UE tasks to prepare for functional tasks in sitting.   8. Pt will stand  x 20 minutes with no LOB while performing dynamic UE tasks to prepare for functional tasks in standing.      Outcome: Ongoing (interventions implemented as appropriate)  Patient participated well in therapy.  POC and goals remain appropriate.  Please refer to the progress note for functional mobility.     Pt is safe to perform transfers with nursing using CGA and RW.     Modesta Richardson, SPT  2/27/2019

## 2019-02-27 NOTE — PROGRESS NOTES
[facility]  Vascular Neurology  Comprehensive Stroke Center  Progress Note    Assessment/Plan:     * Embolic stroke involving left middle cerebral artery    84 year old female with past medical hx of HTN, hypothyroidism, prior R vermis stroke in 3/2017, and aortic stenosis presented to the ED with complains of right facial droop. LKN 18:00, within window for tPA, however patient refused. Per chart review, patient was seen day PTA and diagonsed with a UTI and started on Macrobid. Repeat UA here with no evidence of infection so abx d/c'd.    MRI Brain is with small acute infarcts in the L basal ganglia. 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 Echo demonstrated mild LAE. Stroke etiology not completely clear, but appears embolic/ESUS on imaging. Will plan for 30-day cardiac event monitor following SNF discharge.     Stroke team member discussed level of supervision at home with pt's son by bedside on 2/21. Patient lives with son who works overnight, and he typically takes care of patient's needs during the day but leaves patient at home alone overnight when he works. SNF may be a more safe and appropriate disposition for this pt who is now with decreased mobility from her previous baseline prior to admission, however pt is not in favor of this plan and may refuse.  02/25- Patient now amendable to plan to discharge to facility.         Antithrombotics for secondary stroke prevention: Antiplatelets: On hold for possible GI scope   Statins for secondary stroke prevention and hyperlipidemia, if present:   Statins: Atorvastatin- 80 mg daily  Aggressive risk factor modification: HTN, HLD, Obesity, prior stroke  Rehab efforts: PT/OT/SLP to evaluate and treat  Diagnostics ordered/pending: None   VTE prophylaxis: Heparin 5000 units SQ every 8 hours  Place SCDs   BP  parameters: SBP < 160     Melena     Patient had dark and tarry stool on 02/25/2019. GI consulted. Will hold Plavix to have scope done on Friday. Pt's son updated on POC.   May be able to have scope as outpatient if H&H remains stable     Acute blood loss anemia    Hb 8 > 8 > 6.5. Pt appears pale and c/o light-headedness but is neurologically stable.   Ordered type & screen and 1U pRBCs, consent obtained on 2/22/19. SQH held; SCDs ordered.  Started PPI. + FOBT result. Anemia workup largely WNL with TIBC mildly elevated and iron sat 7%.  Pt s/p 1U pRBCs on 2/22 with good response; Hb 8.8 with pallor improved  Restarted SQH; will d/c ASA and continue with Plavix monotherapy for now   Hb 9.5 on 2/24.    02/25 on Hb 8.8. Patient had dark and tarry stool this morning. GI consulted. Will hold Plavix to have scope done on Friday.   02/26- Hb 9. Patient may be able to discharge and have endoscopy done as an outpatient if H&H remains stable.      Nodule of apex of right lung    CTA head/neck on 2/21/19 shows stable 9 mm noncalcified nodule in the right lung apex.   Per family over the phone, pt has no tobacco use history.  Discussed with Hospital Medicine - No acute intervention needed at this time. PCP to monitor and consider CT Chest in 6 months.     Cytotoxic cerebral edema    -Area of cytotoxic cerebral edema identified when reviewing brain imaging in the territory of the left middle cerebral artery. There is no mass effect associated with it. We will continue to monitor the patients clinical exam for any worsening of symptoms which may indicate expansion of the stroke or the area of the edema resulting in the clinical change. The pattern is suggestive of an embolic/ESUS etiology.     Hyponatremia    -Na 128 > 130 > 129 > 131 > 132 > 129  Last recorded Na in 2018 WNL.  -Urine Na, urine osmol, serum osmol WNL  -250 ml NS bolus given 2/21/19. Repeat Na 131.  Discussed with Hospital Medicine- Hyponatremia possibly 2/2  hypovolemia in the setting of pt's acute Hb drop.   Continue daily BMP; Will discuss any further recs with Hospital Medicine at AM rounds    02/25- Discussed hyponatremia with hospital medicine, patient may be hyponatremic at baseline. Will continue to monitor and appreciate recs from hospital medicine.     History of stroke    Stroke risk factor  3/2017 - diagnosed with R cerebellar infarct. Was on DAPT and Lipitor 40 outpatient.  Patient states she had no significant stroke deficits (dizziness?)     Mixed hyperlipidemia    Stroke risk factor  LDL 96.6  Patient taking Atorvastatin 40 at home; Increased dose to 80 mg Daily     Dizziness    Pt c/o dizziness/light-headedness which she states she has at baseline; Reports no acute change currently  Also c/o mild blurred vision but no other neuro changes appreciated and visual fields are intact  Restarted home PRN Meclizine  Will continue to monitor     Essential hypertension    Stroke risk factor  SBP <160  -Pt states she checks her BP twice daily at home and takes Lisinopril occasionally if her SBP < 150. Lisinopril 5mg started on 02/25.  -BP at goal for last 24 hours.  Will continue monitoring          02/20/2019 MRI brain positive for stroke.  Admitted to Vascular Neurology.  02/21/2019- Na 130-->129. 250 ml NS bolus ordered. Repeat Na lab pending. CTA head/neck with recent infarcts in the left basal ganglia. 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 Echo EF 65% with mild left atrial enlargement. SLP recs for mechanical soft diet. PT/OT recs for possible rehab or SNF.   2/22: Hb 6.5; pt appears pale, c/o light-headedness but is neurologically stable. Ordered type & screen and 1U pRBCs, consent obtained. Will repeat CBC following transfusion. Started PPI and FOBT ordered (needs to be collected.) Monitoring Na.  2/23: Patient s/p 1U pRBCs with good response; no pallor or light-headedness today and Hb 8.8. Restarted  SQH; will d/c ASA and continue with monotherapy (Plavix). FOBT+; will monitor Hb for now and plan for outpatient GI work-up if Hb remains stable. Monitoring BP.  2/24: Hb 9.5 today. WBC 13 however pt afebrile, denies acute complaints; monitoring. Pt c/o dizziness (her reported baseline) as well as mild blurred vision; no other neuro changes, visual fields are intact. Restarted home PRN Meclizine. Pt states she takes Lisinopril occasionally at home if her BP is high; started Lisinopril 5mg Daily. Miconazole powder ordered for areas of redness noted in abdominal folds. Pt is not in favor of d/c to SNF; attempted to call pt's son Angel to discuss if he is comfortable with plan of d/c to home. Attempting to touch base with PT regarding dispo as well.  02/25/2019: Hb 8.8. Patient had dark and tarry stool this morning. GI consulted. Will hold Plavix to have scope done on Friday. Pt's son updated on POC.   02/26/2019: Hb 9. Patient may be able to discharge and have endoscopy done as an outpatient if H&H remains stable. Patient confused with PT, and complained of intermittent confusion. EEG 1 hour monitoring ordered to rule out seizures.   02/27/19 SNF reviewing patient, monitoring H&H due to recent melena, if remains stable then outpatient scope    STROKE DOCUMENTATION   Acute Stroke Times   Last Known Normal Date: 02/20/19  Last Known Normal Time: 1800  Symptom Onset Date: 02/20/19  Symptom Onset Time: 1800  Stroke Team Called Date: 02/20/19  Stroke Team Called Time: 2050  Stroke Team Arrival Date: 02/20/19  Stroke Team Arrival Time: 2052  CT Interpretation Time: 2100  Decision to Treat Time for Alteplase: 2110(Patient refused )  Decision to Treat Time for IR: (NA)    NIH Scale:  1a. Level of Consciousness: 0-->Alert, keenly responsive  1b. LOC Questions: 0-->Answers both questions correctly  1c. LOC Commands: 0-->Performs both tasks correctly  2. Best Gaze: 0-->Normal  3. Visual: 0-->No visual loss  4. Facial Palsy:  0-->Normal symmetrical movements  5a. Motor Arm, Left: 0-->No drift, limb holds 90 (or 45) degrees for full 10 secs  5b. Motor Arm, Right: 0-->No drift, limb holds 90 (or 45) degrees for full 10 secs  6a. Motor Leg, Left: 0-->No drift, leg holds 30 degree position for full 5 secs  6b. Motor Leg, Right: 0-->No drift, leg holds 30 degree position for full 5 secs  7. Limb Ataxia: 0-->Absent  8. Sensory: 1-->Mild-to-moderate sensory loss, patient feels pinprick is less sharp or is dull on the affected side, or there is a loss of superficial pain with pinprick, but patient is aware of being touched  9. Best Language: 0-->No aphasia, normal  10. Dysarthria: 0-->Normal  11. Extinction and Inattention (formerly Neglect): 0-->No abnormality  Total (NIH Stroke Scale): 1       Modified Craven Score: 1  [place] Coma Scale:    ABCD2 Score:    [id number] Score:   HAS -BLED Score:   ICH Score:   Hunt & De Classification:      Hemorrhagic change of an Ischemic Stroke: Does this patient have an ischemic stroke with hemorrhagic changes? No     Neurologic Chief Complaint: Right facial droop, RUE weakness and slurred speech.     Subjective:     Interval History: Hb 9. Patient may be able to discharge and have endoscopy done as an outpatient if H&H remains stable. Patient confused with PT, and complained of intermittent confusion. EEG 1 hour monitoring ordered to rule out seizur    HPI, Past Medical, Family, and Social History remains the same as documented in the initial encounter.     Review of Systems   Constitutional: Negative for chills and fever.   Respiratory: Negative for cough.    Cardiovascular: Negative for chest pain.   Skin: Negative for pallor and wound.   Neurological: Positive for numbness (LUE). Negative for speech difficulty and weakness.   Psychiatric/Behavioral: Positive for confusion. Negative for agitation.     Scheduled Meds:   atorvastatin  80 mg Oral Daily    cholecalciferol (vitamin D3)  5,000 Units Oral  Daily    enoxaparin  30 mg Subcutaneous Daily    ferrous sulfate  325 mg Oral Daily    gabapentin  300 mg Oral Nightly    levothyroxine  75 mcg Oral Daily    lisinopril  5 mg Oral Daily    miconazole NITRATE 2 %   Topical (Top) BID    pantoprazole  40 mg Oral Daily    sodium chloride 0.9%  3 mL Intravenous Q8H     Continuous Infusions:   sodium chloride 0.9%       PRN Meds:sodium chloride, acetaminophen, aluminum & magnesium hydroxide-simethicone, hydrALAZINE, meclizine, ondansetron, ondansetron, sodium chloride 0.9%    Objective:     Vital Signs (Most Recent):  Temp: 99 °F (37.2 °C) (02/27/19 0847)  Pulse: 79 (02/27/19 0847)  Resp: 18 (02/27/19 0847)  BP: (!) 159/69 (02/27/19 0847)  SpO2: 95 % (02/27/19 0847)  BP Location: Left arm    Vital Signs Range (Last 24H):  Temp:  [96.2 °F (35.7 °C)-99 °F (37.2 °C)]   Pulse:  [76-91]   Resp:  [18]   BP: (113-159)/(53-70)   SpO2:  [92 %-97 %]   BP Location: Left arm    Physical Exam   Constitutional: She is oriented to person, place, and time. She appears well-developed and well-nourished. No distress.   HENT:   Head: Normocephalic and atraumatic.   Eyes: EOM are normal. Pupils are equal, round, and reactive to light.   Cardiovascular: Normal rate.   Pulmonary/Chest: Effort normal. No respiratory distress.   Neurological: She is alert and oriented to person, place, and time.   See below   Skin: Skin is warm and dry.   Vitals reviewed.      Neurological Exam:   LOC: alert  Attention Span: Good   Language: No aphasia  Articulation: No dysarthria  Orientation: Person, Place, Time   Visual Fields: Full  EOM (CN III, IV, VI): Full/intact  Facial Movement (CN VII): equal bilaterally  Motor: Arm left  Normal 5/5  Leg left  Normal 5/5  Arm right  Normal 5/5  Leg right Normal 5/5  Cebellar: No evidence of appendicular or axial ataxia  Sensation: decreased sensation in LUE to light touch  Tone: Normal tone throughout    Laboratory:  CMP:   Recent Labs   Lab 02/27/19  0656    CALCIUM 9.0   *   K 4.5   CO2 24      BUN 23   CREATININE 1.1     CBC:   Recent Labs   Lab 02/27/19  0631   WBC 9.98   RBC 3.07*   HGB 8.5*   HCT 28.3*      MCV 92   MCH 27.7   MCHC 30.0*     Lipid Panel:   Recent Labs   Lab 02/20/19 2055   CHOL 177   LDLCALC 96.6   HDL 30*   TRIG 252*     Coagulation:   Recent Labs   Lab 02/20/19 2244   INR 0.9   APTT 24.0     Hgb A1C:   Recent Labs   Lab 02/20/19 2244   HGBA1C 5.3     TSH:   Recent Labs   Lab 02/20/19 2055   TSH 2.994       Diagnostic Results     Brain Imaging     CT head 2/20/19  No acute abnormality noted     MRI 2/21/19    Small acute infarcts in the left basal ganglia.   Changes of chronic small vessel ischemic disease and cerebral volume loss.      Vessel Imaging     CTA Head/Neck 2/20/19  Recent infarcts in the left basal ganglia.  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      Cardiac Imaging     TTE 2/21/19  · Concentric left ventricular remodeling.  · Normal left ventricular systolic function. The estimated ejection fraction is 65%  · Grade I (mild) left ventricular diastolic dysfunction consistent with impaired relaxation.  · Normal left atrial pressure.  · Mild left atrial enlargement.  · Mild aortic regurgitation.  · Normal right ventricular systolic function.  · Mild aortic valve stenosis.  · Aortic valve area is 2.18 cm2; peak velocity is 2.39 m/s; mean gradient is 14.14 mmHg.            Sherlyn Villasenor PA-C  Comprehensive Stroke Center  Department of Vascular Neurology   [facility]

## 2019-02-27 NOTE — PLAN OF CARE
"Problem: Adult Inpatient Plan of Care  Goal: Plan of Care Review  Outcome: Ongoing (interventions implemented as appropriate)  Plan of care reviewed with patient this shift.  At 2010 pt noted to have small amount of blood on gown near ABD area.  When asked pt states "from the Heparin shots."  After several minutes of pressure applied bleeding stopped.  Large amount of bruising noted to lower abdomen.  At 2134 re-entered room to find pt pt again bleeding at same area.  Pressure again applied and bleeding stopped.  VSS.  Heparin not given r/t active bleeding issues.  Call placed to on-call to notify.  Order given stating to also hold AM heparin and will be addressed in AM rounds. Otherwise pretty un-eventful shift.  Respirations unlabored.  Skin w/d.  A/O x4.  When awakened pt is noted to have intermittent confusion but easily re-oriented.  Neuro status unchanged.  Able to verbalize wants/needs.  No c/o pain.      "

## 2019-02-27 NOTE — PT/OT/SLP PROGRESS
"Physical Therapy Treatment    Patient Name: Linda Hylton  MRN: [id number]   Diagnosis: Embolic stroke involving left middle cerebral artery    Recommendations:     Discharge Recommendations:  nursing facility, skilled( SNF)   Discharge Equipment Recommendations: none(TBD at next level of care)   Barriers to Discharge: increased level of caregiver supervision/assistance    Assessment:   Linda Hylton is a 84 y.o. female admitted with a medical diagnosis of Embolic stroke involving left middle cerebral artery.  Presents with the following functional limitations/impairments: weakness, impaired self care skills, impaired endurance, impaired functional mobilty, impaired balance, decreased safety awareness, decreased upper extremity function, decreased lower extremity function, gait instability, impaired fine motor, decreased ROM.  Ms. Hylton participated well in therapy today. She demonstrates an improved ability to follow commands but is limited in her gait distance due to decreased endurance. Pt would continue to benefit from skilled PT in order to address the above deficits and improve independence.    Rehab Prognosis:  good; patient would benefit from acute skilled PT services to address these deficits and reach maximum level of function.      Subjective   PT communicated with nursing prior to therapy.     Patient comments/goals: "Do I have to go for a walk now?"  Pain/Comfort:  · Pain Rating 1: (pain not rated)  · Location - Side 1: Left  · Location - Orientation 1: generalized  · Location 1: leg  · Pain Addressed 1: Reposition, Distraction  · Pain Rating Post-Intervention 1: (pain not rated)    Recent Vital Signs: (Last documentation)  Pulse: 79 (02/27/19 0847)  BP: (!) 159/69 (02/27/19 0847)  SpO2: 95 % (02/27/19 0847)     Objective:   General Precautions: aspiration, fall  Recent Surgery: Procedure(s) (LRB):  EGD (ESOPHAGOGASTRODUODENOSCOPY) (N/A)    The patient currently has telemetry.    The patient was " found seated on bedside commode.    Functional Mobility:  Bed Mobility: NT     Sitting Balance at Edge of Bed:  · Assistance Level Required: SBA  · Time: 10 minutes  · Postural deviations noted: forward head posture, rounded shoulders  · PT Encouraged/facilitated: pt to maintain balance in sitting by placing both feet on floor and using UEs as necessary.      Transfers:   · Sit <> Stand Transfer x4: min A from low surface height  · Pt requires VCs to push up from surface.   · Bedside commode <> Chair Transfer x1: CGA for balance assist with RW     Gait:  Gait x 28 feet; 40 feet; 34 feet with RW and CGA  · Pt demonstrated decreased foot clearance, externally rotated foot progression, narrow base of support, L trendelenberg gait pattern, and decreased step length with RLE.  · PT encouraged or facilitated pt to maintain fully upright and keep head up to scan the environment.    Therapeutic Activities, Education, or Exercises:  Time was provided for active listening, discussion of health disposition, and discussion of safe discharge recommendations. Therapist answered questions to patient/familys satisfaction within scope of practice.  Patient and family are aware of patient's deficits and therapy progression. White board updated to reflect current level of assistance.    The patient was left sitting in chair with chair alarm on.     FUNCTIONAL OUTCOME MEASURES:  Turning over in bed (including adjusting bedclothes, sheets and blankets)?: 4  Sitting down on and standing up from a chair with arms (e.g., wheelchair, bedside commode, etc.): 3  Moving from lying on back to sitting on the side of the bed?: 4  Moving to and from a bed to a chair (including a wheelchair)?: 3  Need to walk in hospital room?: 3  Climbing 3-5 steps with a railing?: 2  Basic Mobility Total Score: 19    Goals:     Multidisciplinary Problems     Physical Therapy Goals        Problem: Physical Therapy Goal    Goal Priority Disciplines Outcome Goal  Variances Interventions   Physical Therapy Goal     PT, PT/OT Ongoing (interventions implemented as appropriate)     Description:    Goals to be met by 2/28/2019    1. Pt will perform rolling to the R and L independently.   2. Pt will perform supine to sit from both sides of the bed independently.  3. Pt will perform sit to supine independently.  4. Pt will perform sit to stand transfers independently.   5. Pt will perform bed <> chair transfers with mod I using RW.  6. Pt will perform gait x 50 feet with RW and CGA.   7. Pt will sit EOB x 20 minutes with no LOB while performing dynamic UE tasks to prepare for functional tasks in sitting.   8. Pt will stand  x 20 minutes with no LOB while performing dynamic UE tasks to prepare for functional tasks in standing.                        Plan:   During this hospitalization, patient to be seen 4 x/week to address their physical therapy related impairments and improve their overall level of function.   · Plan of Care Expires: 03/21/19   Plan of Care Reviewed with: patient    This plan of care has been discussed with the patient and/or family who were involved in its development and are in agreement with the identified goals and treatment plan.     Time Tracking:     PT Received On:  02/27/19  PT Start Time:   1040    PT Stop Time:  1104  PT Total Time (min): 24 min     Billable Minutes: Gait Training 24     NORBERTO Vera  2/27/2019

## 2019-02-28 ENCOUNTER — ANESTHESIA EVENT (OUTPATIENT)
Dept: ENDOSCOPY | Facility: HOSPITAL | Age: 84
DRG: 064 | End: 2019-02-28
Payer: MEDICARE

## 2019-02-28 LAB
ANION GAP SERPL CALC-SCNC: 9 MMOL/L
BASOPHILS # BLD AUTO: 0.1 K/UL
BASOPHILS NFR BLD: 1 %
BUN SERPL-MCNC: 19 MG/DL
CALCIUM SERPL-MCNC: 9.1 MG/DL
CHLORIDE SERPL-SCNC: 100 MMOL/L
CO2 SERPL-SCNC: 24 MMOL/L
CREAT SERPL-MCNC: 0.9 MG/DL
DIFFERENTIAL METHOD: ABNORMAL
EOSINOPHIL # BLD AUTO: 0.5 K/UL
EOSINOPHIL NFR BLD: 4.5 %
ERYTHROCYTE [DISTWIDTH] IN BLOOD BY AUTOMATED COUNT: 15.2 %
EST. GFR  (AFRICAN AMERICAN): >60 ML/MIN/1.73 M^2
EST. GFR  (NON AFRICAN AMERICAN): 58.9 ML/MIN/1.73 M^2
GLUCOSE SERPL-MCNC: 119 MG/DL
HCT VFR BLD AUTO: 27.1 %
HGB BLD-MCNC: 8.3 G/DL
IMM GRANULOCYTES # BLD AUTO: 0.15 K/UL
IMM GRANULOCYTES NFR BLD AUTO: 1.4 %
LYMPHOCYTES # BLD AUTO: 2 K/UL
LYMPHOCYTES NFR BLD: 19.4 %
MAGNESIUM SERPL-MCNC: 1.9 MG/DL
MCH RBC QN AUTO: 28.2 PG
MCHC RBC AUTO-ENTMCNC: 30.6 G/DL
MCV RBC AUTO: 92 FL
MONOCYTES # BLD AUTO: 1 K/UL
MONOCYTES NFR BLD: 9.9 %
NEUTROPHILS # BLD AUTO: 6.7 K/UL
NEUTROPHILS NFR BLD: 63.8 %
NRBC BLD-RTO: 0 /100 WBC
PHOSPHATE SERPL-MCNC: 3.6 MG/DL
PLATELET # BLD AUTO: 255 K/UL
PMV BLD AUTO: 10.9 FL
POTASSIUM SERPL-SCNC: 4.6 MMOL/L
RBC # BLD AUTO: 2.94 M/UL
SODIUM SERPL-SCNC: 133 MMOL/L
WBC # BLD AUTO: 10.5 K/UL

## 2019-02-28 PROCEDURE — 63600175 PHARM REV CODE 636 W HCPCS: Performed by: PHYSICIAN ASSISTANT

## 2019-02-28 PROCEDURE — 99233 SBSQ HOSP IP/OBS HIGH 50: CPT | Mod: ,,, | Performed by: PSYCHIATRY & NEUROLOGY

## 2019-02-28 PROCEDURE — 99233 PR SUBSEQUENT HOSPITAL CARE,LEVL III: ICD-10-PCS | Mod: ,,, | Performed by: PSYCHIATRY & NEUROLOGY

## 2019-02-28 PROCEDURE — 25000003 PHARM REV CODE 250: Performed by: STUDENT IN AN ORGANIZED HEALTH CARE EDUCATION/TRAINING PROGRAM

## 2019-02-28 PROCEDURE — 25000003 PHARM REV CODE 250: Performed by: PHYSICIAN ASSISTANT

## 2019-02-28 PROCEDURE — 85025 COMPLETE CBC W/AUTO DIFF WBC: CPT

## 2019-02-28 PROCEDURE — 20600001 HC STEP DOWN PRIVATE ROOM

## 2019-02-28 PROCEDURE — A4216 STERILE WATER/SALINE, 10 ML: HCPCS | Performed by: NURSE PRACTITIONER

## 2019-02-28 PROCEDURE — 80048 BASIC METABOLIC PNL TOTAL CA: CPT

## 2019-02-28 PROCEDURE — 84100 ASSAY OF PHOSPHORUS: CPT

## 2019-02-28 PROCEDURE — 83735 ASSAY OF MAGNESIUM: CPT

## 2019-02-28 PROCEDURE — 25000003 PHARM REV CODE 250: Performed by: NURSE PRACTITIONER

## 2019-02-28 PROCEDURE — 97535 SELF CARE MNGMENT TRAINING: CPT

## 2019-02-28 PROCEDURE — 36415 COLL VENOUS BLD VENIPUNCTURE: CPT

## 2019-02-28 RX ADMIN — GABAPENTIN 300 MG: 300 CAPSULE ORAL at 08:02

## 2019-02-28 RX ADMIN — LEVOTHYROXINE SODIUM 75 MCG: 75 TABLET ORAL at 08:02

## 2019-02-28 RX ADMIN — FERROUS SULFATE TAB EC 325 MG (65 MG FE EQUIVALENT) 325 MG: 325 (65 FE) TABLET DELAYED RESPONSE at 08:02

## 2019-02-28 RX ADMIN — MICONAZOLE NITRATE: 20 POWDER TOPICAL at 08:02

## 2019-02-28 RX ADMIN — SODIUM CHLORIDE: 0.9 INJECTION, SOLUTION INTRAVENOUS at 09:02

## 2019-02-28 RX ADMIN — Medication 3 ML: at 09:02

## 2019-02-28 RX ADMIN — LISINOPRIL 5 MG: 5 TABLET ORAL at 08:02

## 2019-02-28 RX ADMIN — ENOXAPARIN SODIUM 30 MG: 100 INJECTION SUBCUTANEOUS at 04:02

## 2019-02-28 RX ADMIN — ONDANSETRON 8 MG: 8 TABLET, ORALLY DISINTEGRATING ORAL at 08:02

## 2019-02-28 RX ADMIN — CHOLECALCIFEROL TAB 125 MCG (5000 UNIT) 5000 UNITS: 125 TAB at 08:02

## 2019-02-28 RX ADMIN — ACETAMINOPHEN 650 MG: 325 TABLET ORAL at 08:02

## 2019-02-28 RX ADMIN — ATORVASTATIN CALCIUM 80 MG: 20 TABLET, FILM COATED ORAL at 08:02

## 2019-02-28 RX ADMIN — PANTOPRAZOLE SODIUM 40 MG: 40 TABLET, DELAYED RELEASE ORAL at 08:02

## 2019-02-28 RX ADMIN — RAMELTEON 8 MG: 8 TABLET, FILM COATED ORAL at 09:02

## 2019-02-28 NOTE — SUBJECTIVE & OBJECTIVE
Neurologic Chief Complaint: Right facial droop, RUE weakness and slurred speech.     Subjective:     Interval History: BREEON, patient and nurse tech reported black stool after BM this morning        HPI, Past Medical, Family, and Social History remains the same as documented in the initial encounter.     Review of Systems   Constitutional: Negative for chills and fever.   Respiratory: Negative for cough.    Cardiovascular: Negative for chest pain.   Gastrointestinal:        Black stool   Skin: Negative for pallor and wound.   Neurological: Negative for speech difficulty, weakness and numbness.   Psychiatric/Behavioral: Positive for confusion. Negative for agitation.     Scheduled Meds:   atorvastatin  80 mg Oral Daily    cholecalciferol (vitamin D3)  5,000 Units Oral Daily    enoxaparin  30 mg Subcutaneous Daily    ferrous sulfate  325 mg Oral Daily    gabapentin  300 mg Oral Nightly    levothyroxine  75 mcg Oral Daily    lisinopril  5 mg Oral Daily    miconazole NITRATE 2 %   Topical (Top) BID    pantoprazole  40 mg Oral Daily    sodium chloride 0.9%  3 mL Intravenous Q8H     Continuous Infusions:   sodium chloride 0.9% 50 mL/hr at 02/27/19 1214    sodium chloride 0.9%       PRN Meds:sodium chloride, acetaminophen, aluminum & magnesium hydroxide-simethicone, hydrALAZINE, meclizine, ondansetron, ondansetron, ramelteon, sodium chloride 0.9%    Objective:     Vital Signs (Most Recent):  Temp: 98 °F (36.7 °C) (02/28/19 0728)  Pulse: 99 (02/28/19 0741)  Resp: 12 (02/28/19 0728)  BP: (!) 146/67 (02/28/19 0728)  SpO2: (!) 92 % (02/28/19 0728)  BP Location: Left arm    Vital Signs Range (Last 24H):  Temp:  [97.3 °F (36.3 °C)-99 °F (37.2 °C)]   Pulse:  [61-99]   Resp:  [12-19]   BP: (131-184)/(65-94)   SpO2:  [92 %-97 %]   BP Location: Left arm    Physical Exam   Constitutional: She is oriented to person, place, and time. She appears well-developed and well-nourished. No distress.   HENT:   Head: Normocephalic  and atraumatic.   Eyes: EOM are normal. Pupils are equal, round, and reactive to light.   Cardiovascular: Normal rate.   Pulmonary/Chest: Effort normal. No respiratory distress.   Neurological: She is alert and oriented to person, place, and time.   See below   Skin: Skin is warm and dry.   Vitals reviewed.      Neurological Exam:   LOC: alert  Attention Span: Good   Language: No aphasia  Articulation: No dysarthria  Orientation: Person, Place, Time   Visual Fields: Full  EOM (CN III, IV, VI): Full/intact  Facial Movement (CN VII): equal bilaterally  Motor: Arm left  Normal 5/5  Leg left  Normal 5/5  Arm right  Normal 5/5  Leg right Normal 5/5  Cebellar: No evidence of appendicular or axial ataxia  Sensation: intact bilaterally to light touch  Tone: Normal tone throughout    Laboratory:  CMP:   Recent Labs   Lab 02/28/19  0410   CALCIUM 9.1   *   K 4.6   CO2 24      BUN 19   CREATININE 0.9     CBC:   Recent Labs   Lab 02/28/19  0410   WBC 10.50   RBC 2.94*   HGB 8.3*   HCT 27.1*      MCV 92   MCH 28.2   MCHC 30.6*     Lipid Panel:   No results for input(s): CHOL, LDLCALC, HDL, TRIG in the last 168 hours.  Coagulation:   No results for input(s): PT, INR, APTT in the last 168 hours.  Hgb A1C:   No results for input(s): HGBA1C in the last 168 hours.  TSH:   No results for input(s): TSH in the last 168 hours.    Diagnostic Results     Brain Imaging     CT head 2/20/19  No acute abnormality noted     MRI 2/21/19    Small acute infarcts in the left basal ganglia.   Changes of chronic small vessel ischemic disease and cerebral volume loss.      Vessel Imaging     CTA Head/Neck 2/20/19  Recent infarcts in the left basal ganglia.  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      Cardiac Imaging     TTE 2/21/19  · Concentric left ventricular remodeling.  · Normal left ventricular systolic function. The estimated ejection fraction is 65%  · Grade I (mild) left ventricular diastolic dysfunction consistent with impaired relaxation.  · Normal left atrial pressure.  · Mild left atrial enlargement.  · Mild aortic regurgitation.  · Normal right ventricular systolic function.  · Mild aortic valve stenosis.  · Aortic valve area is 2.18 cm2; peak velocity is 2.39 m/s; mean gradient is 14.14 mmHg.

## 2019-02-28 NOTE — ANESTHESIA PREPROCEDURE EVALUATION
[facility]  Anesthesia Pre-Operative Evaluation         Patient Name: Linda Hylton  YOB: 1935  MRN: [id number]    SUBJECTIVE:     Pre-operative evaluation for Procedure(s) (LRB):  EGD (ESOPHAGOGASTRODUODENOSCOPY) (N/A)     02/28/2019    Linda Hylton is a 84 y.o. female w/ a significant PMHx of HTN, hypothyroidism,  prior R vermis stroke in 3/2017 presented to the ED with complains of Right facial droop.      MRI Brain is with small acute infarcts in the L basal ganglia. 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 Echo demonstrated mild LAE. Stroke etiology not completely clear, but appears embolic/ESUS on imaging.     Hgb 8.3      Per primary, patient may be discharged and have endoscopy done as outpatient if H&H remains stable.    Patient now presents for the above procedure(s).      LDA: None documented.       Peripheral IV - Single Lumen 02/25/19 1805 Right Upper Arm (Active)   Site Assessment Clean;Dry;Intact 2/27/2019  8:30 PM   Line Status Flushed 2/27/2019  8:30 PM   Dressing Status Clean;Dry;Intact 2/27/2019  8:30 PM   Dressing Intervention New dressing 2/25/2019  6:05 PM   Dressing Change Due 03/01/19 2/26/2019  8:00 PM   Site Change Due 03/01/19 2/26/2019  8:00 PM   Reason Not Rotated Not due 2/26/2019  8:00 PM   Number of days: 2       Prev airway: None documented.    Drips: None documented.   sodium chloride 0.9% Stopped (02/28/19 0800)    sodium chloride 0.9%         Patient Active Problem List   Diagnosis    Essential hypertension    Acquired hypothyroidism    Sciatica of left side    Aortic stenosis    Vitamin D deficiency    Nausea & vomiting    Aortic arch atherosclerosis    Arterial stenosis    Intracranial atherosclerosis    PAOD (peripheral arterial occlusive disease)    Dizziness    Acute encephalopathy     Mixed hyperlipidemia    Vertebral basilar insufficiency    Fatigue    Iron deficiency anemia    History of stroke    Embolic stroke involving left middle cerebral artery    Hyponatremia    Cytotoxic cerebral edema    Nodule of apex of right lung    Acute blood loss anemia    Melena       Review of patient's allergies indicates:   Allergen Reactions    Sulfa (sulfonamide antibiotics) Hives       Current Inpatient Medications:   atorvastatin  80 mg Oral Daily    cholecalciferol (vitamin D3)  5,000 Units Oral Daily    enoxaparin  30 mg Subcutaneous Daily    ferrous sulfate  325 mg Oral Daily    gabapentin  300 mg Oral Nightly    levothyroxine  75 mcg Oral Daily    lisinopril  5 mg Oral Daily    miconazole NITRATE 2 %   Topical (Top) BID    pantoprazole  40 mg Oral Daily    sodium chloride 0.9%  3 mL Intravenous Q8H       No current facility-administered medications on file prior to encounter.      Current Outpatient Medications on File Prior to Encounter   Medication Sig Dispense Refill    ALPRAZolam (XANAX) 0.5 MG tablet TAKE 1 TABLET (0.5 MG TOTAL) BY MOUTH 2 (TWO) TIMES DAILY AS NEEDED FOR ANXIETY. 60 tablet 0    cholecalciferol, vitamin D3, 5,000 unit Tab Take 5,000 Units by mouth once daily.      clopidogrel (PLAVIX) 75 mg tablet Take 1 tablet (75 mg total) by mouth once daily. 90 tablet 3    clotrimazole-betamethasone 1-0.05% (LOTRISONE) cream Apply topically 2 (two) times daily. 45 g 1    fenofibrate 160 MG Tab Take 1 tablet (160 mg total) by mouth once daily. 90 tablet 3    ferrous sulfate 325 (65 FE) MG EC tablet Take 325 mg by mouth once daily.      gabapentin (NEURONTIN) 300 MG capsule Take 300 mg by mouth nightly.  1    levothyroxine (SYNTHROID) 75 MCG tablet TAKE 1 TABLET (75 MCG TOTAL) BY MOUTH ONCE DAILY. 90 tablet 1    meclizine (ANTIVERT) 25 mg tablet Take 1 tablet (25 mg total) by mouth 3 (three) times daily as needed for Dizziness. 90 tablet 3    nystatin  (MYCOSTATIN) powder Apply to affected area 3 times daily 60 g 0    ondansetron (ZOFRAN-ODT) 8 MG TbDL Take 1 tablet (8 mg total) by mouth every 8 (eight) hours as needed. 20 tablet 0    aspirin (ECOTRIN) 325 MG EC tablet Take 1 tablet (325 mg total) by mouth once daily.  0    atorvastatin (LIPITOR) 40 MG tablet Take 1 tablet (40 mg total) by mouth once daily. 30 tablet 3       Past Surgical History:   Procedure Laterality Date    APPENDECTOMY      [facility]12/2016    [facility]    PARATHYROIDECTOMY Right 2012    THYROID SURGERY Right 2012    Partial thyroidectomy       Social History     Socioeconomic History    Marital status:      Spouse name: Not on file    Number of children: Not on file    Years of education: Not on file    Highest education level: Not on file   Social Needs    Financial resource strain: Not on file    Food insecurity - worry: Not on file    Food insecurity - inability: Not on file    Transportation needs - medical: Not on file    Transportation needs - non-medical: Not on file   Occupational History    Not on file   Tobacco Use    Smoking status: Never Smoker    Smokeless tobacco: Never Used   Substance and Sexual Activity    Alcohol use: Not on file    Drug use: No    Sexual activity: No   Other Topics Concern    Not on file   Social History Narrative    Not on file       OBJECTIVE:     Vital Signs Range (Last 24H):  Temp:  [36.3 °C (97.3 °F)-36.9 °C (98.5 °F)]   Pulse:  [61-99]   Resp:  [12-19]   BP: (131-184)/(65-94)   SpO2:  [92 %-97 %]       Significant Labs:  Lab Results   Component Value Date    WBC 10.50 02/28/2019    HGB 8.3 (L) 02/28/2019    HCT 27.1 (L) 02/28/2019     02/28/2019    CHOL 177 02/20/2019    TRIG 252 (H) 02/20/2019    HDL 30 (L) 02/20/2019    ALT 20 02/22/2019    AST 23 02/22/2019     (L) 02/28/2019    K 4.6 02/28/2019     02/28/2019    CREATININE 0.9 02/28/2019    BUN 19 02/28/2019    CO2 24 02/28/2019    TSH 2.994  02/20/2019    INR 0.9 02/20/2019    HGBA1C 5.3 02/20/2019       Diagnostic Studies: No relevant studies.    EKG:     Normal sinus rhythm  Right bundle branch block  Left anterior fascicular block   Bifascicular block   Septal infarct (cited on or before 20-FEB-2019)  Abnormal ECG  When compared with ECG of 18-DEC-2017 06:51,  Questionable change in initial forces of Septal leads    TTE 2/21/19    · Concentric left ventricular remodeling.  · Normal left ventricular systolic function. The estimated ejection fraction is 65%  · Grade I (mild) left ventricular diastolic dysfunction consistent with impaired relaxation.  · Normal left atrial pressure.  · Mild left atrial enlargement.  · Mild aortic regurgitation.  · Normal right ventricular systolic function.  · Mild aortic valve stenosis.  · Aortic valve area is 2.18 cm2; peak velocity is 2.39 m/s; mean gradient is 14.14 mmHg.    ASSESSMENT/PLAN:         Anesthesia Evaluation    I have reviewed the Patient Summary Reports.     I have reviewed the Medications.     Review of Systems  Anesthesia Hx:  History of prior surgery of interest to airway management or planning:   Cardiovascular:   Hypertension    Neurological:   CVA    Endocrine:   Hypothyroidism        Physical Exam  General:  Obesity, Well nourished    Airway/Jaw/Neck:  Airway Findings: Mouth Opening: Normal Tongue: Normal  General Airway Assessment: Adult  Mallampati: III  TM Distance: 4 - 6 cm      Dental:  Dental Findings: Periodontal disease, Severe, lower retainer    Chest/Lungs:  Chest/Lungs Findings: Clear to auscultation, Normal Respiratory Rate     Heart/Vascular:  Heart Findings: Rate: Normal  Rhythm: Regular Rhythm  Sounds: Normal      Musculoskeletal:  Musculoskeletal Findings: Cervical Lordosis     Mental Status:  Mental Status Findings:  Cooperative, Alert and Oriented         Anesthesia Plan  Type of Anesthesia, risks & benefits discussed:  Anesthesia Type:  MAC  Patient's Preference:   Intra-op  Monitoring Plan: standard ASA monitors  Intra-op Monitoring Plan Comments:   Post Op Pain Control Plan: multimodal analgesia, IV/PO Opioids PRN and per primary service following discharge from PACU  Post Op Pain Control Plan Comments:   Induction:   IV  Beta Blocker:  Patient is not currently on a Beta-Blocker (No further documentation required).       Informed Consent: Patient understands risks and agrees with Anesthesia plan.  Questions answered. Anesthesia consent signed with patient.  ASA Score: 3     Day of Surgery Review of History & Physical:    H&P update referred to the surgeon.         Ready For Surgery From Anesthesia Perspective.

## 2019-02-28 NOTE — ASSESSMENT & PLAN NOTE
Patient had dark and tarry stool on 02/25/2019 and black stool on 2/28/19.   GI consulted.   Holding plavix for scope 3/1/19. Pt's son updated on POC.

## 2019-02-28 NOTE — ASSESSMENT & PLAN NOTE
Hb 8 > 8 > 6.5. Pt appears pale and c/o light-headedness but is neurologically stable.   Ordered type & screen and 1U pRBCs, consent obtained on 2/22/19. SQH held; SCDs ordered.  Started PPI. + FOBT result. Anemia workup largely WNL with TIBC mildly elevated and iron sat 7%.  Pt s/p 1U pRBCs on 2/22 with good response; Hb 8.8 with pallor improved  Restarted SQH; will d/c ASA and continue with Plavix monotherapy for now   Hb 9.5 on 2/24.    02/25 on Hb 8.8. Patient had dark and tarry stool this morning. GI consulted. Will hold Plavix to have scope done on Friday.   02/28 Hgb 8.9, black stool during bowel movement this morning

## 2019-02-28 NOTE — NURSING
Resident spoke with patient and daughter in law at bedside. Plan for EGD tomorrow morning pt verbalized understanding of NPO at midnight. Verified with resident- Lovenox OK to give tonight.

## 2019-02-28 NOTE — CONSULTS
RN notified [facility] unable to see pt due to high consult volume.  Re-consult if unable to obtain IV access.

## 2019-02-28 NOTE — PHYSICIAN QUERY
PT Name: Linda Hylton  MR #: [id number]     CDS/: Lisa DUENAS, RN              Contact information: [email or web address]  This form is a permanent document in the medical record.     Query Date: February 28, 2019    Physician Query - Neurological Condition Clarification    By submitting this query, we are merely seeking further clarification of documentation to reflect the severity of illness of your patient. Please utilize your independent clinical judgment when addressing the question(s) below.    The Medical record reflects the following:     Indicators   Supporting Clinical Findings Location in Medical Record    AMS, Confusion,  LOC, etc.       x Acute / Chronic Illness  Embolic stroke involving left middle cerebral artery    84 year old female with past medical hx of HTN, hypothyroidism, prior R vermis stroke in 3/2017,    Vascular Neurology Progress 2/26/19    Radiology Findings      Electrolyte Imbalance      Medication      Treatment              x Other  In this patient with risk factors for stroke recommending MRI brain to assess for acute infarct. However, in this patient with recent diagnosis of UTI and multiple medical complications acute encephalopathy is in the differential.     EEG without epileptiform abnormalities but mild-mod encephalopathy.  Vascular Neurology H&P 2/20/19         Vascular Neurology Progress 2/26/19 attestation     Encephalopathy- is a general term for any diffuse disease of the brain that alters brain function or structure. Treatment of the cognitive dysfunction varies but is ultimately dependent on the treatment of the underlying condition.    Major Symptoms of Encephalopathy - Decreased level of consciousness, fluctuating alertness/concentration, confusion, agitation, lethargy, somnolence, drowsiness, obtundation, stupor, or coma.         References: National Institutes of Healths (NIH) National [place] of Neurological Disorders and Strokes;  HCPro 2016; Advisory  Board     Clinical Guidelines:   These guidelines will set system standards to assist providers in managing, documentation, and coding of encephalopathy. The intent of this document is to serve as a system guideline, not replace the providers clinical judgment:  Provider, please specify the diagnosis or diagnoses associated with above clinical findings.  [  x ] Metabolic Encephalopathy - Due to electrolye imbalance, metabolic derangements, or infections processes, includes Septic Encephalopathy   [   ] Other Encephalopathy - Includes uremic encephalopathy   [   ] Unspecified Encephalopathy      [   ] Other Neurological Condition-  Includes Post-ictal altered mental status. (please specify condition): _________   [  ]  Clinically Undetermined     Please document in your progress notes daily for the duration of treatment until resolved, and include in your discharge summary.

## 2019-02-28 NOTE — PT/OT/SLP PROGRESS
Occupational Therapy   Treatment    Name: Linda Hylton  MRN: [id number]  Admitting Diagnosis:  Embolic stroke involving left middle cerebral artery       Recommendations:     Discharge Recommendations: nursing facility, skilled  Discharge Equipment Recommendations:  (TBD at next level of care)  Barriers to discharge:  Inaccessible home environment    Assessment:     Linda Hylton is a 84 y.o. female with a medical diagnosis of Embolic stroke involving left middle cerebral artery.  She presents with functional deficits affecting occupational functioning which include: weakness, impaired functional mobilty, decreased safety awareness, impaired cognition, impaired coordination, impaired endurance, gait instability, decreased coordination, pain, impaired fine motor, impaired balance, decreased upper extremity function, impaired self care skills, decreased lower extremity function.  Pt continues to require increased A with functional tasks, however A level tends to fluctuate with inconsistent performance.  Pt would continue to benefit from receiving skilled therapy services for addressing noted limitations identified above for maximizing independence on d/c.      Rehab Prognosis:  Fair; patient would benefit from acute skilled OT services to address these deficits and reach maximum level of function.       Plan:     Patient to be seen 3 x/week to address the above listed problems via self-care/home management, therapeutic activities, therapeutic exercises  · Plan of Care Expires: 03/22/19  · Plan of Care Reviewed with: patient    Subjective     Pain/Comfort:  · Pain Rating 1: 0/10  · Pain Rating Post-Intervention 1: 0/10    Objective:     Communicated with: RN prior to session.  Patient found up in chair with telemetry, pulse ox (continuous) upon OT entry to room.    General Precautions: Standard, aspiration, fall   Orthopedic Precautions:N/A   Braces: N/A     Occupational Performance:     Bed Mobility:    · Not  performed    Functional Mobility/Transfers:  · Patient completed Sit <> Stand Transfer from bedside chair with contact guard assistance  with  no assistive device   · Patient completed Toilet Transfer Stand Pivot technique with minimum assistance with  no AD  · Functional Mobility: Pt ambulated functional household distance with Min A and no Hernandez personal room from bedside chair to BSC located a few feet away.  Pt noted to be unsteady and off-balance with minimal functional ambulation this date, and encouraged to take a rest break if needed.        Activities of Daily Living:  · Toileting: Pt was left on BSC as she was unable to complete toileting task.  Pt will require maximal assistance for completion of toileting task and A with cleaning buttocks region.      [facility] 6 Click ADL: 13    Treatment & Education:  Pt noted to not be oriented to place/situation, OTS reoriented pt to these aspects.    Pt educated of safety with transfers and functional mobility, as she required increased A during tx compared to previous sessions.  Pt educated on importance of OOB activity and participation in therapy for improving independence level on d/c    Patient left on BSC with all lines intact, call button in reach, with daughter and PA present, and nurse notified    GOALS:   Multidisciplinary Problems     Occupational Therapy Goals        Problem: Occupational Therapy Goal    Goal Priority Disciplines Outcome Interventions   Occupational Therapy Goal     OT, PT/OT Ongoing (interventions implemented as appropriate)    Description:  Goals to be met by: 3/1/19     Patient will increase functional independence with ADLs by performing:    Grooming while seated with Minimal Assistance.  Toileting from bedside commode with Supervision for hygiene and clothing management.   Supine to sit with Modified [place].  Stand pivot transfers with Modified [place].  Toilet transfer to bedside commode with Stand-by Assistance.  Upper  extremity exercise program 3 sets x10 reps per handout, with independence.  Pt will ambulate functional, household distance with CGA and AE as needed.                         Time Tracking:     OT Date of Treatment: 02/28/19  OT Start Time: 1452  OT Stop Time: 1512  OT Total Time (min): 20 min    Billable Minutes:Self Care/Home Management 20    ZHENG Crum  2/28/2019

## 2019-02-28 NOTE — PLAN OF CARE
Problem: Occupational Therapy Goal  Goal: Occupational Therapy Goal  Goals to be met by: 3/1/19     Patient will increase functional independence with ADLs by performing:    Grooming while seated with Minimal Assistance.  Toileting from bedside commode with Supervision for hygiene and clothing management.   Supine to sit with Modified [place].  Stand pivot transfers with Modified [place].  Toilet transfer to bedside commode with Stand-by Assistance.  Upper extremity exercise program 3 sets x10 reps per handout, with independence.  Pt will ambulate functional, household distance with CGA and AE as needed.        Outcome: Ongoing (interventions implemented as appropriate)  Continue OT POC.

## 2019-02-28 NOTE — PROGRESS NOTES
[facility]  Vascular Neurology  Comprehensive Stroke Center  Progress Note    Assessment/Plan:     * Embolic stroke involving left middle cerebral artery    84 year old female with past medical hx of HTN, hypothyroidism, prior R vermis stroke in 3/2017, and aortic stenosis presented to the ED with complains of right facial droop. LKN 18:00, within window for tPA, however patient refused. Per chart review, patient was seen day PTA and diagonsed with a UTI and started on Macrobid. Repeat UA here with no evidence of infection so abx d/c'd.    MRI Brain is with small acute infarcts in the L basal ganglia. 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 Echo demonstrated mild LAE. Stroke etiology not completely clear, but appears embolic/ESUS on imaging. Will plan for 30-day cardiac event monitor following SNF discharge.     Stroke team member discussed level of supervision at home with pt's son by bedside on 2/21. Patient lives with son who works overnight, and he typically takes care of patient's needs during the day but leaves patient at home alone overnight when he works. SNF may be a more safe and appropriate disposition for this pt who is now with decreased mobility from her previous baseline prior to admission, however pt is not in favor of this plan and may refuse.  02/25- Patient now amendable to plan to discharge to facility.     patient may be discharged and have endoscopy done as outpatient if H&H remains stable.    Antithrombotics for secondary stroke prevention: Antiplatelets: On hold for possible GI scope   Statins for secondary stroke prevention and hyperlipidemia, if present:   Statins: Atorvastatin- 80 mg daily  Aggressive risk factor modification: HTN, HLD, Obesity, prior stroke  Rehab efforts: PT/OT/SLP to evaluate and treat  Diagnostics  ordered/pending: None   VTE prophylaxis: Heparin 5000 units SQ every 8 hours  Place SCDs   BP parameters: SBP < 160     Melena     Patient had dark and tarry stool on 02/25/2019 and black stool on 2/28/19.   GI consulted.   Holding plavix for scope 3/1/19. Pt's son updated on POC.        Acute blood loss anemia    Hb 8 > 8 > 6.5. Pt appears pale and c/o light-headedness but is neurologically stable.   Ordered type & screen and 1U pRBCs, consent obtained on 2/22/19. SQH held; SCDs ordered.  Started PPI. + FOBT result. Anemia workup largely WNL with TIBC mildly elevated and iron sat 7%.  Pt s/p 1U pRBCs on 2/22 with good response; Hb 8.8 with pallor improved  Restarted SQH; will d/c ASA and continue with Plavix monotherapy for now   Hb 9.5 on 2/24.    02/25 on Hb 8.8. Patient had dark and tarry stool this morning. GI consulted. Will hold Plavix to have scope done on Friday.   02/28 Hgb 8.9, black stool during bowel movement this morning     Nodule of apex of right lung    CTA head/neck on 2/21/19 shows stable 9 mm noncalcified nodule in the right lung apex.   Per family over the phone, pt has no tobacco use history.  Discussed with Hospital Medicine - No acute intervention needed at this time. PCP to monitor and consider CT Chest in 6 months.     Cytotoxic cerebral edema    -Area of cytotoxic cerebral edema identified when reviewing brain imaging in the territory of the left middle cerebral artery. There is no mass effect associated with it. We will continue to monitor the patients clinical exam for any worsening of symptoms which may indicate expansion of the stroke or the area of the edema resulting in the clinical change. The pattern is suggestive of an embolic/ESUS etiology.     Hyponatremia    -Na 128 > 130 > 129 > 131 > 132 > 129  Last recorded Na in 2018 WNL.  -Urine Na, urine osmol, serum osmol WNL  -250 ml NS bolus given 2/21/19. Repeat Na 131.  Discussed with Hospital Medicine- Hyponatremia possibly 2/2  hypovolemia in the setting of pt's acute Hb drop.   Continue daily BMP; Will discuss any further recs with Hospital Medicine at AM rounds    02/25- Discussed hyponatremia with hospital medicine, patient may be hyponatremic at baseline. Will continue to monitor and appreciate recs from hospital medicine.     History of stroke    Stroke risk factor  3/2017 - diagnosed with R cerebellar infarct. Was on DAPT and Lipitor 40 outpatient.  Patient states she had no significant stroke deficits (dizziness?)     Mixed hyperlipidemia    Stroke risk factor  LDL 96.6  Patient taking Atorvastatin 40 at home; Increased dose to 80 mg Daily     Dizziness    Pt c/o dizziness/light-headedness which she states she has at baseline; Reports no acute change currently  Also c/o mild blurred vision but no other neuro changes appreciated and visual fields are intact  Restarted home PRN Meclizine  Will continue to monitor     Essential hypertension    Stroke risk factor  SBP <160  -Pt states she checks her BP twice daily at home and takes Lisinopril occasionally if her SBP < 150. Lisinopril 5mg started on 02/25.  -BP at goal           02/20/2019 MRI brain positive for stroke.  Admitted to Vascular Neurology.  02/21/2019- Na 130-->129. 250 ml NS bolus ordered. Repeat Na lab pending. CTA head/neck with recent infarcts in the left basal ganglia. 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 Echo EF 65% with mild left atrial enlargement. SLP recs for mechanical soft diet. PT/OT recs for possible rehab or SNF.   2/22: Hb 6.5; pt appears pale, c/o light-headedness but is neurologically stable. Ordered type & screen and 1U pRBCs, consent obtained. Will repeat CBC following transfusion. Started PPI and FOBT ordered (needs to be collected.) Monitoring Na.  2/23: Patient s/p 1U pRBCs with good response; no pallor or light-headedness today and Hb 8.8. Restarted SQH; will d/c ASA and continue with  monotherapy (Plavix). FOBT+; will monitor Hb for now and plan for outpatient GI work-up if Hb remains stable. Monitoring BP.  2/24: Hb 9.5 today. WBC 13 however pt afebrile, denies acute complaints; monitoring. Pt c/o dizziness (her reported baseline) as well as mild blurred vision; no other neuro changes, visual fields are intact. Restarted home PRN Meclizine. Pt states she takes Lisinopril occasionally at home if her BP is high; started Lisinopril 5mg Daily. Miconazole powder ordered for areas of redness noted in abdominal folds. Pt is not in favor of d/c to SNF; attempted to call pt's son Angel to discuss if he is comfortable with plan of d/c to home. Attempting to touch base with PT regarding dispo as well.  02/25/2019: Hb 8.8. Patient had dark and tarry stool this morning. GI consulted. Will hold Plavix to have scope done on Friday. Pt's son updated on POC.   02/26/2019: Hb 9. Patient may be able to discharge and have endoscopy done as an outpatient if H&H remains stable. Patient confused with PT, and complained of intermittent confusion. EEG 1 hour monitoring ordered to rule out seizures.   02/27/19 SNF reviewing patient, monitoring H&H due to recent melena, if remains stable then outpatient scope  02/28/2019 no focal deficits noted on exam today. SNF unable to transport patient back to hospital for outpatient EGD. Therefore, will plan to have EGD prior to discharge. Plans to contact GI to confirm EGD scheduled for tomorrow.     STROKE DOCUMENTATION   Acute Stroke Times   Last Known Normal Date: 02/20/19  Last Known Normal Time: 1800  Symptom Onset Date: 02/20/19  Symptom Onset Time: 1800  Stroke Team Called Date: 02/20/19  Stroke Team Called Time: 2050  Stroke Team Arrival Date: 02/20/19  Stroke Team Arrival Time: 2052  CT Interpretation Time: 2100  Decision to Treat Time for Alteplase: 2110(Patient refused )  Decision to Treat Time for IR: (NA)    NIH Scale:  1a. Level of Consciousness: 0-->Alert, keenly  responsive  1b. LOC Questions: 0-->Answers both questions correctly  1c. LOC Commands: 0-->Performs both tasks correctly  2. Best Gaze: 0-->Normal  3. Visual: 0-->No visual loss  4. Facial Palsy: 0-->Normal symmetrical movements  5a. Motor Arm, Left: 0-->No drift, limb holds 90 (or 45) degrees for full 10 secs  5b. Motor Arm, Right: 0-->No drift, limb holds 90 (or 45) degrees for full 10 secs  6a. Motor Leg, Left: 0-->No drift, leg holds 30 degree position for full 5 secs  6b. Motor Leg, Right: 0-->No drift, leg holds 30 degree position for full 5 secs  7. Limb Ataxia: 0-->Absent  8. Sensory: 0-->Normal, no sensory loss  9. Best Language: 0-->No aphasia, normal  10. Dysarthria: 0-->Normal  11. Extinction and Inattention (formerly Neglect): 0-->No abnormality  Total (NIH Stroke Scale): 0       Modified Ericka Score: 1  [place] Coma Scale:    ABCD2 Score:    [id number] Score:   HAS -BLED Score:   ICH Score:   Hunt & De Classification:      Hemorrhagic change of an Ischemic Stroke: Does this patient have an ischemic stroke with hemorrhagic changes? No     Neurologic Chief Complaint: Right facial droop, RUE weakness and slurred speech.     Subjective:     Interval History: BREEON, patient and nurse tech reported black stool after BM this morning        HPI, Past Medical, Family, and Social History remains the same as documented in the initial encounter.     Review of Systems   Constitutional: Negative for chills and fever.   Respiratory: Negative for cough.    Cardiovascular: Negative for chest pain.   Gastrointestinal:        Black stool   Skin: Negative for pallor and wound.   Neurological: Negative for speech difficulty, weakness and numbness.   Psychiatric/Behavioral: Positive for confusion. Negative for agitation.     Scheduled Meds:   atorvastatin  80 mg Oral Daily    cholecalciferol (vitamin D3)  5,000 Units Oral Daily    enoxaparin  30 mg Subcutaneous Daily    ferrous sulfate  325 mg Oral Daily     gabapentin  300 mg Oral Nightly    levothyroxine  75 mcg Oral Daily    lisinopril  5 mg Oral Daily    miconazole NITRATE 2 %   Topical (Top) BID    pantoprazole  40 mg Oral Daily    sodium chloride 0.9%  3 mL Intravenous Q8H     Continuous Infusions:   sodium chloride 0.9% 50 mL/hr at 02/27/19 1214    sodium chloride 0.9%       PRN Meds:sodium chloride, acetaminophen, aluminum & magnesium hydroxide-simethicone, hydrALAZINE, meclizine, ondansetron, ondansetron, ramelteon, sodium chloride 0.9%    Objective:     Vital Signs (Most Recent):  Temp: 98 °F (36.7 °C) (02/28/19 0728)  Pulse: 99 (02/28/19 0741)  Resp: 12 (02/28/19 0728)  BP: (!) 146/67 (02/28/19 0728)  SpO2: (!) 92 % (02/28/19 0728)  BP Location: Left arm    Vital Signs Range (Last 24H):  Temp:  [97.3 °F (36.3 °C)-99 °F (37.2 °C)]   Pulse:  [61-99]   Resp:  [12-19]   BP: (131-184)/(65-94)   SpO2:  [92 %-97 %]   BP Location: Left arm    Physical Exam   Constitutional: She is oriented to person, place, and time. She appears well-developed and well-nourished. No distress.   HENT:   Head: Normocephalic and atraumatic.   Eyes: EOM are normal. Pupils are equal, round, and reactive to light.   Cardiovascular: Normal rate.   Pulmonary/Chest: Effort normal. No respiratory distress.   Neurological: She is alert and oriented to person, place, and time.   See below   Skin: Skin is warm and dry.   Vitals reviewed.      Neurological Exam:   LOC: alert  Attention Span: Good   Language: No aphasia  Articulation: No dysarthria  Orientation: Person, Place, Time   Visual Fields: Full  EOM (CN III, IV, VI): Full/intact  Facial Movement (CN VII): equal bilaterally  Motor: Arm left  Normal 5/5  Leg left  Normal 5/5  Arm right  Normal 5/5  Leg right Normal 5/5  Cebellar: No evidence of appendicular or axial ataxia  Sensation: intact bilaterally to light touch  Tone: Normal tone throughout    Laboratory:  CMP:   Recent Labs   Lab 02/28/19  0410   CALCIUM 9.1   *   K 4.6    CO2 24      BUN 19   CREATININE 0.9     CBC:   Recent Labs   Lab 02/28/19  0410   WBC 10.50   RBC 2.94*   HGB 8.3*   HCT 27.1*      MCV 92   MCH 28.2   MCHC 30.6*     Lipid Panel:   No results for input(s): CHOL, LDLCALC, HDL, TRIG in the last 168 hours.  Coagulation:   No results for input(s): PT, INR, APTT in the last 168 hours.  Hgb A1C:   No results for input(s): HGBA1C in the last 168 hours.  TSH:   No results for input(s): TSH in the last 168 hours.    Diagnostic Results     Brain Imaging     CT head 2/20/19  No acute abnormality noted     MRI 2/21/19    Small acute infarcts in the left basal ganglia.   Changes of chronic small vessel ischemic disease and cerebral volume loss.      Vessel Imaging     CTA Head/Neck 2/20/19  Recent infarcts in the left basal ganglia.  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      Cardiac Imaging     TTE 2/21/19  · Concentric left ventricular remodeling.  · Normal left ventricular systolic function. The estimated ejection fraction is 65%  · Grade I (mild) left ventricular diastolic dysfunction consistent with impaired relaxation.  · Normal left atrial pressure.  · Mild left atrial enlargement.  · Mild aortic regurgitation.  · Normal right ventricular systolic function.  · Mild aortic valve stenosis.  · Aortic valve area is 2.18 cm2; peak velocity is 2.39 m/s; mean gradient is 14.14 mmHg.            Sherlyn Villasenor PA-C  Comprehensive Stroke Center  Department of Vascular Neurology   [facility]

## 2019-02-28 NOTE — ASSESSMENT & PLAN NOTE
Stroke risk factor  SBP <160  -Pt states she checks her BP twice daily at home and takes Lisinopril occasionally if her SBP < 150. Lisinopril 5mg started on 02/25.  -BP at goal

## 2019-03-01 ENCOUNTER — ANESTHESIA (OUTPATIENT)
Dept: ENDOSCOPY | Facility: HOSPITAL | Age: 84
DRG: 064 | End: 2019-03-01
Payer: MEDICARE

## 2019-03-01 PROBLEM — I63.9 STROKE: Status: ACTIVE | Noted: 2019-03-01

## 2019-03-01 PROBLEM — I78.1 TELANGIECTASIA: Status: ACTIVE | Noted: 2019-03-01

## 2019-03-01 LAB
ANION GAP SERPL CALC-SCNC: 8 MMOL/L
BASOPHILS # BLD AUTO: 0.11 K/UL
BASOPHILS NFR BLD: 1.1 %
BUN SERPL-MCNC: 17 MG/DL
CALCIUM SERPL-MCNC: 9.3 MG/DL
CHLORIDE SERPL-SCNC: 96 MMOL/L
CO2 SERPL-SCNC: 26 MMOL/L
CREAT SERPL-MCNC: 0.8 MG/DL
DIFFERENTIAL METHOD: ABNORMAL
EOSINOPHIL # BLD AUTO: 0.4 K/UL
EOSINOPHIL NFR BLD: 3.7 %
ERYTHROCYTE [DISTWIDTH] IN BLOOD BY AUTOMATED COUNT: 15.1 %
EST. GFR  (AFRICAN AMERICAN): >60 ML/MIN/1.73 M^2
EST. GFR  (NON AFRICAN AMERICAN): >60 ML/MIN/1.73 M^2
GLUCOSE SERPL-MCNC: 97 MG/DL
HCT VFR BLD AUTO: 28.9 %
HGB BLD-MCNC: 8.8 G/DL
IMM GRANULOCYTES # BLD AUTO: 0.11 K/UL
IMM GRANULOCYTES NFR BLD AUTO: 1.1 %
LYMPHOCYTES # BLD AUTO: 1.5 K/UL
LYMPHOCYTES NFR BLD: 14.8 %
MCH RBC QN AUTO: 27.9 PG
MCHC RBC AUTO-ENTMCNC: 30.4 G/DL
MCV RBC AUTO: 92 FL
MONOCYTES # BLD AUTO: 1 K/UL
MONOCYTES NFR BLD: 9.4 %
NEUTROPHILS # BLD AUTO: 7.1 K/UL
NEUTROPHILS NFR BLD: 69.9 %
NRBC BLD-RTO: 0 /100 WBC
PLATELET # BLD AUTO: 252 K/UL
PMV BLD AUTO: 11 FL
POCT GLUCOSE: 108 MG/DL (ref 70–110)
POTASSIUM SERPL-SCNC: 4.3 MMOL/L
RBC # BLD AUTO: 3.15 M/UL
SODIUM SERPL-SCNC: 130 MMOL/L
WBC # BLD AUTO: 10.09 K/UL

## 2019-03-01 PROCEDURE — 31575 PR LARYNGOSCOPY, FLEXIBLE; DIAGNOSTIC: ICD-10-PCS | Mod: ,,, | Performed by: OTOLARYNGOLOGY

## 2019-03-01 PROCEDURE — 25000003 PHARM REV CODE 250: Performed by: PHYSICIAN ASSISTANT

## 2019-03-01 PROCEDURE — D9220A PRA ANESTHESIA: ICD-10-PCS | Mod: CRNA,,, | Performed by: NURSE ANESTHETIST, CERTIFIED REGISTERED

## 2019-03-01 PROCEDURE — 43239 EGD BIOPSY SINGLE/MULTIPLE: CPT | Mod: ,,, | Performed by: INTERNAL MEDICINE

## 2019-03-01 PROCEDURE — 25000003 PHARM REV CODE 250: Performed by: NURSE PRACTITIONER

## 2019-03-01 PROCEDURE — 37000009 HC ANESTHESIA EA ADD 15 MINS: Performed by: INTERNAL MEDICINE

## 2019-03-01 PROCEDURE — 43239 EGD BIOPSY SINGLE/MULTIPLE: CPT | Performed by: INTERNAL MEDICINE

## 2019-03-01 PROCEDURE — 88305 TISSUE EXAM BY PATHOLOGIST: CPT | Mod: 26,,, | Performed by: PATHOLOGY

## 2019-03-01 PROCEDURE — 99233 PR SUBSEQUENT HOSPITAL CARE,LEVL III: ICD-10-PCS | Mod: GC,,, | Performed by: PSYCHIATRY & NEUROLOGY

## 2019-03-01 PROCEDURE — 99223 PR INITIAL HOSPITAL CARE,LEVL III: ICD-10-PCS | Mod: 25,,, | Performed by: OTOLARYNGOLOGY

## 2019-03-01 PROCEDURE — 43239 PR EGD, FLEX, W/BIOPSY, SGL/MULTI: ICD-10-PCS | Mod: ,,, | Performed by: INTERNAL MEDICINE

## 2019-03-01 PROCEDURE — 88305 TISSUE EXAM BY PATHOLOGIST: CPT | Performed by: PATHOLOGY

## 2019-03-01 PROCEDURE — 37000008 HC ANESTHESIA 1ST 15 MINUTES: Performed by: INTERNAL MEDICINE

## 2019-03-01 PROCEDURE — 99223 1ST HOSP IP/OBS HIGH 75: CPT | Mod: 25,,, | Performed by: OTOLARYNGOLOGY

## 2019-03-01 PROCEDURE — A4216 STERILE WATER/SALINE, 10 ML: HCPCS | Performed by: NURSE PRACTITIONER

## 2019-03-01 PROCEDURE — 88305 TISSUE SPECIMEN TO PATHOLOGY - SURGERY: ICD-10-PCS | Mod: 26,,, | Performed by: PATHOLOGY

## 2019-03-01 PROCEDURE — D9220A PRA ANESTHESIA: Mod: CRNA,,, | Performed by: NURSE ANESTHETIST, CERTIFIED REGISTERED

## 2019-03-01 PROCEDURE — 85025 COMPLETE CBC W/AUTO DIFF WBC: CPT

## 2019-03-01 PROCEDURE — 99233 SBSQ HOSP IP/OBS HIGH 50: CPT | Mod: GC,,, | Performed by: PSYCHIATRY & NEUROLOGY

## 2019-03-01 PROCEDURE — 20600001 HC STEP DOWN PRIVATE ROOM

## 2019-03-01 PROCEDURE — 27201012 HC FORCEPS, HOT/COLD, DISP: Performed by: INTERNAL MEDICINE

## 2019-03-01 PROCEDURE — 63600175 PHARM REV CODE 636 W HCPCS: Performed by: NURSE ANESTHETIST, CERTIFIED REGISTERED

## 2019-03-01 PROCEDURE — 63600175 PHARM REV CODE 636 W HCPCS: Performed by: PHYSICIAN ASSISTANT

## 2019-03-01 PROCEDURE — 31575 DIAGNOSTIC LARYNGOSCOPY: CPT | Mod: ,,, | Performed by: OTOLARYNGOLOGY

## 2019-03-01 PROCEDURE — 27000221 HC OXYGEN, UP TO 24 HOURS

## 2019-03-01 PROCEDURE — D9220A PRA ANESTHESIA: ICD-10-PCS | Mod: ANES,,, | Performed by: ANESTHESIOLOGY

## 2019-03-01 PROCEDURE — D9220A PRA ANESTHESIA: Mod: ANES,,, | Performed by: ANESTHESIOLOGY

## 2019-03-01 PROCEDURE — 80048 BASIC METABOLIC PNL TOTAL CA: CPT

## 2019-03-01 PROCEDURE — 36415 COLL VENOUS BLD VENIPUNCTURE: CPT

## 2019-03-01 PROCEDURE — 25000003 PHARM REV CODE 250: Performed by: STUDENT IN AN ORGANIZED HEALTH CARE EDUCATION/TRAINING PROGRAM

## 2019-03-01 RX ORDER — PROPOFOL 10 MG/ML
VIAL (ML) INTRAVENOUS CONTINUOUS PRN
Status: DISCONTINUED | OUTPATIENT
Start: 2019-03-01 | End: 2019-03-01

## 2019-03-01 RX ORDER — CLOPIDOGREL BISULFATE 75 MG/1
75 TABLET ORAL DAILY
Status: DISCONTINUED | OUTPATIENT
Start: 2019-03-02 | End: 2019-03-02

## 2019-03-01 RX ORDER — SODIUM CHLORIDE 0.9 % (FLUSH) 0.9 %
3 SYRINGE (ML) INJECTION
Status: DISCONTINUED | OUTPATIENT
Start: 2019-03-01 | End: 2019-03-01 | Stop reason: HOSPADM

## 2019-03-01 RX ORDER — LIDOCAINE HCL/PF 100 MG/5ML
SYRINGE (ML) INTRAVENOUS
Status: DISCONTINUED | OUTPATIENT
Start: 2019-03-01 | End: 2019-03-01

## 2019-03-01 RX ORDER — PROPOFOL 10 MG/ML
VIAL (ML) INTRAVENOUS
Status: DISCONTINUED | OUTPATIENT
Start: 2019-03-01 | End: 2019-03-01

## 2019-03-01 RX ORDER — SODIUM CHLORIDE 0.9 % (FLUSH) 0.9 %
3 SYRINGE (ML) INJECTION
Status: CANCELLED | OUTPATIENT
Start: 2019-03-01

## 2019-03-01 RX ORDER — ONDANSETRON 2 MG/ML
4 INJECTION INTRAMUSCULAR; INTRAVENOUS DAILY PRN
Status: DISCONTINUED | OUTPATIENT
Start: 2019-03-01 | End: 2019-03-01 | Stop reason: HOSPADM

## 2019-03-01 RX ADMIN — Medication 3 ML: at 09:03

## 2019-03-01 RX ADMIN — GABAPENTIN 300 MG: 300 CAPSULE ORAL at 09:03

## 2019-03-01 RX ADMIN — PROPOFOL 100 MCG/KG/MIN: 10 INJECTION, EMULSION INTRAVENOUS at 09:03

## 2019-03-01 RX ADMIN — MICONAZOLE NITRATE: 20 POWDER TOPICAL at 09:03

## 2019-03-01 RX ADMIN — LEVOTHYROXINE SODIUM 75 MCG: 75 TABLET ORAL at 05:03

## 2019-03-01 RX ADMIN — LIDOCAINE HYDROCHLORIDE 50 MG: 20 INJECTION, SOLUTION INTRAVENOUS at 09:03

## 2019-03-01 RX ADMIN — PROPOFOL 50 MG: 10 INJECTION, EMULSION INTRAVENOUS at 09:03

## 2019-03-01 RX ADMIN — RAMELTEON 8 MG: 8 TABLET, FILM COATED ORAL at 09:03

## 2019-03-01 RX ADMIN — Medication 3 ML: at 05:03

## 2019-03-01 RX ADMIN — ENOXAPARIN SODIUM 30 MG: 100 INJECTION SUBCUTANEOUS at 05:03

## 2019-03-01 NOTE — PROGRESS NOTES
[facility]  Vascular Neurology  Comprehensive Stroke Center  Progress Note    Assessment/Plan:     * Embolic stroke involving left middle cerebral artery    84 year old female with past medical hx of HTN, hypothyroidism, prior R vermis stroke in 3/2017, and aortic stenosis presented to the ED with complains of right facial droop. LKN 18:00, within window for tPA, however patient refused. Per chart review, patient was seen day PTA and diagonsed with a UTI and started on Macrobid. Repeat UA here with no evidence of infection so abx d/c'd.    MRI Brain is with small acute infarcts in the L basal ganglia. CTA H/N shows BL fetal PCAs but overall stable from prior scans in 2017. Echo demonstrated mild LAE. Stroke etiology not completely clear, but appears embolic/ESUS on imaging. Will plan for 30-day cardiac event monitor following SNF discharge.     Antithrombotics for secondary stroke prevention: Antiplatelets: On hold for EGD  Statins for secondary stroke prevention and hyperlipidemia, if present:   Statins: Atorvastatin- 80 mg daily  Aggressive risk factor modification: HTN, HLD, Obesity, prior stroke  Rehab efforts: PT/OT/SLP to evaluate and treat  Diagnostics ordered/pending: None   VTE prophylaxis: Heparin 5000 units SQ every 8 hours  Place SCDs   BP parameters: SBP < 160     Melena     Patient had dark and tarry stool on 02/25/2019 and black stool on 2/28/19.   GI consulted.   EGD scheduled for 03/01/19       Acute blood loss anemia    Hb 8 > 8 > 6.5. Pt appears pale and c/o light-headedness but is neurologically stable.   Ordered type & screen and 1U pRBCs, consent obtained on 2/22/19. SQH held; SCDs ordered.  Started PPI. + FOBT result. Anemia workup largely WNL with TIBC mildly elevated and iron sat 7%.  Pt s/p 1U pRBCs on 2/22 with good response; Hb 8.8 with pallor improved  Restarted SQH; will d/c ASA and continue with Plavix monotherapy for now   Hb 9.5 on 2/24.    02/25 on Hb 8.8.  Patient had dark and tarry stool this morning. GI consulted. Will hold Plavix to have scope done on Friday.   03/01 Hgb 8.8, EGD scheduled today     Nodule of apex of right lung    CTA head/neck on 2/21/19 shows stable 9 mm noncalcified nodule in the right lung apex.   Per family over the phone, pt has no tobacco use history.  Discussed with Hospital Medicine - No acute intervention needed at this time. PCP to monitor and consider CT Chest in 6 months.     Cytotoxic cerebral edema    -Area of cytotoxic cerebral edema identified when reviewing brain imaging in the territory of the left middle cerebral artery. There is no mass effect associated with it. We will continue to monitor the patients clinical exam for any worsening of symptoms which may indicate expansion of the stroke or the area of the edema resulting in the clinical change. The pattern is suggestive of an embolic/ESUS etiology.     Hyponatremia    Discussed with Hospital Medicine- Hyponatremia possibly 2/2 hypovolemia in the setting of pt's acute Hb drop.   Continue daily BMP; Will discuss any further recs with Hospital Medicine at AM rounds    02/25- Discussed hyponatremia with hospital medicine, patient may be hyponatremic at baseline. Will continue to monitor and appreciate recs from hospital medicine.  03/01 - continued hyponatremia, 1L fluid restriction ordered     History of stroke    Stroke risk factor  3/2017 - diagnosed with R cerebellar infarct. Was on DAPT and Lipitor 40 outpatient.  Patient states she had no significant stroke deficits (dizziness?)     Mixed hyperlipidemia    Stroke risk factor  LDL 96.6  Patient taking Atorvastatin 40 at home; Increased dose to 80 mg Daily     Dizziness    Pt c/o dizziness/light-headedness which she states she has at baseline; Reports no acute change currently  Also c/o mild blurred vision but no other neuro changes appreciated and visual fields are intact  Restarted home PRN Meclizine  Will continue to  monitor     Essential hypertension    Stroke risk factor  SBP <160  -Pt states she checks her BP twice daily at home and takes Lisinopril occasionally if her SBP < 150. Lisinopril 5mg started on 02/25.  -BP at goal           02/20/2019 MRI brain positive for stroke.  Admitted to Vascular Neurology.  02/21/2019- Na 130-->129. 250 ml NS bolus ordered. Repeat Na lab pending. CTA head/neck with recent infarcts in the left basal ganglia. 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 Echo EF 65% with mild left atrial enlargement. SLP recs for mechanical soft diet. PT/OT recs for possible rehab or SNF.   2/22: Hb 6.5; pt appears pale, c/o light-headedness but is neurologically stable. Ordered type & screen and 1U pRBCs, consent obtained. Will repeat CBC following transfusion. Started PPI and FOBT ordered (needs to be collected.) Monitoring Na.  2/23: Patient s/p 1U pRBCs with good response; no pallor or light-headedness today and Hb 8.8. Restarted SQH; will d/c ASA and continue with monotherapy (Plavix). FOBT+; will monitor Hb for now and plan for outpatient GI work-up if Hb remains stable. Monitoring BP.  2/24: Hb 9.5 today. WBC 13 however pt afebrile, denies acute complaints; monitoring. Pt c/o dizziness (her reported baseline) as well as mild blurred vision; no other neuro changes, visual fields are intact. Restarted home PRN Meclizine. Pt states she takes Lisinopril occasionally at home if her BP is high; started Lisinopril 5mg Daily. Miconazole powder ordered for areas of redness noted in abdominal folds. Pt is not in favor of d/c to SNF; attempted to call pt's son Angel to discuss if he is comfortable with plan of d/c to home. Attempting to touch base with PT regarding dispo as well.  02/25/2019: Hb 8.8. Patient had dark and tarry stool this morning. GI consulted. Will hold Plavix to have scope done on Friday. Pt's son updated on POC.   02/26/2019: Hb 9. Patient may be  able to discharge and have endoscopy done as an outpatient if H&H remains stable. Patient confused with PT, and complained of intermittent confusion. EEG 1 hour monitoring ordered to rule out seizures.   02/27/19 SNF reviewing patient, monitoring H&H due to recent melena, if remains stable then outpatient scope  02/28/2019 no focal deficits noted on exam today. SNF unable to transport patient back to hospital for outpatient EGD. Therefore, will plan to have EGD prior to discharge. Plans to contact GI to confirm EGD scheduled for tomorrow.   03/01/19 ordered 1L fluid restriction due to hyponatremia, patient going for EGD today    STROKE DOCUMENTATION   Acute Stroke Times   Last Known Normal Date: 02/20/19  Last Known Normal Time: 1800  Symptom Onset Date: 02/20/19  Symptom Onset Time: 1800  Stroke Team Called Date: 02/20/19  Stroke Team Called Time: 2050  Stroke Team Arrival Date: 02/20/19  Stroke Team Arrival Time: 2052  CT Interpretation Time: 2100  Decision to Treat Time for Alteplase: 2110(Patient refused )  Decision to Treat Time for IR: (NA)    NIH Scale:  1a. Level of Consciousness: 0-->Alert, keenly responsive  1b. LOC Questions: 0-->Answers both questions correctly  1c. LOC Commands: 0-->Performs both tasks correctly  2. Best Gaze: 0-->Normal  3. Visual: 0-->No visual loss  4. Facial Palsy: 0-->Normal symmetrical movements  5a. Motor Arm, Left: 0-->No drift, limb holds 90 (or 45) degrees for full 10 secs  5b. Motor Arm, Right: 0-->No drift, limb holds 90 (or 45) degrees for full 10 secs  6a. Motor Leg, Left: 0-->No drift, leg holds 30 degree position for full 5 secs  6b. Motor Leg, Right: 0-->No drift, leg holds 30 degree position for full 5 secs  7. Limb Ataxia: 0-->Absent  8. Sensory: 0-->Normal, no sensory loss  9. Best Language: 0-->No aphasia, normal  10. Dysarthria: 0-->Normal  11. Extinction and Inattention (formerly Neglect): 0-->No abnormality  Total (NIH Stroke Scale): 0       Modified [place]  Score: 1  [place] Coma Scale:    ABCD2 Score:    [id number] Score:   HAS -BLED Score:   ICH Score:   Hunt & De Classification:      Hemorrhagic change of an Ischemic Stroke: Does this patient have an ischemic stroke with hemorrhagic changes? No     Neurologic Chief Complaint: Right facial droop, RUE weakness and slurred speech.     Subjective:     Interval History: MARVINON, nursing staff noting melena after bowel movements, patient has no new complaints    HPI, Past Medical, Family, and Social History remains the same as documented in the initial encounter.     Review of Systems   Constitutional: Negative for chills and fever.   Respiratory: Negative for cough.    Cardiovascular: Negative for chest pain.   Gastrointestinal:        Black stool   Skin: Negative for pallor and wound.   Neurological: Negative for speech difficulty, weakness and numbness.   Psychiatric/Behavioral: Positive for confusion. Negative for agitation.     Scheduled Meds:   atorvastatin  80 mg Oral Daily    cholecalciferol (vitamin D3)  5,000 Units Oral Daily    enoxaparin  30 mg Subcutaneous Daily    ferrous sulfate  325 mg Oral Daily    gabapentin  300 mg Oral Nightly    levothyroxine  75 mcg Oral Daily    lisinopril  5 mg Oral Daily    miconazole NITRATE 2 %   Topical (Top) BID    pantoprazole  40 mg Oral Daily    sodium chloride 0.9%  3 mL Intravenous Q8H     Continuous Infusions:   sodium chloride 0.9% 50 mL/hr at 03/01/19 1020    sodium chloride 0.9%       PRN Meds:sodium chloride, acetaminophen, aluminum & magnesium hydroxide-simethicone, hydrALAZINE, meclizine, ondansetron, ondansetron, ondansetron, promethazine (PHENERGAN) IVPB, ramelteon, sodium chloride 0.9%, sodium chloride 0.9%    Objective:     Vital Signs (Most Recent):  Temp: 96 °F (35.6 °C) (03/01/19 1115)  Pulse: 65 (03/01/19 1115)  Resp: 17 (03/01/19 1115)  BP: (!) 144/66 (03/01/19 1115)  SpO2: 95 % (03/01/19 1115)  BP Location: Left arm    Vital Signs Range  (Last 24H):  Temp:  [96 °F (35.6 °C)-98.8 °F (37.1 °C)]   Pulse:  [59-92]   Resp:  [15-19]   BP: (121-181)/(53-82)   SpO2:  [95 %-100 %]   BP Location: Left arm    Physical Exam   Constitutional: She is oriented to person, place, and time. She appears well-developed and well-nourished. No distress.   HENT:   Head: Normocephalic and atraumatic.   Eyes: EOM are normal. Pupils are equal, round, and reactive to light.   Cardiovascular: Normal rate.   Pulmonary/Chest: Effort normal. No respiratory distress.   Neurological: She is alert and oriented to person, place, and time.   See below   Skin: Skin is warm and dry.   Vitals reviewed.      Neurological Exam:   LOC: alert  Attention Span: Good   Language: No aphasia  Articulation: No dysarthria  Orientation: Person, Place, Time   Visual Fields: Full  EOM (CN III, IV, VI): Full/intact  Facial Movement (CN VII): equal bilaterally  Motor: Arm left  Normal 5/5  Leg left  Normal 5/5  Arm right  Normal 5/5  Leg right Normal 5/5  Cebellar: No evidence of appendicular or axial ataxia  Sensation: intact bilaterally to light touch  Tone: Normal tone throughout    Laboratory:  CMP:   Recent Labs   Lab 03/01/19  0332   CALCIUM 9.3   *   K 4.3   CO2 26   CL 96   BUN 17   CREATININE 0.8     CBC:   Recent Labs   Lab 03/01/19  0332   WBC 10.09   RBC 3.15*   HGB 8.8*   HCT 28.9*      MCV 92   MCH 27.9   MCHC 30.4*     Lipid Panel:   No results for input(s): CHOL, LDLCALC, HDL, TRIG in the last 168 hours.  Coagulation:   No results for input(s): PT, INR, APTT in the last 168 hours.  Hgb A1C:   No results for input(s): HGBA1C in the last 168 hours.  TSH:   No results for input(s): TSH in the last 168 hours.    Diagnostic Results     Brain Imaging     CT head 2/20/19  No acute abnormality noted     MRI 2/21/19    Small acute infarcts in the left basal ganglia.   Changes of chronic small vessel ischemic disease and cerebral volume loss.      Vessel Imaging     CTA Head/Neck  2/20/19  Recent infarcts in the left basal ganglia.  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      Cardiac Imaging     TTE 2/21/19  · Concentric left ventricular remodeling.  · Normal left ventricular systolic function. The estimated ejection fraction is 65%  · Grade I (mild) left ventricular diastolic dysfunction consistent with impaired relaxation.  · Normal left atrial pressure.  · Mild left atrial enlargement.  · Mild aortic regurgitation.  · Normal right ventricular systolic function.  · Mild aortic valve stenosis.  · Aortic valve area is 2.18 cm2; peak velocity is 2.39 m/s; mean gradient is 14.14 mmHg.            Sherlyn Villasenor PA-C  Comprehensive Stroke Center  Department of Vascular Neurology   [facility]

## 2019-03-01 NOTE — TRANSFER OF CARE
"Anesthesia Transfer of Care Note    Patient: Linda Hylton    Procedure(s) Performed: Procedure(s) (LRB):  EGD (ESOPHAGOGASTRODUODENOSCOPY) (N/A)    Patient location: [facility]    Anesthesia Type: general    Transport from OR: Transported from OR on 2-3 L/min O2 by NC with adequate spontaneous ventilation    Post pain: adequate analgesia    Post assessment: no apparent anesthetic complications and tolerated procedure well    Post vital signs: stable    Level of consciousness: awake, alert and oriented    Nausea/Vomiting: no nausea/vomiting    Complications: none    Transfer of care protocol was followed      Last vitals:   Visit Vitals  /73 (BP Location: Left arm, Patient Position: Lying)   Pulse 92   Temp 36.9 °C (98.4 °F) (Temporal)   Resp 19   Ht 4' 9" (1.448 m)   Wt 72.6 kg (160 lb 0 oz)   LMP  (LMP Unknown)   SpO2 100%   Breastfeeding? No   BMI 34.62 kg/m²     "

## 2019-03-01 NOTE — PT/OT/SLP PROGRESS
Physical Therapy      Patient Name:  Linda Hylton   MRN:  [id number]    Patient not seen today secondary to pt off floor for procedure. Will follow-up as schedule permits.    Mindy Richardson, SPT   3/1/2019

## 2019-03-01 NOTE — ANESTHESIA POSTPROCEDURE EVALUATION
"Anesthesia Post Evaluation    Patient: Linda Hylton    Procedure(s) Performed: Procedure(s) (LRB):  EGD (ESOPHAGOGASTRODUODENOSCOPY) (N/A)    Final Anesthesia Type: general  Patient location during evaluation: [facility]  Patient participation: Yes- Able to Participate  Level of consciousness: awake and alert  Post-procedure vital signs: reviewed and stable  Pain management: adequate  Airway patency: patent  PONV status at discharge: No PONV  Anesthetic complications: no      Cardiovascular status: blood pressure returned to baseline  Respiratory status: unassisted  Hydration status: euvolemic  Follow-up not needed.        Visit Vitals  /73 (BP Location: Left arm, Patient Position: Lying)   Pulse 92   Temp 36.9 °C (98.4 °F) (Temporal)   Resp 19   Ht 4' 9" (1.448 m)   Wt 72.6 kg (160 lb 0 oz)   LMP  (LMP Unknown)   SpO2 100%   Breastfeeding? No   BMI 34.62 kg/m²       Pain/Joana Score: Pain Rating Prior to Med Admin: 5 (2/28/2019  8:13 PM)  Pain Rating Post Med Admin: 2 (2/28/2019  9:13 PM)        "

## 2019-03-01 NOTE — NURSING TRANSFER
Nursing Transfer Note      3/1/2019     Transfer To: 7086A    Transfer via stretcher    Transfer with cardiac monitoring    Transported by MEHNZA López    Medicines sent: Maintenance IVF- normal saline at 50cc/hr via pump    Chart send with patient: Yes    Patient reassessed at: 3/1/19 at 1045    Report given to DONALD Garcia

## 2019-03-01 NOTE — [employer]OVATION PATIENT INSTRUCTIONS
Discharge Summary/Instructions after an Endoscopic Procedure  Patient Name: Linda Hylton  Patient MRN: [id number]  Patient YOB: 1935 Friday, March 01, 2019  Simon Ortiz MD  RESTRICTIONS:  During your procedure today, you received medications for sedation.  These   medications may affect your judgment, balance and coordination.  Therefore,   for 24 hours, you have the following restrictions:   - DO NOT drive a car, operate machinery, make legal/financial decisions,   sign important papers or drink alcohol.    ACTIVITY:  Today: no heavy lifting, straining or running due to procedural   sedation/anesthesia.  The following day: return to full activity including work.  DIET:  Eat and drink normally unless instructed otherwise.     TREATMENT FOR COMMON SIDE EFFECTS:  - Mild abdominal pain, nausea, belching, bloating or excessive gas:  rest,   eat lightly and use a heating pad.  - Sore Throat: treat with throat lozenges and/or gargle with warm salt   water.  - Because air was used during the procedure, expelling large amounts of air   from your rectum or belching is normal.  - If a bowel prep was taken, you may not have a bowel movement for 1-3 days.    This is normal.  SYMPTOMS TO WATCH FOR AND REPORT TO YOUR PHYSICIAN:  1. Abdominal pain or bloating, other than gas cramps.  2. Chest pain.  3. Back pain.  4. Signs of infection such as: chills or fever occurring within 24 hours   after the procedure.  5. Rectal bleeding, which would show as bright red, maroon, or black stools.   (A tablespoon of blood from the rectum is not serious, especially if   hemorrhoids are present.)  6. Vomiting.  7. Weakness or dizziness.  GO DIRECTLY TO THE NEAREST EMERGENCY ROOM IF YOU HAVE ANY OF THE FOLLOWING:      Difficulty breathing              Chills and/or fever over 101 F   Persistent vomiting and/or vomiting blood   Severe abdominal pain   Severe chest pain   Black, tarry stools   Bleeding- more than one tablespoon   Any  other symptom or condition that you feel may need urgent attention  Your doctor recommends these additional instructions:  If any biopsies were taken, your doctors clinic will contact you in 1 to 2   weeks with any results.  - [facility] to [facility] for [facility].   - Resume Plavix (clopidogrel) at prior dose in 2 days.   - The findings and recommendations were discussed with the referring   physician.   - No obvious GI bleeding source seen but would Refer to an ENT specialist   given oropharynx findings.  For questions, problems or results please call your physician - Simon Ortiz MD at Work:  [phone].  [facility], EMERGENCY ROOM PHONE NUMBER: [phone]  IF A COMPLICATION OR EMERGENCY SITUATION ARISES AND YOU ARE UNABLE TO REACH   YOUR PHYSICIAN - GO DIRECTLY TO THE EMERGENCY ROOM.  Simon Ortiz MD  3/1/2019 10:06:06 AM  This report has been verified and signed electronically.  [employer]

## 2019-03-01 NOTE — PLAN OF CARE
CM notified by team that patient has been cleared for discharge. CM contacted Zena at [facility]-she states they have a cut off time of 4pm and would be able to accept patient on Monday if patient is medically stable. Updated team with info.

## 2019-03-01 NOTE — ASSESSMENT & PLAN NOTE
Discussed with Hospital Medicine- Hyponatremia possibly 2/2 hypovolemia in the setting of pt's acute Hb drop.   Continue daily BMP; Will discuss any further recs with Hospital Medicine at AM rounds    02/25- Discussed hyponatremia with hospital medicine, patient may be hyponatremic at baseline. Will continue to monitor and appreciate recs from hospital medicine.  03/01 - continued hyponatremia, 1L fluid restriction ordered

## 2019-03-01 NOTE — NURSING
Pt arrived to room 7086 via stretcher with NS @50 ml/hr on room air. Pt VSS obtained and WDL. Safety precautions intiated. Will continue to monitor.

## 2019-03-01 NOTE — ASSESSMENT & PLAN NOTE
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 LKN 18:00, within window for tPA, however patient refused. Per chart review, patient was seen day PTA and diagonsed with a UTI and started on Macrobid. Repeat UA here with no evidence of infection so abx d/c'd.    MRI Brain is with small acute infarcts in the L basal ganglia. CTA H/N shows BL fetal PCAs but overall stable from prior scans in 2017. Echo demonstrated mild LAE. Stroke etiology not completely clear, but appears embolic/ESUS on imaging. Will plan for 30-day cardiac event monitor following SNF discharge.     Antithrombotics for secondary stroke prevention: Antiplatelets: On hold for EGD  Statins for secondary stroke prevention and hyperlipidemia, if present:   Statins: Atorvastatin- 80 mg daily  Aggressive risk factor modification: HTN, HLD, Obesity, prior stroke  Rehab efforts: PT/OT/SLP to evaluate and treat  Diagnostics ordered/pending: None   VTE prophylaxis: Heparin 5000 units SQ every 8 hours  Place [facility]   BP parameters: SBP < 160

## 2019-03-01 NOTE — PLAN OF CARE
Problem: Adult Inpatient Plan of Care  Goal: Plan of Care Review  Outcome: Ongoing (interventions implemented as appropriate)  Patient AAOx4 throughout shift.  x1 assist to bedside commode. Has purewick in place, changed on 3/1.  Last BM 2/28 (melena).  Complains of slight dizziness when ambulating.  Gave prn meds for headache and indigestion.  Remained NPO after midnight.  Going for EGD later this morning.  No other complaints.  Will continue to monitor.

## 2019-03-01 NOTE — PROGRESS NOTES
"[facility]  Adult Nutrition  Progress Note    SUMMARY       Recommendations    Recommendation/Intervention:   1. Resume Regular diet once able.    -If PO intake <50%, recommend Boost Plus TID. Will monitor.   Goals: Pt to meet % EEN and EPN.   Nutrition Goal Status: new  Communication of RD Recs: reviewed with RN    Reason for Assessment    Reason For Assessment: RD follow-up  Diagnosis: stroke/CVA  Relevant Medical History: HTN, HLD  General Information Comments: Pt not in room during visit. Noted pt NPO for EGD. Pt with varied PO documented. Pt with stable wt X 1yr. Unable to assess for signs of malnutrition. Will perform NFPE at later date.   Nutrition Discharge Planning: Adequate PO intake for optimal nutrition.     Nutrition/Diet History    Spiritual, Cultural Beliefs, Anabaptism Practices, Values that Affect Care: no  Factors Affecting Nutritional Intake: None identified at this time    Anthropometrics    Temp: 97.5 °F (36.4 °C)  Height Method: Stated  Height: 4' 9" (144.8 cm)  Height (inches): 57 in  Weight Method: Bed Scale  Weight: 72.6 kg (160 lb 0 oz)  Weight (lb): 160 lb  Ideal Body Weight (IBW), Female: 85 lb  % Ideal Body Weight, Female (lb): 188.24   BMI (Calculated): 34.7  BMI Grade: 30 - 34.9- obesity - grade I  Usual Body Weight (UBW), k.8 kg(2018)  % Usual Body Weight: 99.9     Lab/Procedures/Meds    Pertinent Labs Reviewed: reviewed  Pertinent Labs Comments: Na 130  Pertinent Medications Reviewed: reviewed  Pertinent Medications Comments: vit D3, ferrous sulfate    Estimated/Assessed Needs    Weight Used For Calorie Calculations: 72.6 kg (160 lb 0.9 oz)  Energy Calorie Requirements (kcal): 1311  Energy Need Method: Steward-St Jeor(1.25 PAL)  Protein Requirements: 72-87g (1-1.2g/kg)  Weight Used For Protein Calculations: 72.6 kg (160 lb 0.9 oz)  Fluid Requirements (mL): Per MD  RDA Method (mL): 1311     Nutrition Prescription Ordered    Current Diet Order: " NPO  Nutrition Order Comments: for EGD    Evaluation of Received Nutrient/Fluid Intake    Comments: LBM 2/28  % Intake of Estimated Energy Needs: Other: varied PO, currently NPO  % Meal Intake: Other: varied PO, currently NPO    Nutrition Risk    Level of Risk/Frequency of Follow-up: (1X/week)     Assessment and Plan    No nutrition related risk factor present at this time.     Monitor and Evaluation    Food and Nutrient Intake: energy intake, food and beverage intake  Food and Nutrient Adminstration: diet order  Anthropometric Measurements: weight, weight change  Biochemical Data, Medical Tests and Procedures: other (specify)(All labs)  Nutrition-Focused Physical Findings: overall appearance     Nutrition Follow-Up    RD Follow-up?: Yes

## 2019-03-01 NOTE — SUBJECTIVE & OBJECTIVE
Neurologic Chief Complaint: Right facial droop, RUE weakness and slurred speech.     Subjective:     Interval History: NAEON, nursing staff noting melena after bowel movements, patient has no new complaints    HPI, Past Medical, Family, and Social History remains the same as documented in the initial encounter.     Review of Systems   Constitutional: Negative for chills and fever.   Respiratory: Negative for cough.    Cardiovascular: Negative for chest pain.   Gastrointestinal:        Black stool   Skin: Negative for pallor and wound.   Neurological: Negative for speech difficulty, weakness and numbness.   Psychiatric/Behavioral: Positive for confusion. Negative for agitation.     Scheduled Meds:   atorvastatin  80 mg Oral Daily    cholecalciferol (vitamin D3)  5,000 Units Oral Daily    enoxaparin  30 mg Subcutaneous Daily    ferrous sulfate  325 mg Oral Daily    gabapentin  300 mg Oral Nightly    levothyroxine  75 mcg Oral Daily    lisinopril  5 mg Oral Daily    miconazole NITRATE 2 %   Topical (Top) BID    pantoprazole  40 mg Oral Daily    sodium chloride 0.9%  3 mL Intravenous Q8H     Continuous Infusions:   sodium chloride 0.9% 50 mL/hr at 03/01/19 1020    sodium chloride 0.9%       PRN Meds:sodium chloride, acetaminophen, aluminum & magnesium hydroxide-simethicone, hydrALAZINE, meclizine, ondansetron, ondansetron, ondansetron, promethazine (PHENERGAN) IVPB, ramelteon, sodium chloride 0.9%, sodium chloride 0.9%    Objective:     Vital Signs (Most Recent):  Temp: 96 °F (35.6 °C) (03/01/19 1115)  Pulse: 65 (03/01/19 1115)  Resp: 17 (03/01/19 1115)  BP: (!) 144/66 (03/01/19 1115)  SpO2: 95 % (03/01/19 1115)  BP Location: Left arm    Vital Signs Range (Last 24H):  Temp:  [96 °F (35.6 °C)-98.8 °F (37.1 °C)]   Pulse:  [59-92]   Resp:  [15-19]   BP: (121-181)/(53-82)   SpO2:  [95 %-100 %]   BP Location: Left arm    Physical Exam   Constitutional: She is oriented to person, place, and time. She appears  well-developed and well-nourished. No distress.   HENT:   Head: Normocephalic and atraumatic.   Eyes: EOM are normal. Pupils are equal, round, and reactive to light.   Cardiovascular: Normal rate.   Pulmonary/Chest: Effort normal. No respiratory distress.   Neurological: She is alert and oriented to person, place, and time.   See below   Skin: Skin is warm and dry.   Vitals reviewed.      Neurological Exam:   LOC: alert  Attention Span: Good   Language: No aphasia  Articulation: No dysarthria  Orientation: Person, Place, Time   Visual Fields: Full  EOM (CN III, IV, VI): Full/intact  Facial Movement (CN VII): equal bilaterally  Motor: Arm left  Normal 5/5  Leg left  Normal 5/5  Arm right  Normal 5/5  Leg right Normal 5/5  Cebellar: No evidence of appendicular or axial ataxia  Sensation: intact bilaterally to light touch  Tone: Normal tone throughout    Laboratory:  CMP:   Recent Labs   Lab 03/01/19  0332   CALCIUM 9.3   *   K 4.3   CO2 26   CL 96   BUN 17   CREATININE 0.8     CBC:   Recent Labs   Lab 03/01/19  0332   WBC 10.09   RBC 3.15*   HGB 8.8*   HCT 28.9*      MCV 92   MCH 27.9   MCHC 30.4*     Lipid Panel:   No results for input(s): CHOL, LDLCALC, HDL, TRIG in the last 168 hours.  Coagulation:   No results for input(s): PT, INR, APTT in the last 168 hours.  Hgb A1C:   No results for input(s): HGBA1C in the last 168 hours.  TSH:   No results for input(s): TSH in the last 168 hours.    Diagnostic Results     Brain Imaging     CT head 2/20/19  No acute abnormality noted     MRI 2/21/19    Small acute infarcts in the left basal ganglia.   Changes of chronic small vessel ischemic disease and cerebral volume loss.      Vessel Imaging     CTA Head/Neck 2/20/19  Recent infarcts in the left basal ganglia.  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  Occlusion of the right  vertebral artery origin with reconstitution at the V2 segment, and interrupted occlusion or high-grade stenosis throughout the V2 segment, overall unchanged dating back to 2017.      Cardiac Imaging     TTE 2/21/19  · Concentric left ventricular remodeling.  · Normal left ventricular systolic function. The estimated ejection fraction is 65%  · Grade I (mild) left ventricular diastolic dysfunction consistent with impaired relaxation.  · Normal left atrial pressure.  · Mild left atrial enlargement.  · Mild aortic regurgitation.  · Normal right ventricular systolic function.  · Mild aortic valve stenosis.  · Aortic valve area is 2.18 cm2; peak velocity is 2.39 m/s; mean gradient is 14.14 mmHg.

## 2019-03-01 NOTE — ASSESSMENT & PLAN NOTE
Patient had dark and tarry stool on 02/25/2019 and black stool on 2/28/19.   GI consulted.   EGD scheduled for 03/01/19

## 2019-03-01 NOTE — H&P
Endoscopy History and Physical    PCP - Yakov Barrett MD     Procedure - EGD  ASA - per anesthesia  Mallampati - per anesthesia  History of Anesthesia problems - no  Family history Anesthesia problems -  no   Plan of anesthesia - General    HPI:  This is a 84 y.o. 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    Surgical History:  has a past surgical history that includes Hip surgery (Left, 01/12/2016); Thyroid surgery (Right, 2012); Appendectomy; and Parathyroidectomy (Right, 2012).    Family History: family history includes Diabetes in her son; Heart disease in her brother, father, and sister.. Otherwise no colon cancer, inflammatory bowel disease, or GI malignancies.    Social History:  reports that  has never smoked. she has never used smokeless tobacco. She reports that she does not use drugs.    Review of patient's allergies indicates:   Allergen Reactions    Sulfa (sulfonamide antibiotics) Hives       Medications:   Medications Prior to Admission   Medication Sig Dispense Refill Last Dose    ALPRAZolam (XANAX) 0.5 MG tablet TAKE 1 TABLET (0.5 MG TOTAL) BY MOUTH 2 (TWO) TIMES DAILY AS NEEDED FOR ANXIETY. 60 tablet 0 Past Week at Unknown time    cholecalciferol, vitamin D3, 5,000 unit Tab Take 5,000 Units by mouth once daily.   2/20/2019    clopidogrel (PLAVIX) 75 mg tablet Take 1 tablet (75 mg total) by mouth once daily. 90 tablet 3 2/20/2019    clotrimazole-betamethasone 1-0.05% (LOTRISONE) cream  Apply topically 2 (two) times daily. 45 g 1 2019    fenofibrate 160 MG Tab Take 1 tablet (160 mg total) by mouth once daily. 90 tablet 3 2019    ferrous sulfate 325 (65 FE) MG EC tablet Take 325 mg by mouth once daily.   2019 at Unknown time    gabapentin (NEURONTIN) 300 MG capsule Take 300 mg by mouth nightly.  1 Past Week at Unknown time    levothyroxine (SYNTHROID) 75 MCG tablet TAKE 1 TABLET (75 MCG TOTAL) BY MOUTH ONCE DAILY. 90 tablet 1 2019    meclizine (ANTIVERT) 25 mg tablet Take 1 tablet (25 mg total) by mouth 3 (three) times daily as needed for Dizziness. 90 tablet 3 2019 at Unknown time    [] nitrofurantoin, macrocrystal-monohydrate, (MACROBID) 100 MG capsule Take 1 capsule (100 mg total) by mouth 2 (two) times daily. for 7 days 14 capsule 0 2019    nystatin (MYCOSTATIN) powder Apply to affected area 3 times daily 60 g 0 2019    ondansetron (ZOFRAN-ODT) 8 MG TbDL Take 1 tablet (8 mg total) by mouth every 8 (eight) hours as needed. 20 tablet 0 2019    aspirin (ECOTRIN) 325 MG EC tablet Take 1 tablet (325 mg total) by mouth once daily.  0 Taking    atorvastatin (LIPITOR) 40 MG tablet Take 1 tablet (40 mg total) by mouth once daily. 30 tablet 3 Taking       Physical Exam:    Vital Signs:   Vitals:    19 0910   BP: (!) 181/69   Pulse: 66   Resp: 18   Temp: 97.5 °F (36.4 °C)       General Appearance: Well appearing in no acute distress  Eyes:    No scleral icterus  ENT: Neck supple, Lips, mucosa, and tongue normal; teeth and gums normal  Lungs: CTA anteriorly  Heart:  Regular rate, S1, S2 normal, no murmurs heard.  Abdomen: Soft, non tender, non distended with normal bowel sounds. No hepatosplenomegaly, ascites, or mass.  Extremities: No edema  Skin: No rash    Labs:  Lab Results   Component Value Date    WBC 10.09 2019    HGB 8.8 (L) 2019    HCT 28.9 (L) 2019     2019    CHOL 177 2019    TRIG 252 (H)  02/20/2019    HDL 30 (L) 02/20/2019    ALT 20 02/22/2019    AST 23 02/22/2019     (L) 03/01/2019    K 4.3 03/01/2019    CL 96 03/01/2019    CREATININE 0.8 03/01/2019    BUN 17 03/01/2019    CO2 26 03/01/2019    TSH 2.994 02/20/2019    INR 0.9 02/20/2019    HGBA1C 5.3 02/20/2019       I have explained the risks and benefits of endoscopy procedures to the patient including but not limited to bleeding, perforation, infection, and death.      Simon Ortiz MD

## 2019-03-01 NOTE — HPI
84F admitted for stroke. EGD performed today for anemia and melena. Per procedure note: The posterior oropharynx just lateral to the epiglottis had what looked like a vascular lesion. Not actively bleeding but potential bleeding source. ENT consulted to evaluate lesion. Pt denies dysphagia, change in voice, tongue pain or bleeding from mouth.

## 2019-03-01 NOTE — DISCHARGE INSTRUCTIONS

## 2019-03-01 NOTE — ASSESSMENT & PLAN NOTE
Hb 8 > 8 > 6.5. Pt appears pale and c/o light-headedness but is neurologically stable.   Ordered type & screen and 1U pRBCs, consent obtained on 2/22/19. SQH held; SCDs ordered.  Started PPI. + FOBT result. Anemia workup largely WNL with TIBC mildly elevated and iron sat 7%.  Pt s/p 1U pRBCs on 2/22 with good response; Hb 8.8 with pallor improved  Restarted SQH; will d/c ASA and continue with Plavix monotherapy for now   Hb 9.5 on 2/24.    02/25 on Hb 8.8. Patient had dark and tarry stool this morning. GI consulted. Will hold Plavix to have scope done on Friday.   03/01 Hgb 8.8, EGD scheduled today

## 2019-03-02 LAB
ANION GAP SERPL CALC-SCNC: 9 MMOL/L
BASOPHILS # BLD AUTO: 0.11 K/UL
BASOPHILS NFR BLD: 1.1 %
BUN SERPL-MCNC: 13 MG/DL
CALCIUM SERPL-MCNC: 9.5 MG/DL
CHLORIDE SERPL-SCNC: 97 MMOL/L
CO2 SERPL-SCNC: 25 MMOL/L
CREAT SERPL-MCNC: 0.7 MG/DL
DIFFERENTIAL METHOD: ABNORMAL
EOSINOPHIL # BLD AUTO: 0.3 K/UL
EOSINOPHIL NFR BLD: 2.8 %
ERYTHROCYTE [DISTWIDTH] IN BLOOD BY AUTOMATED COUNT: 15 %
EST. GFR  (AFRICAN AMERICAN): >60 ML/MIN/1.73 M^2
EST. GFR  (NON AFRICAN AMERICAN): >60 ML/MIN/1.73 M^2
GLUCOSE SERPL-MCNC: 119 MG/DL
HCT VFR BLD AUTO: 28 %
HGB BLD-MCNC: 8.6 G/DL
IMM GRANULOCYTES # BLD AUTO: 0.1 K/UL
IMM GRANULOCYTES NFR BLD AUTO: 1 %
LYMPHOCYTES # BLD AUTO: 1.7 K/UL
LYMPHOCYTES NFR BLD: 16.3 %
MCH RBC QN AUTO: 27.7 PG
MCHC RBC AUTO-ENTMCNC: 30.7 G/DL
MCV RBC AUTO: 90 FL
MONOCYTES # BLD AUTO: 1 K/UL
MONOCYTES NFR BLD: 9.6 %
NEUTROPHILS # BLD AUTO: 7.1 K/UL
NEUTROPHILS NFR BLD: 69.2 %
NRBC BLD-RTO: 0 /100 WBC
PLATELET # BLD AUTO: 274 K/UL
PMV BLD AUTO: 10.7 FL
POTASSIUM SERPL-SCNC: 4.5 MMOL/L
RBC # BLD AUTO: 3.1 M/UL
SODIUM SERPL-SCNC: 131 MMOL/L
WBC # BLD AUTO: 10.28 K/UL

## 2019-03-02 PROCEDURE — 99233 PR SUBSEQUENT HOSPITAL CARE,LEVL III: ICD-10-PCS | Mod: GC,,, | Performed by: PSYCHIATRY & NEUROLOGY

## 2019-03-02 PROCEDURE — 80048 BASIC METABOLIC PNL TOTAL CA: CPT

## 2019-03-02 PROCEDURE — 63600175 PHARM REV CODE 636 W HCPCS: Performed by: PHYSICIAN ASSISTANT

## 2019-03-02 PROCEDURE — 36415 COLL VENOUS BLD VENIPUNCTURE: CPT

## 2019-03-02 PROCEDURE — 85025 COMPLETE CBC W/AUTO DIFF WBC: CPT

## 2019-03-02 PROCEDURE — A4216 STERILE WATER/SALINE, 10 ML: HCPCS | Performed by: NURSE PRACTITIONER

## 2019-03-02 PROCEDURE — 25000003 PHARM REV CODE 250: Performed by: NURSE PRACTITIONER

## 2019-03-02 PROCEDURE — 99233 SBSQ HOSP IP/OBS HIGH 50: CPT | Mod: GC,,, | Performed by: PSYCHIATRY & NEUROLOGY

## 2019-03-02 PROCEDURE — 25000003 PHARM REV CODE 250: Performed by: STUDENT IN AN ORGANIZED HEALTH CARE EDUCATION/TRAINING PROGRAM

## 2019-03-02 PROCEDURE — 25000003 PHARM REV CODE 250: Performed by: PHYSICIAN ASSISTANT

## 2019-03-02 PROCEDURE — 20600001 HC STEP DOWN PRIVATE ROOM

## 2019-03-02 RX ORDER — CLOPIDOGREL BISULFATE 75 MG/1
75 TABLET ORAL DAILY
Status: DISCONTINUED | OUTPATIENT
Start: 2019-03-03 | End: 2019-03-04 | Stop reason: HOSPADM

## 2019-03-02 RX ADMIN — MICONAZOLE NITRATE: 20 POWDER TOPICAL at 09:03

## 2019-03-02 RX ADMIN — PANTOPRAZOLE SODIUM 40 MG: 40 TABLET, DELAYED RELEASE ORAL at 08:03

## 2019-03-02 RX ADMIN — MICONAZOLE NITRATE: 20 POWDER TOPICAL at 08:03

## 2019-03-02 RX ADMIN — LEVOTHYROXINE SODIUM 75 MCG: 75 TABLET ORAL at 05:03

## 2019-03-02 RX ADMIN — ENOXAPARIN SODIUM 30 MG: 100 INJECTION SUBCUTANEOUS at 05:03

## 2019-03-02 RX ADMIN — CHOLECALCIFEROL TAB 125 MCG (5000 UNIT) 5000 UNITS: 125 TAB at 08:03

## 2019-03-02 RX ADMIN — ATORVASTATIN CALCIUM 80 MG: 20 TABLET, FILM COATED ORAL at 08:03

## 2019-03-02 RX ADMIN — RAMELTEON 8 MG: 8 TABLET, FILM COATED ORAL at 09:03

## 2019-03-02 RX ADMIN — Medication 3 ML: at 09:03

## 2019-03-02 RX ADMIN — Medication 3 ML: at 05:03

## 2019-03-02 RX ADMIN — LISINOPRIL 5 MG: 5 TABLET ORAL at 08:03

## 2019-03-02 RX ADMIN — GABAPENTIN 300 MG: 300 CAPSULE ORAL at 09:03

## 2019-03-02 RX ADMIN — FERROUS SULFATE TAB EC 325 MG (65 MG FE EQUIVALENT) 325 MG: 325 (65 FE) TABLET DELAYED RESPONSE at 08:03

## 2019-03-02 NOTE — ASSESSMENT & PLAN NOTE
Dilated blood vessels at left base of tongue. No evidence of active or recent bleeding. Highly unlikely to be source of anemia and melena. No intervention indicated. No need for ENT follow up.

## 2019-03-02 NOTE — SUBJECTIVE & OBJECTIVE
Medications:  Continuous Infusions:   sodium chloride 0.9%       Scheduled Meds:   atorvastatin  80 mg Oral Daily    cholecalciferol (vitamin D3)  5,000 Units Oral Daily    [START ON 3/2/2019] clopidogrel  75 mg Oral Daily    enoxaparin  30 mg Subcutaneous Daily    ferrous sulfate  325 mg Oral Daily    gabapentin  300 mg Oral Nightly    levothyroxine  75 mcg Oral Daily    lisinopril  5 mg Oral Daily    miconazole NITRATE 2 %   Topical (Top) BID    pantoprazole  40 mg Oral Daily    sodium chloride 0.9%  3 mL Intravenous Q8H     PRN Meds:sodium chloride, acetaminophen, aluminum & magnesium hydroxide-simethicone, hydrALAZINE, meclizine, ondansetron, ondansetron, ramelteon, sodium chloride 0.9%     No current facility-administered medications on file prior to encounter.      Current Outpatient Medications on File Prior to Encounter   Medication Sig    ALPRAZolam (XANAX) 0.5 MG tablet TAKE 1 TABLET (0.5 MG TOTAL) BY MOUTH 2 (TWO) TIMES DAILY AS NEEDED FOR ANXIETY.    cholecalciferol, vitamin D3, 5,000 unit Tab Take 5,000 Units by mouth once daily.    clopidogrel (PLAVIX) 75 mg tablet Take 1 tablet (75 mg total) by mouth once daily.    clotrimazole-betamethasone 1-0.05% (LOTRISONE) cream Apply topically 2 (two) times daily.    fenofibrate 160 MG Tab Take 1 tablet (160 mg total) by mouth once daily.    ferrous sulfate 325 (65 FE) MG EC tablet Take 325 mg by mouth once daily.    gabapentin (NEURONTIN) 300 MG capsule Take 300 mg by mouth nightly.    levothyroxine (SYNTHROID) 75 MCG tablet TAKE 1 TABLET (75 MCG TOTAL) BY MOUTH ONCE DAILY.    meclizine (ANTIVERT) 25 mg tablet Take 1 tablet (25 mg total) by mouth 3 (three) times daily as needed for Dizziness.    nystatin (MYCOSTATIN) powder Apply to affected area 3 times daily    ondansetron (ZOFRAN-ODT) 8 MG TbDL Take 1 tablet (8 mg total) by mouth every 8 (eight) hours as needed.    aspirin (ECOTRIN) 325 MG EC tablet Take 1 tablet (325 mg total) by  mouth once daily.    atorvastatin (LIPITOR) 40 MG tablet Take 1 tablet (40 mg total) by mouth once daily.       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    Vertebral basilar insufficiency 12/19/2017    Vitamin D deficiency      Past Surgical History:   Procedure Laterality Date    APPENDECTOMY      [facility]    PARATHYROIDECTOMY Right 2012    THYROID SURGERY Right 2012    Partial thyroidectomy     Family History     Problem Relation (Age of Onset)    Diabetes Son    Heart disease Father, Sister, Brother        Tobacco Use    Smoking status: Never Smoker    Smokeless tobacco: Never Used   Substance and Sexual Activity    Alcohol use: Not on file    Drug use: No    Sexual activity: No     Review of Systems  ENT focused ROS completed and is negative unlsess otherwise stated in HPI  Objective:     Vital Signs (Most Recent):  Temp: 97 °F (36.1 °C) (03/01/19 1541)  Pulse: 72 (03/01/19 1601)  Resp: 16 (03/01/19 1601)  BP: 126/64 (03/01/19 1541)  SpO2: 96 % (03/01/19 1601) Vital Signs (24h Range):  Temp:  [96 °F (35.6 °C)-98.9 °F (37.2 °C)] 97 °F (36.1 °C)  Pulse:  [59-92] 72  Resp:  [15-19] 16  SpO2:  [95 %-100 %] 96 %  BP: (121-181)/(59-82) 126/64     Weight: 72.6 kg (160 lb 0 oz)  Body mass index is 34.62 kg/m².    Date 03/01/19 0700 - 03/02/19 0659   Shift 2774-5318 5545-4850 4860-1506 24 Hour Total   INTAKE   I.V.(mL/kg) 200(2.8)   200(2.8)   Shift Total(mL/kg) 200(2.8)   200(2.8)   OUTPUT   Urine(mL/kg/hr) 550(0.9)   550   Shift Total(mL/kg) 550(7.6)   550(7.6)   Weight (kg) 72.6 72.6 72.6 72.6       Physical Exam  Eyes  open spontaneously  Spontaneous and appropriateverbal response  Follows commands  Oriented  Pupils equal and round  Face symmetric, non-edematous  Oral cavity without lesions or masses.   Tongue soft, non-edematous, midline on extension  Neck symmetric and non-edematous    Fiberoptic nasopharyngolaryngoscopy  Verbal consent obtained. Anesthesia with topical lidocaine + afrin.   Surgeons: Dr. Venu Masters  Procedure: scope passed through left nares with adequate visualization of nasal passages, nasopharynx, pharynx and larynx  Findings: telengiectatic blood vessels in left base of tongue. No bleeding. No masses  Significant Labs:  BMP:   Recent Labs   Lab 02/28/19  0410 03/01/19  0332   * 97    96   CO2 24 26   BUN 19 17   CREATININE 0.9 0.8   CALCIUM 9.1 9.3   MG 1.9  --      CBC:   Recent Labs   Lab 03/01/19  0332   WBC 10.09   RBC 3.15*   HGB 8.8*   HCT 28.9*      MCV 92   MCH 27.9   MCHC 30.4*       Significant Diagnostics:  I reviewed pt's cta which demonstrates prominent vasculature in bilateral base of tongue. Appears to be a normal variant.

## 2019-03-02 NOTE — ASSESSMENT & PLAN NOTE
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 LKN 18:00, within window for tPA, however patient refused. Per chart review, patient was seen day PTA and diagonsed with a UTI and started on Macrobid. Repeat UA here with no evidence of infection so abx d/c'd.    MRI Brain is with small acute infarcts in the L basal ganglia. CTA H/N shows BL fetal PCAs but overall stable from prior scans in 2017. Echo demonstrated mild LAE. Stroke etiology not completely clear, but appears embolic/ESUS on imaging. Will plan for 30-day cardiac event monitor following SNF discharge.     Antithrombotics for secondary stroke prevention: Antiplatelets: will resume plavix on 3/3 after EGD  Statins for secondary stroke prevention and hyperlipidemia, if present:   Statins: Atorvastatin- 80 mg daily  Aggressive risk factor modification: HTN, HLD, Obesity, prior stroke  Rehab efforts: PT/OT/SLP to evaluate and treat  Diagnostics ordered/pending: None   VTE prophylaxis: Heparin 5000 units SQ every 8 hours  [facility]   BP parameters: SBP < 160

## 2019-03-02 NOTE — SUBJECTIVE & OBJECTIVE
Neurologic Chief Complaint: Right facial droop, RUE weakness and slurred speech.     Subjective:     Interval History: NAEON. No further melanotic stools. Hemoglobin stable. No new complaints.     HPI, Past Medical, Family, and Social History remains the same as documented in the initial encounter.     Review of Systems   Constitutional: Negative for chills and fever.   Respiratory: Negative for cough.    Cardiovascular: Negative for chest pain.   Gastrointestinal: Negative for blood in stool.   Skin: Negative for pallor and wound.   Neurological: Negative for speech difficulty, weakness and numbness.   Psychiatric/Behavioral: Negative for agitation.     Scheduled Meds:   atorvastatin  80 mg Oral Daily    cholecalciferol (vitamin D3)  5,000 Units Oral Daily    [START ON 3/3/2019] clopidogrel  75 mg Oral Daily    enoxaparin  30 mg Subcutaneous Daily    ferrous sulfate  325 mg Oral Daily    gabapentin  300 mg Oral Nightly    levothyroxine  75 mcg Oral Daily    lisinopril  5 mg Oral Daily    miconazole NITRATE 2 %   Topical (Top) BID    pantoprazole  40 mg Oral Daily    sodium chloride 0.9%  3 mL Intravenous Q8H     Continuous Infusions:   sodium chloride 0.9%       PRN Meds:sodium chloride, acetaminophen, aluminum & magnesium hydroxide-simethicone, hydrALAZINE, meclizine, ondansetron, ondansetron, ramelteon, sodium chloride 0.9%    Objective:     Vital Signs (Most Recent):  Temp: 96.7 °F (35.9 °C) (03/02/19 1213)  Pulse: 67 (03/02/19 1213)  Resp: 17 (03/02/19 1213)  BP: (!) 147/62 (03/02/19 1213)  SpO2: 98 % (03/02/19 1213)  BP Location: Left arm    Vital Signs Range (Last 24H):  Temp:  [96.7 °F (35.9 °C)-97.6 °F (36.4 °C)]   Pulse:  [53-76]   Resp:  [16-18]   BP: (126-171)/(62-80)   SpO2:  [93 %-98 %]   BP Location: Left arm    Physical Exam   Constitutional: She is oriented to person, place, and time. She appears well-developed and well-nourished. No distress.   HENT:   Head: Normocephalic and  atraumatic.   Eyes: EOM are normal. Pupils are equal, round, and reactive to light.   Cardiovascular: Normal rate.   Pulmonary/Chest: Effort normal. No respiratory distress.   Neurological: She is alert and oriented to person, place, and time.   See below   Skin: Skin is warm and dry.   Vitals reviewed.      Neurological Exam:   LOC: alert  Attention Span: Good   Language: No aphasia  Articulation: No dysarthria  Orientation: Person, Place, Time   Visual Fields: Full  EOM (CN III, IV, VI): Full/intact  Facial Movement (CN VII): equal bilaterally  Motor: Arm left  Normal 5/5  Leg left  Normal 5/5  Arm right  Normal 5/5  Leg right Normal 5/5  Cebellar: +1 ataxia LLE.  Sensation: intact bilaterally to light touch  Tone: Normal tone throughout    Laboratory:  CMP:   Recent Labs   Lab 03/02/19  0408   CALCIUM 9.5   *   K 4.5   CO2 25   CL 97   BUN 13   CREATININE 0.7     CBC:   Recent Labs   Lab 03/02/19  0409   WBC 10.28   RBC 3.10*   HGB 8.6*   HCT 28.0*      MCV 90   MCH 27.7   MCHC 30.7*     Lipid Panel:   No results for input(s): CHOL, LDLCALC, HDL, TRIG in the last 168 hours.  Coagulation:   No results for input(s): PT, INR, APTT in the last 168 hours.  Hgb A1C:   No results for input(s): HGBA1C in the last 168 hours.  TSH:   No results for input(s): TSH in the last 168 hours.    Diagnostic Results     Brain Imaging     CT head 2/20/19  No acute abnormality noted     MRI 2/21/19    Small acute infarcts in the left basal ganglia.   Changes of chronic small vessel ischemic disease and cerebral volume loss.      Vessel Imaging     CTA Head/Neck 2/20/19  Recent infarcts in the left basal ganglia.  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  Occlusion of the right vertebral artery origin with reconstitution at the V2 segment, and interrupted occlusion or high-grade stenosis  throughout the V2 segment, overall unchanged dating back to 2017.      Cardiac Imaging     TTE 2/21/19  · Concentric left ventricular remodeling.  · Normal left ventricular systolic function. The estimated ejection fraction is 65%  · Grade I (mild) left ventricular diastolic dysfunction consistent with impaired relaxation.  · Normal left atrial pressure.  · Mild left atrial enlargement.  · Mild aortic regurgitation.  · Normal right ventricular systolic function.  · Mild aortic valve stenosis.  · Aortic valve area is 2.18 cm2; peak velocity is 2.39 m/s; mean gradient is 14.14 mmHg.

## 2019-03-02 NOTE — ASSESSMENT & PLAN NOTE
Hb 8 > 8 > 6.5. Pt appears pale and c/o light-headedness but is neurologically stable.   Ordered type & screen and 1U pRBCs, consent obtained on 2/22/19. SQH held; SCDs ordered.  Started PPI. + FOBT result. Anemia workup largely WNL with TIBC mildly elevated and iron sat 7%.  Pt s/p 1U pRBCs on 2/22 with good response; Hb 8.8 with pallor improved  Restarted SQH; will d/c ASA and continue with Plavix monotherapy for now   Hb 9.5 on 2/24.    02/25 on Hb 8.8. Patient had dark and tarry stool this morning. GI consulted. Will hold Plavix to have scope done on Friday.   03/01 Hgb 8.8, EGD performed, with Sanchez's esophagus and hiatal hernia but no source of bleeding. Continue to hold plavix until 3/3

## 2019-03-02 NOTE — ASSESSMENT & PLAN NOTE
-Area of cytotoxic cerebral edema identified when reviewing brain imaging in the territory of the left middle cerebral artery. There is no mass effect associated with it. We will continue to monitor the patients clinical exam for any worsening of symptoms which may indicate expansion of the stroke or the area of the edema resulting in the clinical change. The pattern is suggestive of an embolic/ESUS etiology

## 2019-03-02 NOTE — ASSESSMENT & PLAN NOTE
Patient had dark and tarry stool on 02/25/2019 and black stool on 2/28/19.   GI consulted.   EGD performed, results as above

## 2019-03-02 NOTE — PLAN OF CARE
Problem: Adult Inpatient Plan of Care  Goal: Plan of Care Review  Plan of care reviewed with pt. Pt voiced understanding. Pt disoriented to time. Pt denies any c/o pain/discomfort during the shift. No apparent distress noted. Fall precautions maintained. Bed in lowest position, and locked. Call light within reach and advised to call for assistance. Side rails x 2 and slip resistant socks on at this time. Pt progressing towards goals, will continue to monitor.

## 2019-03-02 NOTE — PLAN OF CARE
Problem: Adult Inpatient Plan of Care  Goal: Plan of Care Review  POC reviewed with pt. Verbalized understanding. VSS. No c/o pain or signs of distress noted. Neuro exam remains unchanged. Fall precautions maintained. Pt advised to call staff for assistance, call light is within reach. Will continue to monitor, with all safety measure met.

## 2019-03-02 NOTE — CONSULTS
[facility]  Otorhinolaryngology-Head & Neck Surgery  Consult Note    Patient Name: Linda Hylton  MRN: [id number]  Code Status: Full Code  Admission Date: 2/20/2019  Hospital Length of Stay: 9 days  Attending Physician: Carlos Gil MD  Primary Care Provider: Yakov Barrett MD    Patient information was obtained from patient, past medical records and ER records.     Consults  Subjective:     Chief Complaint/Reason for Admission: Stroke    History of Present Illness: 84F admitted for stroke. EGD performed today for anemia and melena. Per procedure note: The posterior oropharynx just lateral to the epiglottis had what looked like a vascular lesion. Not actively bleeding but potential bleeding source. ENT consulted to evaluate lesion. Pt denies dysphagia, change in voice, tongue pain or bleeding from mouth.    Medications:  Continuous Infusions:   sodium chloride 0.9%       Scheduled Meds:   atorvastatin  80 mg Oral Daily    cholecalciferol (vitamin D3)  5,000 Units Oral Daily    [START ON 3/2/2019] clopidogrel  75 mg Oral Daily    enoxaparin  30 mg Subcutaneous Daily    ferrous sulfate  325 mg Oral Daily    gabapentin  300 mg Oral Nightly    levothyroxine  75 mcg Oral Daily    lisinopril  5 mg Oral Daily    miconazole NITRATE 2 %   Topical (Top) BID    pantoprazole  40 mg Oral Daily    sodium chloride 0.9%  3 mL Intravenous Q8H     PRN Meds:sodium chloride, acetaminophen, aluminum & magnesium hydroxide-simethicone, hydrALAZINE, meclizine, ondansetron, ondansetron, ramelteon, sodium chloride 0.9%     No current facility-administered medications on file prior to encounter.      Current Outpatient Medications on File Prior to Encounter   Medication Sig    ALPRAZolam (XANAX) 0.5 MG tablet TAKE 1 TABLET (0.5 MG TOTAL) BY MOUTH 2 (TWO) TIMES DAILY AS NEEDED FOR ANXIETY.    cholecalciferol, vitamin D3, 5,000 unit Tab Take 5,000 Units by mouth once daily.    clopidogrel (PLAVIX) 75 mg  tablet Take 1 tablet (75 mg total) by mouth once daily.    clotrimazole-betamethasone 1-0.05% (LOTRISONE) cream Apply topically 2 (two) times daily.    fenofibrate 160 MG Tab Take 1 tablet (160 mg total) by mouth once daily.    ferrous sulfate 325 (65 FE) MG EC tablet Take 325 mg by mouth once daily.    gabapentin (NEURONTIN) 300 MG capsule Take 300 mg by mouth nightly.    levothyroxine (SYNTHROID) 75 MCG tablet TAKE 1 TABLET (75 MCG TOTAL) BY MOUTH ONCE DAILY.    meclizine (ANTIVERT) 25 mg tablet Take 1 tablet (25 mg total) by mouth 3 (three) times daily as needed for Dizziness.    nystatin (MYCOSTATIN) powder Apply to affected area 3 times daily    ondansetron (ZOFRAN-ODT) 8 MG TbDL Take 1 tablet (8 mg total) by mouth every 8 (eight) hours as needed.    aspirin (ECOTRIN) 325 MG EC tablet Take 1 tablet (325 mg total) by mouth once daily.    atorvastatin (LIPITOR) 40 MG tablet Take 1 tablet (40 mg total) by mouth once daily.       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    Vertebral basilar insufficiency 12/19/2017    Vitamin D deficiency      Past Surgical History:   Procedure Laterality Date    APPENDECTOMY      [facility]12/2016    [facility]    PARATHYROIDECTOMY Right 2012    THYROID SURGERY Right 2012    Partial thyroidectomy     Family History     Problem Relation (Age of Onset)    Diabetes Son    Heart disease Father, Sister, Brother        Tobacco Use    Smoking status: Never Smoker    Smokeless tobacco: Never Used   Substance and Sexual Activity    Alcohol use: Not on file    Drug use: No    Sexual activity: No     Review of  Systems  ENT focused ROS completed and is negative unlsess otherwise stated in HPI  Objective:     Vital Signs (Most Recent):  Temp: 97 °F (36.1 °C) (03/01/19 1541)  Pulse: 72 (03/01/19 1601)  Resp: 16 (03/01/19 1601)  BP: 126/64 (03/01/19 1541)  SpO2: 96 % (03/01/19 1601) Vital Signs (24h Range):  Temp:  [96 °F (35.6 °C)-98.9 °F (37.2 °C)] 97 °F (36.1 °C)  Pulse:  [59-92] 72  Resp:  [15-19] 16  SpO2:  [95 %-100 %] 96 %  BP: (121-181)/(59-82) 126/64     Weight: 72.6 kg (160 lb 0 oz)  Body mass index is 34.62 kg/m².    Date 03/01/19 0700 - 03/02/19 0659   Shift 4039-5106 1650-2422 7824-2693 24 Hour Total   INTAKE   I.V.(mL/kg) 200(2.8)   200(2.8)   Shift Total(mL/kg) 200(2.8)   200(2.8)   OUTPUT   Urine(mL/kg/hr) 550(0.9)   550   Shift Total(mL/kg) 550(7.6)   550(7.6)   Weight (kg) 72.6 72.6 72.6 72.6       Physical Exam  Eyes open spontaneously  Spontaneous and appropriateverbal response  Follows commands  Oriented  Pupils equal and round  Face symmetric, non-edematous  Oral cavity without lesions or masses.   Tongue soft, non-edematous, midline on extension  Neck symmetric and non-edematous    Fiberoptic nasopharyngolaryngoscopy  Verbal consent obtained. Anesthesia with topical lidocaine + afrin.   Surgeons: Dr. Venu Master  Procedure: scope passed through left nares with adequate visualization of nasal passages, nasopharynx, pharynx and larynx  Findings: telengiectatic blood vessels in left base of tongue. No bleeding. No masses      Significant Labs:  BMP:   Recent Labs   Lab 02/28/19  0410 03/01/19  0332   * 97    96   CO2 24 26   BUN 19 17   CREATININE 0.9 0.8   CALCIUM 9.1 9.3   MG 1.9  --      CBC:   Recent Labs   Lab 03/01/19  0332   WBC 10.09   RBC 3.15*   HGB 8.8*   HCT 28.9*      MCV 92   MCH 27.9   MCHC 30.4*       Significant Diagnostics:  I reviewed pt's cta which demonstrates prominent vasculature in bilateral base of tongue. Appears to be a normal variant.    Assessment/Plan:      Telangiectasia of Base of tongue    Dilated blood vessels at left base of tongue. No evidence of active or recent bleeding. Highly unlikely to be source of anemia and melena. No intervention indicated. No need for ENT follow up.       VTE Risk Mitigation (From admission, onward)        Ordered     enoxaparin injection 30 mg  Daily      02/27/19 0951     IP VTE HIGH RISK PATIENT  Once      02/20/19 2223     Place sequential compression device  Until discontinued      02/20/19 2223          Thank you for your consult. I will sign off. Please contact us if you have any additional questions.    Drake Lea, DO  Otorhinolaryngology-Head & Neck Surgery  [facility]

## 2019-03-02 NOTE — PROGRESS NOTES
[facility]  Vascular Neurology  Comprehensive Stroke Center  Progress Note    Assessment/Plan:     * Embolic stroke involving left middle cerebral artery    84 year old female with past medical hx of HTN, hypothyroidism, prior R vermis stroke in 3/2017, and aortic stenosis presented to the ED with complains of right facial droop. LKN 18:00, within window for tPA, however patient refused. Per chart review, patient was seen day PTA and diagonsed with a UTI and started on Macrobid. Repeat UA here with no evidence of infection so abx d/c'd.    MRI Brain is with small acute infarcts in the L basal ganglia. CTA H/N shows BL fetal PCAs but overall stable from prior scans in 2017. Echo demonstrated mild LAE. Stroke etiology not completely clear, but appears embolic/ESUS on imaging. Will plan for 30-day cardiac event monitor following SNF discharge.     Antithrombotics for secondary stroke prevention: Antiplatelets: will resume plavix on 3/3 after EGD  Statins for secondary stroke prevention and hyperlipidemia, if present:   Statins: Atorvastatin- 80 mg daily  Aggressive risk factor modification: HTN, HLD, Obesity, prior stroke  Rehab efforts: PT/OT/SLP to evaluate and treat  Diagnostics ordered/pending: None   VTE prophylaxis: Heparin 5000 units SQ every 8 hours  Place SCDs   BP parameters: SBP < 160     Melena     Patient had dark and tarry stool on 02/25/2019 and black stool on 2/28/19.   GI consulted.   EGD performed, results as above       Acute blood loss anemia    Hb 8 > 8 > 6.5. Pt appears pale and c/o light-headedness but is neurologically stable.   Ordered type & screen and 1U pRBCs, consent obtained on 2/22/19. SQH held; SCDs ordered.  Started PPI. + FOBT result. Anemia workup largely WNL with TIBC mildly elevated and iron sat 7%.  Pt s/p 1U pRBCs on 2/22 with good response; Hb 8.8 with pallor improved  Restarted SQH; will d/c ASA and continue with Plavix monotherapy for now   Hb 9.5 on  2/24.    02/25 on Hb 8.8. Patient had dark and tarry stool this morning. GI consulted. Will hold Plavix to have scope done on Friday.   03/01 Hgb 8.8, EGD performed, with Sanchez's esophagus and hiatal hernia but no source of bleeding. Continue to hold plavix until 3/3     Nodule of apex of right lung    CTA head/neck on 2/21/19 shows stable 9 mm noncalcified nodule in the right lung apex.   Per family over the phone, pt has no tobacco use history.  Discussed with Hospital Medicine - No acute intervention needed at this time. PCP to monitor and consider CT Chest in 6 months.     Cytotoxic cerebral edema    -Area of cytotoxic cerebral edema identified when reviewing brain imaging in the territory of the left middle cerebral artery. There is no mass effect associated with it. We will continue to monitor the patients clinical exam for any worsening of symptoms which may indicate expansion of the stroke or the area of the edema resulting in the clinical change. The pattern is suggestive of an embolic/ESUS etiology     Hyponatremia    Discussed with Hospital Medicine- Hyponatremia possibly 2/2 hypovolemia in the setting of pt's acute Hb drop.   Continue daily BMP; Will discuss any further recs with Hospital Medicine at AM rounds    02/25- Discussed hyponatremia with hospital medicine, patient may be hyponatremic at baseline. Will continue to monitor and appreciate recs from hospital medicine.  03/01 - continued hyponatremia, 1L fluid restriction ordered     History of stroke    Stroke risk factor  3/2017 - diagnosed with R cerebellar infarct. Was on DAPT and Lipitor 40 outpatient.  Patient states she had no significant stroke deficits (dizziness?)     Mixed hyperlipidemia    Stroke risk factor  LDL 96.6  Patient taking Atorvastatin 40 at home; Increased dose to 80 mg Daily     Dizziness    Pt c/o dizziness/light-headedness which she states she has at baseline; Reports no acute change currently  Also c/o mild blurred  vision but no other neuro changes appreciated and visual fields are intact  Restarted home PRN Meclizine  Will continue to monitor     Essential hypertension    Stroke risk factor  SBP <160  -Pt states she checks her BP twice daily at home and takes Lisinopril occasionally if her SBP < 150. Lisinopril 5mg started on 02/25.  -BP at goal           02/20/2019 MRI brain positive for stroke.  Admitted to Vascular Neurology.  02/21/2019- Na 130-->129. 250 ml NS bolus ordered. Repeat Na lab pending. CTA head/neck with recent infarcts in the left basal ganglia. 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 Echo EF 65% with mild left atrial enlargement. SLP recs for mechanical soft diet. PT/OT recs for possible rehab or SNF.   2/22: Hb 6.5; pt appears pale, c/o light-headedness but is neurologically stable. Ordered type & screen and 1U pRBCs, consent obtained. Will repeat CBC following transfusion. Started PPI and FOBT ordered (needs to be collected.) Monitoring Na.  2/23: Patient s/p 1U pRBCs with good response; no pallor or light-headedness today and Hb 8.8. Restarted SQH; will d/c ASA and continue with monotherapy (Plavix). FOBT+; will monitor Hb for now and plan for outpatient GI work-up if Hb remains stable. Monitoring BP.  2/24: Hb 9.5 today. WBC 13 however pt afebrile, denies acute complaints; monitoring. Pt c/o dizziness (her reported baseline) as well as mild blurred vision; no other neuro changes, visual fields are intact. Restarted home PRN Meclizine. Pt states she takes Lisinopril occasionally at home if her BP is high; started Lisinopril 5mg Daily. Miconazole powder ordered for areas of redness noted in abdominal folds. Pt is not in favor of d/c to SNF; attempted to call pt's son Angel to discuss if he is comfortable with plan of d/c to home. Attempting to touch base with PT regarding dispo as well.  02/25/2019: Hb 8.8. Patient had dark and tarry stool this morning.  GI consulted. Will hold Plavix to have scope done on Friday. Pt's son updated on POC.   02/26/2019: Hb 9. Patient may be able to discharge and have endoscopy done as an outpatient if H&H remains stable. Patient confused with PT, and complained of intermittent confusion. EEG 1 hour monitoring ordered to rule out seizures.   02/27/19 SNF reviewing patient, monitoring H&H due to recent melena, if remains stable then outpatient scope  02/28/2019 no focal deficits noted on exam today. SNF unable to transport patient back to hospital for outpatient EGD. Therefore, will plan to have EGD prior to discharge. Plans to contact GI to confirm EGD scheduled for tomorrow.   03/01/19 ordered 1L fluid restriction due to hyponatremia, patient went to EGD. 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 ENT consulted, no intervention necessary for telangiectasia.   03/02/2019 Hemoglobin stable. Sodium improved to 131 with fluid restriction. Holding plavix s/p EGD    STROKE DOCUMENTATION   Acute Stroke Times   Last Known Normal Date: 02/20/19  Last Known Normal Time: 1800  Symptom Onset Date: 02/20/19  Symptom Onset Time: 1800  Stroke Team Called Date: 02/20/19  Stroke Team Called Time: 2050  Stroke Team Arrival Date: 02/20/19  Stroke Team Arrival Time: 2052  CT Interpretation Time: 2100  Decision to Treat Time for Alteplase: 2110(Patient refused )  Decision to Treat Time for IR: (NA)    NIH Scale:  1a. Level of Consciousness: 0-->Alert, keenly responsive  1b. LOC Questions: 0-->Answers both questions correctly  1c. LOC Commands: 0-->Performs both tasks correctly  2. Best Gaze: 0-->Normal  3. Visual: 0-->No visual loss  4. Facial Palsy: 0-->Normal symmetrical movements  5a. Motor Arm, Left: 0-->No drift, limb holds 90 (or 45) degrees for full 10 secs  5b. Motor Arm, Right: 0-->No drift, limb holds 90 (or 45) degrees for full 10 secs  6a. Motor Leg, Left: 0-->No drift, leg holds 30 degree position for  full 5 secs  6b. Motor Leg, Right: 0-->No drift, leg holds 30 degree position for full 5 secs  7. Limb Ataxia: 1-->Present in one limb  8. Sensory: 0-->Normal, no sensory loss  9. Best Language: 0-->No aphasia, normal  10. Dysarthria: 0-->Normal  11. Extinction and Inattention (formerly Neglect): 0-->No abnormality  Total (NIH Stroke Scale): 1       Modified [place] Score: 1  Miranda Coma Scale:    ABCD2 Score:    [id number] Score:   HAS -BLED Score:   ICH Score:   Hunt & De Classification:      Hemorrhagic change of an Ischemic Stroke: Does this patient have an ischemic stroke with hemorrhagic changes? No     Neurologic Chief Complaint: Right facial droop, RUE weakness and slurred speech.     Subjective:     Interval History: NAEON. No further melanotic stools. Hemoglobin stable. No new complaints.     HPI, Past Medical, Family, and Social History remains the same as documented in the initial encounter.     Review of Systems   Constitutional: Negative for chills and fever.   Respiratory: Negative for cough.    Cardiovascular: Negative for chest pain.   Gastrointestinal: Negative for blood in stool.   Skin: Negative for pallor and wound.   Neurological: Negative for speech difficulty, weakness and numbness.   Psychiatric/Behavioral: Negative for agitation.     Scheduled Meds:   atorvastatin  80 mg Oral Daily    cholecalciferol (vitamin D3)  5,000 Units Oral Daily    [START ON 3/3/2019] clopidogrel  75 mg Oral Daily    enoxaparin  30 mg Subcutaneous Daily    ferrous sulfate  325 mg Oral Daily    gabapentin  300 mg Oral Nightly    levothyroxine  75 mcg Oral Daily    lisinopril  5 mg Oral Daily    miconazole NITRATE 2 %   Topical (Top) BID    pantoprazole  40 mg Oral Daily    sodium chloride 0.9%  3 mL Intravenous Q8H     Continuous Infusions:   sodium chloride 0.9%       PRN Meds:sodium chloride, acetaminophen, aluminum & magnesium hydroxide-simethicone, hydrALAZINE, meclizine, ondansetron, ondansetron,  ramelteon, sodium chloride 0.9%    Objective:     Vital Signs (Most Recent):  Temp: 96.7 °F (35.9 °C) (03/02/19 1213)  Pulse: 67 (03/02/19 1213)  Resp: 17 (03/02/19 1213)  BP: (!) 147/62 (03/02/19 1213)  SpO2: 98 % (03/02/19 1213)  BP Location: Left arm    Vital Signs Range (Last 24H):  Temp:  [96.7 °F (35.9 °C)-97.6 °F (36.4 °C)]   Pulse:  [53-76]   Resp:  [16-18]   BP: (126-171)/(62-80)   SpO2:  [93 %-98 %]   BP Location: Left arm    Physical Exam   Constitutional: She is oriented to person, place, and time. She appears well-developed and well-nourished. No distress.   HENT:   Head: Normocephalic and atraumatic.   Eyes: EOM are normal. Pupils are equal, round, and reactive to light.   Cardiovascular: Normal rate.   Pulmonary/Chest: Effort normal. No respiratory distress.   Neurological: She is alert and oriented to person, place, and time.   See below   Skin: Skin is warm and dry.   Vitals reviewed.      Neurological Exam:   LOC: alert  Attention Span: Good   Language: No aphasia  Articulation: No dysarthria  Orientation: Person, Place, Time   Visual Fields: Full  EOM (CN III, IV, VI): Full/intact  Facial Movement (CN VII): equal bilaterally  Motor: Arm left  Normal 5/5  Leg left  Normal 5/5  Arm right  Normal 5/5  Leg right Normal 5/5  Cebellar: +1 ataxia LLE.  Sensation: intact bilaterally to light touch  Tone: Normal tone throughout    Laboratory:  CMP:   Recent Labs   Lab 03/02/19  0408   CALCIUM 9.5   *   K 4.5   CO2 25   CL 97   BUN 13   CREATININE 0.7     CBC:   Recent Labs   Lab 03/02/19  0409   WBC 10.28   RBC 3.10*   HGB 8.6*   HCT 28.0*      MCV 90   MCH 27.7   MCHC 30.7*     Lipid Panel:   No results for input(s): CHOL, LDLCALC, HDL, TRIG in the last 168 hours.  Coagulation:   No results for input(s): PT, INR, APTT in the last 168 hours.  Hgb A1C:   No results for input(s): HGBA1C in the last 168 hours.  TSH:   No results for input(s): TSH in the last 168 hours.    Diagnostic Results      Brain Imaging     CT head 2/20/19  No acute abnormality noted     MRI 2/21/19    Small acute infarcts in the left basal ganglia.   Changes of chronic small vessel ischemic disease and cerebral volume loss.      Vessel Imaging     CTA Head/Neck 2/20/19  Recent infarcts in the left basal ganglia.  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      Cardiac Imaging     TTE 2/21/19  · Concentric left ventricular remodeling.  · Normal left ventricular systolic function. The estimated ejection fraction is 65%  · Grade I (mild) left ventricular diastolic dysfunction consistent with impaired relaxation.  · Normal left atrial pressure.  · Mild left atrial enlargement.  · Mild aortic regurgitation.  · Normal right ventricular systolic function.  · Mild aortic valve stenosis.  · Aortic valve area is 2.18 cm2; peak velocity is 2.39 m/s; mean gradient is 14.14 mmHg.      Malinda Staley,   [facility]  Department of Vascular Neurology   [facility]

## 2019-03-02 NOTE — PLAN OF CARE
Problem: Skin Injury Risk Increased  Goal: Skin Health and Integrity  Outcome: Ongoing (interventions implemented as appropriate)  Plan of care reviewed with pt at bedside. Safety precautions initiated. Bed locked in lowest position, call bell within reach, Bed alarm on. [id number]. Pt received and EGD and successfully returned to room without discomfort.

## 2019-03-03 LAB
ANION GAP SERPL CALC-SCNC: 8 MMOL/L
BASOPHILS # BLD AUTO: 0.12 K/UL
BASOPHILS NFR BLD: 1.1 %
BUN SERPL-MCNC: 15 MG/DL
CALCIUM SERPL-MCNC: 9.3 MG/DL
CHLORIDE SERPL-SCNC: 98 MMOL/L
CO2 SERPL-SCNC: 27 MMOL/L
CREAT SERPL-MCNC: 0.8 MG/DL
DIFFERENTIAL METHOD: ABNORMAL
EOSINOPHIL # BLD AUTO: 0.3 K/UL
EOSINOPHIL NFR BLD: 3.1 %
ERYTHROCYTE [DISTWIDTH] IN BLOOD BY AUTOMATED COUNT: 14.6 %
EST. GFR  (AFRICAN AMERICAN): >60 ML/MIN/1.73 M^2
EST. GFR  (NON AFRICAN AMERICAN): >60 ML/MIN/1.73 M^2
GLUCOSE SERPL-MCNC: 130 MG/DL
HCT VFR BLD AUTO: 28 %
HGB BLD-MCNC: 8.7 G/DL
IMM GRANULOCYTES # BLD AUTO: 0.09 K/UL
IMM GRANULOCYTES NFR BLD AUTO: 0.8 %
LYMPHOCYTES # BLD AUTO: 1.8 K/UL
LYMPHOCYTES NFR BLD: 16.7 %
MAGNESIUM SERPL-MCNC: 1.6 MG/DL
MCH RBC QN AUTO: 27.4 PG
MCHC RBC AUTO-ENTMCNC: 31.1 G/DL
MCV RBC AUTO: 88 FL
MONOCYTES # BLD AUTO: 1.1 K/UL
MONOCYTES NFR BLD: 9.7 %
NEUTROPHILS # BLD AUTO: 7.4 K/UL
NEUTROPHILS NFR BLD: 68.6 %
NRBC BLD-RTO: 0 /100 WBC
PHOSPHATE SERPL-MCNC: 4.8 MG/DL
PLATELET # BLD AUTO: 284 K/UL
PMV BLD AUTO: 10.8 FL
POTASSIUM SERPL-SCNC: 4.5 MMOL/L
RBC # BLD AUTO: 3.17 M/UL
SODIUM SERPL-SCNC: 133 MMOL/L
WBC # BLD AUTO: 10.78 K/UL

## 2019-03-03 PROCEDURE — 84100 ASSAY OF PHOSPHORUS: CPT

## 2019-03-03 PROCEDURE — 63600175 PHARM REV CODE 636 W HCPCS: Performed by: STUDENT IN AN ORGANIZED HEALTH CARE EDUCATION/TRAINING PROGRAM

## 2019-03-03 PROCEDURE — 25000003 PHARM REV CODE 250: Performed by: PHYSICIAN ASSISTANT

## 2019-03-03 PROCEDURE — 36415 COLL VENOUS BLD VENIPUNCTURE: CPT

## 2019-03-03 PROCEDURE — 99233 SBSQ HOSP IP/OBS HIGH 50: CPT | Mod: GC,,, | Performed by: PSYCHIATRY & NEUROLOGY

## 2019-03-03 PROCEDURE — 83735 ASSAY OF MAGNESIUM: CPT

## 2019-03-03 PROCEDURE — 99233 PR SUBSEQUENT HOSPITAL CARE,LEVL III: ICD-10-PCS | Mod: GC,,, | Performed by: PSYCHIATRY & NEUROLOGY

## 2019-03-03 PROCEDURE — A4216 STERILE WATER/SALINE, 10 ML: HCPCS | Performed by: NURSE PRACTITIONER

## 2019-03-03 PROCEDURE — 85025 COMPLETE CBC W/AUTO DIFF WBC: CPT

## 2019-03-03 PROCEDURE — 25000003 PHARM REV CODE 250: Performed by: STUDENT IN AN ORGANIZED HEALTH CARE EDUCATION/TRAINING PROGRAM

## 2019-03-03 PROCEDURE — 63600175 PHARM REV CODE 636 W HCPCS: Performed by: PHYSICIAN ASSISTANT

## 2019-03-03 PROCEDURE — 20600001 HC STEP DOWN PRIVATE ROOM

## 2019-03-03 PROCEDURE — 25000003 PHARM REV CODE 250: Performed by: NURSE PRACTITIONER

## 2019-03-03 PROCEDURE — 80048 BASIC METABOLIC PNL TOTAL CA: CPT

## 2019-03-03 RX ORDER — LISINOPRIL 5 MG/1
5 TABLET ORAL ONCE
Status: COMPLETED | OUTPATIENT
Start: 2019-03-03 | End: 2019-03-03

## 2019-03-03 RX ORDER — LISINOPRIL 10 MG/1
10 TABLET ORAL DAILY
Status: DISCONTINUED | OUTPATIENT
Start: 2019-03-04 | End: 2019-03-04 | Stop reason: HOSPADM

## 2019-03-03 RX ORDER — MAGNESIUM SULFATE HEPTAHYDRATE 40 MG/ML
2 INJECTION, SOLUTION INTRAVENOUS ONCE
Status: COMPLETED | OUTPATIENT
Start: 2019-03-03 | End: 2019-03-03

## 2019-03-03 RX ADMIN — ATORVASTATIN CALCIUM 80 MG: 20 TABLET, FILM COATED ORAL at 09:03

## 2019-03-03 RX ADMIN — PANTOPRAZOLE SODIUM 40 MG: 40 TABLET, DELAYED RELEASE ORAL at 09:03

## 2019-03-03 RX ADMIN — MAGNESIUM SULFATE HEPTAHYDRATE 2 G: 40 INJECTION, SOLUTION INTRAVENOUS at 09:03

## 2019-03-03 RX ADMIN — CHOLECALCIFEROL TAB 125 MCG (5000 UNIT) 5000 UNITS: 125 TAB at 09:03

## 2019-03-03 RX ADMIN — GABAPENTIN 300 MG: 300 CAPSULE ORAL at 09:03

## 2019-03-03 RX ADMIN — RAMELTEON 8 MG: 8 TABLET, FILM COATED ORAL at 09:03

## 2019-03-03 RX ADMIN — Medication 3 ML: at 06:03

## 2019-03-03 RX ADMIN — LISINOPRIL 5 MG: 5 TABLET ORAL at 02:03

## 2019-03-03 RX ADMIN — FERROUS SULFATE TAB EC 325 MG (65 MG FE EQUIVALENT) 325 MG: 325 (65 FE) TABLET DELAYED RESPONSE at 09:03

## 2019-03-03 RX ADMIN — MICONAZOLE NITRATE: 20 POWDER TOPICAL at 09:03

## 2019-03-03 RX ADMIN — ENOXAPARIN SODIUM 30 MG: 100 INJECTION SUBCUTANEOUS at 05:03

## 2019-03-03 RX ADMIN — Medication 3 ML: at 09:03

## 2019-03-03 RX ADMIN — LISINOPRIL 5 MG: 5 TABLET ORAL at 09:03

## 2019-03-03 RX ADMIN — CLOPIDOGREL 75 MG: 75 TABLET, FILM COATED ORAL at 09:03

## 2019-03-03 RX ADMIN — LEVOTHYROXINE SODIUM 75 MCG: 75 TABLET ORAL at 06:03

## 2019-03-03 NOTE — PROGRESS NOTES
[facility]  Vascular Neurology  Comprehensive Stroke Center  Progress Note    Assessment/Plan:     * Embolic stroke involving left middle cerebral artery    84 year old female with past medical hx of HTN, hypothyroidism, prior R vermis stroke in 3/2017, and aortic stenosis presented to the ED with complains of right facial droop. LKN 18:00, within window for tPA, however patient refused. Per chart review, patient was seen day PTA and diagonsed with a UTI and started on Macrobid. Repeat UA here with no evidence of infection so abx d/c'd.    MRI Brain is with small acute infarcts in the L basal ganglia. CTA H/N shows BL fetal PCAs but overall stable from prior scans in 2017. Echo demonstrated mild LAE. Stroke etiology not completely clear, but appears embolic/ESUS on imaging. Will plan for 30-day cardiac event monitor following SNF discharge.     Antithrombotics for secondary stroke prevention: Antiplatelets: will resume plavix on 3/3 after EGD  Statins for secondary stroke prevention and hyperlipidemia, if present:   Statins: Atorvastatin- 80 mg daily  Aggressive risk factor modification: HTN, HLD, Obesity, prior stroke  Rehab efforts: PT/OT/SLP to evaluate and treat  Diagnostics ordered/pending: None   VTE prophylaxis: Heparin 5000 units SQ every 8 hours  Place SCDs   BP parameters: SBP < 160     Melena     Patient had dark and tarry stool on 02/25/2019 and black stool on 2/28/19.   GI consulted.   EGD performed, results as above       Acute blood loss anemia    Hb 8 > 8 > 6.5. Pt appears pale and c/o light-headedness but is neurologically stable.   Ordered type & screen and 1U pRBCs, consent obtained on 2/22/19. SQH held; SCDs ordered.  Started PPI. + FOBT result. Anemia workup largely WNL with TIBC mildly elevated and iron sat 7%.  Pt s/p 1U pRBCs on 2/22 with good response; Hb 8.8 with pallor improved  Restarted SQH; will d/c ASA and continue with Plavix monotherapy for now   Hb 9.5 on  2/24.    02/25 on Hb 8.8. Patient had dark and tarry stool this morning. GI consulted. Will hold Plavix to have scope done on Friday.   03/01 Hgb 8.8, EGD performed, with Sanchez's esophagus and hiatal hernia but no source of bleeding. Continue to hold plavix until 3/3     Nodule of apex of right lung    CTA head/neck on 2/21/19 shows stable 9 mm noncalcified nodule in the right lung apex.   Per family over the phone, pt has no tobacco use history.  Discussed with Hospital Medicine - No acute intervention needed at this time. PCP to monitor and consider CT Chest in 6 months.     Cytotoxic cerebral edema    -Area of cytotoxic cerebral edema identified when reviewing brain imaging in the territory of the left middle cerebral artery. There is no mass effect associated with it. We will continue to monitor the patients clinical exam for any worsening of symptoms which may indicate expansion of the stroke or the area of the edema resulting in the clinical change. The pattern is suggestive of an embolic/ESUS etiology     Hyponatremia    Discussed with Hospital Medicine- Hyponatremia possibly 2/2 hypovolemia in the setting of pt's acute Hb drop.   Continue daily BMP; Will discuss any further recs with Hospital Medicine at AM rounds    02/25- Discussed hyponatremia with hospital medicine, patient may be hyponatremic at baseline. Will continue to monitor and appreciate recs from hospital medicine.  03/01 - continued hyponatremia, 1L fluid restriction ordered  03/03 - improving     History of stroke    Stroke risk factor  3/2017 - diagnosed with R cerebellar infarct. Was on DAPT and Lipitor 40 outpatient.  Patient states she had no significant stroke deficits (dizziness?)     Mixed hyperlipidemia    Stroke risk factor  LDL 96.6  Patient taking Atorvastatin 40 at home; Increased dose to 80 mg Daily     Dizziness    Pt c/o dizziness/light-headedness which she states she has at baseline; Reports no acute change currently  Also  c/o mild blurred vision but no other neuro changes appreciated and visual fields are intact  Restarted home PRN Meclizine  Will continue to monitor     Essential hypertension    Stroke risk factor  SBP <160  -Pt states she checks her BP twice daily at home and takes Lisinopril occasionally if her SBP < 150. Lisinopril 5mg started on 02/25.  -increased lisinopril to 10mg daily on 3/3           02/20/2019 MRI brain positive for stroke.  Admitted to Vascular Neurology.  02/21/2019- Na 130-->129. 250 ml NS bolus ordered. Repeat Na lab pending. CTA head/neck with recent infarcts in the left basal ganglia. 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 Echo EF 65% with mild left atrial enlargement. SLP recs for mechanical soft diet. PT/OT recs for possible rehab or SNF.   2/22: Hb 6.5; pt appears pale, c/o light-headedness but is neurologically stable. Ordered type & screen and 1U pRBCs, consent obtained. Will repeat CBC following transfusion. Started PPI and FOBT ordered (needs to be collected.) Monitoring Na.  2/23: Patient s/p 1U pRBCs with good response; no pallor or light-headedness today and Hb 8.8. Restarted SQH; will d/c ASA and continue with monotherapy (Plavix). FOBT+; will monitor Hb for now and plan for outpatient GI work-up if Hb remains stable. Monitoring BP.  2/24: Hb 9.5 today. WBC 13 however pt afebrile, denies acute complaints; monitoring. Pt c/o dizziness (her reported baseline) as well as mild blurred vision; no other neuro changes, visual fields are intact. Restarted home PRN Meclizine. Pt states she takes Lisinopril occasionally at home if her BP is high; started Lisinopril 5mg Daily. Miconazole powder ordered for areas of redness noted in abdominal folds. Pt is not in favor of d/c to SNF; attempted to call pt's son Angel to discuss if he is comfortable with plan of d/c to home. Attempting to touch base with PT regarding dispo as well.  02/25/2019: Hb  8.8. Patient had dark and tarry stool this morning. GI consulted. Will hold Plavix to have scope done on Friday. Pt's son updated on POC.   02/26/2019: Hb 9. Patient may be able to discharge and have endoscopy done as an outpatient if H&H remains stable. Patient confused with PT, and complained of intermittent confusion. EEG 1 hour monitoring ordered to rule out seizures.   02/27/19 SNF reviewing patient, monitoring H&H due to recent melena, if remains stable then outpatient scope  02/28/2019 no focal deficits noted on exam today. SNF unable to transport patient back to hospital for outpatient EGD. Therefore, will plan to have EGD prior to discharge. Plans to contact GI to confirm EGD scheduled for tomorrow.   03/01/19 ordered 1L fluid restriction due to hyponatremia, patient went to EGD. 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 ENT consulted, no intervention necessary for telangiectasia.   03/02/2019 Hemoglobin stable. Sodium improved to 131 with fluid restriction. Holding plavix s/p EGD    STROKE DOCUMENTATION   Acute Stroke Times   Last Known Normal Date: 02/20/19  Last Known Normal Time: 1800  Symptom Onset Date: 02/20/19  Symptom Onset Time: 1800  Stroke Team Called Date: 02/20/19  Stroke Team Called Time: 2050  Stroke Team Arrival Date: 02/20/19  Stroke Team Arrival Time: 2052  CT Interpretation Time: 2100  Decision to Treat Time for Alteplase: 2110(Patient refused )  Decision to Treat Time for IR: (NA)    NIH Scale:  1a. Level of Consciousness: 0-->Alert, keenly responsive  1b. LOC Questions: 0-->Answers both questions correctly  1c. LOC Commands: 0-->Performs both tasks correctly  2. Best Gaze: 0-->Normal  3. Visual: 0-->No visual loss  4. Facial Palsy: 0-->Normal symmetrical movements  5a. Motor Arm, Left: 0-->No drift, limb holds 90 (or 45) degrees for full 10 secs  5b. Motor Arm, Right: 0-->No drift, limb holds 90 (or 45) degrees for full 10 secs  6a. Motor Leg,  Left: 0-->No drift, leg holds 30 degree position for full 5 secs  6b. Motor Leg, Right: 0-->No drift, leg holds 30 degree position for full 5 secs  7. Limb Ataxia: 1-->Present in one limb  8. Sensory: 0-->Normal, no sensory loss  9. Best Language: 0-->No aphasia, normal  10. Dysarthria: 0-->Normal  11. Extinction and Inattention (formerly Neglect): 0-->No abnormality  Total (NIH Stroke Scale): 1       Modified [place] Score: 1  [place] Coma Scale:    ABCD2 Score:    [id number] Score:   HAS -BLED Score:   ICH Score:   Hunt & De Classification:      Hemorrhagic change of an Ischemic Stroke: Does this patient have an ischemic stroke with hemorrhagic changes? No     Neurologic Chief Complaint: Right facial droop, RUE weakness and slurred speech.     Subjective:     Interval History: NAEON. No further melanotic stools. Hemoglobin stable. No new complaints.     HPI, Past Medical, Family, and Social History remains the same as documented in the initial encounter.     Review of Systems   Constitutional: Negative for chills and fever.   Respiratory: Negative for cough.    Cardiovascular: Negative for chest pain.   Gastrointestinal: Negative for blood in stool.   Skin: Negative for pallor and wound.   Neurological: Negative for speech difficulty, weakness and numbness.   Psychiatric/Behavioral: Negative for agitation.     Scheduled Meds:   atorvastatin  80 mg Oral Daily    cholecalciferol (vitamin D3)  5,000 Units Oral Daily    clopidogrel  75 mg Oral Daily    enoxaparin  30 mg Subcutaneous Daily    ferrous sulfate  325 mg Oral Daily    gabapentin  300 mg Oral Nightly    levothyroxine  75 mcg Oral Daily    [START ON 3/4/2019] lisinopril  10 mg Oral Daily    lisinopril  5 mg Oral Once    miconazole NITRATE 2 %   Topical (Top) BID    pantoprazole  40 mg Oral Daily    sodium chloride 0.9%  3 mL Intravenous Q8H     Continuous Infusions:   sodium chloride 0.9%       PRN Meds:sodium chloride, acetaminophen, aluminum  & magnesium hydroxide-simethicone, hydrALAZINE, meclizine, ondansetron, ondansetron, ramelteon, sodium chloride 0.9%    Objective:     Vital Signs (Most Recent):  Temp: 98.4 °F (36.9 °C) (03/03/19 1135)  Pulse: (!) 57 (03/03/19 1147)  Resp: 18 (03/03/19 1135)  BP: (!) 157/67 (03/03/19 1135)  SpO2: (!) 94 % (03/03/19 1135)  BP Location: Left arm    Vital Signs Range (Last 24H):  Temp:  [96.7 °F (35.9 °C)-99 °F (37.2 °C)]   Pulse:  [53-67]   Resp:  [17-18]   BP: (135-179)/(56-67)   SpO2:  [94 %-98 %]   BP Location: Left arm    Physical Exam   Constitutional: She is oriented to person, place, and time. She appears well-developed and well-nourished. No distress.   HENT:   Head: Normocephalic and atraumatic.   Eyes: EOM are normal. Pupils are equal, round, and reactive to light.   Cardiovascular: Normal rate.   Pulmonary/Chest: Effort normal. No respiratory distress.   Neurological: She is alert and oriented to person, place, and time.   See below   Skin: Skin is warm and dry.   Vitals reviewed.      Neurological Exam:   LOC: alert  Attention Span: Good   Language: No aphasia  Articulation: No dysarthria  Orientation: Person, Place, Time   Visual Fields: Full  EOM (CN III, IV, VI): Full/intact  Facial Movement (CN VII): equal bilaterally  Motor: Arm left  Normal 5/5  Leg left  Normal 5/5  Arm right  Normal 5/5  Leg right Normal 5/5  Cebellar: +1 ataxia LLE.  Sensation: intact bilaterally to light touch  Tone: Normal tone throughout    Laboratory:  CMP:   Recent Labs   Lab 03/03/19 0357   CALCIUM 9.3   *   K 4.5   CO2 27   CL 98   BUN 15   CREATININE 0.8     CBC:   Recent Labs   Lab 03/03/19 0357   WBC 10.78   RBC 3.17*   HGB 8.7*   HCT 28.0*      MCV 88   MCH 27.4   MCHC 31.1*     Lipid Panel:   No results for input(s): CHOL, LDLCALC, HDL, TRIG in the last 168 hours.  Coagulation:   No results for input(s): PT, INR, APTT in the last 168 hours.  Hgb A1C:   No results for input(s): HGBA1C in the last 168  hours.  TSH:   No results for input(s): TSH in the last 168 hours.    Diagnostic Results     Brain Imaging     CT head 2/20/19  No acute abnormality noted     MRI 2/21/19    Small acute infarcts in the left basal ganglia.   Changes of chronic small vessel ischemic disease and cerebral volume loss.      Vessel Imaging     CTA Head/Neck 2/20/19  Recent infarcts in the left basal ganglia.  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      Cardiac Imaging     TTE 2/21/19  · Concentric left ventricular remodeling.  · Normal left ventricular systolic function. The estimated ejection fraction is 65%  · Grade I (mild) left ventricular diastolic dysfunction consistent with impaired relaxation.  · Normal left atrial pressure.  · Mild left atrial enlargement.  · Mild aortic regurgitation.  · Normal right ventricular systolic function.  · Mild aortic valve stenosis.  · Aortic valve area is 2.18 cm2; peak velocity is 2.39 m/s; mean gradient is 14.14 mmHg.      Malinda Staley DO  [facility]  Department of Vascular Neurology   [facility]

## 2019-03-03 NOTE — SUBJECTIVE & OBJECTIVE
Neurologic Chief Complaint: Right facial droop, RUE weakness and slurred speech.     Subjective:     Interval History: NAEON. No further melanotic stools. Hemoglobin stable. No new complaints.     HPI, Past Medical, Family, and Social History remains the same as documented in the initial encounter.     Review of Systems   Constitutional: Negative for chills and fever.   Respiratory: Negative for cough.    Cardiovascular: Negative for chest pain.   Gastrointestinal: Negative for blood in stool.   Skin: Negative for pallor and wound.   Neurological: Negative for speech difficulty, weakness and numbness.   Psychiatric/Behavioral: Negative for agitation.     Scheduled Meds:   atorvastatin  80 mg Oral Daily    cholecalciferol (vitamin D3)  5,000 Units Oral Daily    clopidogrel  75 mg Oral Daily    enoxaparin  30 mg Subcutaneous Daily    ferrous sulfate  325 mg Oral Daily    gabapentin  300 mg Oral Nightly    levothyroxine  75 mcg Oral Daily    [START ON 3/4/2019] lisinopril  10 mg Oral Daily    lisinopril  5 mg Oral Once    miconazole NITRATE 2 %   Topical (Top) BID    pantoprazole  40 mg Oral Daily    sodium chloride 0.9%  3 mL Intravenous Q8H     Continuous Infusions:   sodium chloride 0.9%       PRN Meds:sodium chloride, acetaminophen, aluminum & magnesium hydroxide-simethicone, hydrALAZINE, meclizine, ondansetron, ondansetron, ramelteon, sodium chloride 0.9%    Objective:     Vital Signs (Most Recent):  Temp: 98.4 °F (36.9 °C) (03/03/19 1135)  Pulse: (!) 57 (03/03/19 1147)  Resp: 18 (03/03/19 1135)  BP: (!) 157/67 (03/03/19 1135)  SpO2: (!) 94 % (03/03/19 1135)  BP Location: Left arm    Vital Signs Range (Last 24H):  Temp:  [96.7 °F (35.9 °C)-99 °F (37.2 °C)]   Pulse:  [53-67]   Resp:  [17-18]   BP: (135-179)/(56-67)   SpO2:  [94 %-98 %]   BP Location: Left arm    Physical Exam   Constitutional: She is oriented to person, place, and time. She appears well-developed and well-nourished. No distress.    HENT:   Head: Normocephalic and atraumatic.   Eyes: EOM are normal. Pupils are equal, round, and reactive to light.   Cardiovascular: Normal rate.   Pulmonary/Chest: Effort normal. No respiratory distress.   Neurological: She is alert and oriented to person, place, and time.   See below   Skin: Skin is warm and dry.   Vitals reviewed.      Neurological Exam:   LOC: alert  Attention Span: Good   Language: No aphasia  Articulation: No dysarthria  Orientation: Person, Place, Time   Visual Fields: Full  EOM (CN III, IV, VI): Full/intact  Facial Movement (CN VII): equal bilaterally  Motor: Arm left  Normal 5/5  Leg left  Normal 5/5  Arm right  Normal 5/5  Leg right Normal 5/5  Cebellar: +1 ataxia LLE.  Sensation: intact bilaterally to light touch  Tone: Normal tone throughout    Laboratory:  CMP:   Recent Labs   Lab 03/03/19  0357   CALCIUM 9.3   *   K 4.5   CO2 27   CL 98   BUN 15   CREATININE 0.8     CBC:   Recent Labs   Lab 03/03/19  0357   WBC 10.78   RBC 3.17*   HGB 8.7*   HCT 28.0*      MCV 88   MCH 27.4   MCHC 31.1*     Lipid Panel:   No results for input(s): CHOL, LDLCALC, HDL, TRIG in the last 168 hours.  Coagulation:   No results for input(s): PT, INR, APTT in the last 168 hours.  Hgb A1C:   No results for input(s): HGBA1C in the last 168 hours.  TSH:   No results for input(s): TSH in the last 168 hours.    Diagnostic Results     Brain Imaging     CT head 2/20/19  No acute abnormality noted     MRI 2/21/19    Small acute infarcts in the left basal ganglia.   Changes of chronic small vessel ischemic disease and cerebral volume loss.      Vessel Imaging     CTA Head/Neck 2/20/19  Recent infarcts in the left basal ganglia.  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      Cardiac Imaging     TTE 2/21/19  · Concentric left ventricular remodeling.  · Normal left ventricular systolic function. The estimated ejection fraction is 65%  · Grade I (mild) left ventricular diastolic dysfunction consistent with impaired relaxation.  · Normal left atrial pressure.  · Mild left atrial enlargement.  · Mild aortic regurgitation.  · Normal right ventricular systolic function.  · Mild aortic valve stenosis.  · Aortic valve area is 2.18 cm2; peak velocity is 2.39 m/s; mean gradient is 14.14 mmHg.

## 2019-03-03 NOTE — ASSESSMENT & PLAN NOTE
Stroke risk factor  SBP <160  -Pt states she checks her BP twice daily at home and takes Lisinopril occasionally if her SBP < 150. Lisinopril 5mg started on 02/25.  -increased lisinopril to 10mg daily on 3/3

## 2019-03-03 NOTE — ASSESSMENT & PLAN NOTE
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 LKN 18:00, within window for tPA, however patient refused. Per chart review, patient was seen day PTA and diagonsed with a UTI and started on Macrobid. Repeat UA here with no evidence of infection so abx d/c'd.    MRI Brain is with small acute infarcts in the L basal ganglia. CTA H/N shows BL fetal PCAs but overall stable from prior scans in 2017. Echo demonstrated mild LAE. Stroke etiology not completely clear, but appears embolic/ESUS on imaging. Will plan for 30-day cardiac event monitor following SNF discharge.     Antithrombotics for secondary stroke prevention: Antiplatelets: will resume plavix on 3/3 after EGD  Statins for secondary stroke prevention and hyperlipidemia, if present:   Statins: Atorvastatin- 80 mg daily  Aggressive risk factor modification: HTN, HLD, Obesity, prior stroke  Rehab efforts: PT/OT/SLP to evaluate and treat  Diagnostics ordered/pending: None   VTE prophylaxis: Heparin 5000 units SQ every 8 hours  Place [facility]   BP parameters: SBP < 160

## 2019-03-03 NOTE — ASSESSMENT & PLAN NOTE
Discussed with Hospital Medicine- Hyponatremia possibly 2/2 hypovolemia in the setting of pt's acute Hb drop.   Continue daily BMP; Will discuss any further recs with Hospital Medicine at AM rounds    02/25- Discussed hyponatremia with hospital medicine, patient may be hyponatremic at baseline. Will continue to monitor and appreciate recs from hospital medicine.  03/01 - continued hyponatremia, 1L fluid restriction ordered  03/03 - improving

## 2019-03-04 VITALS
WEIGHT: 160 LBS | SYSTOLIC BLOOD PRESSURE: 135 MMHG | HEART RATE: 83 BPM | RESPIRATION RATE: 17 BRPM | OXYGEN SATURATION: 96 % | TEMPERATURE: 96 F | BODY MASS INDEX: 34.52 KG/M2 | DIASTOLIC BLOOD PRESSURE: 96 MMHG | HEIGHT: 57 IN

## 2019-03-04 PROBLEM — D62 ACUTE BLOOD LOSS ANEMIA: Status: RESOLVED | Noted: 2019-02-22 | Resolved: 2019-03-04

## 2019-03-04 LAB
ANION GAP SERPL CALC-SCNC: 13 MMOL/L
ANISOCYTOSIS BLD QL SMEAR: SLIGHT
BASOPHILS # BLD AUTO: 0.09 K/UL
BASOPHILS NFR BLD: 1 %
BUN SERPL-MCNC: 13 MG/DL
CALCIUM SERPL-MCNC: 9.8 MG/DL
CHLORIDE SERPL-SCNC: 99 MMOL/L
CO2 SERPL-SCNC: 22 MMOL/L
CREAT SERPL-MCNC: 0.8 MG/DL
DIFFERENTIAL METHOD: ABNORMAL
EOSINOPHIL # BLD AUTO: 0.3 K/UL
EOSINOPHIL NFR BLD: 3.7 %
ERYTHROCYTE [DISTWIDTH] IN BLOOD BY AUTOMATED COUNT: 14.7 %
EST. GFR  (AFRICAN AMERICAN): >60 ML/MIN/1.73 M^2
EST. GFR  (NON AFRICAN AMERICAN): >60 ML/MIN/1.73 M^2
GLUCOSE SERPL-MCNC: 105 MG/DL
HCT VFR BLD AUTO: 31.1 %
HGB BLD-MCNC: 9.6 G/DL
IMM GRANULOCYTES # BLD AUTO: 0.08 K/UL
IMM GRANULOCYTES NFR BLD AUTO: 0.9 %
LYMPHOCYTES # BLD AUTO: 1.5 K/UL
LYMPHOCYTES NFR BLD: 16.9 %
MCH RBC QN AUTO: 27.9 PG
MCHC RBC AUTO-ENTMCNC: 30.9 G/DL
MCV RBC AUTO: 90 FL
MONOCYTES # BLD AUTO: 0.8 K/UL
MONOCYTES NFR BLD: 9.1 %
NEUTROPHILS # BLD AUTO: 6.2 K/UL
NEUTROPHILS NFR BLD: 68.4 %
NRBC BLD-RTO: 0 /100 WBC
PLATELET # BLD AUTO: 245 K/UL
PLATELET BLD QL SMEAR: ABNORMAL
PMV BLD AUTO: 10.5 FL
POIKILOCYTOSIS BLD QL SMEAR: SLIGHT
POTASSIUM SERPL-SCNC: 4.3 MMOL/L
RBC # BLD AUTO: 3.44 M/UL
SODIUM SERPL-SCNC: 134 MMOL/L
WBC # BLD AUTO: 9.12 K/UL

## 2019-03-04 PROCEDURE — 25000003 PHARM REV CODE 250: Performed by: PHYSICIAN ASSISTANT

## 2019-03-04 PROCEDURE — 99233 SBSQ HOSP IP/OBS HIGH 50: CPT | Mod: GC,,, | Performed by: PSYCHIATRY & NEUROLOGY

## 2019-03-04 PROCEDURE — 97116 GAIT TRAINING THERAPY: CPT

## 2019-03-04 PROCEDURE — 36415 COLL VENOUS BLD VENIPUNCTURE: CPT

## 2019-03-04 PROCEDURE — A4216 STERILE WATER/SALINE, 10 ML: HCPCS | Performed by: NURSE PRACTITIONER

## 2019-03-04 PROCEDURE — 80048 BASIC METABOLIC PNL TOTAL CA: CPT

## 2019-03-04 PROCEDURE — 99233 PR SUBSEQUENT HOSPITAL CARE,LEVL III: ICD-10-PCS | Mod: GC,,, | Performed by: PSYCHIATRY & NEUROLOGY

## 2019-03-04 PROCEDURE — 25000003 PHARM REV CODE 250: Performed by: NURSE PRACTITIONER

## 2019-03-04 PROCEDURE — 25000003 PHARM REV CODE 250: Performed by: STUDENT IN AN ORGANIZED HEALTH CARE EDUCATION/TRAINING PROGRAM

## 2019-03-04 PROCEDURE — 85025 COMPLETE CBC W/AUTO DIFF WBC: CPT

## 2019-03-04 RX ORDER — GABAPENTIN 300 MG/1
300 CAPSULE ORAL NIGHTLY
Qty: 30 CAPSULE | Refills: 11 | Status: SHIPPED | OUTPATIENT
Start: 2019-03-04 | End: 2020-02-01

## 2019-03-04 RX ORDER — PANTOPRAZOLE SODIUM 40 MG/1
40 TABLET, DELAYED RELEASE ORAL DAILY
Qty: 30 TABLET | Refills: 11 | Status: SHIPPED | OUTPATIENT
Start: 2019-03-05 | End: 2020-02-16

## 2019-03-04 RX ORDER — CLOPIDOGREL BISULFATE 75 MG/1
75 TABLET ORAL DAILY
Qty: 30 TABLET | Refills: 11 | Status: SHIPPED | OUTPATIENT
Start: 2019-03-05 | End: 2020-02-10

## 2019-03-04 RX ORDER — FERROUS SULFATE 325(65) MG
325 TABLET, DELAYED RELEASE (ENTERIC COATED) ORAL DAILY
Refills: 0 | COMMUNITY
Start: 2019-03-05 | End: 2024-03-30

## 2019-03-04 RX ORDER — ACETAMINOPHEN 500 MG
5000 TABLET ORAL DAILY
COMMUNITY
Start: 2019-03-05 | End: 2024-03-30

## 2019-03-04 RX ORDER — ATORVASTATIN CALCIUM 80 MG/1
80 TABLET, FILM COATED ORAL DAILY
Qty: 90 TABLET | Refills: 3 | Status: SHIPPED | OUTPATIENT
Start: 2019-03-05 | End: 2020-03-11

## 2019-03-04 RX ORDER — LEVOTHYROXINE SODIUM 88 UG/1
75 TABLET ORAL DAILY
Qty: 30 TABLET | Refills: 11 | Status: SHIPPED | OUTPATIENT
Start: 2019-03-04 | End: 2020-02-10

## 2019-03-04 RX ORDER — MECLIZINE HYDROCHLORIDE 25 MG/1
25 TABLET ORAL 3 TIMES DAILY PRN
Qty: 90 TABLET | Refills: 0 | Status: SHIPPED | OUTPATIENT
Start: 2019-03-04 | End: 2019-07-25 | Stop reason: SDUPTHER

## 2019-03-04 RX ORDER — LISINOPRIL 10 MG/1
10 TABLET ORAL DAILY
Qty: 90 TABLET | Refills: 3 | Status: SHIPPED | OUTPATIENT
Start: 2019-03-05 | End: 2019-04-25

## 2019-03-04 RX ADMIN — ATORVASTATIN CALCIUM 80 MG: 20 TABLET, FILM COATED ORAL at 09:03

## 2019-03-04 RX ADMIN — LISINOPRIL 10 MG: 10 TABLET ORAL at 09:03

## 2019-03-04 RX ADMIN — CLOPIDOGREL 75 MG: 75 TABLET, FILM COATED ORAL at 09:03

## 2019-03-04 RX ADMIN — FERROUS SULFATE TAB EC 325 MG (65 MG FE EQUIVALENT) 325 MG: 325 (65 FE) TABLET DELAYED RESPONSE at 09:03

## 2019-03-04 RX ADMIN — Medication 3 ML: at 02:03

## 2019-03-04 RX ADMIN — PANTOPRAZOLE SODIUM 40 MG: 40 TABLET, DELAYED RELEASE ORAL at 09:03

## 2019-03-04 RX ADMIN — Medication 3 ML: at 06:03

## 2019-03-04 RX ADMIN — CHOLECALCIFEROL TAB 125 MCG (5000 UNIT) 5000 UNITS: 125 TAB at 09:03

## 2019-03-04 RX ADMIN — MICONAZOLE NITRATE: 20 POWDER TOPICAL at 09:03

## 2019-03-04 RX ADMIN — LEVOTHYROXINE SODIUM 75 MCG: 75 TABLET ORAL at 06:03

## 2019-03-04 NOTE — ASSESSMENT & PLAN NOTE
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 LKN 18:00, within window for tPA, however patient refused. Per chart review, patient was seen day PTA and diagonsed with a UTI and started on Macrobid. Repeat UA here with no evidence of infection so abx d/c'd.    MRI Brain is with small acute infarcts in the L basal ganglia. CTA H/N shows BL fetal PCAs but overall stable from prior scans in 2017. Echo demonstrated mild LAE. Stroke etiology not completely clear, but appears embolic/ESUS on imaging. Will plan for 30-day cardiac event monitor following SNF discharge.     03/04/219 No acute events overnight. Sodium improved to 134. H&H remains stable. Patient medically ready for discharge.     Antithrombotics for secondary stroke prevention: Antiplatelets: Plavix 75 mg daily  Statins for secondary stroke prevention and hyperlipidemia, if present:   Statins: Atorvastatin- 80 mg daily  Aggressive risk factor modification: HTN, HLD, Obesity, prior stroke  Rehab efforts: PT/OT/SLP to evaluate and treat  Diagnostics ordered/pending: None   VTE prophylaxis: Heparin 5000 units SQ every 8 hours  Place [facility]   BP parameters: SBP < 160

## 2019-03-04 NOTE — PLAN OF CARE
1300-Son is scheduled to sign paperwork at [facility] for 1:30pm    1027-Patient has been accepted to [facility]. Notified Zena that patient is ready for discharge. She states she has tried to contact patient's son in reference to signing paperwork and has left messages. CM contacted Tavo, patient's son, left message on voicemail.        03/04/19 1027   Post-Acute Status   Post-Acute Authorization Placement   Post-Acute Placement Status Authorization Obtained

## 2019-03-04 NOTE — ASSESSMENT & PLAN NOTE
Hb 8 > 8 > 6.5. Pt appears pale and c/o light-headedness but is neurologically stable.   Ordered type & screen and 1U pRBCs, consent obtained on 2/22/19. SQH held; SCDs ordered.  Started PPI. + FOBT result. Anemia workup largely WNL with TIBC mildly elevated and iron sat 7%.  Pt s/p 1U pRBCs on 2/22 with good response; Hb 8.8 with pallor improved  Restarted SQH; will d/c ASA and continue with Plavix monotherapy for now   Hb 9.5 on 2/24.    02/25 on Hb 8.8. Patient had dark and tarry stool this morning. GI consulted. Will hold Plavix to have scope done on Friday.   03/01 Hgb 8.8, EGD performed, with Sanchez's esophagus and hiatal hernia but no source of bleeding. Continue to hold plavix until 3/3    03/04- plavix continued on 3/3. H&H remains stable.

## 2019-03-04 NOTE — PT/OT/SLP PROGRESS
"Physical Therapy Treatment    Patient Name: Linda Hylton  MRN: [id number]   Diagnosis: Embolic stroke involving left middle cerebral artery    Recommendations:     Discharge Recommendations:  nursing facility, skilled   Discharge Equipment Recommendations: (TBD at next level of care)   Barriers to Discharge: increased level of caregiver support    Assessment:   Linda Hylton is a 84 y.o. female admitted with a medical diagnosis of Embolic stroke involving left middle cerebral artery.  Presents with the following functional limitations/impairments: weakness, impaired functional mobilty, decreased safety awareness, impaired cognition, impaired coordination, impaired endurance, gait instability, decreased coordination, pain, impaired fine motor, impaired balance, decreased upper extremity function, decreased lower extremity function, impaired self care skills. Ms. Hylton participated well in therapy after max encouragement to participate in ambulation. She improved gait distance and showed minimal signs of discomfort following gait trials. She would continue to benefit from skilled PT to address endurance impairments.    Rehab Prognosis:  good; patient would benefit from acute skilled PT services to address these deficits and reach maximum level of function.      Subjective   PT communicated with nurse (Beena) prior to therapy.     Patient comments/goals: "Well I might be leaving today so we better just get the walk done now."  Pain/Comfort:  · Pain Rating 1: (pain not rated)  · Location - Side 1: Left  · Location - Orientation 1: generalized  · Location 1: leg  · Pain Addressed 1: Distraction  · Pain Rating Post-Intervention 1: (pain not rated)    Recent Vital Signs: (Last documentation)  Pulse: 72 (03/04/19 1147)  BP: (!) 149/63 (03/04/19 1208)  SpO2: 97 % (03/04/19 1147)     Objective:   General Precautions: aspiration, fall  Recent Surgery: Procedure(s) (LRB):  EGD (ESOPHAGOGASTRODUODENOSCOPY) (N/A)    The patient " currently has telemetry.    The patient was found supine in bed with all lines intact.    Functional Mobility:  Bed Mobility:   · Rolling Right: NT  · Rolling Left: NT  · Supine to Sit: mod A  · Pt reached for B HHA from SPT in order to attain sitting at EOB  · She was then able to scoot EOB x4 with SBA.  · Sit to Supine: NT     Sitting Balance at Edge of Bed:  · Assistance Level Required: SBA  · Time: 5 minutes  · PT Encouraged patient to maintain sitting balance while donning Depends with min A although pt was unable to demonstrate ability to reach B feet and required additional assist for LE dressing     Transfers:   · Sit <> Stand Transfer x3: min A from low chair height  · Pt continues to require VCs for appropriate form to complete transfer.    Gait:  Gait x 50 feet then seated rest; x36 feet then standing rest; x60 feet with RW and CGA  · Pt demonstrated decreased foot clearance bilaterally and L trendelenberg gait pattern. Pt continually states that she is fearful of falling even with BUE support and PT assistance. She continues to require max encouragement to participate and states that she does not feel like she is doing well with her walking.  · PT encouraged pt to continue walking as far as possible until she required seated rest.       Therapeutic Activities, Education, or Exercises:  Time was provided for active listening, discussion of health disposition, and discussion of safe discharge recommendations. Therapist answered questions to patient/familys satisfaction within scope of practice.  Patient and family are aware of patient's deficits and therapy progression. White board updated to reflect current level of assistance.    The patient was left sitting in chair with chair alarm on, nurse notified, and family at bedside.     FUNCTIONAL OUTCOME MEASURES:  Turning over in bed (including adjusting bedclothes, sheets and blankets)?: 4  Sitting down on and standing up from a chair with arms (e.g.,  wheelchair, bedside commode, etc.): 3  Moving from lying on back to sitting on the side of the bed?: 3  Moving to and from a bed to a chair (including a wheelchair)?: 3  Need to walk in hospital room?: 3  Climbing 3-5 steps with a railing?: 2  Basic Mobility Total Score: 18    Goals:     Multidisciplinary Problems     Physical Therapy Goals        Problem: Physical Therapy Goal    Goal Priority Disciplines Outcome Goal Variances Interventions   Physical Therapy Goal     PT, PT/OT Ongoing (interventions implemented as appropriate)     Description:    Goals to be met by 3/10/2019:    1. Pt will perform rolling to the R and L independently.   2. Pt will perform supine to sit from both sides of the bed independently.  3. Pt will perform sit to supine independently.  4. Pt will perform sit to stand transfers independently.   5. Pt will perform bed <> chair transfers with mod I using RW.  6. Pt will perform gait x 50 feet with RW and CGA. - MET  Revised: pt will perform gait x100 feet with RW and supervision.  7. Pt will sit EOB x 20 minutes with no LOB while performing dynamic UE tasks to prepare for functional tasks in sitting.   8. Pt will stand  x 20 minutes with no LOB while performing dynamic UE tasks to prepare for functional tasks in standing.                         Plan:   During this hospitalization, patient to be seen 4 x/week to address their physical therapy related impairments and improve their overall level of function.   · Plan of Care Expires: 03/21/19   Plan of Care Reviewed with: patient    This plan of care has been discussed with the patient and/or family who were involved in its development and are in agreement with the identified goals and treatment plan.     Time Tracking:     PT Received On:  03/04/19  PT Start Time:   1132    PT Stop Time:  1202  PT Total Time (min): 30 min     Billable Minutes: Gait Training 30     NORBERTO Vera  3/4/2019

## 2019-03-04 NOTE — PLAN OF CARE
[facility]     Department of Hospital Medicine     [address] [place] [address]     [phone] after hours  [phone] fax       NURSING HOME ORDERS    03/04/2019    Admit to Nursing Home:  Skilled Bed                                                 Diagnoses:  Active Hospital Problems    Diagnosis  POA    *Embolic stroke involving left middle cerebral artery [I63.412]  Yes    Acute blood loss anemia [D62]  No     Priority: 1 - High    Essential hypertension [I10]  Yes     Priority: 2     Mixed hyperlipidemia [E78.2]  Yes     Priority: 3     Hyponatremia [E87.1]  Yes     Priority: 4     Dizziness [R42]  Yes     Priority: 5     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]  Yes    Melena [K92.1]  Yes    Nodule of apex of right lung [R91.1]  Yes    History of stroke [Z86.73]  Not Applicable    Cytotoxic cerebral edema [G93.6]  Yes      Resolved Hospital Problems    Diagnosis Date Resolved POA    Leukocytosis [D72.829] 02/27/2019 No    Facial droop [R29.810] 02/27/2019 Yes    Acute cystitis without hematuria [N30.00] 02/22/2019 Yes       Patient is homebound due to:  Embolic stroke involving left middle cerebral artery    Allergies:  Review of patient's allergies indicates:   Allergen Reactions    Sulfa (sulfonamide antibiotics) Hives       Vitals:          Every shift (Skilled Nursing patients)    Diet: regular cardiac diet,  Thin liquids with 1000 mL fluid restriction   Supplement:  1 can every three times a day with meals                         Type:  House          Acitivities:      - Up in a chair each morning as tolerated   - Ambulate with assistance to bathroom   - Scheduled walks once each shift (every 8 hours)   - May use walker, cane, or self-propelled wheelchair    LABS:  Per facility protocol     Nursing Precautions:   - Aspiration precautions:             - Total assistance with meals            -  Upright 90 degrees befor during and  after meals             -  Suction at bedside          - Fall precautions per nursing home protocol   - Seizure precaution per group home protocol   - Decubitus precautions:        -  for positioning   - Pressure reducing foam mattress   - Turn patient every two hours. Use wedge pillows to anchor patient    CONSULTS:    Physical Therapy to evaluate and treat     Occupational Therapy to evaluate and treat     Speech Therapy  to evaluate and treat     Nutrition to evaluate and recommend diet     Psychiatry to evaluate and follow patients for delirium    MISCELLANEOUS CARE:    Routine Skin for Bedridden Patients:  Apply moisture barrier cream to all    skin folds and wet areas in perineal area daily and after baths and                           all bowel movements.                         DIABETES CARE:    Check blood sugar:   Fingerstick blood sugar AC and HS      Report CBG < 60 or > 400 to physician.                                          Insulin Sliding Scale          Glucose  Novolog Insulin Subcutaneous        0 - 60   Orange juice or glucose tablet, hold insulin      No insulin   201-250  2 units   251-300  4 units   301-350  6 units   351-400  8 units   >400   10 units then call physician      Medications: Discontinue all previous medication orders, if any. See new list below.      Linda Hylton Estella   Home Medication Instructions JUAN CARLOS:[id number]    Printed on:03/04/19 3796   Medication Information                                 atorvastatin (LIPITOR) 80 MG tablet  Take 1 tablet (80 mg total) by mouth once daily.             cholecalciferol, vitamin D3, 5,000 unit Tab  Take 1 tablet (5,000 Units total) by mouth once daily.             clopidogrel (PLAVIX) 75 mg tablet  Take 1 tablet (75 mg total) by mouth once daily.             ferrous sulfate 325 (65 FE) MG EC tablet  Take 1 tablet (325 mg total) by mouth once daily.             gabapentin (NEURONTIN) 300 MG capsule  Take 1 capsule (300 mg  total) by mouth nightly.             levothyroxine (EUTHYROX) 88 MCG tablet  Take 1 tablet (88 mcg total) by mouth once daily.             lisinopril 10 MG tablet  Take 1 tablet (10 mg total) by mouth once daily.             meclizine (ANTIVERT) 25 mg tablet  Take 1 tablet (25 mg total) by mouth 3 (three) times daily as needed for Dizziness.             nystatin (MYCOSTATIN) powder  Apply to affected area 3 times daily             ondansetron (ZOFRAN-ODT) 8 MG TbDL  Take 1 tablet (8 mg total) by mouth every 8 (eight) hours as needed.             pantoprazole (PROTONIX) 40 MG tablet  Take 1 tablet (40 mg total) by mouth once daily.                       _________________________________  Tasha Man NP  03/04/2019

## 2019-03-04 NOTE — PROGRESS NOTES
Reviewed stroke booklet with pt, instructed pt on low fat low salt diet to control bp and prevent further strokes,and to take ordered lisinopril and atorvastatin to prevent further strokes. pt is forgetful and needs reinforcement. Also instructed to not get up without assistance,pt verbalized understanding, chair alarm on, stroke booklet remains at bedside

## 2019-03-04 NOTE — PLAN OF CARE
03/04/19 1531   Final Note   Assessment Type Final Discharge Note   Anticipated Discharge Disposition SNF  ([facility])   Right Care Referral Info   Post Acute Recommendation SNF / Sub-Acute Rehab

## 2019-03-04 NOTE — ASSESSMENT & PLAN NOTE
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 LKN 18:00, within window for tPA, however patient refused. Per chart review, patient was seen day PTA and diagonsed with a UTI and started on Macrobid. Repeat UA here with no evidence of infection so abx d/c'd.    MRI Brain is with small acute infarcts in the L basal ganglia. CTA H/N shows BL fetal PCAs but overall stable from prior scans in 2017. Echo demonstrated mild LAE. Stroke etiology not completely clear, but appears embolic/ESUS on imaging. Will plan for 30-day cardiac event monitor following SNF discharge.     03/04/219 No acute events overnight. Sodium improved to 134. H&H remains stable. Patient medically ready for discharge.     Antithrombotics for secondary stroke prevention: Antiplatelets: Plavix 75 mg daily  Statins for secondary stroke prevention and hyperlipidemia, if present:   Statins: Atorvastatin- 80 mg daily  Aggressive risk factor modification: HTN, HLD, Obesity, prior stroke  Rehab efforts: PT/OT/SLP to evaluate and treat  Diagnostics ordered/pending: None   VTE prophylaxis: Heparin 5000 units SQ every 8 hours  Place [facility]   BP parameters: SBP < 160

## 2019-03-04 NOTE — PLAN OF CARE
Problem: Physical Therapy Goal  Goal: Physical Therapy Goal    Goals to be met by 3/10/2019:    1. Pt will perform rolling to the R and L independently.   2. Pt will perform supine to sit from both sides of the bed independently.  3. Pt will perform sit to supine independently.  4. Pt will perform sit to stand transfers independently.   5. Pt will perform bed <> chair transfers with mod I using RW.  6. Pt will perform gait x 50 feet with RW and CGA. - MET  Revised: pt will perform gait x100 feet with RW and supervision.  7. Pt will sit EOB x 20 minutes with no LOB while performing dynamic UE tasks to prepare for functional tasks in sitting.   8. Pt will stand  x 20 minutes with no LOB while performing dynamic UE tasks to prepare for functional tasks in standing.       Outcome: Ongoing (interventions implemented as appropriate)  Patient participated well in therapy.  POC and goals remain appropriate.  Please refer to the progress note for functional mobility.     Pt is safe to perform transfers with nursing using bedside commode and chair with RW and CGA.     Modesat Richardson, SPT  3/4/2019

## 2019-03-04 NOTE — PLAN OF CARE
Problem: Adult Inpatient Plan of Care  Goal: Plan of Care Review  POC reviewed with pt. Verbalized understanding. VSS. No c/o pain or signs of distress noted. Participating in care. Neuro exam remains unchanged. Fall precautions maintained. Pt advised to call staff for assistance, call light is within reach. Will continue to monitor, with all safety measures met.

## 2019-03-04 NOTE — SUBJECTIVE & OBJECTIVE
Neurologic Chief Complaint: Right facial droop, RUE weakness and slurred speech.     Subjective:     Interval History: No acute events overnight. Sodium improved to 134. H&H remains stable. Patient medically ready for discharge.     HPI, Past Medical, Family, and Social History remains the same as documented in the initial encounter.     Review of Systems   Constitutional: Negative for fatigue and fever.   Respiratory: Negative for chest tightness, shortness of breath and wheezing.    Cardiovascular: Negative for chest pain and palpitations.   Skin: Negative for pallor and wound.   Neurological: Positive for facial asymmetry (slight). Negative for speech difficulty and weakness.   Psychiatric/Behavioral: Positive for confusion. Negative for agitation.     Scheduled Meds:   atorvastatin  80 mg Oral Daily    cholecalciferol (vitamin D3)  5,000 Units Oral Daily    clopidogrel  75 mg Oral Daily    enoxaparin  30 mg Subcutaneous Daily    ferrous sulfate  325 mg Oral Daily    gabapentin  300 mg Oral Nightly    levothyroxine  75 mcg Oral Daily    lisinopril  10 mg Oral Daily    miconazole NITRATE 2 %   Topical (Top) BID    pantoprazole  40 mg Oral Daily    sodium chloride 0.9%  3 mL Intravenous Q8H     Continuous Infusions:   sodium chloride 0.9%       PRN Meds:sodium chloride, acetaminophen, aluminum & magnesium hydroxide-simethicone, hydrALAZINE, meclizine, ondansetron, ondansetron, ramelteon, sodium chloride 0.9%    Objective:     Vital Signs (Most Recent):  Temp: (!) 94.4 °F (34.7 °C) (03/04/19 1147)  Pulse: 72 (03/04/19 1147)  Resp: 17 (03/04/19 1147)  BP: (!) 149/63 (03/04/19 1208)  SpO2: 97 % (03/04/19 1147)  BP Location: Right arm    Vital Signs Range (Last 24H):  Temp:  [94.4 °F (34.7 °C)-98.6 °F (37 °C)]   Pulse:  [58-72]   Resp:  [16-18]   BP: (130-187)/(63-76)   SpO2:  [94 %-97 %]   BP Location: Right arm    Physical Exam   Constitutional: She appears well-developed and well-nourished. No distress.    HENT:   Head: Normocephalic and atraumatic.   Eyes: Conjunctivae and EOM are normal.   Cardiovascular: Normal rate.   Pulmonary/Chest: Effort normal. No respiratory distress.   Musculoskeletal: She exhibits no edema or deformity.   Neurological: She is alert. No sensory deficit. She exhibits normal muscle tone. Coordination normal.   Skin: Skin is warm and dry. No pallor.   Psychiatric: She has a normal mood and affect. Her behavior is normal.   Nursing note and vitals reviewed.      Neurological Exam:   LOC: alert  Attention Span: Good   Language: No aphasia  Articulation: No dysarthria  Orientation: Person, Place, Time   Visual Fields: Full  EOM (CN III, IV, VI): Full/intact  Facial Movement (CN VII): Lower facial weakness on the Right  Motor: Arm left  Normal 5/5  Leg left  Normal 5/5  Arm right  Normal 5/5  Leg right Normal 5/5  Cebellar: No evidence of appendicular or axial ataxia  Sensation: Intact to light touch  Tone: Normal tone throughout    Laboratory:  CMP:   Recent Labs   Lab 03/04/19  0600   CALCIUM 9.8   *   K 4.3   CO2 22*   CL 99   BUN 13   CREATININE 0.8     CBC:   Recent Labs   Lab 03/04/19  0600   WBC 9.12   RBC 3.44*   HGB 9.6*   HCT 31.1*      MCV 90   MCH 27.9   MCHC 30.9*     Lipid Panel:   No results for input(s): CHOL, LDLCALC, HDL, TRIG in the last 168 hours.  Coagulation:   No results for input(s): PT, INR, APTT in the last 168 hours.  Hgb A1C:   No results for input(s): HGBA1C in the last 168 hours.  TSH:   No results for input(s): TSH in the last 168 hours.    Diagnostic Results     Brain Imaging     CT head 2/20/19  No acute abnormality noted     MRI 2/21/19    Small acute infarcts in the left basal ganglia.   Changes of chronic small vessel ischemic disease and cerebral volume loss.      Vessel Imaging     CTA Head/Neck 2/20/19  Recent infarcts in the left basal ganglia.  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      Cardiac Imaging     TTE 2/21/19  · Concentric left ventricular remodeling.  · Normal left ventricular systolic function. The estimated ejection fraction is 65%  · Grade I (mild) left ventricular diastolic dysfunction consistent with impaired relaxation.  · Normal left atrial pressure.  · Mild left atrial enlargement.  · Mild aortic regurgitation.  · Normal right ventricular systolic function.  · Mild aortic valve stenosis.  · Aortic valve area is 2.18 cm2; peak velocity is 2.39 m/s; mean gradient is 14.14 mmHg.

## 2019-03-04 NOTE — ASSESSMENT & PLAN NOTE
Patient had dark and tarry stool on 02/25/2019 and black stool on 2/28/19.   GI consulted.   EGD performed- no obvious GI bleed.

## 2019-03-04 NOTE — DISCHARGE SUMMARY
[facility]  Vascular Neurology  Comprehensive Stroke Center  Discharge Summary     Summary:     Admit Date: 2/20/2019  8:46 PM    Discharge Date and Time:  03/04/2019 3:30 PM    Attending Physician: Carlos Gil MD     Discharge Provider: Tasha Man NP    History of Present Illness: 84 year old female with past medical hx of HTN, hypothyroidism, previous stroke in 2017, and aortic stenosis presented to the ED today with complains of right facial droop. LKN this evening at 18:00. When prompting patient about what happened this evening she continues to say she is just not feeling well. During transport to CT stating she feels like she could pass out. Patient denies unilateral weakness, vision changes, or aphasia. Per chart review patient was seen yesterday and diagonsed with a UTI and started on Macrobid. Patient has been seen before for stroke. Diagnosed with acute infarct in the right vermis of the cerebellum March, 2017. 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 Patient currently on DAPT and Atorvastatin.         Hospital Course (synopsis of major diagnoses, care, treatment, and services provided during the course of the hospital stay):    Ms. Linda Hylton is a 84 year old female with past medical hx of HTN, hypothyroidism, prior R vermis stroke in 3/2017, and aortic stenosis, she was admitted to Vascular Neurology for acute stroke workup. She presented to the ED with complains of right facial droop on 02/20/2019. LKN 18:00 on 02/20/219, within window for tPA, however patient refused. 02/20/2019 MRI Brain is with small acute infarcts in the L basal ganglia. CTA H/N on 02/20/2019 showed BL fetal PCAs but overall stable from prior scans in 2017. Echo on 02/21/2019 demonstrated mild LAE. Stroke etiology not completely clear, but appears embolic/ESUS on imaging. Plan to do 30 day event monitor at discharge from SNF. Hospital stay was  complicated by decrease in H&H and melena on 02/23- aspirin discontinued. Plavix held for EGD on 03/01. EGD found no evidence of obvious GI bleeding. H&H stable, Plavix restarted on 03/03. Patient is now medically ready for discharge. Patient discharged with recommendations for admission to [facility] SNF. Family by phone amenable to plan. Patient with improvement in stroke symptoms since admission. Inpatient acute stroke work up completed and patient stable for discharge Please see all appropriate medication changes, imaging results, and necessary follow-up below.    Detailed hospital course below:     02/20/2019 MRI brain positive for stroke.  Admitted to Vascular Neurology.  02/21/2019- Na 130-->129. 250 ml NS bolus ordered. Repeat Na lab pending. CTA head/neck with recent infarcts in the left basal ganglia. 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 Echo EF 65% with mild left atrial enlargement. SLP recs for mechanical soft diet. PT/OT recs for possible rehab or SNF.   2/22: Hb 6.5; pt appears pale, c/o light-headedness but is neurologically stable. Ordered type & screen and 1U pRBCs, consent obtained. Will repeat CBC following transfusion. Started PPI and FOBT ordered (needs to be collected.) Monitoring Na.  2/23: Patient s/p 1U pRBCs with good response; no pallor or light-headedness today and Hb 8.8. Restarted SQH; will d/c ASA and continue with monotherapy (Plavix). FOBT+; will monitor Hb for now and plan for outpatient GI work-up if Hb remains stable. Monitoring BP.  2/24: Hb 9.5 today. WBC 13 however pt afebrile, denies acute complaints; monitoring. Pt c/o dizziness (her reported baseline) as well as mild blurred vision; no other neuro changes, visual fields are intact. Restarted home PRN Meclizine. Pt states she takes Lisinopril occasionally at home if her BP is high; started Lisinopril 5mg Daily. Miconazole powder ordered for areas of redness  noted in abdominal folds. Pt is not in favor of d/c to SNF; attempted to call pt's son Angel to discuss if he is comfortable with plan of d/c to home. Attempting to touch base with PT regarding dispo as well.  02/25/2019: Hb 8.8. Patient had dark and tarry stool this morning. GI consulted. Will hold Plavix to have scope done on Friday. Pt's son updated on POC.   02/26/2019: Hb 9. Patient may be able to discharge and have endoscopy done as an outpatient if H&H remains stable. Patient confused with PT, and complained of intermittent confusion. EEG 1 hour monitoring ordered to rule out seizures.   02/27/19 SNF reviewing patient, monitoring H&H due to recent melena, if remains stable then outpatient scope  02/28/2019 no focal deficits noted on exam today. SNF unable to transport patient back to hospital for outpatient EGD. Therefore, will plan to have EGD prior to discharge. Plans to contact GI to confirm EGD scheduled for tomorrow.   03/01/19 ordered 1L fluid restriction due to hyponatremia, patient went to EGD. 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 ENT consulted, no intervention necessary for telangiectasia.   03/02/2019 Hemoglobin stable. Sodium improved to 131 with fluid restriction. Holding plavix s/p EGD  03/03/2019 Sodium continues to improve, hemoglobin stable. Increased lisinopril to 10mg daily. Plavix restarted.  03/04/2019- No acute events overnight. Sodium improved to 134. H&H remains stable. Patient medically ready for discharge.     STROKE DOCUMENTATION   Acute Stroke Times   Last Known Normal Date: 02/20/19  Last Known Normal Time: 1800  Symptom Onset Date: 02/20/19  Symptom Onset Time: 1800  Stroke Team Called Date: 02/20/19  Stroke Team Called Time: 2050  Stroke Team Arrival Date: 02/20/19  Stroke Team Arrival Time: 2052  CT Interpretation Time: 2100  Decision to Treat Time for Alteplase: 2110(Patient refused )  Decision to Treat Time for IR: (NA)     NIH  Scale:  1a. Level of Consciousness: 0-->Alert, keenly responsive  1b. LOC Questions: 0-->Answers both questions correctly  1c. LOC Commands: 0-->Performs both tasks correctly  2. Best Gaze: 0-->Normal  3. Visual: 0-->No visual loss  4. Facial Palsy: 0-->Normal symmetrical movements  5a. Motor Arm, Left: 0-->No drift, limb holds 90 (or 45) degrees for full 10 secs  5b. Motor Arm, Right: 0-->No drift, limb holds 90 (or 45) degrees for full 10 secs  6a. Motor Leg, Left: 0-->No drift, leg holds 30 degree position for full 5 secs  6b. Motor Leg, Right: 0-->No drift, leg holds 30 degree position for full 5 secs  7. Limb Ataxia: 1-->Present in one limb  8. Sensory: 0-->Normal, no sensory loss  9. Best Language: 0-->No aphasia, normal  10. Dysarthria: 0-->Normal  11. Extinction and Inattention (formerly Neglect): 0-->No abnormality  Total (NIH Stroke Scale): 1        Modified Ericka Score: 4  [place] Coma Scale:15   ABCD2 Score:    [id number] Score:   HAS -BLED Score:   ICH Score:   Hunt & De Classification:       Assessment/Plan:     Diagnostic Results:    Brain Imaging      CT head 2/20/19  No acute abnormality noted      MRI 2/21/19    Small acute infarcts in the left basal ganglia.   Changes of chronic small vessel ischemic disease and cerebral volume loss.        Vessel Imaging      CTA Head/Neck 2/20/19  Recent infarcts in the left basal ganglia.  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        Cardiac Imaging      TTE 2/21/19  · Concentric left ventricular remodeling.  · Normal left ventricular systolic function. The estimated ejection fraction is 65%  · Grade I (mild) left  ventricular diastolic dysfunction consistent with impaired relaxation.  · Normal left atrial pressure.  · Mild left atrial enlargement.  · Mild aortic regurgitation.  · Normal right ventricular systolic function.  · Mild aortic valve stenosis.  · Aortic valve area is 2.18 cm2; peak velocity is 2.39 m/s; mean gradient is 14.14 mmHg.        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] 03/01/2019 Yes    Melena [K92.1] 02/25/2019 Yes    Nodule of apex of right lung [R91.1] 02/21/2019 Yes    History of stroke [Z86.73] 02/20/2019 Not Applicable    Cytotoxic cerebral edema [G93.6] 02/20/2019 Yes      Problems Resolved During this Admission:    Diagnosis Date Noted Date Resolved POA    Acute blood loss anemia [D62] 02/22/2019 03/04/2019 No    Leukocytosis [D72.829] 02/24/2019 02/27/2019 No    Facial droop [R29.810] 02/20/2019 02/27/2019 Yes    Acute cystitis without hematuria [N30.00] 12/28/2017 02/22/2019 Yes     No new Assessment & Plan notes have been filed under this hospital service since the last note was generated.  Service: Vascular Neurology      Recommendations:     Post-discharge complication risks: Falls    Stroke Education given to: patient    Follow-up in Stroke Clinic in 4 weeks    Discharge Plan:  Antithrombotics: Clopidogrel 75mg  Statin: Atorvastatin 80 mg  Aggresive risk factor modification:  Hypertension  High Cholesterol  Diet  Exercise  Obesity    Follow Up:  Follow-up Information     Yakov Barrett MD.    Specialty:  Internal Medicine  Why:  Outpatient Services  Contact information:  [address]  [place]  [address]  [phone]                   Patient Instructions:      Diet Adult Regular     Notify your health care provider if you experience any of the following:  increased confusion or weakness     Notify your health care provider if you experience any of the following:  persistent dizziness, light-headedness, or visual disturbances     Notify your health care provider if you experience any of the following:  worsening rash     Notify your health care provider if you experience any of the following:  difficulty breathing or increased cough     Notify your health care provider if you experience any of the following:  severe persistent headache     Notify your health care provider if you experience any of the following:  severe uncontrolled pain     Notify your health care provider if you experience any of the following:  persistent nausea and vomiting or diarrhea     Notify your health care provider if you experience any of the following:  temperature >100.4     Activity as tolerated       Medications:  Reconciled Home Medications:      Medication List      START taking these medications    lisinopril 10 MG tablet  Take 1 tablet (10 mg total) by mouth once daily.  Start taking on:  3/5/2019     pantoprazole 40 MG tablet  Commonly known as:  PROTONIX  Take 1 tablet (40 mg total) by mouth once daily.  Start taking on:  3/5/2019        CHANGE how you take these medications    atorvastatin 80 MG tablet  Commonly known as:  LIPITOR  Take 1 tablet (80 mg total) by mouth once daily.  Start taking on:  3/5/2019  What changed:    · medication strength  · how much to take     levothyroxine 88 MCG tablet  Commonly known as:  EUTHYROX  Take 1 tablet (88 mcg total) by mouth once daily.  What changed:    · medication strength  · how much to take        CONTINUE taking these medications    cholecalciferol (vitamin D3) 5,000 unit Tab  Take 1 tablet (5,000 Units total) by mouth once daily.  Start taking on:  3/5/2019     clopidogrel  75 mg tablet  Commonly known as:  PLAVIX  Take 1 tablet (75 mg total) by mouth once daily.  Start taking on:  3/5/2019     ferrous sulfate 325 (65 FE) MG EC tablet  Take 1 tablet (325 mg total) by mouth once daily.  Start taking on:  3/5/2019     gabapentin 300 MG capsule  Commonly known as:  NEURONTIN  Take 1 capsule (300 mg total) by mouth nightly.     meclizine 25 mg tablet  Commonly known as:  ANTIVERT  Take 1 tablet (25 mg total) by mouth 3 (three) times daily as needed for Dizziness.     nystatin powder  Commonly known as:  MYCOSTATIN  Apply to affected area 3 times daily     ondansetron 8 MG Tbdl  Commonly known as:  ZOFRAN-ODT  Take 1 tablet (8 mg total) by mouth every 8 (eight) hours as needed.        STOP taking these medications    ALPRAZolam 0.5 MG tablet  Commonly known as:  XANAX     aspirin 325 MG EC tablet  Commonly known as:  ECOTRIN     clotrimazole-betamethasone 1-0.05% cream  Commonly known as:  LOTRISONE     fenofibrate 160 MG Tab     nitrofurantoin (macrocrystal-monohydrate) 100 MG capsule  Commonly known as:  MACROBID            Tasha Man NP  Comprehensive Stroke Center  Department of Vascular Neurology   [facility]

## 2019-03-04 NOTE — PROGRESS NOTES
Report called to Monica at [facility]. Awaiting transport at 1700. Pt resting quietly. No distress noted.

## 2019-03-04 NOTE — PLAN OF CARE
03/04/19 1519   Post-Acute Status   Post-Acute Authorization Placement   Post-Acute Placement Status Set-up Complete       Pt has been accepted by [facility]. Nurse can call report to [phone] to [facility]. Pt going to room P110.  Transport setup for 5pm.

## 2019-03-04 NOTE — PROGRESS NOTES
[facility]  Vascular Neurology  Comprehensive Stroke Center  Progress Note    Assessment/Plan:     * Embolic stroke involving left middle cerebral artery    84 year old female with past medical hx of HTN, hypothyroidism, prior R vermis stroke in 3/2017, and aortic stenosis presented to the ED with complains of right facial droop. LKN 18:00, within window for tPA, however patient refused. Per chart review, patient was seen day PTA and diagonsed with a UTI and started on Macrobid. Repeat UA here with no evidence of infection so abx d/c'd.    MRI Brain is with small acute infarcts in the L basal ganglia. CTA H/N shows BL fetal PCAs but overall stable from prior scans in 2017. Echo demonstrated mild LAE. Stroke etiology not completely clear, but appears embolic/ESUS on imaging. Will plan for 30-day cardiac event monitor following SNF discharge.     03/04/219 No acute events overnight. Sodium improved to 134. H&H remains stable. Patient medically ready for discharge.     Antithrombotics for secondary stroke prevention: Antiplatelets: Plavix 75 mg daily  Statins for secondary stroke prevention and hyperlipidemia, if present:   Statins: Atorvastatin- 80 mg daily  Aggressive risk factor modification: HTN, HLD, Obesity, prior stroke  Rehab efforts: PT/OT/SLP to evaluate and treat  Diagnostics ordered/pending: None   VTE prophylaxis: Heparin 5000 units SQ every 8 hours  Place SCDs   BP parameters: SBP < 160     Essential hypertension    Stroke risk factor  SBP <160  -Pt states she checks her BP twice daily at home and takes Lisinopril occasionally if her SBP < 150. Lisinopril 5mg started on 02/25.  -increased lisinopril to 10mg daily on 3/3      Mixed hyperlipidemia    Stroke risk factor  LDL 96.6  Patient taking Atorvastatin 40 at home; Increased dose to 80 mg Daily     Hyponatremia    Discussed with Hospital Medicine- Hyponatremia possibly 2/2 hypovolemia in the setting of pt's acute Hb drop.   Continue  daily BMP; Will discuss any further recs with Hospital Medicine at AM rounds    02/25- Discussed hyponatremia with hospital medicine, patient may be hyponatremic at baseline. Will continue to monitor and appreciate recs from hospital medicine.  03/01 - continued hyponatremia, 1L fluid restriction ordered  03/03 - improving     Dizziness    Pt c/o dizziness/light-headedness which she states she has at baseline; Reports no acute change currently  Also c/o mild blurred vision but no other neuro changes appreciated and visual fields are intact  Restarted home PRN Meclizine  Will continue to monitor     Melena     Patient had dark and tarry stool on 02/25/2019 and black stool on 2/28/19.   GI consulted.   EGD performed- no obvious GI bleed.     Nodule of apex of right lung    CTA head/neck on 2/21/19 shows stable 9 mm noncalcified nodule in the right lung apex.   Per family over the phone, pt has no tobacco use history.  Discussed with Hospital Medicine - No acute intervention needed at this time. PCP to monitor and consider CT Chest in 6 months.     Cytotoxic cerebral edema    -Area of cytotoxic cerebral edema identified when reviewing brain imaging in the territory of the left middle cerebral artery. There is no mass effect associated with it. We will continue to monitor the patients clinical exam for any worsening of symptoms which may indicate expansion of the stroke or the area of the edema resulting in the clinical change. The pattern is suggestive of an embolic/ESUS etiology     History of stroke    Stroke risk factor  3/2017 - diagnosed with R cerebellar infarct. Was on DAPT and Lipitor 40 outpatient.  Patient states she had no significant stroke deficits (dizziness?)          02/20/2019 MRI brain positive for stroke.  Admitted to Vascular Neurology.  02/21/2019- Na 130-->129. 250 ml NS bolus ordered. Repeat Na lab pending. CTA head/neck with recent infarcts in the left basal ganglia. CTA imaging also shows  interrupted occlusion of the basilar artery and 9mm right lung nodule. Echo EF 65% with mild left atrial enlargement. SLP recs for mechanical soft diet. PT/OT recs for possible rehab or SNF.   2/22: Hb 6.5; pt appears pale, c/o light-headedness but is neurologically stable. Ordered type & screen and 1U pRBCs, consent obtained. Will repeat CBC following transfusion. Started PPI and FOBT ordered (needs to be collected.) Monitoring Na.  2/23: Patient s/p 1U pRBCs with good response; no pallor or light-headedness today and Hb 8.8. Restarted SQH; will d/c ASA and continue with monotherapy (Plavix). FOBT+; will monitor Hb for now and plan for outpatient GI work-up if Hb remains stable. Monitoring BP.  2/24: Hb 9.5 today. WBC 13 however pt afebrile, denies acute complaints; monitoring. Pt c/o dizziness (her reported baseline) as well as mild blurred vision; no other neuro changes, visual fields are intact. Restarted home PRN Meclizine. Pt states she takes Lisinopril occasionally at home if her BP is high; started Lisinopril 5mg Daily. Miconazole powder ordered for areas of redness noted in abdominal folds. Pt is not in favor of d/c to SNF; attempted to call pt's son Angel to discuss if he is comfortable with plan of d/c to home. Attempting to touch base with PT regarding dispo as well.  02/25/2019: Hb 8.8. Patient had dark and tarry stool this morning. GI consulted. Will hold Plavix to have scope done on Friday. Pt's son updated on POC.   02/26/2019: Hb 9. Patient may be able to discharge and have endoscopy done as an outpatient if H&H remains stable. Patient confused with PT, and complained of intermittent confusion. EEG 1 hour monitoring ordered to rule out seizures.   02/27/19 SNF reviewing patient, monitoring H&H due to recent melena, if remains stable then outpatient scope  02/28/2019 no focal deficits noted on exam today. SNF unable to transport patient back to hospital for outpatient EGD. Therefore, will plan to have  EGD prior to discharge. Plans to contact GI to confirm EGD scheduled for tomorrow.   03/01/19 ordered 1L fluid restriction due to hyponatremia, patient went to EGD. 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 ENT consulted, no intervention necessary for telangiectasia.   03/02/2019 Hemoglobin stable. Sodium improved to 131 with fluid restriction. Holding plavix s/p EGD  03/03/2019 Sodium continues to improve, hemoglobin stable. Increased lisinopril to 10mg daily. Plavix restarted.  03/04/2019- No acute events overnight. Sodium improved to 134. H&H remains stable. Patient medically ready for discharge.     STROKE DOCUMENTATION   Acute Stroke Times   Last Known Normal Date: 02/20/19  Last Known Normal Time: 1800  Symptom Onset Date: 02/20/19  Symptom Onset Time: 1800  Stroke Team Called Date: 02/20/19  Stroke Team Called Time: 2050  Stroke Team Arrival Date: 02/20/19  Stroke Team Arrival Time: 2052  CT Interpretation Time: 2100  Decision to Treat Time for Alteplase: 2110(Patient refused )  Decision to Treat Time for IR: (NA)    NIH Scale:  1a. Level of Consciousness: 0-->Alert, keenly responsive  1b. LOC Questions: 0-->Answers both questions correctly  1c. LOC Commands: 0-->Performs both tasks correctly  2. Best Gaze: 0-->Normal  3. Visual: 0-->No visual loss  4. Facial Palsy: 0-->Normal symmetrical movements  5a. Motor Arm, Left: 0-->No drift, limb holds 90 (or 45) degrees for full 10 secs  5b. Motor Arm, Right: 0-->No drift, limb holds 90 (or 45) degrees for full 10 secs  6a. Motor Leg, Left: 0-->No drift, leg holds 30 degree position for full 5 secs  6b. Motor Leg, Right: 0-->No drift, leg holds 30 degree position for full 5 secs  7. Limb Ataxia: 1-->Present in one limb  8. Sensory: 0-->Normal, no sensory loss  9. Best Language: 0-->No aphasia, normal  10. Dysarthria: 0-->Normal  11. Extinction and Inattention (formerly Neglect): 0-->No abnormality  Total (NIH Stroke Scale): 1        Modified Ericka Score: 4  Miranda Coma Scale:15   ABCD2 Score:    [id number] Score:   HAS -BLED Score:   ICH Score:   Hunt & De Classification:      Hemorrhagic change of an Ischemic Stroke: Does this patient have an ischemic stroke with hemorrhagic changes? No     Neurologic Chief Complaint: Right facial droop, RUE weakness and slurred speech.     Subjective:     Interval History: No acute events overnight. Sodium improved to 134. H&H remains stable. Patient medically ready for discharge.     HPI, Past Medical, Family, and Social History remains the same as documented in the initial encounter.     Review of Systems   Constitutional: Negative for fatigue and fever.   Respiratory: Negative for chest tightness, shortness of breath and wheezing.    Cardiovascular: Negative for chest pain and palpitations.   Skin: Negative for pallor and wound.   Neurological: Positive for facial asymmetry (slight). Negative for speech difficulty and weakness.   Psychiatric/Behavioral: Positive for confusion. Negative for agitation.     Scheduled Meds:   atorvastatin  80 mg Oral Daily    cholecalciferol (vitamin D3)  5,000 Units Oral Daily    clopidogrel  75 mg Oral Daily    enoxaparin  30 mg Subcutaneous Daily    ferrous sulfate  325 mg Oral Daily    gabapentin  300 mg Oral Nightly    levothyroxine  75 mcg Oral Daily    lisinopril  10 mg Oral Daily    miconazole NITRATE 2 %   Topical (Top) BID    pantoprazole  40 mg Oral Daily    sodium chloride 0.9%  3 mL Intravenous Q8H     Continuous Infusions:   sodium chloride 0.9%       PRN Meds:sodium chloride, acetaminophen, aluminum & magnesium hydroxide-simethicone, hydrALAZINE, meclizine, ondansetron, ondansetron, ramelteon, sodium chloride 0.9%    Objective:     Vital Signs (Most Recent):  Temp: (!) 94.4 °F (34.7 °C) (03/04/19 1147)  Pulse: 72 (03/04/19 1147)  Resp: 17 (03/04/19 1147)  BP: (!) 149/63 (03/04/19 1208)  SpO2: 97 % (03/04/19 1147)  BP Location: Right  arm    Vital Signs Range (Last 24H):  Temp:  [94.4 °F (34.7 °C)-98.6 °F (37 °C)]   Pulse:  [58-72]   Resp:  [16-18]   BP: (130-187)/(63-76)   SpO2:  [94 %-97 %]   BP Location: Right arm    Physical Exam   Constitutional: She appears well-developed and well-nourished. No distress.   HENT:   Head: Normocephalic and atraumatic.   Eyes: Conjunctivae and EOM are normal.   Cardiovascular: Normal rate.   Pulmonary/Chest: Effort normal. No respiratory distress.   Musculoskeletal: She exhibits no edema or deformity.   Neurological: She is alert. No sensory deficit. She exhibits normal muscle tone. Coordination normal.   Skin: Skin is warm and dry. No pallor.   Psychiatric: She has a normal mood and affect. Her behavior is normal.   Nursing note and vitals reviewed.      Neurological Exam:   LOC: alert  Attention Span: Good   Language: No aphasia  Articulation: No dysarthria  Orientation: Person, Place, Time   Visual Fields: Full  EOM (CN III, IV, VI): Full/intact  Facial Movement (CN VII): Lower facial weakness on the Right  Motor: Arm left  Normal 5/5  Leg left  Normal 5/5  Arm right  Normal 5/5  Leg right Normal 5/5  Cebellar: No evidence of appendicular or axial ataxia  Sensation: Intact to light touch  Tone: Normal tone throughout    Laboratory:  CMP:   Recent Labs   Lab 03/04/19  0600   CALCIUM 9.8   *   K 4.3   CO2 22*   CL 99   BUN 13   CREATININE 0.8     CBC:   Recent Labs   Lab 03/04/19  0600   WBC 9.12   RBC 3.44*   HGB 9.6*   HCT 31.1*      MCV 90   MCH 27.9   MCHC 30.9*     Lipid Panel:   No results for input(s): CHOL, LDLCALC, HDL, TRIG in the last 168 hours.  Coagulation:   No results for input(s): PT, INR, APTT in the last 168 hours.  Hgb A1C:   No results for input(s): HGBA1C in the last 168 hours.  TSH:   No results for input(s): TSH in the last 168 hours.    Diagnostic Results     Brain Imaging     CT head 2/20/19  No acute abnormality noted     MRI 2/21/19    Small acute infarcts in the left  basal ganglia.   Changes of chronic small vessel ischemic disease and cerebral volume loss.      Vessel Imaging     CTA Head/Neck 2/20/19  Recent infarcts in the left basal ganglia.  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      Cardiac Imaging     TTE 2/21/19  · Concentric left ventricular remodeling.  · Normal left ventricular systolic function. The estimated ejection fraction is 65%  · Grade I (mild) left ventricular diastolic dysfunction consistent with impaired relaxation.  · Normal left atrial pressure.  · Mild left atrial enlargement.  · Mild aortic regurgitation.  · Normal right ventricular systolic function.  · Mild aortic valve stenosis.  · Aortic valve area is 2.18 cm2; peak velocity is 2.39 m/s; mean gradient is 14.14 mmHg.      Tasha Man NP  [facility]  Department of Vascular Neurology   [facility]

## 2019-03-04 NOTE — PROGRESS NOTES
Received a call from Michael Lee (Ms.'s other son's wife). They were contacted by Ms. Hylton's other son, who told them to go to [facility]. They did so and signed the paperwork.

## 2019-03-04 NOTE — PT/OT/SLP DISCHARGE
Physical Therapy Discharge Summary    Name: Linda Hylton  MRN: [id number]   Principal Problem: Embolic stroke involving left middle cerebral artery     Patient Discharged from acute Physical Therapy on 3/4/2019.  Please refer to prior PT noted date on 3/4/2019 for functional status.     Assessment:     Goals partially met. Patient appropriate for care in another setting.    Objective:     GOALS:   Multidisciplinary Problems     Physical Therapy Goals        Problem: Physical Therapy Goal    Goal Priority Disciplines Outcome Goal Variances Interventions   Physical Therapy Goal     PT, PT/OT Ongoing (interventions implemented as appropriate)     Description:    Goals to be met by 3/10/2019:    1. Pt will perform rolling to the R and L independently.   2. Pt will perform supine to sit from both sides of the bed independently.  3. Pt will perform sit to supine independently.  4. Pt will perform sit to stand transfers independently.   5. Pt will perform bed <> chair transfers with mod I using RW.  6. Pt will perform gait x 50 feet with RW and CGA. - MET  Revised: pt will perform gait x100 feet with RW and supervision.  7. Pt will sit EOB x 20 minutes with no LOB while performing dynamic UE tasks to prepare for functional tasks in sitting.   8. Pt will stand  x 20 minutes with no LOB while performing dynamic UE tasks to prepare for functional tasks in standing.                         Reasons for Discontinuation of Therapy Services  Transfer to alternate level of care.      Plan:     Patient Discharged to: Skilled Nursing Facility.    Jadyn Rosales, PT  3/4/2019

## 2019-03-12 ENCOUNTER — TELEPHONE (OUTPATIENT)
Dept: GASTROENTEROLOGY | Facility: CLINIC | Age: 84
End: 2019-03-12

## 2019-03-12 NOTE — TELEPHONE ENCOUNTER
Ma contact pt to inform pt of per Dr. 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

## 2019-03-13 ENCOUNTER — TELEPHONE (OUTPATIENT)
Dept: GASTROENTEROLOGY | Facility: CLINIC | Age: 84
End: 2019-03-13

## 2019-03-13 ENCOUNTER — TELEPHONE (OUTPATIENT)
Dept: INTERNAL MEDICINE | Facility: CLINIC | Age: 84
End: 2019-03-13

## 2019-03-13 NOTE — TELEPHONE ENCOUNTER
I spoke to the daughter. Apparently, pt. had another stroke and is in a rehab facility for 20 days. She wants to know when the pt was d/c'ed form the hospital and sent to the rehab facility. She wants a d/c summary because she states that she was never given one. There is not f/u d/c appointment with the PCP. They want the results of a bx results of her throat.  She wants Dr Barrett to review her chart.

## 2019-03-13 NOTE — TELEPHONE ENCOUNTER
----- Message from Chelly Martínez sent at 3/13/2019 12:14 PM CDT -----  Contact: Daughter in law, Mrs. Hylton [phone]      ----- Message -----  From: Lionel Carvalho  Sent: 3/13/2019  11:59 AM  To: Arnold BEASLEY Staff    Daughter in law of patient calling regarding pt. was discharged after stroke, stating has many question now regarding patient being discharged, follow up, biopsy, test results, and next steps for patient.  Daughter in law, Mrs. Hylton [phone]    Please call an advise  Thank you

## 2019-03-15 ENCOUNTER — TELEPHONE (OUTPATIENT)
Dept: INTERNAL MEDICINE | Facility: CLINIC | Age: 84
End: 2019-03-15

## 2019-03-15 ENCOUNTER — PATIENT OUTREACH (OUTPATIENT)
Dept: ADMINISTRATIVE | Facility: CLINIC | Age: 84
End: 2019-03-15

## 2019-03-15 DIAGNOSIS — K92.2 LOWER GI BLEEDING: ICD-10-CM

## 2019-03-15 DIAGNOSIS — K22.70 BARRETT'S ESOPHAGUS WITHOUT DYSPLASIA: ICD-10-CM

## 2019-03-15 DIAGNOSIS — I63.412 EMBOLIC STROKE INVOLVING LEFT MIDDLE CEREBRAL ARTERY: Primary | ICD-10-CM

## 2019-03-15 NOTE — TELEPHONE ENCOUNTER
I already spoke to this lady on Wednesday. A message was sent to the provider. I'm awaiting response.

## 2019-03-15 NOTE — TELEPHONE ENCOUNTER
----- Message from Chelly Martínez sent at 3/15/2019 11:01 AM CDT -----  Contact: [phone]/ Daughter-in-law. Mrs. Hylton      ----- Message -----  From: Pauline Gordon  Sent: 3/15/2019  10:21 AM  To: Arnold BEASLEY Staff    Patient's daughter-in-law is very upset that no one from the office has returned her call about the patient.  She stated she will call every day to get information needed for the patient.     Previous Request:  She wants to know when the pt was d/c'ed form the hospital and sent to the rehab facility. She wants a d/c summary because she states that she was never given one. There is not f/u d/c appointment with the PCP. They want the results of a bx results of her throat.     Please advise, thank you

## 2019-03-15 NOTE — TELEPHONE ENCOUNTER
----- Message from Paulinerochelle Balbuenamons sent at 3/15/2019 10:21 AM CDT -----  Contact: [phone]/ Daughter-in-law. Mrs. Hylton  Patient's daughter-in-law is very upset that no one from the office has returned her call about the patient.  She stated she will call every day to get information needed for the patient.     Previous Request:  She wants to know when the pt was d/c'ed form the hospital and sent to the rehab facility. She wants a d/c summary because she states that she was never given one. There is not f/u d/c appointment with the PCP. They want the results of a bx results of her throat.     Please advise, thank you

## 2019-03-15 NOTE — TELEPHONE ENCOUNTER
Clinical status:  Look up diagnosis in [facility] for specific questions  Requires  a. Injections  b. IV  c. Nebulizers, 02 evaluation sats  d. Enteral feedings new or recent change  e. Care of new colostomy or teaching  f. Frequent suctioning, trach and /or vent needs  g. Frequent irrigation, replacement of urinary cath, complex suprapubic  h. Treatment stg 3/ or 4 pressure ulcers, complex wound care  i. Nursing evaluation due to unstable and complex medical condition  j. Nursing rehab teaching e.g. bowel and bladder training, adaptive care    PT/OT/ST    *Transfer mobility (eg, from bed to chair)   Independent (patient can perform an activity safely and independently without any devices, physical assistance, or supervision)  Modified independent (patient requires the use of an assistive device or extra time to complete an activity, but is otherwise independent)  X  Contact guard assistance (patient can perform an activity independently, but needs constant physical touch to steady or guide to ensure safe performance)  Supervision (patient can perform an activity, but supervision is provided to address safety concerns)  Minimum assistance (patient can perform most of an activity (ie, approximately 75%), but requires limited assistance from one person for safe completion)  Moderate assistance (patient can perform about half the effort of an activity, but requires extensive assistance from one or more persons for safe performance of the other half)  Maximum assistance (patient has difficulty performing an activity, but can offer some assistance (eg, approximately 25% of effort) and is dependent on one or more people to assist for safe completion)  Dependent or unable (patient is unable to perform an activity or depends on 2 or more people to complete it)    *Ambulation inside (eg, within room, hallways, to toilet)     150_____Ft with standby assistance and rolling walker.  Independent  Modified independent  Contact  guard assistance  Supervision  Minimum assistance  Moderate assistance  Maximum assistance  Dependent or unable    *Ambulation outside (eg, getting in or out of buildings, walking on uneven surfaces) ______Ft  Independent  Modified independent  X Contact guard assistance with rw  Supervision  Minimum assistance  Moderate assistance  Maximum assistance   Dependent or unable    *Toileting self-management:   Independent  Modified independent  X  Contact guard assistance  Supervision  Minimum assistance  Moderate assistance  Maximum assistance  Dependent or unable    *Nourishment self-management (ie, ability to feed self)   X  Independent  Modified independent  Supervision  Minimum assistance  Moderate assistance  Maximum assistance  Dependent or unable    *Personal care self-management (eg, dressing, bathing, brushing teeth, washing hair) upper body with  Standby asistance.  Independent  Modified independent  Contact guard assistance  Supervision  X  Minimum assistance min assist and shower  Moderate assistance  Maximum assistance  Dependent or unable    Medication support (ie, preparing/taking all prescribed and over-the-counter medications reliably and safely, including correct dosage at correct times)   X  Not applicable  Independent  Modified independent  Supervision  Minimum assistance  Moderate assistance  Maximum assistance    ADL adaptive devices self-management (ie, ability to manage putting on and/or removing braces, splints, and other adaptive devices)   X  Not applicable  Independent  Modified independent  Supervision  Minimum assistance  Moderate assistance  Maximum assistance  Dependent or unable          For Extensions  - Unanticipated functional problems impacting safe completion of therapy  - Multiple comorbidities or frailty impairing functional improvement  - Cognitive impairment requiring additional intervention and education  - Communication impairment requiring additional intervention and  education  - Assessment or care unmanageable at lower level of care  - Expect brief to moderate stay extension.        Barriers to progress:    None at this time plan for dc on 03/16/2019    New treatments:    None    Projected length of stay/ expected discharge:        Discharge Disposition:    DC home with son and  HH , NO DME 03/16/2019.      Recommendations:    Discharge home as planned.

## 2019-03-15 NOTE — TELEPHONE ENCOUNTER
----- Message from Ekaterina Olmos sent at 3/15/2019 10:46 AM CDT -----  Contact: [employer] [facility] [employer] [place] [phone]  Urgent  3rd request    Requesting a call back from Nurse Good in regards to the availablity of an appointment. Has a sooner appointment been scheduled?     Have you spoken to Dr. Barrett?    When will I get a callback with biopsy results?    Should patient have been scheduled in a timely manner following release from hospital?    Please call and advise  Thank you

## 2019-03-19 DIAGNOSIS — F41.9 ANXIETY: ICD-10-CM

## 2019-03-20 RX ORDER — ALPRAZOLAM 0.5 MG/1
0.5 TABLET ORAL 2 TIMES DAILY PRN
Qty: 60 TABLET | Refills: 0 | OUTPATIENT
Start: 2019-03-20 | End: 2019-05-19

## 2019-03-21 ENCOUNTER — OFFICE VISIT (OUTPATIENT)
Dept: INTERNAL MEDICINE | Facility: CLINIC | Age: 84
End: 2019-03-21
Payer: MEDICARE

## 2019-03-21 ENCOUNTER — TELEPHONE (OUTPATIENT)
Dept: NEUROLOGY | Facility: CLINIC | Age: 84
End: 2019-03-21

## 2019-03-21 ENCOUNTER — HOSPITAL ENCOUNTER (OUTPATIENT)
Dept: RADIOLOGY | Facility: HOSPITAL | Age: 84
Discharge: HOME | End: 2019-03-21
Attending: INTERNAL MEDICINE
Payer: MEDICARE

## 2019-03-21 ENCOUNTER — TELEPHONE (OUTPATIENT)
Dept: INTERNAL MEDICINE | Facility: CLINIC | Age: 84
End: 2019-03-21

## 2019-03-21 VITALS
SYSTOLIC BLOOD PRESSURE: 136 MMHG | OXYGEN SATURATION: 96 % | DIASTOLIC BLOOD PRESSURE: 68 MMHG | BODY MASS INDEX: 29.87 KG/M2 | HEIGHT: 57 IN | TEMPERATURE: 98 F | HEART RATE: 71 BPM | WEIGHT: 138.44 LBS

## 2019-03-21 DIAGNOSIS — I10 ESSENTIAL HYPERTENSION: ICD-10-CM

## 2019-03-21 DIAGNOSIS — I63.412 EMBOLIC STROKE INVOLVING LEFT MIDDLE CEREBRAL ARTERY: Primary | ICD-10-CM

## 2019-03-21 DIAGNOSIS — M54.32 SCIATICA OF LEFT SIDE: ICD-10-CM

## 2019-03-21 DIAGNOSIS — I67.848 OTHER CEREBROVASCULAR VASOSPASM AND VASOCONSTRICTION: ICD-10-CM

## 2019-03-21 DIAGNOSIS — Z87.19 H/O: GI BLEED: ICD-10-CM

## 2019-03-21 DIAGNOSIS — K22.70 BARRETT'S ESOPHAGUS WITHOUT DYSPLASIA: ICD-10-CM

## 2019-03-21 DIAGNOSIS — E03.9 HYPOTHYROIDISM, UNSPECIFIED TYPE: ICD-10-CM

## 2019-03-21 PROCEDURE — 99999 PR PBB SHADOW E&M-EST. PATIENT-LVL IV: CPT | Mod: PBBFAC,,, | Performed by: INTERNAL MEDICINE

## 2019-03-21 PROCEDURE — 99215 PR OFFICE/OUTPT VISIT, EST, LEVL V, 40-54 MIN: ICD-10-PCS | Mod: S$PBB,,, | Performed by: INTERNAL MEDICINE

## 2019-03-21 PROCEDURE — 72100 XR LUMBAR SPINE AP AND LATERAL: ICD-10-PCS | Mod: 26,,, | Performed by: RADIOLOGY

## 2019-03-21 PROCEDURE — 99999 PR PBB SHADOW E&M-EST. PATIENT-LVL IV: ICD-10-PCS | Mod: PBBFAC,,, | Performed by: INTERNAL MEDICINE

## 2019-03-21 PROCEDURE — 99214 OFFICE O/P EST MOD 30 MIN: CPT | Mod: PBBFAC,25 | Performed by: INTERNAL MEDICINE

## 2019-03-21 PROCEDURE — 72100 X-RAY EXAM L-S SPINE 2/3 VWS: CPT | Mod: TC

## 2019-03-21 PROCEDURE — 72100 X-RAY EXAM L-S SPINE 2/3 VWS: CPT | Mod: 26,,, | Performed by: RADIOLOGY

## 2019-03-21 PROCEDURE — 99215 OFFICE O/P EST HI 40 MIN: CPT | Mod: S$PBB,,, | Performed by: INTERNAL MEDICINE

## 2019-03-21 RX ORDER — CLOTRIMAZOLE AND BETAMETHASONE DIPROPIONATE 10; .64 MG/G; MG/G
CREAM TOPICAL 2 TIMES DAILY
Qty: 45 G | Refills: 1 | Status: SHIPPED | OUTPATIENT
Start: 2019-03-21 | End: 2024-03-30

## 2019-03-21 NOTE — TELEPHONE ENCOUNTER
----- Message from Lionel Carvalho sent at 3/21/2019  1:15 PM CDT -----  Contact: Delmar Hylton [phone]  Sooner appointment than the  can schedule.  Did you offer to schedule the next available appointment and put the patient on the wait list?: declined   When is the first available appointment: 05/04/19  What is the nature of the appointment: 1 Month Follow Up   What visit type: EP  Patient preference of timeframe to be scheduled:    Comments: Delmar Hylton [phone]    Please call an advise  Thank you

## 2019-03-21 NOTE — PROGRESS NOTES
CHIEF COMPLAINT:  Hospital followup.    HISTORY OF PRESENT ILLNESS:  The patient is an 84-year-old female who we see for   hypertension, hypothyroidism, who comes in today as a followup of her left   middle cerebral artery stroke.  The patient presented to the Emergency   Department with right-sided facial weakness, right arm weakness as well as some   confusion.  The patient declined tPA.  A workup was done.  No source of her   emboli was found, but it was felt that she had an embolic stroke.  At the time   of discharge, the plan was to do a 30-day event recorder.  The patient was also   found to be anemic.  She began having melanotic stools.  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  The patient was   transfused with 1 unit of blood.  At the time of discharge, she was referred to   [facility].  The patient is now back home with family.  She is going to   start physical therapy tomorrow.  She reports no more facial weakness.  She   reports no new weakness in the arms or legs.    REVIEW OF SYSTEMS:  No fever or chills, no visual change, no auditory change.    She is eating well.  No chest pain, no shortness of breath.  However, her   daughter-in-law, who is with her today, does report she does get winded.  No   nausea or vomiting, no abdominal pain, no black tarry stools.  She does complain   of dizziness, which is not new.  This predates her stroke.  She does complain   of a rash underneath the pannus on her upper abdomen.  She also complains of   left lower back pain radiating to the left leg.    PHYSICAL EXAMINATION:  GENERAL APPEARANCE:  No acute distress.  HEENT:  Conjunctivae are clear.  She does have dense cataract.  TMs are clear.    Nasal septum is midline without discharge.  Oropharynx is without erythema.  NECK:  Trachea is midline without JVD, without thyromegaly.  PULMONARY:  Good inspiratory and expiratory breath sounds are heard.  Lungs are   clear to  auscultation.  CARDIOVASCULAR:  S1, S2 with a 2/6 systolic ejection murmur heard best at the   left lower sternal border.  EXTREMITIES:  With trace to 1+ edema.  ABDOMEN:  Nontender, nondistended, without hepatosplenomegaly.    The patient did have a Candida infection underneath the pannus of her abdomen.    , upper extremity motor strength 3-4/5 bilaterally.  The same is true for   the lower extremities.  It appeared to be equal bilaterally.  The patient   reports no pain currently with her back; however, when she came in, she was   complaining of lower back pain.    ASSESSMENT:  1.  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  2.  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  3.  Hypertension.  4.  Hypothyroidism.  5.  Hyperlipidemia.    PLAN:  We will put the patient in for a 30-day event recorder.  We will also   schedule a CBC today.  We will change her prescription from nystatin to   Lotrisone cream.  She is to follow up with us in one month.  We will also see   about getting a lumbar spine film.      MANUEL/KEDAR  dd: 03/21/2019 11:48:22 (CDT)  td: 03/21/2019 23:52:07 (CDT)  Doc ID   #[id number]  Job ID #[id number]    CC:

## 2019-03-25 ENCOUNTER — HOME CARE VISIT (OUTPATIENT)
Dept: NEUROLOGY | Facility: HOSPITAL | Age: 84
End: 2019-03-25

## 2019-03-25 ENCOUNTER — TELEPHONE (OUTPATIENT)
Dept: INTERNAL MEDICINE | Facility: CLINIC | Age: 84
End: 2019-03-25

## 2019-03-25 NOTE — TELEPHONE ENCOUNTER
Does this mean her order is wrong or can someone in Cardiology reschedule the appointment?    Please advise,  Thank You.

## 2019-03-25 NOTE — TELEPHONE ENCOUNTER
----- Message from Roxana Whaley sent at 3/25/2019  1:49 PM CDT -----  Contact: [phone]  Patient is requesting to reschedule her appointment she has today to get a heart monitor. Patient stated she cannot reschedule, she has to schedule with you.    Please advise, thanks

## 2019-03-25 NOTE — TELEPHONE ENCOUNTER
This pt was scheduled by the schedulers at check out because I did not know how to, that is why I want to know who can schedule this correct with the pt.    Who do I need to forward this msg to schedule this pt.

## 2019-03-29 ENCOUNTER — TELEPHONE (OUTPATIENT)
Dept: GASTROENTEROLOGY | Facility: CLINIC | Age: 84
End: 2019-03-29

## 2019-03-29 ENCOUNTER — TELEPHONE (OUTPATIENT)
Dept: INTERNAL MEDICINE | Facility: CLINIC | Age: 84
End: 2019-03-29

## 2019-03-29 NOTE — TELEPHONE ENCOUNTER
----- Message from Caprice Cheney sent at 3/29/2019 10:07 AM CDT -----  Contact: Jessica/DONALD/ [facility]/ [phone]  Nurse states that she is calling to speak with someone on behalf of the pt. She states that someone was to give the pt a call back in regards to getting an appt set up for a heart monitor. She states that the pt has not yet heard back from anyone as of yet. Please call back and advise.      Thanks

## 2019-03-29 NOTE — TELEPHONE ENCOUNTER
You sent me a message to schedule this pt with BERNABE for chacon's esophagus but she's already scheduled with Dr. Tang on 6/21

## 2019-03-31 ENCOUNTER — EXTERNAL CHRONIC CARE MANAGEMENT (OUTPATIENT)
Dept: PRIMARY CARE CLINIC | Facility: CLINIC | Age: 84
End: 2019-03-31
Payer: MEDICARE

## 2019-03-31 PROCEDURE — 99490 CHRNC CARE MGMT STAFF 1ST 20: CPT | Mod: S$PBB,,, | Performed by: INTERNAL MEDICINE

## 2019-03-31 PROCEDURE — 99490 CHRNC CARE MGMT STAFF 1ST 20: CPT | Mod: PBBFAC | Performed by: INTERNAL MEDICINE

## 2019-03-31 PROCEDURE — 99490 PR CHRONIC CARE MGMT, 1ST 20 MIN: ICD-10-PCS | Mod: S$PBB,,, | Performed by: INTERNAL MEDICINE

## 2019-04-05 ENCOUNTER — PATIENT MESSAGE (OUTPATIENT)
Dept: NEUROLOGY | Facility: CLINIC | Age: 84
End: 2019-04-05

## 2019-04-08 ENCOUNTER — TELEPHONE (OUTPATIENT)
Dept: NEUROLOGY | Facility: CLINIC | Age: 84
End: 2019-04-08

## 2019-04-08 ENCOUNTER — TELEPHONE (OUTPATIENT)
Dept: INTERNAL MEDICINE | Facility: CLINIC | Age: 84
End: 2019-04-08

## 2019-04-08 NOTE — TELEPHONE ENCOUNTER
----- Message from Earnestine Acosta sent at 4/5/2019 10:07 AM CDT -----  Contact: Rosa (dtjanett) @ [phone]  Calling to schedule an appt with Dr. Gil for the patient, please call.

## 2019-04-08 NOTE — TELEPHONE ENCOUNTER
MD Arnold Barrett Staff 27 minutes ago (4:06 PM)      Okay to discharge speech therapy.    Routing comment      Corry notfied

## 2019-04-08 NOTE — TELEPHONE ENCOUNTER
----- Message from Roxana Whaley sent at 4/8/2019  2:49 PM CDT -----  Contact: [facility] [phone]  Nichelle is calling to get an verbal order to discharged speech therapy, its the patients request.    Please advise, thanks

## 2019-04-10 ENCOUNTER — CLINICAL SUPPORT (OUTPATIENT)
Dept: CARDIOLOGY | Facility: HOSPITAL | Age: 84
End: 2019-04-10
Attending: INTERNAL MEDICINE
Payer: MEDICARE

## 2019-04-10 DIAGNOSIS — I67.848 OTHER CEREBROVASCULAR VASOSPASM AND VASOCONSTRICTION: ICD-10-CM

## 2019-04-10 DIAGNOSIS — I63.412 EMBOLIC STROKE INVOLVING LEFT MIDDLE CEREBRAL ARTERY: ICD-10-CM

## 2019-04-10 PROCEDURE — 93272 CARDIAC EVENT MONITOR (CUPID ONLY): ICD-10-PCS | Mod: ,,, | Performed by: INTERNAL MEDICINE

## 2019-04-10 PROCEDURE — 93272 ECG/REVIEW INTERPRET ONLY: CPT | Mod: ,,, | Performed by: INTERNAL MEDICINE

## 2019-04-10 PROCEDURE — 93271 ECG/MONITORING AND ANALYSIS: CPT

## 2019-04-22 ENCOUNTER — PATIENT MESSAGE (OUTPATIENT)
Dept: NEUROLOGY | Facility: CLINIC | Age: 84
End: 2019-04-22

## 2019-04-22 ENCOUNTER — TELEPHONE (OUTPATIENT)
Dept: NEUROLOGY | Facility: CLINIC | Age: 84
End: 2019-04-22

## 2019-04-22 NOTE — TELEPHONE ENCOUNTER
----- Message from Earnestine Acosta sent at 4/22/2019  9:58 AM CDT -----  Contact: Rosa (dtjanett) @ [phone]  Calling to schedule a follow up appt for the patient to see Dr. Gil, says no Tuesdays and appt must be after 12noon. Please call says this is her second time calling.

## 2019-04-25 ENCOUNTER — LAB VISIT (OUTPATIENT)
Dept: LAB | Facility: HOSPITAL | Age: 84
End: 2019-04-25
Attending: INTERNAL MEDICINE
Payer: MEDICARE

## 2019-04-25 ENCOUNTER — OFFICE VISIT (OUTPATIENT)
Dept: INTERNAL MEDICINE | Facility: CLINIC | Age: 84
End: 2019-04-25
Payer: MEDICARE

## 2019-04-25 VITALS
OXYGEN SATURATION: 95 % | SYSTOLIC BLOOD PRESSURE: 128 MMHG | HEART RATE: 64 BPM | BODY MASS INDEX: 30.11 KG/M2 | DIASTOLIC BLOOD PRESSURE: 68 MMHG | HEIGHT: 57 IN | TEMPERATURE: 98 F | WEIGHT: 139.56 LBS

## 2019-04-25 VITALS
OXYGEN SATURATION: 98 % | HEART RATE: 90 BPM | SYSTOLIC BLOOD PRESSURE: 144 MMHG | DIASTOLIC BLOOD PRESSURE: 94 MMHG | RESPIRATION RATE: 20 BRPM

## 2019-04-25 DIAGNOSIS — Z87.19 H/O: GI BLEED: ICD-10-CM

## 2019-04-25 DIAGNOSIS — I10 ESSENTIAL HYPERTENSION: ICD-10-CM

## 2019-04-25 DIAGNOSIS — I63.412 EMBOLIC STROKE INVOLVING LEFT MIDDLE CEREBRAL ARTERY: Primary | ICD-10-CM

## 2019-04-25 DIAGNOSIS — R42 DIZZINESS: ICD-10-CM

## 2019-04-25 DIAGNOSIS — E03.9 HYPOTHYROIDISM, UNSPECIFIED TYPE: ICD-10-CM

## 2019-04-25 LAB
BASOPHILS # BLD AUTO: 0.05 K/UL (ref 0–0.2)
BASOPHILS NFR BLD: 0.5 % (ref 0–1.9)
DIFFERENTIAL METHOD: ABNORMAL
EOSINOPHIL # BLD AUTO: 0.4 K/UL (ref 0–0.5)
EOSINOPHIL NFR BLD: 4.3 % (ref 0–8)
ERYTHROCYTE [DISTWIDTH] IN BLOOD BY AUTOMATED COUNT: 14.5 % (ref 11.5–14.5)
HCT VFR BLD AUTO: 36 % (ref 37–48.5)
HGB BLD-MCNC: 10.7 G/DL (ref 12–16)
LYMPHOCYTES # BLD AUTO: 1.8 K/UL (ref 1–4.8)
LYMPHOCYTES NFR BLD: 19.6 % (ref 18–48)
MCH RBC QN AUTO: 25.7 PG (ref 27–31)
MCHC RBC AUTO-ENTMCNC: 29.7 G/DL (ref 32–36)
MCV RBC AUTO: 87 FL (ref 82–98)
MONOCYTES # BLD AUTO: 1 K/UL (ref 0.3–1)
MONOCYTES NFR BLD: 11.1 % (ref 4–15)
NEUTROPHILS # BLD AUTO: 5.9 K/UL (ref 1.8–7.7)
NEUTROPHILS NFR BLD: 64.3 % (ref 38–73)
PLATELET # BLD AUTO: 265 K/UL (ref 150–350)
PMV BLD AUTO: 10.4 FL (ref 9.2–12.9)
RBC # BLD AUTO: 4.16 M/UL (ref 4–5.4)
WBC # BLD AUTO: 9.11 K/UL (ref 3.9–12.7)

## 2019-04-25 PROCEDURE — 99214 OFFICE O/P EST MOD 30 MIN: CPT | Mod: S$PBB,,, | Performed by: INTERNAL MEDICINE

## 2019-04-25 PROCEDURE — 85025 COMPLETE CBC W/AUTO DIFF WBC: CPT

## 2019-04-25 PROCEDURE — 36415 COLL VENOUS BLD VENIPUNCTURE: CPT

## 2019-04-25 PROCEDURE — 99999 PR PBB SHADOW E&M-EST. PATIENT-LVL III: ICD-10-PCS | Mod: PBBFAC,,, | Performed by: INTERNAL MEDICINE

## 2019-04-25 PROCEDURE — 99214 PR OFFICE/OUTPT VISIT, EST, LEVL IV, 30-39 MIN: ICD-10-PCS | Mod: S$PBB,,, | Performed by: INTERNAL MEDICINE

## 2019-04-25 PROCEDURE — 99999 PR PBB SHADOW E&M-EST. PATIENT-LVL III: CPT | Mod: PBBFAC,,, | Performed by: INTERNAL MEDICINE

## 2019-04-25 PROCEDURE — 99213 OFFICE O/P EST LOW 20 MIN: CPT | Mod: PBBFAC | Performed by: INTERNAL MEDICINE

## 2019-04-25 RX ORDER — LISINOPRIL 10 MG/1
5 TABLET ORAL DAILY
Qty: 45 TABLET | Refills: 3 | Status: ON HOLD
Start: 2019-04-25 | End: 2019-08-06 | Stop reason: SDUPTHER

## 2019-04-25 NOTE — PROGRESS NOTES
CC:  One-month follow-up    HPI:  Patient is a 84-year-old female a history a right middle cerebral stroke as well as transfusion for GI bleeding comes today for follow-up.  Patient's only complaint today is dizziness.  This happens daily, usually in the morning.  It will last for several hours.  When it occurs, she feels very weak.  She does not actually pass out.  This is single for several years.    ROS:  See patient review of [employer]agent for HPI/ROS submitted by the patient on 4/24/2019   activity change: No  unexpected weight change: No  neck pain: No  hearing loss: No  rhinorrhea: No  trouble swallowing: No  eye discharge: No  visual disturbance: No  chest tightness: No  wheezing: No  chest pain: Yes  palpitations: Yes  blood in stool: Yes  constipation: No  vomiting: No  diarrhea: Yes  polydipsia: No  polyuria: No  difficulty urinating: No  hematuria: No  menstrual problem: No  dysuria: No  joint swelling: No  arthralgias: No  headaches: No  weakness: No  confusion: No  dysphoric mood: No  Condition, patient reports blood pressure been running low at night systolic blood pressure 126 and 136.  Patient had an EGD and CTA.    PHYSICAL EXAM:  HEENT:  No acute distress. The is clear.  Patient had bilateral cataracts.  TMs are sclerosed.  Septum is midline without discharge. Oropharynx is without erythema.  Trachea is midline.  Pulmonary:  Good inspiratory, expiratory breath sounds heard.  Lungs are clear to auscultation.  Cardiovascular:  S1-S2  Extremities:  Trace edema.  GI:  Nontender nondistended.  Normal bowel.    Assessment:  1.  History of right middle cerebral CVA  2.  History of GI bleed status transfusion  3.  Dizziness  4.  Hypertension  5.  Hypothyroidism    Plan:  1.  Will await results of the 30 day monitor  2.  CBC today  3.  Decrease lisinopril 5 mg once  4.  The patient is follow up 1 month for re-evaluation.

## 2019-04-25 NOTE — PROGRESS NOTES
AAO but forgetful.  Lives w/son who was not present for visit.  NIHSS-1; does not smoke; refrains from adding salt, salty and fast food; compliant w/ all meds; has home health PT for exercise.  Education provided on goal of stroke mobile, s/s of stroke, diet, exercise, medications.  Verbalized understanding.  Encouraged to utilize stroke team for any concern.

## 2019-04-30 ENCOUNTER — HOME CARE VISIT (OUTPATIENT)
Dept: NEUROLOGY | Facility: HOSPITAL | Age: 84
End: 2019-04-30

## 2019-04-30 ENCOUNTER — EXTERNAL CHRONIC CARE MANAGEMENT (OUTPATIENT)
Dept: PRIMARY CARE CLINIC | Facility: CLINIC | Age: 84
End: 2019-04-30
Payer: MEDICARE

## 2019-04-30 PROCEDURE — 99490 CHRNC CARE MGMT STAFF 1ST 20: CPT | Mod: PBBFAC | Performed by: INTERNAL MEDICINE

## 2019-04-30 PROCEDURE — 99490 PR CHRONIC CARE MGMT, 1ST 20 MIN: ICD-10-PCS | Mod: S$PBB,,, | Performed by: INTERNAL MEDICINE

## 2019-04-30 PROCEDURE — 99490 CHRNC CARE MGMT STAFF 1ST 20: CPT | Mod: S$PBB,,, | Performed by: INTERNAL MEDICINE

## 2019-05-10 ENCOUNTER — PATIENT OUTREACH (OUTPATIENT)
Dept: ADMINISTRATIVE | Facility: HOSPITAL | Age: 84
End: 2019-05-10

## 2019-05-10 DIAGNOSIS — Z78.0 POSTMENOPAUSAL: Primary | ICD-10-CM

## 2019-05-10 NOTE — PROGRESS NOTES
Health Maintenance Due   Topic Date Due    DEXA SCAN  01/25/1975    Pneumococcal Vaccine (65+ Low/Medium Risk) (2 of 2 - PPSV23) 03/25/2018     Pre-visit audit complete.

## 2019-05-12 ENCOUNTER — PATIENT MESSAGE (OUTPATIENT)
Dept: INTERNAL MEDICINE | Facility: CLINIC | Age: 84
End: 2019-05-12

## 2019-05-20 ENCOUNTER — PATIENT MESSAGE (OUTPATIENT)
Dept: INTERNAL MEDICINE | Facility: CLINIC | Age: 84
End: 2019-05-20

## 2019-05-22 VITALS
HEART RATE: 74 BPM | DIASTOLIC BLOOD PRESSURE: 78 MMHG | SYSTOLIC BLOOD PRESSURE: 122 MMHG | OXYGEN SATURATION: 97 % | RESPIRATION RATE: 20 BRPM

## 2019-05-23 NOTE — PROGRESS NOTES
AAOx3 but forgetul.  Lives w/ son who was not present for visit.  NIHSS-1; does not smoke; refrains from adding salt, salty and fast food; compliant w/ all meds; no consistent form of exercise noted.  Education provided on goal of stroke mobile, s/s of stroke, diet, exercise, medications.  Understanding verbalized.  Encouraged to utilize stroke team for any concern.

## 2019-05-24 ENCOUNTER — OFFICE VISIT (OUTPATIENT)
Dept: INTERNAL MEDICINE | Facility: CLINIC | Age: 84
End: 2019-05-24
Payer: MEDICARE

## 2019-05-24 ENCOUNTER — LAB VISIT (OUTPATIENT)
Dept: LAB | Facility: HOSPITAL | Age: 84
End: 2019-05-24
Attending: INTERNAL MEDICINE
Payer: MEDICARE

## 2019-05-24 VITALS
WEIGHT: 160.06 LBS | DIASTOLIC BLOOD PRESSURE: 68 MMHG | HEART RATE: 71 BPM | SYSTOLIC BLOOD PRESSURE: 126 MMHG | HEIGHT: 57 IN | BODY MASS INDEX: 34.53 KG/M2 | TEMPERATURE: 98 F | OXYGEN SATURATION: 98 %

## 2019-05-24 DIAGNOSIS — R42 DIZZINESS: Primary | ICD-10-CM

## 2019-05-24 DIAGNOSIS — R42 DIZZINESS: ICD-10-CM

## 2019-05-24 DIAGNOSIS — I63.319 CEREBROVASCULAR ACCIDENT (CVA) DUE TO THROMBOSIS OF MIDDLE CEREBRAL ARTERY, UNSPECIFIED BLOOD VESSEL LATERALITY: ICD-10-CM

## 2019-05-24 DIAGNOSIS — Z87.19 H/O: GI BLEED: ICD-10-CM

## 2019-05-24 DIAGNOSIS — I10 ESSENTIAL HYPERTENSION: ICD-10-CM

## 2019-05-24 DIAGNOSIS — G47.9 TROUBLE IN SLEEPING: ICD-10-CM

## 2019-05-24 LAB
ALBUMIN SERPL BCP-MCNC: 3.6 G/DL (ref 3.5–5.2)
ALP SERPL-CCNC: 67 U/L (ref 55–135)
ALT SERPL W/O P-5'-P-CCNC: 12 U/L (ref 10–44)
ANION GAP SERPL CALC-SCNC: 11 MMOL/L (ref 8–16)
AST SERPL-CCNC: 16 U/L (ref 10–40)
BASOPHILS # BLD AUTO: 0.09 K/UL (ref 0–0.2)
BASOPHILS NFR BLD: 0.9 % (ref 0–1.9)
BILIRUB SERPL-MCNC: 0.3 MG/DL (ref 0.1–1)
BUN SERPL-MCNC: 17 MG/DL (ref 8–23)
CALCIUM SERPL-MCNC: 9.6 MG/DL (ref 8.7–10.5)
CHLORIDE SERPL-SCNC: 100 MMOL/L (ref 95–110)
CO2 SERPL-SCNC: 28 MMOL/L (ref 23–29)
CREAT SERPL-MCNC: 0.8 MG/DL (ref 0.5–1.4)
DIFFERENTIAL METHOD: ABNORMAL
EOSINOPHIL # BLD AUTO: 0.4 K/UL (ref 0–0.5)
EOSINOPHIL NFR BLD: 4.2 % (ref 0–8)
ERYTHROCYTE [DISTWIDTH] IN BLOOD BY AUTOMATED COUNT: 14.5 % (ref 11.5–14.5)
EST. GFR  (AFRICAN AMERICAN): >60 ML/MIN/1.73 M^2
EST. GFR  (NON AFRICAN AMERICAN): >60 ML/MIN/1.73 M^2
GLUCOSE SERPL-MCNC: 94 MG/DL (ref 70–110)
HCT VFR BLD AUTO: 34 % (ref 37–48.5)
HGB BLD-MCNC: 10.5 G/DL (ref 12–16)
LYMPHOCYTES # BLD AUTO: 1.9 K/UL (ref 1–4.8)
LYMPHOCYTES NFR BLD: 18.4 % (ref 18–48)
MCH RBC QN AUTO: 25.7 PG (ref 27–31)
MCHC RBC AUTO-ENTMCNC: 30.9 G/DL (ref 32–36)
MCV RBC AUTO: 83 FL (ref 82–98)
MONOCYTES # BLD AUTO: 1 K/UL (ref 0.3–1)
MONOCYTES NFR BLD: 9.5 % (ref 4–15)
NEUTROPHILS # BLD AUTO: 6.9 K/UL (ref 1.8–7.7)
NEUTROPHILS NFR BLD: 66.6 % (ref 38–73)
PLATELET # BLD AUTO: 276 K/UL (ref 150–350)
PMV BLD AUTO: 11 FL (ref 9.2–12.9)
POTASSIUM SERPL-SCNC: 3.8 MMOL/L (ref 3.5–5.1)
PROT SERPL-MCNC: 7.1 G/DL (ref 6–8.4)
RBC # BLD AUTO: 4.08 M/UL (ref 4–5.4)
SODIUM SERPL-SCNC: 139 MMOL/L (ref 136–145)
WBC # BLD AUTO: 10.32 K/UL (ref 3.9–12.7)

## 2019-05-24 PROCEDURE — 99999 PR PBB SHADOW E&M-EST. PATIENT-LVL IV: ICD-10-PCS | Mod: PBBFAC,,, | Performed by: INTERNAL MEDICINE

## 2019-05-24 PROCEDURE — 36415 COLL VENOUS BLD VENIPUNCTURE: CPT

## 2019-05-24 PROCEDURE — 85025 COMPLETE CBC W/AUTO DIFF WBC: CPT

## 2019-05-24 PROCEDURE — 99999 PR PBB SHADOW E&M-EST. PATIENT-LVL IV: CPT | Mod: PBBFAC,,, | Performed by: INTERNAL MEDICINE

## 2019-05-24 PROCEDURE — 80053 COMPREHEN METABOLIC PANEL: CPT

## 2019-05-24 PROCEDURE — 99214 PR OFFICE/OUTPT VISIT, EST, LEVL IV, 30-39 MIN: ICD-10-PCS | Mod: S$PBB,,, | Performed by: INTERNAL MEDICINE

## 2019-05-24 PROCEDURE — 99214 OFFICE O/P EST MOD 30 MIN: CPT | Mod: PBBFAC | Performed by: INTERNAL MEDICINE

## 2019-05-24 PROCEDURE — 99214 OFFICE O/P EST MOD 30 MIN: CPT | Mod: S$PBB,,, | Performed by: INTERNAL MEDICINE

## 2019-05-24 RX ORDER — TRAZODONE HYDROCHLORIDE 50 MG/1
50 TABLET ORAL NIGHTLY
Qty: 30 TABLET | Refills: 11 | Status: SHIPPED | OUTPATIENT
Start: 2019-05-24 | End: 2020-04-16 | Stop reason: SDUPTHER

## 2019-05-24 NOTE — PROGRESS NOTES
CHIEF COMPLAINT:  One-month followup.    HISTORY OF PRESENT ILLNESS:  The patient is an 84-year-old female who we last   saw as a followup of a right middle cerebral artery stroke as well as   transfusion for GI bleed.  The patient was complaining of dizziness still.  Her   lisinopril was decreased to 5 mg once a day.  The patient states that she is   still having problems with feeling dizzy.  It occurs at night and also in the   morning and she felt she was going to pass out.  She did have an event recorder   placed.  The results returned in.  It showed a sinus rhythm, sometimes sinus   tachycardia or bradycardia.    REVIEW OF SYSTEMS:  Please see the patient entered review of systems.  The   patient reports she has not actually passed out.  She is having trouble   sleeping.  Some nights she stays awake all night.  She is inquiring whether she   can have a refill of her alprazolam 0.5 mg.  She has been off this medication   for quite some time.    PHYSICAL EXAMINATION:  GENERAL APPEARANCE:  No acute distress.  PULMONARY:  Good inspiratory, expiratory breath sounds are heard.  Lungs are   clear to auscultation.  CARDIOVASCULAR:  S1, S2.  Rhythm appears to be normal.  EXTREMITIES:  With trace edema.  ABDOMEN:  Nontender, nondistended, without hepatosplenomegaly.    ASSESSMENT:  1.  Dizziness.  2.  History of cerebrovascular accident.  3.  History of gastrointestinal bleed.  4.   Hypertension.  5.  Trouble sleeping.    PLAN:  We will put the patient in for CBC, CMP.  She is to continue with   lisinopril at current dose.  She will follow up with us pending results of labs.      JDS/HN  dd: 05/24/2019 13:52:00 (CDT)  td: 05/25/2019 00:44:58 (CDT)  Doc ID   #[id number]  Job ID #[id number]    CC:     Answers for HPI/ROS submitted by the patient on 5/23/2019   activity change: No  unexpected weight change: No  neck pain: No  hearing loss: No  rhinorrhea: No  trouble swallowing: No  eye discharge: No  visual disturbance: No  chest  tightness: No  wheezing: No  chest pain: No  palpitations: Yes  blood in stool: No  constipation: No  vomiting: No  diarrhea: No  polydipsia: No  polyuria: No  difficulty urinating: No  hematuria: No  menstrual problem: No  dysuria: No  joint swelling: No  arthralgias: Yes  headaches: Yes  weakness: No  confusion: No  dysphoric mood: No

## 2019-05-27 ENCOUNTER — TELEPHONE (OUTPATIENT)
Dept: INTERNAL MEDICINE | Facility: CLINIC | Age: 84
End: 2019-05-27

## 2019-05-27 NOTE — TELEPHONE ENCOUNTER
----- Message from Yakov Barrett MD sent at 5/27/2019  6:52 AM CDT -----  Please call the patient regarding her abnormal result. Notify that her blood test shows that she is slightly on the anemic side. It is stable. She needs to keep her appointment with her gastroenterologist ( Dr. Tang).

## 2019-05-28 ENCOUNTER — HOME CARE VISIT (OUTPATIENT)
Dept: NEUROLOGY | Facility: HOSPITAL | Age: 84
End: 2019-05-28

## 2019-05-28 ENCOUNTER — PATIENT MESSAGE (OUTPATIENT)
Dept: INTERNAL MEDICINE | Facility: CLINIC | Age: 84
End: 2019-05-28

## 2019-05-29 ENCOUNTER — TELEPHONE (OUTPATIENT)
Dept: INTERNAL MEDICINE | Facility: CLINIC | Age: 84
End: 2019-05-29

## 2019-05-29 NOTE — TELEPHONE ENCOUNTER
----- Message from Teresita Dawn sent at 5/29/2019  3:20 PM CDT -----  Contact: Melani/Daughter in law/[phone]  Melani called in regards needing to talk with Dr Barrett medical assistant about the even monitor results on the patient. Please call and advise. Thank you

## 2019-05-30 ENCOUNTER — TELEPHONE (OUTPATIENT)
Dept: INTERNAL MEDICINE | Facility: CLINIC | Age: 84
End: 2019-05-30

## 2019-05-30 NOTE — TELEPHONE ENCOUNTER
----- Message from Yakov Barrett MD sent at 5/29/2019  8:33 PM CDT -----  Please see if you locate the results of patient's cardiac event recorder. Results are still not on Epic.

## 2019-05-30 NOTE — TELEPHONE ENCOUNTER
Daughter contacted the practice 5/28/2019 and are asking for the results of the test. I was not given the results to report to the patient.

## 2019-05-30 NOTE — TELEPHONE ENCOUNTER
Double click on 05/24/2019 office visit scroll down a little and you will see Document on 5/24/2019 1:32 PM by Ramses Mccarthy: Telerhythmics Report  There is 255 pages. You can only access it from the office visit, I'll print it if you would like me to please let me know.    Please advise,  Thank You.

## 2019-05-30 NOTE — TELEPHONE ENCOUNTER
The results are under Chart Review, Encounter, AVS then scroll down to Document on 5/24/2019 1:32 PM by Ramses Mccarthy: Telerhythmics Report  Click on it: it is a 255 page report. I do not know why the department did not put it where it belong but this is where I found it.

## 2019-05-31 ENCOUNTER — EXTERNAL CHRONIC CARE MANAGEMENT (OUTPATIENT)
Dept: PRIMARY CARE CLINIC | Facility: CLINIC | Age: 84
End: 2019-05-31
Payer: MEDICARE

## 2019-05-31 ENCOUNTER — TELEPHONE (OUTPATIENT)
Dept: INTERNAL MEDICINE | Facility: CLINIC | Age: 84
End: 2019-05-31

## 2019-05-31 PROCEDURE — 99490 CHRNC CARE MGMT STAFF 1ST 20: CPT | Mod: PBBFAC | Performed by: INTERNAL MEDICINE

## 2019-05-31 PROCEDURE — 99490 CHRNC CARE MGMT STAFF 1ST 20: CPT | Mod: S$PBB,,, | Performed by: INTERNAL MEDICINE

## 2019-05-31 PROCEDURE — 99490 PR CHRONIC CARE MGMT, 1ST 20 MIN: ICD-10-PCS | Mod: S$PBB,,, | Performed by: INTERNAL MEDICINE

## 2019-05-31 NOTE — TELEPHONE ENCOUNTER
Calling daughter in law about results of Event Results are under Media tab encounter date 04/10/2019 date uploaded 05/24/19 document type Cardiology-Telerhythmics Report.

## 2019-05-31 NOTE — TELEPHONE ENCOUNTER
----- Message from Kayley James sent at 5/31/2019 12:21 PM CDT -----  Contact: daughter-in-law/nerissa/[phone]  Pt called in regards to talking to the office about locate the results of patient's cardiac event recorder.      Please advise

## 2019-06-04 ENCOUNTER — TELEPHONE (OUTPATIENT)
Dept: INTERNAL MEDICINE | Facility: CLINIC | Age: 84
End: 2019-06-04

## 2019-06-04 NOTE — TELEPHONE ENCOUNTER
----- Message from Kayley James sent at 6/3/2019 10:20 AM CDT -----  Contact: daughter-in-law/nerissa/[phone]  Pt daughter-in-law called in regards to checking the status of her first message about results of Event Results are under Media tab encounter date 04/10/2019 date uploaded 05/24/19 document type Cardiology-Telerhythmics Report.      Please advise

## 2019-06-04 NOTE — TELEPHONE ENCOUNTER
Spoke with Ramses Mccarthy in Cariology and she has the report but it has not been assigned to a Physician to be read. Possibly by the end of this week we will have the results but surely not by the end of today.  Spoke with Mrs. Lee informed her that the results are not in and ensured her that we should have something before weeks end.

## 2019-06-06 ENCOUNTER — TELEPHONE (OUTPATIENT)
Dept: INTERNAL MEDICINE | Facility: CLINIC | Age: 84
End: 2019-06-06

## 2019-06-06 NOTE — TELEPHONE ENCOUNTER
----- Message from Yakov Barrett MD sent at 6/5/2019  7:08 PM CDT -----  Please contact patient. Notify that her event recorder readings were normal.

## 2019-06-06 NOTE — TELEPHONE ENCOUNTER
----- Message from Lionel Carvalho sent at 6/6/2019  4:07 PM CDT -----  Contact: Patient [phone]  Patient is returning a phone call.  Who left a message for the patient: Dr llamas   Does patient know what this is regarding:  Not sure of call  Comments:     Please call an advise  Thank you

## 2019-06-06 NOTE — TELEPHONE ENCOUNTER
Attempted to contact pt no success, left voice message.  Spoke with pt's daughter in law Rosa informed of results's and she stated that they received the portal msg as well and was reassured.

## 2019-06-13 ENCOUNTER — PATIENT MESSAGE (OUTPATIENT)
Dept: INTERNAL MEDICINE | Facility: CLINIC | Age: 84
End: 2019-06-13

## 2019-06-17 ENCOUNTER — TELEPHONE (OUTPATIENT)
Dept: NEUROLOGY | Facility: CLINIC | Age: 84
End: 2019-06-17

## 2019-06-21 ENCOUNTER — OFFICE VISIT (OUTPATIENT)
Dept: GASTROENTEROLOGY | Facility: CLINIC | Age: 84
End: 2019-06-21
Payer: MEDICARE

## 2019-06-21 VITALS
HEIGHT: 57 IN | DIASTOLIC BLOOD PRESSURE: 70 MMHG | SYSTOLIC BLOOD PRESSURE: 166 MMHG | BODY MASS INDEX: 34.57 KG/M2 | WEIGHT: 160.25 LBS | HEART RATE: 78 BPM

## 2019-06-21 DIAGNOSIS — K22.70 BARRETT'S ESOPHAGUS WITHOUT DYSPLASIA: Primary | ICD-10-CM

## 2019-06-21 PROCEDURE — 99999 PR PBB SHADOW E&M-EST. PATIENT-LVL III: CPT | Mod: PBBFAC,,, | Performed by: INTERNAL MEDICINE

## 2019-06-21 PROCEDURE — 99213 OFFICE O/P EST LOW 20 MIN: CPT | Mod: PBBFAC | Performed by: INTERNAL MEDICINE

## 2019-06-21 PROCEDURE — 99213 OFFICE O/P EST LOW 20 MIN: CPT | Mod: S$PBB,,, | Performed by: INTERNAL MEDICINE

## 2019-06-21 PROCEDURE — 99213 PR OFFICE/OUTPT VISIT, EST, LEVL III, 20-29 MIN: ICD-10-PCS | Mod: S$PBB,,, | Performed by: INTERNAL MEDICINE

## 2019-06-21 PROCEDURE — 99999 PR PBB SHADOW E&M-EST. PATIENT-LVL III: ICD-10-PCS | Mod: PBBFAC,,, | Performed by: INTERNAL MEDICINE

## 2019-06-21 NOTE — LETTER
June 21, 2019      Yakov Barrett MD  [address]  [place] [address]           [facility] - Gastroenterology  [address]  [place] [address]  Phone: [phone]  Fax: [phone]          Patient: Linda Hylton   MR Number: [id number]   YOB: 1935   Date of Visit: 6/21/2019       Dear Dr. Yakov Barrett:    Thank you for referring Linda Hylton to me for evaluation. Attached you will find relevant portions of my assessment and plan of care.    If you have questions, please do not hesitate to call me. I look forward to following Linda Hylton along with you.    Sincerely,    Keven Tang MD    Enclosure  CC:  No Recipients    If you would like to receive this communication electronically, please contact [email or web address] or [phone] to request more information on [employer] Link access.    For providers and/or their staff who would like to refer a patient to [facility], please contact us through our one-stop-shop provider referral line, [facility], at [phone].    If you feel you have received this communication in error or would no longer like to receive these types of communications, please e-mail [email or web address]

## 2019-06-25 ENCOUNTER — HOME CARE VISIT (OUTPATIENT)
Dept: NEUROLOGY | Facility: HOSPITAL | Age: 84
End: 2019-06-25

## 2019-06-25 VITALS
SYSTOLIC BLOOD PRESSURE: 122 MMHG | DIASTOLIC BLOOD PRESSURE: 90 MMHG | HEART RATE: 70 BPM | OXYGEN SATURATION: 97 % | RESPIRATION RATE: 20 BRPM

## 2019-06-26 NOTE — PROGRESS NOTES
AAOx3 but forgetful.  Lives w/ son who was not present for visit.  NIHSS-1; does not smoke; refrains from adding salt, salty and fast food; compliant w/ all meds; performs arm and leg exercises daily.  Education provided on goal of stroke mobile, s/s of stroke, diet, exercise, medications.  Understanding verbalized. Encouraged to utilize stroke team for any concern.

## 2019-06-28 RX ORDER — MECLIZINE HYDROCHLORIDE 25 MG/1
25 TABLET ORAL 3 TIMES DAILY PRN
Qty: 90 TABLET | Refills: 0 | OUTPATIENT
Start: 2019-06-28

## 2019-06-30 ENCOUNTER — EXTERNAL CHRONIC CARE MANAGEMENT (OUTPATIENT)
Dept: PRIMARY CARE CLINIC | Facility: CLINIC | Age: 84
End: 2019-06-30
Payer: MEDICARE

## 2019-06-30 PROCEDURE — 99490 CHRNC CARE MGMT STAFF 1ST 20: CPT | Mod: PBBFAC | Performed by: INTERNAL MEDICINE

## 2019-06-30 PROCEDURE — 99490 CHRNC CARE MGMT STAFF 1ST 20: CPT | Mod: S$PBB,,, | Performed by: INTERNAL MEDICINE

## 2019-06-30 PROCEDURE — 99490 PR CHRONIC CARE MGMT, 1ST 20 MIN: ICD-10-PCS | Mod: S$PBB,,, | Performed by: INTERNAL MEDICINE

## 2019-07-25 DIAGNOSIS — R42 DIZZINESS: Primary | ICD-10-CM

## 2019-07-25 RX ORDER — MECLIZINE HYDROCHLORIDE 25 MG/1
25 TABLET ORAL 3 TIMES DAILY PRN
Qty: 90 TABLET | Refills: 3 | Status: SHIPPED | OUTPATIENT
Start: 2019-07-25 | End: 2020-03-01

## 2019-07-25 NOTE — TELEPHONE ENCOUNTER
----- Message from Ekaterina Olmos sent at 7/25/2019  3:22 PM CDT -----  Contact: Son [phone]  Patient is calling for an RX refill or new RX.  Is this a refill or new RX:  New rx  RX name and strength: meclizine (ANTIVERT) 25 mg tablet  Directions (copy/paste from chart):  Take 1 tablet (25 mg total) by mouth 3 (three) times daily as needed for Dizziness. - Oral  Is this a 30 day or 90 day RX:    Local pharmacy or mail order pharmacy:  local  Pharmacy name and phone # [id number] - [place] - [address] AT [facility] [phone] (Phone) [phone] (Fax)    Please call and advise  Thank you

## 2019-07-25 NOTE — PROGRESS NOTES
Phone visit as follow-up to stroke event.  Lives w/ son who is her primary caregiver.  Denies s/s of stroke; reports refraining from adding salt, salty and fast food; compliant w/ all meds; perform leg/arm exercises.   Education provided on goal of stroke mobile, s/s of stroke, diet, exercise, medications.  Understanding verbalized.  Encouraged to utilize stroke team for any concern.

## 2019-07-26 ENCOUNTER — HOME CARE VISIT (OUTPATIENT)
Dept: NEUROLOGY | Facility: HOSPITAL | Age: 84
End: 2019-07-26

## 2019-07-28 ENCOUNTER — HOSPITAL ENCOUNTER (INPATIENT)
Facility: HOSPITAL | Age: 84
LOS: 8 days | Discharge: HOME HEALTH | DRG: 417 | End: 2019-08-06
Attending: EMERGENCY MEDICINE | Admitting: HOSPITALIST
Payer: MEDICARE

## 2019-07-28 DIAGNOSIS — I35.0 NONRHEUMATIC AORTIC VALVE STENOSIS: ICD-10-CM

## 2019-07-28 DIAGNOSIS — K85.10 ACUTE BILIARY PANCREATITIS, UNSPECIFIED COMPLICATION STATUS: ICD-10-CM

## 2019-07-28 DIAGNOSIS — R10.9 ABDOMINAL PAIN: ICD-10-CM

## 2019-07-28 DIAGNOSIS — K81.9 CHOLECYSTITIS: Primary | ICD-10-CM

## 2019-07-28 DIAGNOSIS — R00.0 TACHYCARDIA: ICD-10-CM

## 2019-07-28 LAB
ALBUMIN SERPL BCP-MCNC: 3.3 G/DL (ref 3.5–5.2)
ALP SERPL-CCNC: 95 U/L (ref 55–135)
ALT SERPL W/O P-5'-P-CCNC: 171 U/L (ref 10–44)
ANION GAP SERPL CALC-SCNC: 13 MMOL/L (ref 8–16)
AST SERPL-CCNC: 260 U/L (ref 10–40)
BASOPHILS # BLD AUTO: 0.05 K/UL (ref 0–0.2)
BASOPHILS NFR BLD: 0.2 % (ref 0–1.9)
BILIRUB SERPL-MCNC: 1.6 MG/DL (ref 0.1–1)
BUN SERPL-MCNC: 19 MG/DL (ref 8–23)
CALCIUM SERPL-MCNC: 9.8 MG/DL (ref 8.7–10.5)
CHLORIDE SERPL-SCNC: 92 MMOL/L (ref 95–110)
CO2 SERPL-SCNC: 28 MMOL/L (ref 23–29)
CREAT SERPL-MCNC: 0.8 MG/DL (ref 0.5–1.4)
DIFFERENTIAL METHOD: ABNORMAL
EOSINOPHIL # BLD AUTO: 0 K/UL (ref 0–0.5)
EOSINOPHIL NFR BLD: 0.1 % (ref 0–8)
ERYTHROCYTE [DISTWIDTH] IN BLOOD BY AUTOMATED COUNT: 14.6 % (ref 11.5–14.5)
EST. GFR  (AFRICAN AMERICAN): >60 ML/MIN/1.73 M^2
EST. GFR  (NON AFRICAN AMERICAN): >60 ML/MIN/1.73 M^2
GLUCOSE SERPL-MCNC: 134 MG/DL (ref 70–110)
HCT VFR BLD AUTO: 39.3 % (ref 37–48.5)
HGB BLD-MCNC: 12.9 G/DL (ref 12–16)
IMM GRANULOCYTES # BLD AUTO: 0.11 K/UL (ref 0–0.04)
IMM GRANULOCYTES NFR BLD AUTO: 0.5 % (ref 0–0.5)
LACTATE SERPL-SCNC: 2.1 MMOL/L (ref 0.5–2.2)
LYMPHOCYTES # BLD AUTO: 1.2 K/UL (ref 1–4.8)
LYMPHOCYTES NFR BLD: 5.4 % (ref 18–48)
MCH RBC QN AUTO: 26.5 PG (ref 27–31)
MCHC RBC AUTO-ENTMCNC: 32.8 G/DL (ref 32–36)
MCV RBC AUTO: 81 FL (ref 82–98)
MONOCYTES # BLD AUTO: 1.7 K/UL (ref 0.3–1)
MONOCYTES NFR BLD: 8 % (ref 4–15)
NEUTROPHILS # BLD AUTO: 18.5 K/UL (ref 1.8–7.7)
NEUTROPHILS NFR BLD: 85.8 % (ref 38–73)
NRBC BLD-RTO: 0 /100 WBC
PLATELET # BLD AUTO: 330 K/UL (ref 150–350)
PMV BLD AUTO: 10.5 FL (ref 9.2–12.9)
POTASSIUM SERPL-SCNC: 2.8 MMOL/L (ref 3.5–5.1)
PROT SERPL-MCNC: 7.5 G/DL (ref 6–8.4)
RBC # BLD AUTO: 4.86 M/UL (ref 4–5.4)
SODIUM SERPL-SCNC: 133 MMOL/L (ref 136–145)
WBC # BLD AUTO: 21.61 K/UL (ref 3.9–12.7)

## 2019-07-28 PROCEDURE — 25500020 PHARM REV CODE 255: Performed by: EMERGENCY MEDICINE

## 2019-07-28 PROCEDURE — 99285 EMERGENCY DEPT VISIT HI MDM: CPT | Mod: 25

## 2019-07-28 PROCEDURE — 93005 ELECTROCARDIOGRAM TRACING: CPT

## 2019-07-28 PROCEDURE — 84100 ASSAY OF PHOSPHORUS: CPT

## 2019-07-28 PROCEDURE — 85025 COMPLETE CBC W/AUTO DIFF WBC: CPT

## 2019-07-28 PROCEDURE — 83735 ASSAY OF MAGNESIUM: CPT

## 2019-07-28 PROCEDURE — 93010 EKG 12-LEAD: ICD-10-PCS | Mod: ,,, | Performed by: INTERNAL MEDICINE

## 2019-07-28 PROCEDURE — 99285 EMERGENCY DEPT VISIT HI MDM: CPT | Mod: ,,, | Performed by: EMERGENCY MEDICINE

## 2019-07-28 PROCEDURE — 83690 ASSAY OF LIPASE: CPT

## 2019-07-28 PROCEDURE — 96365 THER/PROPH/DIAG IV INF INIT: CPT

## 2019-07-28 PROCEDURE — 99285 PR EMERGENCY DEPT VISIT,LEVEL V: ICD-10-PCS | Mod: ,,, | Performed by: EMERGENCY MEDICINE

## 2019-07-28 PROCEDURE — 80053 COMPREHEN METABOLIC PANEL: CPT

## 2019-07-28 PROCEDURE — 93010 ELECTROCARDIOGRAM REPORT: CPT | Mod: ,,, | Performed by: INTERNAL MEDICINE

## 2019-07-28 PROCEDURE — 83605 ASSAY OF LACTIC ACID: CPT

## 2019-07-28 RX ORDER — POTASSIUM CHLORIDE 7.45 MG/ML
10 INJECTION INTRAVENOUS
Status: COMPLETED | OUTPATIENT
Start: 2019-07-29 | End: 2019-07-29

## 2019-07-28 RX ADMIN — IOHEXOL 75 ML: 350 INJECTION, SOLUTION INTRAVENOUS at 11:07

## 2019-07-29 ENCOUNTER — ANESTHESIA (OUTPATIENT)
Dept: SURGERY | Facility: HOSPITAL | Age: 84
DRG: 417 | End: 2019-07-29
Payer: MEDICARE

## 2019-07-29 ENCOUNTER — ANESTHESIA EVENT (OUTPATIENT)
Dept: SURGERY | Facility: HOSPITAL | Age: 84
DRG: 417 | End: 2019-07-29
Payer: MEDICARE

## 2019-07-29 PROBLEM — K80.50 CHOLEDOCHOLITHIASIS: Status: ACTIVE | Noted: 2019-07-29

## 2019-07-29 PROBLEM — R11.0 NAUSEA: Status: ACTIVE | Noted: 2019-07-29

## 2019-07-29 PROBLEM — K81.9 CHOLECYSTITIS: Status: ACTIVE | Noted: 2019-07-29

## 2019-07-29 PROBLEM — E87.6 HYPOKALEMIA: Status: ACTIVE | Noted: 2019-07-29

## 2019-07-29 PROBLEM — E83.39 HYPOPHOSPHATEMIA: Status: ACTIVE | Noted: 2019-07-29

## 2019-07-29 PROBLEM — K85.10 ACUTE BILIARY PANCREATITIS: Status: ACTIVE | Noted: 2019-07-29

## 2019-07-29 PROBLEM — R53.81 DEBILITY: Status: ACTIVE | Noted: 2019-07-29

## 2019-07-29 PROBLEM — R79.89 ABNORMAL LFTS: Status: ACTIVE | Noted: 2019-07-29

## 2019-07-29 PROBLEM — E86.9 VOLUME DEPLETION: Status: ACTIVE | Noted: 2019-07-29

## 2019-07-29 PROBLEM — R91.1 INCIDENTAL LUNG NODULE, > 3MM AND < 8MM: Status: ACTIVE | Noted: 2019-07-29

## 2019-07-29 LAB
ALBUMIN SERPL BCP-MCNC: 2.8 G/DL (ref 3.5–5.2)
ALP SERPL-CCNC: 100 U/L (ref 55–135)
ALT SERPL W/O P-5'-P-CCNC: 172 U/L (ref 10–44)
ANION GAP SERPL CALC-SCNC: 10 MMOL/L (ref 8–16)
AST SERPL-CCNC: 185 U/L (ref 10–40)
BACTERIA #/AREA URNS AUTO: NORMAL /HPF
BASOPHILS # BLD AUTO: 0.04 K/UL (ref 0–0.2)
BASOPHILS NFR BLD: 0.3 % (ref 0–1.9)
BILIRUB SERPL-MCNC: 1 MG/DL (ref 0.1–1)
BILIRUB UR QL STRIP: NEGATIVE
BUN SERPL-MCNC: 15 MG/DL (ref 8–23)
CALCIUM SERPL-MCNC: 9.2 MG/DL (ref 8.7–10.5)
CHLORIDE SERPL-SCNC: 99 MMOL/L (ref 95–110)
CLARITY UR REFRACT.AUTO: CLEAR
CO2 SERPL-SCNC: 27 MMOL/L (ref 23–29)
COLOR UR AUTO: YELLOW
CREAT SERPL-MCNC: 0.7 MG/DL (ref 0.5–1.4)
DIFFERENTIAL METHOD: ABNORMAL
EOSINOPHIL # BLD AUTO: 0.2 K/UL (ref 0–0.5)
EOSINOPHIL NFR BLD: 1.2 % (ref 0–8)
ERYTHROCYTE [DISTWIDTH] IN BLOOD BY AUTOMATED COUNT: 14.7 % (ref 11.5–14.5)
EST. GFR  (AFRICAN AMERICAN): >60 ML/MIN/1.73 M^2
EST. GFR  (NON AFRICAN AMERICAN): >60 ML/MIN/1.73 M^2
GLUCOSE SERPL-MCNC: 86 MG/DL (ref 70–110)
GLUCOSE UR QL STRIP: NEGATIVE
HCT VFR BLD AUTO: 35.8 % (ref 37–48.5)
HGB BLD-MCNC: 11.7 G/DL (ref 12–16)
HGB UR QL STRIP: ABNORMAL
HYALINE CASTS UR QL AUTO: 0 /LPF
IMM GRANULOCYTES # BLD AUTO: 0.06 K/UL (ref 0–0.04)
IMM GRANULOCYTES NFR BLD AUTO: 0.5 % (ref 0–0.5)
KETONES UR QL STRIP: NEGATIVE
LACTATE SERPL-SCNC: 0.8 MMOL/L (ref 0.5–2.2)
LEUKOCYTE ESTERASE UR QL STRIP: NEGATIVE
LIPASE SERPL-CCNC: >1000 U/L (ref 4–60)
LYMPHOCYTES # BLD AUTO: 1.9 K/UL (ref 1–4.8)
LYMPHOCYTES NFR BLD: 14.8 % (ref 18–48)
MAGNESIUM SERPL-MCNC: 1.6 MG/DL (ref 1.6–2.6)
MAGNESIUM SERPL-MCNC: 1.8 MG/DL (ref 1.6–2.6)
MCH RBC QN AUTO: 26.1 PG (ref 27–31)
MCHC RBC AUTO-ENTMCNC: 32.7 G/DL (ref 32–36)
MCV RBC AUTO: 80 FL (ref 82–98)
MICROSCOPIC COMMENT: NORMAL
MONOCYTES # BLD AUTO: 1.2 K/UL (ref 0.3–1)
MONOCYTES NFR BLD: 9.2 % (ref 4–15)
NEUTROPHILS # BLD AUTO: 9.7 K/UL (ref 1.8–7.7)
NEUTROPHILS NFR BLD: 74 % (ref 38–73)
NITRITE UR QL STRIP: NEGATIVE
NRBC BLD-RTO: 0 /100 WBC
PH UR STRIP: 7 [PH] (ref 5–8)
PHOSPHATE SERPL-MCNC: 2.2 MG/DL (ref 2.7–4.5)
PHOSPHATE SERPL-MCNC: 2.6 MG/DL (ref 2.7–4.5)
PLATELET # BLD AUTO: 280 K/UL (ref 150–350)
PMV BLD AUTO: 11 FL (ref 9.2–12.9)
POTASSIUM SERPL-SCNC: 3.2 MMOL/L (ref 3.5–5.1)
PROT SERPL-MCNC: 6.4 G/DL (ref 6–8.4)
PROT UR QL STRIP: ABNORMAL
RBC # BLD AUTO: 4.49 M/UL (ref 4–5.4)
RBC #/AREA URNS AUTO: 1 /HPF (ref 0–4)
SODIUM SERPL-SCNC: 136 MMOL/L (ref 136–145)
SP GR UR STRIP: 1.02 (ref 1–1.03)
SQUAMOUS #/AREA URNS AUTO: 1 /HPF
TROPONIN I SERPL DL<=0.01 NG/ML-MCNC: 0.04 NG/ML (ref 0–0.03)
TROPONIN I SERPL DL<=0.01 NG/ML-MCNC: 0.05 NG/ML (ref 0–0.03)
URN SPEC COLLECT METH UR: ABNORMAL
WBC # BLD AUTO: 13.13 K/UL (ref 3.9–12.7)
WBC #/AREA URNS AUTO: 1 /HPF (ref 0–5)

## 2019-07-29 PROCEDURE — D9220A PRA ANESTHESIA: Mod: ANES,,, | Performed by: ANESTHESIOLOGY

## 2019-07-29 PROCEDURE — S0030 INJECTION, METRONIDAZOLE: HCPCS | Performed by: HOSPITALIST

## 2019-07-29 PROCEDURE — 63600175 PHARM REV CODE 636 W HCPCS: Performed by: NURSE ANESTHETIST, CERTIFIED REGISTERED

## 2019-07-29 PROCEDURE — 36415 COLL VENOUS BLD VENIPUNCTURE: CPT

## 2019-07-29 PROCEDURE — 63600175 PHARM REV CODE 636 W HCPCS: Performed by: EMERGENCY MEDICINE

## 2019-07-29 PROCEDURE — 99232 SBSQ HOSP IP/OBS MODERATE 35: CPT | Mod: ,,, | Performed by: HOSPITALIST

## 2019-07-29 PROCEDURE — D9220A PRA ANESTHESIA: ICD-10-PCS | Mod: ANES,,, | Performed by: ANESTHESIOLOGY

## 2019-07-29 PROCEDURE — 84484 ASSAY OF TROPONIN QUANT: CPT | Mod: 91

## 2019-07-29 PROCEDURE — 85025 COMPLETE CBC W/AUTO DIFF WBC: CPT

## 2019-07-29 PROCEDURE — 71000039 HC RECOVERY, EACH ADD'L HOUR: Performed by: SURGERY

## 2019-07-29 PROCEDURE — 94761 N-INVAS EAR/PLS OXIMETRY MLT: CPT

## 2019-07-29 PROCEDURE — 83605 ASSAY OF LACTIC ACID: CPT

## 2019-07-29 PROCEDURE — 88304 TISSUE EXAM BY PATHOLOGIST: CPT | Performed by: PATHOLOGY

## 2019-07-29 PROCEDURE — 81001 URINALYSIS AUTO W/SCOPE: CPT

## 2019-07-29 PROCEDURE — 36000708 HC OR TIME LEV III 1ST 15 MIN: Performed by: SURGERY

## 2019-07-29 PROCEDURE — 25000003 PHARM REV CODE 250: Performed by: HOSPITALIST

## 2019-07-29 PROCEDURE — 20600001 HC STEP DOWN PRIVATE ROOM

## 2019-07-29 PROCEDURE — 88304 TISSUE EXAM BY PATHOLOGIST: CPT | Mod: 26,,, | Performed by: PATHOLOGY

## 2019-07-29 PROCEDURE — 99232 PR SUBSEQUENT HOSPITAL CARE,LEVL II: ICD-10-PCS | Mod: ,,, | Performed by: HOSPITALIST

## 2019-07-29 PROCEDURE — 27201423 OPTIME MED/SURG SUP & DEVICES STERILE SUPPLY: Performed by: SURGERY

## 2019-07-29 PROCEDURE — 27000221 HC OXYGEN, UP TO 24 HOURS

## 2019-07-29 PROCEDURE — 87040 BLOOD CULTURE FOR BACTERIA: CPT | Mod: 59

## 2019-07-29 PROCEDURE — 71000033 HC RECOVERY, INTIAL HOUR: Performed by: SURGERY

## 2019-07-29 PROCEDURE — 99222 PR INITIAL HOSPITAL CARE,LEVL II: ICD-10-PCS | Mod: ,,, | Performed by: SURGERY

## 2019-07-29 PROCEDURE — 63600175 PHARM REV CODE 636 W HCPCS: Performed by: STUDENT IN AN ORGANIZED HEALTH CARE EDUCATION/TRAINING PROGRAM

## 2019-07-29 PROCEDURE — 80053 COMPREHEN METABOLIC PANEL: CPT

## 2019-07-29 PROCEDURE — 99222 1ST HOSP IP/OBS MODERATE 55: CPT | Mod: ,,, | Performed by: SURGERY

## 2019-07-29 PROCEDURE — 83735 ASSAY OF MAGNESIUM: CPT

## 2019-07-29 PROCEDURE — 25500020 PHARM REV CODE 255: Performed by: EMERGENCY MEDICINE

## 2019-07-29 PROCEDURE — 63600175 PHARM REV CODE 636 W HCPCS: Performed by: REGISTERED NURSE

## 2019-07-29 PROCEDURE — 25000003 PHARM REV CODE 250: Performed by: STUDENT IN AN ORGANIZED HEALTH CARE EDUCATION/TRAINING PROGRAM

## 2019-07-29 PROCEDURE — D9220A PRA ANESTHESIA: Mod: CRNA,,, | Performed by: REGISTERED NURSE

## 2019-07-29 PROCEDURE — 63600175 PHARM REV CODE 636 W HCPCS: Performed by: HOSPITALIST

## 2019-07-29 PROCEDURE — 37000009 HC ANESTHESIA EA ADD 15 MINS: Performed by: SURGERY

## 2019-07-29 PROCEDURE — 47562 PR LAP,CHOLECYSTECTOMY: ICD-10-PCS | Mod: ,,, | Performed by: SURGERY

## 2019-07-29 PROCEDURE — 25000003 PHARM REV CODE 250: Performed by: REGISTERED NURSE

## 2019-07-29 PROCEDURE — 88304 TISSUE SPECIMEN TO PATHOLOGY - SURGERY: ICD-10-PCS | Mod: 26,,, | Performed by: PATHOLOGY

## 2019-07-29 PROCEDURE — 37000008 HC ANESTHESIA 1ST 15 MINUTES: Performed by: SURGERY

## 2019-07-29 PROCEDURE — S0020 INJECTION, BUPIVICAINE HYDRO: HCPCS | Performed by: SURGERY

## 2019-07-29 PROCEDURE — 36000709 HC OR TIME LEV III EA ADD 15 MIN: Performed by: SURGERY

## 2019-07-29 PROCEDURE — 25000003 PHARM REV CODE 250: Performed by: SURGERY

## 2019-07-29 PROCEDURE — D9220A PRA ANESTHESIA: ICD-10-PCS | Mod: CRNA,,, | Performed by: REGISTERED NURSE

## 2019-07-29 PROCEDURE — 84100 ASSAY OF PHOSPHORUS: CPT

## 2019-07-29 PROCEDURE — 47562 LAPAROSCOPIC CHOLECYSTECTOMY: CPT | Mod: ,,, | Performed by: SURGERY

## 2019-07-29 PROCEDURE — 25000003 PHARM REV CODE 250: Performed by: NURSE ANESTHETIST, CERTIFIED REGISTERED

## 2019-07-29 RX ORDER — NEOSTIGMINE METHYLSULFATE 1 MG/ML
INJECTION, SOLUTION INTRAVENOUS
Status: DISCONTINUED | OUTPATIENT
Start: 2019-07-29 | End: 2019-07-29

## 2019-07-29 RX ORDER — SODIUM CHLORIDE, SODIUM LACTATE, POTASSIUM CHLORIDE, CALCIUM CHLORIDE 600; 310; 30; 20 MG/100ML; MG/100ML; MG/100ML; MG/100ML
1000 INJECTION, SOLUTION INTRAVENOUS
Status: COMPLETED | OUTPATIENT
Start: 2019-07-29 | End: 2019-07-29

## 2019-07-29 RX ORDER — GLYCOPYRROLATE 0.2 MG/ML
INJECTION INTRAMUSCULAR; INTRAVENOUS
Status: DISCONTINUED | OUTPATIENT
Start: 2019-07-29 | End: 2019-07-29

## 2019-07-29 RX ORDER — ACETAMINOPHEN 325 MG/1
650 TABLET ORAL EVERY 6 HOURS PRN
Status: DISCONTINUED | OUTPATIENT
Start: 2019-07-29 | End: 2019-08-01

## 2019-07-29 RX ORDER — SODIUM CHLORIDE, SODIUM LACTATE, POTASSIUM CHLORIDE, CALCIUM CHLORIDE 600; 310; 30; 20 MG/100ML; MG/100ML; MG/100ML; MG/100ML
INJECTION, SOLUTION INTRAVENOUS CONTINUOUS
Status: DISCONTINUED | OUTPATIENT
Start: 2019-07-29 | End: 2019-07-30

## 2019-07-29 RX ORDER — HYDROMORPHONE HYDROCHLORIDE 1 MG/ML
0.2 INJECTION, SOLUTION INTRAMUSCULAR; INTRAVENOUS; SUBCUTANEOUS EVERY 5 MIN PRN
Status: DISCONTINUED | OUTPATIENT
Start: 2019-07-29 | End: 2019-07-29 | Stop reason: HOSPADM

## 2019-07-29 RX ORDER — BUPIVACAINE HYDROCHLORIDE 5 MG/ML
INJECTION, SOLUTION EPIDURAL; INTRACAUDAL
Status: DISCONTINUED | OUTPATIENT
Start: 2019-07-29 | End: 2019-07-29 | Stop reason: HOSPADM

## 2019-07-29 RX ORDER — FENTANYL CITRATE 50 UG/ML
INJECTION, SOLUTION INTRAMUSCULAR; INTRAVENOUS
Status: DISCONTINUED | OUTPATIENT
Start: 2019-07-29 | End: 2019-07-29

## 2019-07-29 RX ORDER — CEFAZOLIN SODIUM 1 G/3ML
INJECTION, POWDER, FOR SOLUTION INTRAMUSCULAR; INTRAVENOUS
Status: DISCONTINUED | OUTPATIENT
Start: 2019-07-29 | End: 2019-07-29

## 2019-07-29 RX ORDER — FERROUS SULFATE 325(65) MG
325 TABLET, DELAYED RELEASE (ENTERIC COATED) ORAL DAILY
Status: DISCONTINUED | OUTPATIENT
Start: 2019-07-29 | End: 2019-08-06 | Stop reason: HOSPADM

## 2019-07-29 RX ORDER — IPRATROPIUM BROMIDE AND ALBUTEROL SULFATE 2.5; .5 MG/3ML; MG/3ML
3 SOLUTION RESPIRATORY (INHALATION) EVERY 4 HOURS PRN
Status: DISCONTINUED | OUTPATIENT
Start: 2019-07-29 | End: 2019-08-06 | Stop reason: HOSPADM

## 2019-07-29 RX ORDER — MICONAZOLE NITRATE 2 %
POWDER (GRAM) TOPICAL 2 TIMES DAILY
Status: DISCONTINUED | OUTPATIENT
Start: 2019-07-29 | End: 2019-08-03

## 2019-07-29 RX ORDER — LANOLIN ALCOHOL/MO/W.PET/CERES
800 CREAM (GRAM) TOPICAL
Status: DISCONTINUED | OUTPATIENT
Start: 2019-07-29 | End: 2019-07-31

## 2019-07-29 RX ORDER — GLUCAGON 1 MG
1 KIT INJECTION
Status: DISCONTINUED | OUTPATIENT
Start: 2019-07-29 | End: 2019-08-06 | Stop reason: HOSPADM

## 2019-07-29 RX ORDER — ETOMIDATE 2 MG/ML
INJECTION INTRAVENOUS
Status: DISCONTINUED | OUTPATIENT
Start: 2019-07-29 | End: 2019-07-29

## 2019-07-29 RX ORDER — SODIUM CHLORIDE 0.9 % (FLUSH) 0.9 %
10 SYRINGE (ML) INJECTION
Status: DISCONTINUED | OUTPATIENT
Start: 2019-07-29 | End: 2019-08-06 | Stop reason: HOSPADM

## 2019-07-29 RX ORDER — MORPHINE SULFATE 2 MG/ML
4 INJECTION, SOLUTION INTRAMUSCULAR; INTRAVENOUS EVERY 4 HOURS PRN
Status: DISCONTINUED | OUTPATIENT
Start: 2019-07-29 | End: 2019-08-02

## 2019-07-29 RX ORDER — LORAZEPAM 2 MG/ML
0.25 INJECTION INTRAMUSCULAR ONCE AS NEEDED
Status: DISCONTINUED | OUTPATIENT
Start: 2019-07-29 | End: 2019-07-29 | Stop reason: HOSPADM

## 2019-07-29 RX ORDER — SODIUM CHLORIDE 9 MG/ML
INJECTION, SOLUTION INTRAVENOUS CONTINUOUS
Status: DISCONTINUED | OUTPATIENT
Start: 2019-07-29 | End: 2019-07-29

## 2019-07-29 RX ORDER — POTASSIUM CHLORIDE 7.45 MG/ML
10 INJECTION INTRAVENOUS
Status: COMPLETED | OUTPATIENT
Start: 2019-07-29 | End: 2019-07-29

## 2019-07-29 RX ORDER — PANTOPRAZOLE SODIUM 40 MG/1
40 TABLET, DELAYED RELEASE ORAL DAILY
Status: DISCONTINUED | OUTPATIENT
Start: 2019-07-29 | End: 2019-08-06 | Stop reason: HOSPADM

## 2019-07-29 RX ORDER — ONDANSETRON 2 MG/ML
4 INJECTION INTRAMUSCULAR; INTRAVENOUS EVERY 6 HOURS PRN
Status: DISCONTINUED | OUTPATIENT
Start: 2019-07-29 | End: 2019-08-06 | Stop reason: HOSPADM

## 2019-07-29 RX ORDER — OXYCODONE HYDROCHLORIDE 5 MG/1
5 TABLET ORAL EVERY 6 HOURS PRN
Status: DISCONTINUED | OUTPATIENT
Start: 2019-07-29 | End: 2019-07-30

## 2019-07-29 RX ORDER — OXYCODONE HYDROCHLORIDE 5 MG/1
5 TABLET ORAL EVERY 4 HOURS PRN
Qty: 31 TABLET | Refills: 0 | Status: SHIPPED | OUTPATIENT
Start: 2019-07-29 | End: 2019-08-05

## 2019-07-29 RX ORDER — ACETAMINOPHEN 500 MG
5000 TABLET ORAL DAILY
Status: DISCONTINUED | OUTPATIENT
Start: 2019-07-29 | End: 2019-08-06 | Stop reason: HOSPADM

## 2019-07-29 RX ORDER — OXYCODONE HYDROCHLORIDE 10 MG/1
10 TABLET ORAL EVERY 4 HOURS PRN
Status: DISCONTINUED | OUTPATIENT
Start: 2019-07-29 | End: 2019-08-01

## 2019-07-29 RX ORDER — GABAPENTIN 300 MG/1
300 CAPSULE ORAL NIGHTLY
Status: DISCONTINUED | OUTPATIENT
Start: 2019-07-29 | End: 2019-08-06 | Stop reason: HOSPADM

## 2019-07-29 RX ORDER — IBUPROFEN 200 MG
24 TABLET ORAL
Status: DISCONTINUED | OUTPATIENT
Start: 2019-07-29 | End: 2019-08-06 | Stop reason: HOSPADM

## 2019-07-29 RX ORDER — PHENYLEPHRINE HYDROCHLORIDE 10 MG/ML
INJECTION INTRAVENOUS
Status: DISCONTINUED | OUTPATIENT
Start: 2019-07-29 | End: 2019-07-29

## 2019-07-29 RX ORDER — METRONIDAZOLE 500 MG/100ML
500 INJECTION, SOLUTION INTRAVENOUS
Status: DISCONTINUED | OUTPATIENT
Start: 2019-07-29 | End: 2019-07-29

## 2019-07-29 RX ORDER — MIDAZOLAM HYDROCHLORIDE 1 MG/ML
INJECTION, SOLUTION INTRAMUSCULAR; INTRAVENOUS
Status: DISCONTINUED | OUTPATIENT
Start: 2019-07-29 | End: 2019-07-29

## 2019-07-29 RX ORDER — MECLIZINE HYDROCHLORIDE 25 MG/1
25 TABLET ORAL 3 TIMES DAILY PRN
Status: DISCONTINUED | OUTPATIENT
Start: 2019-07-29 | End: 2019-07-30

## 2019-07-29 RX ORDER — PROPOFOL 10 MG/ML
VIAL (ML) INTRAVENOUS
Status: DISCONTINUED | OUTPATIENT
Start: 2019-07-29 | End: 2019-07-29

## 2019-07-29 RX ORDER — SODIUM CHLORIDE, SODIUM LACTATE, POTASSIUM CHLORIDE, CALCIUM CHLORIDE 600; 310; 30; 20 MG/100ML; MG/100ML; MG/100ML; MG/100ML
INJECTION, SOLUTION INTRAVENOUS CONTINUOUS
Status: DISCONTINUED | OUTPATIENT
Start: 2019-07-29 | End: 2019-07-29

## 2019-07-29 RX ORDER — HYDRALAZINE HYDROCHLORIDE 25 MG/1
25 TABLET, FILM COATED ORAL EVERY 6 HOURS PRN
Status: DISCONTINUED | OUTPATIENT
Start: 2019-07-29 | End: 2019-07-30

## 2019-07-29 RX ORDER — TRAZODONE HYDROCHLORIDE 50 MG/1
50 TABLET ORAL NIGHTLY PRN
Status: DISCONTINUED | OUTPATIENT
Start: 2019-07-29 | End: 2019-07-30

## 2019-07-29 RX ORDER — POTASSIUM CHLORIDE 20 MEQ/15ML
40 SOLUTION ORAL ONCE
Status: COMPLETED | OUTPATIENT
Start: 2019-07-29 | End: 2019-07-29

## 2019-07-29 RX ORDER — AMOXICILLIN 250 MG
2 CAPSULE ORAL 2 TIMES DAILY PRN
Status: DISCONTINUED | OUTPATIENT
Start: 2019-07-29 | End: 2019-07-31

## 2019-07-29 RX ORDER — LEVOTHYROXINE SODIUM 75 UG/1
75 TABLET ORAL DAILY
Status: DISCONTINUED | OUTPATIENT
Start: 2019-07-29 | End: 2019-07-30

## 2019-07-29 RX ORDER — ROCURONIUM BROMIDE 10 MG/ML
INJECTION, SOLUTION INTRAVENOUS
Status: DISCONTINUED | OUTPATIENT
Start: 2019-07-29 | End: 2019-07-29

## 2019-07-29 RX ORDER — LISINOPRIL 5 MG/1
5 TABLET ORAL DAILY
Status: DISCONTINUED | OUTPATIENT
Start: 2019-07-29 | End: 2019-07-29

## 2019-07-29 RX ORDER — KETAMINE HCL IN 0.9 % NACL 50 MG/5 ML
SYRINGE (ML) INTRAVENOUS
Status: DISCONTINUED | OUTPATIENT
Start: 2019-07-29 | End: 2019-07-29

## 2019-07-29 RX ORDER — LIDOCAINE HCL/PF 100 MG/5ML
SYRINGE (ML) INTRAVENOUS
Status: DISCONTINUED | OUTPATIENT
Start: 2019-07-29 | End: 2019-07-29

## 2019-07-29 RX ORDER — IBUPROFEN 200 MG
16 TABLET ORAL
Status: DISCONTINUED | OUTPATIENT
Start: 2019-07-29 | End: 2019-08-06 | Stop reason: HOSPADM

## 2019-07-29 RX ORDER — MEPERIDINE HYDROCHLORIDE 50 MG/ML
12.5 INJECTION INTRAMUSCULAR; INTRAVENOUS; SUBCUTANEOUS ONCE AS NEEDED
Status: DISCONTINUED | OUTPATIENT
Start: 2019-07-29 | End: 2019-07-29 | Stop reason: HOSPADM

## 2019-07-29 RX ORDER — CIPROFLOXACIN 2 MG/ML
400 INJECTION, SOLUTION INTRAVENOUS
Status: DISCONTINUED | OUTPATIENT
Start: 2019-07-29 | End: 2019-07-29

## 2019-07-29 RX ORDER — SODIUM CHLORIDE 9 MG/ML
INJECTION, SOLUTION INTRAVENOUS CONTINUOUS PRN
Status: DISCONTINUED | OUTPATIENT
Start: 2019-07-29 | End: 2019-07-29

## 2019-07-29 RX ORDER — POTASSIUM CHLORIDE 20 MEQ/15ML
40 SOLUTION ORAL EVERY 4 HOURS
Status: DISCONTINUED | OUTPATIENT
Start: 2019-07-29 | End: 2019-07-29

## 2019-07-29 RX ADMIN — PROPOFOL 30 MG: 10 INJECTION, EMULSION INTRAVENOUS at 01:07

## 2019-07-29 RX ADMIN — MICONAZOLE NITRATE: 20 POWDER TOPICAL at 08:07

## 2019-07-29 RX ADMIN — SODIUM CHLORIDE 0.4 MCG/KG/MIN: 9 INJECTION, SOLUTION INTRAVENOUS at 01:07

## 2019-07-29 RX ADMIN — POTASSIUM CHLORIDE 10 MEQ: 10 INJECTION, SOLUTION INTRAVENOUS at 12:07

## 2019-07-29 RX ADMIN — SUGAMMADEX 200 MG: 100 INJECTION, SOLUTION INTRAVENOUS at 02:07

## 2019-07-29 RX ADMIN — CEFAZOLIN 2 G: 330 INJECTION, POWDER, FOR SOLUTION INTRAMUSCULAR; INTRAVENOUS at 01:07

## 2019-07-29 RX ADMIN — PIPERACILLIN AND TAZOBACTAM 4.5 G: 4; .5 INJECTION, POWDER, LYOPHILIZED, FOR SOLUTION INTRAVENOUS; PARENTERAL at 02:07

## 2019-07-29 RX ADMIN — FENTANYL CITRATE 50 MCG: 50 INJECTION, SOLUTION INTRAMUSCULAR; INTRAVENOUS at 01:07

## 2019-07-29 RX ADMIN — OXYCODONE HYDROCHLORIDE 5 MG: 5 TABLET ORAL at 09:07

## 2019-07-29 RX ADMIN — HYDRALAZINE HYDROCHLORIDE 25 MG: 25 TABLET, FILM COATED ORAL at 05:07

## 2019-07-29 RX ADMIN — LISINOPRIL 5 MG: 2.5 TABLET ORAL at 08:07

## 2019-07-29 RX ADMIN — SODIUM CHLORIDE 500 ML: 0.9 INJECTION, SOLUTION INTRAVENOUS at 12:07

## 2019-07-29 RX ADMIN — GLYCOPYRROLATE 0.4 MG: 0.2 INJECTION, SOLUTION INTRAMUSCULAR; INTRAVENOUS at 02:07

## 2019-07-29 RX ADMIN — NEOSTIGMINE METHYLSULFATE 3 MG: 1 INJECTION INTRAVENOUS at 02:07

## 2019-07-29 RX ADMIN — SODIUM CHLORIDE, SODIUM LACTATE, POTASSIUM CHLORIDE, AND CALCIUM CHLORIDE: .6; .31; .03; .02 INJECTION, SOLUTION INTRAVENOUS at 07:07

## 2019-07-29 RX ADMIN — MICONAZOLE NITRATE: 20 POWDER TOPICAL at 09:07

## 2019-07-29 RX ADMIN — ETOMIDATE 10 MG: 2 INJECTION, SOLUTION INTRAVENOUS at 01:07

## 2019-07-29 RX ADMIN — FERROUS SULFATE TAB EC 325 MG (65 MG FE EQUIVALENT) 325 MG: 325 (65 FE) TABLET DELAYED RESPONSE at 08:07

## 2019-07-29 RX ADMIN — ROCURONIUM BROMIDE 30 MG: 10 INJECTION, SOLUTION INTRAVENOUS at 01:07

## 2019-07-29 RX ADMIN — ROCURONIUM BROMIDE 20 MG: 10 INJECTION, SOLUTION INTRAVENOUS at 01:07

## 2019-07-29 RX ADMIN — CIPROFLOXACIN 400 MG: 2 INJECTION, SOLUTION INTRAVENOUS at 07:07

## 2019-07-29 RX ADMIN — PROPOFOL 30 MG: 10 INJECTION, EMULSION INTRAVENOUS at 02:07

## 2019-07-29 RX ADMIN — PHENYLEPHRINE HYDROCHLORIDE 100 MCG: 10 INJECTION INTRAVENOUS at 02:07

## 2019-07-29 RX ADMIN — PHENYLEPHRINE HYDROCHLORIDE 100 MCG: 10 INJECTION INTRAVENOUS at 01:07

## 2019-07-29 RX ADMIN — POTASSIUM PHOSPHATE, MONOBASIC AND POTASSIUM PHOSPHATE, DIBASIC 15 MMOL: 224; 236 INJECTION, SOLUTION, CONCENTRATE INTRAVENOUS at 08:07

## 2019-07-29 RX ADMIN — LIDOCAINE HYDROCHLORIDE 40 MG: 20 INJECTION, SOLUTION INTRAVENOUS at 01:07

## 2019-07-29 RX ADMIN — LEVOTHYROXINE SODIUM 75 MCG: 75 TABLET ORAL at 08:07

## 2019-07-29 RX ADMIN — GABAPENTIN 300 MG: 300 CAPSULE ORAL at 04:07

## 2019-07-29 RX ADMIN — MIDAZOLAM HYDROCHLORIDE 1 MG: 1 INJECTION, SOLUTION INTRAMUSCULAR; INTRAVENOUS at 01:07

## 2019-07-29 RX ADMIN — METRONIDAZOLE 500 MG: 500 INJECTION, SOLUTION INTRAVENOUS at 07:07

## 2019-07-29 RX ADMIN — SODIUM CHLORIDE: 0.9 INJECTION, SOLUTION INTRAVENOUS at 01:07

## 2019-07-29 RX ADMIN — Medication 20 MG: at 01:07

## 2019-07-29 RX ADMIN — PROPOFOL 50 MG: 10 INJECTION, EMULSION INTRAVENOUS at 01:07

## 2019-07-29 RX ADMIN — METRONIDAZOLE 500 MG: 500 INJECTION, SOLUTION INTRAVENOUS at 01:07

## 2019-07-29 RX ADMIN — Medication 5000 UNITS: at 08:07

## 2019-07-29 RX ADMIN — GLYCOPYRROLATE 0.1 MG: 0.2 INJECTION, SOLUTION INTRAMUSCULAR; INTRAVENOUS at 01:07

## 2019-07-29 RX ADMIN — PANTOPRAZOLE SODIUM 40 MG: 40 TABLET, DELAYED RELEASE ORAL at 08:07

## 2019-07-29 RX ADMIN — TRAZODONE HYDROCHLORIDE 50 MG: 50 TABLET ORAL at 10:07

## 2019-07-29 RX ADMIN — GABAPENTIN 300 MG: 300 CAPSULE ORAL at 10:07

## 2019-07-29 RX ADMIN — SODIUM CHLORIDE, SODIUM LACTATE, POTASSIUM CHLORIDE, AND CALCIUM CHLORIDE: .6; .31; .03; .02 INJECTION, SOLUTION INTRAVENOUS at 02:07

## 2019-07-29 RX ADMIN — POTASSIUM CHLORIDE 40 MEQ: 20 SOLUTION ORAL at 07:07

## 2019-07-29 RX ADMIN — SODIUM CHLORIDE, SODIUM LACTATE, POTASSIUM CHLORIDE, AND CALCIUM CHLORIDE 1000 ML: .6; .31; .03; .02 INJECTION, SOLUTION INTRAVENOUS at 02:07

## 2019-07-29 RX ADMIN — POTASSIUM CHLORIDE 10 MEQ: 10 INJECTION, SOLUTION INTRAVENOUS at 02:07

## 2019-07-29 NOTE — CONSULTS
[facility]  Gastroenterology  Consult Note    Patient Name: Linda Hylton  MRN: [id number]  Admission Date: 7/28/2019  Hospital Length of Stay: 0 days  Code Status: Full Code   Attending Provider: Quinton Baron MD   Consulting Provider: Angelito Marie MD  Primary Care Physician: Yakov Barrett MD  Principal Problem:Acute biliary pancreatitis    Inpatient consult to [facility])  Consult performed by: Angelito aMrie MD  Consult ordered by: Stevenson Weldon DO        Subjective:     HPI:  This is an 83 yo F with HTN, GERD, AS, cholelithiasis, hx of embolic stroke on plavix who presented to the ED yesterday with abdominal pain. She reports the pain began two days ago, and describes it as sharp. The pain is mostly mid-epigastric with radiation up into the chest. Also reported some back pain. She has never had this type of pain before and did not know she had gallstones. In the ED, she was found to have a WBC of 21, lipase >1000, TB 1.6, AST//171. CT AP was obtained which found mild gallbladder distention with pericholecystic fluid and CBD of 8 mm. On interview today, patient reports that her abdominal pain has completely resolved. At present, she does not report any abdominal pain, back pain, nausea, or vomiting. She reports feeling hungry. Her labs today showed an improvement in her TB to 1.0 and WBC to 13 with an improvement in her liver enzymes as well.       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    Vertebral basilar insufficiency 12/19/2017    Vitamin D deficiency        Past Surgical History:   Procedure Laterality Date    APPENDECTOMY      EGD (ESOPHAGOGASTRODUODENOSCOPY) N/A 3/1/2019    Performed by Simon  [facility] at [facility] (2ND F[facility])    HIP SURGERY Left 01/12/2016    Merged with [facility]    PARATHYROIDECTOMY Right 2012    THYROID SURGERY Right 2012    Partial thyroidectomy       Review of patient's allergies indicates:   Allergen Reactions    Sulfa (sulfonamide antibiotics) Hives     Family History     Problem Relation (Age of Onset)    Diabetes Son    Heart disease Father, Sister, Brother        Tobacco Use    Smoking status: Never Smoker    Smokeless tobacco: Never Used   Substance and Sexual Activity    Alcohol use: Not on file    Drug use: No    Sexual activity: Never     Review of Systems   Constitutional: Negative for appetite change, chills and fever.   HENT: Negative for sore throat and trouble swallowing.    Respiratory: Negative for cough and shortness of breath.    Cardiovascular: Negative for chest pain and leg swelling.   Gastrointestinal: Negative for abdominal distention, abdominal pain, blood in stool, constipation, diarrhea, nausea and vomiting.   Genitourinary: Negative for decreased urine volume and difficulty urinating.   Musculoskeletal: Negative for arthralgias and back pain.   Skin: Negative for color change and pallor.   Neurological: Negative for dizziness and light-headedness.   Psychiatric/Behavioral: Negative for agitation and confusion.     Objective:     Vital Signs (Most Recent):  Temp: 98.2 °F (36.8 °C) (07/29/19 0821)  Pulse: 73 (07/29/19 0821)  Resp: 16 (07/29/19 0821)  BP: (!) 170/74 (07/29/19 0830)  SpO2: (!) 94 % (07/29/19 0821) Vital Signs (24h Range):  Temp:  [98.2 °F (36.8 °C)-98.9 °F (37.2 °C)] 98.2 °F (36.8 °C)  Pulse:  [73-82] 73  Resp:  [16-20] 16  SpO2:  [94 %-97 %] 94 %  BP: (138-196)/(69-88) 170/74     Weight: 72.6 kg (160 lb) (07/29/19 0400)  Body mass index is 34.62 kg/m².      Intake/Output Summary (Last 24 hours) at 7/29/2019 0921  Last data filed at 7/29/2019 0400  Gross per 24 hour   Intake 600 ml   Output --   Net 600 ml       Lines/Drains/Airways     Drain             Female External Urinary Catheter 07/29/19 0400 less than 1 day          Peripheral Intravenous Line                 Peripheral IV - Single Lumen 07/29/19 0000 20 G Right Forearm less than 1 day         Peripheral IV - Single Lumen 07/29/19 0800 22 G Left Wrist less than 1 day                Physical Exam   Constitutional: She is oriented to person, place, and time. She appears well-developed and well-nourished. No distress.   HENT:   Mouth/Throat: Oropharynx is clear and moist.   Eyes: No scleral icterus.   Cardiovascular: Normal rate and regular rhythm.   Pulmonary/Chest: Effort normal and breath sounds normal. No respiratory distress.   Abdominal: Soft. Bowel sounds are normal. She exhibits no distension. There is no tenderness. There is no guarding.   Musculoskeletal: She exhibits no edema or deformity.   Neurological: She is alert and oriented to person, place, and time.   Skin: Skin is warm and dry.   Psychiatric: She has a normal mood and affect.   Vitals reviewed.      Significant Labs:  CBC:   Recent Labs   Lab 07/28/19  2241 07/29/19  0537   WBC 21.61* 13.13*   HGB 12.9 11.7*   HCT 39.3 35.8*    280     CMP:   Recent Labs   Lab 07/29/19  0537   GLU 86   CALCIUM 9.2   ALBUMIN 2.8*   PROT 6.4      K 3.2*   CO2 27   CL 99   BUN 15   CREATININE 0.7   ALKPHOS 100   *   *   BILITOT 1.0     Coagulation: No results for input(s): PT, INR, APTT in the last 48 hours.      Assessment/Plan:     * Acute biliary pancreatitis  83 yo F with HTN, GERD, AS, cholelithiasis, hx of embolic stroke on plavix who presented to the ED yesterday with abdominal pain. Labs revealed lipase>1000 and imaging findings consistent with pancreatitis. Her TB was noted to be 1.6 yesterday with improvement to 1.0 today. Liver enzymes have downtrended, and patient is pain-free. There is no evidence to suggest that patient has biliary obstruction at this point. No indication for endoscopic  procedures.    Recommendations:  - Can obtain MRCP if patient's liver tests increase or if signs of developing obstruction or general surgery can perform IOC at time of cholecystectomy  - Supportive care  - Ok to advance diet from our standpoint        Thank you for your consult. I will follow-up with patient. Please contact us if you have any additional questions.    Angelito Marie MD  Gastroenterology  [facility]

## 2019-07-29 NOTE — ED TRIAGE NOTES
Abdominal Pain (Pt N/V and abdominal pain since yesterday. Pt denies hematemesis or blood in stool. )

## 2019-07-29 NOTE — ASSESSMENT & PLAN NOTE
83yo F with biliary pancreatitis    - GI does not plan on ERCP; suggest MRCP if concern about choledocholithiasis  - Will discuss with staff interval cholecystectomy  - Care per primary team

## 2019-07-29 NOTE — NURSING TRANSFER
Nursing Transfer Note      7/29/2019     Transfer To: 1055    Transfer via stretcher    Transfer with IV fluids    Transported by PCT    Medicines sent: none    Chart send with patient: Yes    Notified: liaison     Patient reassessed at: 160

## 2019-07-29 NOTE — TRANSFER OF CARE
"Anesthesia Transfer of Care Note    Patient: Linda Hylton    Procedure(s) Performed: Procedure(s) (LRB):  CHOLECYSTECTOMY, LAPAROSCOPIC (N/A)    Patient location: PACU    Anesthesia Type: general    Transport from OR: Transported from OR on 6-10 L/min O2 by face mask with adequate spontaneous ventilation    Post pain: adequate analgesia    Post assessment: tolerated procedure well and no apparent anesthetic complications    Post vital signs: stable    Level of consciousness: agitated    Nausea/Vomiting: no nausea/vomiting    Complications: none    Transfer of care protocol was followed      Last vitals:   Visit Vitals  /76   Pulse 92   Temp 36.8 °C (98.3 °F) (Axillary)   Resp 16   Ht 4' 9" (1.448 m)   Wt 72.6 kg (160 lb)   LMP  (LMP Unknown)   SpO2 97%   Breastfeeding? No   BMI 34.62 kg/m²     "

## 2019-07-29 NOTE — CONSULTS
[facility]  General Surgery  Consult Note    Consults  Subjective:     Chief Complaint/Reason for Admission: Abdominal pain    History of Present Illness:   Linda Hylton is a 84 y.o. female with h/o HTN and CVA (without residual deficits) who presents with one day of epigastric pain and nausea. Reports severe epigastric pain which has improved over the day. She last had a Boost shake for dinner, which did not increase her abdominal pain. She denies fever or chills. Has cholelithiasis, but has not had symptoms of biliary colic in the past. Past abdominal surgical history includes appendectomy.   On admission, patient is afebrile and hemodynamically stable. Leukocytosis to 21 with elevated tbili and LFTs. Lipase >1000. CT scan showed mild pericholecystic fluid with mild enlargement of CBD to 8mm.   Currently on plavix for history of stroke several years prior.     No current facility-administered medications on file prior to encounter.      Current Outpatient Medications on File Prior to Encounter   Medication Sig    atorvastatin (LIPITOR) 80 MG tablet Take 1 tablet (80 mg total) by mouth once daily.    cholecalciferol, vitamin D3, 5,000 unit Tab Take 1 tablet (5,000 Units total) by mouth once daily.    clopidogrel (PLAVIX) 75 mg tablet Take 1 tablet (75 mg total) by mouth once daily.    clotrimazole-betamethasone 1-0.05% (LOTRISONE) cream Apply topically 2 (two) times daily.    ferrous sulfate 325 (65 FE) MG EC tablet Take 1 tablet (325 mg total) by mouth once daily.    gabapentin (NEURONTIN) 300 MG capsule Take 1 capsule (300 mg total) by mouth nightly.    levothyroxine (EUTHYROX) 88 MCG tablet Take 1 tablet (88 mcg total) by mouth once daily.    lisinopril 10 MG tablet Take 0.5 tablets (5 mg total) by mouth once daily.    meclizine (ANTIVERT) 25 mg tablet Take 1 tablet (25 mg total) by mouth 3 (three) times daily as needed for Dizziness.    pantoprazole (PROTONIX) 40 MG tablet Take  1 tablet (40 mg total) by mouth once daily.    traZODone (DESYREL) 50 MG tablet Take 1 tablet (50 mg total) by mouth every evening.    nystatin (MYCOSTATIN) powder Apply to affected area 3 times daily    ondansetron (ZOFRAN-ODT) 8 MG TbDL Take 1 tablet (8 mg total) by mouth every 8 (eight) hours as needed.       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    Vertebral basilar insufficiency 12/19/2017    Vitamin D deficiency      Past Surgical History:   Procedure Laterality Date    APPENDECTOMY      EGD (ESOPHAGOGASTRODUODENOSCOPY) N/A 3/1/2019    Performed by [facility] at [facility] (2ND F[facility])    [facility]2016    [facility]    PARATHYROIDECTOMY Right 2012    THYROID SURGERY Right 2012    Partial thyroidectomy     Family History     Problem Relation (Age of Onset)    Diabetes Son    Heart disease Father, Sister, Brother        Tobacco Use    Smoking status: Never Smoker    Smokeless tobacco: Never Used   Substance and Sexual Activity    Alcohol use: Not on file    Drug use: No    Sexual activity: Never     Review of Systems   Constitutional: Negative for chills and fever.   HENT: Negative for sore throat.    Respiratory: Negative for cough and shortness of breath.    Cardiovascular: Negative for chest pain and palpitations.   Gastrointestinal: Positive for abdominal pain and nausea. Negative for abdominal distention, diarrhea and vomiting.   Genitourinary: Negative for difficulty urinating and dysuria.   Neurological: Negative for dizziness and headaches.   Psychiatric/Behavioral: Negative for agitation and confusion.     Objective:      Vital Signs (Most Recent):  Temp: 98.5 °F (36.9 °C) (07/28/19 2142)  Pulse: 78 (07/29/19 0225)  Resp: 20 (07/29/19 0225)  BP: (!) 162/69 (07/29/19 0225)  SpO2: 96 % (07/29/19 0225) Vital Signs (24h Range):  Temp:  [98.5 °F (36.9 °C)] 98.5 °F (36.9 °C)  Pulse:  [74-82] 78  Resp:  [18-20] 20  SpO2:  [94 %-97 %] 96 %  BP: (138-187)/(69-88) 162/69     Weight: 72.7 kg (160 lb 4.4 oz)  Body mass index is 34.68 kg/m².      Intake/Output Summary (Last 24 hours) at 7/29/2019 0248  Last data filed at 7/29/2019 0116  Gross per 24 hour   Intake 600 ml   Output --   Net 600 ml       Physical Exam   Constitutional: She is oriented to person, place, and time. She appears well-developed and well-nourished. No distress.   Cardiovascular: Normal rate and regular rhythm.   Pulmonary/Chest: Effort normal. No respiratory distress.   Abdominal: Soft. She exhibits no distension. There is no tenderness. There is no rebound and no guarding.   Minimal tenderness in epigastric and RUQ   Neurological: She is alert and oriented to person, place, and time.   Skin: Skin is warm and dry.   Psychiatric: She has a normal mood and affect. Her behavior is normal.       Significant Labs:  CBC:   Recent Labs   Lab 07/28/19 2241   WBC 21.61*   RBC 4.86   HGB 12.9   HCT 39.3      MCV 81*   MCH 26.5*   MCHC 32.8     CMP:   Recent Labs   Lab 07/28/19  2241   *   CALCIUM 9.8   ALBUMIN 3.3*   PROT 7.5   *   K 2.8*   CO2 28   CL 92*   BUN 19   CREATININE 0.8   ALKPHOS 95   *   *   BILITOT 1.6*       Significant Diagnostics:  I have reviewed all pertinent imaging results/findings within the past 24 hours.    Assessment/Plan:     Active Diagnoses:    Diagnosis Date Noted POA    Cholecystitis [K81.9] 07/29/2019 Yes      Problems Resolved During this Admission:     Patient with biliary pancreatitis with significantly improved abdominal pain and no pain with PO intake  Admit to Internal Medicine with plans for GI evaluation  for ERCP  Following down trending LFTs, will plan for interval cholecystectomy  Antibiotics  NPO  IVF  Hold plavix   Surgery will continue to follow    Leela Saenz MD  General Surgery  [facility]

## 2019-07-29 NOTE — BRIEF OP NOTE
[facility]  Brief Operative Note    SUMMARY     Surgery Date: 7/29/2019     Surgeon(s) and Role:     * Tre Alejandra MD - Primary     * Elio Trinidad MD - Resident - Teaching Assisting     * Ansley Patton MD - Resident - Assisting    Pre-op Diagnosis:  Acute biliary pancreatitis, unspecified complication status [K85.10]    Post-op Diagnosis:  Post-Op Diagnosis Codes:     * Acute biliary pancreatitis, unspecified complication status [K85.10]    Procedure(s) (LRB):  CHOLECYSTECTOMY, LAPAROSCOPIC (N/A)    Anesthesia: General    Description of Procedure: Laparoscopic cholecystectomy.    Description of the findings of the procedure: Inflamed gallbladder with stone in the cystic duct. Laparoscopic cholecystectomy without complication.    Estimated Blood Loss: 20mL         Specimens:   Specimen (12h ago, onward)    Start     Ordered    07/29/19 1400  Specimen to Pathology - Surgery  Once     Comments:  Pre-op Diagnosis: Acute biliary pancreatitis, unspecified complication status [K85.10]Procedure(s):CHOLECYSTECTOMY, LAPAROSCOPIC Number of specimens: 1Name of specimens: GALLBLADDER     Start Status     07/29/19 1400 Collected (07/29/19 1400) Order ID: [id number]       07/29/19 1400        Ansley Patton MD  Pager: [phone]  General Surgery PGY-III  [facility]

## 2019-07-29 NOTE — ASSESSMENT & PLAN NOTE
ABd pain, elevated bili, and elevated Lipase > 1000  CT abdomen showed dilated CBD and mild GB distention with pericholecystic fluid   -abdominal pain resolved with IVF given in ED   - monitor

## 2019-07-29 NOTE — SUBJECTIVE & OBJECTIVE
Interval History: NAEON. Denies nausea/emesis, or abdominal pain this am.    Medications:  Continuous Infusions:   lactated ringers 125 mL/hr at 07/29/19 0711     Scheduled Meds:   cholecalciferol (vitamin D3)  5,000 Units Oral Daily    ciprofloxacin  400 mg Intravenous Q12H    ferrous sulfate  325 mg Oral Daily    gabapentin  300 mg Oral Nightly    levothyroxine  75 mcg Oral Daily    lisinopril  5 mg Oral Daily    metronidazole  500 mg Intravenous Q8H    miconazole NITRATE 2 %   Topical (Top) BID    pantoprazole  40 mg Oral Daily    potassium phosphate IVPB  15 mmol Intravenous Once     PRN Meds:acetaminophen, albuterol-ipratropium, Dextrose 10% Bolus, Dextrose 10% Bolus, glucagon (human recombinant), glucose, glucose, magnesium oxide, meclizine, morphine, ondansetron, oxyCODONE, senna-docusate 8.6-50 mg, sodium chloride 0.9%, sodium chloride 0.9%, traZODone     Review of patient's allergies indicates:   Allergen Reactions    Sulfa (sulfonamide antibiotics) Hives     Objective:     Vital Signs (Most Recent):  Temp: 98.2 °F (36.8 °C) (07/29/19 0821)  Pulse: 73 (07/29/19 0821)  Resp: 16 (07/29/19 0821)  BP: (!) 170/74 (07/29/19 0830)  SpO2: (!) 94 % (07/29/19 0821) Vital Signs (24h Range):  Temp:  [98.2 °F (36.8 °C)-98.9 °F (37.2 °C)] 98.2 °F (36.8 °C)  Pulse:  [73-82] 73  Resp:  [16-20] 16  SpO2:  [94 %-97 %] 94 %  BP: (138-196)/(69-88) 170/74     Weight: 72.6 kg (160 lb)  Body mass index is 34.62 kg/m².    Intake/Output - Last 3 Shifts       07/27 0700 - 07/28 0659 07/28 0700 - 07/29 0659 07/29 0700 - 07/30 0659    P.O.  0     IV Piggyback  600     Total Intake(mL/kg)  600 (8.3)     Net  +600            Stool Occurrence   1 x          Physical Exam   Constitutional: She appears well-developed.   HENT:   Head: Normocephalic.   Eyes: Conjunctivae are normal.   Cardiovascular: Normal rate and regular rhythm.   Pulmonary/Chest: Effort normal.   Abdominal: Soft. She exhibits no distension. There is no  tenderness.   Neurological: She is alert.   Skin: Skin is warm.       Significant Labs:  CBC:   Recent Labs   Lab 07/29/19  0537   WBC 13.13*   RBC 4.49   HGB 11.7*   HCT 35.8*      MCV 80*   MCH 26.1*   MCHC 32.7     CMP:   Recent Labs   Lab 07/29/19  0537   GLU 86   CALCIUM 9.2   ALBUMIN 2.8*   PROT 6.4      K 3.2*   CO2 27   CL 99   BUN 15   CREATININE 0.7   ALKPHOS 100   *   *   BILITOT 1.0

## 2019-07-29 NOTE — ED PROVIDER NOTES
Encounter Date: 7/28/2019    SCRIBE #1 NOTE: I, Tylor Mccloud, am scribing for, and in the presence of,  Blaze Fitzgerald MD. I have scribed the following portions of the note - the EKG reading.       History     Chief Complaint   Patient presents with    Abdominal Pain     Pt N/V and abdominal pain since yesterday. Pt denies hematemesis or blood in stool.      The patient presents with abdominal pain.  The pain has been present for 1 day.  The pain is periumbilical and radiates occasionally to the epigastrium.  The pain is sharp.  The pain is heart.  The pain is currently improved.  She has had some nausea with 1 episode of vomiting after taking Mylanta.  She denies prior episodes.  Past Surgical history includes appendectomy.  She reports a normal bowel movement just prior to arrival.  She denies fevers or chills.  She denies hematochezia or melena.  She denies urinary symptoms.    The history is provided by the patient.     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    Vertebral basilar insufficiency 12/19/2017    Vitamin D deficiency      Past Surgical History:   Procedure Laterality Date    APPENDECTOMY      EGD (ESOPHAGOGASTRODUODENOSCOPY) N/A 3/1/2019    Performed by [facility] at [facility] ([address] [place] [address])    [facility]    PARATHYROIDECTOMY Right 2012    THYROID SURGERY Right 2012    Partial thyroidectomy     Family History   Problem Relation Age of Onset    Heart disease Father     Heart disease Sister     Heart disease Brother     Diabetes Son      Social History     Tobacco Use    Smoking status: Never Smoker     Smokeless tobacco: Never Used   Substance Use Topics    Alcohol use: Not on file    Drug use: No     Review of Systems   Constitutional: Negative for fever.   HENT: Negative for sore throat.    Respiratory: Negative for shortness of breath.    Cardiovascular: Negative for chest pain.   Gastrointestinal: Positive for abdominal pain, nausea and vomiting.   Genitourinary: Negative for dysuria.   Musculoskeletal: Negative for back pain.   Skin: Negative for rash.   Neurological: Negative for weakness.   Hematological: Does not bruise/bleed easily.       Physical Exam     Initial Vitals [07/28/19 2142]   BP Pulse Resp Temp SpO2   138/88 74 18 98.5 °F (36.9 °C) 97 %      MAP       --         Physical Exam    Nursing note and vitals reviewed.  Constitutional: She appears well-developed and well-nourished. She is not diaphoretic. No distress.   Elderly, no acute distress   HENT:   Mouth/Throat: Oropharynx is clear and moist.   Eyes: EOM are normal. Pupils are equal, round, and reactive to light.   Neck: Normal range of motion.   Cardiovascular: Normal rate, regular rhythm and intact distal pulses.   Murmur heard.  Pulmonary/Chest: Breath sounds normal. No respiratory distress. She has no wheezes. She has no rhonchi. She has no rales. She exhibits no tenderness.   Abdominal: Soft. Bowel sounds are normal. She exhibits no distension. There is tenderness. There is no rebound and no guarding.   There is periumbilical tenderness without rebound or guarding. RUQ tenderness.  Vargas sign is negative.   Musculoskeletal: Normal range of motion.   Neurological: She is alert and oriented to person, place, and time. She has normal strength. No cranial nerve deficit or sensory deficit.   Skin: Skin is warm and dry. No rash noted. No erythema.   Psychiatric: She has a normal mood and affect.         ED Course   Procedures  Labs Reviewed   CBC W/ AUTO DIFFERENTIAL - Abnormal; Notable for the following components:       Result Value     WBC 21.61 (*)     Mean Corpuscular Volume 81 (*)     Mean Corpuscular Hemoglobin 26.5 (*)     RDW 14.6 (*)     Gran # (ANC) 18.5 (*)     Immature Grans (Abs) 0.11 (*)     Mono # 1.7 (*)     Gran% 85.8 (*)     Lymph% 5.4 (*)     All other components within normal limits   COMPREHENSIVE METABOLIC PANEL   LIPASE   URINALYSIS, REFLEX TO URINE CULTURE   LACTIC ACID, PLASMA     EKG Readings: (Independently Interpreted)   Rhythm: Normal Sinus Rhythm. Heart Rate: 83. Conduction: Bifasicular.   No ischemia. Unchanged since previous.        Imaging Results    None          Medical Decision Making:   Initial Assessment:   My differential includes biliary colic, cholecystitis, diverticulitis, enteritis, AAA, mesenteric ischemia, pancreatitis.  The patient appears well and her pain is not out of proportion to her exam.  I will obtain a CT scan, lactate, lipase, additional labs and reassess.  The patient denies chest pain or shortness of breath, EKG is unchanged from prior.  I have a low suspicion for ACS.                   ED Course as of Jul 29 0525   Mon Jul 29, 2019   0046 I reviewed the patient's CT scan.  There is a moderately distended gallbladder with pericholecystic fluid.  No gallstones are seen, but these are not excluded on the CT scan.  The common bile duct is dilated to 8 mm, which is increased from a past  ultrasound.  The patient's WBC is 21.6.  Total bilirubin is slightly elevated at 1.6.  AST and ALT are elevated.  This could represent choledocholithiasis or cholangitis.  I will give IV Zosyn, reassess, and plan for admission.    [ARPIT]   0216 I discussed the case with General surgery and they will evaluate the patient.  I discussed the case with Internal Medicine and they will admit the patient.  Additional labs resulted, lipase is greater than 1000, this is likely gallstone pancreatitis.    [ARPIT]      ED Course User Index  [ARPIT] Blaze Fitzgerald MD     Clinical Impression:       ICD-10-CM ICD-9-CM   1.  Cholecystitis K81.9 575.10   2. Abdominal pain R10.9 789.00   3. Acute biliary pancreatitis, unspecified complication status K85.10 577.0       I, Dr. Viktor Fitzgerald, personally performed the services described in this documentation. All medical record entries made by the scribe were at my direction and in my presence.  I have reviewed the chart and agree that the record reflects my personal performance and is accurate and complete. Viktor Fitzgerald MD.                         Blaze Fitzgerald MD  07/29/19 0542

## 2019-07-29 NOTE — SUBJECTIVE & OBJECTIVE
Interval History:   Symptoms:  Improved.    Diet:  NPO, but hungry.  Activity level:   Returning to normal.  Pain:  Pain resolved.      Review of Systems   Constitutional: Negative for fever.   Respiratory: Negative for shortness of breath.    Cardiovascular: Negative for chest pain.   Gastrointestinal: Negative for abdominal distention, abdominal pain and nausea.     Objective:     Vital Signs (Most Recent):  Temp: 98.8 °F (37.1 °C) (07/29/19 1235)  Pulse: 68 (07/29/19 1235)  Resp: 16 (07/29/19 1235)  BP: (!) 144/68 (07/29/19 1235)  SpO2: 96 % (07/29/19 1235) Vital Signs (24h Range):  Temp:  [98.2 °F (36.8 °C)-98.9 °F (37.2 °C)] 98.8 °F (37.1 °C)  Pulse:  [68-82] 68  Resp:  [16-20] 16  SpO2:  [94 %-97 %] 96 %  BP: (138-196)/(68-88) 144/68     Weight: 72.6 kg (160 lb)  Body mass index is 34.62 kg/m².    Intake/Output Summary (Last 24 hours) at 7/29/2019 1328  Last data filed at 7/29/2019 0400  Gross per 24 hour   Intake 600 ml   Output --   Net 600 ml      Physical Exam   Constitutional: She is oriented to person, place, and time. She appears well-nourished. No distress.   Neck: No JVD present.   Cardiovascular: Normal rate, regular rhythm and normal heart sounds.   Pulmonary/Chest: Effort normal and breath sounds normal. No respiratory distress.   Abdominal: Soft. Bowel sounds are normal. She exhibits no distension. There is no tenderness.   Musculoskeletal: She exhibits no edema.   Neurological: She is alert and oriented to person, place, and time.   Psychiatric: She has a normal mood and affect.       Significant Labs: All pertinent labs within the past 24 hours have been reviewed.    Significant Imaging: I have reviewed all pertinent imaging results/findings within the past 24 hours.

## 2019-07-29 NOTE — SUBJECTIVE & OBJECTIVE
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    Vertebral basilar insufficiency 12/19/2017    Vitamin D deficiency        Past Surgical History:   Procedure Laterality Date    APPENDECTOMY      EGD (ESOPHAGOGASTRODUODENOSCOPY) N/A 3/1/2019    Performed [facility] at [facility] (2ND FLR)    [facility]    PARATHYROIDECTOMY Right 2012    THYROID SURGERY Right 2012    Partial thyroidectomy       Review of patient's allergies indicates:   Allergen Reactions    Sulfa (sulfonamide antibiotics) Hives     Family History     Problem Relation (Age of Onset)    Diabetes Son    Heart disease Father, Sister, Brother        Tobacco Use    Smoking status: Never Smoker    Smokeless tobacco: Never Used   Substance and Sexual Activity    Alcohol use: Not on file    Drug use: No    Sexual activity: Never     Review of Systems   Constitutional: Negative for appetite change, chills and fever.   HENT: Negative for sore throat and trouble swallowing.    Respiratory: Negative for cough and shortness of breath.    Cardiovascular: Negative for chest pain and leg swelling.   Gastrointestinal: Negative for abdominal distention, abdominal pain, blood in stool, constipation, diarrhea, nausea and vomiting.   Genitourinary: Negative for decreased urine volume and difficulty urinating.   Musculoskeletal: Negative for arthralgias and back pain.   Skin: Negative for color change and pallor.   Neurological: Negative for dizziness and light-headedness.   Psychiatric/Behavioral: Negative for agitation and confusion.     Objective:     Vital Signs (Most Recent):  Temp: 98.2 °F (36.8 °C) (07/29/19 0821)  Pulse: 73 (07/29/19 0821)  Resp: 16 (07/29/19 0821)  BP: (!)  170/74 (07/29/19 0830)  SpO2: (!) 94 % (07/29/19 0821) Vital Signs (24h Range):  Temp:  [98.2 °F (36.8 °C)-98.9 °F (37.2 °C)] 98.2 °F (36.8 °C)  Pulse:  [73-82] 73  Resp:  [16-20] 16  SpO2:  [94 %-97 %] 94 %  BP: (138-196)/(69-88) 170/74     Weight: 72.6 kg (160 lb) (07/29/19 0400)  Body mass index is 34.62 kg/m².      Intake/Output Summary (Last 24 hours) at 7/29/2019 0921  Last data filed at 7/29/2019 0400  Gross per 24 hour   Intake 600 ml   Output --   Net 600 ml       Lines/Drains/Airways     Drain            Female External Urinary Catheter 07/29/19 0400 less than 1 day          Peripheral Intravenous Line                 Peripheral IV - Single Lumen 07/29/19 0000 20 G Right Forearm less than 1 day         Peripheral IV - Single Lumen 07/29/19 0800 22 G Left Wrist less than 1 day                Physical Exam   Constitutional: She is oriented to person, place, and time. She appears well-developed and well-nourished. No distress.   HENT:   Mouth/Throat: Oropharynx is clear and moist.   Eyes: No scleral icterus.   Cardiovascular: Normal rate and regular rhythm.   Pulmonary/Chest: Effort normal and breath sounds normal. No respiratory distress.   Abdominal: Soft. Bowel sounds are normal. She exhibits no distension. There is no tenderness. There is no guarding.   Musculoskeletal: She exhibits no edema or deformity.   Neurological: She is alert and oriented to person, place, and time.   Skin: Skin is warm and dry.   Psychiatric: She has a normal mood and affect.   Vitals reviewed.      Significant Labs:  CBC:   Recent Labs   Lab 07/28/19  2241 07/29/19  0537   WBC 21.61* 13.13*   HGB 12.9 11.7*   HCT 39.3 35.8*    280     CMP:   Recent Labs   Lab 07/29/19  0537   GLU 86   CALCIUM 9.2   ALBUMIN 2.8*   PROT 6.4      K 3.2*   CO2 27   CL 99   BUN 15   CREATININE 0.7   ALKPHOS 100   *   *   BILITOT 1.0     Coagulation: No results for input(s): PT, INR, APTT in the last 48 hours.

## 2019-07-29 NOTE — PLAN OF CARE
(SW) met with Pt at bedside. Pt alert and oriented.    Pt reported that she lives with her son/caregiver, Angel, in a 2 story home with 1 step to entryway. Pt reported that she can move around safely in her home. Pt advised that she moved her bed downstairs and no longer has to use the stairs in her home. Pt advised that she uses [facility] and would like to resume services with them upon discharge.     PCP: Dr. Yakov Barrett    Emergency Contacts: Angel Hylton (son) [p: [phone]]     Michael Hylton (son) [p: [phone]]    Home Health: [facility]    Pharmacy:    [id number] [id number] [id number] [id number]  Phone: [phone] Fax: [phone]       07/29/19 1119   Discharge Assessment   Assessment Type Discharge Planning Assessment   Confirmed/corrected address and phone number on facesheet? Yes   Assessment information obtained from? Patient   Prior to hospitilization cognitive status: Alert/Oriented   Prior to hospitalization functional status: Needs Assistance   Current cognitive status: Alert/Oriented   Current Functional Status: Needs Assistance   Lives With child(smitha), adult   Able to Return to Prior Arrangements other (see comments)  (Unknown at this time)   Is patient able to care for self after discharge? Unable to determine at this time (comments)   Who are your caregiver(s) and their phone number(s)? Angel Hylton (son): [phone]   Patient currently being followed by outpatient case management? Unable to determine (comments)   Patient currently receives any other outside agency services? Yes   Name and contact number of agency or person providing outside services [facility]: [address], [place] [address]. Phone #: [phone]   Is it the patient/care giver preference to resume care with the current outside agency? Yes   Equipment Currently Used at Home wheelchair;walker,  standard;shower chair;bedside commode   Do you have any problems affording any of your prescribed medications? No   Is the patient taking medications as prescribed? yes   Does the patient have transportation home? Yes   Transportation Anticipated family or friend will provide   Dialysis Name and Scheduled days None   Does the patient receive services at the [facility]? No   Discharge Plan A Home;Home Health   Discharge Plan B Home;Home Health;Home with family   DME Needed Upon Discharge  none   Patient/Family in Agreement with Plan yes   Linn Saucedo, [facility]  [phone]

## 2019-07-29 NOTE — ASSESSMENT & PLAN NOTE
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 Labs revealed lipase>1000 and imaging findings consistent with pancreatitis. Her TB was noted to be 1.6 yesterday with improvement to 1.0 today. Liver enzymes have downtrended, and patient is pain-free. There is no evidence to suggest that patient has biliary obstruction at this point. No indication for endoscopic procedures.    Recommendations:  - Can obtain MRCP if patient's liver tests increase or if signs of developing obstruction or general surgery can perform IOC at time of cholecystectomy  - Supportive care  - Ok to advance diet from our standpoint

## 2019-07-29 NOTE — HPI
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 She reports the pain began two days ago, and describes it as sharp. The pain is mostly mid-epigastric with radiation up into the chest. Also reported some back pain. She has never had this type of pain before and did not know she had gallstones. In the ED, she was found to have a WBC of 21, lipase >1000, TB 1.6, AST//171. CT AP was obtained which found mild gallbladder distention with pericholecystic fluid and CBD of 8 mm. On interview today, patient reports that her abdominal pain has completely resolved. At present, she does not report any abdominal pain, back pain, nausea, or vomiting. She reports feeling hungry. Her labs today showed an improvement in her TB to 1.0 and WBC to 13 with an improvement in her liver enzymes as well.

## 2019-07-29 NOTE — PROGRESS NOTES
[facility]  General Surgery  Progress Note    Subjective:     History of Present Illness:  No notes on file    Post-Op Info:  * No surgery found *         Interval History: NAEON. Denies nausea/emesis, or abdominal pain this am.    Medications:  Continuous Infusions:   lactated ringers 125 mL/hr at 07/29/19 0711     Scheduled Meds:   cholecalciferol (vitamin D3)  5,000 Units Oral Daily    ciprofloxacin  400 mg Intravenous Q12H    ferrous sulfate  325 mg Oral Daily    gabapentin  300 mg Oral Nightly    levothyroxine  75 mcg Oral Daily    lisinopril  5 mg Oral Daily    metronidazole  500 mg Intravenous Q8H    miconazole NITRATE 2 %   Topical (Top) BID    pantoprazole  40 mg Oral Daily    potassium phosphate IVPB  15 mmol Intravenous Once     PRN Meds:acetaminophen, albuterol-ipratropium, Dextrose 10% Bolus, Dextrose 10% Bolus, glucagon (human recombinant), glucose, glucose, magnesium oxide, meclizine, morphine, ondansetron, oxyCODONE, senna-docusate 8.6-50 mg, sodium chloride 0.9%, sodium chloride 0.9%, traZODone     Review of patient's allergies indicates:   Allergen Reactions    Sulfa (sulfonamide antibiotics) Hives     Objective:     Vital Signs (Most Recent):  Temp: 98.2 °F (36.8 °C) (07/29/19 0821)  Pulse: 73 (07/29/19 0821)  Resp: 16 (07/29/19 0821)  BP: (!) 170/74 (07/29/19 0830)  SpO2: (!) 94 % (07/29/19 0821) Vital Signs (24h Range):  Temp:  [98.2 °F (36.8 °C)-98.9 °F (37.2 °C)] 98.2 °F (36.8 °C)  Pulse:  [73-82] 73  Resp:  [16-20] 16  SpO2:  [94 %-97 %] 94 %  BP: (138-196)/(69-88) 170/74     Weight: 72.6 kg (160 lb)  Body mass index is 34.62 kg/m².    Intake/Output - Last 3 Shifts       07/27 0700 - 07/28 0659 07/28 0700 - 07/29 0659 07/29 0700 - 07/30 0659    P.O.  0     IV Piggyback  600     Total Intake(mL/kg)  600 (8.3)     Net  +600            Stool Occurrence   1 x          Physical Exam   Constitutional: She appears well-developed.   HENT:   Head: Normocephalic.   Eyes:  Conjunctivae are normal.   Cardiovascular: Normal rate and regular rhythm.   Pulmonary/Chest: Effort normal.   Abdominal: Soft. She exhibits no distension. There is no tenderness.   Neurological: She is alert.   Skin: Skin is warm.       Significant Labs:  CBC:   Recent Labs   Lab 07/29/19  0537   WBC 13.13*   RBC 4.49   HGB 11.7*   HCT 35.8*      MCV 80*   MCH 26.1*   MCHC 32.7     CMP:   Recent Labs   Lab 07/29/19  0537   GLU 86   CALCIUM 9.2   ALBUMIN 2.8*   PROT 6.4      K 3.2*   CO2 27   CL 99   BUN 15   CREATININE 0.7   ALKPHOS 100   *   *   BILITOT 1.0           Assessment/Plan:     * Acute biliary pancreatitis  85yo F with biliary pancreatitis    - GI does not plan on ERCP; suggest MRCP if concern about choledocholithiasis  - Will discuss with staff interval cholecystectomy  - Care per primary team        Adrianna Lemus MD  General Surgery  [facility]

## 2019-07-29 NOTE — OP NOTE
DATE OF PROCEDURE: 07/29/2019.     PREOPERATIVE DIAGNOSIS: Gallstone pancreatitis with acute cholecystitis.     POSTOPERATIVE DIAGNOSIS: same.     PROCEDURE PERFORMED: Laparoscopic cholecystectomy.     ATTENDING SURGEON: rTe Alejandra MD    RESIDENT: Ansley Patton MD (RES).    : Elio Trinidad MD (RES).    ANESTHESIA: General endotracheal.     INDICATIONS: Linda Hylton is a 84 y.o.female who was admitted to the hospital with internal medicine for gallstone pancreatitis and acute pancreatitis. She was started on antibiotics and by hospital day 1 her abdominal pain, nausea and vomiting were resolved. We recommended laparoscopic cholecystectomy and the patient agreed to proceed. The patient signed informed consent and expressed understanding of the risks and benefits of surgery.     OPERATIVE PROCEDURE: The patient was taken to the operating room and placed supine. After induction of general endotracheal tube anesthesia, the abdomen was prepped and draped in the standard fashion. Timeout was performed. Marcaine was infiltrated into the subcutaneous tissue. A infraumbilical skin incision was made. Subcutaneous tissue was bluntly dissected. Two Kochers were used to grasp the fascia and elevate and a 1.5-cm midline infraumbilical fascial incision was made. The abdomen was bluntly entered under direct vision. A 0 Vicryl stay suture was placed around the fascial incision in a horizontal mattress fashion. A trocar was placed and the abdomen was insufflated with carbon dioxide to a maximum pressure of 15 mmHg. A 10-mm laparoscope was placed and the abdomen was examined. There was no evidence of injury from the initial trocar placement. Three 5-mm trocars were placed under direct vision through separate stab incisions, one subxiphoid and two in the right upper quadrant after infiltration of marcaine. We directed our attention to the right upper quadrant. The gallbladder was identified and noted to have  significant inflammatory change. The fundus was grasped and retracted cranially and the infundibulum was grasped and retracted laterally. We bluntly dissected the peritoneal reflection off the infundibulum and neck of the gallbladder. With careful blunt dissection in this area, we were able to identify the cystic duct. Further careful dissection identified the cystic artery and we did obtain a critical view of safety. Both duct and artery were triply clipped and divided. The gallbladder was dissected off the gallbladder fossa using Bovie electrocautery from infundibulum to fundus until free. It was placed into an EndoCatch bag and removed from the umbilical port site with no difficulty. We returned the laparoscope and trocar to the umbilicus and reexamined the right upper quadrant. The gallbladder fossa was examined and no further bleeding or any bile leak were noted. The clips on the cystic duct and artery were examined and no bleeding or bile leak were noted. The right upper quadrant was irrigated with saline briefly until the returning effluent was clear. All ports were removed under direct vision and no bleeding from any port site was noted. The insufflation of the abdomen was evacuated and the laparoscope and Rosey trocar were removed. The fascial incision at the umbilical port site was closed with the preexisting 0 Vicryl stitch. All port sites were closed in a subcuticular fashion. Sterile dressings were applied. The patient was extubated in the Operating Room and transported to the Recovery Room in stable condition. All sponge, instrument and needle counts were correct at the end of the case.     ESTIMATED BLOOD LOSS: 20 mL.     FINDINGS: Cholelithiasis and moderate inflammation.     SPECIMEN: Gallbladder.     DRAINS: None.     COMPLICATIONS: None.     Ansley Patton MD  Pager: [phone]  General Surgery PGY-III  [facility]

## 2019-07-29 NOTE — H&P
[facility] Hospital Medicine  History & Physical    Patient Name: Linda Hylton  MRN: [id number]  Admission Date: 7/28/2019  Attending Physician: Quinton Baron MD   Primary Care Provider: Yakov Barrett MD    [facility] Medicine Team: Networked reference to record PCT  Stevenson Weldon DO     Patient information was obtained from patient and ER records.     Subjective:     Principal Problem:Acute biliary pancreatitis    Chief Complaint:   Chief Complaint   Patient presents with    Abdominal Pain     Pt N/V and abdominal pain since yesterday. Pt denies hematemesis or blood in stool.         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 Reports chills, dry heaving, poor PO intake but denies fever, vomiting. States pain is severe, constant and was mostly over periumbilical and upper abdominal area. She never had similar pain in the past.     Work up in ED revealed biliary pancreatitis. Labs significant for WBC 21, abnormal LFT and elevated lipase >1000. CT abdomen showed mild gallbladder distention with pericholecystic fluid and dilated CBD ~8 mm. Blood cultures sent and received empiric zosyn. Also given 1500 cc IVF. She reports feeling much better now and her pain resolved.     She was seen by general surgery in ED with concern for acute cholecystitis with plan for interval cholecystectomy.      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    Vertebral basilar insufficiency 12/19/2017     Vitamin D deficiency        Past Surgical History:   Procedure Laterality Date    APPENDECTOMY      EGD (ESOPHAGOGASTRODUODENOSCOPY) N/A 3/1/2019    Performed by Simon Ortiz MD at [facility] ([address] [place] [address])    HIP SURGERY Left 01/12/2016    [facility]    PARATHYROIDECTOMY Right 2012    THYROID SURGERY Right 2012    Partial thyroidectomy       Review of patient's allergies indicates:   Allergen Reactions    Sulfa (sulfonamide antibiotics) Hives       No current facility-administered medications on file prior to encounter.      Current Outpatient Medications on File Prior to Encounter   Medication Sig    atorvastatin (LIPITOR) 80 MG tablet Take 1 tablet (80 mg total) by mouth once daily.    cholecalciferol, vitamin D3, 5,000 unit Tab Take 1 tablet (5,000 Units total) by mouth once daily.    clopidogrel (PLAVIX) 75 mg tablet Take 1 tablet (75 mg total) by mouth once daily.    clotrimazole-betamethasone 1-0.05% (LOTRISONE) cream Apply topically 2 (two) times daily.    ferrous sulfate 325 (65 FE) MG EC tablet Take 1 tablet (325 mg total) by mouth once daily.    gabapentin (NEURONTIN) 300 MG capsule Take 1 capsule (300 mg total) by mouth nightly.    levothyroxine (EUTHYROX) 88 MCG tablet Take 1 tablet (88 mcg total) by mouth once daily.    lisinopril 10 MG tablet Take 0.5 tablets (5 mg total) by mouth once daily.    meclizine (ANTIVERT) 25 mg tablet Take 1 tablet (25 mg total) by mouth 3 (three) times daily as needed for Dizziness.    pantoprazole (PROTONIX) 40 MG tablet Take 1 tablet (40 mg total) by mouth once daily.    traZODone (DESYREL) 50 MG tablet Take 1 tablet (50 mg total) by mouth every evening.    nystatin (MYCOSTATIN) powder Apply to affected area 3 times daily    ondansetron (ZOFRAN-ODT) 8 MG TbDL Take 1 tablet (8 mg total) by mouth every 8 (eight) hours as needed.     Family History     Problem Relation (Age of Onset)    Diabetes Son    Heart disease Father, Sister, Brother        Tobacco Use     Smoking status: Never Smoker    Smokeless tobacco: Never Used   Substance and Sexual Activity    Alcohol use: Not on file    Drug use: No    Sexual activity: Never     Review of Systems   Constitutional: Positive for chills. Negative for activity change, appetite change, diaphoresis, fatigue and fever.   HENT: Negative for congestion, dental problem, drooling, ear discharge, ear pain, facial swelling, hearing loss, mouth sores, nosebleeds, postnasal drip, rhinorrhea, sinus pressure, sneezing, sore throat, tinnitus, trouble swallowing and voice change.    Eyes: Negative for photophobia, pain, discharge, redness, itching and visual disturbance.   Respiratory: Negative for apnea, cough, choking, chest tightness, shortness of breath, wheezing and stridor.    Cardiovascular: Negative for chest pain, palpitations and leg swelling.   Gastrointestinal: Positive for abdominal pain and nausea. Negative for abdominal distention, anal bleeding, blood in stool, constipation, diarrhea, rectal pain and vomiting.   Endocrine: Negative for cold intolerance, heat intolerance, polydipsia, polyphagia and polyuria.   Genitourinary: Negative for decreased urine volume, difficulty urinating, dyspareunia, dysuria, enuresis, flank pain, frequency, genital sores, hematuria, menstrual problem, pelvic pain, urgency, vaginal bleeding, vaginal discharge and vaginal pain.   Musculoskeletal: Negative for arthralgias, back pain, gait problem, joint swelling, myalgias, neck pain and neck stiffness.   Skin: Negative for color change, pallor, rash and wound.   Allergic/Immunologic: Negative for environmental allergies, food allergies and immunocompromised state.   Neurological: Negative for dizziness, tremors, seizures, syncope, facial asymmetry, speech difficulty, weakness, light-headedness, numbness and headaches.   Hematological: Negative for adenopathy. Does not bruise/bleed easily.   Psychiatric/Behavioral: Negative for agitation, behavioral  problems, confusion, decreased concentration, dysphoric mood, hallucinations, self-injury, sleep disturbance and suicidal ideas. The patient is not nervous/anxious and is not hyperactive.      Objective:     Vital Signs (Most Recent):  Temp: 98.9 °F (37.2 °C) (07/29/19 0400)  Pulse: 73 (07/29/19 0415)  Resp: 20 (07/29/19 0225)  BP: (!) 162/70(rechecked per MD) (07/29/19 0415)  SpO2: (!) 94 % (07/29/19 0400) Vital Signs (24h Range):  Temp:  [98.5 °F (36.9 °C)-98.9 °F (37.2 °C)] 98.9 °F (37.2 °C)  Pulse:  [73-82] 73  Resp:  [18-20] 20  SpO2:  [94 %-97 %] 94 %  BP: (138-189)/(69-88) 162/70     Weight: 72.6 kg (160 lb)  Body mass index is 34.62 kg/m².    Physical Exam   Constitutional: She is oriented to person, place, and time. She appears well-developed and well-nourished. No distress.   HENT:   Head: Normocephalic and atraumatic.   Right Ear: External ear normal.   Left Ear: External ear normal.   Nose: Nose normal.   Mouth/Throat: No oropharyngeal exudate.   Dry oral mucosa     Eyes: Pupils are equal, round, and reactive to light. Conjunctivae and EOM are normal. Right eye exhibits no discharge. Left eye exhibits no discharge. No scleral icterus.   Neck: Normal range of motion. Neck supple. No JVD present. No thyromegaly present.   Cardiovascular: Normal rate, regular rhythm and intact distal pulses. Exam reveals no gallop and no friction rub.   Murmur (III/VI systolic murmur over bilateral upper sternal border L>R) heard.  Pulmonary/Chest: Effort normal and breath sounds normal. No stridor. No respiratory distress. She has no wheezes. She has no rales. She exhibits no tenderness.   Abdominal: Soft. Bowel sounds are normal. She exhibits no distension and no mass. There is tenderness (mild TTP over RUQ and Epigastrium. Negative Vargas's sign). There is no rebound and no guarding. No hernia.   Genitourinary: Rectal exam shows guaiac negative stool. No vaginal discharge found.   Musculoskeletal: Normal range of motion.  She exhibits no edema, tenderness or deformity.   Lymphadenopathy:     She has no cervical adenopathy.   Neurological: She is alert and oriented to person, place, and time. She has normal reflexes. She displays normal reflexes. No cranial nerve deficit. She exhibits normal muscle tone. Coordination normal.   Skin: Skin is warm and dry. No rash noted. She is not diaphoretic. No erythema. No pallor.   Psychiatric: She has a normal mood and affect. Her behavior is normal. Judgment and thought content normal.         CRANIAL NERVES     CN III, IV, VI   Pupils are equal, round, and reactive to light.  Extraocular motions are normal.       Significant Labs:   Recent Results (from the past 24 hour(s))   CBC W/ AUTO DIFFERENTIAL    Collection Time: 07/28/19 10:41 PM   Result Value Ref Range    WBC 21.61 (H) 3.90 - 12.70 K/uL    RBC 4.86 4.00 - 5.40 M/uL    Hemoglobin 12.9 12.0 - 16.0 g/dL    Hematocrit 39.3 37.0 - 48.5 %    Mean Corpuscular Volume 81 (L) 82 - 98 fL    Mean Corpuscular Hemoglobin 26.5 (L) 27.0 - 31.0 pg    Mean Corpuscular Hemoglobin Conc 32.8 32.0 - 36.0 g/dL    RDW 14.6 (H) 11.5 - 14.5 %    Platelets 330 150 - 350 K/uL    MPV 10.5 9.2 - 12.9 fL    Immature Granulocytes 0.5 0.0 - 0.5 %    Gran # (ANC) 18.5 (H) 1.8 - 7.7 K/uL    Immature Grans (Abs) 0.11 (H) 0.00 - 0.04 K/uL    Lymph # 1.2 1.0 - 4.8 K/uL    Mono # 1.7 (H) 0.3 - 1.0 K/uL    Eos # 0.0 0.0 - 0.5 K/uL    Baso # 0.05 0.00 - 0.20 K/uL    nRBC 0 0 /100 WBC    Gran% 85.8 (H) 38.0 - 73.0 %    Lymph% 5.4 (L) 18.0 - 48.0 %    Mono% 8.0 4.0 - 15.0 %    Eosinophil% 0.1 0.0 - 8.0 %    Basophil% 0.2 0.0 - 1.9 %    Differential Method Automated    Comp. Metabolic Panel    Collection Time: 07/28/19 10:41 PM   Result Value Ref Range    Sodium 133 (L) 136 - 145 mmol/L    Potassium 2.8 (L) 3.5 - 5.1 mmol/L    Chloride 92 (L) 95 - 110 mmol/L    CO2 28 23 - 29 mmol/L    Glucose 134 (H) 70 - 110 mg/dL    BUN, Bld 19 8 - 23 mg/dL    Creatinine 0.8 0.5 - 1.4 mg/dL     Calcium 9.8 8.7 - 10.5 mg/dL    Total Protein 7.5 6.0 - 8.4 g/dL    Albumin 3.3 (L) 3.5 - 5.2 g/dL    Total Bilirubin 1.6 (H) 0.1 - 1.0 mg/dL    Alkaline Phosphatase 95 55 - 135 U/L     (H) 10 - 40 U/L     (H) 10 - 44 U/L    Anion Gap 13 8 - 16 mmol/L    eGFR if African American >60.0 >60 mL/min/1.73 m^2    eGFR if non African American >60.0 >60 mL/min/1.73 m^2   Lipase    Collection Time: 07/28/19 10:41 PM   Result Value Ref Range    Lipase >1000 (H) 4 - 60 U/L   Lactic acid, plasma    Collection Time: 07/28/19 10:41 PM   Result Value Ref Range    Lactate (Lactic Acid) 2.1 0.5 - 2.2 mmol/L   Phosphorus    Collection Time: 07/28/19 10:41 PM   Result Value Ref Range    Phosphorus 2.2 (L) 2.7 - 4.5 mg/dL   Magnesium    Collection Time: 07/28/19 10:41 PM   Result Value Ref Range    Magnesium 1.6 1.6 - 2.6 mg/dL   Urinalysis, Reflex to Urine Culture Urine, Clean Catch    Collection Time: 07/29/19  1:15 AM   Result Value Ref Range    Specimen UA Urine, Clean Catch     Color, UA Yellow Yellow, Straw, Hannah    Appearance, UA Clear Clear    pH, UA 7.0 5.0 - 8.0    Specific Gravity, UA 1.025 1.005 - 1.030    Protein, UA 1+ (A) Negative    Glucose, UA Negative Negative    Ketones, UA Negative Negative    Bilirubin (UA) Negative Negative    Occult Blood UA 1+ (A) Negative    Nitrite, UA Negative Negative    Leukocytes, UA Negative Negative   Urinalysis Microscopic    Collection Time: 07/29/19  1:15 AM   Result Value Ref Range    RBC, UA 1 0 - 4 /hpf    WBC, UA 1 0 - 5 /hpf    Bacteria Rare None-Occ /hpf    Squam Epithel, UA 1 /hpf    Hyaline Casts, UA 0 0-1/lpf /lpf    Microscopic Comment SEE COMMENT    Lactic acid, plasma    Collection Time: 07/29/19  5:37 AM   Result Value Ref Range    Lactate (Lactic Acid) 0.8 0.5 - 2.2 mmol/L         Significant Imaging: I have reviewed and interpreted all pertinent imaging results/findings within the past 24 hours.     CT Abdomen pelvis with contrast:    Mild gallbladder  distension with pericholecystic fluid.  Borderline distended common bile duct measuring 8 mm, though increased from 4 mm on prior ultrasound.  Small gallstones identified on prior ultrasound are not radiopaque and therefore choledocholithiasis cannot be excluded.  Suggest correlation with serum bilirubin/LFTs and additional evaluation as warranted.    Small hiatal hernia and mild fluid distention of the stomach.    Colonic diverticulosis without findings of diverticulitis.  No intra-abdominal abscess or pneumoperitoneum.    Nonobstructing left-sided nephrolithiasis.    6 mm solid pulmonary nodule in the posterior right lower lobe in close approximation to the pleura.  Suggest nonemergent follow-up with noncontrast chest CT when clinically appropriate.  For a solid nodule 6-8 mm, Fleischner Society 2017 guidelines recommend follow up with non-contrast chest CT at 6-12 months and 18-24 months after discovery.    EKG: NSR @85 bpm, old RBBB    Assessment/Plan:     Active Diagnoses:    Diagnosis Date Noted POA    PRINCIPAL PROBLEM:  Acute biliary pancreatitis [K85.10] 07/29/2019 Yes    Cholecystitis [K81.9] 07/29/2019 Yes    Abnormal LFTs [R94.5] 07/29/2019 Yes    Choledocholithiasis [K80.50] 07/29/2019 Yes    Nausea [R11.0] 07/29/2019 Yes    Volume depletion [E86.9] 07/29/2019 Yes    Hypokalemia [E87.6] 07/29/2019 Yes    Hypophosphatemia [E83.39] 07/29/2019 Yes    Debility [R53.81] 07/29/2019 Yes    Incidental lung nodule, > 3mm and < 8mm [R91.1] 07/29/2019 Yes    Iron deficiency anemia [D50.9] 12/27/2017 Yes    Aortic stenosis [I35.0]  Yes    Acquired hypothyroidism [E03.9]  Yes    Essential hypertension [I10]  Yes      Problems Resolved During this Admission:     #Acute biliary pancreatitis   -abnormal LFT and elevated Lipase > 1000  -CT abdomen showed dilated CBD and mild GB distention with pericholecystic fluid   -WBC 21, afebrile   -abdominal pain resolved with IVF given in ED   -seen by general  surgery in ED for suspected cholecystitis with plan for cholecystectomy after ERCP  -surgery will follow along  -she does not appear toxic   -continue empiric ciprofloxacin and flagyl   -f/u with cultures   -Trend WBC and LFTs   -NPO, IVF  -pain control and antiemetics prn   -hold plavix in anticipation of cholecystectomy   -consult [facility] for ERCP     # HTN   -continue home dose lisinopril   -hydralazine prn SBP > 160    # h/o embolic CVA w/o residual deficit   -hold plavix in anticipation of surgery   -hold statin in setting of transaminitis    # Hypothyroidism   -on synthroid     # Hypokalemia, hypomagnesemia, hypophosphatemia   -will replete with KCL, K, PHOS and Mg oxide   -f/u with repeat labs     # Iron deficiency anemia   -stable and continue iron     #DJD with back pain and unsteady gait   -ambulates with walker   -denies falls  -continue gabapentin   -fall precaution    # Incidental solitary RLL lung nodules   -no smoking history   -f/u with outpatient CT in 6-12 months         VTE Risk Mitigation (From admission, onward)        Ordered     IP VTE HIGH RISK PATIENT  Once      07/29/19 0426     Place SEAN hose  Until discontinued      07/29/19 0426     Place sequential compression device  Until discontinued      07/29/19 0426            Stevenson Weldon DO  Department of Hospital Medicine   [facility]

## 2019-07-29 NOTE — ASSESSMENT & PLAN NOTE
Improving  consult [facility] any they do not think she needs an ERCP   -seen by general surgery in ED for suspected cholecystitis with plan for cholecystectomy  -continue empiric ciprofloxacin and flagyl   -f/u with cultures   -NPO, IVF  -pain control and antiemetics prn   -hold plavix in anticipation of cholecystectomy

## 2019-07-29 NOTE — PROGRESS NOTES
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 Reports chills, dry heaving, poor PO intake but denies fever, vomiting. States pain is severe, constant and was mostly over periumbilical and upper abdominal area. She never had similar pain in the past.      Work up in ED revealed biliary pancreatitis. Labs significant for WBC 21, abnormal LFT and elevated lipase >1000. CT abdomen showed mild gallbladder distention with pericholecystic fluid and dilated CBD ~8 mm. Blood cultures sent and received empiric zosyn. Also given 1500 cc IVF.   She reports feeling much better now and her pain resolved.     Overview/Hospital Course:  See problem list    Interval History:   Symptoms:  Improved.    Diet:  NPO, but hungry.  Activity level:   Returning to normal.  Pain:  Pain resolved.      Review of Systems   Constitutional: Negative for fever.   Respiratory: Negative for shortness of breath.    Cardiovascular: Negative for chest pain.   Gastrointestinal: Negative for abdominal distention, abdominal pain and nausea.     Objective:     Vital Signs (Most Recent):  Temp: 98.8 °F (37.1 °C) (07/29/19 1235)  Pulse: 68 (07/29/19 1235)  Resp: 16 (07/29/19 1235)  BP: (!) 144/68 (07/29/19 1235)  SpO2: 96 % (07/29/19 1235) Vital Signs (24h Range):  Temp:  [98.2 °F (36.8 °C)-98.9 °F (37.2 °C)]  98.8 °F (37.1 °C)  Pulse:  [68-82] 68  Resp:  [16-20] 16  SpO2:  [94 %-97 %] 96 %  BP: (138-196)/(68-88) 144/68     Weight: 72.6 kg (160 lb)  Body mass index is 34.62 kg/m².    Intake/Output Summary (Last 24 hours) at 7/29/2019 1328  Last data filed at 7/29/2019 0400  Gross per 24 hour   Intake 600 ml   Output --   Net 600 ml      Physical Exam   Constitutional: She is oriented to person, place, and time. She appears well-nourished. No distress.   Neck: No JVD present.   Cardiovascular: Normal rate, regular rhythm and normal heart sounds.   Pulmonary/Chest: Effort normal and breath sounds normal. No respiratory distress.   Abdominal: Soft. Bowel sounds are normal. She exhibits no distension. There is no tenderness.   Musculoskeletal: She exhibits no edema.   Neurological: She is alert and oriented to person, place, and time.   Psychiatric: She has a normal mood and affect.       Significant Labs: All pertinent labs within the past 24 hours have been reviewed.    Significant Imaging: I have reviewed all pertinent imaging results/findings within the past 24 hours.      Assessment/Plan:      * Cholecystitis  Improving  consult [facility] any they do not think she needs an ERCP   -seen by general surgery in ED for suspected cholecystitis with plan for cholecystectomy  -continue empiric ciprofloxacin and flagyl   -f/u with cultures   -NPO, IVF  -pain control and antiemetics prn   -hold plavix in anticipation of cholecystectomy       Acute biliary pancreatitis  ABd pain, elevated bili, and elevated Lipase > 1000  CT abdomen showed dilated CBD and mild GB distention with pericholecystic fluid   -abdominal pain resolved with IVF given in ED   - monitor      Essential hypertension  -continue home dose lisinopril   -hydralazine prn SBP > 160      Iron deficiency anemia  -stable and continue iron         VTE Risk Mitigation (From admission, onward)        Ordered     IP VTE HIGH RISK PATIENT  Once      07/29/19 0737     [place]  hose  Until discontinued      07/29/19 0737     Place SEAN hose  Until discontinued      07/29/19 0426     Place sequential compression device  Until discontinued      07/29/19 0426                Quinton Baron MD  Department of Hospital Medicine   [facility]

## 2019-07-29 NOTE — HOSPITAL COURSE
83 y/o woman hx of HTN, embolic stroke with residual deficit, aortic stenosis, hypothryoidism, gerd, presented with abdominal pain and found with acute biliary pancreatitis and acute cholecystitis who is now s/p laparoscopic cholecystectomy.  Post-op with pain/debility and confusion that has prompted PT/OT evals. Originally planned on SNF discharge; however, she has progressed significantly and now is ok for HH PT/OT.     Initially, there were reports of intermittent confusion and possible hallucinations, this improved greatly after PRN breakthrough medication was switched from opiates to regimen of scheduled APAP and PRN NSAID (toradol/ibuprofen).  She remained with anxiety and dysphoric mood.  On 8/4 Olanzapine given when she reported discomfort/anxiety that she couldn't characterize and her blood pressure was elevated >200.  Pt reported this helped her symptoms will continue as a prn medication.       1. Cholecystitis,   Biliary Pancreatitis   S/p Lap Cholecystectomy 7/29  -tolerating bland-low fat diet    -Scheduled Acetaminophen completed. Continue PRN Nsaids, can give PRN APAP if needed or rotate if needed  -stopped PRN opiates, concern they were contributing to patient's intermittent confusion.   -Transaminase levels downtrending.   -will need to f/u with Gen Surg Dr. Alejandra, 2 weeks post-op     2. Confusion  -no delirium present by CAM-ICU evals  -pt with chronic debility  -PT/OT consults: recommend HH PT/OT due to patient progression     3. Essential Hypertension  -titrated lisinopril to 20mg and initiated low dose amlodipine 5mg; suspect NSAIDs may also be contributing  -would benefit from Cards f/u given mild aortic stenosis      4. Hypothyroidism  -continue levothyroxine     5. Debility  -PT/OT consults: home health    Vitals:    08/06/19 1124   BP: (!) 149/65   Pulse: 93   Resp: 19   Temp: 99 °F (37.2 °C)     Physical Exam   Constitutional: She is oriented to person, place, and time. No distress.    obese   Eyes: No scleral icterus.   Cardiovascular: Normal rate and regular rhythm.   Murmur heard.  Pulmonary/Chest: Effort normal and breath sounds normal. No stridor. No respiratory distress.   Abdominal: Soft. Bowel sounds are normal. She exhibits no distension. There is no tenderness. There is no rebound and no guarding.   Laparoscopic surgical scars c/d/i   Neurological: She is alert and oriented to person, place, and time. GCS eye subscore is 4. GCS verbal subscore is 5. GCS motor subscore is 6.   No asterixis   Skin: Skin is warm and dry.   Psychiatric: Her mood appears anxious. Her affect is not labile. She is attentive.

## 2019-07-29 NOTE — MEDICAL/APP STUDENT
"  History     Chief Complaint   Patient presents with    Abdominal Pain     Pt N/V and abdominal pain since yesterday. Pt denies hematemesis or blood in stool.      83yo female with history of HTN, GI ulcers, CVA, partial thyroidectomy presents for periumbilical abdominal pain since last night. She describes the pain as "hard" but denies tearing, sharp, or burning sensation. The pain has been constant and is not associated with meals. The pain occasionally radiates up to the epigastric area. She took mylanta at home but threw it up. She is otherwise able to tolerate PO fluids and boost. She denies bloody emesis, diarrhea, hematochezia, or melena. She has had a bowel movement today. She denies dysuria, polyuria, alcohol use, shortness of breath, cough, fevers, or coronary artery disease. She had an appendectomy many years ago but denies any other abdominal surgeries.          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    Vertebral basilar insufficiency 12/19/2017    Vitamin D deficiency        Past Surgical History:   Procedure Laterality Date    APPENDECTOMY      EGD (ESOPHAGOGASTRODUODENOSCOPY) N/A 3/1/2019    Performed by Simon Ortiz MD at [facility] ([address] [place] [address])    HIP SURGERY Left 01/12/2016    [facility]    PARATHYROIDECTOMY Right 2012    THYROID SURGERY Right 2012    Partial thyroidectomy       Family History   Problem Relation Age of Onset    Heart disease Father     Heart disease Sister     Heart disease Brother     Diabetes Son        Social History     Tobacco Use    Smoking status: Never Smoker    Smokeless tobacco: Never Used   Substance Use Topics    Alcohol use: Not on file    Drug use: No       Review of Systems   Constitutional: Negative for " "appetite change, chills, fever and unexpected weight change.   HENT: Negative for sore throat.    Respiratory: Negative for chest tightness, shortness of breath and wheezing.    Cardiovascular: Negative for chest pain, palpitations and leg swelling.   Gastrointestinal: Positive for abdominal pain, nausea and vomiting (one episode after taking mylanta). Negative for diarrhea.   Genitourinary: Negative for dysuria, frequency and pelvic pain.   Musculoskeletal: Negative for arthralgias and back pain.   Neurological: Negative for weakness and headaches.       Physical Exam   BP (!) 181/79   Pulse 74   Temp 98.5 °F (36.9 °C) (Oral)   Resp 18   Ht 4' 9" (1.448 m)   Wt 72.7 kg (160 lb 4.4 oz)   LMP  (LMP Unknown)   SpO2 97%   Breastfeeding? No   BMI 34.68 kg/m²     Physical Exam    Constitutional: She appears well-developed and well-nourished. No distress.   Eyes: Conjunctivae are normal. No scleral icterus.   Cardiovascular: Normal rate and regular rhythm.   Murmur (Systolic murmur loudest over mitral area) heard.  Pulmonary/Chest: No stridor. She has no wheezes. She has no rhonchi. She has no rales.   Abdominal: Soft. She exhibits no mass. There is tenderness ( diffuse tenerness worse over epigastric area). There is no rebound and no guarding.   Neurological: She is alert and oriented to person, place, and time.   Skin: Skin is warm and dry. No rash noted. No pallor.         ED Course         "

## 2019-07-30 PROBLEM — E83.39 HYPOPHOSPHATEMIA: Status: RESOLVED | Noted: 2019-07-29 | Resolved: 2019-07-30

## 2019-07-30 PROBLEM — E87.6 HYPOKALEMIA: Status: RESOLVED | Noted: 2019-07-29 | Resolved: 2019-07-30

## 2019-07-30 PROBLEM — K80.50 CHOLEDOCHOLITHIASIS: Status: RESOLVED | Noted: 2019-07-29 | Resolved: 2019-07-30

## 2019-07-30 PROBLEM — Z90.49 S/P LAPAROSCOPIC CHOLECYSTECTOMY: Status: ACTIVE | Noted: 2019-07-30

## 2019-07-30 LAB
ALBUMIN SERPL BCP-MCNC: 2.7 G/DL (ref 3.5–5.2)
ALP SERPL-CCNC: 75 U/L (ref 55–135)
ALT SERPL W/O P-5'-P-CCNC: 105 U/L (ref 10–44)
ANION GAP SERPL CALC-SCNC: 12 MMOL/L (ref 8–16)
AST SERPL-CCNC: 75 U/L (ref 10–40)
BASOPHILS # BLD AUTO: 0.03 K/UL (ref 0–0.2)
BASOPHILS NFR BLD: 0.2 % (ref 0–1.9)
BILIRUB SERPL-MCNC: 0.4 MG/DL (ref 0.1–1)
BUN SERPL-MCNC: 12 MG/DL (ref 8–23)
CALCIUM SERPL-MCNC: 8.8 MG/DL (ref 8.7–10.5)
CHLORIDE SERPL-SCNC: 104 MMOL/L (ref 95–110)
CO2 SERPL-SCNC: 22 MMOL/L (ref 23–29)
CREAT SERPL-MCNC: 0.7 MG/DL (ref 0.5–1.4)
DIFFERENTIAL METHOD: ABNORMAL
EOSINOPHIL # BLD AUTO: 0 K/UL (ref 0–0.5)
EOSINOPHIL NFR BLD: 0 % (ref 0–8)
ERYTHROCYTE [DISTWIDTH] IN BLOOD BY AUTOMATED COUNT: 15.3 % (ref 11.5–14.5)
EST. GFR  (AFRICAN AMERICAN): >60 ML/MIN/1.73 M^2
EST. GFR  (NON AFRICAN AMERICAN): >60 ML/MIN/1.73 M^2
GLUCOSE SERPL-MCNC: 93 MG/DL (ref 70–110)
HCT VFR BLD AUTO: 34.7 % (ref 37–48.5)
HGB BLD-MCNC: 10.8 G/DL (ref 12–16)
IMM GRANULOCYTES # BLD AUTO: 0.09 K/UL (ref 0–0.04)
IMM GRANULOCYTES NFR BLD AUTO: 0.6 % (ref 0–0.5)
LYMPHOCYTES # BLD AUTO: 1.1 K/UL (ref 1–4.8)
LYMPHOCYTES NFR BLD: 7 % (ref 18–48)
MAGNESIUM SERPL-MCNC: 1.6 MG/DL (ref 1.6–2.6)
MCH RBC QN AUTO: 26 PG (ref 27–31)
MCHC RBC AUTO-ENTMCNC: 31.1 G/DL (ref 32–36)
MCV RBC AUTO: 84 FL (ref 82–98)
MONOCYTES # BLD AUTO: 1.3 K/UL (ref 0.3–1)
MONOCYTES NFR BLD: 8.6 % (ref 4–15)
NEUTROPHILS # BLD AUTO: 12.6 K/UL (ref 1.8–7.7)
NEUTROPHILS NFR BLD: 83.6 % (ref 38–73)
NRBC BLD-RTO: 0 /100 WBC
PHOSPHATE SERPL-MCNC: 4.2 MG/DL (ref 2.7–4.5)
PLATELET # BLD AUTO: 250 K/UL (ref 150–350)
PMV BLD AUTO: 11.2 FL (ref 9.2–12.9)
POTASSIUM SERPL-SCNC: 3.9 MMOL/L (ref 3.5–5.1)
PROT SERPL-MCNC: 6.1 G/DL (ref 6–8.4)
RBC # BLD AUTO: 4.15 M/UL (ref 4–5.4)
SODIUM SERPL-SCNC: 138 MMOL/L (ref 136–145)
WBC # BLD AUTO: 15.04 K/UL (ref 3.9–12.7)

## 2019-07-30 PROCEDURE — 20600001 HC STEP DOWN PRIVATE ROOM

## 2019-07-30 PROCEDURE — 27000221 HC OXYGEN, UP TO 24 HOURS

## 2019-07-30 PROCEDURE — 25000003 PHARM REV CODE 250: Performed by: HOSPITALIST

## 2019-07-30 PROCEDURE — 99900035 HC TECH TIME PER 15 MIN (STAT)

## 2019-07-30 PROCEDURE — 36415 COLL VENOUS BLD VENIPUNCTURE: CPT

## 2019-07-30 PROCEDURE — 85025 COMPLETE CBC W/AUTO DIFF WBC: CPT

## 2019-07-30 PROCEDURE — 80053 COMPREHEN METABOLIC PANEL: CPT

## 2019-07-30 PROCEDURE — 99232 SBSQ HOSP IP/OBS MODERATE 35: CPT | Mod: ,,, | Performed by: HOSPITALIST

## 2019-07-30 PROCEDURE — 83735 ASSAY OF MAGNESIUM: CPT

## 2019-07-30 PROCEDURE — 25000003 PHARM REV CODE 250: Performed by: STUDENT IN AN ORGANIZED HEALTH CARE EDUCATION/TRAINING PROGRAM

## 2019-07-30 PROCEDURE — 94761 N-INVAS EAR/PLS OXIMETRY MLT: CPT

## 2019-07-30 PROCEDURE — 84100 ASSAY OF PHOSPHORUS: CPT

## 2019-07-30 PROCEDURE — 99232 PR SUBSEQUENT HOSPITAL CARE,LEVL II: ICD-10-PCS | Mod: ,,, | Performed by: HOSPITALIST

## 2019-07-30 PROCEDURE — 63600175 PHARM REV CODE 636 W HCPCS: Performed by: STUDENT IN AN ORGANIZED HEALTH CARE EDUCATION/TRAINING PROGRAM

## 2019-07-30 RX ORDER — LEVOTHYROXINE SODIUM 75 UG/1
75 TABLET ORAL
Status: DISCONTINUED | OUTPATIENT
Start: 2019-07-31 | End: 2019-08-06 | Stop reason: HOSPADM

## 2019-07-30 RX ORDER — TRAZODONE HYDROCHLORIDE 50 MG/1
50 TABLET ORAL NIGHTLY
Status: DISCONTINUED | OUTPATIENT
Start: 2019-07-30 | End: 2019-08-06 | Stop reason: HOSPADM

## 2019-07-30 RX ORDER — OXYCODONE HYDROCHLORIDE 5 MG/1
5 TABLET ORAL EVERY 4 HOURS PRN
Status: DISCONTINUED | OUTPATIENT
Start: 2019-07-30 | End: 2019-08-01

## 2019-07-30 RX ADMIN — OXYCODONE HYDROCHLORIDE 10 MG: 10 TABLET ORAL at 03:07

## 2019-07-30 RX ADMIN — SODIUM CHLORIDE, SODIUM LACTATE, POTASSIUM CHLORIDE, AND CALCIUM CHLORIDE: .6; .31; .03; .02 INJECTION, SOLUTION INTRAVENOUS at 04:07

## 2019-07-30 RX ADMIN — MICONAZOLE NITRATE: 20 POWDER TOPICAL at 09:07

## 2019-07-30 RX ADMIN — OXYCODONE HYDROCHLORIDE 5 MG: 5 TABLET ORAL at 08:07

## 2019-07-30 RX ADMIN — Medication 5000 UNITS: at 08:07

## 2019-07-30 RX ADMIN — GABAPENTIN 300 MG: 300 CAPSULE ORAL at 08:07

## 2019-07-30 RX ADMIN — FERROUS SULFATE TAB EC 325 MG (65 MG FE EQUIVALENT) 325 MG: 325 (65 FE) TABLET DELAYED RESPONSE at 08:07

## 2019-07-30 RX ADMIN — TRAZODONE HYDROCHLORIDE 50 MG: 50 TABLET ORAL at 08:07

## 2019-07-30 RX ADMIN — ACETAMINOPHEN 650 MG: 325 TABLET ORAL at 11:07

## 2019-07-30 RX ADMIN — LEVOTHYROXINE SODIUM 75 MCG: 75 TABLET ORAL at 08:07

## 2019-07-30 RX ADMIN — PANTOPRAZOLE SODIUM 40 MG: 40 TABLET, DELAYED RELEASE ORAL at 08:07

## 2019-07-30 NOTE — ANESTHESIA POSTPROCEDURE EVALUATION
Anesthesia Post Evaluation    Patient: Linda yHlton    Procedure(s) Performed: Procedure(s) (LRB):  CHOLECYSTECTOMY, LAPAROSCOPIC (N/A)    Final Anesthesia Type: general  Patient location during evaluation: PACU  Patient participation: Yes- Able to Participate  Level of consciousness: awake and alert and oriented  Post-procedure vital signs: reviewed and stable  Pain management: adequate  Airway patency: patent  PONV status at discharge: No PONV  Anesthetic complications: no      Cardiovascular status: blood pressure returned to baseline and hemodynamically stable  Respiratory status: unassisted and spontaneous ventilation  Hydration status: euvolemic  Follow-up not needed.          Vitals Value Taken Time   /66 7/30/2019  8:42 AM   Temp 37.1 °C (98.7 °F) 7/30/2019  8:42 AM   Pulse 79 7/30/2019  8:42 AM   Resp 18 7/30/2019  8:42 AM   SpO2 95 % 7/30/2019  8:42 AM         Event Time     Out of Recovery 07/29/2019 16:23:12          Pain/Joana Score: Pain Rating Prior to Med Admin: 6 (7/30/2019  8:55 AM)  Pain Rating Post Med Admin: 0 (7/30/2019  4:19 AM)  Joana Score: 9 (7/29/2019  3:55 PM)

## 2019-07-30 NOTE — SUBJECTIVE & OBJECTIVE
Interval History:  Patient seen and examined, no acute events overnight  Pain well controlled  Tolerated water last night, denies N/V  +F/BMx1  Afebrile/VSS      Medications:  Continuous Infusions:   lactated ringers 75 mL/hr at 07/30/19 0419     Scheduled Meds:   cholecalciferol (vitamin D3)  5,000 Units Oral Daily    ferrous sulfate  325 mg Oral Daily    gabapentin  300 mg Oral Nightly    levothyroxine  75 mcg Oral Daily    miconazole NITRATE 2 %   Topical (Top) BID    pantoprazole  40 mg Oral Daily     PRN Meds:acetaminophen, albuterol-ipratropium, Dextrose 10% Bolus, Dextrose 10% Bolus, glucagon (human recombinant), glucose, glucose, hydrALAZINE, magnesium oxide, meclizine, morphine, ondansetron, oxyCODONE, oxyCODONE, senna-docusate 8.6-50 mg, sodium chloride 0.9%, sodium chloride 0.9%, traZODone     Review of patient's allergies indicates:   Allergen Reactions    Sulfa (sulfonamide antibiotics) Hives     Objective:     Vital Signs (Most Recent):  Temp: 97.8 °F (36.6 °C) (07/30/19 0419)  Pulse: 84 (07/30/19 0419)  Resp: 16 (07/30/19 0419)  BP: (!) 141/81 (07/30/19 0419)  SpO2: 97 % (07/30/19 0419) Vital Signs (24h Range):  Temp:  [96.9 °F (36.1 °C)-99.1 °F (37.3 °C)] 97.8 °F (36.6 °C)  Pulse:  [] 84  Resp:  [14-20] 16  SpO2:  [89 %-98 %] 97 %  BP: (131-196)/(63-82) 141/81     Weight: 72.6 kg (160 lb)  Body mass index is 34.62 kg/m².    Intake/Output - Last 3 Shifts       07/28 0700 - 07/29 0659 07/29 0700 - 07/30 0659 07/30 0700 - 07/31 0659    P.O. 0 120     I.V. (mL/kg)  1862.5 (25.7)     IV Piggyback 600      Total Intake(mL/kg) 600 (8.3) 1982.5 (27.3)     Urine (mL/kg/hr)  975 (0.6)     Total Output  975     Net +600 +1007.5            Stool Occurrence  1 x           Physical Exam   Constitutional: She appears well-developed and well-nourished. No distress.   HENT:   Head: Normocephalic and atraumatic.   Cardiovascular: Normal rate and regular rhythm.   Pulmonary/Chest: Effort normal. No  respiratory distress.   Abdominal:   Soft, appropriate TTP  Incisions - dermabond in place - clean, dry and intact       Significant Labs:  CBC:   Recent Labs   Lab 07/30/19  0310   WBC 15.04*   RBC 4.15   HGB 10.8*   HCT 34.7*      MCV 84   MCH 26.0*   MCHC 31.1*     CMP:   Recent Labs   Lab 07/30/19  0310   GLU 93   CALCIUM 8.8   ALBUMIN 2.7*   PROT 6.1      K 3.9   CO2 22*      BUN 12   CREATININE 0.7   ALKPHOS 75   *   AST 75*   BILITOT 0.4

## 2019-07-30 NOTE — ASSESSMENT & PLAN NOTE
85yo F with biliary pancreatitis, s/p laparoscopic cholecystectomy 7/29    - ok for regular diet  - recommend PO pain meds, percocet for home  - bowel regimen  - stable for dc from surgery standpoint  - f/u with Dr. Alejandra in two weeks, no heavy lifting

## 2019-07-30 NOTE — PROGRESS NOTES
[facility]  General Surgery  Progress Note    Subjective:     Post-Op Info:  Procedure(s) (LRB):  CHOLECYSTECTOMY, LAPAROSCOPIC (N/A)   1 Day Post-Op     Interval History:  Patient seen and examined, no acute events overnight  Pain well controlled  Tolerated water last night, denies N/V  +F/BMx1  Afebrile/VSS      Medications:  Continuous Infusions:   lactated ringers 75 mL/hr at 07/30/19 0419     Scheduled Meds:   cholecalciferol (vitamin D3)  5,000 Units Oral Daily    ferrous sulfate  325 mg Oral Daily    gabapentin  300 mg Oral Nightly    levothyroxine  75 mcg Oral Daily    miconazole NITRATE 2 %   Topical (Top) BID    pantoprazole  40 mg Oral Daily     PRN Meds:acetaminophen, albuterol-ipratropium, Dextrose 10% Bolus, Dextrose 10% Bolus, glucagon (human recombinant), glucose, glucose, hydrALAZINE, magnesium oxide, meclizine, morphine, ondansetron, oxyCODONE, oxyCODONE, senna-docusate 8.6-50 mg, sodium chloride 0.9%, sodium chloride 0.9%, traZODone     Review of patient's allergies indicates:   Allergen Reactions    Sulfa (sulfonamide antibiotics) Hives     Objective:     Vital Signs (Most Recent):  Temp: 97.8 °F (36.6 °C) (07/30/19 0419)  Pulse: 84 (07/30/19 0419)  Resp: 16 (07/30/19 0419)  BP: (!) 141/81 (07/30/19 0419)  SpO2: 97 % (07/30/19 0419) Vital Signs (24h Range):  Temp:  [96.9 °F (36.1 °C)-99.1 °F (37.3 °C)] 97.8 °F (36.6 °C)  Pulse:  [] 84  Resp:  [14-20] 16  SpO2:  [89 %-98 %] 97 %  BP: (131-196)/(63-82) 141/81     Weight: 72.6 kg (160 lb)  Body mass index is 34.62 kg/m².    Intake/Output - Last 3 Shifts       07/28 0700 - 07/29 0659 07/29 0700 - 07/30 0659 07/30 0700 - 07/31 0659    P.O. 0 120     I.V. (mL/kg)  1862.5 (25.7)     IV Piggyback 600      Total Intake(mL/kg) 600 (8.3) 1982.5 (27.3)     Urine (mL/kg/hr)  975 (0.6)     Total Output  975     Net +600 +1007.5            Stool Occurrence  1 x           Physical Exam   Constitutional: She appears  well-developed and well-nourished. No distress.   HENT:   Head: Normocephalic and atraumatic.   Cardiovascular: Normal rate and regular rhythm.   Pulmonary/Chest: Effort normal. No respiratory distress.   Abdominal:   Soft, appropriate TTP  Incisions - dermabond in place - clean, dry and intact       Significant Labs:  CBC:   Recent Labs   Lab 07/30/19  0310   WBC 15.04*   RBC 4.15   HGB 10.8*   HCT 34.7*      MCV 84   MCH 26.0*   MCHC 31.1*     CMP:   Recent Labs   Lab 07/30/19  0310   GLU 93   CALCIUM 8.8   ALBUMIN 2.7*   PROT 6.1      K 3.9   CO2 22*      BUN 12   CREATININE 0.7   ALKPHOS 75   *   AST 75*   BILITOT 0.4           Assessment/Plan:     Acute biliary pancreatitis  85yo F with biliary pancreatitis, s/p laparoscopic cholecystectomy 7/29    - ok for regular diet  - recommend PO pain meds, percocet for home  - bowel regimen  - stable for dc from surgery standpoint  - f/u with Dr. Alejandra in two weeks, no heavy lifting        Joann Larry PA-C   [id number]  General Surgery  [facility]

## 2019-07-30 NOTE — PLAN OF CARE
Problem: Adult Inpatient Plan of Care  Goal: Plan of Care Review  Outcome: Revised  Care plan reviewed and understood by patient. Resp rate even and unlabored. VSS. Pure wick intact and patent. Patient with no reports of nausea. Patient with SBP issues today. Responsive to lisinopril(PO) in the morning and hydralazine(Po)in the evening see MAR. Patient remain free of falls. No acute pain or distress noted.

## 2019-07-30 NOTE — PROGRESS NOTES
"[facility] Hospital Medicine  Progress Note    Patient Name: Linda Hylton  MRN: [id number]  Patient Class: IP- Inpatient   Admission Date: 7/28/2019  Length of Stay: 1 days  Attending Physician: Marco Topete MD  Primary Care Provider: Yakov Barrett MD    [place] [facility] Medicine Team: [facility] HOSP MED R Marco Topete MD    Subjective:     Principal Problem:Cholecystitis    Interval History: s/p Lap Mansi with Gen surgery service.  They evaluated this AM and from their standpoint pt stable for d/c if tolerating PO    Seen mid-day, patient reports eating grits for breakfast   RN in the room notes patient with some confusion - pt asking me as I walk in, if her Mother was here yet, "My mom is supposed to take care of me"     Disoriented to day (off by a day) Oriented to month/year/city/state/president  CAM-ICU screening - negative, intact attention tasks, intact organized thinking.     Pt reports living with one of her sons.     Has 6/10 Right sided abdominal pain, c/o of "I don't feel good" during visit, unable to further qualify.     Updated son Tavo (number on board)   Place PT/OT consults given debility     Review of Systems   Constitutional: Negative for fever.   HENT: Negative for sore throat.    Respiratory: Negative for cough and shortness of breath.    Cardiovascular: Negative for chest pain and leg swelling.   Gastrointestinal: Positive for abdominal pain.   Endocrine: Negative for polyuria.   Genitourinary: Negative for dysuria.   Musculoskeletal: Negative for arthralgias and back pain.   Psychiatric/Behavioral: Positive for confusion.     Objective:     Vital Signs (Most Recent):  Temp: 98.7 °F (37.1 °C) (07/30/19 0842)  Pulse: 79 (07/30/19 0842)  Resp: 18 (07/30/19 0842)  BP: (!) 143/66 (07/30/19 0842)  SpO2: 95 % (07/30/19 0842) Vital Signs (24h Range):  Temp:  [96.9 °F (36.1 °C)-99.1 °F (37.3 °C)] 98.7 °F (37.1 °C)  Pulse:  [] 79  Resp:  [14-20] 18  SpO2:  [89 %-98 %] 95 %  BP: " (131-193)/(63-81) 143/66     Weight: 72.6 kg (160 lb)  Body mass index is 34.62 kg/m².    Intake/Output Summary (Last 24 hours) at 7/30/2019 1149  Last data filed at 7/30/2019 0547  Gross per 24 hour   Intake 1982.5 ml   Output 975 ml   Net 1007.5 ml      Physical Exam   Constitutional: She is oriented to person, place, and time. She appears distressed.   obese   Eyes: No scleral icterus.   Cardiovascular: Normal rate and regular rhythm.   Murmur heard.  Pulmonary/Chest: Effort normal and breath sounds normal. No stridor. No respiratory distress.   On 2L nasal cannula   Abdominal: Soft. Bowel sounds are normal. She exhibits no distension. There is tenderness. There is no rebound.   Right sided tenderness,    Neurological: She is alert and oriented to person, place, and time.   No asterixis   Skin: Skin is warm and dry.   Psychiatric:   Cam-icu negative         Significant Labs:   CBC:   Recent Labs   Lab 07/28/19 2241 07/29/19  0537 07/30/19  0310   WBC 21.61* 13.13* 15.04*   HGB 12.9 11.7* 10.8*   HCT 39.3 35.8* 34.7*    280 250     CMP:   Recent Labs   Lab 07/28/19 2241 07/29/19  0537 07/30/19  0310   * 136 138   K 2.8* 3.2* 3.9   CL 92* 99 104   CO2 28 27 22*   * 86 93   BUN 19 15 12   CREATININE 0.8 0.7 0.7   CALCIUM 9.8 9.2 8.8   PROT 7.5 6.4 6.1   ALBUMIN 3.3* 2.8* 2.7*   BILITOT 1.6* 1.0 0.4   ALKPHOS 95 100 75   * 185* 75*   * 172* 105*   ANIONGAP 13 10 12   EGFRNONAA >60.0 >60.0 >60.0       Significant Imaging: I have reviewed all pertinent imaging results/findings within the past 24 hours.    Assessment/Plan:      Overview/Hospital Course:   83 y/o Woman hx of HTN, embolic stroke with residual deficit, aortic stenosis, hypothryoidism, gerd, presented with abdominal pain and found with acute biliary pancreatitis and acute cholecystitis who is now s/p laparoscopic cholecystectomy     1. Cholecystitis,   Biliary Pancreatitis   S/p Lap Cholecystectomy  -POD1   -continue LR @  75cc/hr   -switched to Oral diet and tolerating  -Transaminase levels downtrending.   -Antibiotics stopped  -Continue PRN pain medication    2. Confusion  -no delirium present  -pt with chronic debility  -PT/OT consults   -monitor if improving by tomorrow likely will be medically stable for discharge home    3. Hypertension  -holding home antihypertensives - restart as indicated.     4. Hypothyroidism  -continue levothyroxine    5. Debility  -PT/OT consults.       Active Diagnoses:    Diagnosis Date Noted POA    PRINCIPAL PROBLEM:  Cholecystitis [K81.9] 07/29/2019 Yes    Acute biliary pancreatitis [K85.10] 07/29/2019 Yes    S/P laparoscopic cholecystectomy [Z90.49] 07/30/2019 Not Applicable    Essential hypertension [I10]  Yes    Acquired hypothyroidism [E03.9]  Yes    Aortic stenosis [I35.0]  Yes    Iron deficiency anemia [D50.9] 12/27/2017 Yes    Debility [R53.81] 07/29/2019 Yes      Problems Resolved During this Admission:    Diagnosis Date Noted Date Resolved POA    Hypokalemia [E87.6] 07/29/2019 07/30/2019 Yes    Choledocholithiasis [K80.50] 07/29/2019 07/30/2019 Yes    Hypophosphatemia [E83.39] 07/29/2019 07/30/2019 Yes     VTE Risk Mitigation (From admission, onward)        Ordered     IP VTE HIGH RISK PATIENT  Once      07/29/19 0737     Place SEAN hose  Until discontinued      07/29/19 0737     Place SEAN hose  Until discontinued      07/29/19 0426     Place sequential compression device  Until discontinued      07/29/19 0426             Marco Topete MD  Department of Hospital Medicine   [facility]

## 2019-07-30 NOTE — ANESTHESIA PREPROCEDURE EVALUATION
07/30/2019  Linda Hylton is a 84 y.o., female.    Anesthesia Evaluation    I have reviewed the Patient Summary Reports.     I have reviewed the Medications.     Review of Systems  Anesthesia Hx:  No problems with previous Anesthesia    Social:  Non-Smoker    Hematology/Oncology:     Oncology Normal     Cardiovascular:   Exercise tolerance: poor Hypertension Valvular problems/Murmurs, AS Hx of thrombotic, occlusive stroke.  Goal to keep systolic BP >150 per patient    Mild-moderate AS per echo   Pulmonary:  Pulmonary Normal  Denies Shortness of breath.    Renal/:  Renal/ Normal     Hepatic/GI:  Hepatic/GI Normal    Endocrine:   Hypothyroidism        Physical Exam  General:  Well nourished    Airway/Jaw/Neck:  Airway Findings: Mouth Opening: Small, but > 3cm Tongue: Normal  General Airway Assessment: Adult  Mallampati: II  Jaw/Neck Findings:  Neck ROM: Normal ROM      Dental:  Dental Findings: In tact, Periodontal disease, Mild    Chest/Lungs:  Chest/Lungs Findings: Clear to auscultation, Normal Respiratory Rate     Heart/Vascular:  Heart Findings: Rate: Normal  Rhythm: Regular Rhythm  Heart Murmur  Systolic        Mental Status:  Mental Status Findings:  Cooperative, Alert and Oriented         Anesthesia Plan  Type of Anesthesia, risks & benefits discussed:  Anesthesia Type:  general  Patient's Preference:   Intra-op Monitoring Plan: standard ASA monitors  Intra-op Monitoring Plan Comments:   Post Op Pain Control Plan: multimodal analgesia  Post Op Pain Control Plan Comments:   Induction:   IV  Beta Blocker:         Informed Consent: Patient understands risks and agrees with Anesthesia plan.  Questions answered. Anesthesia consent signed with patient.  ASA Score: 3     Day of Surgery Review of History & Physical:    H&P update referred to the surgeon.         Ready For Surgery From Anesthesia  Perspective.

## 2019-07-30 NOTE — PLAN OF CARE
Problem: Adult Inpatient Plan of Care  Goal: Plan of Care Review  Outcome: Ongoing (interventions implemented as appropriate)  VSS. Call light and personal items in reach. PT & OT need to evaluate her. LR running @ 75ml/hr until 1900. Pt keeps asking for her mother. Contacting MD for a U/A . However, she is AAOx4. She complains of 7/10 pain. Pain meds given. No issues today. WCTM.

## 2019-07-31 ENCOUNTER — EXTERNAL CHRONIC CARE MANAGEMENT (OUTPATIENT)
Dept: PRIMARY CARE CLINIC | Facility: CLINIC | Age: 84
End: 2019-07-31
Payer: MEDICARE

## 2019-07-31 LAB
ALBUMIN SERPL BCP-MCNC: 2.6 G/DL (ref 3.5–5.2)
ALP SERPL-CCNC: 69 U/L (ref 55–135)
ALT SERPL W/O P-5'-P-CCNC: 57 U/L (ref 10–44)
ANION GAP SERPL CALC-SCNC: 10 MMOL/L (ref 8–16)
AST SERPL-CCNC: 31 U/L (ref 10–40)
BASOPHILS # BLD AUTO: 0.07 K/UL (ref 0–0.2)
BASOPHILS NFR BLD: 0.6 % (ref 0–1.9)
BILIRUB SERPL-MCNC: 0.6 MG/DL (ref 0.1–1)
BUN SERPL-MCNC: 16 MG/DL (ref 8–23)
CALCIUM SERPL-MCNC: 8.7 MG/DL (ref 8.7–10.5)
CHLORIDE SERPL-SCNC: 99 MMOL/L (ref 95–110)
CO2 SERPL-SCNC: 27 MMOL/L (ref 23–29)
CREAT SERPL-MCNC: 0.7 MG/DL (ref 0.5–1.4)
DIFFERENTIAL METHOD: ABNORMAL
EOSINOPHIL # BLD AUTO: 0.3 K/UL (ref 0–0.5)
EOSINOPHIL NFR BLD: 2.1 % (ref 0–8)
ERYTHROCYTE [DISTWIDTH] IN BLOOD BY AUTOMATED COUNT: 14.9 % (ref 11.5–14.5)
EST. GFR  (AFRICAN AMERICAN): >60 ML/MIN/1.73 M^2
EST. GFR  (NON AFRICAN AMERICAN): >60 ML/MIN/1.73 M^2
GLUCOSE SERPL-MCNC: 84 MG/DL (ref 70–110)
HCT VFR BLD AUTO: 32.8 % (ref 37–48.5)
HGB BLD-MCNC: 10.3 G/DL (ref 12–16)
IMM GRANULOCYTES # BLD AUTO: 0.07 K/UL (ref 0–0.04)
IMM GRANULOCYTES NFR BLD AUTO: 0.6 % (ref 0–0.5)
LYMPHOCYTES # BLD AUTO: 1.6 K/UL (ref 1–4.8)
LYMPHOCYTES NFR BLD: 13.2 % (ref 18–48)
MAGNESIUM SERPL-MCNC: 1.5 MG/DL (ref 1.6–2.6)
MCH RBC QN AUTO: 26.1 PG (ref 27–31)
MCHC RBC AUTO-ENTMCNC: 31.4 G/DL (ref 32–36)
MCV RBC AUTO: 83 FL (ref 82–98)
MONOCYTES # BLD AUTO: 1.4 K/UL (ref 0.3–1)
MONOCYTES NFR BLD: 11.1 % (ref 4–15)
NEUTROPHILS # BLD AUTO: 9 K/UL (ref 1.8–7.7)
NEUTROPHILS NFR BLD: 72.4 % (ref 38–73)
NRBC BLD-RTO: 0 /100 WBC
PHOSPHATE SERPL-MCNC: 4 MG/DL (ref 2.7–4.5)
PLATELET # BLD AUTO: 226 K/UL (ref 150–350)
PMV BLD AUTO: 11.4 FL (ref 9.2–12.9)
POTASSIUM SERPL-SCNC: 4.2 MMOL/L (ref 3.5–5.1)
PROT SERPL-MCNC: 6.1 G/DL (ref 6–8.4)
RBC # BLD AUTO: 3.94 M/UL (ref 4–5.4)
SODIUM SERPL-SCNC: 136 MMOL/L (ref 136–145)
WBC # BLD AUTO: 12.35 K/UL (ref 3.9–12.7)

## 2019-07-31 PROCEDURE — 97162 PT EVAL MOD COMPLEX 30 MIN: CPT

## 2019-07-31 PROCEDURE — 93010 EKG 12-LEAD: ICD-10-PCS | Mod: ,,, | Performed by: INTERNAL MEDICINE

## 2019-07-31 PROCEDURE — 25000003 PHARM REV CODE 250: Performed by: NURSE PRACTITIONER

## 2019-07-31 PROCEDURE — 99232 SBSQ HOSP IP/OBS MODERATE 35: CPT | Mod: ,,, | Performed by: HOSPITALIST

## 2019-07-31 PROCEDURE — 20600001 HC STEP DOWN PRIVATE ROOM

## 2019-07-31 PROCEDURE — 97530 THERAPEUTIC ACTIVITIES: CPT

## 2019-07-31 PROCEDURE — 85025 COMPLETE CBC W/AUTO DIFF WBC: CPT

## 2019-07-31 PROCEDURE — 99490 CHRNC CARE MGMT STAFF 1ST 20: CPT | Mod: PBBFAC | Performed by: INTERNAL MEDICINE

## 2019-07-31 PROCEDURE — 25000003 PHARM REV CODE 250: Performed by: STUDENT IN AN ORGANIZED HEALTH CARE EDUCATION/TRAINING PROGRAM

## 2019-07-31 PROCEDURE — 99232 PR SUBSEQUENT HOSPITAL CARE,LEVL II: ICD-10-PCS | Mod: ,,, | Performed by: HOSPITALIST

## 2019-07-31 PROCEDURE — 99490 PR CHRONIC CARE MGMT, 1ST 20 MIN: ICD-10-PCS | Mod: S$PBB,,, | Performed by: INTERNAL MEDICINE

## 2019-07-31 PROCEDURE — 93010 ELECTROCARDIOGRAM REPORT: CPT | Mod: ,,, | Performed by: INTERNAL MEDICINE

## 2019-07-31 PROCEDURE — 63600175 PHARM REV CODE 636 W HCPCS: Performed by: HOSPITALIST

## 2019-07-31 PROCEDURE — 99490 CHRNC CARE MGMT STAFF 1ST 20: CPT | Mod: S$PBB,,, | Performed by: INTERNAL MEDICINE

## 2019-07-31 PROCEDURE — 63600175 PHARM REV CODE 636 W HCPCS: Performed by: STUDENT IN AN ORGANIZED HEALTH CARE EDUCATION/TRAINING PROGRAM

## 2019-07-31 PROCEDURE — 84100 ASSAY OF PHOSPHORUS: CPT

## 2019-07-31 PROCEDURE — 97165 OT EVAL LOW COMPLEX 30 MIN: CPT

## 2019-07-31 PROCEDURE — 93005 ELECTROCARDIOGRAM TRACING: CPT

## 2019-07-31 PROCEDURE — 97535 SELF CARE MNGMENT TRAINING: CPT

## 2019-07-31 PROCEDURE — 80053 COMPREHEN METABOLIC PANEL: CPT

## 2019-07-31 PROCEDURE — 25000003 PHARM REV CODE 250: Performed by: HOSPITALIST

## 2019-07-31 PROCEDURE — 83735 ASSAY OF MAGNESIUM: CPT

## 2019-07-31 PROCEDURE — 94761 N-INVAS EAR/PLS OXIMETRY MLT: CPT

## 2019-07-31 PROCEDURE — 36415 COLL VENOUS BLD VENIPUNCTURE: CPT

## 2019-07-31 RX ORDER — AMOXICILLIN 250 MG
2 CAPSULE ORAL 2 TIMES DAILY
Status: DISCONTINUED | OUTPATIENT
Start: 2019-07-31 | End: 2019-08-02

## 2019-07-31 RX ORDER — LISINOPRIL 2.5 MG/1
5 TABLET ORAL DAILY
Status: DISCONTINUED | OUTPATIENT
Start: 2019-07-31 | End: 2019-08-02

## 2019-07-31 RX ORDER — POLYETHYLENE GLYCOL 3350 17 G/17G
17 POWDER, FOR SOLUTION ORAL DAILY
Status: DISCONTINUED | OUTPATIENT
Start: 2019-07-31 | End: 2019-08-03

## 2019-07-31 RX ORDER — SODIUM CHLORIDE, SODIUM LACTATE, POTASSIUM CHLORIDE, CALCIUM CHLORIDE 600; 310; 30; 20 MG/100ML; MG/100ML; MG/100ML; MG/100ML
INJECTION, SOLUTION INTRAVENOUS CONTINUOUS
Status: ACTIVE | OUTPATIENT
Start: 2019-07-31 | End: 2019-07-31

## 2019-07-31 RX ORDER — MAGNESIUM SULFATE HEPTAHYDRATE 40 MG/ML
2 INJECTION, SOLUTION INTRAVENOUS ONCE
Status: COMPLETED | OUTPATIENT
Start: 2019-07-31 | End: 2019-07-31

## 2019-07-31 RX ADMIN — OXYCODONE HYDROCHLORIDE 5 MG: 5 TABLET ORAL at 09:07

## 2019-07-31 RX ADMIN — PANTOPRAZOLE SODIUM 40 MG: 40 TABLET, DELAYED RELEASE ORAL at 08:07

## 2019-07-31 RX ADMIN — LISINOPRIL 5 MG: 2.5 TABLET ORAL at 12:07

## 2019-07-31 RX ADMIN — SENNOSIDES,DOCUSATE SODIUM 2 TABLET: 8.6; 5 TABLET, FILM COATED ORAL at 08:07

## 2019-07-31 RX ADMIN — POLYETHYLENE GLYCOL 3350 17 G: 17 POWDER, FOR SOLUTION ORAL at 10:07

## 2019-07-31 RX ADMIN — MICONAZOLE NITRATE: 20 POWDER TOPICAL at 08:07

## 2019-07-31 RX ADMIN — MORPHINE SULFATE 4 MG: 2 INJECTION, SOLUTION INTRAMUSCULAR; INTRAVENOUS at 12:07

## 2019-07-31 RX ADMIN — TRAZODONE HYDROCHLORIDE 50 MG: 50 TABLET ORAL at 08:07

## 2019-07-31 RX ADMIN — OXYCODONE HYDROCHLORIDE 5 MG: 5 TABLET ORAL at 05:07

## 2019-07-31 RX ADMIN — MAGNESIUM SULFATE IN WATER 2 G: 40 INJECTION, SOLUTION INTRAVENOUS at 10:07

## 2019-07-31 RX ADMIN — OXYCODONE HYDROCHLORIDE 10 MG: 10 TABLET ORAL at 02:07

## 2019-07-31 RX ADMIN — LEVOTHYROXINE SODIUM 75 MCG: 75 TABLET ORAL at 05:07

## 2019-07-31 RX ADMIN — SODIUM CHLORIDE, SODIUM LACTATE, POTASSIUM CHLORIDE, AND CALCIUM CHLORIDE: .6; .31; .03; .02 INJECTION, SOLUTION INTRAVENOUS at 05:07

## 2019-07-31 RX ADMIN — OXYCODONE HYDROCHLORIDE 10 MG: 10 TABLET ORAL at 11:07

## 2019-07-31 RX ADMIN — ONDANSETRON 4 MG: 2 INJECTION INTRAMUSCULAR; INTRAVENOUS at 12:07

## 2019-07-31 RX ADMIN — FERROUS SULFATE TAB EC 325 MG (65 MG FE EQUIVALENT) 325 MG: 325 (65 FE) TABLET DELAYED RESPONSE at 08:07

## 2019-07-31 RX ADMIN — SENNOSIDES,DOCUSATE SODIUM 2 TABLET: 8.6; 5 TABLET, FILM COATED ORAL at 10:07

## 2019-07-31 RX ADMIN — GABAPENTIN 300 MG: 300 CAPSULE ORAL at 08:07

## 2019-07-31 RX ADMIN — Medication 5000 UNITS: at 08:07

## 2019-07-31 RX ADMIN — OXYCODONE HYDROCHLORIDE 10 MG: 10 TABLET ORAL at 12:07

## 2019-07-31 RX ADMIN — LISINOPRIL 5 MG: 2.5 TABLET ORAL at 08:07

## 2019-07-31 RX ADMIN — MORPHINE SULFATE 4 MG: 2 INJECTION, SOLUTION INTRAMUSCULAR; INTRAVENOUS at 08:07

## 2019-07-31 RX ADMIN — SODIUM CHLORIDE, SODIUM LACTATE, POTASSIUM CHLORIDE, AND CALCIUM CHLORIDE: .6; .31; .03; .02 INJECTION, SOLUTION INTRAVENOUS at 10:07

## 2019-07-31 NOTE — PLAN OF CARE
Problem: Occupational Therapy Goal  Goal: Occupational Therapy Goal  Goals to be met by:  8/2/2019    Patient will increase functional independence with ADLs by performing:    Pt will ambulate to toilet using RW with CGA.   Pt will change depends while in seated position with min A.   Pt will stand at sink to wash hands with CGA using RW.     Evaluation completed.  OT plan of care developed and reviewed with patient.     Recommend home with home health and 24 hour assist as needed from either son or daughter in law.  Pt will certainly require home health OT and PT upon d/c.   No further DME needs.      EMILY Mccoy  7/31/2019  Rehab Services

## 2019-07-31 NOTE — PLAN OF CARE
Problem: Adult Inpatient Plan of Care  Goal: Plan of Care Review  Outcome: Ongoing (interventions implemented as appropriate)  Plan of care reviewed with patient, who verbalized understanding. VSS on 1L NC, oriented to self but at times confused to where she is at.Pain controlled with  Prn meds per MAR.Pt was able to get out of bed and ambulate with PT use the bedside requires x2 assist. Incentive spirometry used family assisted.deep inspiration is very painful at this time.Purewick is in place. Family is a bedside, Bed in lowest position, call light in reach, bed rails up x2.WCTM

## 2019-07-31 NOTE — PT/OT/SLP EVAL
Physical Therapy Evaluation    Patient Name:  Linda Hylton   MRN:  [id number]    Recommendations:     Discharge Recommendations:  SNF, Patient needs more mobility training than initally expected.  Discharge Equipment Recommendations: none   Barriers to discharge: Decreased caregiver support    Assessment:       Linda Hylton is a 84 y.o. female admitted with a medical diagnosis of Cholecystitis and pertinent PMHx and surgical history including laproscopic cholecystectomy on 7/30/19.  She presents with the following impairments/functional limitations:  weakness, gait instability, impaired endurance, impaired self care skills, impaired functional mobilty, pain.      Patient demonstrates fair motivation and fair activity tolerance secondary pain and weakness.  Patient requires CGA to minimal assistance for transfers secondary to pain and weakness.  Patient will benefit from further strengthening and endurance training to increase independence.      Rehab Prognosis: Good; patient would benefit from acute skilled PT services 4 x/week to address these deficits and reach maximum level of function.  Patient is most appropriate to go to home health PT, home health OT  Pending progress.  Recent Surgery: Procedure(s) (LRB):  CHOLECYSTECTOMY, LAPAROSCOPIC (N/A) 2 Days Post-Op    Plan:     During this hospitalization, patient to be seen 4 x/week to address the identified rehab impairments via gait training, therapeutic activities, therapeutic exercises, neuromuscular re-education and progress toward the following goals:    · Plan of Care Expires:  08/30/19    Subjective     Subjective:  Pain/Comfort:  · Pain Rating 1: 5/10  · Location 1: abdomen  · Pain Addressed 1: Reposition, Cessation of Activity  · Pain Rating Post-Intervention 1: 5/10    Patients cultural, spiritual, Buddhist conflicts given the current situation: no    Living Environment:  Living Environment: Pt's son lives with her in a 2SH with threshold to enter.   She lives downstairs and is unable to access 2nd floor where her bathroom is. She has a half bath downstairs.  Previous level of function: Pt needed assist with bathing a couple times per week which a home health nurse is able to come assist with. They do sponge baths for the reason that her bathtub is upstairs. She is able to perform dressing (mostly wears housecoats/gowns). Pt is independent with toileting but does wear depends. Patient ambulates with walker for household distances and uses a wheelchair for community mobility.  Equipment Used at Home:  walker, rolling, wheelchair, bedside commode, shower chair  Assistance upon Discharge: son and daughter in law is able to assist as needed     Objective:     Communicated with RN prior to session.  Patient found sitting on bedpan on chair with OT with peripheral IV  upon PT entry to room.    General Precautions: Standard, fall   Orthopedic Precautions:N/A   Braces: N/A     Exams:  · RLE ROM: WFL  · RLE Strength: Deficits: grossly 4/5   · LLE ROM: WFL  · LLE Strength: Deficits: grossly 4/5    Functional Mobility:  · Transfers:     · Sit to Stand:  contact guard assistance and minimum assistance with rolling walker  · Gait: Patient took 2-3 steps at chair with rolling walker.      Therapeutic Activities and Exercises:   Deferred increased ambulation secondary to increased need to use restroom with mobility.  Patient tolerated standing x 3 ~ 2-3 min each time to get cleaned up following bowel movement.  Patient with fair plus balance and able to maintain standing balance with CGA.    Patient Education:    Patient and Family member educated on Fall risk, home safety, Home exercise program and transfer training by explanation.  Patient was receptive to education and verbalizes understanding.     AM-PAC 6 CLICK MOBILITY  Total Score:17     Patient left up in chair with all lines intact and call button in reach.    GOALS:   Multidisciplinary Problems     Physical Therapy  Goals        Problem: Physical Therapy Goal    Goal Priority Disciplines Outcome Goal Variances Interventions   Physical Therapy Goal     PT, PT/OT Ongoing (interventions implemented as appropriate)     Description:  Goals to be met by: 2019    Patient will increase functional independence with mobility by performin. Supine to sit with Stand-by Assistance  2. Sit to supine with Stand-by Assistance  3. Sit to stand transfer with Supervision  4. Gait  x 100 feet with Supervision using Rolling Walker.                           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    Vertebral basilar insufficiency 2017    Vitamin D deficiency        Past Surgical History:   Procedure Laterality Date    APPENDECTOMY      CHOLECYSTECTOMY, LAPAROSCOPIC N/A 2019    Performed [facility] at [facility] 2ND FLR    EGD (ESOPHAGOGASTRODUODENOSCOPY) N/A 3/1/2019    Performed by Simon Ortiz MD at [facility] ENDO (2ND FLR)    [facility]GERY Left 2016    [facility]    PARATHYROIDECTOMY Right     THYROID SURGERY Right     Partial thyroidectomy       Time Tracking:     PT Received On: 19  PT Start Time: 1116     PT Stop Time: 1139  PT Total Time (min): 23 min     Billable Minutes: Evaluation 10 min and Therapeutic Activity 13 min      Julián Banda, PT  2019

## 2019-07-31 NOTE — PROGRESS NOTES
Patients heart rate showed erratically on pulse oximetry, heart rate briefly entering into the 150's, blood pressure high 172/73, patient complaining of pain in the right intercostal margin near lap incision that increased with inspiration. Findings reviewed with FOREST Barrera orders obtained for 12 lead EKG. Findings reviewed with FOREST Barrera. Lisinopril added to MAR for elevated blood pressure, cardiac enzymes deemed not indicated at this time. No additional orders given at this time, pain will be continued to be monitored.

## 2019-07-31 NOTE — PT/OT/SLP EVAL
Occupational Therapy   Evaluation    Name: Linda Hylton  MRN: [id number]  Admitting Diagnosis:  Cholecystitis 2 Days Post-Op    Recommendations:     Discharge Recommendations: home health PT, home health OT  Discharge Equipment Recommendations:  none  Barriers to discharge:  None    Assessment:     Linda Hylton is a 84 y.o. female with a medical diagnosis of Cholecystitis.  She presents with decline in ADLs and functional mobility.  Pt is limited by pain during movement and overall debility. Performance deficits affecting function: weakness, gait instability, impaired balance, impaired self care skills, impaired functional mobilty, pain.      Rehab Prognosis: Good; patient would benefit from acute skilled OT services to address these deficits and reach maximum level of function.       Plan:     Patient to be seen 5 x/week to address the above listed problems via self-care/home management, therapeutic activities, therapeutic exercises  · Plan of Care Expires: 08/31/19  · Plan of Care Reviewed with: patient    Subjective     Chief Complaint: Pain in ribs on R side  Patient/Family Comments/goals: To be able to improve mobility so that she can return home.    Occupational Profile:  Living Environment: Pt's son lives with her in a 2SH with threshold to enter.  She lives downstairs and is unable to access 2nd floor where her bathroom is. She has a half bath downstairs.  Previous level of function: Pt needed assist with bathing a couple times per week which a home health nurse is able to come assist with. They do sponge baths for the reason that her bathtub is upstairs. She is able to perform dressing (mostly wears housecoats/gowns). Pt is independent with toileting but does wear depends.   Roles and Routines: mother, mother in law  Equipment Used at Home:  walker, rolling, wheelchair, bedside commode, shower chair  Assistance upon Discharge: son and daughter in law is able to assist as needed      Pain/Comfort:  · Pain Rating 1: 4/10  · Location 1: rib(s)  · Pain Rating Post-Intervention 1: 5/10  · Location 2: rib(s)    Patients cultural, spiritual, Mormonism conflicts given the current situation: no    Objective:     Communicated with: RN prior to session.  Patient found up in chair with peripheral IV upon OT entry to room.    General Precautions: Standard, fall   Orthopedic Precautions:N/A   Braces: N/A     Occupational Performance:    Bed Mobility:    · Patient completed Scooting/Bridging with maximal assistance  · Patient completed Supine to Sit with moderate assistance    Functional Mobility/Transfers:  · Patient completed Sit <> Stand Transfer with moderate assistance  with  no assistive device   · Patient completed Bed <> Chair Transfer using Step Transfer technique with moderate assistance with no assistive device  · Functional Mobility: Pt transferred to chair without use of device due to height of bed. Pt's feet were ~2 ft from floor.     Activities of Daily Living:  · Feeding:  independence    · Upper Body Dressing: moderate assistance for donning new gown  · Lower Body Dressing: maximal assistance for donning gown  · Toileting: total assistance (max A for transfer, total assist for clothing management and cleaning after BM)    Cognitive/Visual Perceptual:  Cognitive/Psychosocial Skills:     -       Follows Commands/attention:Follows one-step commands  -       Safety awareness/insight to disability: intact   Visual/Perceptual:      -Intact      Physical Exam:  Balance:    -       sitting balance min A at times SBA, standing balance min-mod A without device  Fine Motor Coordination:    -       Intact  Gross motor coordination:   WFL  Neurological: -       intact    AMPAC 6 Click ADL:  AMPAC Total Score: 15    Treatment & Education:  · Pt educated on role of OT in acute care setting.   · Assisted with ADLs and functional mobility with assist levels noted above  · Pt performed numerous sit to  stands due to need for BM, cleaning, dressing and then repeated process.  OT eval completed, PT arrived and took over.   Education:    Patient left up in chair with all lines intact and daughter and PT present    GOALS:   Multidisciplinary Problems     Occupational Therapy Goals        Problem: Occupational Therapy Goal    Goal Priority Disciplines Outcome Interventions   Occupational Therapy Goal     OT, PT/OT     Description:  Goals to be met by:  8/2/2019    Patient will increase functional independence with ADLs by performing:    Pt will ambulate to toilet using RW with CGA.   Pt will change depends while in seated position with min A.   Pt will stand at sink to wash hands with CGA using RW.                       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    PARATHYROIDECTOMY Right 2012    THYROID SURGERY Right 2012    Partial thyroidectomy       Time Tracking:     OT Date of Treatment: 07/31/19  OT Start Time: 1049  OT Stop Time: 1125  OT Total Time (min): 36 min    Billable Minutes:Evaluation 10  Self Care/Home Management 26    EMILY Reynolds  7/31/2019

## 2019-07-31 NOTE — PLAN OF CARE
Problem: Adult Inpatient Plan of Care  Goal: Plan of Care Review  Outcome: Ongoing (interventions implemented as appropriate)  Plan of care reviewed with patient, who verbalized understanding. Pain management has been more difficult this evening, requiring IV morphine, to control pain at the right intercostal margin. Pt was able to get out of bed and ambulate, but additional activity should be encouraged in the morning after a restful sleep. Incentive spirometry should be encouraged when pain is better controlled, as deep inspiration is very painful at this time.

## 2019-08-01 LAB
ALBUMIN SERPL BCP-MCNC: 2.4 G/DL (ref 3.5–5.2)
ALP SERPL-CCNC: 68 U/L (ref 55–135)
ALT SERPL W/O P-5'-P-CCNC: 36 U/L (ref 10–44)
ANION GAP SERPL CALC-SCNC: 9 MMOL/L (ref 8–16)
AST SERPL-CCNC: 20 U/L (ref 10–40)
BACTERIA #/AREA URNS AUTO: NORMAL /HPF
BASOPHILS # BLD AUTO: 0.06 K/UL (ref 0–0.2)
BASOPHILS NFR BLD: 0.5 % (ref 0–1.9)
BILIRUB SERPL-MCNC: 0.7 MG/DL (ref 0.1–1)
BILIRUB UR QL STRIP: NEGATIVE
BUN SERPL-MCNC: 9 MG/DL (ref 8–23)
CALCIUM SERPL-MCNC: 9 MG/DL (ref 8.7–10.5)
CHLORIDE SERPL-SCNC: 99 MMOL/L (ref 95–110)
CLARITY UR REFRACT.AUTO: CLEAR
CO2 SERPL-SCNC: 28 MMOL/L (ref 23–29)
COLOR UR AUTO: YELLOW
CREAT SERPL-MCNC: 0.7 MG/DL (ref 0.5–1.4)
DIFFERENTIAL METHOD: ABNORMAL
EOSINOPHIL # BLD AUTO: 0.3 K/UL (ref 0–0.5)
EOSINOPHIL NFR BLD: 2.1 % (ref 0–8)
ERYTHROCYTE [DISTWIDTH] IN BLOOD BY AUTOMATED COUNT: 14.9 % (ref 11.5–14.5)
EST. GFR  (AFRICAN AMERICAN): >60 ML/MIN/1.73 M^2
EST. GFR  (NON AFRICAN AMERICAN): >60 ML/MIN/1.73 M^2
GLUCOSE SERPL-MCNC: 83 MG/DL (ref 70–110)
GLUCOSE UR QL STRIP: NEGATIVE
HCT VFR BLD AUTO: 31.6 % (ref 37–48.5)
HGB BLD-MCNC: 9.9 G/DL (ref 12–16)
HGB UR QL STRIP: NEGATIVE
HYALINE CASTS UR QL AUTO: 0 /LPF
IMM GRANULOCYTES # BLD AUTO: 0.08 K/UL (ref 0–0.04)
IMM GRANULOCYTES NFR BLD AUTO: 0.7 % (ref 0–0.5)
KETONES UR QL STRIP: NEGATIVE
LEUKOCYTE ESTERASE UR QL STRIP: NEGATIVE
LYMPHOCYTES # BLD AUTO: 1.4 K/UL (ref 1–4.8)
LYMPHOCYTES NFR BLD: 11.7 % (ref 18–48)
MAGNESIUM SERPL-MCNC: 1.7 MG/DL (ref 1.6–2.6)
MCH RBC QN AUTO: 26 PG (ref 27–31)
MCHC RBC AUTO-ENTMCNC: 31.3 G/DL (ref 32–36)
MCV RBC AUTO: 83 FL (ref 82–98)
MICROSCOPIC COMMENT: NORMAL
MONOCYTES # BLD AUTO: 1.2 K/UL (ref 0.3–1)
MONOCYTES NFR BLD: 10.4 % (ref 4–15)
NEUTROPHILS # BLD AUTO: 8.8 K/UL (ref 1.8–7.7)
NEUTROPHILS NFR BLD: 74.6 % (ref 38–73)
NITRITE UR QL STRIP: NEGATIVE
NRBC BLD-RTO: 0 /100 WBC
PH UR STRIP: 6 [PH] (ref 5–8)
PHOSPHATE SERPL-MCNC: 3.8 MG/DL (ref 2.7–4.5)
PLATELET # BLD AUTO: 209 K/UL (ref 150–350)
PMV BLD AUTO: 11.1 FL (ref 9.2–12.9)
POTASSIUM SERPL-SCNC: 3.9 MMOL/L (ref 3.5–5.1)
PROT SERPL-MCNC: 6 G/DL (ref 6–8.4)
PROT UR QL STRIP: ABNORMAL
RBC # BLD AUTO: 3.81 M/UL (ref 4–5.4)
RBC #/AREA URNS AUTO: 1 /HPF (ref 0–4)
SODIUM SERPL-SCNC: 136 MMOL/L (ref 136–145)
SP GR UR STRIP: 1 (ref 1–1.03)
SQUAMOUS #/AREA URNS AUTO: 0 /HPF
URN SPEC COLLECT METH UR: ABNORMAL
WBC # BLD AUTO: 11.78 K/UL (ref 3.9–12.7)
WBC #/AREA URNS AUTO: 2 /HPF (ref 0–5)

## 2019-08-01 PROCEDURE — 81001 URINALYSIS AUTO W/SCOPE: CPT

## 2019-08-01 PROCEDURE — 85025 COMPLETE CBC W/AUTO DIFF WBC: CPT

## 2019-08-01 PROCEDURE — 25000003 PHARM REV CODE 250: Performed by: NURSE PRACTITIONER

## 2019-08-01 PROCEDURE — 84100 ASSAY OF PHOSPHORUS: CPT

## 2019-08-01 PROCEDURE — 97530 THERAPEUTIC ACTIVITIES: CPT

## 2019-08-01 PROCEDURE — 63600175 PHARM REV CODE 636 W HCPCS: Performed by: HOSPITALIST

## 2019-08-01 PROCEDURE — 80053 COMPREHEN METABOLIC PANEL: CPT

## 2019-08-01 PROCEDURE — 36415 COLL VENOUS BLD VENIPUNCTURE: CPT

## 2019-08-01 PROCEDURE — 25000003 PHARM REV CODE 250: Performed by: STUDENT IN AN ORGANIZED HEALTH CARE EDUCATION/TRAINING PROGRAM

## 2019-08-01 PROCEDURE — 99232 PR SUBSEQUENT HOSPITAL CARE,LEVL II: ICD-10-PCS | Mod: ,,, | Performed by: HOSPITALIST

## 2019-08-01 PROCEDURE — 97116 GAIT TRAINING THERAPY: CPT

## 2019-08-01 PROCEDURE — 63600175 PHARM REV CODE 636 W HCPCS: Performed by: STUDENT IN AN ORGANIZED HEALTH CARE EDUCATION/TRAINING PROGRAM

## 2019-08-01 PROCEDURE — 99232 SBSQ HOSP IP/OBS MODERATE 35: CPT | Mod: ,,, | Performed by: HOSPITALIST

## 2019-08-01 PROCEDURE — 20600001 HC STEP DOWN PRIVATE ROOM

## 2019-08-01 PROCEDURE — 25000003 PHARM REV CODE 250: Performed by: HOSPITALIST

## 2019-08-01 PROCEDURE — 83735 ASSAY OF MAGNESIUM: CPT

## 2019-08-01 RX ORDER — KETOROLAC TROMETHAMINE 15 MG/ML
15 INJECTION, SOLUTION INTRAMUSCULAR; INTRAVENOUS ONCE
Status: COMPLETED | OUTPATIENT
Start: 2019-08-01 | End: 2019-08-01

## 2019-08-01 RX ORDER — ACETAMINOPHEN 500 MG
1000 TABLET ORAL EVERY 8 HOURS
Status: COMPLETED | OUTPATIENT
Start: 2019-08-01 | End: 2019-08-03

## 2019-08-01 RX ORDER — IBUPROFEN 600 MG/1
600 TABLET ORAL EVERY 8 HOURS PRN
Status: DISCONTINUED | OUTPATIENT
Start: 2019-08-01 | End: 2019-08-02

## 2019-08-01 RX ORDER — CLOPIDOGREL BISULFATE 75 MG/1
75 TABLET ORAL DAILY
Status: DISCONTINUED | OUTPATIENT
Start: 2019-08-02 | End: 2019-08-06 | Stop reason: HOSPADM

## 2019-08-01 RX ORDER — LIDOCAINE 50 MG/G
2 PATCH TOPICAL
Status: DISCONTINUED | OUTPATIENT
Start: 2019-08-01 | End: 2019-08-06 | Stop reason: HOSPADM

## 2019-08-01 RX ORDER — MORPHINE SULFATE 15 MG/1
15 TABLET ORAL EVERY 4 HOURS PRN
Status: DISCONTINUED | OUTPATIENT
Start: 2019-08-01 | End: 2019-08-02

## 2019-08-01 RX ORDER — ENOXAPARIN SODIUM 100 MG/ML
40 INJECTION SUBCUTANEOUS EVERY 24 HOURS
Status: DISCONTINUED | OUTPATIENT
Start: 2019-08-01 | End: 2019-08-06 | Stop reason: HOSPADM

## 2019-08-01 RX ORDER — HALOPERIDOL 5 MG/ML
2 INJECTION INTRAMUSCULAR ONCE
Status: DISCONTINUED | OUTPATIENT
Start: 2019-08-01 | End: 2019-08-01

## 2019-08-01 RX ADMIN — OXYCODONE HYDROCHLORIDE 10 MG: 10 TABLET ORAL at 05:08

## 2019-08-01 RX ADMIN — LISINOPRIL 5 MG: 2.5 TABLET ORAL at 09:08

## 2019-08-01 RX ADMIN — ENOXAPARIN SODIUM 40 MG: 100 INJECTION SUBCUTANEOUS at 04:08

## 2019-08-01 RX ADMIN — TRAZODONE HYDROCHLORIDE 50 MG: 50 TABLET ORAL at 09:08

## 2019-08-01 RX ADMIN — Medication 5000 UNITS: at 09:08

## 2019-08-01 RX ADMIN — ACETAMINOPHEN 650 MG: 325 TABLET ORAL at 09:08

## 2019-08-01 RX ADMIN — LIDOCAINE 2 PATCH: 50 PATCH TOPICAL at 03:08

## 2019-08-01 RX ADMIN — ACETAMINOPHEN 1000 MG: 500 TABLET ORAL at 03:08

## 2019-08-01 RX ADMIN — ONDANSETRON 4 MG: 2 INJECTION INTRAMUSCULAR; INTRAVENOUS at 08:08

## 2019-08-01 RX ADMIN — GABAPENTIN 300 MG: 300 CAPSULE ORAL at 09:08

## 2019-08-01 RX ADMIN — MICONAZOLE NITRATE: 20 POWDER TOPICAL at 09:08

## 2019-08-01 RX ADMIN — ONDANSETRON 4 MG: 2 INJECTION INTRAMUSCULAR; INTRAVENOUS at 09:08

## 2019-08-01 RX ADMIN — LEVOTHYROXINE SODIUM 75 MCG: 75 TABLET ORAL at 05:08

## 2019-08-01 RX ADMIN — KETOROLAC TROMETHAMINE 15 MG: 15 INJECTION, SOLUTION INTRAMUSCULAR; INTRAVENOUS at 11:08

## 2019-08-01 RX ADMIN — ACETAMINOPHEN 1000 MG: 500 TABLET ORAL at 09:08

## 2019-08-01 RX ADMIN — PANTOPRAZOLE SODIUM 40 MG: 40 TABLET, DELAYED RELEASE ORAL at 09:08

## 2019-08-01 RX ADMIN — FERROUS SULFATE TAB EC 325 MG (65 MG FE EQUIVALENT) 325 MG: 325 (65 FE) TABLET DELAYED RESPONSE at 09:08

## 2019-08-01 NOTE — PROGRESS NOTES
[facility] Hospital Medicine  Progress Note    Patient Name: Linda Hylton  MRN: [id number]  Patient Class: IP- Inpatient   Admission Date: 7/28/2019  Length of Stay: 3 days  Attending Physician: Marco Topete MD  Primary Care Provider: Yakov Barrett MD    [facility] Medicine Team: [facility] HOSP MED R Marco Topete MD    Subjective:     Principal Problem:Cholecystitis    Interval History:   Pt seen this AM, RN reported earlier today pt with hallucinations, talked to people not in the room - initially plans for ABG & haldol, but cancelled after bedside eval    Repeat UA w/o signs of UTI  On bedside eval, patient is awake, alert,   CAM-ICU eval - negative, intact attention testing, no disorganized thinking or altered LOC    Oriented to hospital/reason, month/year/    Grandson visiting during my eval, pt happy to see and greeting during examination, where previously pt had tenderness on abdominal exam, was talking to family and no tenderness in epigastrum or RUQ elicited.     Pt complains of bilateral leg/knee pain today     Will switch from oxycodone to PO morphine  Will give a 1x Toradol dosage   Encourage pt to be up in chair.  She is stable by labs/vitals/exam but then has some lability to mood, will whimper that she doesn't feel good and hurts all over.  Concern pt more with dysphoric mood s/p surgery.  Tried to reassure her clinical condition was not worsening. Pt did state while family at bedside that she wants to stay in hospital longer.     Advance to full liquids      Review of Systems   Constitutional: Negative for fever.   HENT: Negative for sore throat.    Respiratory: Negative for cough and shortness of breath.    Cardiovascular: Negative for chest pain and leg swelling.   Gastrointestinal: Positive for abdominal pain.   Endocrine: Negative for polyuria.   Genitourinary: Negative for dysuria.   Musculoskeletal: Negative for arthralgias and back pain.   Psychiatric/Behavioral:  Positive for confusion.     Objective:     Vital Signs (Most Recent):  Temp: 96.6 °F (35.9 °C) (08/01/19 1256)  Pulse: 85 (08/01/19 1256)  Resp: 18 (08/01/19 1256)  BP: (!) 126/57 (08/01/19 1256)  SpO2: (!) 92 % (08/01/19 1256) Vital Signs (24h Range):  Temp:  [96.6 °F (35.9 °C)-99.4 °F (37.4 °C)] 96.6 °F (35.9 °C)  Pulse:  [] 85  Resp:  [14-20] 18  SpO2:  [92 %-98 %] 92 %  BP: (126-183)/(57-86) 126/57     Weight: 72.6 kg (160 lb)  Body mass index is 34.62 kg/m².    Intake/Output Summary (Last 24 hours) at 8/1/2019 1335  Last data filed at 8/1/2019 0600  Gross per 24 hour   Intake 1113.33 ml   Output 1900 ml   Net -786.67 ml      Physical Exam   Constitutional: She is oriented to person, place, and time. No distress.   obese   Eyes: No scleral icterus.   Cardiovascular: Normal rate and regular rhythm.   Murmur heard.  Pulmonary/Chest: Effort normal and breath sounds normal. No stridor. No respiratory distress.   On 2L nasal cannula   Abdominal: Soft. Bowel sounds are normal. She exhibits no distension. There is no tenderness. There is no rebound and no guarding.   Laparoscopic surgical scars c/d/i   Neurological: She is alert and oriented to person, place, and time. GCS eye subscore is 4. GCS verbal subscore is 5. GCS motor subscore is 6.   No asterixis   Skin: Skin is warm and dry.   Psychiatric: Her affect is labile. She is attentive.         Significant Labs:   CBC:   Recent Labs   Lab 07/31/19  0501 08/01/19 0613   WBC 12.35 11.78   HGB 10.3* 9.9*   HCT 32.8* 31.6*    209     CMP:   Recent Labs   Lab 07/31/19  0501 08/01/19 0613    136   K 4.2 3.9   CL 99 99   CO2 27 28   GLU 84 83   BUN 16 9   CREATININE 0.7 0.7   CALCIUM 8.7 9.0   PROT 6.1 6.0   ALBUMIN 2.6* 2.4*   BILITOT 0.6 0.7   ALKPHOS 69 68   AST 31 20   ALT 57* 36   ANIONGAP 10 9   EGFRNONAA >60.0 >60.0       Significant Imaging: I have reviewed all pertinent imaging results/findings within the past 24 hours.    Assessment/Plan:       Overview/Hospital Course:   85 y/o Woman hx of HTN, embolic stroke with residual deficit, aortic stenosis, hypothryoidism, gerd, presented with abdominal pain and found with acute biliary pancreatitis and acute cholecystitis who is now s/p laparoscopic cholecystectomy     1. Cholecystitis,   Biliary Pancreatitis   S/p Lap Cholecystectomy  -POD3   -advance to full liquids. Hold further IVF today   -Switch oxycodone to PO morphine.  Give IV toradol x 1   -Transaminase levels downtrending.   -Antibiotics stopped s/p surgery   -Continue PRN pain medication  -will need to f/u with Dr. Alejandra, 2 weeks post-op    2. Confusion  -no delirium present  -pt with chronic debility  -PT/OT consults  recs changed to SNF>  to discuss with patient.   -monitor if improving by tomorrow likely will be medically stable for discharge home    3. Hypertension  -home lisinopril restarted    4. Hypothyroidism  -continue levothyroxine    5. Debility  -PT/OT consults.       Active Diagnoses:    Diagnosis Date Noted POA    PRINCIPAL PROBLEM:  Cholecystitis [K81.9] 07/29/2019 Yes    Acute biliary pancreatitis [K85.10] 07/29/2019 Yes    S/P laparoscopic cholecystectomy [Z90.49] 07/30/2019 Not Applicable    Essential hypertension [I10]  Yes    Acquired hypothyroidism [E03.9]  Yes    Aortic stenosis [I35.0]  Yes    Iron deficiency anemia [D50.9] 12/27/2017 Yes    Debility [R53.81] 07/29/2019 Yes      Problems Resolved During this Admission:    Diagnosis Date Noted Date Resolved POA    Hypokalemia [E87.6] 07/29/2019 07/30/2019 Yes    Choledocholithiasis [K80.50] 07/29/2019 07/30/2019 Yes    Hypophosphatemia [E83.39] 07/29/2019 07/30/2019 Yes     VTE Risk Mitigation (From admission, onward)        Ordered     IP VTE HIGH RISK PATIENT  Once      07/29/19 0737     Place SEAN hose  Until discontinued      07/29/19 0737     Place SEAN hose  Until discontinued      07/29/19 0426     Place sequential compression device  Until discontinued       07/29/19 0426             Marco Topete MD  Department of Hospital Medicine   [facility]

## 2019-08-01 NOTE — PLAN OF CARE
08/01/19 1110   Discharge Reassessment   Assessment Type Discharge Planning Reassessment   Provided patient/caregiver education on the expected discharge date and the discharge plan Yes   Do you have any problems affording any of your prescribed medications? No   Discharge Plan A Home Health   Discharge Plan B Home Health   DME Needed Upon Discharge  none   Patient choice form signed by patient/caregiver Yes   Anticipated Discharge Disposition Home-Health   Can the patient answer the patient profile reliably? Yes, cognitively intact   How does the patient rate their overall health at the present time? Fair   Describe the patient's ability to walk at the present time. Minor restrictions or changes   How often would a person be available to care for the patient? Often   Number of comorbid conditions (as recorded on the chart) Four   During the past month, has the patient often been bothered by feeling down, depressed or hopeless? No   During the past month, has the patient often been bothered by little interest or pleasure in doing things? No   Post-Acute Status   Post-Acute Authorization Home Health/Hospice   Home Health/Hospice Status Referrals Sent

## 2019-08-01 NOTE — PT/OT/SLP PROGRESS
"Physical Therapy Treatment    Patient Name:  Linda Hylton   MRN:  [id number]    Recommendations:     Discharge Recommendations:  nursing facility, skilled   Discharge Equipment Recommendations: none   Barriers to discharge: Decreased caregiver support    Assessment:     Linda Hylton is a 84 y.o. female admitted with a medical diagnosis of Cholecystitis.  She presents with the following impairments/functional limitations:  weakness, impaired endurance, impaired self care skills, impaired functional mobilty, gait instability, impaired balance, pain, decreased lower extremity function, decreased coordination. Pt tolerated session well with focus on bed mobility, transfers, and gait training. Pt with little progression and requiring increased assistance for bed mobility and transfers. PT notified and recommendation for SNF made by PT with  informed. Pt will continue to benefit from therapy services to address impairments listed above.     Rehab Prognosis: Good; patient would benefit from acute skilled PT services to address these deficits and reach maximum level of function.    Recent Surgery: Procedure(s) (LRB):  CHOLECYSTECTOMY, LAPAROSCOPIC (N/A) 3 Days Post-Op    Plan:     During this hospitalization, patient to be seen 4 x/week to address the identified rehab impairments via gait training, therapeutic activities, therapeutic exercises, neuromuscular re-education and progress toward the following goals:    · Plan of Care Expires:  08/30/19    Subjective     Chief Complaint: no c/o  Patient/Family Comments/goals: "I really don't feel like I am safe at all."   Pain/Comfort:  · Pain Rating 1: 0/10  · Pain Rating Post-Intervention 1: 0/10      Objective:     Communicated with NSG prior to session.  Patient found supine with peripheral IV upon PTA entry to room.     General Precautions: Standard, fall   Orthopedic Precautions:N/A   Braces: N/A     Functional Mobility:  · Bed Mobility:     · Supine to " Sit: moderate assistance  · Transfers:     · Sit to Stand:  moderate assistance with rolling walker  · Gait: Pt ambulates 8 ft with RW and Min A. Pt with shuffling gait, poor foot clearance bilaterally and poor R weight shift.       AM-PAC 6 CLICK MOBILITY  Turning over in bed (including adjusting bedclothes, sheets and blankets)?: 3  Sitting down on and standing up from a chair with arms (e.g., wheelchair, bedside commode, etc.): 3  Moving from lying on back to sitting on the side of the bed?: 3  Moving to and from a bed to a chair (including a wheelchair)?: 3  Need to walk in hospital room?: 3  Climbing 3-5 steps with a railing?: 2  Basic Mobility Total Score: 17       Therapeutic Activities and Exercises:   Pt assisted with functional mobility as noted above.   Increased time for functional mobility d/t slow pace and attempt to decrease pts assistance needed.     Patient left up in chair with all lines intact, call button in reach and NSG notified.    GOALS:   Multidisciplinary Problems     Physical Therapy Goals        Problem: Physical Therapy Goal    Goal Priority Disciplines Outcome Goal Variances Interventions   Physical Therapy Goal     PT, PT/OT Ongoing (interventions implemented as appropriate)     Description:  Goals to be met by: 2019    Patient will increase functional independence with mobility by performin. Supine to sit with Stand-by Assistance  2. Sit to supine with Stand-by Assistance  3. Sit to stand transfer with Supervision  4. Gait  x 100 feet with Supervision using Rolling Walker.                           Time Tracking:     PT Received On: 19  PT Start Time: 1036     PT Stop Time: 1104  PT Total Time (min): 28 min     Billable Minutes: Gait Training 12 and Therapeutic Activity 16    Treatment Type: Treatment  PT/PTA: PTA     PTA Visit Number: 1     David Prince PTA  2019

## 2019-08-01 NOTE — NURSING
"Patient having intermittent hallucinations. Pt appeared talking to someone else in the room and asked "who is that lady with the hat on over there." She also called for her mother and stated "Momma help me." Patient was able to be reoriented. Notified . MD put in new orders and came to bedside to assess patient. Will continue to monitor patient.  "

## 2019-08-01 NOTE — PLAN OF CARE
Problem: Physical Therapy Goal  Goal: Physical Therapy Goal  Goals to be met by: 2019    Patient will increase functional independence with mobility by performin. Supine to sit with Stand-by Assistance  2. Sit to supine with Stand-by Assistance  3. Sit to stand transfer with Supervision  4. Gait  x 100 feet with Supervision using Rolling Walker.          Outcome: Ongoing (interventions implemented as appropriate)  Goals remain appropriate.

## 2019-08-01 NOTE — PROGRESS NOTES
[facility] Hospital Medicine  Progress Note    Patient Name: Linda Hylton  MRN: [id number]  Patient Class: IP- Inpatient   Admission Date: 7/28/2019  Length of Stay: 2 days  Attending Physician: Marco Topete MD  Primary Care Provider: Yakov Barrett MD    [facility] Medicine Team: [facility] [place] [facility] [place] Marco Topete MD    Subjective:     Principal Problem:Cholecystitis    Interval History:   Son and daughter in law at bedside    Pt continues to complain of right sided pain. Reports she slept better overnight   Overall intermittently states she doesn't feel well.     PT/OT to [facility] for Home health on discharge.     Son describes pts pre-admit symptoms as midline/epigastric, now predominantly RUQ/right flank pain. Suspect secondary post-op but as patient had pancreatitis as well likely due to gall stone will put on clear liquids and give IV fluids     Add bowel regimen. While on opaites    Review of Systems   Constitutional: Negative for fever.   HENT: Negative for sore throat.    Respiratory: Negative for cough and shortness of breath.    Cardiovascular: Negative for chest pain and leg swelling.   Gastrointestinal: Positive for abdominal pain.   Endocrine: Negative for polyuria.   Genitourinary: Negative for dysuria.   Musculoskeletal: Negative for arthralgias and back pain.   Psychiatric/Behavioral: Positive for confusion.     Objective:     Vital Signs (Most Recent):  Temp: 98.7 °F (37.1 °C) (07/31/19 1642)  Pulse: 83 (07/31/19 1642)  Resp: 16 (07/31/19 1642)  BP: (!) 154/70 (07/31/19 1725)  SpO2: 98 % (07/31/19 1642) Vital Signs (24h Range):  Temp:  [98.5 °F (36.9 °C)-99.8 °F (37.7 °C)] 98.7 °F (37.1 °C)  Pulse:  [] 83  Resp:  [14-20] 16  SpO2:  [87 %-98 %] 98 %  BP: (132-174)/(60-87) 154/70     Weight: 72.6 kg (160 lb)  Body mass index is 34.62 kg/m².    Intake/Output Summary (Last 24 hours) at 7/31/2019 1928  Last data filed at 7/31/2019 1800  Gross per 24 hour   Intake  1737.33 ml   Output 1350 ml   Net 387.33 ml      Physical Exam   Constitutional: She is oriented to person, place, and time. No distress.   obese   Eyes: No scleral icterus.   Cardiovascular: Normal rate and regular rhythm.   Murmur heard.  Pulmonary/Chest: Effort normal and breath sounds normal. No stridor. No respiratory distress.   On 2L nasal cannula   Abdominal: Soft. Bowel sounds are normal. She exhibits no distension. There is tenderness. There is guarding. There is no rebound.   Right sided tenderness,    Neurological: She is alert and oriented to person, place, and time.   No asterixis   Skin: Skin is warm and dry.         Significant Labs:   CBC:   Recent Labs   Lab 07/30/19  0310 07/31/19  0501   WBC 15.04* 12.35   HGB 10.8* 10.3*   HCT 34.7* 32.8*    226     CMP:   Recent Labs   Lab 07/30/19  0310 07/31/19  0501    136   K 3.9 4.2    99   CO2 22* 27   GLU 93 84   BUN 12 16   CREATININE 0.7 0.7   CALCIUM 8.8 8.7   PROT 6.1 6.1   ALBUMIN 2.7* 2.6*   BILITOT 0.4 0.6   ALKPHOS 75 69   AST 75* 31   * 57*   ANIONGAP 12 10   EGFRNONAA >60.0 >60.0       Significant Imaging: I have reviewed all pertinent imaging results/findings within the past 24 hours.    Assessment/Plan:      Overview/Hospital Course:   85 y/o Woman hx of HTN, embolic stroke with residual deficit, aortic stenosis, hypothryoidism, gerd, presented with abdominal pain and found with acute biliary pancreatitis and acute cholecystitis who is now s/p laparoscopic cholecystectomy     1. Cholecystitis,   Biliary Pancreatitis   S/p Lap Cholecystectomy  -POD2   -Place on clear liquids, give IVF 200c for 10hr, IV and PO PRN pain medications.   -Transaminase levels downtrending.   -Antibiotics stopped s/p surgery   -Continue PRN pain medication  -will need to f/u with Dr. Alejandra, 2 weeks post-op    2. Confusion  -no delirium present  -pt with chronic debility  -PT/OT consults  recs for d/c home with home health.   -monitor if  improving by tomorrow likely will be medically stable for discharge home    3. Hypertension  -home lisinopril restarted    4. Hypothyroidism  -continue levothyroxine    5. Debility  -PT/OT consults.       Active Diagnoses:    Diagnosis Date Noted POA    PRINCIPAL PROBLEM:  Cholecystitis [K81.9] 07/29/2019 Yes    Acute biliary pancreatitis [K85.10] 07/29/2019 Yes    S/P laparoscopic cholecystectomy [Z90.49] 07/30/2019 Not Applicable    Essential hypertension [I10]  Yes    Acquired hypothyroidism [E03.9]  Yes    Aortic stenosis [I35.0]  Yes    Iron deficiency anemia [D50.9] 12/27/2017 Yes    Debility [R53.81] 07/29/2019 Yes      Problems Resolved During this Admission:    Diagnosis Date Noted Date Resolved POA    Hypokalemia [E87.6] 07/29/2019 07/30/2019 Yes    Choledocholithiasis [K80.50] 07/29/2019 07/30/2019 Yes    Hypophosphatemia [E83.39] 07/29/2019 07/30/2019 Yes     VTE Risk Mitigation (From admission, onward)        Ordered     IP VTE HIGH RISK PATIENT  Once      07/29/19 0737     Place SEAN hose  Until discontinued      07/29/19 0737     Place SEAN hose  Until discontinued      07/29/19 0426     Place sequential compression device  Until discontinued      07/29/19 0426             Marco Topete MD  Department of Hospital Medicine   [facility]

## 2019-08-01 NOTE — PLAN OF CARE
Problem: Adult Inpatient Plan of Care  Goal: Plan of Care Review  Outcome: Ongoing (interventions implemented as appropriate)   POC reviewed w/ pt and family and pt and family verbalized understanding.Patient AAO w/ intermittent confusion, but able to be reoriented. VSS for patient on room air w/ unlabored breathing. Patient tolerated diet well w/ no complaints of nausea or vomiting. Patient had adequate urine output per purewick w/ no bm this shift. Pain well controlled w/ ordered pain medications. Patient ambulated in room w/ PT using walker, sat up in chair for 50% of the day, and remains free from falls or injury. Delirium precautions maintained. SCDs applied to patient. Patient is resting w/ bed locked in lowest position, call light in reach, and family at bedside. No acute distress noted. Will continue to monitor patient.

## 2019-08-02 PROCEDURE — 25000003 PHARM REV CODE 250: Performed by: STUDENT IN AN ORGANIZED HEALTH CARE EDUCATION/TRAINING PROGRAM

## 2019-08-02 PROCEDURE — 20600001 HC STEP DOWN PRIVATE ROOM

## 2019-08-02 PROCEDURE — 99231 SBSQ HOSP IP/OBS SF/LOW 25: CPT | Mod: ,,, | Performed by: HOSPITALIST

## 2019-08-02 PROCEDURE — 99231 PR SUBSEQUENT HOSPITAL CARE,LEVL I: ICD-10-PCS | Mod: ,,, | Performed by: HOSPITALIST

## 2019-08-02 PROCEDURE — 94761 N-INVAS EAR/PLS OXIMETRY MLT: CPT

## 2019-08-02 PROCEDURE — 25000003 PHARM REV CODE 250: Performed by: HOSPITALIST

## 2019-08-02 PROCEDURE — 25000003 PHARM REV CODE 250: Performed by: NURSE PRACTITIONER

## 2019-08-02 PROCEDURE — 63600175 PHARM REV CODE 636 W HCPCS: Performed by: HOSPITALIST

## 2019-08-02 PROCEDURE — 27000221 HC OXYGEN, UP TO 24 HOURS

## 2019-08-02 RX ORDER — IBUPROFEN 400 MG/1
400 TABLET ORAL EVERY 6 HOURS PRN
Status: DISCONTINUED | OUTPATIENT
Start: 2019-08-02 | End: 2019-08-06 | Stop reason: HOSPADM

## 2019-08-02 RX ORDER — LIDOCAINE 50 MG/G
OINTMENT TOPICAL
Status: DISCONTINUED | OUTPATIENT
Start: 2019-08-02 | End: 2019-08-06 | Stop reason: HOSPADM

## 2019-08-02 RX ORDER — AMOXICILLIN 250 MG
1 CAPSULE ORAL 2 TIMES DAILY
Status: DISCONTINUED | OUTPATIENT
Start: 2019-08-02 | End: 2019-08-03

## 2019-08-02 RX ORDER — KETOROLAC TROMETHAMINE 15 MG/ML
15 INJECTION, SOLUTION INTRAMUSCULAR; INTRAVENOUS EVERY 6 HOURS PRN
Status: DISPENSED | OUTPATIENT
Start: 2019-08-02 | End: 2019-08-05

## 2019-08-02 RX ORDER — LISINOPRIL 10 MG/1
10 TABLET ORAL DAILY
Status: DISCONTINUED | OUTPATIENT
Start: 2019-08-03 | End: 2019-08-03

## 2019-08-02 RX ORDER — HYDRALAZINE HYDROCHLORIDE 25 MG/1
25 TABLET, FILM COATED ORAL EVERY 6 HOURS PRN
Status: DISCONTINUED | OUTPATIENT
Start: 2019-08-02 | End: 2019-08-03

## 2019-08-02 RX ORDER — LISINOPRIL 2.5 MG/1
5 TABLET ORAL ONCE
Status: COMPLETED | OUTPATIENT
Start: 2019-08-02 | End: 2019-08-02

## 2019-08-02 RX ADMIN — LIDOCAINE: 50 OINTMENT TOPICAL at 04:08

## 2019-08-02 RX ADMIN — FERROUS SULFATE TAB EC 325 MG (65 MG FE EQUIVALENT) 325 MG: 325 (65 FE) TABLET DELAYED RESPONSE at 09:08

## 2019-08-02 RX ADMIN — LISINOPRIL 5 MG: 2.5 TABLET ORAL at 09:08

## 2019-08-02 RX ADMIN — ACETAMINOPHEN 1000 MG: 500 TABLET ORAL at 06:08

## 2019-08-02 RX ADMIN — Medication 5000 UNITS: at 09:08

## 2019-08-02 RX ADMIN — GABAPENTIN 300 MG: 300 CAPSULE ORAL at 09:08

## 2019-08-02 RX ADMIN — LEVOTHYROXINE SODIUM 75 MCG: 75 TABLET ORAL at 06:08

## 2019-08-02 RX ADMIN — ACETAMINOPHEN 1000 MG: 500 TABLET ORAL at 09:08

## 2019-08-02 RX ADMIN — HYDRALAZINE HYDROCHLORIDE 25 MG: 25 TABLET, FILM COATED ORAL at 07:08

## 2019-08-02 RX ADMIN — PANTOPRAZOLE SODIUM 40 MG: 40 TABLET, DELAYED RELEASE ORAL at 09:08

## 2019-08-02 RX ADMIN — LISINOPRIL 5 MG: 2.5 TABLET ORAL at 05:08

## 2019-08-02 RX ADMIN — ENOXAPARIN SODIUM 40 MG: 100 INJECTION SUBCUTANEOUS at 04:08

## 2019-08-02 RX ADMIN — TRAZODONE HYDROCHLORIDE 50 MG: 50 TABLET ORAL at 09:08

## 2019-08-02 RX ADMIN — ACETAMINOPHEN 1000 MG: 500 TABLET ORAL at 03:08

## 2019-08-02 RX ADMIN — MICONAZOLE NITRATE: 20 POWDER TOPICAL at 09:08

## 2019-08-02 RX ADMIN — KETOROLAC TROMETHAMINE 15 MG: 15 INJECTION, SOLUTION INTRAMUSCULAR; INTRAVENOUS at 01:08

## 2019-08-02 RX ADMIN — CLOPIDOGREL BISULFATE 75 MG: 75 TABLET, FILM COATED ORAL at 09:08

## 2019-08-02 RX ADMIN — SENNOSIDES,DOCUSATE SODIUM 2 TABLET: 8.6; 5 TABLET, FILM COATED ORAL at 09:08

## 2019-08-02 NOTE — PLAN OF CARE
SW left message for Marleni with [facility] ([phone]) to follow up on referral. SW waiting to hear back.    2:12 PM  SW spoke with Savana with [facility]. Savana states that patient has been accepted to [facility]. A SNF bed will be available on Monday.     Rosy Caruso LMSW  [facility]

## 2019-08-02 NOTE — PROGRESS NOTES
[facility] Hospital Medicine  Progress Note    Patient Name: Linda Hylton  MRN: [id number]  Patient Class: IP- Inpatient   Admission Date: 7/28/2019  Length of Stay: 4 days  Attending Physician: Marco Topete MD  Primary Care Provider: Yakov Barrett MD    [facility] Medicine Team: [facility] [place] [facility] MED R Marco Topete MD    Subjective:     Principal Problem:Cholecystitis    Interval History:   Seen this AM    No morphine use in >24hrs.   Toradol x 1 helped with pt    2BM today     [facility] expects bed available Monday  Decreased lab frequency  Weaned from oxygen  Advance diet to low fat bland    Pts pain improved on scheduled Tylenol.  Will change all PRNS to NSAID for severe or moderate as her renal function has been stable.     BP rising in evening - Titrate Lisinopril     Review of Systems   Constitutional: Negative for fever.   HENT: Negative for sore throat.    Respiratory: Negative for cough and shortness of breath.    Cardiovascular: Negative for chest pain and leg swelling.   Gastrointestinal: Positive for abdominal pain.   Endocrine: Negative for polyuria.   Genitourinary: Negative for dysuria.   Musculoskeletal: Negative for arthralgias and back pain.   Psychiatric/Behavioral: Positive for confusion.     Objective:     Vital Signs (Most Recent):  Temp: 98.6 °F (37 °C) (08/02/19 1629)  Pulse: 78 (08/02/19 1629)  Resp: 14 (08/02/19 1153)  BP: (!) 190/80 (08/02/19 1629)  SpO2: (!) 93 % (08/02/19 1629) Vital Signs (24h Range):  Temp:  [97.6 °F (36.4 °C)-98.9 °F (37.2 °C)] 98.6 °F (37 °C)  Pulse:  [70-87] 78  Resp:  [14-18] 14  SpO2:  [89 %-97 %] 93 %  BP: (141-193)/(63-98) 190/80     Weight: 72.6 kg (160 lb)  Body mass index is 34.62 kg/m².    Intake/Output Summary (Last 24 hours) at 8/2/2019 6607  Last data filed at 8/2/2019 1300  Gross per 24 hour   Intake 1320 ml   Output 2100 ml   Net -780 ml      Physical Exam   Constitutional: She is oriented to person, place, and  time. No distress.   obese   Eyes: No scleral icterus.   Cardiovascular: Normal rate and regular rhythm.   Murmur heard.  Pulmonary/Chest: Effort normal and breath sounds normal. No stridor. No respiratory distress.   Abdominal: Soft. Bowel sounds are normal. She exhibits no distension. There is no tenderness. There is no rebound and no guarding.   Laparoscopic surgical scars c/d/i   Neurological: She is alert and oriented to person, place, and time. GCS eye subscore is 4. GCS verbal subscore is 5. GCS motor subscore is 6.   No asterixis   Skin: Skin is warm and dry.   Psychiatric: Her affect is labile. She is attentive.         Significant Labs:   CBC:   Recent Labs   Lab 08/01/19  0613   WBC 11.78   HGB 9.9*   HCT 31.6*        CMP:   Recent Labs   Lab 08/01/19 0613      K 3.9   CL 99   CO2 28   GLU 83   BUN 9   CREATININE 0.7   CALCIUM 9.0   PROT 6.0   ALBUMIN 2.4*   BILITOT 0.7   ALKPHOS 68   AST 20   ALT 36   ANIONGAP 9   EGFRNONAA >60.0       Significant Imaging: I have reviewed all pertinent imaging results/findings within the past 24 hours.    Assessment/Plan:      Overview/Hospital Course:   83 y/o Woman hx of HTN, embolic stroke with residual deficit, aortic stenosis, hypothryoidism, gerd, presented with abdominal pain and found with acute biliary pancreatitis and acute cholecystitis who is now s/p laparoscopic cholecystectomy     1. Cholecystitis,   Biliary Pancreatitis   S/p Lap Cholecystectomy 7/29  -advance to bland-low fat diet    -Scheduled Acetaminophen, PRN Nsaids, will stop PRN opiates as pt has not required and believe it may have been contributing to patient's intermittent confusion.   -Transaminase levels downtrending.     -will need to f/u with Dr. Alejandra, 2 weeks post-op    2. Confusion  -no delirium present  -pt with chronic debility  -PT/OT consults  [facility]  -hold opiate medication unless refractory pain develops    3.  Hypertension  -uncontrolled over last 24 hours - continue lisinopril giving extra dose and increasing dose to 10mg for tomorrow.   -In setting of aortic stenosis, optimal agents limited, prior echo with mild AoStenosis.       4. Hypothyroidism  -continue levothyroxine    5. Debility  -PT/OT consults.       Active Diagnoses:    Diagnosis Date Noted POA    PRINCIPAL PROBLEM:  Cholecystitis [K81.9] 07/29/2019 Yes    Acute biliary pancreatitis [K85.10] 07/29/2019 Yes    S/P laparoscopic cholecystectomy [Z90.49] 07/30/2019 Not Applicable    Essential hypertension [I10]  Yes    Acquired hypothyroidism [E03.9]  Yes    Aortic stenosis [I35.0]  Yes    Iron deficiency anemia [D50.9] 12/27/2017 Yes    Debility [R53.81] 07/29/2019 Yes      Problems Resolved During this Admission:    Diagnosis Date Noted Date Resolved POA    Hypokalemia [E87.6] 07/29/2019 07/30/2019 Yes    Choledocholithiasis [K80.50] 07/29/2019 07/30/2019 Yes    Hypophosphatemia [E83.39] 07/29/2019 07/30/2019 Yes     VTE Risk Mitigation (From admission, onward)        Ordered     enoxaparin injection 40 mg  Daily      08/01/19 1343     IP VTE HIGH RISK PATIENT  Once      07/29/19 0737     Place SEAN hose  Until discontinued      07/29/19 0737     Place SEAN hose  Until discontinued      07/29/19 0426     Place sequential compression device  Until discontinued      07/29/19 0426             Marco Topete MD  Department of Hospital Medicine   [facility]

## 2019-08-02 NOTE — PLAN OF CARE
Problem: Adult Inpatient Plan of Care  Goal: Plan of Care Review  Outcome: Ongoing (interventions implemented as appropriate)  Patient aaox3 w/ VSS for patient on room air except htn (asymptomatic). MD aware and ordered bp medication. Medication given, will reassess pt. Patient tolerated diet w/ adequate urine output and x2 large liquid bm this shift. Foam dressing and barrier cream applied to sacrum and buttocks. Patient repositioned w/ wedge and pillows. Patient stood at the edge of the bed x1 and sat up in chair for 50% of the day. Patient remains free from falls or injury. Bed locked in lowest position, side rails raised x3, call light in reach. No acute distress noted. Will continue to monitor patient.

## 2019-08-02 NOTE — NURSING
Patient's /80 HR 78 (asymptomatic). Patient denies headache,chest pain, or sob. Notified Dr. Topete. MD [facility]

## 2019-08-03 LAB
ALBUMIN SERPL BCP-MCNC: 2.5 G/DL (ref 3.5–5.2)
ALP SERPL-CCNC: 85 U/L (ref 55–135)
ALT SERPL W/O P-5'-P-CCNC: 29 U/L (ref 10–44)
ANION GAP SERPL CALC-SCNC: 12 MMOL/L (ref 8–16)
AST SERPL-CCNC: 21 U/L (ref 10–40)
BACTERIA BLD CULT: NORMAL
BACTERIA BLD CULT: NORMAL
BASOPHILS # BLD AUTO: 0.07 K/UL (ref 0–0.2)
BASOPHILS NFR BLD: 0.5 % (ref 0–1.9)
BILIRUB SERPL-MCNC: 0.5 MG/DL (ref 0.1–1)
BUN SERPL-MCNC: 6 MG/DL (ref 8–23)
CALCIUM SERPL-MCNC: 9.5 MG/DL (ref 8.7–10.5)
CHLORIDE SERPL-SCNC: 100 MMOL/L (ref 95–110)
CO2 SERPL-SCNC: 27 MMOL/L (ref 23–29)
CREAT SERPL-MCNC: 0.7 MG/DL (ref 0.5–1.4)
DIFFERENTIAL METHOD: ABNORMAL
EOSINOPHIL # BLD AUTO: 0.3 K/UL (ref 0–0.5)
EOSINOPHIL NFR BLD: 2.3 % (ref 0–8)
ERYTHROCYTE [DISTWIDTH] IN BLOOD BY AUTOMATED COUNT: 14.8 % (ref 11.5–14.5)
EST. GFR  (AFRICAN AMERICAN): >60 ML/MIN/1.73 M^2
EST. GFR  (NON AFRICAN AMERICAN): >60 ML/MIN/1.73 M^2
GLUCOSE SERPL-MCNC: 123 MG/DL (ref 70–110)
HCT VFR BLD AUTO: 35.1 % (ref 37–48.5)
HGB BLD-MCNC: 11 G/DL (ref 12–16)
IMM GRANULOCYTES # BLD AUTO: 0.09 K/UL (ref 0–0.04)
IMM GRANULOCYTES NFR BLD AUTO: 0.7 % (ref 0–0.5)
LYMPHOCYTES # BLD AUTO: 1.3 K/UL (ref 1–4.8)
LYMPHOCYTES NFR BLD: 9.8 % (ref 18–48)
MAGNESIUM SERPL-MCNC: 1.3 MG/DL (ref 1.6–2.6)
MCH RBC QN AUTO: 26.3 PG (ref 27–31)
MCHC RBC AUTO-ENTMCNC: 31.3 G/DL (ref 32–36)
MCV RBC AUTO: 84 FL (ref 82–98)
MONOCYTES # BLD AUTO: 1 K/UL (ref 0.3–1)
MONOCYTES NFR BLD: 7.6 % (ref 4–15)
NEUTROPHILS # BLD AUTO: 10.2 K/UL (ref 1.8–7.7)
NEUTROPHILS NFR BLD: 79.1 % (ref 38–73)
NRBC BLD-RTO: 0 /100 WBC
PHOSPHATE SERPL-MCNC: 2.9 MG/DL (ref 2.7–4.5)
PLATELET # BLD AUTO: 333 K/UL (ref 150–350)
PMV BLD AUTO: 10.5 FL (ref 9.2–12.9)
POTASSIUM SERPL-SCNC: 3.5 MMOL/L (ref 3.5–5.1)
PROT SERPL-MCNC: 6.6 G/DL (ref 6–8.4)
RBC # BLD AUTO: 4.19 M/UL (ref 4–5.4)
SODIUM SERPL-SCNC: 139 MMOL/L (ref 136–145)
WBC # BLD AUTO: 12.86 K/UL (ref 3.9–12.7)

## 2019-08-03 PROCEDURE — 25000003 PHARM REV CODE 250: Performed by: HOSPITALIST

## 2019-08-03 PROCEDURE — 36415 COLL VENOUS BLD VENIPUNCTURE: CPT

## 2019-08-03 PROCEDURE — 25000003 PHARM REV CODE 250: Performed by: PHYSICIAN ASSISTANT

## 2019-08-03 PROCEDURE — 20600001 HC STEP DOWN PRIVATE ROOM

## 2019-08-03 PROCEDURE — 84100 ASSAY OF PHOSPHORUS: CPT

## 2019-08-03 PROCEDURE — 99231 SBSQ HOSP IP/OBS SF/LOW 25: CPT | Mod: ,,, | Performed by: HOSPITALIST

## 2019-08-03 PROCEDURE — 83735 ASSAY OF MAGNESIUM: CPT

## 2019-08-03 PROCEDURE — 85025 COMPLETE CBC W/AUTO DIFF WBC: CPT

## 2019-08-03 PROCEDURE — 80053 COMPREHEN METABOLIC PANEL: CPT

## 2019-08-03 PROCEDURE — 25000003 PHARM REV CODE 250: Performed by: STUDENT IN AN ORGANIZED HEALTH CARE EDUCATION/TRAINING PROGRAM

## 2019-08-03 PROCEDURE — 63600175 PHARM REV CODE 636 W HCPCS: Performed by: HOSPITALIST

## 2019-08-03 PROCEDURE — 99231 PR SUBSEQUENT HOSPITAL CARE,LEVL I: ICD-10-PCS | Mod: ,,, | Performed by: HOSPITALIST

## 2019-08-03 RX ORDER — LOPERAMIDE HYDROCHLORIDE 2 MG/1
2 CAPSULE ORAL 4 TIMES DAILY PRN
Status: DISCONTINUED | OUTPATIENT
Start: 2019-08-03 | End: 2019-08-06 | Stop reason: HOSPADM

## 2019-08-03 RX ORDER — HYDRALAZINE HYDROCHLORIDE 50 MG/1
50 TABLET, FILM COATED ORAL EVERY 6 HOURS PRN
Status: DISCONTINUED | OUTPATIENT
Start: 2019-08-03 | End: 2019-08-04

## 2019-08-03 RX ORDER — LISINOPRIL 10 MG/1
10 TABLET ORAL DAILY
Status: DISCONTINUED | OUTPATIENT
Start: 2019-08-03 | End: 2019-08-04

## 2019-08-03 RX ORDER — LISINOPRIL 20 MG/1
20 TABLET ORAL DAILY
Status: DISCONTINUED | OUTPATIENT
Start: 2019-08-03 | End: 2019-08-03

## 2019-08-03 RX ADMIN — ENOXAPARIN SODIUM 40 MG: 100 INJECTION SUBCUTANEOUS at 04:08

## 2019-08-03 RX ADMIN — LISINOPRIL 10 MG: 10 TABLET ORAL at 10:08

## 2019-08-03 RX ADMIN — HYPROMELLOSE 2910 2 DROP: 5 SOLUTION OPHTHALMIC at 08:08

## 2019-08-03 RX ADMIN — LOPERAMIDE HYDROCHLORIDE 2 MG: 2 CAPSULE ORAL at 05:08

## 2019-08-03 RX ADMIN — MICONAZOLE NITRATE: 20 POWDER TOPICAL at 10:08

## 2019-08-03 RX ADMIN — HYDRALAZINE HYDROCHLORIDE 50 MG: 50 TABLET ORAL at 05:08

## 2019-08-03 RX ADMIN — FERROUS SULFATE TAB EC 325 MG (65 MG FE EQUIVALENT) 325 MG: 325 (65 FE) TABLET DELAYED RESPONSE at 10:08

## 2019-08-03 RX ADMIN — PANTOPRAZOLE SODIUM 40 MG: 40 TABLET, DELAYED RELEASE ORAL at 10:08

## 2019-08-03 RX ADMIN — MICONAZOLE NITRATE: 20 OINTMENT TOPICAL at 08:08

## 2019-08-03 RX ADMIN — LEVOTHYROXINE SODIUM 75 MCG: 75 TABLET ORAL at 05:08

## 2019-08-03 RX ADMIN — CLOPIDOGREL BISULFATE 75 MG: 75 TABLET, FILM COATED ORAL at 10:08

## 2019-08-03 RX ADMIN — ACETAMINOPHEN 1000 MG: 500 TABLET ORAL at 05:08

## 2019-08-03 RX ADMIN — GABAPENTIN 300 MG: 300 CAPSULE ORAL at 08:08

## 2019-08-03 RX ADMIN — HYPROMELLOSE 2910 2 DROP: 5 SOLUTION OPHTHALMIC at 05:08

## 2019-08-03 RX ADMIN — MICONAZOLE NITRATE: 20 OINTMENT TOPICAL at 02:08

## 2019-08-03 RX ADMIN — LIDOCAINE 2 PATCH: 50 PATCH TOPICAL at 02:08

## 2019-08-03 RX ADMIN — IBUPROFEN 400 MG: 400 TABLET, FILM COATED ORAL at 07:08

## 2019-08-03 RX ADMIN — Medication 5000 UNITS: at 10:08

## 2019-08-03 RX ADMIN — TRAZODONE HYDROCHLORIDE 50 MG: 50 TABLET ORAL at 08:08

## 2019-08-03 NOTE — PLAN OF CARE
Problem: Adult Inpatient Plan of Care  Goal: Plan of Care Review  Outcome: Ongoing (interventions implemented as appropriate)  POC reviewed with pt; verbalized understanding; BP noted to be elevated requiring adjustment in meds and prn hydralazine x2; afebrile; medicated for c/o diarrhea as well; assessment per flowsheet; meds administered per MAR; continues with lab/vitals monitoring, post op management, PT/OT to increase activity; safety precautions maintained; bed alarm in use for falls precautions; 0 falls, injury or events this shift; freq weight shift encouraged for skin protection; barrier ointment applied to MAD rash to inner buttocks; ja well; 0 further needs voiced    Pending: AM labs, transfer to [facility]

## 2019-08-03 NOTE — PROGRESS NOTES
[facility] Hospital Medicine  Progress Note    Patient Name: Linda Hylton  MRN: [id number]  Patient Class: IP- Inpatient   Admission Date: 7/28/2019  Length of Stay: 5 days  Attending Physician: Marco Topete MD  Primary Care Provider: Yakov Barrett MD    [facility] Medicine Team: [facility]OSP MED R Marco Topete MD    Subjective:     Principal Problem:Cholecystitis    Interval History:   Seen this AM    Says she's not doing great, but tolerated breakfast, no acute pain complaints,   Pending SNF Monday    Had hypertension overnight, received 25mg/50mg PO hydralazine, large swings after in her blood pressure.  Will keep on Lisinopril 10mg only this AM as planned yesterday afternoon.     She reports today she thought grandson/grandaughter in law were visitingthis AM but in discussion with nurse no visitors have been in, so still having some intermittent confusion    Review of Systems   Constitutional: Negative for fever.   HENT: Negative for sore throat.    Respiratory: Negative for cough and shortness of breath.    Cardiovascular: Negative for chest pain and leg swelling.   Gastrointestinal: Negative for abdominal pain.   Endocrine: Negative for polyuria.   Genitourinary: Negative for dysuria.   Musculoskeletal: Negative for arthralgias and back pain.   Psychiatric/Behavioral: Positive for confusion.     Objective:     Vital Signs (Most Recent):  Temp: 97 °F (36.1 °C) (08/03/19 0829)  Pulse: 93 (08/03/19 1001)  Resp: 16 (08/03/19 0829)  BP: (!) 124/58 (08/03/19 1001)  SpO2: (!) 94 % (08/03/19 0843) Vital Signs (24h Range):  Temp:  [97 °F (36.1 °C)-98.6 °F (37 °C)] 97 °F (36.1 °C)  Pulse:  [] 93  Resp:  [16-18] 16  SpO2:  [92 %-96 %] 94 %  BP: (106-216)/(51-86) 124/58     Weight: 72.6 kg (160 lb)  Body mass index is 34.62 kg/m².    Intake/Output Summary (Last 24 hours) at 8/3/2019 1208  Last data filed at 8/3/2019 0500  Gross per 24 hour   Intake 240 ml   Output 1450 ml   Net -1210 ml       Physical Exam   Constitutional: She is oriented to person, place, and time. No distress.   obese   Eyes: No scleral icterus.   Cardiovascular: Normal rate and regular rhythm.   Murmur heard.  Pulmonary/Chest: Effort normal and breath sounds normal. No stridor. No respiratory distress.   Abdominal: Soft. Bowel sounds are normal. She exhibits no distension. There is no tenderness. There is no rebound and no guarding.   Laparoscopic surgical scars c/d/i   Neurological: She is alert and oriented to person, place, and time. GCS eye subscore is 4. GCS verbal subscore is 5. GCS motor subscore is 6.   No asterixis   Skin: Skin is warm and dry.   Psychiatric: Her affect is labile. She is attentive.         Significant Labs:   CBC:   Recent Labs   Lab 08/03/19  0848   WBC 12.86*   HGB 11.0*   HCT 35.1*        CMP:   Recent Labs   Lab 08/03/19  0848      K 3.5      CO2 27   *   BUN 6*   CREATININE 0.7   CALCIUM 9.5   PROT 6.6   ALBUMIN 2.5*   BILITOT 0.5   ALKPHOS 85   AST 21   ALT 29   ANIONGAP 12   EGFRNONAA >60.0       Significant Imaging: I have reviewed all pertinent imaging results/findings within the past 24 hours.    Assessment/Plan:      Overview/Hospital Course:   85 y/o Woman hx of HTN, embolic stroke with residual deficit, aortic stenosis, hypothryoidism, gerd, presented with abdominal pain and found with acute biliary pancreatitis and acute cholecystitis who is now s/p laparoscopic cholecystectomy.  Post-op with pain/debility and confusion that has prompted PT/OT evals and planning on SNF discharge.     1. Cholecystitis,   Biliary Pancreatitis   S/p Lap Cholecystectomy 7/29  -advance to bland-low fat diet    -Scheduled Acetaminophen completed. Continue , PRN Nsaids,  -stopped PRN opiates, concern, they were contributing to patient's intermittent confusion.   -Transaminase levels downtrending.   -will need to f/u with Dr. Alejandra, 2 weeks post-op    2. Confusion  -no delirium present by  CAM-ICU evals  -pt with chronic debility  -PT/OT consults  [facility]  -hold opiate medication unless refractory pain develops    3. Hypertension  -uncontrolled over last 24 hours - continue lisinopril increasing dose to 10mg today  -In setting of aortic stenosis, optimal agents limited, prior echo with mild AoStenosis.   -s/p 2 doses of PO hydralazine overnight.       4. Hypothyroidism  -continue levothyroxine    5. Debility  -PT/OT consults.   -SNF palcement.       Active Diagnoses:    Diagnosis Date Noted POA    PRINCIPAL PROBLEM:  Cholecystitis [K81.9] 07/29/2019 Yes    Acute biliary pancreatitis [K85.10] 07/29/2019 Yes    S/P laparoscopic cholecystectomy [Z90.49] 07/30/2019 Not Applicable    Essential hypertension [I10]  Yes    Acquired hypothyroidism [E03.9]  Yes    Aortic stenosis [I35.0]  Yes    Iron deficiency anemia [D50.9] 12/27/2017 Yes    Debility [R53.81] 07/29/2019 Yes      Problems Resolved During this Admission:    Diagnosis Date Noted Date Resolved POA    Hypokalemia [E87.6] 07/29/2019 07/30/2019 Yes    Choledocholithiasis [K80.50] 07/29/2019 07/30/2019 Yes    Hypophosphatemia [E83.39] 07/29/2019 07/30/2019 Yes     VTE Risk Mitigation (From admission, onward)        Ordered     enoxaparin injection 40 mg  Daily      08/01/19 1343     IP VTE HIGH RISK PATIENT  Once      07/29/19 0737     Place SEAN hose  Until discontinued      07/29/19 0737     Place SEAN hose  Until discontinued      07/29/19 0426     Place sequential compression device  Until discontinued      07/29/19 0426             Marco Topete MD  Department of Hospital Medicine   [facility]

## 2019-08-03 NOTE — PLAN OF CARE
Problem: Adult Inpatient Plan of Care  Goal: Plan of Care Review  Outcome: Ongoing (interventions implemented as appropriate)  POC is reviewed and understood by patient. VSS while on room air. Oriented x4. Low saturated fat/low cholesterol diet. Up with PT. Uses purewick to void. Can get up to bedside commode with assist x1, or use the bedpan. VS improved post early morning administration of Hydralazine. Gets only scheduled Tylenol for pain. No signs of distress noted. HOB elevated. Call bell in reach. Bed in lowest position. WCTM.

## 2019-08-03 NOTE — CONSULTS
Consult received on patient's skin breakdown to buttocks. Moisture associated dermatitis noted to gluteal cleft, appears fungal in nature. Recommend BID/prn apply barrier cream with miconazole.  No diapers while in bed, use covidien blue bed pads to wick moisture away from skin.  Dr. Topete approved recs, nursing to continue care. Re-consult wound care if needed.      buttocks

## 2019-08-03 NOTE — NURSING
Patient given ordered lisinopril @1736  for BP of 190/80. Pt asymptomatic and denies chest pain,  Headaches, or dizziness    Reassessed patient's BP @1900. Pt's BP was 213/86, pt asymptomatic and denies chest pain, headaches or dizziness. Notified MD on call for IMR.    ordered prn hydralazine PO for SBP>180. Ordered hydralazine given. Report given to Night nurse DONALD Martinez. Pt has no acute distress at this time. Nurse will continue to monitor patient

## 2019-08-03 NOTE — CARE UPDATE
"RAPID RESPONSE NURSE PROACTIVE ROUNDING NOTE     Time of Visit: 08:38    Admit Date: 2019  LOS: 5  Code Status: Full Code   Date of Visit: 2019  : 1935  Age: 84 y.o.  Sex: female  Race: White  Bed: [address] [place] [address] A:   MRN: [id number]  Was the patient discharged from an ICU this admission? no   Was the patient discharged from a PACU within last 24 hours?  no  Did the patient receive conscious sedation/general anesthesia in last 24 hours?  no  Was the patient in the ED within the past 24 hours?  no  Was the patient started on NIPPV within the past 24 hours?  no  Attending Physician: Marco Topete MD  Primary Service: [facility] MED     ASSESSMENT     Diagnosis: Cholecystitis    Abnormal Vital Signs: /60 (BP Location: Left arm, Patient Position: Lying)   Pulse 95   Temp 97 °F (36.1 °C) (Oral)   Resp 16   Ht 4' 9" (1.448 m)   Wt 72.6 kg (160 lb)   LMP  (LMP Unknown)   SpO2 (!) 94%   Breastfeeding? 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    Patient seen on proactive rounds for hypertension. Received PO hydralazine this am. On bedside exam, patient sitting up eating breakfast, complaints of mild dizziness, states she has taken meclizine in the past. BP left arm 106/53; BP right arm 112/51.        INTERVENTIONS/ RECOMMENDATIONS     Would wait to give AM antihypertensives until BP comes up some as baseline patient runs HTN.     Discussed plan of care with RNMahnaz.    PHYSICIAN ESCALATION     Yes/No  no    Orders received and case discussed with NA.    Disposition: Remain in room 1055A.    FOLLOW-UP     Call back the Rapid Response Nurse, Ansley Villatoro RN at [phone] for additional questions or concerns.          "

## 2019-08-03 NOTE — NURSING
Pt was given po hydralazine around start of shift for /86; dropped to 170's/80's throughout night; noted to be elevated again this /82 a/w c/o HA; provider on call paged to notify; adjustments made to medications; WCTM

## 2019-08-04 PROBLEM — K81.9 CHOLECYSTITIS: Status: RESOLVED | Noted: 2019-07-29 | Resolved: 2019-08-04

## 2019-08-04 PROBLEM — K85.10 ACUTE BILIARY PANCREATITIS: Status: RESOLVED | Noted: 2019-07-29 | Resolved: 2019-08-04

## 2019-08-04 PROCEDURE — 20600001 HC STEP DOWN PRIVATE ROOM

## 2019-08-04 PROCEDURE — 25000003 PHARM REV CODE 250: Performed by: STUDENT IN AN ORGANIZED HEALTH CARE EDUCATION/TRAINING PROGRAM

## 2019-08-04 PROCEDURE — 63600175 PHARM REV CODE 636 W HCPCS: Performed by: HOSPITALIST

## 2019-08-04 PROCEDURE — 99231 SBSQ HOSP IP/OBS SF/LOW 25: CPT | Mod: ,,, | Performed by: HOSPITALIST

## 2019-08-04 PROCEDURE — 25000003 PHARM REV CODE 250: Performed by: HOSPITALIST

## 2019-08-04 PROCEDURE — 99231 PR SUBSEQUENT HOSPITAL CARE,LEVL I: ICD-10-PCS | Mod: ,,, | Performed by: HOSPITALIST

## 2019-08-04 PROCEDURE — 25000003 PHARM REV CODE 250: Performed by: PHYSICIAN ASSISTANT

## 2019-08-04 RX ORDER — CARVEDILOL 6.25 MG/1
6.25 TABLET ORAL 2 TIMES DAILY
Status: DISCONTINUED | OUTPATIENT
Start: 2019-08-04 | End: 2019-08-04

## 2019-08-04 RX ORDER — LISINOPRIL 20 MG/1
20 TABLET ORAL DAILY
Status: DISCONTINUED | OUTPATIENT
Start: 2019-08-05 | End: 2019-08-06 | Stop reason: HOSPADM

## 2019-08-04 RX ORDER — OLANZAPINE 5 MG/1
5 TABLET, ORALLY DISINTEGRATING ORAL ONCE
Status: COMPLETED | OUTPATIENT
Start: 2019-08-04 | End: 2019-08-04

## 2019-08-04 RX ORDER — HYDRALAZINE HYDROCHLORIDE 50 MG/1
50 TABLET, FILM COATED ORAL EVERY 6 HOURS PRN
Status: DISCONTINUED | OUTPATIENT
Start: 2019-08-04 | End: 2019-08-06 | Stop reason: HOSPADM

## 2019-08-04 RX ORDER — LISINOPRIL 10 MG/1
10 TABLET ORAL ONCE
Status: COMPLETED | OUTPATIENT
Start: 2019-08-04 | End: 2019-08-04

## 2019-08-04 RX ORDER — MAGNESIUM SULFATE HEPTAHYDRATE 40 MG/ML
2 INJECTION, SOLUTION INTRAVENOUS
Status: COMPLETED | OUTPATIENT
Start: 2019-08-04 | End: 2019-08-05

## 2019-08-04 RX ADMIN — CLOPIDOGREL BISULFATE 75 MG: 75 TABLET, FILM COATED ORAL at 09:08

## 2019-08-04 RX ADMIN — HYPROMELLOSE 2910 2 DROP: 5 SOLUTION OPHTHALMIC at 08:08

## 2019-08-04 RX ADMIN — HYPROMELLOSE 2910 2 DROP: 5 SOLUTION OPHTHALMIC at 05:08

## 2019-08-04 RX ADMIN — LIDOCAINE 2 PATCH: 50 PATCH TOPICAL at 05:08

## 2019-08-04 RX ADMIN — GABAPENTIN 300 MG: 300 CAPSULE ORAL at 08:08

## 2019-08-04 RX ADMIN — MAGNESIUM SULFATE IN WATER 2 G: 40 INJECTION, SOLUTION INTRAVENOUS at 10:08

## 2019-08-04 RX ADMIN — FERROUS SULFATE TAB EC 325 MG (65 MG FE EQUIVALENT) 325 MG: 325 (65 FE) TABLET DELAYED RESPONSE at 09:08

## 2019-08-04 RX ADMIN — ENOXAPARIN SODIUM 40 MG: 100 INJECTION SUBCUTANEOUS at 05:08

## 2019-08-04 RX ADMIN — MICONAZOLE NITRATE: 20 OINTMENT TOPICAL at 09:08

## 2019-08-04 RX ADMIN — HYPROMELLOSE 2910 2 DROP: 5 SOLUTION OPHTHALMIC at 09:08

## 2019-08-04 RX ADMIN — PANTOPRAZOLE SODIUM 40 MG: 40 TABLET, DELAYED RELEASE ORAL at 09:08

## 2019-08-04 RX ADMIN — MICONAZOLE NITRATE: 20 OINTMENT TOPICAL at 08:08

## 2019-08-04 RX ADMIN — LISINOPRIL 10 MG: 10 TABLET ORAL at 09:08

## 2019-08-04 RX ADMIN — IBUPROFEN 400 MG: 400 TABLET, FILM COATED ORAL at 05:08

## 2019-08-04 RX ADMIN — LEVOTHYROXINE SODIUM 75 MCG: 75 TABLET ORAL at 05:08

## 2019-08-04 RX ADMIN — Medication 5000 UNITS: at 09:08

## 2019-08-04 RX ADMIN — TRAZODONE HYDROCHLORIDE 50 MG: 50 TABLET ORAL at 08:08

## 2019-08-04 RX ADMIN — LISINOPRIL 10 MG: 10 TABLET ORAL at 04:08

## 2019-08-04 RX ADMIN — OLANZAPINE 5 MG: 5 TABLET, ORALLY DISINTEGRATING ORAL at 08:08

## 2019-08-04 RX ADMIN — HYDRALAZINE HYDROCHLORIDE 50 MG: 50 TABLET ORAL at 05:08

## 2019-08-04 RX ADMIN — IBUPROFEN 400 MG: 400 TABLET, FILM COATED ORAL at 10:08

## 2019-08-04 NOTE — PROGRESS NOTES
[facility] Hospital Medicine  Progress Note    Patient Name: Linda Hytlon  MRN: [id number]  Patient Class: IP- Inpatient   Admission Date: 7/28/2019  Length of Stay: 6 days  Attending Physician: Marco Topete MD  Primary Care Provider: Yakov Barrett MD    [facility] Medicine Team: [facility] HOSP MED R Marco Topete MD    Subjective:     Principal Problem:Cholecystitis    Interval History:   Seen this AM  Continues to state i'm not doing well, but clinically stable.       Review of Systems   Constitutional: Negative for fever.   HENT: Negative for sore throat.    Respiratory: Negative for cough and shortness of breath.    Cardiovascular: Negative for chest pain and leg swelling.   Gastrointestinal: Negative for abdominal pain.   Endocrine: Negative for polyuria.   Genitourinary: Negative for dysuria.   Musculoskeletal: Negative for arthralgias and back pain.   Psychiatric/Behavioral: Negative for confusion.     Objective:     Vital Signs (Most Recent):  Temp: 99.5 °F (37.5 °C) (08/04/19 1126)  Pulse: 77 (08/04/19 1126)  Resp: 18 (08/04/19 0745)  BP: (!) 147/72 (08/04/19 1126)  SpO2: 96 % (08/04/19 1126) Vital Signs (24h Range):  Temp:  [98.1 °F (36.7 °C)-99.5 °F (37.5 °C)] 99.5 °F (37.5 °C)  Pulse:  [77-94] 77  Resp:  [16-18] 18  SpO2:  [90 %-96 %] 96 %  BP: (134-178)/(60-90) 147/72     Weight: 72.6 kg (160 lb)  Body mass index is 34.62 kg/m².    Intake/Output Summary (Last 24 hours) at 8/4/2019 1459  Last data filed at 8/4/2019 0328  Gross per 24 hour   Intake 240 ml   Output 950 ml   Net -710 ml      Physical Exam   Constitutional: She is oriented to person, place, and time. No distress.   obese   Eyes: No scleral icterus.   Cardiovascular: Normal rate and regular rhythm.   Murmur heard.  Pulmonary/Chest: Effort normal and breath sounds normal. No stridor. No respiratory distress.   Abdominal: Soft. Bowel sounds are normal. She exhibits no distension. There is no tenderness. There is no  rebound and no guarding.   Laparoscopic surgical scars c/d/i   Neurological: She is alert and oriented to person, place, and time. GCS eye subscore is 4. GCS verbal subscore is 5. GCS motor subscore is 6.   No asterixis   Skin: Skin is warm and dry.   Psychiatric: Her affect is labile. She is attentive.         Significant Labs:   CBC:   Recent Labs   Lab 08/03/19  0848   WBC 12.86*   HGB 11.0*   HCT 35.1*        CMP:   Recent Labs   Lab 08/03/19  0848      K 3.5      CO2 27   *   BUN 6*   CREATININE 0.7   CALCIUM 9.5   PROT 6.6   ALBUMIN 2.5*   BILITOT 0.5   ALKPHOS 85   AST 21   ALT 29   ANIONGAP 12   EGFRNONAA >60.0       Significant Imaging: I have reviewed all pertinent imaging results/findings within the past 24 hours.    Assessment/Plan:      Overview/Hospital Course:   83 y/o Woman hx of HTN, embolic stroke with residual deficit, aortic stenosis, hypothryoidism, gerd, presented with abdominal pain and found with acute biliary pancreatitis and acute cholecystitis who is now s/p laparoscopic cholecystectomy.  Post-op with pain/debility and confusion that has prompted PT/OT evals and planning on SNF discharge.     1. Cholecystitis,   Biliary Pancreatitis   S/p Lap Cholecystectomy 7/29  -tolerating  bland-low fat diet    -Scheduled Acetaminophen completed. Continue , PRN Nsaids,  -stopped PRN opiates, concern, they were contributing to patient's intermittent confusion.   -Transaminase levels downtrending.   -will need to f/u with Dr. Alejandra, 2 weeks post-op    2. Confusion  -no delirium present by CAM-ICU evals  -pt with chronic debility  -PT/OT consults  recs changed to SNF- [facility]  -hold opiate medication unless refractory pain develops    3. Hypertension  -titrate lisinopril as pt tolerates give 10mg this afternoon and change to 20mg in AM.   -In setting of aortic stenosis, optimal agents limited, prior echo with mild AoStenosis. Beta blockers and  calcium channel blockers are not preferred, per echo she has Mild Aortic stenosis so if 2nd drug class needed, add low dose and and titrate as she tolerates.       4. Hypothyroidism  -continue levothyroxine    5. Debility  -PT/OT consults.   -SNF palcement.       Active Diagnoses:    Diagnosis Date Noted POA    PRINCIPAL PROBLEM:  S/P laparoscopic cholecystectomy [Z90.49] 07/30/2019 Not Applicable    Essential hypertension [I10]  Yes    Acquired hypothyroidism [E03.9]  Yes    Aortic stenosis [I35.0]  Yes    Iron deficiency anemia [D50.9] 12/27/2017 Yes    Debility [R53.81] 07/29/2019 Yes      Problems Resolved During this Admission:    Diagnosis Date Noted Date Resolved POA    Cholecystitis [K81.9] 07/29/2019 08/04/2019 Yes    Acute biliary pancreatitis [K85.10] 07/29/2019 08/04/2019 Yes    Hypokalemia [E87.6] 07/29/2019 07/30/2019 Yes    Choledocholithiasis [K80.50] 07/29/2019 07/30/2019 Yes    Hypophosphatemia [E83.39] 07/29/2019 07/30/2019 Yes     VTE Risk Mitigation (From admission, onward)        Ordered     enoxaparin injection 40 mg  Daily      08/01/19 1343     IP VTE HIGH RISK PATIENT  Once      07/29/19 0737     Place SEAN hose  Until discontinued      07/29/19 0737     Place SEAN hose  Until discontinued      07/29/19 0426     Place sequential compression device  Until discontinued      07/29/19 0426             Marco Topete MD  Department of Hospital Medicine   [facility]

## 2019-08-04 NOTE — PLAN OF CARE
Problem: Adult Inpatient Plan of Care  Goal: Plan of Care Review  Outcome: Ongoing (interventions implemented as appropriate)  POC is reviewed and understood by patient. Elevated BP at times. Oriented x3. Disoriented to time today. Regular diet. Uses purewick to void. Receives ibuprofen PRN for pain. HOB elevated. Bed in lowest position. Call bell in reach. WCTM.

## 2019-08-04 NOTE — PLAN OF CARE
Problem: Adult Inpatient Plan of Care  Goal: Plan of Care Review  Outcome: Ongoing (interventions implemented as appropriate)  Pt remains awake, alert, disoriented to place with VSS throughout shift. BP systolic 150s  Pt confused about situation and place occasionally during shift  X 4 lap sites remain open to air with dermabond  Purewick remains in place; I/O documented in flow sheets  Redness noted to perianal area;site cleansed; antifungal cream applied  Pt remains free from falls and injuries  Will continue to monitor

## 2019-08-05 PROCEDURE — 63600175 PHARM REV CODE 636 W HCPCS: Performed by: HOSPITALIST

## 2019-08-05 PROCEDURE — 25000003 PHARM REV CODE 250: Performed by: STUDENT IN AN ORGANIZED HEALTH CARE EDUCATION/TRAINING PROGRAM

## 2019-08-05 PROCEDURE — 99231 SBSQ HOSP IP/OBS SF/LOW 25: CPT | Mod: ,,, | Performed by: HOSPITALIST

## 2019-08-05 PROCEDURE — 94761 N-INVAS EAR/PLS OXIMETRY MLT: CPT

## 2019-08-05 PROCEDURE — 97535 SELF CARE MNGMENT TRAINING: CPT

## 2019-08-05 PROCEDURE — 99231 PR SUBSEQUENT HOSPITAL CARE,LEVL I: ICD-10-PCS | Mod: ,,, | Performed by: HOSPITALIST

## 2019-08-05 PROCEDURE — 20600001 HC STEP DOWN PRIVATE ROOM

## 2019-08-05 PROCEDURE — 25000003 PHARM REV CODE 250: Performed by: HOSPITALIST

## 2019-08-05 RX ORDER — LIDOCAINE 50 MG/G
2 PATCH TOPICAL
Status: CANCELLED | OUTPATIENT
Start: 2019-08-06

## 2019-08-05 RX ORDER — ONDANSETRON 4 MG/1
4 TABLET, ORALLY DISINTEGRATING ORAL EVERY 8 HOURS PRN
Status: CANCELLED | OUTPATIENT
Start: 2019-08-05

## 2019-08-05 RX ORDER — LOPERAMIDE HYDROCHLORIDE 2 MG/1
2 CAPSULE ORAL 4 TIMES DAILY PRN
Status: CANCELLED | OUTPATIENT
Start: 2019-08-05

## 2019-08-05 RX ORDER — FERROUS SULFATE 325(65) MG
325 TABLET, DELAYED RELEASE (ENTERIC COATED) ORAL DAILY
Status: CANCELLED | OUTPATIENT
Start: 2019-08-06

## 2019-08-05 RX ORDER — ACETAMINOPHEN 325 MG/1
650 TABLET ORAL EVERY 6 HOURS PRN
Status: CANCELLED | OUTPATIENT
Start: 2019-08-05

## 2019-08-05 RX ORDER — HYDRALAZINE HYDROCHLORIDE 50 MG/1
50 TABLET, FILM COATED ORAL EVERY 6 HOURS PRN
Status: CANCELLED | OUTPATIENT
Start: 2019-08-05

## 2019-08-05 RX ORDER — OLANZAPINE 5 MG/1
5 TABLET, ORALLY DISINTEGRATING ORAL 2 TIMES DAILY PRN
Status: CANCELLED | OUTPATIENT
Start: 2019-08-05

## 2019-08-05 RX ORDER — GABAPENTIN 300 MG/1
300 CAPSULE ORAL NIGHTLY
Status: CANCELLED | OUTPATIENT
Start: 2019-08-05

## 2019-08-05 RX ORDER — CLOPIDOGREL BISULFATE 75 MG/1
75 TABLET ORAL DAILY
Status: CANCELLED | OUTPATIENT
Start: 2019-08-06

## 2019-08-05 RX ORDER — LIDOCAINE 50 MG/G
OINTMENT TOPICAL
Status: CANCELLED | OUTPATIENT
Start: 2019-08-05

## 2019-08-05 RX ORDER — OLANZAPINE 5 MG/1
5 TABLET, ORALLY DISINTEGRATING ORAL 2 TIMES DAILY PRN
Status: DISCONTINUED | OUTPATIENT
Start: 2019-08-05 | End: 2019-08-06 | Stop reason: HOSPADM

## 2019-08-05 RX ORDER — ALBUTEROL SULFATE 2.5 MG/.5ML
2.5 SOLUTION RESPIRATORY (INHALATION) EVERY 4 HOURS PRN
Status: CANCELLED | OUTPATIENT
Start: 2019-08-05

## 2019-08-05 RX ORDER — IBUPROFEN 400 MG/1
400 TABLET ORAL EVERY 6 HOURS PRN
Status: CANCELLED | OUTPATIENT
Start: 2019-08-05

## 2019-08-05 RX ORDER — LISINOPRIL 20 MG/1
20 TABLET ORAL DAILY
Status: CANCELLED | OUTPATIENT
Start: 2019-08-06

## 2019-08-05 RX ORDER — ACETAMINOPHEN 500 MG
5000 TABLET ORAL DAILY
Status: CANCELLED | OUTPATIENT
Start: 2019-08-06

## 2019-08-05 RX ORDER — LEVOTHYROXINE SODIUM 75 UG/1
75 TABLET ORAL
Status: CANCELLED | OUTPATIENT
Start: 2019-08-06

## 2019-08-05 RX ORDER — TRAZODONE HYDROCHLORIDE 50 MG/1
50 TABLET ORAL NIGHTLY
Status: CANCELLED | OUTPATIENT
Start: 2019-08-05

## 2019-08-05 RX ORDER — PANTOPRAZOLE SODIUM 40 MG/1
40 TABLET, DELAYED RELEASE ORAL DAILY
Status: CANCELLED | OUTPATIENT
Start: 2019-08-06

## 2019-08-05 RX ORDER — CALCIUM CARBONATE 200(500)MG
500 TABLET,CHEWABLE ORAL 2 TIMES DAILY PRN
Status: CANCELLED | OUTPATIENT
Start: 2019-08-05

## 2019-08-05 RX ORDER — POLYETHYLENE GLYCOL 3350 17 G/17G
17 POWDER, FOR SOLUTION ORAL 2 TIMES DAILY PRN
Status: CANCELLED | OUTPATIENT
Start: 2019-08-05

## 2019-08-05 RX ORDER — ATORVASTATIN CALCIUM 20 MG/1
80 TABLET, FILM COATED ORAL DAILY
Status: CANCELLED | OUTPATIENT
Start: 2019-08-06

## 2019-08-05 RX ORDER — BISACODYL 10 MG
10 SUPPOSITORY, RECTAL RECTAL
Status: CANCELLED | OUTPATIENT
Start: 2019-08-05

## 2019-08-05 RX ADMIN — LEVOTHYROXINE SODIUM 75 MCG: 75 TABLET ORAL at 05:08

## 2019-08-05 RX ADMIN — TRAZODONE HYDROCHLORIDE 50 MG: 50 TABLET ORAL at 08:08

## 2019-08-05 RX ADMIN — MICONAZOLE NITRATE: 20 OINTMENT TOPICAL at 09:08

## 2019-08-05 RX ADMIN — IBUPROFEN 400 MG: 400 TABLET, FILM COATED ORAL at 11:08

## 2019-08-05 RX ADMIN — LISINOPRIL 20 MG: 20 TABLET ORAL at 09:08

## 2019-08-05 RX ADMIN — IBUPROFEN 400 MG: 400 TABLET, FILM COATED ORAL at 10:08

## 2019-08-05 RX ADMIN — MICONAZOLE NITRATE: 20 OINTMENT TOPICAL at 08:08

## 2019-08-05 RX ADMIN — PANTOPRAZOLE SODIUM 40 MG: 40 TABLET, DELAYED RELEASE ORAL at 09:08

## 2019-08-05 RX ADMIN — HYPROMELLOSE 2910 2 DROP: 5 SOLUTION OPHTHALMIC at 02:08

## 2019-08-05 RX ADMIN — FERROUS SULFATE TAB EC 325 MG (65 MG FE EQUIVALENT) 325 MG: 325 (65 FE) TABLET DELAYED RESPONSE at 09:08

## 2019-08-05 RX ADMIN — CLOPIDOGREL BISULFATE 75 MG: 75 TABLET, FILM COATED ORAL at 09:08

## 2019-08-05 RX ADMIN — MAGNESIUM SULFATE IN WATER 2 G: 40 INJECTION, SOLUTION INTRAVENOUS at 12:08

## 2019-08-05 RX ADMIN — Medication 5000 UNITS: at 09:08

## 2019-08-05 RX ADMIN — GABAPENTIN 300 MG: 300 CAPSULE ORAL at 08:08

## 2019-08-05 RX ADMIN — HYPROMELLOSE 2910 2 DROP: 5 SOLUTION OPHTHALMIC at 08:08

## 2019-08-05 RX ADMIN — ENOXAPARIN SODIUM 40 MG: 100 INJECTION SUBCUTANEOUS at 05:08

## 2019-08-05 RX ADMIN — HYPROMELLOSE 2910 2 DROP: 5 SOLUTION OPHTHALMIC at 09:08

## 2019-08-05 NOTE — CARE UPDATE
Called by nursing     Patient with BP up to 200, patient anxious this afternoon, reports she's uncomfortable.  Received PRN PO hydralazine.     Pt seen at bedside, whimpers when asked how she's feeling. States I'm not doing too well, states she's uncomfortable but has difficulty characterizing her complaints.  Reports she keeps slumping down in bed, reports she hasn't been into chair today.       Initially plans to add more BP medication  -I increased her lisinopril dose tomorrow and gave 10mg earlier today  -Manual re-check is 160/70-80  -Give dose of Olanzapine 5mg disintigrating now   pts anxiety seems to be driving hypertension and the other night with multiple dose of hydralazine had large swings in systolic pressure which i'd like to aovid.   -discussed with Night RN.         Marco Topete M.D.  Attending Physician  [facility] Medicine Dept.  Pager: [phone]  Spectralink -x [phone]

## 2019-08-05 NOTE — PT/OT/SLP PROGRESS
Occupational Therapy   Treatment    Name: Linda Hylton  MRN: [id number]  Admitting Diagnosis:  S/P laparoscopic cholecystectomy  7 Days Post-Op    Recommendations:     Discharge Recommendations: nursing facility, skilled  Discharge Equipment Recommendations:  none  Barriers to discharge:  None    Assessment:     Linda Hylton is a 84 y.o. female with a medical diagnosis of S/P laparoscopic cholecystectomy.  She presents with the following Performance deficits affecting function are weakness, impaired endurance, impaired self care skills, impaired functional mobilty, gait instability, impaired balance, decreased lower extremity function, pain, decreased safety awareness.     Pt. Tolerated therapy session well today. Pt. Is CGA with RW in t/f and ambulation. Pt. Ambulated ~15ft. CGA using RW to in room bathroom. Pt. Stood at sink SBA to perform grooming tasks with seated rest breaks during tasks.       Rehab Prognosis:  Good; patient would benefit from acute skilled OT services to address these deficits and reach maximum level of function.       Plan:     Patient to be seen 4 x/week to address the above listed problems via self-care/home management, therapeutic activities, therapeutic exercises  · Plan of Care Expires: 08/31/19  · Plan of Care Reviewed with: patient    Subjective     Pain/Comfort:  ·      Objective:     Communicated with: RN prior to session.  Patient found up in chair with PureWick, peripheral IV upon OT entry to room.    General Precautions: Standard, fall   Orthopedic Precautions:N/A   Braces:       Occupational Performance:     Bed Mobility:    · Not tested     Functional Mobility/Transfers:  · Patient completed Sit <> Stand Transfer with contact guard assistance  with  rolling walker   · Patient completed Toilet Transfer Stand Pivot technique with contact guard assistance with  rolling walker  · Functional Mobility: Pt. Is CGA with RW in t/f and ambulation. Pt. Ambulated ~15ft. CGA using RW  to in room bathroom. Pt. Stood at sink SBA to perform grooming tasks with seated rest breaks during tasks.     Activities of Daily Living:     · Grooming: stand by assistance standing at sink (bedside commode behind for seated breaks)  · Upper Body Dressing: stand by assistance zofia gastelum      [facility] 6 Click ADL: 15    Treatment & Education:  - OT/POC-  - Importance of mobility to maximize recovery.  - RW management and hand placement  - Deep breathing techniques      Patient left up in chair with all lines intact, call button in reach and RN notifiedEducation:      GOALS:   Multidisciplinary Problems     Occupational Therapy Goals        Problem: Occupational Therapy Goal    Goal Priority Disciplines Outcome Interventions   Occupational Therapy Goal     OT, PT/OT Ongoing (interventions implemented as appropriate)    Description:  Goals to be met by:  8/2/2019    Patient will increase functional independence with ADLs by performing:    Pt will ambulate to toilet using RW with CGA.   Pt will change depends while in seated position with min A.   Pt will stand at sink to wash hands with CGA using RW.                       Time Tracking:     OT Date of Treatment: 08/05/19  OT Start Time: 1035  OT Stop Time: 1111  OT Total Time (min): 36 min    Billable Minutes:Self Care/Home Management 36    Melissa Martins OT  8/5/2019

## 2019-08-05 NOTE — PLAN OF CARE
Problem: Occupational Therapy Goal  Goal: Occupational Therapy Goal  Goals to be met by:  8/2/2019    Patient will increase functional independence with ADLs by performing:    Pt will ambulate to toilet using RW with CGA.   Pt will change depends while in seated position with min A.   Pt will stand at sink to wash hands with CGA using RW.      Outcome: Ongoing (interventions implemented as appropriate)  Pt. Tolerated therapy session well today. Pt. Is CGA with RW in t/f and ambulation. Pt. Ambulated ~15ft. CGA using RW to in room bathroom. Pt. Stood at sink SBA to perform grooming tasks with seated rest breaks during tasks. Continue OT POC.    Melissa Martins, OT  8/5/2019

## 2019-08-05 NOTE — PLAN OF CARE
Problem: Adult Inpatient Plan of Care  Goal: Plan of Care Review  Outcome: Ongoing (interventions implemented as appropriate)  Patient AAOx4 w/ vss for patient on room air w/ unlabored breathing. Patient tolerated diet well w/ no complaints of n/v. Pain controlled w/ ordered pain medications. Patient ambulated in room w/ OT w/ walker and stand by assist. Patient showered x1, foam dressing and barrier cream applied to sacral area. Pt is resting w/ side rails raised x2, bed locked in lowest position, purewick in place, and call light in reach. No distress noted. Will continue to monitor patient.

## 2019-08-05 NOTE — PLAN OF CARE
POC reviewed w/ pt, verbalized understanding. Pt confused to place at times; easily reoriented. Pt complains of being anxious but is un able to verbalize why. MD came to bedside at start of shift for elevated BP; one time dose of PO Zyprexa ordered/given. VSS on 2L NC. Patient denies pain overnight. Pt voids per purewick, with adequate UOP overnight. Pt tolerating regular diet with no c/o nausea. Pt slept between care. Frequent rounds made for pt safety. Call light in reach and bed in lowest position. WCTM

## 2019-08-05 NOTE — PLAN OF CARE
08/05/19 1438   Discharge Reassessment   Assessment Type Discharge Planning Reassessment   Provided patient/caregiver education on the expected discharge date and the discharge plan No   Do you have any problems affording any of your prescribed medications? No   Discharge Plan A Skilled Nursing Facility   Discharge Plan B Rehab   DME Needed Upon Discharge  none   Patient choice form signed by patient/caregiver Yes   Anticipated Discharge Disposition SNF   Can the patient answer the patient profile reliably? Yes, cognitively intact   How does the patient rate their overall health at the present time? Fair   Describe the patient's ability to walk at the present time. Walks with the help of equipment   How often would a person be available to care for the patient? Often   Number of comorbid conditions (as recorded on the chart) Four   During the past month, has the patient often been bothered by feeling down, depressed or hopeless? No   During the past month, has the patient often been bothered by little interest or pleasure in doing things? No   Post-Acute Status   Post-Acute Authorization Placement   Post-Acute Placement Status Set-up Complete   Discharge Delays (!) Change in Medical Condition  (increased BP and confusion )

## 2019-08-05 NOTE — PLAN OF CARE
CM spoke to Savana at -[facility]- in regards to patient acceptance, due to Delirium and high BP, Dr. Tejeda is requesting the patient remain inpatient until tomorrow. CM notified Primary team.

## 2019-08-05 NOTE — PHYSICIAN QUERY
PT Name: Linda Hylton  MR #: [id number]     Liza Rodney, RN, CCDS  Desk # [phone]; daron # [phone] [email or web address]    This form is a permanent document in the medical record.     Query Date: August 5, 2019    Physician Query - Neurological Condition Clarification    By submitting this query, we are merely seeking further clarification of documentation to reflect the severity of illness of your patient. Please utilize your independent clinical judgment when addressing the question(s) below.    The Medical record reflects the following:   Indicators   Supporting Clinical Findings Location in Medical Record   x AMS, Confusion,  LOC, etc.  Psychiatric/Behavioral: Positive for confusion.  Pt confused to place at times; easily reoriented Yoselyn PN 7/30  RN Care Plan 8/5   x Acute / Chronic Illness Acute Cholecystitis  Biliary Pancreatitis  S/p Lap Cholecystectomy 7/29  HTN  Hypothyroidism  Chronic Debility  Aortic Stenosis Yoselyn PN 8/4    Radiology Findings      Electrolyte Imbalance      Medication     x Treatment         stopped PRN opiates, concern, they were contributing to patient's intermittent confusion.    Post-op with pain/debility and confusion that has prompted PT/OT evals and planning on SNF discharge. Yoselyn PN 8/4    Other       Encephalopathy- is a general term for any diffuse disease of the brain that alters brain function or structure. Treatment of the cognitive dysfunction varies but is ultimately dependent on the treatment of the underlying condition.    Major Symptoms of Encephalopathy - Decreased level of consciousness, fluctuating alertness/concentration, confusion, agitation, lethargy, somnolence, drowsiness, obtundation, stupor, or coma.         References: National Institutes of Healths (NIH) National [place] of Neurological Disorders and Strokes;  HCPro 2016; Advisory Board     Clinical Guidelines:   These guidelines will set system standards to assist providers  in managing, documentation, and coding of encephalopathy. The intent of this document is to serve as a system guideline, not replace the providers clinical judgment:    Provider, please specify the diagnosis or diagnoses associated with above clinical findings.    [ X  ] Toxic Encephalopathy - Due to drugs, chemicals, or other toxic substances   [  ] Other Encephalopathy - Includes uremic encephalopathy   [   ] Unspecified Encephalopathy      [   ] Other Neurological Condition-  Includes Post-ictal altered mental status. (please specify condition): _________   [   ]  Clinically Undetermined     Please document in your progress notes daily for the duration of treatment until resolved, and include in your discharge summary.

## 2019-08-06 VITALS
HEART RATE: 93 BPM | BODY MASS INDEX: 34.52 KG/M2 | RESPIRATION RATE: 19 BRPM | DIASTOLIC BLOOD PRESSURE: 65 MMHG | TEMPERATURE: 99 F | HEIGHT: 57 IN | OXYGEN SATURATION: 93 % | SYSTOLIC BLOOD PRESSURE: 149 MMHG | WEIGHT: 160 LBS

## 2019-08-06 DIAGNOSIS — I35.0 NONRHEUMATIC AORTIC VALVE STENOSIS: Primary | ICD-10-CM

## 2019-08-06 LAB
ALBUMIN SERPL BCP-MCNC: 2.6 G/DL (ref 3.5–5.2)
ALP SERPL-CCNC: 75 U/L (ref 55–135)
ALT SERPL W/O P-5'-P-CCNC: 15 U/L (ref 10–44)
ANION GAP SERPL CALC-SCNC: 11 MMOL/L (ref 8–16)
AST SERPL-CCNC: 18 U/L (ref 10–40)
BASOPHILS # BLD AUTO: 0.07 K/UL (ref 0–0.2)
BASOPHILS NFR BLD: 0.8 % (ref 0–1.9)
BILIRUB SERPL-MCNC: 0.4 MG/DL (ref 0.1–1)
BUN SERPL-MCNC: 8 MG/DL (ref 8–23)
CALCIUM SERPL-MCNC: 8.8 MG/DL (ref 8.7–10.5)
CHLORIDE SERPL-SCNC: 102 MMOL/L (ref 95–110)
CO2 SERPL-SCNC: 25 MMOL/L (ref 23–29)
CREAT SERPL-MCNC: 0.7 MG/DL (ref 0.5–1.4)
DIFFERENTIAL METHOD: ABNORMAL
EOSINOPHIL # BLD AUTO: 0.3 K/UL (ref 0–0.5)
EOSINOPHIL NFR BLD: 3.1 % (ref 0–8)
ERYTHROCYTE [DISTWIDTH] IN BLOOD BY AUTOMATED COUNT: 14.9 % (ref 11.5–14.5)
EST. GFR  (AFRICAN AMERICAN): >60 ML/MIN/1.73 M^2
EST. GFR  (NON AFRICAN AMERICAN): >60 ML/MIN/1.73 M^2
GLUCOSE SERPL-MCNC: 93 MG/DL (ref 70–110)
HCT VFR BLD AUTO: 33.8 % (ref 37–48.5)
HGB BLD-MCNC: 10.6 G/DL (ref 12–16)
IMM GRANULOCYTES # BLD AUTO: 0.09 K/UL (ref 0–0.04)
IMM GRANULOCYTES NFR BLD AUTO: 1 % (ref 0–0.5)
LYMPHOCYTES # BLD AUTO: 1.6 K/UL (ref 1–4.8)
LYMPHOCYTES NFR BLD: 16.9 % (ref 18–48)
MAGNESIUM SERPL-MCNC: 1.8 MG/DL (ref 1.6–2.6)
MCH RBC QN AUTO: 26.6 PG (ref 27–31)
MCHC RBC AUTO-ENTMCNC: 31.4 G/DL (ref 32–36)
MCV RBC AUTO: 85 FL (ref 82–98)
MONOCYTES # BLD AUTO: 0.8 K/UL (ref 0.3–1)
MONOCYTES NFR BLD: 9.2 % (ref 4–15)
NEUTROPHILS # BLD AUTO: 6.3 K/UL (ref 1.8–7.7)
NEUTROPHILS NFR BLD: 69 % (ref 38–73)
NRBC BLD-RTO: 0 /100 WBC
PHOSPHATE SERPL-MCNC: 3.7 MG/DL (ref 2.7–4.5)
PLATELET # BLD AUTO: 313 K/UL (ref 150–350)
PMV BLD AUTO: 10.4 FL (ref 9.2–12.9)
POTASSIUM SERPL-SCNC: 3.2 MMOL/L (ref 3.5–5.1)
PROT SERPL-MCNC: 6.7 G/DL (ref 6–8.4)
RBC # BLD AUTO: 3.99 M/UL (ref 4–5.4)
SODIUM SERPL-SCNC: 138 MMOL/L (ref 136–145)
WBC # BLD AUTO: 9.17 K/UL (ref 3.9–12.7)

## 2019-08-06 PROCEDURE — 83735 ASSAY OF MAGNESIUM: CPT

## 2019-08-06 PROCEDURE — 25000003 PHARM REV CODE 250: Performed by: STUDENT IN AN ORGANIZED HEALTH CARE EDUCATION/TRAINING PROGRAM

## 2019-08-06 PROCEDURE — 97116 GAIT TRAINING THERAPY: CPT

## 2019-08-06 PROCEDURE — 99239 HOSP IP/OBS DSCHRG MGMT >30: CPT | Mod: ,,, | Performed by: HOSPITALIST

## 2019-08-06 PROCEDURE — 25000003 PHARM REV CODE 250: Performed by: HOSPITALIST

## 2019-08-06 PROCEDURE — 36415 COLL VENOUS BLD VENIPUNCTURE: CPT

## 2019-08-06 PROCEDURE — 85025 COMPLETE CBC W/AUTO DIFF WBC: CPT

## 2019-08-06 PROCEDURE — 80053 COMPREHEN METABOLIC PANEL: CPT

## 2019-08-06 PROCEDURE — 97535 SELF CARE MNGMENT TRAINING: CPT

## 2019-08-06 PROCEDURE — 97530 THERAPEUTIC ACTIVITIES: CPT

## 2019-08-06 PROCEDURE — 84100 ASSAY OF PHOSPHORUS: CPT

## 2019-08-06 PROCEDURE — 99239 PR HOSPITAL DISCHARGE DAY,>30 MIN: ICD-10-PCS | Mod: ,,, | Performed by: HOSPITALIST

## 2019-08-06 PROCEDURE — 94761 N-INVAS EAR/PLS OXIMETRY MLT: CPT

## 2019-08-06 RX ORDER — POTASSIUM CHLORIDE 20 MEQ/1
40 TABLET, EXTENDED RELEASE ORAL
Status: DISCONTINUED | OUTPATIENT
Start: 2019-08-06 | End: 2019-08-06 | Stop reason: HOSPADM

## 2019-08-06 RX ORDER — AMLODIPINE BESYLATE 5 MG/1
5 TABLET ORAL DAILY
Status: DISCONTINUED | OUTPATIENT
Start: 2019-08-06 | End: 2019-08-06 | Stop reason: HOSPADM

## 2019-08-06 RX ORDER — LISINOPRIL 10 MG/1
20 TABLET ORAL DAILY
Qty: 45 TABLET | Refills: 3
Start: 2019-08-06 | End: 2019-08-30 | Stop reason: DRUGHIGH

## 2019-08-06 RX ORDER — AMLODIPINE BESYLATE 5 MG/1
5 TABLET ORAL DAILY
Qty: 30 TABLET | Refills: 11 | Status: SHIPPED | OUTPATIENT
Start: 2019-08-07 | End: 2020-08-07

## 2019-08-06 RX ORDER — LIDOCAINE 50 MG/G
2 PATCH TOPICAL DAILY
Qty: 30 PATCH | Refills: 3 | Status: SHIPPED | OUTPATIENT
Start: 2019-08-06 | End: 2024-03-30

## 2019-08-06 RX ORDER — OLANZAPINE 5 MG/1
5 TABLET, ORALLY DISINTEGRATING ORAL 2 TIMES DAILY PRN
Qty: 30 TABLET | Refills: 3 | Status: CANCELLED | OUTPATIENT
Start: 2019-08-06 | End: 2020-08-05

## 2019-08-06 RX ADMIN — AMLODIPINE BESYLATE 5 MG: 5 TABLET ORAL at 08:08

## 2019-08-06 RX ADMIN — LISINOPRIL 20 MG: 20 TABLET ORAL at 08:08

## 2019-08-06 RX ADMIN — HYPROMELLOSE 2910 2 DROP: 5 SOLUTION OPHTHALMIC at 08:08

## 2019-08-06 RX ADMIN — CLOPIDOGREL BISULFATE 75 MG: 75 TABLET, FILM COATED ORAL at 08:08

## 2019-08-06 RX ADMIN — POTASSIUM CHLORIDE 40 MEQ: 20 TABLET, EXTENDED RELEASE ORAL at 02:08

## 2019-08-06 RX ADMIN — HYPROMELLOSE 2910 2 DROP: 5 SOLUTION OPHTHALMIC at 02:08

## 2019-08-06 RX ADMIN — Medication 5000 UNITS: at 08:08

## 2019-08-06 RX ADMIN — FERROUS SULFATE TAB EC 325 MG (65 MG FE EQUIVALENT) 325 MG: 325 (65 FE) TABLET DELAYED RESPONSE at 08:08

## 2019-08-06 RX ADMIN — LIDOCAINE 2 PATCH: 50 PATCH TOPICAL at 03:08

## 2019-08-06 RX ADMIN — IBUPROFEN 400 MG: 400 TABLET, FILM COATED ORAL at 06:08

## 2019-08-06 RX ADMIN — LEVOTHYROXINE SODIUM 75 MCG: 75 TABLET ORAL at 06:08

## 2019-08-06 RX ADMIN — PANTOPRAZOLE SODIUM 40 MG: 40 TABLET, DELAYED RELEASE ORAL at 08:08

## 2019-08-06 RX ADMIN — MICONAZOLE NITRATE: 20 OINTMENT TOPICAL at 08:08

## 2019-08-06 NOTE — PT/OT/SLP PROGRESS
Occupational Therapy   Treatment    Name: Linda Hylton  MRN: [id number]  Admitting Diagnosis:  S/P laparoscopic cholecystectomy  8 Days Post-Op    Recommendations:     Discharge Recommendations: home health PT, home health OT  Discharge Equipment Recommendations:  none  Barriers to discharge:  None    Assessment:     Linda Hylton is a 84 y.o. female with a medical diagnosis of S/P laparoscopic cholecystectomy.  She presents with decline in ADLs and functional mobility. Pt would benefit from skilled OT services in order to maximize independence with ADLs and facilitate safe discharge. Performance deficits affecting function are weakness, impaired functional mobilty, impaired endurance, impaired self care skills, gait instability, impaired balance, decreased upper extremity function, decreased lower extremity function, pain, decreased safety awareness, impaired cardiopulmonary response to activity.     Rehab Prognosis:  Good; patient would benefit from acute skilled OT services to address these deficits and reach maximum level of function.       Plan:     Patient to be seen 4 x/week to address the above listed problems via self-care/home management, therapeutic activities, therapeutic exercises  · Plan of Care Expires: 08/31/19  · Plan of Care Reviewed with: patient, other (see comments)(MD)    Subjective     Pain/Comfort:  · Pain Rating 1: 0/10  · Pain Addressed 1: Reposition, Cessation of Activity  · Pain Rating Post-Intervention 1: 0/10  · Pain Rating Post-Intervention 2: 0/10    Objective:     Communicated with: RN prior to session.  Patient found up in chair with PureWick upon OT entry to room.    General Precautions: Standard, fall   Orthopedic Precautions:N/A   Braces: N/A     Occupational Performance:     Functional Mobility/Transfers:  · Patient completed Sit <> Stand Transfer with contact guard assistance  with  rolling walker   · Patient completed Bed <> Chair Transfer using Step Transfer technique  with contact guard assistance with rolling walker  · Patient completed Toilet Transfer Step Transfer technique with minimum assistance with  no AD  · Functional Mobility:     Activities of Daily Living:  · Grooming: stand by assistance    · Lower Body Dressing: moderate assistance for donning diaper  · Toileting: minimum assistance for ambulating to toilet, managing clothing and for cristiano care    [facility] 6 Click ADL: 17    Treatment & Education:  · Pt educated on role of OT in acute care setting.   · Assisted with ADLs and functional mobility with assist levels noted above    Patient left up in chair with all lines intactEducation:      GOALS:   Multidisciplinary Problems     Occupational Therapy Goals        Problem: Occupational Therapy Goal    Goal Priority Disciplines Outcome Interventions   Occupational Therapy Goal     OT, PT/OT Ongoing (interventions implemented as appropriate)    Description:  Goals to be met by:  8/2/2019    Patient will increase functional independence with ADLs by performing:    Pt will ambulate to toilet using RW with CGA. Goal met  Pt will change depends while in seated position with min A.  Not met  Pt will stand at sink to wash hands with CGA using RW. Goal met                     Time Tracking:     OT Date of Treatment: 08/06/19  OT Start Time: 1032  OT Stop Time: 1100  OT Total Time (min): 28 min    Billable Minutes:Self Care/Home Management 28    EMILY Reynolds  8/6/2019

## 2019-08-06 NOTE — PROGRESS NOTES
Patient being discharged to home w/ home health. Patient discharge education completed and patient and son verbalized understanding. Printed copy of prescriptions given to patient. PIV removed with catheter tip intact. Patient remains free of falls with no distress noted.  Patient left unit via personal wheelchair w/ son.

## 2019-08-06 NOTE — PT/OT/SLP PROGRESS
Physical Therapy Treatment    Patient Name:  Linda Hylton   MRN:  [id number]    Recommendations:     Discharge Recommendations:  home health PT   Discharge Equipment Recommendations: none   Barriers to discharge: Decreased caregiver support    Assessment:     Linda Hylton is a 84 y.o. female admitted with a medical diagnosis of S/P laparoscopic cholecystectomy.  She presents with the following impairments/functional limitations:  weakness, impaired endurance, impaired self care skills, impaired functional mobilty, gait instability, impaired balance, decreased safety awareness. Pt is progressing with therapy, requiring decreased assistance for functional mobility and gait this session. Pt with decreased safety awareness and reduced endurance, as she was fatigued after in room ambulation and bathroom transfers.  Pt performed transfers with CGA and RW. Pt ambulated 2 x 20 ft with RW and CGA, with decreased gait speed, bilateral decreased foot clearance, and instability, required max verbal cueing to walk within RW. Change in d/c recs to HHPT this session due to improvements in functional mobility. Pt would continue to benefit from skilled PT services to improve functional mobility, ambulation, and activity tolerance to return to OF.      Rehab Prognosis: Good; patient would benefit from acute skilled PT services to address these deficits and reach maximum level of function.    Recent Surgery: Procedure(s) (LRB):  CHOLECYSTECTOMY, LAPAROSCOPIC (N/A) 8 Days Post-Op    Plan:     During this hospitalization, patient to be seen 4 x/week to address the identified rehab impairments via gait training, therapeutic activities, therapeutic exercises, neuromuscular re-education and progress toward the following goals:    · Plan of Care Expires:  08/30/19    Subjective     Chief Complaint: fatigue   Patient/Family Comments/goals: to return home   Pain/Comfort:  · Pain Rating 1: 0/10  · Pain Rating Post-Intervention 1:  0/10      Objective:     Communicated with RN prior to session.  Patient found up in chair with PureWick upon PT entry to room.     General Precautions: Standard, fall   Orthopedic Precautions:N/A   Braces: N/A     Functional Mobility:  · Transfers:    · Sit to Stand:  contact guard assistance with rolling walker x 1 from bedside chair, x 1 from EOB   · Toilet Transfer: contact guard assistance with  rolling walker  using  Step Transfer  · Gait:   · 2 x 20 ft with RW and CGA   · Pt with decreased gait speed, bilateral decreased foot clearance, and instability, required max verbal cueing to walk within RW   · Balance:   · Sitting: Supervision   · Standing: CGA       AM-PAC 6 CLICK MOBILITY  Turning over in bed (including adjusting bedclothes, sheets and blankets)?: 3  Sitting down on and standing up from a chair with arms (e.g., wheelchair, bedside commode, etc.): 3  Moving from lying on back to sitting on the side of the bed?: 3  Moving to and from a bed to a chair (including a wheelchair)?: 3  Need to walk in hospital room?: 3  Climbing 3-5 steps with a railing?: 2  Basic Mobility Total Score: 17       Therapeutic Activities and Exercises:    -Pt educated on:              -PT roles, expectations, and POC               -Safety with mobility              -Benefits of OOB activities to increase strength and functional mobility               -Discharge recommendations   -Pt performed static standing with RW and CGA at sink to brush teeth and wash hands x 2 min     Patient left up in chair with all lines intact, call button in reach and RN notified..    GOALS:   Multidisciplinary Problems     Physical Therapy Goals        Problem: Physical Therapy Goal    Goal Priority Disciplines Outcome Goal Variances Interventions   Physical Therapy Goal     PT, PT/OT Ongoing (interventions implemented as appropriate)     Description:  Goals to be met by: 8/14/2019    Patient will increase functional independence with mobility by  performin. Supine to sit with Stand-by Assistance  2. Sit to supine with Stand-by Assistance  3. Sit to stand transfer with Supervision  4. Gait  x 100 feet with Supervision using Rolling Walker.                           Time Tracking:     PT Received On: 19  PT Start Time: 1039     PT Stop Time: 1102  PT Total Time (min): 23 min     Billable Minutes: Gait Training 10 and Therapeutic Activity 13    Treatment Type: Treatment  PT/PTA: PT     PTA Visit Number: 0     Fabiola Jacobson, PT  2019

## 2019-08-06 NOTE — PLAN OF CARE
Problem: Occupational Therapy Goal  Goal: Occupational Therapy Goal  Goals to be met by:  8/2/2019    Patient will increase functional independence with ADLs by performing:    Pt will ambulate to toilet using RW with CGA. Goal met  Pt will change depends while in seated position with min A.  Not met  Pt will stand at sink to wash hands with CGA using RW. Goal met   Outcome: Ongoing (interventions implemented as appropriate)  Goals partially met.    Recommending home with home health. Pt did have a considerable improvement. Communicated with MD regarding change in OT recs on this date.    EMILY Mccoy  8/6/2019  Rehab Services'

## 2019-08-06 NOTE — PROGRESS NOTES
[facility] Hospital Medicine  Progress Note    Patient Name: Linda Hylton  MRN: [id number]  Patient Class: IP- Inpatient   Admission Date: 7/28/2019  Length of Stay: 7 days  Attending Physician: Marco Topete MD  Primary Care Provider: Yakov Barrett MD    [facility] Medicine Team: [facility] [place] Marco Topete MD    Subjective:     Principal Problem:S/P laparoscopic cholecystectomy    Interval History:   Seen this afternoon eating lunch    States she's doing fine, has no acute pain complaints    She reports olanzapine ODT yesterday helped a lot with her mood/anxiety.  Would like to continue PRN.     Received lisinopril 20mg this AM.  Will placed PRN Olanzapine.     D/c readmit orders placed and pending acceptance to [facility]>       Review of Systems   Constitutional: Negative for fever.   HENT: Negative for sore throat.    Respiratory: Negative for cough and shortness of breath.    Cardiovascular: Negative for chest pain and leg swelling.   Gastrointestinal: Negative for abdominal pain.   Endocrine: Negative for polyuria.   Genitourinary: Negative for dysuria.   Musculoskeletal: Negative for arthralgias and back pain.   Psychiatric/Behavioral: Negative for confusion.     Objective:     Vital Signs (Most Recent):  Temp: 98.2 °F (36.8 °C) (08/05/19 1943)  Pulse: 77 (08/05/19 1943)  Resp: 18 (08/05/19 1943)  BP: (!) 168/71 (08/05/19 1943)  SpO2: 95 % (08/05/19 1943) Vital Signs (24h Range):  Temp:  [97.5 °F (36.4 °C)-98.9 °F (37.2 °C)] 98.2 °F (36.8 °C)  Pulse:  [58-86] 77  Resp:  [16-20] 18  SpO2:  [92 %-97 %] 95 %  BP: (100-170)/(49-72) 168/71     Weight: 72.6 kg (160 lb)  Body mass index is 34.62 kg/m².    Intake/Output Summary (Last 24 hours) at 8/5/2019 2021  Last data filed at 8/5/2019 1300  Gross per 24 hour   Intake 630 ml   Output 1450 ml   Net -820 ml      Physical Exam   Constitutional: She is oriented to person, place, and time. No distress.   obese   Eyes: No scleral icterus.    Cardiovascular: Normal rate and regular rhythm.   Murmur heard.  Pulmonary/Chest: Effort normal and breath sounds normal. No stridor. No respiratory distress.   Abdominal: Soft. Bowel sounds are normal. She exhibits no distension. There is no tenderness. There is no rebound and no guarding.   Laparoscopic surgical scars c/d/i   Neurological: She is alert and oriented to person, place, and time. GCS eye subscore is 4. GCS verbal subscore is 5. GCS motor subscore is 6.   No asterixis   Skin: Skin is warm and dry.   Psychiatric: Her mood appears anxious. Her affect is not labile. She is attentive.         Significant Labs:   CBC:   No results for input(s): WBC, HGB, HCT, PLT in the last 48 hours.  CMP:   No results for input(s): NA, K, CL, CO2, GLU, BUN, CREATININE, CALCIUM, PROT, ALBUMIN, BILITOT, ALKPHOS, AST, ALT, ANIONGAP, EGFRNONAA in the last 48 hours.    Invalid input(s): ESTGFAFRICA    Significant Imaging: I have reviewed all pertinent imaging results/findings within the past 24 hours.    Assessment/Plan:      Overview/Hospital Course:   85 y/o Woman hx of HTN, embolic stroke with residual deficit, aortic stenosis, hypothryoidism, gerd, presented with abdominal pain and found with acute biliary pancreatitis and acute cholecystitis who is now s/p laparoscopic cholecystectomy.  Post-op with pain/debility and confusion that has prompted PT/OT evals and planning on SNF discharge.     There were reports of intermittent confusion and possible hallucinations, this improved greatly after PRN breakthrough medication was switched from opiates to regimen of scheduled APAP and PRN NSAID (toradol/ibuprofen)  She remained with anxiety and dysphoric mood.  On 8/4 Olanzapine given when she reported discomfort/anxiety that she couldn't characterize and her blood pressure was elevated >200.  Pt reported this helped her symptoms will continue as a prn medication.      1. Cholecystitis,   Biliary Pancreatitis   S/p Lap  Cholecystectomy 7/29  -tolerating  bland-low fat diet    -Scheduled Acetaminophen completed. Continue PRN Nsaids, can give PRN APAP if needed or rotate if needed  -stopped PRN opiates, concern, they were contributing to patient's intermittent confusion.   -Transaminase levels downtrending.   -will need to f/u with Gen Surg Dr. Alejandra, 2 weeks post-op    2. Confusion  -no delirium present by CAM-ICU evals  -pt with chronic debility  -PT/OT consults  recs changed to SNF- [facility] - pending acceptance  -hold opiate medication unless refractory pain develops  -discharge with Olanzapine 5mg ODT - BID PRN anxiety/agitation    3. Hypertension  -titratee lisinopril to 20mg  -In setting of aortic stenosis, optimal agents limited, prior echo with mild AoStenosis. Beta blockers and calcium channel blockers are not preferred, per echo she has Mild Aortic stenosis so if 2nd drug class needed, add low dose and and titrate as she tolerates.       4. Hypothyroidism  -continue levothyroxine    5. Debility  -PT/OT consults.   -SNF placement.       Active Diagnoses:    Diagnosis Date Noted POA    PRINCIPAL PROBLEM:  S/P laparoscopic cholecystectomy [Z90.49] 07/30/2019 Not Applicable    Aortic stenosis [I35.0]  Yes    Essential hypertension [I10]  Yes    Acquired hypothyroidism [E03.9]  Yes    Iron deficiency anemia [D50.9] 12/27/2017 Yes    Debility [R53.81] 07/29/2019 Yes      Problems Resolved During this Admission:    Diagnosis Date Noted Date Resolved POA    Cholecystitis [K81.9] 07/29/2019 08/04/2019 Yes    Acute biliary pancreatitis [K85.10] 07/29/2019 08/04/2019 Yes    Hypokalemia [E87.6] 07/29/2019 07/30/2019 Yes    Choledocholithiasis [K80.50] 07/29/2019 07/30/2019 Yes    Hypophosphatemia [E83.39] 07/29/2019 07/30/2019 Yes     VTE Risk Mitigation (From admission, onward)        Ordered     enoxaparin injection 40 mg  Daily      08/01/19 1343     IP VTE HIGH RISK PATIENT  Once      07/29/19 0737     [facility] SEAN  hose  Until discontinued      07/29/19 0737     Place SEAN hose  Until discontinued      07/29/19 0426     Place sequential compression device  Until discontinued      07/29/19 0426             Marco Topete MD  Department of Hospital Medicine   [facility]

## 2019-08-06 NOTE — PLAN OF CARE
POC reviewed w/ pt, verbalized understanding. Pt is AAOx4. VSS on RA. Pt given PRN ibuprofen for pain with full relief. Pt voids per purewick, with adequate UOP overnight. Pt tolerating regular diet with no c/o nausea. Pt slept between care. Frequent rounds made for pt safety. Call light in reach and bed in lowest position. WCTM

## 2019-08-06 NOTE — PLAN OF CARE
[facility]    HOME HEALTH ORDERS  FACE TO FACE ENCOUNTER    Patient Name: Linda Hylton  YOB: 1935    PCP: Yakov Barrett MD   PCP Address: [address] / [place] [address]  PCP Phone Number: [phone]  PCP Fax: [phone]    Encounter Date: 08/06/2019    Admit to Home Health    Diagnoses:  Active Hospital Problems    Diagnosis  POA    *S/P laparoscopic cholecystectomy [Z90.49]  Not Applicable    Debility [R53.81]  Yes    Iron deficiency anemia [D50.9]  Yes    Aortic stenosis [I35.0]  Yes    Acquired hypothyroidism [E03.9]  Yes    Essential hypertension [I10]  Yes      Resolved Hospital Problems    Diagnosis Date Resolved POA    Cholecystitis [K81.9] 08/04/2019 Yes    Acute biliary pancreatitis [K85.10] 08/04/2019 Yes    Choledocholithiasis [K80.50] 07/30/2019 Yes    Hypokalemia [E87.6] 07/30/2019 Yes    Hypophosphatemia [E83.39] 07/30/2019 Yes       Future Appointments   Date Time Provider Department Center   8/15/2019 10:00 AM Joann Larry PA-C [facility]     Follow-up Information     Tre Alejandra MD.    Specialty:  General Surgery  Contact information:  [address]  [facility] [address]  [phone]                     I have seen and examined this patient face to face today. My clinical findings that support the need for the home health skilled services and home bound status are the following:  Weakness/numbness causing balance and gait disturbance due to Weakness/Debility making it taxing to leave home.  Patient with medication mismanagement issues requiring home bound status as evidenced by  Unstable vital signs (blood pressure, heart rate).    Allergies:  Review of patient's allergies indicates:   Allergen Reactions    Sulfa (sulfonamide antibiotics) Hives       Diet: cardiac diet    Activities: activity as tolerated    Nursing:   SN to complete comprehensive assessment including routine vital signs. Instruct on disease  process and s/s of complications to report to MD. Review/verify medication list sent home with the patient at time of discharge  and instruct patient/caregiver as needed. Frequency may be adjusted depending on start of care date.    Notify MD if SBP > 160 or < 90; DBP > 90 or < 50; HR > 120 or < 50; Temp > 101; Other:         CONSULTS:    Physical Therapy to evaluate and treat. Evaluate for home safety and equipment needs; Establish/upgrade home exercise program. Perform / instruct on therapeutic exercises, gait training, transfer training, and Range of Motion.  Occupational Therapy to evaluate and treat. Evaluate home environment for safety and equipment needs. Perform/Instruct on transfers, ADL training, ROM, and therapeutic exercises.   to evaluate for community resources/long-range planning.  Aide to provide assistance with personal care, ADLs, and vital signs.    MISCELLANEOUS CARE:  N/A    WOUND CARE ORDERS  Skin breakdown of buttocks:  Bid barrier cream with miconazole to gluteal cleft.      Medications: Review discharge medications with patient and family and provide education.      Current Discharge Medication List      START taking these medications    Details   amLODIPine (NORVASC) 5 MG tablet Take 1 tablet (5 mg total) by mouth once daily.  Qty: 30 tablet, Refills: 11      lidocaine (LIDODERM) 5 % Place 2 patches onto the skin once daily. Remove & Discard patch within 12 hours or as directed by MD  Qty: 30 patch, Refills: 3      miconazole nitrate 2% (MICOTIN) 2 % Oint Apply topically 2 (two) times daily.  Refills: 0         CONTINUE these medications which have CHANGED    Details   lisinopril 10 MG tablet Take 2 tablets (20 mg total) by mouth once daily.  Qty: 45 tablet, Refills: 3         CONTINUE these medications which have NOT CHANGED    Details   atorvastatin (LIPITOR) 80 MG tablet Take 1 tablet (80 mg total) by mouth once daily.  Qty: 90 tablet, Refills: 3      cholecalciferol,  vitamin D3, 5,000 unit Tab Take 1 tablet (5,000 Units total) by mouth once daily.      clopidogrel (PLAVIX) 75 mg tablet Take 1 tablet (75 mg total) by mouth once daily.  Qty: 30 tablet, Refills: 11      clotrimazole-betamethasone 1-0.05% (LOTRISONE) cream Apply topically 2 (two) times daily.  Qty: 45 g, Refills: 1      ferrous sulfate 325 (65 FE) MG EC tablet Take 1 tablet (325 mg total) by mouth once daily.  Refills: 0      gabapentin (NEURONTIN) 300 MG capsule Take 1 capsule (300 mg total) by mouth nightly.  Qty: 30 capsule, Refills: 11      levothyroxine (EUTHYROX) 88 MCG tablet Take 1 tablet (88 mcg total) by mouth once daily.  Qty: 30 tablet, Refills: 11      meclizine (ANTIVERT) 25 mg tablet Take 1 tablet (25 mg total) by mouth 3 (three) times daily as needed for Dizziness.  Qty: 90 tablet, Refills: 3    Associated Diagnoses: Dizziness      pantoprazole (PROTONIX) 40 MG tablet Take 1 tablet (40 mg total) by mouth once daily.  Qty: 30 tablet, Refills: 11      traZODone (DESYREL) 50 MG tablet Take 1 tablet (50 mg total) by mouth every evening.  Qty: 30 tablet, Refills: 11    Associated Diagnoses: Trouble in sleeping      nystatin (MYCOSTATIN) powder Apply to affected area 3 times daily  Qty: 60 g, Refills: 0    Associated Diagnoses: Candidal skin infection      ondansetron (ZOFRAN-ODT) 8 MG TbDL Take 1 tablet (8 mg total) by mouth every 8 (eight) hours as needed.  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

## 2019-08-06 NOTE — NURSING
Notified  of patient's BP of 204/84,HR 73. Patient asymptomatic and denies headache,chest pain, or dizziness. MD ordered amlodipine. nathantmp.

## 2019-08-06 NOTE — PT/OT/SLP DISCHARGE
Occupational Therapy Discharge Summary    Linda Hylton  MRN: [id number]   Principal Problem: S/P laparoscopic cholecystectomy      Patient Discharged from acute Occupational Therapy on 8/6/2019.  Please refer to prior OT note dated 8/6/2019 for functional status.    Assessment:      Patient appropriate for care in another setting.    Objective:     GOALS:   Multidisciplinary Problems     Occupational Therapy Goals        Problem: Occupational Therapy Goal    Goal Priority Disciplines Outcome Interventions   Occupational Therapy Goal     OT, PT/OT Ongoing (interventions implemented as appropriate)    Description:  Goals to be met by:  8/2/2019    Patient will increase functional independence with ADLs by performing:    Pt will ambulate to toilet using RW with CGA. Goal met  Pt will change depends while in seated position with min A.  Not met  Pt will stand at sink to wash hands with CGA using RW. Goal met                     Reasons for Discontinuation of Therapy Services  pt will be d/c home today      Plan:     Patient Discharged to: Home with Home Health Service    EMILY Reynolds  8/6/2019

## 2019-08-06 NOTE — PLAN OF CARE
08/06/19 1406   Final Note   Assessment Type Final Discharge Note   Anticipated Discharge Disposition Home-Health   What phone number can be called within the next 1-3 days to see how you are doing after discharge? [phone]   Hospital Follow Up  Appt(s) scheduled? Yes   Discharge plans and expectations educations in teach back method with documentation complete? Yes   Right Care Referral Info   Post Acute Recommendation Home-care   Referral Type Home Health    Facility Name [facility] talked to patients son Tavo and updated him on the discharge plan, he will send his brother to  their mother today.

## 2019-08-06 NOTE — PLAN OF CARE
Problem: Physical Therapy Goal  Goal: Physical Therapy Goal  Goals to be met by: 2019    Patient will increase functional independence with mobility by performin. Supine to sit with Stand-by Assistance  2. Sit to supine with Stand-by Assistance  3. Sit to stand transfer with Supervision  4. Gait  x 100 feet with Supervision using Rolling Walker.          Outcome: Ongoing (interventions implemented as appropriate)  Pt is progressing towards goals.   Fabiola Jacobson, PT  2019

## 2019-08-06 NOTE — PHYSICIAN QUERY
PT Name: Linda Hylton  MR #: [id number]    Physician Query Form - Pathology Findings Clarification     Liza Rodney RN, CCDS  Desk # [phone]; [facility] # [phone] [email or web address]    This form is a permanent document in the medical record.     Query Date: August 6, 2019    By submitting this query, we are merely seeking further clarification of documentation.  Please utilize your independent clinical judgment when addressing the question(s) below.    The medical record contains the following:     Findings Supporting Clinical Information Location in Medical Record   FINAL PATHOLOGIC DIAGNOSIS  Gallbladder, cholecystectomy:  - Acute and chronic cholecystitis.  - Cholelithiasis.  Diagnosed by: Blessing Simon M.D.  (Electronically Signed: 2019-08-01 15:45:41)    -- per pathology report Post-op Diagnosis:    Acute biliary pancreatitis, unspecified complication status     Procedure(s) (LRB):  CHOLECYSTECTOMY, LAPAROSCOPIC (N/A)    Cholecystitis  Biliary Panreatitis Op Note 7/29                  Per Hosp PN 8/5     Please document the clinical significance of the Pathologists findings of:     - Acute and chronic cholecystitis.  - Cholelithiasis.    [x   ] I agree with the Pathology Findings   [   ] I do not agree with the Pathology Findings   [   ] Other/Clarification of Findings: __________________   [   ] Clinically Insignificant   [  ] Clinically Undetermined     Please document in your progress notes daily for the duration of treatment until resolved and include in your discharge summary.

## 2019-08-07 NOTE — DISCHARGE SUMMARY
[facility] Hospital Medicine  Discharge Summary      Patient Name: Linda Hylton  MRN: [id number]  Admission Date: 7/28/2019  Hospital Length of Stay: 8 days  Discharge Date and Time: 8/6/2019  5:39 PM  Attending Physician: No att. providers found   Discharging Provider: Santy Mauro MD  Primary Care Provider: Yakov Barrett MD  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 Reports chills, dry heaving, poor PO intake but denies fever, vomiting. States pain is severe, constant and was mostly over periumbilical and upper abdominal area. She never had similar pain in the past.      Work up in ED revealed biliary pancreatitis. Labs significant for WBC 21, abnormal LFT and elevated lipase >1000. CT abdomen showed mild gallbladder distention with pericholecystic fluid and dilated CBD ~8 mm. Blood cultures sent and received empiric zosyn. Also given 1500 cc IVF. She reports feeling much better now and her pain resolved.      She was seen by general surgery in ED with concern for acute cholecystitis with plan for interval cholecystectomy.      Procedure(s) (LRB):  CHOLECYSTECTOMY, LAPAROSCOPIC (N/A)      Hospital Course:   85 y/o woman hx of HTN, embolic stroke with residual deficit, aortic stenosis, hypothryoidism, gerd, presented with abdominal pain and found with acute biliary pancreatitis and acute cholecystitis who is now s/p laparoscopic cholecystectomy.  Post-op with pain/debility and confusion that has prompted PT/OT evals. Originally planned on SNF discharge; however, she has progressed significantly and now is ok for  PT/OT.     Initially, there were reports  of intermittent confusion and possible hallucinations, this improved greatly after PRN breakthrough medication was switched from opiates to regimen of scheduled APAP and PRN NSAID (toradol/ibuprofen).  She remained with anxiety and dysphoric mood.  On 8/4 Olanzapine given when she reported discomfort/anxiety that she couldn't characterize and her blood pressure was elevated >200.  Pt reported this helped her symptoms will continue as a prn medication.       1. Cholecystitis,   Biliary Pancreatitis   S/p Lap Cholecystectomy 7/29  -tolerating bland-low fat diet    -Scheduled Acetaminophen completed. Continue PRN Nsaids, can give PRN APAP if needed or rotate if needed  -stopped PRN opiates, concern they were contributing to patient's intermittent confusion.   -Transaminase levels downtrending.   -will need to f/u with Gen Surg Dr. Alejandra, 2 weeks post-op     2. Confusion  -no delirium present by CAM-ICU evals  -pt with chronic debility  -PT/OT consults: recommend HH PT/OT due to patient progression     3. Essential Hypertension  -titrated lisinopril to 20mg  and initiated low dose amlodipine 5mg; suspect NSAIDs may also be contributing  -would benefit from Cards f/u given mild aortic stenosis      4. Hypothyroidism  -continue levothyroxine     5. Debility  -PT/OT consults: home health    Vitals:    08/06/19 1124   BP: (!) 149/65   Pulse: 93   Resp: 19   Temp: 99 °F (37.2 °C)     Physical Exam   Constitutional: She is oriented to person, place, and time. No distress.   obese   Eyes: No scleral icterus.   Cardiovascular: Normal rate and regular rhythm.   Murmur heard.  Pulmonary/Chest: Effort normal and breath sounds normal. No stridor. No respiratory distress.   Abdominal: Soft. Bowel sounds are normal. She exhibits no distension. There is no tenderness. There is no rebound and no guarding.   Laparoscopic surgical scars c/d/i   Neurological: She is alert and oriented to person, place, and time. GCS eye subscore is 4.  GCS verbal subscore is 5. GCS motor subscore is 6.   No asterixis   Skin: Skin is warm and dry.   Psychiatric: Her mood appears anxious. Her affect is not labile. She is attentive.       Consults:   Consults (From admission, onward)        Status Ordering Provider     Inpatient consult to [facility]  Once     Provider:  (Not yet assigned)    Completed SONIDO CHAPA          No new Assessment & Plan notes have been filed under this hospital service since the last note was generated.  Service: Hospital Medicine    Final Active Diagnoses:    Diagnosis Date Noted POA    PRINCIPAL PROBLEM:  S/P laparoscopic cholecystectomy [Z90.49] 07/30/2019 Not Applicable    Debility [R53.81] 07/29/2019 Yes    Iron deficiency anemia [D50.9] 12/27/2017 Yes    Aortic stenosis [I35.0]  Yes    Acquired hypothyroidism [E03.9]  Yes    Essential hypertension [I10]  Yes      Problems Resolved During this Admission:    Diagnosis Date Noted Date Resolved POA    Cholecystitis [K81.9] 07/29/2019 08/04/2019 Yes    Acute biliary pancreatitis [K85.10] 07/29/2019 08/04/2019 Yes    Choledocholithiasis [K80.50] 07/29/2019 07/30/2019 Yes    Hypokalemia [E87.6] 07/29/2019 07/30/2019 Yes    Hypophosphatemia [E83.39] 07/29/2019 07/30/2019 Yes       Discharged Condition: good    Disposition: [facility]-Health Care [facility]    Follow Up:  Follow-up Information     Tre Alejandra MD.    Specialty:  General Surgery  Contact information:  [address] [place] [id number]  [phone]                 Patient Instructions:      Ambulatory referral to Outpatient Case Management   Referral Priority: Routine Referral Type: Consultation   Referral Reason: Specialty Services Required   Number of Visits Requested: 1     Ambulatory consult to General Surgery   Referral Priority: Routine Referral Type: Consultation   Referral Reason: Specialty Services Required   Requested Specialty: Surgery   Number of Visits Requested: 1     Diet Adult  Regular     Lifting restrictions   Order Comments: No heavy lifting, nothing heavier than 10lbs     Notify your health care provider if you experience any of the following:  difficulty breathing or increased cough     Notify your health care provider if you experience any of the following:  redness, tenderness, or signs of infection (pain, swelling, redness, odor or green/yellow discharge around incision site)     Notify your health care provider if you experience any of the following:  severe uncontrolled pain     Notify your health care provider if you experience any of the following:  temperature >100.4     Notify your health care provider if you experience any of the following:  persistent nausea and vomiting or diarrhea     No dressing needed   Order Comments: Dermabond does not need to be covered. No bathing, swimming or soaking in a tub. Showering is okay.       Significant Diagnostic Studies: Labs:   CMP   Recent Labs   Lab 08/06/19  0745      K 3.2*      CO2 25   GLU 93   BUN 8   CREATININE 0.7   CALCIUM 8.8   PROT 6.7   ALBUMIN 2.6*   BILITOT 0.4   ALKPHOS 75   AST 18   ALT 15   ANIONGAP 11   ESTGFRAFRICA >60.0   EGFRNONAA >60.0   , CBC   Recent Labs   Lab 08/06/19  0745   WBC 9.17   HGB 10.6*   HCT 33.8*      , INR   Lab Results   Component Value Date    INR 0.9 02/20/2019    INR 1.0 02/20/2019    INR 1.0 12/18/2017   , Lipid Panel   Lab Results   Component Value Date    CHOL 177 02/20/2019    HDL 30 (L) 02/20/2019    LDLCALC 96.6 02/20/2019    TRIG 252 (H) 02/20/2019    CHOLHDL 16.9 (L) 02/20/2019   , Troponin No results for input(s): TROPONINI in the last 168 hours., A1C:   Recent Labs   Lab 02/20/19  2244   HGBA1C 5.3    and All labs within the past 24 hours have been reviewed  Microbiology:   Blood Culture   Lab Results   Component Value Date    LABBLOO No growth after 5 days. 07/29/2019     Cardiac Graphics: Echocardiogram:   2D echo with color flow doppler:   Results for orders  placed or performed during the hospital encounter of 01/18/18   2D echo with color flow doppler   Result Value Ref Range    QEF 70 55 - 65    Diastolic Dysfunction Yes (A)     Est. PA Systolic Pressure 29.83     Mitral Valve Mobility CHORDAL MARSHAL     Tricuspid Valve Regurgitation MILD     Narrative    Date of Procedure: 01/18/2018        TEST DESCRIPTION   Technical Quality: This is a technically challenging study. There is poor endocardial definition.     Aorta: The aortic root is normal in size, measuring 2.5 cm at sinotubular junction and 2.6 cm at Sinuses of Valsalva. The proximal ascending aorta is normal in size, measuring 2.9 cm across.     Left Atrium: The left atrial volume index is moderately enlarged, measuring 41.72 cc/m2.     Left Ventricle: The left ventricle is normal in size, with an end-diastolic diameter of 2.8 cm, and an end-systolic diameter of 2.2 cm. Septal wall thickness is mildly increased, and posterior wall thickness is upper limit of normal in size, with the   septum measuring 1.2 cm and the posterior wall measuring 1.1 cm across. Relative wall thickness was increased at 0.79, and the LV mass index was 62.3 g/m2 consistent with concentric remodeling. There are no regional wall motion abnormalities. Left   ventricular systolic function appears normal. Visually estimated ejection fraction is 65-70%. The LV Doppler derived stroke volume equals 78.0 ccs.     Diastolic indices: E wave velocity 1.2 m/s, E/A ratio 0.8,  msec., E/e' ratio(avg) 20. There is pseudonormalization of mitral inflow pattern consistent with significant diastolic dysfunction.     Right Atrium: The right atrium is normal in size, measuring 4.1 cm in length and 3.2 cm in width in the apical view.     Right Ventricle: The right ventricle is normal in size measuring 3.5 cm at the base in the apical right ventricle-focused view. Global right ventricular systolic function appears normal. Tricuspid annular plane systolic  excursion (TAPSE) is 1.5 cm.   Tissue Doppler-derived tricuspid annular peak systolic velocity (S prime) is 10.1 cm/s. The estimated PA systolic pressure is 30 mmHg.     Aortic Valve:  The aortic valve is mildly sclerotic. A dynamic resting peak gradient of 58 mmHg was detected across the left ventricular outflow tract. Following Valsalva maneuver, the peak gradient was 71 mmHg.     Mitral Valve:  The mitral valve is mildly sclerotic with evidence of chordal MARSHAL. There is mitral annular calcification.     Tricuspid Valve:  The tricuspid valve is not well seen. There is mild tricuspid regurgitation.     Pulmonary Valve:  The pulmonic valve is not well seen.     IVC: IVC is normal in size and collapses > 50% with a sniff, suggesting normal right atrial pressure of 3 mmHg.     Intracavitary: There is no evidence of pericardial effusion, intracavity mass, thrombi, or vegetation.     Please note that neither mitral nor aortic valve were not well visualized.          CONCLUSIONS     1 - Normal left ventricular systolic function (EF 65-70%).     2 - No wall motion abnormalities.     3 - Moderate left atrial enlargement.     4 - Impaired LV relaxation, elevated LAP (grade 2 diastolic dysfunction).     5 - Normal right ventricular systolic function .     6 - The estimated PA systolic pressure is 30 mmHg.     7 - The mitral valve is mildly sclerotic with evidence of chordal MARSHAL.     8 - Mild tricuspid regurgitation.     9 - Dynamic, resting LVOT gradient of 58mmHg (see text).             This document has been electronically    SIGNED BY: Cori Miles MD On: 01/18/2018 15:52    and Transthoracic echo (TTE) complete (Cupid Only):   Results for orders placed or performed during the hospital encounter of 02/20/19   Transthoracic echo (TTE) complete (Cupid Only)   Result Value Ref Range    Ascending aorta 2.92 cm    STJ 2.70 cm    AV mean gradient 14.14 mmHg    Ao peak taz 2.39 m/s    Ao VTI 50.74 cm    IVS 0.75 0.6 - 1.1 cm     LA size 4.17 cm    Left Atrium Major Axis 6.03 cm    Left Atrium Minor Axis 4.69 cm    LVIDD 3.07 (A) 3.5 - 6.0 cm    LVIDS 2.19 2.1 - 4.0 cm    LVOT diameter 1.97 cm    LVOT peak VTI 36.28 cm    PW 0.93 0.6 - 1.1 cm    MV Peak A Wong 1.36 m/s    E wave decelartion time 334.40 msec    MV Peak E Wong 1.03 m/s    RA Major Axis 4.65 cm    RA Width 2.90 cm    RVDD 2.64 cm    Sinus 2.97 cm    TDI LATERAL 0.07     TDI SEPTAL 0.06     LA WIDTH 3.19 cm    LV Diastolic Volume 37.12 mL    LV Systolic Volume 16.08 mL    LVOT peak wong [id number] m/s    LV LATERAL E/E' RATIO 14.71     LV SEPTAL E/E' RATIO 17.17     FS 29 %    LA volume 59.66 cm3    LV mass 65.69 g    Left Ventricle Relative Wall Thickness 0.61 cm    AV valve area 2.18 cm2    AV Velocity Ratio 0.62     AV index (prosthetic) 0.72     E/A ratio 0.76     Mean e' 0.07     LVOT area 3.05 cm2    LVOT stroke volume 110.53 cm3    AV peak gradient 22.85 mmHg    E/E' ratio 15.85     LV Systolic Volume Index 9.8 mL/m2    LV Diastolic Volume Index 22.69 mL/m2    LA Volume Index 36.5 mL/m2    LV Mass Index 40.2 g/m2    BSA 1.71 m2    Narrative    · Concentric left ventricular remodeling.  · Normal left ventricular systolic function. The estimated ejection   fraction is 65%  · Grade I (mild) left ventricular diastolic dysfunction consistent with   impaired relaxation.  · Normal left atrial pressure.  · Mild left atrial enlargement.  · Mild aortic regurgitation.  · Normal right ventricular systolic function.  · Mild aortic valve stenosis.  · Aortic valve area is 2.18 cm2; peak velocity is 2.39 m/s; mean gradient   is 14.14 mmHg.          Pending Diagnostic Studies:     None         Medications:  Reconciled Home Medications:      Medication List      START taking these medications    amLODIPine 5 MG tablet  Commonly known as:  NORVASC  Take 1 tablet (5 mg total) by mouth once daily.  Start taking on:  8/7/2019     lidocaine 5 %  Commonly known as:  LIDODERM  Place 2  patches onto the skin once daily. Remove & Discard patch within 12 hours or as directed by MD     miconazole nitrate 2% 2 % Oint  Commonly known as:  MICOTIN  Apply topically 2 (two) times daily.        CHANGE how you take these medications    lisinopril 10 MG tablet  Take 2 tablets (20 mg total) by mouth once daily.  What changed:  how much to take        CONTINUE taking these medications    atorvastatin 80 MG tablet  Commonly known as:  LIPITOR  Take 1 tablet (80 mg total) by mouth once daily.     cholecalciferol (vitamin D3) 5,000 unit Tab  Take 1 tablet (5,000 Units total) by mouth once daily.     clopidogrel 75 mg tablet  Commonly known as:  PLAVIX  Take 1 tablet (75 mg total) by mouth once daily.     clotrimazole-betamethasone 1-0.05% cream  Commonly known as:  LOTRISONE  Apply topically 2 (two) times daily.     ferrous sulfate 325 (65 FE) MG EC tablet  Take 1 tablet (325 mg total) by mouth once daily.     gabapentin 300 MG capsule  Commonly known as:  NEURONTIN  Take 1 capsule (300 mg total) by mouth nightly.     levothyroxine 88 MCG tablet  Commonly known as:  EUTHYROX  Take 1 tablet (88 mcg total) by mouth once daily.     meclizine 25 mg tablet  Commonly known as:  ANTIVERT  Take 1 tablet (25 mg total) by mouth 3 (three) times daily as needed for Dizziness.     nystatin powder  Commonly known as:  MYCOSTATIN  Apply to affected area 3 times daily     ondansetron 8 MG Tbdl  Commonly known as:  ZOFRAN-ODT  Take 1 tablet (8 mg total) by mouth every 8 (eight) hours as needed.     pantoprazole 40 MG tablet  Commonly known as:  PROTONIX  Take 1 tablet (40 mg total) by mouth once daily.     traZODone 50 MG tablet  Commonly known as:  DESYREL  Take 1 tablet (50 mg total) by mouth every evening.            Indwelling Lines/Drains at time of discharge:   Lines/Drains/Airways          None          Time spent on the discharge of patient: 45 minutes  Patient was seen and examined on the date of discharge and determined  to be suitable for discharge.         Santy Mauro MD  Department of Hospital Medicine  [facility]

## 2019-08-08 ENCOUNTER — PATIENT OUTREACH (OUTPATIENT)
Dept: ADMINISTRATIVE | Facility: CLINIC | Age: 84
End: 2019-08-08

## 2019-08-08 NOTE — PROGRESS NOTES
C3 nurse attempted to contact patient. No answer. The following message was left for the patient to return the call:  Good morning,  I am a nurse calling on behalf of [facility] from the Care Coordination [facility].  This is a Transitional Care Call for Linda Hylton. When you have a moment please contact us at [phone] or [phone] Monday through Friday, between the hours of 8 am to 4 pm. We look forward to speaking with you. On behalf of [employer] [facility] have a nice day.    The patient has a scheduled POST OP appointment with 8/15 on E. Kendy @ 2068.

## 2019-08-08 NOTE — PATIENT INSTRUCTIONS
Discharge Instructions for Laparoscopic Cholecystectomy  You have had a procedure known as a laparoscopic cholecystectomy. A laparoscopic cholecystectomy is a procedure to remove your gallbladder. People who have this procedure usually recover more quickly and have less pain than with open gallbladder surgery (called open cholecystectomy). Many surgeons recommend a low-fat diet, avoiding fried food in particular, for the first month after surgery.   You can live a full and healthy life without your gallbladder. This includes eating the foods and doing the things you enjoyed before your gallbladder problems started.  Home care  Recommendations for home care include the following:   · Ask someone to drive you to your appointments for the next 3 days. Dont drive until you are no longer taking pain medicine and are able to step on the brake pedal without hesitation.   · Wash the skin around your incision daily with mild soap and water. It's OK to shower the day after your surgery.  · Eat your regular diet. It is wise to stay away from rich, greasy, or spicy food for a few days.  · Remember, it takes at least 1 week for you to get most of your strength and energy back.  · Make an office visit to talk to your healthcare provider if the following symptoms dont go away within a week after your surgery:  ¨ Fatigue  ¨ Pain around the incision  ¨ Diarrhea or constipation  ¨ Loss of appetite     When to call your healthcare provider  Call your healthcare provider immediately if you have any of the following:  · Yellowing of your eyes or skin (jaundice)  · Chills  · Fever of 100.4°F (38.0°C) or higher, or as directed by your healthcare provider   · Redness, swelling, increasing pain, pus, or a foul smell at the incision site  · Dark or rust-colored urine  · Stool that is eleanor-colored or light in color instead of brown  · Increasing belly pain  · Rectal bleeding  · Leg swelling or shortness of breath   Date Last Reviewed:  7/1/2016  © 2559-2362 [employer]. [address] [place] [address], [place] [address]. All rights reserved. This information is not intended as a substitute for professional medical care. Always follow your healthcare professional's instructions.

## 2019-08-19 ENCOUNTER — TELEPHONE (OUTPATIENT)
Dept: SURGERY | Facility: CLINIC | Age: 84
End: 2019-08-19

## 2019-08-19 ENCOUNTER — OFFICE VISIT (OUTPATIENT)
Dept: SURGERY | Facility: CLINIC | Age: 84
End: 2019-08-19
Payer: MEDICARE

## 2019-08-19 VITALS
HEART RATE: 84 BPM | TEMPERATURE: 98 F | SYSTOLIC BLOOD PRESSURE: 158 MMHG | BODY MASS INDEX: 34.52 KG/M2 | DIASTOLIC BLOOD PRESSURE: 70 MMHG | HEIGHT: 57 IN | WEIGHT: 160 LBS

## 2019-08-19 DIAGNOSIS — Z90.49 S/P LAPAROSCOPIC CHOLECYSTECTOMY: Primary | ICD-10-CM

## 2019-08-19 PROCEDURE — 99999 PR PBB SHADOW E&M-EST. PATIENT-LVL IV: CPT | Mod: PBBFAC,,, | Performed by: SURGERY

## 2019-08-19 PROCEDURE — 99024 POSTOP FOLLOW-UP VISIT: CPT | Mod: POP,,, | Performed by: SURGERY

## 2019-08-19 PROCEDURE — 99214 OFFICE O/P EST MOD 30 MIN: CPT | Mod: PBBFAC | Performed by: SURGERY

## 2019-08-19 PROCEDURE — 99999 PR PBB SHADOW E&M-EST. PATIENT-LVL IV: ICD-10-PCS | Mod: PBBFAC,,, | Performed by: SURGERY

## 2019-08-19 PROCEDURE — 99024 PR POST-OP FOLLOW-UP VISIT: ICD-10-PCS | Mod: POP,,, | Performed by: SURGERY

## 2019-08-19 NOTE — PROGRESS NOTES
SUBJECTIVE:  The patient is a 84 y.o. y/o female 2 weeks s/p laparoscopic cholecystectomy. She denies pain, fevers, chills, nausea, vomiting, diarrhea, or constipation. Eating well with normal appetite and bowel function. Denies redness around or drainage from incisions.    OBJECTIVE:  GEN: female in NAD  ABD: soft, non-tender, non-distended  INCISIONS: dermabond in place - healing well without signs of infection or hernia    ASSESSMENT/PLAN:  Doing well 2 weeks s/p laparoscopic cholecystectomy for chronic cholecystitis. Patient is advised to avoid heavy lifting or strenuous activity for another 2-4 weeks. May resume light cardio. Patient may bathe and continue to take a regular diet. Will follow-up with me on an as-needed basis. All questions answered; patient is comfortable with follow-up plan.

## 2019-08-19 NOTE — TELEPHONE ENCOUNTER
Left message on patient's voice regarding appointment today with  number provided if needed to reschedule.      Angie Gallegos

## 2019-08-22 VITALS
RESPIRATION RATE: 20 BRPM | OXYGEN SATURATION: 96 % | SYSTOLIC BLOOD PRESSURE: 128 MMHG | HEART RATE: 78 BPM | DIASTOLIC BLOOD PRESSURE: 88 MMHG

## 2019-08-22 NOTE — PROGRESS NOTES
AAO but forgetful.  Lives w/ son who was not present for visit.  NIHSS-1; does not smoke; refrains from adding salt, salty and fast food; performs leg exercises daily; compliant w/ all meds.  Education provided on goal of stroke mobile, s/s of stroke, diet, exercise, medications.  Verbalized understanding.  Encouraged to utilize stroke team for any concern.

## 2019-08-27 ENCOUNTER — PES CALL (OUTPATIENT)
Dept: ADMINISTRATIVE | Facility: CLINIC | Age: 84
End: 2019-08-27

## 2019-08-28 ENCOUNTER — OUTPATIENT CASE MANAGEMENT (OUTPATIENT)
Dept: ADMINISTRATIVE | Facility: OTHER | Age: 84
End: 2019-08-28

## 2019-08-28 NOTE — PROGRESS NOTES
8/29/19    Summary:  Contacted patient for initial assessment for [facility]. Patient is an 83 yo female with hx of stroke, HTN, PAD, Hyperlipidemia, and s/p lap cholecystectomy on 8/8/19. Discussed BP monitoring with patient-she states that her son checks her BP every night and she keeps a log for the  nurse and for her MD appts. Patient lives with her son in a 2 story home, patient currently staying on the 1st floor. Patient requires some assistance with ADLs such as bathing and dressing. Patient has an aide that is privately paid that assists patient with bathing and dressing 2-3 times per week. Patient uses a RW to ambulate throughout the home and uses a WC only when going to MD appts and out of the house to other events.  Patient's son Angel assists patient with all IADLs. Patient's son manages her medications by using a pillbox. Patient denies any financial issues with affording medications. Denies falls in the last 12 months. Patient states that her son and other family members provide transportation to her MD appts and to  medications from pharmacy for her. Patient denies any financial struggles at this time. Patient is currently active with [facility] and has a nurse and PT at this time. Patient states that she does have a living will in place but that she would like to receive info on POA. Will refer patient to [facility] LCSW for assistance with POA.  Discussed completing F/U call with patient, who is in agreement with call in 2 weeks.      Interventions:  Chart review completed  PHQ2 completed, score 0  Mail resource materials for Hypertension  Mail resource materials for Fall prevention  Refer to [facility] LMSW to assist with needs of advanced care planning (POA)  Encourage compliance with medications and treatment regimen  Inquired about current BP monitoring    Plan:  F/U in 2 weeks as discussed  F/U on receipt of resource materials  Continue education on Hypertension. Review s/sx of High BP.  Encourage  compliance with medication and treatment regimen. Encourage attendance to appts.  Continue education on Fall Prevention.   [facility] to assist with needs of advanced care planning  Collaborate with providers to meet needs      Todays [facility] Self-Management Care Plan was developed with the patients/caregivers input and was based on identified barriers from todays assessment.  Goals were written today with the patient/caregiver and the patient has agreed to work towards these goals to improve his/her overall well-being. Patient verbalized understanding of the care plan, goals, and all of today's instructions. Encouraged patient/caregiver to communicate with his/her physician and health care team about health conditions and the treatment plan.  Provided my contact information today and encouraged patient/caregiver to call me with any questions as needed.      8/28/19    Summary:  Attempted to complete initial assessment for Outpatient Care Management    Interventions:  Left message requesting a return call, letter mailed (and sent via pt portal if pt is active portal) requesting a return call.      Plan:  Scheduled pt for call back. Monica Casey RN/Usman Zapata RN

## 2019-08-28 NOTE — LETTER
August 29, 2019             Dear Ms. Concetta,    Welcome to [facility]s Complex Care Management Program.  It was a pleasure talking with you today.  My name is Monica Casey, and I look forward to being your Care Manager.  My goal is to help you function at the healthiest and highest level possible.  You can contact me directly at [phone].    As an [facility] patient, some of the services we may be able to provide include:      Development of an individualized care plan with a Registered Nurse    Connection with a    Connection with available resources and services     Coordinate communication among your care team members    Provide coaching and education    Help you understand your doctors treatment plan   Help you obtain information about your insurance coverage.     All services provided by [facility]s Complex Care Managers and other care team members are coordinated with and communicated to your primary care team.      As part of your enrollment, you will be receiving education materials and more information about these services in your [facility] account, by phone or through the mail.  If you do not wish to participate or receive information, please contact our office at [phone].      Sincerely,        Monica Casey RN  [facility]   Out-patient RN Complex Care Manager

## 2019-08-28 NOTE — LETTER
August 28, 2019    Linda Soria Concetta  [address]  [place] [address]             [facility]  [address] [facility] [facility] [place] [address] Dear Ms. Hylton,    My name is Monica and I work with [facility] Outpatient Case Management Department. We received a referral to call you to discuss your medical history.These services are free of charge and are offered to [facility] patients who have recently been discharged from any of our facilities or who have complex medical conditions that may require the skill of a nurse to assist with management.             I attempted to reach you by telephone, but I was unsuccessful. Please call our department so that we can go over some questions with you regarding your health.    The Outpatient Case Management Department can be reached at [phone] from 8:00AM to 4:30 PM on Monday thru Friday. [facility] also has a program where a nurse is available 24/7 to answer questions or provide medical advice, their number is [phone].    Thank You,      Monica Casey RN  Outpatient Care Management  [phone]

## 2019-08-30 DIAGNOSIS — I10 ESSENTIAL HYPERTENSION: ICD-10-CM

## 2019-08-30 RX ORDER — LISINOPRIL 5 MG/1
5 TABLET ORAL DAILY
Qty: 90 TABLET | Refills: 3 | Status: SHIPPED | OUTPATIENT
Start: 2019-08-30 | End: 2020-09-29 | Stop reason: SDUPTHER

## 2019-08-31 ENCOUNTER — EXTERNAL CHRONIC CARE MANAGEMENT (OUTPATIENT)
Dept: PRIMARY CARE CLINIC | Facility: CLINIC | Age: 84
End: 2019-08-31
Payer: MEDICARE

## 2019-08-31 PROCEDURE — 99490 CHRNC CARE MGMT STAFF 1ST 20: CPT | Mod: PBBFAC | Performed by: INTERNAL MEDICINE

## 2019-08-31 PROCEDURE — 99490 PR CHRONIC CARE MGMT, 1ST 20 MIN: ICD-10-PCS | Mod: S$PBB,,, | Performed by: INTERNAL MEDICINE

## 2019-08-31 PROCEDURE — 99490 CHRNC CARE MGMT STAFF 1ST 20: CPT | Mod: S$PBB,,, | Performed by: INTERNAL MEDICINE

## 2019-09-03 ENCOUNTER — OUTPATIENT CASE MANAGEMENT (OUTPATIENT)
Dept: ADMINISTRATIVE | Facility: OTHER | Age: 84
End: 2019-09-03

## 2019-09-03 NOTE — LETTER
September 6, 2019    Linda Hylton  [address]  [place] [address]             [facility]  [address] [facility] [place] [address] Dear Ms. Hylton:    I am writing from the Outpatient Complex Care Management Department at [facility].  I received a referral from DONALD Anderson to contact you regarding any needs you may have.  I have been unable to reach you by phone.   Please contact me if you would like to discuss any needs you may have.    I can be reached at [phone] Monday thru Friday from 8:00am to 4:30pm.  [facility] also has a program with a nurse available 24/7 to answer questions or provide medical advice. [facility] can be reached at [phone].    Sincerely,         Debora Shafer LMSW     Outpatient Care Management

## 2019-09-06 NOTE — PROGRESS NOTES
Second attempt to complete SW assessment. LMSW will mail letter  with contact information requesting return call. LMSW will follow up.

## 2019-09-10 ENCOUNTER — PES CALL (OUTPATIENT)
Dept: ADMINISTRATIVE | Facility: CLINIC | Age: 84
End: 2019-09-10

## 2019-09-12 ENCOUNTER — OUTPATIENT CASE MANAGEMENT (OUTPATIENT)
Dept: ADMINISTRATIVE | Facility: OTHER | Age: 84
End: 2019-09-12

## 2019-09-12 NOTE — LETTER
September 13, 2019    Linda Soria Concetta  [address]  [place] [address]             [facility]  [address]  [place] [address] Dear Ms. Concetta,       My name is Monica and I work with [facility] Outpatient Case Management Department. I have been unsuccessful at reaching you to follow-up to see how you have been doing. Please call me back at your earliest convenience to discuss your health care needs.      I can be reached at [phone] from 8:00AM to 4:30 PM on Monday thru Friday. [facility] also has a program where a nurse is available 24/7 to answer questions or provide medical advice, their number is [phone].    Thank You,        Moniac Casey RN  Outpatient Case Management/Disease Management  [phone]

## 2019-09-12 NOTE — PROGRESS NOTES
9/12/19    Summary:  1st Attempt to complete followup call for Outpatient Care Management    Interventions:  Left message requesting a return call, letter mailed (and sent via pt portal if pt is active portal) requesting a return call.      Plan:  Attempt to contact as scheduled  Monica Casey RN  Outpatient Care Management

## 2019-09-13 NOTE — PROGRESS NOTES
9/13/19    Summary:  2nd Attempt to complete follow-up call for OPCM    Interventions:  Left message requesting a return call    Plan:  Scheduled pt for call back. Monica Casey RN   independent

## 2019-09-19 NOTE — PROGRESS NOTES
Outpatient Care Management  Plan of Care Follow Up Visit    Patient: Linda Hylton  MRN:  [id number]  Date of Service:  9/19/2019  Completed by:  Monica Casey RN    Chief Complaint   Patient presents with    OPCM RN Second Follow-Up Attempt     9/16/19    Update Plan Of Care     9/19/19       Patient Summary     Complex Case Management Summary:  1.  Last Referral Date to [facility] for Care Management or Disease:  07/30/2019   2.  Was the patient referred to OPC or Disease Management?  Case Management (High Risk)  3.  Last Completed Encounter with [facility]  Date:  09/03/2019    Involvement of Care:  Do I have permission to speak with other family members about your care?       Patient Reported Insurance:  1.  Verified current insurance plan:  No data was found       Problem List     Patient Active Problem List   Diagnosis    Essential hypertension    Acquired hypothyroidism    Sciatica of left side    Aortic stenosis    Vitamin D deficiency    Nausea & vomiting    Aortic arch atherosclerosis    Arterial stenosis    Intracranial atherosclerosis    PAOD (peripheral arterial occlusive disease)    Dizziness    Acute encephalopathy    Mixed hyperlipidemia    Vertebral basilar insufficiency    Fatigue    Iron deficiency anemia    History of stroke    Embolic stroke involving left middle cerebral artery    Hyponatremia    Cytotoxic cerebral edema    Nodule of apex of right lung    Melena    Telangiectasia of Base of tongue    Debility    Incidental lung nodule, > 3mm and < 8mm    S/P laparoscopic cholecystectomy       Problem list comments:     Current Health Status:  Reviewed Active Problem List with patient and/or Caregiver.    Patient Reported Labs & Vitals:  1.  Any Patient Reported Labs & Vitals?  Yes  2.  Patient Reported Blood Pressure:  118/78 9/18/19 and 138/80 9/17/19  3.  Patient Reported Pulse:     4.  Patient Reported Weight (Kg):     5.  Patient Reported Blood Glucose (mg/dl):        History:  Reviewed medical history with patient and/or caregiver    Social History:  Social history reviewed with patient and/or caregiver    Clinical Assessment     PHQ Depression Screenin.  Results of PHQ Taken in last 30 days:  A PHQ screening has not been completed for the patient within the last 30 days.  · The last PHQ2 score was:  No data recorded  · The last PHQ9 score was:  No data recorded    Reviewed and provided basic information on available community resources for mental health, transportation, wellness resources, and palliative care programs with patient and/or caregiver.      Complex Care Plan     Care plan was discussed and completed today with input from patient and/or caregiver.    Goals      Patient/caregiver will have an action plan in place to manage and prevent complications of high blood pressure prior to discharge from [facility]. - Priority: High      Overall Time to Completion  2 months from 2019     [facility] Identified Patient Barriers:  Housing--Deferred  Fall Risk--Care Plan Created  Advanced Care Planning--Referred to [facility]     RN Identified Patient Barriers:  Housing--Deferred  ADLs--Deferred (patient has a private sitter to assist)      Short Term Goals    Patient/caregiver will verbalize 2 red flags of Hypertension and know when to contact Physician within 2 weeks.  Interventions   · Assess for availability of working blood pressure cuff in home setting.  · Assess for retention of the signs and symptoms of disease specific exacerbation.  · Collaborate with Physician as appropriate to meet patient needs.  · Empower patient/caregiver to discuss treatment plan with Physician/care team.  · Encourage compliance with Physician follow-ups.  · Encourage Dietary Compliance.  · Encourage Exercise.  · Encourage Medication Compliance.  · Recognize and provide educational material (DANIEL).  · Review eating/nutrition habits.     Status  · Partially  met        Patient/caregiver will verbalize 2 ways of preventing complications due to disease process within 3 weeks.  Interventions   · Assess for retention of the signs and symptoms of disease specific exacerbation.  · Encourage Dietary Compliance.  · Encourage Exercise.  · Encourage Medication Compliance.  · Recognize and provide educational material (DANIEL).  · Review eating/nutrition habits.     Status  · Partially met             Patient/caregiver will have knowledge of resources available in order to obtain the services that are needed prior to discharge from \[facility]\"". - Priority: High      Overall Time to Completion  2 months from 08/29/2019     \[facility]\"" Identified Patient Barriers:  Housing--Deferred  Fall Risk--Care Plan Created  Advanced Care Planning--Referred to \[facility]\""     RN Identified Patient Barriers:  Housing--Deferred  ADLs--Deferred (patient has a private sitter to assist)      Short Term Goals   Patient/caregiver will fill out any applications that were sent to patient to complete from \[facility]\""  within 1 month.  Interventions   · Refer to Outpatient Case Management Social Worker.     Status  · Partially met      Patient/caregiver will have contact information for identified community resources IE:\[facility]\""  for follow-up within 1 month.  Interventions   · Refer to Outpatient Case Management Social Worker.     Status  · Partially met      Patient/caregiver will notify RN CCM if patient does not hear from \[facility]\""  within 2 weeks.  Interventions   · Refer to Outpatient Case Management Social Worker.     Status  · Partially met         Patient/caregiver will have Safety Plan in place prior to discharge from \[facility]\"". - Priority: High      Overall Time to Completion  2 months from 08/29/2019      \[facility]\"" Identified Patient Barriers:  Housing--Deferred  Fall Risk--Care Plan Created  Advanced Care Planning--Referred to \[facility]\""     RN Identified Patient  Barriers:  Housing--Deferred  ADLs--Deferred (patient has a private sitter to assist)      Short Term Goals    Patient/caregiver will verbalize importance of having a safe home environment by keeping room free of clutter, making sure no electrical cords placed in walk ways, making sure rooms are well lit, placing non-skid bath mats and removing throw rugs within 2 weeks.  Interventions   · Collaborate with Physician as appropriate to meet patient needs.  · Empower patient/caregiver to discuss treatment plan with Physician/care team.  · Encourage family/caregiver to help patient perform ADLs according to the patient's capabilities.  · Recognize and provide educational material ([facility]).     Status  · Met-9/19/19                Patient Instructions     Instructions were provided via the [facility] patient resources and are available for the patient to view on the patient portal.    Follow up in about 2 weeks (around 9/26/2019) for OPCM.        OPCM contacted patient for follow-up call. Patient is doing well. She states that her son has been checking her BP every night and has been logging it for next MD appt. Her last 2 readings were 118/78 and 138/80. Patient is still currently active with [facility]  but just a nurse is seeing patient, PT is no longer seeing patient. Patient states that she is having sciatica pain and the Oxycodone that she was taking since her surgery was helping with that pain but now she is out of that medicine. Pharmacy will not contact MD for refill due to it being prescribed when patient was in the hospital. Explained to patient that she will need to schedule an appointment with her PCP to get an evaluation for the sciatica pain. She verbalizes understanding and has agreed for OPCM to schedule her an appt. Contacted PCP and first available appt is 11/15/19 at 10:30am. Contacted patient back to inform her of this info and she is fine with waiting that long. She does not wish to see any other MDs in  the same office, she would prefer to wait for Dr Barrett. Reminded patient of all upcoming appointments, verbalized awareness. Discussed completing follow up call with patient, who is in agreement with call in 2 weeks.        Todays OPCM Self-Management Care Plan was developed with the patients/caregivers input and was based on identified barriers from todays assessment.  Goals were written today with the patient/caregiver and the patient has agreed to work towards these goals to improve his/her overall well-being. Patient verbalized understanding of the care plan, goals, and all of today's instructions. Encouraged patient/caregiver to communicate with his/her physician and health care team about health conditions and the treatment plan.  Provided my contact information today and encouraged patient/caregiver to call me with any questions as needed.

## 2019-09-30 ENCOUNTER — PES CALL (OUTPATIENT)
Dept: ADMINISTRATIVE | Facility: CLINIC | Age: 84
End: 2019-09-30

## 2019-09-30 ENCOUNTER — EXTERNAL CHRONIC CARE MANAGEMENT (OUTPATIENT)
Dept: PRIMARY CARE CLINIC | Facility: CLINIC | Age: 84
End: 2019-09-30
Payer: MEDICARE

## 2019-09-30 PROCEDURE — 99490 PR CHRONIC CARE MGMT, 1ST 20 MIN: ICD-10-PCS | Mod: S$PBB,,, | Performed by: INTERNAL MEDICINE

## 2019-09-30 PROCEDURE — 99490 CHRNC CARE MGMT STAFF 1ST 20: CPT | Mod: S$PBB,,, | Performed by: INTERNAL MEDICINE

## 2019-09-30 PROCEDURE — 99490 CHRNC CARE MGMT STAFF 1ST 20: CPT | Mod: PBBFAC | Performed by: INTERNAL MEDICINE

## 2019-10-02 ENCOUNTER — PATIENT MESSAGE (OUTPATIENT)
Dept: INTERNAL MEDICINE | Facility: CLINIC | Age: 84
End: 2019-10-02

## 2019-10-04 ENCOUNTER — OUTPATIENT CASE MANAGEMENT (OUTPATIENT)
Dept: ADMINISTRATIVE | Facility: OTHER | Age: 84
End: 2019-10-04

## 2019-10-04 NOTE — LETTER
October 11, 2019    Linda Ann Concetta  [address]  [place] [address]             [facility]  [address] [place] [address] Dear Ms. Hylton,     I work with [facility] Outpatient Case Management Department. I have been unsuccessful at reaching you to follow-up to see how you have been doing. Please call me back at your earliest convenience to discuss your health care needs.      I can be reached at [phone]  from 8:00AM to 4:30 PM on Monday thru Friday. [facility] is a program offered through [facility] where a nurse is available 24/7 to answer questions or provide medical advice, their number is [phone].    Thanks,      Monica Casey, RN Case Manager  Outpatient Case Management  [phone]

## 2019-10-06 PROCEDURE — G0179 PR HOME HEALTH MD RECERTIFICATION: ICD-10-PCS | Mod: ,,, | Performed by: INTERNAL MEDICINE

## 2019-10-06 PROCEDURE — G0179 MD RECERTIFICATION HHA PT: HCPCS | Mod: ,,, | Performed by: INTERNAL MEDICINE

## 2019-10-10 ENCOUNTER — LAB VISIT (OUTPATIENT)
Dept: LAB | Facility: HOSPITAL | Age: 84
End: 2019-10-10
Attending: INTERNAL MEDICINE
Payer: MEDICARE

## 2019-10-10 ENCOUNTER — IMMUNIZATION (OUTPATIENT)
Dept: INTERNAL MEDICINE | Facility: CLINIC | Age: 84
End: 2019-10-10
Payer: MEDICARE

## 2019-10-10 ENCOUNTER — OFFICE VISIT (OUTPATIENT)
Dept: INTERNAL MEDICINE | Facility: CLINIC | Age: 84
End: 2019-10-10
Payer: MEDICARE

## 2019-10-10 VITALS
DIASTOLIC BLOOD PRESSURE: 82 MMHG | WEIGHT: 151.88 LBS | OXYGEN SATURATION: 96 % | HEIGHT: 57 IN | SYSTOLIC BLOOD PRESSURE: 126 MMHG | BODY MASS INDEX: 32.77 KG/M2 | HEART RATE: 56 BPM | TEMPERATURE: 99 F

## 2019-10-10 DIAGNOSIS — G89.29 CHRONIC LEFT-SIDED LOW BACK PAIN WITH LEFT-SIDED SCIATICA: Primary | ICD-10-CM

## 2019-10-10 DIAGNOSIS — I63.412 EMBOLIC STROKE INVOLVING LEFT MIDDLE CEREBRAL ARTERY: ICD-10-CM

## 2019-10-10 DIAGNOSIS — I10 ESSENTIAL HYPERTENSION: ICD-10-CM

## 2019-10-10 DIAGNOSIS — M54.42 CHRONIC LEFT-SIDED LOW BACK PAIN WITH LEFT-SIDED SCIATICA: Primary | ICD-10-CM

## 2019-10-10 DIAGNOSIS — E03.9 HYPOTHYROIDISM, UNSPECIFIED TYPE: ICD-10-CM

## 2019-10-10 DIAGNOSIS — G56.02 CARPAL TUNNEL SYNDROME OF LEFT WRIST: ICD-10-CM

## 2019-10-10 DIAGNOSIS — N39.41 URGE INCONTINENCE OF URINE: ICD-10-CM

## 2019-10-10 LAB
ALBUMIN SERPL BCP-MCNC: 3.8 G/DL (ref 3.5–5.2)
ALP SERPL-CCNC: 61 U/L (ref 55–135)
ALT SERPL W/O P-5'-P-CCNC: 12 U/L (ref 10–44)
ANION GAP SERPL CALC-SCNC: 10 MMOL/L (ref 8–16)
AST SERPL-CCNC: 15 U/L (ref 10–40)
BILIRUB SERPL-MCNC: 0.3 MG/DL (ref 0.1–1)
BUN SERPL-MCNC: 14 MG/DL (ref 8–23)
CALCIUM SERPL-MCNC: 9.4 MG/DL (ref 8.7–10.5)
CHLORIDE SERPL-SCNC: 101 MMOL/L (ref 95–110)
CHOLEST SERPL-MCNC: 109 MG/DL (ref 120–199)
CHOLEST/HDLC SERPL: 3.8 {RATIO} (ref 2–5)
CO2 SERPL-SCNC: 26 MMOL/L (ref 23–29)
CREAT SERPL-MCNC: 0.8 MG/DL (ref 0.5–1.4)
EST. GFR  (AFRICAN AMERICAN): >60 ML/MIN/1.73 M^2
EST. GFR  (NON AFRICAN AMERICAN): >60 ML/MIN/1.73 M^2
GLUCOSE SERPL-MCNC: 87 MG/DL (ref 70–110)
HDLC SERPL-MCNC: 29 MG/DL (ref 40–75)
HDLC SERPL: 26.6 % (ref 20–50)
LDLC SERPL CALC-MCNC: 41.6 MG/DL (ref 63–159)
NONHDLC SERPL-MCNC: 80 MG/DL
POTASSIUM SERPL-SCNC: 3.9 MMOL/L (ref 3.5–5.1)
PROT SERPL-MCNC: 7.5 G/DL (ref 6–8.4)
SODIUM SERPL-SCNC: 137 MMOL/L (ref 136–145)
T4 SERPL-MCNC: 13.8 UG/DL (ref 4.5–11.5)
TRIGL SERPL-MCNC: 192 MG/DL (ref 30–150)
TSH SERPL DL<=0.005 MIU/L-ACNC: 0.41 UIU/ML (ref 0.4–4)

## 2019-10-10 PROCEDURE — 99215 PR OFFICE/OUTPT VISIT, EST, LEVL V, 40-54 MIN: ICD-10-PCS | Mod: S$PBB,,, | Performed by: INTERNAL MEDICINE

## 2019-10-10 PROCEDURE — 99215 OFFICE O/P EST HI 40 MIN: CPT | Mod: S$PBB,,, | Performed by: INTERNAL MEDICINE

## 2019-10-10 PROCEDURE — 99999 PR PBB SHADOW E&M-EST. PATIENT-LVL V: ICD-10-PCS | Mod: PBBFAC,,, | Performed by: INTERNAL MEDICINE

## 2019-10-10 PROCEDURE — 99999 PR PBB SHADOW E&M-EST. PATIENT-LVL V: CPT | Mod: PBBFAC,,, | Performed by: INTERNAL MEDICINE

## 2019-10-10 PROCEDURE — 90662 IIV NO PRSV INCREASED AG IM: CPT | Mod: PBBFAC

## 2019-10-10 PROCEDURE — 99215 OFFICE O/P EST HI 40 MIN: CPT | Mod: PBBFAC | Performed by: INTERNAL MEDICINE

## 2019-10-10 PROCEDURE — 84436 ASSAY OF TOTAL THYROXINE: CPT

## 2019-10-10 PROCEDURE — 84443 ASSAY THYROID STIM HORMONE: CPT

## 2019-10-10 PROCEDURE — 80053 COMPREHEN METABOLIC PANEL: CPT

## 2019-10-10 PROCEDURE — 80061 LIPID PANEL: CPT

## 2019-10-10 PROCEDURE — 36415 COLL VENOUS BLD VENIPUNCTURE: CPT

## 2019-10-10 NOTE — PROGRESS NOTES
CC:  Multiple problems.    HPI:  Patient is an 84-year-old female who is currently being treated for hypertension, hyperlipidemia, hypothyroidism, aortic stenosis, and vitamin-D deficiency who presents today multiple complaints.  Patient does also have a history of a right middle cerebral artery stroke affecting left side.  She is complaining left lower back pain with radiation down the left leg.  Pain is constant.  Is worse with standing and ambulation.  It is better with sitting not go away.  She also complains of left hand pain. She has been using lidocaine cream which appears to help.  She is also complaining of overactive bladder.  Where she has to go to the bathroom every hour even at night.  She was  referred to Urogynecology and was contacted by that department; however, patient was not aware the referral and declined the office visit.  The patient is also complaining of lower extremity edema. Is worse currently than what it had been the last time we saw her.  Finally, the patient does complain of in mole behind her right ear which has her concerned.    ROS:  Please see patient and review of systems.Answers for HPI/ROS submitted by the patient on 10/10/2019   activity change: No  unexpected weight change: No  neck pain: No  hearing loss: No  rhinorrhea: No  trouble swallowing: No  eye discharge: No  visual disturbance: No  chest tightness: No  wheezing: No  chest pain: No  palpitations: No  blood in stool: No  constipation: No  vomiting: No  diarrhea: No  polydipsia: No  polyuria: No  difficulty urinating: No  hematuria: No  menstrual problem: No  dysuria: No  joint swelling: Yes  arthralgias: Yes  headaches: No  weakness: No  confusion: No  dysphoric mood: No    Physical exam:  General appearance no acute distress.  HEENT:  Conjunctiva is clear.  She has bilateral cataracts.  TMs were clear.  Nasal septum is midline without discharge. Oropharynx is without erythema.  Trachea is midline without  JVD.  Pulmonary:  Good inspiratory, expiratory breath sounds are heard.  Lungs are clear to auscultation.  Cardiovascular:  S1-S2, rhythm appeared to be normal.  She had a 2 to 3/6 systolic ejection murmur heard best at the right sternal border.  Extremities:  Trace to 1+ ankle edema.  GI:  Abdomen nontender, nondistended without hepatosplenomegaly.  Ortho:  Patient had a positive Tinel sign involving her left wrist.  A formal lumbar spine exam cannot be performed.  The patient complained of pain in the left buttock with radiation down the left leg.  Derm:  Patient has a seborrheic keratosis behind the right ear.    Assessment:  1.  Chronic left lower back pain with sciatica involving the left leg  2.  Carpal tunnel syndrome involving the left hand and wrist  3.  Bilateral lower extremity edema  4.  Urinary frequency and urgency  5.  Seborrheic keratosis  6.  Aortic stenosis  7.  Hypothyroidism  8.  Hyperlipidemia  9.  Hypertension.    Plan:  1.  We will refer the patient to pain management  2.  We will refer the patient back to Urogynecology  3.  Will schedule a flu shot today  4.  Will check a CMP, lipid panel, TSH T4 today.

## 2019-10-11 ENCOUNTER — TELEPHONE (OUTPATIENT)
Dept: INTERNAL MEDICINE | Facility: CLINIC | Age: 84
End: 2019-10-11

## 2019-10-11 DIAGNOSIS — E03.9 HYPOTHYROIDISM, UNSPECIFIED TYPE: Primary | ICD-10-CM

## 2019-10-11 DIAGNOSIS — I10 ESSENTIAL HYPERTENSION: ICD-10-CM

## 2019-10-11 DIAGNOSIS — Z87.19 H/O: GI BLEED: ICD-10-CM

## 2019-10-11 NOTE — TELEPHONE ENCOUNTER
----- Message from Yakov Barrett MD sent at 10/11/2019  5:13 AM CDT -----  Please contact patient.Please contact and notify that her labs were acceptable. Please schedule a CBC, CMP, Lipid, TSH and T4 in 4 months. Orders are in.

## 2019-10-11 NOTE — TELEPHONE ENCOUNTER
Spoke with pt informed her of lab results, she was happy to hear that. Scheduled appt according to what days and times the pt could not do, reminder mailed.

## 2019-10-17 ENCOUNTER — EXTERNAL HOME HEALTH (OUTPATIENT)
Dept: HOME HEALTH SERVICES | Facility: HOSPITAL | Age: 84
End: 2019-10-17
Payer: MEDICARE

## 2019-10-18 NOTE — PROGRESS NOTES
Outpatient Care Management  Plan of Care Follow Up Visit    Patient: Linda Hylton  MRN: [id number]  Date of Service: 10/18/2019  Completed by: Monica Casey RN  Referral Date: 07/30/2019  Program: Case Management (High Risk)    Reason for Visit   Patient presents with    OPCM RN First Follow-Up Attempt     10/10/19    OPCM RN Second Follow-Up Attempt     10/11/19    Update Plan Of Care     10/18/19       Patient Summary     Involvement of Care:  Do I have permission to speak with other family members about your care?       Problem List     Patient Active Problem List   Diagnosis    Essential hypertension    Acquired hypothyroidism    Sciatica of left side    Aortic stenosis    Vitamin D deficiency    Nausea & vomiting    Aortic arch atherosclerosis    Arterial stenosis    Intracranial atherosclerosis    PAOD (peripheral arterial occlusive disease)    Dizziness    Acute encephalopathy    Mixed hyperlipidemia    Vertebral basilar insufficiency    Fatigue    Iron deficiency anemia    History of stroke    Embolic stroke involving left middle cerebral artery    Hyponatremia    Cytotoxic cerebral edema    Nodule of apex of right lung    Melena    Telangiectasia of Base of tongue    Debility    Incidental lung nodule, > 3mm and < 8mm    S/P laparoscopic cholecystectomy       Reviewed Active Problem List with patient and/or Caregiver.    Patient Reported Labs & Vitals:  1.  Any Patient Reported Labs & Vitals?  Yes  2.  Patient Reported Blood Pressure:  135/80  3.  Patient Reported Pulse:     4.  Patient Reported Weight (Kg):  151 lbs  5.  Patient Reported Blood Glucose (mg/dl):       Medical History:  Reviewed medical history with patient and/or caregiver    Social History:  Reviewed social history with patient and/or caregiver    Clinical Assessment     Reviewed and provided basic information on available community resources for mental health, transportation, wellness resources, and palliative  care programs with patient and/or caregiver.    Complex Care Plan     Care plan was discussed and completed today with input from patient and/or caregiver.    Goals      Patient/caregiver will have an action plan in place to manage and prevent complications of high blood pressure prior to discharge from OPCM. - Priority: High      Overall Time to Completion  2 months from 08/29/2019     hospitals Identified Patient Barriers:  Housing--Deferred  Fall Risk--Care Plan Created  Advanced Care Planning--Referred to OPCM     RN Identified Patient Barriers:  Housing--Deferred  ADLs--Deferred (patient has a private sitter to assist)      Short Term Goals    Patient/caregiver will verbalize 2 red flags of Hypertension and know when to contact Physician within 2 weeks.  Interventions   · Assess for availability of working blood pressure cuff in home setting.  · Assess for retention of the signs and symptoms of disease specific exacerbation.  · Collaborate with Physician as appropriate to meet patient needs.  · Empower patient/caregiver to discuss treatment plan with Physician/care team.  · Encourage compliance with Physician follow-ups.  · Encourage Dietary Compliance.  · Encourage Exercise.  · Encourage Medication Compliance.  · Recognize and provide educational material (KRATAN).  · Review eating/nutrition habits.     Status  · Partially met        Patient/caregiver will verbalize 2 ways of preventing complications due to disease process within 3 weeks.  Interventions   · Assess for retention of the signs and symptoms of disease specific exacerbation.  · Encourage Dietary Compliance.  · Encourage Exercise.  · Encourage Medication Compliance.  · Recognize and provide educational material (KRAMES).  · Review eating/nutrition habits.     Status  · Partially met             Patient/caregiver will have Safety Plan in place prior to discharge from OPCM. - Priority: High      Overall Time to Completion  2 months from  08/29/2019      OPCM Identified Patient Barriers:  Housing--Deferred  Fall Risk--Care Plan Created  Advanced Care Planning--Referred to OPCM     RN Identified Patient Barriers:  Housing--Deferred  ADLs--Deferred (patient has a private sitter to assist)      Short Term Goals    Patient/caregiver will verbalize importance of having a safe home environment by keeping room free of clutter, making sure no electrical cords placed in walk ways, making sure rooms are well lit, placing non-skid bath mats and removing throw rugs within 2 weeks.  Interventions   · Collaborate with Physician as appropriate to meet patient needs.  · Empower patient/caregiver to discuss treatment plan with Physician/care team.  · Encourage family/caregiver to help patient perform ADLs according to the patient's capabilities.  · Recognize and provide educational material ([employer]).     Status  · Met-9/19/19                Patient Instructions     Instructions were provided via the [facility] patient resources and are available for the patient to view on the patient portal.    Follow up in about 4 weeks (around 11/1/2019) for OPCM.     Contacted patient to complete follow-up call for OPCM. Patient states she is feeling good today. She does report sciatica pain but states that its getting a little better each day. She states that she has an appointment with the pain clinic on 10/31/19 for back and sciatica pain. Will mail patient education on sciatica pain and home care. Patient reports her BP as 135/80 and her weight today as 151 lbs. Reminded patient of upcoming appointments, verbalized awareness. Discussed completing follow up call with patient, who is in agreement with call in 2 weeks.          Todays OPCM Self-Management Care Plan was developed with the patients/caregivers input and was based on identified barriers from todays assessment.  Goals were written today with the patient/caregiver and the patient has agreed to work towards  these goals to improve his/her overall well-being. Patient verbalized understanding of the care plan, goals, and all of today's instructions. Encouraged patient/caregiver to communicate with his/her physician and health care team about health conditions and the treatment plan.  Provided my contact information today and encouraged patient/caregiver to call me with any questions as needed.

## 2019-10-22 ENCOUNTER — PATIENT OUTREACH (OUTPATIENT)
Dept: ADMINISTRATIVE | Facility: OTHER | Age: 84
End: 2019-10-22

## 2019-10-29 ENCOUNTER — PATIENT OUTREACH (OUTPATIENT)
Dept: ADMINISTRATIVE | Facility: OTHER | Age: 84
End: 2019-10-29

## 2019-10-30 ENCOUNTER — TELEPHONE (OUTPATIENT)
Dept: PAIN MEDICINE | Facility: CLINIC | Age: 84
End: 2019-10-30

## 2019-10-30 NOTE — TELEPHONE ENCOUNTER
Good morning,afternoon Mr./Mrs.    My name is Linda I am contacting you from [facility] regarding your appointment scheduled for,10/31/2019 with, Kerline Chong just confirming you will be able to make it.      Staff left voicemail reminding pt of their appt.  MD

## 2019-10-31 ENCOUNTER — EXTERNAL CHRONIC CARE MANAGEMENT (OUTPATIENT)
Dept: PRIMARY CARE CLINIC | Facility: CLINIC | Age: 84
End: 2019-10-31
Payer: MEDICARE

## 2019-10-31 PROCEDURE — 99490 PR CHRONIC CARE MGMT, 1ST 20 MIN: ICD-10-PCS | Mod: S$PBB,,, | Performed by: INTERNAL MEDICINE

## 2019-10-31 PROCEDURE — 99490 CHRNC CARE MGMT STAFF 1ST 20: CPT | Mod: PBBFAC | Performed by: INTERNAL MEDICINE

## 2019-10-31 PROCEDURE — 99490 CHRNC CARE MGMT STAFF 1ST 20: CPT | Mod: S$PBB,,, | Performed by: INTERNAL MEDICINE

## 2019-11-01 ENCOUNTER — OUTPATIENT CASE MANAGEMENT (OUTPATIENT)
Dept: ADMINISTRATIVE | Facility: OTHER | Age: 84
End: 2019-11-01

## 2019-11-18 NOTE — PROGRESS NOTES
11/18/19  Summary:  Last Attempt to complete follow-up call for OPCM    Interventions:  Left message requesting a return call, a letter was mailed (and sent via pt portal if pt is active on portal)at the time of the 1st attempt.     Plan:  Close case. Monica Casey RN    11/12/19  Summary:  3rd Attempt to complete follow-up call for OPCM    Interventions:  Left message requesting a return call, a letter was mailed (and sent via pt portal if pt is active on portal)at the time of the 1st attempt.     Plan:  Scheduled pt for call back. Monica Casey RN    11/7/19  Summary:  2nd Attempt to complete follow-up call for OPCM    Interventions:  Left message requesting a return call, letter requesting a return call was mailed and sent via pt portal at time of 1st attempt.     Plan:  Scheduled pt for call back. Monica Casey RN    11/1/19  Summary:  1st Attempt to complete follow-up call or Outpatient Care Management    Interventions:  Left message requesting a return call, letter mailed (and sent via pt portal if pt is active portal) requesting a return call.      Plan:  Attempt to contact as scheduled  Monica Casey RN  Outpatient Care Management

## 2019-11-30 ENCOUNTER — EXTERNAL CHRONIC CARE MANAGEMENT (OUTPATIENT)
Dept: PRIMARY CARE CLINIC | Facility: CLINIC | Age: 84
End: 2019-11-30
Payer: MEDICARE

## 2019-11-30 PROCEDURE — 99490 PR CHRONIC CARE MGMT, 1ST 20 MIN: ICD-10-PCS | Mod: S$PBB,,, | Performed by: INTERNAL MEDICINE

## 2019-11-30 PROCEDURE — 99490 CHRNC CARE MGMT STAFF 1ST 20: CPT | Mod: PBBFAC | Performed by: INTERNAL MEDICINE

## 2019-11-30 PROCEDURE — 99490 CHRNC CARE MGMT STAFF 1ST 20: CPT | Mod: S$PBB,,, | Performed by: INTERNAL MEDICINE

## 2019-12-19 ENCOUNTER — TELEPHONE (OUTPATIENT)
Dept: INTERNAL MEDICINE | Facility: CLINIC | Age: 84
End: 2019-12-19

## 2019-12-19 NOTE — TELEPHONE ENCOUNTER
[facility] faxed in prescription clarification request and is asking for a NEW prescription per pts insurance. The directions have two ways to use medication and per insurance it need to be one. See medication directions below.    Take 1 tablet (25 mg total) by mouth 3 (three) times daily as needed for Dizziness    TID or prn, can not call in new prescription need to be e-prescribed.

## 2019-12-31 ENCOUNTER — EXTERNAL CHRONIC CARE MANAGEMENT (OUTPATIENT)
Dept: PRIMARY CARE CLINIC | Facility: CLINIC | Age: 84
End: 2019-12-31
Payer: MEDICARE

## 2019-12-31 PROCEDURE — 99490 CHRNC CARE MGMT STAFF 1ST 20: CPT | Mod: PBBFAC | Performed by: INTERNAL MEDICINE

## 2019-12-31 PROCEDURE — 99490 CHRNC CARE MGMT STAFF 1ST 20: CPT | Mod: S$PBB,,, | Performed by: INTERNAL MEDICINE

## 2019-12-31 PROCEDURE — 99490 PR CHRONIC CARE MGMT, 1ST 20 MIN: ICD-10-PCS | Mod: S$PBB,,, | Performed by: INTERNAL MEDICINE

## 2020-02-25 DIAGNOSIS — R42 DIZZINESS: ICD-10-CM

## 2020-03-01 RX ORDER — MECLIZINE HYDROCHLORIDE 25 MG/1
25 TABLET ORAL 3 TIMES DAILY PRN
Qty: 90 TABLET | Refills: 3 | Status: SHIPPED | OUTPATIENT
Start: 2020-03-01 | End: 2020-04-29 | Stop reason: SDUPTHER

## 2020-04-16 DIAGNOSIS — G47.9 TROUBLE IN SLEEPING: ICD-10-CM

## 2020-04-16 RX ORDER — TRAZODONE HYDROCHLORIDE 50 MG/1
50 TABLET ORAL NIGHTLY
Qty: 30 TABLET | Refills: 11 | Status: SHIPPED | OUTPATIENT
Start: 2020-04-16 | End: 2021-02-09

## 2020-04-28 ENCOUNTER — TELEPHONE (OUTPATIENT)
Dept: INTERNAL MEDICINE | Facility: CLINIC | Age: 85
End: 2020-04-28

## 2020-04-28 NOTE — TELEPHONE ENCOUNTER
[facility] fax refill request for an alternative to meclizine, pharmacy comments: Alternative requested Insurance requires 90 day supply.  Fax is in you signature paperwork.

## 2020-04-29 DIAGNOSIS — R42 DIZZINESS: ICD-10-CM

## 2020-04-29 NOTE — PROGRESS NOTES
Refill Routing Note    Medication(s) are not appropriate for processing by [facility]:       Outside of protocol           Medication reconciliation completed: No      Appointments [id number] or future 3m with PCP    Date Provider   Last Visit   10/10/2019 Yakov Barrett MD   Next Visit   Visit date not found Yakov Barrett MD     Automatic Epic Protocol Generated Data:    Requested Prescriptions   Pending Prescriptions Disp Refills    meclizine (ANTIVERT) 25 mg tablet 90 tablet 3     Sig: Take 1 tablet (25 mg total) by mouth 3 (three) times daily as needed for Dizziness.       There is no refill protocol information for this order           Note composed:3:05 PM 04/29/2020

## 2020-04-29 NOTE — TELEPHONE ENCOUNTER
----- Message from Reyna Tang sent at 4/29/2020 10:21 AM CDT -----  Contact: Lulu with [facility] pharmacy   Requesting an RX refill or new RX.  Is this a refill or new RX:  Refill   RX name and strength: meclizine (ANTIVERT) 25 mg tablet  Directions (copy/paste from chart):    Is this a 30 day or 90 day RX:  90   Local pharmacy or mail order pharmacy:  [facility]/pharmacy #[id number] - [place], [place] - [address] AT [facility] [phone] (Phone)  [phone] (Fax)  Pharmacy name and phone # (copy/paste from chart):     Comments:

## 2020-05-01 RX ORDER — MECLIZINE HYDROCHLORIDE 25 MG/1
25 TABLET ORAL 3 TIMES DAILY PRN
Qty: 30 TABLET | Refills: 1 | Status: SHIPPED | OUTPATIENT
Start: 2020-05-01 | End: 2020-08-24

## 2020-06-22 NOTE — TELEPHONE ENCOUNTER
----- Message from Earnestine Acosta sent at 6/17/2019  2:08 PM CDT -----  Contact: pt @ [phone]  Calling to schedule an appt with Dr. Gil. Please call.  
Pt has accepted an appt with Dr. Gil on 7/15/19 @ 1:00pm here at [facility]. Appt now viewable via [email or web address].   
Detail Level: Zone
Detail Level: Detailed

## 2020-07-15 DIAGNOSIS — Z71.89 COMPLEX CARE COORDINATION: ICD-10-CM

## 2020-08-22 DIAGNOSIS — R42 DIZZINESS: ICD-10-CM

## 2020-08-22 NOTE — PROGRESS NOTES
Refill Routing Note   Medication(s) are not appropriate for processing by [facility]:       - Outside of protocol           Medication reconciliation completed: No      Automatic Epic Generated Protocol Data:        Requested Prescriptions   Pending Prescriptions Disp Refills    meclizine (ANTIVERT) 25 mg tablet [Pharmacy Med Name: MECLIZINE 25 MG TABLET] 90 tablet 3     Sig: TAKE 1 TABLET (25 MG TOTAL) BY MOUTH 3 (THREE) TIMES DAILY AS NEEDED FOR DIZZINESS.       There is no refill protocol information for this order           Appointments  past 12m or future 3m with PCP    Date Provider   Last Visit   10/10/2019 Yakov Barrett MD   Next Visit   Visit date not found Yakov Barrett MD   ED visits in past 90 days: 0     Note composed:4:27 PM 08/22/2020

## 2020-08-24 RX ORDER — MECLIZINE HYDROCHLORIDE 25 MG/1
25 TABLET ORAL 3 TIMES DAILY PRN
Qty: 90 TABLET | Refills: 3 | Status: SHIPPED | OUTPATIENT
Start: 2020-08-24 | End: 2020-12-18

## 2020-09-29 ENCOUNTER — PATIENT MESSAGE (OUTPATIENT)
Dept: OTHER | Facility: OTHER | Age: 85
End: 2020-09-29

## 2020-09-29 DIAGNOSIS — I10 ESSENTIAL HYPERTENSION: ICD-10-CM

## 2020-09-29 RX ORDER — LISINOPRIL 5 MG/1
5 TABLET ORAL DAILY
Qty: 90 TABLET | Refills: 3 | Status: SHIPPED | OUTPATIENT
Start: 2020-09-29 | End: 2022-06-03 | Stop reason: SDUPTHER

## 2020-09-29 NOTE — TELEPHONE ENCOUNTER
Care Due:                  Date            Visit Type   Department     Provider  --------------------------------------------------------------------------------                                ESTABLISHED   [facility] INTERNAL  Last Visit: 10-      PATIENT      MEDICINE       RORY OGDEN  Next Visit: None Scheduled  None         None Found                                                            Last  Test          Frequency    Reason                     Performed    Due Date  --------------------------------------------------------------------------------    Office Visit  12 months..  atorvastatin,              10-   10-                             levothyroxine, traZODone.    ALT.........  12 months..  atorvastatin.............  10-   10-    AST.........  12 months..  atorvastatin.............  10-   10-    HDL.........  12 months..  atorvastatin.............  10-   10-    LDL.........  12 months..  atorvastatin.............  10-   10-    Total         12 months..  atorvastatin.............  10-   10-  Cholesterol.    Triglyceride  12 months..  atorvastatin.............  10-   10-  s...........    TSH.........  12 months..  levothyroxine............  10-   10-    Powered by [employer]. Reference number: [id number]. 9/29/2020 9:22:19 AM CDT

## 2020-09-30 NOTE — ASSESSMENT & PLAN NOTE
Much improved from admission but patient still with mild disorientation but no further lethargy as on initial presentation to hospital. Uncertain etiology but suspect presyncope related to dehydration in patient with known vertebrobasilar insufficiency causing transient neurologic symptoms. No evidence to suggest infectious cause as U/A and CXR with no evidence of infection and patient with normal pro calcitonin level. So far blood cultures are also no growth and patient afebrile and no systemic symptoms to suggest infectious cause.   - Vascular neurology consulted and MRI and EEG ordered as per their recs and EEG with no evidence of seizure and MRI of brain with no evidence of acute stroke and Vascular Neurology recommends to continue patient on statin therapy and DAPT as taking prior to this admit with no changes and to allow patient to have permissive HTN with -160 range on discharge sio plan to discontinue Norvasc for HTN on discharge.      not applicable

## 2020-10-01 RX ORDER — GABAPENTIN 300 MG/1
CAPSULE ORAL
Qty: 90 CAPSULE | Refills: 3 | Status: CANCELLED | OUTPATIENT
Start: 2020-10-01

## 2020-10-01 NOTE — TELEPHONE ENCOUNTER
----- Message from Yana Ruiz sent at 10/1/2020  3:17 PM CDT -----  Regarding: refill  Contact: self  Type:  Needs Medical Advice    Who Called: Self    Would the patient rather a call back or a response via [facility]? All back     Best Call Back Number: [phone]    Additional Information: Self [phone]----calling to get a refill on the pt gabapentin (NEURONTIN) 300 MG capsule. The pt is requesting a call back

## 2020-10-13 ENCOUNTER — TELEPHONE (OUTPATIENT)
Dept: INTERNAL MEDICINE | Facility: CLINIC | Age: 85
End: 2020-10-13

## 2020-10-13 NOTE — TELEPHONE ENCOUNTER
----- Message from Chelly Martínez sent at 10/13/2020 10:09 AM CDT -----  Contact: Patient [phone]  Prescription Request:     Name of medication: gabapentin (NEURONTIN) 300 MG capsule    Reason for request: Refill    Pharmacy: [facility]/pharmacy #[id number] - [facility] [place] - [address] AT [facility]    Please note that I spoke to the pharmacy and they said that the patient has picked up 12 months of this Rx in 8 months. But patient stated that she is only taking one a day  but does not have any left.    Thank You

## 2020-10-13 NOTE — TELEPHONE ENCOUNTER
Please note that Josiane spoke to the pharmacy and they said that the patient has picked up 12 months of this Rx in 8 months. But patient stated that she is only taking one a day  but does not have any left.

## 2020-10-15 ENCOUNTER — PATIENT MESSAGE (OUTPATIENT)
Dept: INTERNAL MEDICINE | Facility: CLINIC | Age: 85
End: 2020-10-15

## 2020-10-15 RX ORDER — GABAPENTIN 300 MG/1
CAPSULE ORAL
Qty: 90 CAPSULE | Refills: 3 | Status: SHIPPED | OUTPATIENT
Start: 2020-10-15 | End: 2021-05-23

## 2020-10-16 NOTE — TELEPHONE ENCOUNTER
Please check with her pharmacy. I did not think she could refill ahead of time. Is that not correct?

## 2020-10-20 NOTE — TELEPHONE ENCOUNTER
Spoke with pharmacist at [facility] and she stated that the pt picked up her rx on 10/15/2020 as written 90 capsules and she has 3 refills remaining.

## 2020-12-11 ENCOUNTER — PATIENT MESSAGE (OUTPATIENT)
Dept: OTHER | Facility: OTHER | Age: 85
End: 2020-12-11

## 2020-12-17 DIAGNOSIS — R42 DIZZINESS: ICD-10-CM

## 2020-12-18 RX ORDER — MECLIZINE HYDROCHLORIDE 25 MG/1
25 TABLET ORAL 3 TIMES DAILY PRN
Qty: 90 TABLET | Refills: 3 | Status: SHIPPED | OUTPATIENT
Start: 2020-12-18 | End: 2021-04-30

## 2021-01-04 ENCOUNTER — PATIENT MESSAGE (OUTPATIENT)
Dept: ADMINISTRATIVE | Facility: HOSPITAL | Age: 86
End: 2021-01-04

## 2021-02-05 DIAGNOSIS — G47.9 TROUBLE IN SLEEPING: ICD-10-CM

## 2021-02-09 RX ORDER — TRAZODONE HYDROCHLORIDE 50 MG/1
50 TABLET ORAL NIGHTLY
Qty: 90 TABLET | Refills: 3 | Status: SHIPPED | OUTPATIENT
Start: 2021-02-09 | End: 2022-02-21

## 2021-02-18 ENCOUNTER — TELEPHONE (OUTPATIENT)
Dept: INTERNAL MEDICINE | Facility: CLINIC | Age: 86
End: 2021-02-18

## 2021-02-19 RX ORDER — CLOPIDOGREL BISULFATE 75 MG/1
75 TABLET ORAL DAILY
Qty: 90 TABLET | Refills: 3 | Status: SHIPPED | OUTPATIENT
Start: 2021-02-19 | End: 2022-02-21

## 2021-02-19 RX ORDER — PANTOPRAZOLE SODIUM 40 MG/1
40 TABLET, DELAYED RELEASE ORAL DAILY
Qty: 90 TABLET | Refills: 3 | Status: SHIPPED | OUTPATIENT
Start: 2021-02-19 | End: 2022-02-21

## 2021-02-19 RX ORDER — LEVOTHYROXINE SODIUM 88 UG/1
75 TABLET ORAL DAILY
Qty: 90 TABLET | Refills: 3 | Status: SHIPPED | OUTPATIENT
Start: 2021-02-19 | End: 2022-02-21

## 2021-03-01 ENCOUNTER — PES CALL (OUTPATIENT)
Dept: ADMINISTRATIVE | Facility: CLINIC | Age: 86
End: 2021-03-01

## 2021-03-19 ENCOUNTER — PATIENT MESSAGE (OUTPATIENT)
Dept: INTERNAL MEDICINE | Facility: CLINIC | Age: 86
End: 2021-03-19

## 2021-03-22 RX ORDER — ATORVASTATIN CALCIUM 80 MG/1
80 TABLET, FILM COATED ORAL DAILY
Qty: 90 TABLET | Refills: 3 | Status: SHIPPED | OUTPATIENT
Start: 2021-03-22 | End: 2022-02-21

## 2021-04-05 ENCOUNTER — PATIENT MESSAGE (OUTPATIENT)
Dept: ADMINISTRATIVE | Facility: HOSPITAL | Age: 86
End: 2021-04-05

## 2021-04-06 ENCOUNTER — PES CALL (OUTPATIENT)
Dept: ADMINISTRATIVE | Facility: CLINIC | Age: 86
End: 2021-04-06

## 2021-04-15 ENCOUNTER — PATIENT MESSAGE (OUTPATIENT)
Dept: RESEARCH | Facility: HOSPITAL | Age: 86
End: 2021-04-15

## 2021-04-28 DIAGNOSIS — R42 DIZZINESS: ICD-10-CM

## 2021-04-30 RX ORDER — MECLIZINE HYDROCHLORIDE 25 MG/1
25 TABLET ORAL 3 TIMES DAILY PRN
Qty: 90 TABLET | Refills: 3 | Status: SHIPPED | OUTPATIENT
Start: 2021-04-30 | End: 2021-08-28

## 2021-05-11 ENCOUNTER — PES CALL (OUTPATIENT)
Dept: ADMINISTRATIVE | Facility: CLINIC | Age: 86
End: 2021-05-11

## 2021-05-23 RX ORDER — GABAPENTIN 300 MG/1
CAPSULE ORAL
Qty: 90 CAPSULE | Refills: 3 | Status: SHIPPED | OUTPATIENT
Start: 2021-05-23 | End: 2021-07-27 | Stop reason: SDUPTHER

## 2021-06-13 NOTE — PROGRESS NOTES
Third attempt to complete SW assessment. SW mailed letter after second attempt for SW follow-up with contact information requesting return call and pt has not reached out to to LMSW. LMSW will close case and notified OPCM RN    used

## 2021-07-06 ENCOUNTER — PATIENT MESSAGE (OUTPATIENT)
Dept: ADMINISTRATIVE | Facility: HOSPITAL | Age: 86
End: 2021-07-06

## 2021-07-27 ENCOUNTER — TELEPHONE (OUTPATIENT)
Dept: INTERNAL MEDICINE | Facility: CLINIC | Age: 86
End: 2021-07-27

## 2021-07-27 RX ORDER — GABAPENTIN 300 MG/1
CAPSULE ORAL
Qty: 90 CAPSULE | Refills: 3 | Status: SHIPPED | OUTPATIENT
Start: 2021-07-27 | End: 2022-06-03 | Stop reason: SDUPTHER

## 2021-08-27 DIAGNOSIS — R42 DIZZINESS: ICD-10-CM

## 2021-08-28 ENCOUNTER — PATIENT OUTREACH (OUTPATIENT)
Dept: ADMINISTRATIVE | Facility: HOSPITAL | Age: 86
End: 2021-08-28

## 2021-08-28 ENCOUNTER — PATIENT MESSAGE (OUTPATIENT)
Dept: ADMINISTRATIVE | Facility: HOSPITAL | Age: 86
End: 2021-08-28

## 2021-08-28 RX ORDER — MECLIZINE HYDROCHLORIDE 25 MG/1
25 TABLET ORAL 3 TIMES DAILY PRN
Qty: 90 TABLET | Refills: 0 | Status: SHIPPED | OUTPATIENT
Start: 2021-08-28 | End: 2021-10-13 | Stop reason: SDUPTHER

## 2021-09-07 ENCOUNTER — TELEPHONE (OUTPATIENT)
Dept: ADMINISTRATIVE | Facility: HOSPITAL | Age: 86
End: 2021-09-07

## 2021-09-08 ENCOUNTER — TELEPHONE (OUTPATIENT)
Dept: INTERNAL MEDICINE | Facility: CLINIC | Age: 86
End: 2021-09-08

## 2021-10-04 ENCOUNTER — PATIENT MESSAGE (OUTPATIENT)
Dept: ADMINISTRATIVE | Facility: HOSPITAL | Age: 86
End: 2021-10-04

## 2021-10-13 DIAGNOSIS — R42 DIZZINESS: ICD-10-CM

## 2021-10-15 RX ORDER — MECLIZINE HYDROCHLORIDE 25 MG/1
25 TABLET ORAL 3 TIMES DAILY PRN
Qty: 90 TABLET | Refills: 0 | Status: SHIPPED | OUTPATIENT
Start: 2021-10-15 | End: 2022-02-21

## 2021-12-20 NOTE — ASSESSMENT & PLAN NOTE
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 LKN 18:00, within window for tPA, however patient refused. Per chart review, patient was seen day PTA and diagonsed with a UTI and started on Macrobid. Repeat UA here with no evidence of infection so abx d/c'd.    MRI Brain is with small acute infarcts in the L basal ganglia. 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 Echo demonstrated mild LAE. Stroke etiology not completely clear, but appears embolic/ESUS on imaging. Will plan for 30-day cardiac event monitor following SNF discharge.     Stroke team member discussed level of supervision at home with pt's son by bedside on 2/21. Patient lives with son who works overnight, and he typically takes care of patient's needs during the day but leaves patient at home alone overnight when he works. SNF may be a more safe and appropriate disposition for this pt who is now with decreased mobility from her previous baseline prior to admission, however pt is not in favor of this plan and may refuse.  02/25- Patient now amendable to plan to discharge to facility.     patient may be discharged and have endoscopy done as outpatient if H&H remains stable.    Antithrombotics for secondary stroke prevention: Antiplatelets: On hold for possible GI scope   Statins for secondary stroke prevention and hyperlipidemia, if present:   Statins: Atorvastatin- 80 mg daily  Aggressive risk factor modification: HTN, HLD, Obesity, prior stroke  Rehab efforts: PT/OT/SLP to evaluate and treat  Diagnostics ordered/pending: None   VTE prophylaxis: Heparin 5000 units SQ every 8 hours  Place [facility]   BP parameters: SBP < 160   no

## 2022-01-09 DIAGNOSIS — G47.9 TROUBLE IN SLEEPING: ICD-10-CM

## 2022-01-09 DIAGNOSIS — R42 DIZZINESS: ICD-10-CM

## 2022-01-09 NOTE — TELEPHONE ENCOUNTER
No new care gaps identified.  Powered by [employer]. Reference number: [id number].   1/09/2022 3:22:51 PM CST

## 2022-01-11 RX ORDER — MECLIZINE HYDROCHLORIDE 25 MG/1
TABLET ORAL
Qty: 90 TABLET | Refills: 0 | OUTPATIENT
Start: 2022-01-11

## 2022-01-11 RX ORDER — ATORVASTATIN CALCIUM 80 MG/1
TABLET, FILM COATED ORAL
Qty: 90 TABLET | Refills: 3 | OUTPATIENT
Start: 2022-01-11

## 2022-01-11 RX ORDER — PANTOPRAZOLE SODIUM 40 MG/1
TABLET, DELAYED RELEASE ORAL
Qty: 90 TABLET | Refills: 3 | OUTPATIENT
Start: 2022-01-11

## 2022-01-11 RX ORDER — LEVOTHYROXINE SODIUM 88 UG/1
TABLET ORAL
Qty: 90 TABLET | Refills: 3 | OUTPATIENT
Start: 2022-01-11

## 2022-01-11 RX ORDER — TRAZODONE HYDROCHLORIDE 50 MG/1
50 TABLET ORAL NIGHTLY
Qty: 90 TABLET | Refills: 3 | OUTPATIENT
Start: 2022-01-11 | End: 2023-01-11

## 2022-01-11 RX ORDER — CLOPIDOGREL BISULFATE 75 MG/1
TABLET ORAL
Qty: 90 TABLET | Refills: 3 | OUTPATIENT
Start: 2022-01-11

## 2022-01-11 NOTE — TELEPHONE ENCOUNTER
Provider Staff:     Action required for this patient.    Please note Refusal of medication.            Requested Prescriptions     Refused Prescriptions Disp Refills    clopidogreL (PLAVIX) 75 mg tablet [Pharmacy Med Name: CLOPIDOGREL 75 MG TABLET] 90 tablet 3     Sig: TAKE 1 TABLET BY MOUTH EVERY DAY     Refused By: RORY OGDEN     Reason for Refusal: Patient needs an appointment    atorvastatin (LIPITOR) 80 MG tablet [Pharmacy Med Name: ATORVASTATIN 80 MG TABLET] 90 tablet 3     Sig: TAKE 1 TABLET BY MOUTH EVERY DAY     Refused By: RORY OGDEN     Reason for Refusal: Patient needs an appointment    meclizine (ANTIVERT) 25 mg tablet [Pharmacy Med Name: MECLIZINE 25 MG TABLET] 90 tablet 0     Sig: TAKE 1 TABLET BY MOUTH 3 TIMES DAILY AS NEEDED FOR DIZZINESS.     Refused By: RORY OGDEN     Reason for Refusal: Patient needs an appointment    pantoprazole (PROTONIX) 40 MG tablet [Pharmacy Med Name: PANTOPRAZOLE SOD DR 40 MG TAB] 90 tablet 3     Sig: TAKE 1 TABLET BY MOUTH EVERY DAY     Refused By: RORY OGDEN     Reason for Refusal: Patient needs an appointment    traZODone (DESYREL) 50 MG tablet [Pharmacy Med Name: TRAZODONE 50 MG TABLET] 90 tablet 3     Sig: TAKE 1 TABLET (50 MG TOTAL) BY MOUTH EVERY EVENING.     Refused By: RORY OGDEN     Reason for Refusal: Patient needs an appointment    levothyroxine (SYNTHROID) 88 MCG tablet [Pharmacy Med Name: LEVOTHYROXINE 88 MCG TABLET] 90 tablet 3     Sig: TAKE 1 TABLET BY MOUTH EVERY DAY     Refused By: RORY OGDEN     Reason for Refusal: Patient needs an appointment      ThanksJerrica  [facility]   Note composed: 01/11/2022 4:17 PM

## 2022-01-27 ENCOUNTER — TELEPHONE (OUTPATIENT)
Dept: INTERNAL MEDICINE | Facility: CLINIC | Age: 87
End: 2022-01-27

## 2022-01-27 ENCOUNTER — LAB VISIT (OUTPATIENT)
Dept: PRIMARY CARE CLINIC | Facility: CLINIC | Age: 87
End: 2022-01-27
Payer: MEDICARE

## 2022-01-27 DIAGNOSIS — Z20.822 CONTACT WITH AND (SUSPECTED) EXPOSURE TO COVID-19: ICD-10-CM

## 2022-01-27 LAB
CTP QC/QA: YES
SARS-COV-2 AG RESP QL IA.RAPID: POSITIVE

## 2022-01-27 PROCEDURE — 87811 SARS-COV-2 COVID19 W/OPTIC: CPT

## 2022-01-27 NOTE — TELEPHONE ENCOUNTER
Congested, barely talks, coughing sneezing, low grade fever, temp 99.8. oxygen not below 96, she lives with her son. I spoke to the patient. She started having symptoms. Advised patient to let her unvaccinated son know to wear a mask in public if he must go out and if he should decide to get tested he will be on quarantine 10 days she will be 5 days. Thank you.

## 2022-01-31 ENCOUNTER — TELEPHONE (OUTPATIENT)
Dept: INTERNAL MEDICINE | Facility: CLINIC | Age: 87
End: 2022-01-31
Payer: MEDICARE

## 2022-01-31 DIAGNOSIS — U07.1 COVID-19: Primary | ICD-10-CM

## 2022-01-31 NOTE — TELEPHONE ENCOUNTER
This [facility] location does not stock the Covid anti-viral medication. Per [facility] pharmacist [facility] have the anti-viral medication.

## 2022-01-31 NOTE — TELEPHONE ENCOUNTER
Spoke with daughter in law [place] informed anti-viral medication is not sent.  Spoke with Dr. Barrett he will send to [facility], notified daughter in law [place].

## 2022-01-31 NOTE — TELEPHONE ENCOUNTER
----- Message from Eloise Colon sent at 1/31/2022 11:23 AM CST -----  Contact: Veronica daughter in law [phone]  Patient would like to get medical advice.  Symptoms (please be specific):    How long have you had these symptoms:   Would you like a call back, or a response through your [facility] portal?:call back  Pharmacy name and phone # (copy from chart):    Comments:  Pt's daughter in law Veronica is calling because the pt tested positive for covid and said that he would have a monitoring system for her and some medication. She stated that she is not getting any better and wants to speak with someone

## 2022-02-01 ENCOUNTER — TELEPHONE (OUTPATIENT)
Dept: INTERNAL MEDICINE | Facility: CLINIC | Age: 87
End: 2022-02-01
Payer: MEDICARE

## 2022-02-01 NOTE — TELEPHONE ENCOUNTER
----- Message from Ilan Landin sent at 1/31/2022  3:29 PM CST -----  Contact: [phone] Melani daughter in law  Patient is returning a phone call.  Who left a message for the patient: n/a  Does patient know what this is regarding: n/a   Would you like a call back, or a response through your [facility] portal?: call back   Comments: Please call back

## 2022-02-20 DIAGNOSIS — R42 DIZZINESS: ICD-10-CM

## 2022-02-20 DIAGNOSIS — G47.9 TROUBLE IN SLEEPING: ICD-10-CM

## 2022-02-20 NOTE — TELEPHONE ENCOUNTER
Care Due:                  Date            Visit Type   Department     Provider  --------------------------------------------------------------------------------    Last Visit: None Found      None         None Found  Next Visit: None Scheduled  None         None Found                                                            Last  Test          Frequency    Reason                     Performed    Due Date  --------------------------------------------------------------------------------    Office Visit  12 months..  atorvastatin,              Not Found    Overdue                             clopidogreL,                             levothyroxine,                             pantoprazole.............    CBC.........  12 months..  clopidogreL..............  Not Found    Overdue    CMP.........  12 months..  atorvastatin.............  Not Found    Overdue    Lipid Panel.  12 months..  atorvastatin.............  Not Found    Overdue    TSH.........  12 months..  levothyroxine............  Not Found    Overdue    Powered by [employer] by [employer]. Reference number: [id number].   2/20/2022 1:53:21 PM CST

## 2022-02-21 RX ORDER — ATORVASTATIN CALCIUM 80 MG/1
TABLET, FILM COATED ORAL
Qty: 30 TABLET | Refills: 0 | Status: SHIPPED | OUTPATIENT
Start: 2022-02-21 | End: 2022-04-02

## 2022-02-21 RX ORDER — PANTOPRAZOLE SODIUM 40 MG/1
TABLET, DELAYED RELEASE ORAL
Qty: 30 TABLET | Refills: 0 | Status: SHIPPED | OUTPATIENT
Start: 2022-02-21 | End: 2022-03-18

## 2022-02-21 RX ORDER — MECLIZINE HYDROCHLORIDE 25 MG/1
TABLET ORAL
Qty: 30 TABLET | Refills: 0 | Status: SHIPPED | OUTPATIENT
Start: 2022-02-21 | End: 2022-06-14

## 2022-02-21 RX ORDER — CLOPIDOGREL BISULFATE 75 MG/1
TABLET ORAL
Qty: 30 TABLET | Refills: 0 | Status: SHIPPED | OUTPATIENT
Start: 2022-02-21 | End: 2022-03-18

## 2022-02-21 RX ORDER — LEVOTHYROXINE SODIUM 88 UG/1
TABLET ORAL
Qty: 30 TABLET | Refills: 0 | Status: SHIPPED | OUTPATIENT
Start: 2022-02-21 | End: 2022-03-18

## 2022-02-21 RX ORDER — TRAZODONE HYDROCHLORIDE 50 MG/1
50 TABLET ORAL NIGHTLY
Qty: 30 TABLET | Refills: 0 | Status: SHIPPED | OUTPATIENT
Start: 2022-02-21 | End: 2022-03-18

## 2022-03-16 ENCOUNTER — PATIENT MESSAGE (OUTPATIENT)
Dept: ADMINISTRATIVE | Facility: HOSPITAL | Age: 87
End: 2022-03-16
Payer: MEDICARE

## 2022-03-30 DIAGNOSIS — G47.9 TROUBLE IN SLEEPING: ICD-10-CM

## 2022-03-30 NOTE — TELEPHONE ENCOUNTER
No new care gaps identified.  Powered by [employer] by [employer]. Reference number: [id number].   3/30/2022 9:32:08 AM CDT

## 2022-03-31 NOTE — TELEPHONE ENCOUNTER
This Rx Request does not qualify for assessment with the [facility]C   Please review protocol details and the Care Due Message for extra clinical information    Reasons Rx Request may be deferred:  Labs/Vitals overdue  Pt due for OV with PCP  Indication is outside of protocol for ORC - trazodone for sleep  Required indication for medication is not active on problem list - prilosec    Note composed:1:32 PM 03/31/2022

## 2022-04-02 RX ORDER — ATORVASTATIN CALCIUM 80 MG/1
TABLET, FILM COATED ORAL
Qty: 30 TABLET | Refills: 0 | Status: SHIPPED | OUTPATIENT
Start: 2022-04-02 | End: 2022-06-03 | Stop reason: SDUPTHER

## 2022-04-02 RX ORDER — PANTOPRAZOLE SODIUM 40 MG/1
TABLET, DELAYED RELEASE ORAL
Qty: 30 TABLET | Refills: 0 | Status: SHIPPED | OUTPATIENT
Start: 2022-04-02 | End: 2022-04-06

## 2022-04-02 RX ORDER — LEVOTHYROXINE SODIUM 88 UG/1
TABLET ORAL
Qty: 30 TABLET | Refills: 0 | Status: SHIPPED | OUTPATIENT
Start: 2022-04-02 | End: 2022-04-06

## 2022-04-02 RX ORDER — TRAZODONE HYDROCHLORIDE 50 MG/1
TABLET ORAL
Qty: 30 TABLET | Refills: 0 | Status: SHIPPED | OUTPATIENT
Start: 2022-04-02 | End: 2022-04-06

## 2022-04-02 RX ORDER — CLOPIDOGREL BISULFATE 75 MG/1
TABLET ORAL
Qty: 30 TABLET | Refills: 0 | Status: SHIPPED | OUTPATIENT
Start: 2022-04-02 | End: 2022-04-06

## 2022-04-04 ENCOUNTER — TELEPHONE (OUTPATIENT)
Dept: INTERNAL MEDICINE | Facility: CLINIC | Age: 87
End: 2022-04-04
Payer: MEDICARE

## 2022-04-04 NOTE — TELEPHONE ENCOUNTER
----- Message from Yakov Barrett MD sent at 4/2/2022 11:45 AM CDT -----  Regarding: Medications  Please contact patient. I will not be able to refill her medications after this time. I have not seen her in over a year. SHe has to make an appointment to be seen for a check up.

## 2022-04-05 DIAGNOSIS — G47.9 TROUBLE IN SLEEPING: ICD-10-CM

## 2022-04-05 NOTE — TELEPHONE ENCOUNTER
Refill Routing Note   Medication(s) are not appropriate for processing by [facility] for the following reason(s):      - Change request from Pharmacy-see pharmacy comment for details  - Patient has not been seen in over 15 months by PCP  - Required indication for medication not on problem list (Gerd)    ORC action(s):  Route          Medication reconciliation completed: No     Appointments  past 12m or future 3m with PCP    Date Provider   Last Visit   10/10/2019 Yakov Barrett MD   Next Visit   Visit date not found Yakov Barrett MD   ED visits in past 90 days: 0        Note composed:4:38 PM 04/05/2022

## 2022-04-05 NOTE — TELEPHONE ENCOUNTER
No new care gaps identified.  Powered by [employer] by [employer]. Reference number: [id number].   4/05/2022 2:26:10 PM CDT

## 2022-04-06 DIAGNOSIS — G47.9 TROUBLE IN SLEEPING: ICD-10-CM

## 2022-04-06 RX ORDER — TRAZODONE HYDROCHLORIDE 50 MG/1
50 TABLET ORAL NIGHTLY PRN
Qty: 30 TABLET | Refills: 0 | Status: SHIPPED | OUTPATIENT
Start: 2022-04-06 | End: 2022-06-03 | Stop reason: SDUPTHER

## 2022-04-06 RX ORDER — TRAZODONE HYDROCHLORIDE 50 MG/1
TABLET ORAL
Refills: 0 | OUTPATIENT
Start: 2022-04-06

## 2022-04-06 RX ORDER — LEVOTHYROXINE SODIUM 88 UG/1
88 TABLET ORAL
Qty: 30 TABLET | Refills: 0 | Status: SHIPPED | OUTPATIENT
Start: 2022-04-06 | End: 2022-05-14

## 2022-04-06 RX ORDER — CLOPIDOGREL BISULFATE 75 MG/1
75 TABLET ORAL DAILY
Qty: 30 TABLET | Refills: 0 | Status: SHIPPED | OUTPATIENT
Start: 2022-04-06 | End: 2022-06-03 | Stop reason: SDUPTHER

## 2022-04-06 RX ORDER — PANTOPRAZOLE SODIUM 40 MG/1
40 TABLET, DELAYED RELEASE ORAL DAILY
Qty: 30 TABLET | Refills: 0 | Status: SHIPPED | OUTPATIENT
Start: 2022-04-06 | End: 2022-06-03 | Stop reason: SDUPTHER

## 2022-04-06 RX ORDER — PANTOPRAZOLE SODIUM 40 MG/1
TABLET, DELAYED RELEASE ORAL
Refills: 0 | OUTPATIENT
Start: 2022-04-06

## 2022-04-06 RX ORDER — CLOPIDOGREL BISULFATE 75 MG/1
TABLET ORAL
Refills: 0 | OUTPATIENT
Start: 2022-04-06

## 2022-04-06 NOTE — TELEPHONE ENCOUNTER
No new care gaps identified.  Powered by Camerama by Endoclear. Reference number: [id number].   4/06/2022 4:52:03 PM CDT

## 2022-04-07 ENCOUNTER — TELEPHONE (OUTPATIENT)
Dept: INTERNAL MEDICINE | Facility: CLINIC | Age: 87
End: 2022-04-07
Payer: MEDICARE

## 2022-04-07 DIAGNOSIS — G47.9 TROUBLE IN SLEEPING: ICD-10-CM

## 2022-04-07 NOTE — TELEPHONE ENCOUNTER
----- Message from Yakov Barrett MD sent at 4/6/2022  4:49 PM CDT -----  Regarding: Appointment  Please contact . Patient needs an appointment. No more refills without being seen.

## 2022-04-07 NOTE — TELEPHONE ENCOUNTER
No new care gaps identified.  Powered by [employer] by Model Metrics. Reference number: [id number].   4/07/2022 8:45:58 AM CDT

## 2022-04-07 NOTE — TELEPHONE ENCOUNTER
[facility] Note  Quick DC. Inappropriate Request   Refill request requires further review by MD: NO   Medication Therapy Plan: Pharmacy is requesting new script(s) for the following medications without required information, (sig/ frequency/qty/etc)     [facility] action(s):  Quick Discontinue      Duplicate Pended Encounter(s)/ Last Prescribed Details:    Pharmacies have been requesting medications for patients without required information, (sig, frequency, qty, etc.). In addition, requests are sent for medication(s) pt. are currently not taking, and medications patients have never taken.    We have spoken to the pharmacies about these request types and advised their teams previously that we are unable to assess these New Script requests and require all details for these requests. This is a known issue and has been reported.        Medication related problems are not assessed for QDC.   Medication Reconciliation Completed? NO Were there pending details that required adjustment? NO     Automatic Epic Generated Protocol Data Below:   Requested Prescriptions   Pending Prescriptions Disp Refills    clopidogreL (PLAVIX) 75 mg tablet [Pharmacy Med Name: CLOPIDOGREL 75 MG TABLET]  0    traZODone (DESYREL) 50 MG tablet [Pharmacy Med Name: TRAZODONE 50 MG TABLET]  0    pantoprazole (PROTONIX) 40 MG tablet [Pharmacy Med Name: PANTOPRAZOLE SOD DR 40 MG TAB]  0              Appointments      Date Provider   Last Visit   10/10/2019 Yakov Barrett MD   Next Visit   Visit date not found Yakov Barrett MD        Note composed:9:56 PM 04/06/2022

## 2022-04-08 DIAGNOSIS — G47.9 TROUBLE IN SLEEPING: ICD-10-CM

## 2022-04-08 RX ORDER — TRAZODONE HYDROCHLORIDE 50 MG/1
TABLET ORAL
Refills: 0 | OUTPATIENT
Start: 2022-04-08

## 2022-04-08 RX ORDER — CLOPIDOGREL BISULFATE 75 MG/1
TABLET ORAL
Refills: 0 | OUTPATIENT
Start: 2022-04-08

## 2022-04-08 NOTE — TELEPHONE ENCOUNTER
Flor PITTS. Previously Denied   Refill Authorization Note   Linda Hylton  is requesting a refill authorization.  Brief Assessment and Rationale for Refill:  Quick Discontinue  Medication Therapy Plan:  WE RECEIVED MUTLIPLE REQUEST FOR 90 DAYS AND DENIED BY PCP, PT IS OVERDUE FOR AN APPT SO 30 DAY SUPPLY GIVEN ON 04/06/2022; SPOKE WITH RICKY MCNAMARA SHE SAID SHE WILL INFORM THE PT TO CALL OFFICE FOR AN APPT AND CANCEL THE REQUESTS.    Medication Reconciliation Completed:  Yes      Comments:   Pended Medication(s)       Requested Prescriptions     Pending Prescriptions Disp Refills    clopidogreL (PLAVIX) 75 mg tablet [Pharmacy Med Name: CLOPIDOGREL 75 MG TABLET]  0    traZODone (DESYREL) 50 MG tablet [Pharmacy Med Name: TRAZODONE 50 MG TABLET]  0        Duplicate Pended Encounter(s)/ Last Prescribed Details: (includes pharmacy & prescriber details)   [facility]/pharmacy #[id number] - [place] - [address] AT [address] [address]   Phone:  [phone]  Fax:  [phone]   VAIBHAV #:  [id number]   TORITO Reason: --       Outpatient Medication Detail     Disp Refills Start End TORITO   clopidogreL (PLAVIX) 75 mg tablet 30 tablet 0 4/6/2022  No   Sig - Route: Take 1 tablet (75 mg total) by mouth once daily. - Oral   Sent to pharmacy as: clopidogreL (PLAVIX) 75 mg tablet   Class: Normal   Order: [id number]   Date/Time Signed: 4/6/2022 16:49       E-Prescribing Status: Receipt confirmed by pharmacy (4/6/2022  4:49 PM CDT)       Ordering Encounter Report    Associated Reports   View Encounter         [facility]/pharmacy #[id number] - [place] - [address] AT [address] [address]   Phone:  [phone]  Fax:  [phone]   VAIBHAV #:  [id number]   TORITO Reason: --       Outpatient Medication Detail     Disp Refills Start End TORITO   traZODone (DESYREL) 50 MG tablet 30 tablet 0 4/6/2022  No   Sig - Route: Take 1 tablet (50 mg total) by mouth nightly as needed for  Insomnia. - Oral   Sent to pharmacy as: traZODone (DESYREL) 50 MG tablet   Class: Normal   Order: [id number]   Date/Time Signed: 4/6/2022 16:49       E-Prescribing Status: Receipt confirmed by pharmacy (4/6/2022  4:49 PM CDT)       Ordering Encounter Report    Associated Reports   View Encounter            Note composed:2:47 PM 04/08/2022

## 2022-04-08 NOTE — TELEPHONE ENCOUNTER
[facility] Note  Quick DC. Inappropriate Request   Refill request requires further review by MD: NO   Medication Therapy Plan: Pharmacy is requesting new script(s) for the following medications without required information, (sig/ frequency/qty/etc)     [facility] action(s):  Quick Discontinue      Duplicate Pended Encounter(s)/ Last Prescribed Details:    Pharmacies have been requesting medications for patients without required information, (sig, frequency, qty, etc.). In addition, requests are sent for medication(s) pt. are currently not taking, and medications patients have never taken.    We have spoken to the pharmacies about these request types and advised their teams previously that we are unable to assess these New Script requests and require all details for these requests. This is a known issue and has been reported.        Medication related problems are not assessed for QDC.   Medication Reconciliation Completed? NO Were there pending details that required adjustment? NO     Automatic Epic Generated Protocol Data Below:   Requested Prescriptions   Pending Prescriptions Disp Refills    clopidogreL (PLAVIX) 75 mg tablet [Pharmacy Med Name: CLOPIDOGREL 75 MG TABLET]  0    traZODone (DESYREL) 50 MG tablet [Pharmacy Med Name: TRAZODONE 50 MG TABLET]  0              Appointments      Date Provider   Last Visit   10/10/2019 Yakov Barrett MD   Next Visit   Visit date not found Yakov Barrett MD        Note composed:3:32 PM 04/08/2022

## 2022-04-08 NOTE — TELEPHONE ENCOUNTER
Pharmacy Call Documentation    Pharmacy Called:  [facility] Call Time: 2:40 P   Spoke with: RICKY MCNAMARA 04/08/2022      Called to verify that the script that was sent on: 04/06/22  WE RECEIVED MUTLIPLE REQUEST FOR 90 DAYS AND DENIED BY PCP, PT IS OVERDUE FOR AN APPT SO 30 DAY SUPPLY GIVEN ON 04/06/2022; SPOKE WITH RICKY MCNAMARA SHE SAID SHE WILL INFORM THE PT TO CALL OFFICE FOR AN APPT AND CANCEL THE REQUESTS.     Current Medication Requested: (refill encounter)   Requested Prescriptions     Pending Prescriptions Disp Refills    clopidogreL (PLAVIX) 75 mg tablet [Pharmacy Med Name: CLOPIDOGREL 75 MG TABLET]  0    traZODone (DESYREL) 50 MG tablet [Pharmacy Med Name: TRAZODONE 50 MG TABLET]  0      [facility]     Note composed: 04/08/2022 2:48 PM

## 2022-04-08 NOTE — TELEPHONE ENCOUNTER
No new care gaps identified.  Powered by Nimbix by Plexisoft. Reference number: [id number].   4/08/2022 3:18:09 PM CDT

## 2022-04-24 NOTE — TELEPHONE ENCOUNTER
Refill Routing Note   Medication(s) are not appropriate for processing by [facility] for the following reason(s):      - Pharmacy comment: Alternative Requested:THIS MEDICATION NEEDS TO BE DISPENSED AS A 90 DAY SUPPLY PER INSURANCE REQUIREMENTS. PLEASE PROVIDE ADDITIONAL REFILLS IF APPROPRIATE.    [facility] action(s):  Route          Medication reconciliation completed: No     Appointments  past 12m or future 3m with PCP    Date Provider   Last Visit   10/10/2019 Yakov Barrett MD   Next Visit   Visit date not found Yakov Barrett MD   ED visits in past 90 days: 0        Note composed:5:22 PM 04/24/2022

## 2022-04-24 NOTE — TELEPHONE ENCOUNTER
Care Due:                  Date            Visit Type   Department     Provider  --------------------------------------------------------------------------------    Last Visit: None Found      None         None Found  Next Visit: None Scheduled  None         None Found                                                            Last  Test          Frequency    Reason                     Performed    Due Date  --------------------------------------------------------------------------------    Office Visit  12 months..  atorvastatin,              Not Found    Overdue                             clopidogreL,                             levothyroxine,                             pantoprazole, traZODone..    CBC.........  12 months..  clopidogreL..............  Not Found    Overdue    CMP.........  12 months..  atorvastatin.............  Not Found    Overdue    Lipid Panel.  12 months..  atorvastatin.............  Not Found    Overdue    TSH.........  12 months..  levothyroxine............  Not Found    Overdue    Powered by Snagsta by Phreesia. Reference number: [id number].   4/24/2022 8:32:34 AM CDT

## 2022-04-24 NOTE — TELEPHONE ENCOUNTER
No new care gaps identified.  Powered by ClicData by JellyCloud. Reference number: [id number].   4/24/2022 4:26:27 PM CDT

## 2022-04-25 RX ORDER — LEVOTHYROXINE SODIUM 88 UG/1
TABLET ORAL
Qty: 90 TABLET | Refills: 0 | OUTPATIENT
Start: 2022-04-25

## 2022-04-25 RX ORDER — LEVOTHYROXINE SODIUM 88 UG/1
TABLET ORAL
Refills: 0 | OUTPATIENT
Start: 2022-04-25

## 2022-04-25 NOTE — TELEPHONE ENCOUNTER
No new care gaps identified.  Powered by Scoot & Doodle by Kozio. Reference number: [id number].   4/25/2022 6:37:57 PM CDT

## 2022-04-25 NOTE — TELEPHONE ENCOUNTER
Refill Routing Note   Medication(s) are not appropriate for processing by [facility] for the following reason(s):      - Patient has not been seen in over 15 months by PCP  - Required vitals are outdated    [facility] action(s):  Route Medication-related problems identified: Requires labs        Medication reconciliation completed: No     Appointments  past 12m or future 3m with PCP    Date Provider   Last Visit   10/10/2019 Yakov Barrett MD   Next Visit   4/24/2022 Yakov Barrett MD   ED visits in past 90 days: 0        Note composed:3:24 PM 04/25/2022

## 2022-04-25 NOTE — TELEPHONE ENCOUNTER
Provider Staff:     Action required for this patient.    Please note Refusal of medication.            Requested Prescriptions     Refused Prescriptions Disp Refills    levothyroxine (SYNTHROID) 88 MCG tablet [Pharmacy Med Name: LEVOTHYROXINE 88 MCG TABLET]  0     Refused By: RORY OGDEN     Reason for Refusal: Patient needs an appointment      Thanks!  [facility]   Note composed: 04/25/2022 4:13 PM

## 2022-04-25 NOTE — TELEPHONE ENCOUNTER
Flor DC. Inappropriate Request    Refill Authorization Note   Linda Hylton  is requesting a refill authorization.  Brief Assessment and Rationale for Refill:  Quick Discontinue  Medication Therapy Plan:    Pharmacy is requesting new scripts for the following medications without required information, (sig/ frequency/qty/etc)       Medication Reconciliation Completed:  No      Comments: Pharmacies have been requesting medications for patients without required information, (sig, frequency, qty, etc.). In addition, requests are sent for medication(s) pt. are currently not taking, and medications patients have never taken.    We have spoken to the pharmacies about these request types and advised their teams previously that we are unable to assess these New Script requests and require all details for these requests. This is a known issue and has been reported.        Note composed:6:36 PM 04/25/2022

## 2022-04-25 NOTE — TELEPHONE ENCOUNTER
No new care gaps identified.  Powered by 500px by Open Utility. Reference number: [id number].   4/25/2022 3:15:28 PM CDT

## 2022-04-25 NOTE — TELEPHONE ENCOUNTER
Flor DC. Inappropriate Request    Refill Authorization Note   Linda Hylton  is requesting a refill authorization.  Brief Assessment and Rationale for Refill:  Quick Discontinue  Medication Therapy Plan:    Pharmacy is requesting new scripts for the following medications without required information, (sig/ frequency/qty/etc)       Medication Reconciliation Completed:  No      Comments: Pharmacies have been requesting medications for patients without required information, (sig, frequency, qty, etc.). In addition, requests are sent for medication(s) pt. are currently not taking, and medications patients have never taken.    We have spoken to the pharmacies about these request types and advised their teams previously that we are unable to assess these New Script requests and require all details for these requests. This is a known issue and has been reported.        Note composed:6:39 PM 04/25/2022

## 2022-04-26 RX ORDER — ATORVASTATIN CALCIUM 80 MG/1
TABLET, FILM COATED ORAL
Qty: 30 TABLET | Refills: 0 | OUTPATIENT
Start: 2022-04-26

## 2022-05-11 NOTE — TELEPHONE ENCOUNTER
No new care gaps identified.  [facility] Embedded Care Gaps. Reference number: [id number]. 5/11/2022   2:28:36 PM CDT

## 2022-05-11 NOTE — TELEPHONE ENCOUNTER
Refill Routing Note   Medication(s) are not appropriate for processing by [facility] for the following reason(s):      - Pharmacy comment: INSURANCE ONLY COVERS 90 DAY SUPPLY. PLEASE SEND.    [facility] action(s):  Defer Medication-related problems identified: Requires appointment     Medication Therapy Plan: Needs an OV with PCP, Last PCP Visit: 10/10/2019  Medication reconciliation completed: No     Appointments  past 12m or future 3m with PCP    Date Provider   Last Visit   10/10/2019 Yakov Barrett MD   Next Visit   Visit date not found Yakov Barrett MD   ED visits in past 90 days: 0        Note composed:4:58 PM 05/11/2022

## 2022-05-14 RX ORDER — LEVOTHYROXINE SODIUM 88 UG/1
TABLET ORAL
Qty: 30 TABLET | Refills: 0 | Status: SHIPPED | OUTPATIENT
Start: 2022-05-14 | End: 2022-06-03 | Stop reason: SDUPTHER

## 2022-05-14 RX ORDER — LEVOTHYROXINE SODIUM 88 UG/1
TABLET ORAL
Refills: 0 | OUTPATIENT
Start: 2022-05-14

## 2022-05-14 NOTE — TELEPHONE ENCOUNTER
No new care gaps identified.  [facility] Embedded Care Gaps. Reference number: [id number]. 5/14/2022   8:55:40 AM CDT

## 2022-05-14 NOTE — TELEPHONE ENCOUNTER
Refill Authorization Note   Linda Hylton  is requesting a refill authorization.  Brief Assessment and Rationale for Refill:  Quick Discontinue     Medication Therapy Plan:       Medication Reconciliation Completed: No   Comments:     No Care Gaps recommended.     Note composed:12:07 PM 05/14/2022

## 2022-05-14 NOTE — TELEPHONE ENCOUNTER
No new care gaps identified.  [facility] Embedded Care Gaps. Reference number: [id number]. 5/14/2022   12:13:18 PM CDT

## 2022-05-14 NOTE — TELEPHONE ENCOUNTER
[facility] Note  Quick DC. Inappropriate Request   Refill request requires further review by MD: NO   Medication Therapy Plan: Pharmacy is requesting new script(s) for the following medications without required information, (sig/ frequency/qty/etc)     [facility] action(s):  Quick Discontinue      Duplicate Pended Encounter(s)/ Last Prescribed Details:    Pharmacies have been requesting medications for patients without required information, (sig, frequency, qty, etc.). In addition, requests are sent for medication(s) pt. are currently not taking, and medications patients have never taken.    We have spoken to the pharmacies about these request types and advised their teams previously that we are unable to assess these New Script requests and require all details for these requests. This is a known issue and has been reported.        Medication related problems are not assessed for QDC.   Medication Reconciliation Completed? NO Were there pending details that required adjustment? NO     Automatic Epic Generated Protocol Data Below:   Requested Prescriptions   Pending Prescriptions Disp Refills    levothyroxine (SYNTHROID) 88 MCG tablet [Pharmacy Med Name: LEVOTHYROXINE 88 MCG TABLET]  0              Appointments      Date Provider   Last Visit   10/10/2019 Yakov Barrett MD   Next Visit   Visit date not found Yakov Barrett MD        Note composed:2:26 PM 05/14/2022

## 2022-05-23 NOTE — ASSESSMENT & PLAN NOTE
[facility]  Discharge Summary   Patient: Petr Mattson Discharge Date: 5/23/2022    YOB: 1965 Admission Date: 5/10/2022    Medical Record Number: [id number] Admission Length: 3 Days    Admitting Physician: Kalen Obregon MD Discharge Physician: Kalen Obregon MD Outpatient Primary Care Provider: No Pcp     Admission Information     Admission Diagnoses:   Atrial fibrillation with RVR (CMS/HCC) [I48.91] Primary Discharge Diagnoses:   A. fib with RVR status post ablation Secondary Discharge Diagnoses:   Patient Active Problem List   Diagnosis   • Atrial fibrillation with RVR (CMS/HCC)   • Morbid obesity (CMS/HCC)   • ESR raised   • Type 2 diabetes mellitus (CMS/HCC)        LACE Score: 0-10 Low to Moderate Risk  30 day Readmission: No  PPQ Completed: NA  RCA Completed: NA  D/C Appt. Scheduled in EPIC: Yes Admission Condition: Fair    Discharged Condition: Stable    Disposition: Home     Hospital Course   Petr Mattson is a 56 year old male who presented with past medical history of morbid obesity diabetes and A. fib was admitted with A. fib/a flutter with RVR resistant to medications.  Patient was seen by cardiology and electrophysiology consulted.  Patient had MISAEL which was negative for thrombi.  Patient then was taken to the Cath Lab for ablation.  Post ablation the patient was cleared for discharge by electrophysiology and was discharged home in stable condition.      Diagnostic Studies/Surgical Procedures:   Procedure(s) (LRB):  ABLATION ATRIAL FLUTTER - CV (N/A)    Consultants:  IP Consult Orders (From admission, onward)             Start     Ordered    05/10/22 1849  Inpatient consult to Cardiology  ONE TIME        Provider:  Ervin Tran MD    05/10/22 1848              Physical Exam     Visit Vitals  /82   Pulse 81   Temp 97.5 °F (36.4 °C) (Oral)   Resp 14   Ht 6' 1\" (1.854 m)   Wt (!) 141.2 kg (311 lb 4.6 oz)   SpO2 (!) 87%   BMI 41.07 kg/m²       General: No  -Na 128 > 130 > 129 > 131 > 132 > 129  Last recorded Na in 2018 WNL.  -Urine Na, urine osmol, serum osmol WNL  -250 ml NS bolus given 2/21/19. Repeat Na 131.  Discussed with Hospital Medicine- Hyponatremia possibly 2/2 hypovolemia in the setting of pt's acute Hb drop.   Continue daily BMP; Will discuss any further recs with Hospital Medicine at AM rounds    02/25- Discussed hyponatremia with hospital medicine, patient may be hyponatremic at baseline. Will continue to monitor and appreciate recs from hospital medicine.   appreciable disease, comfortable appearing male.   HEENT:    PERRL, no scleral icterus or conjunctival pallor, no nasal discharge   Cardiovascular:   RRR, no murmurs/rubs/gallops, S1/S2 normal, no JVD, no bilateral lower extremity edema   Respiratory:   Clear to auscultation bilaterally, no retractions or increased work of breathing   GI:   Positive bowel sounds, soft, nontender/non-distended   Musculoskeletal:  ROM normal throughout. No clubbing, edema, or cyanosis.   Skin:   No rashes/lesions/ecchymoses/jaundice.   Neurologic:  Alert and oriented x3. EOMI. No gross motor or sensory deficits. No facial droop or dysarthria.   Psychiatric:   Appropriate mood/affect.     Wt Readings from Last 1 Encounters:   05/10/22 (!) 141.2 kg (311 lb 4.6 oz)       Recommendations - Instructions - Follow-up   ED Advisories (Hx of violent behavior/ AMA/ Multiple hospital visits- if YES, then route note to ED Case Manager: [email or web address]@): NA    Outpatient Follow-up Diagnostics/Recommendations (advised post discharge diagnostic studies/ INR range/ DVT/ Stent hx/ Coagulopathies/ etc.): NA    Unresulted at Discharge (delete if specific follow-up not required): None  Unresulted Labs (From admission, onward)    None        Unresulted Procedure (From admission, onward)    None          Diet:  cardiac diet Activity:  Activity as Tolerated Wound Care: None Needed     Follow-up Providers/Appointments:  Nik Meehan MD  [address] [facility] [address]  [facility] [address]  [phone]    In 1 month        find PCP at [facility]           Time spent on dc co-ordination and consulting is >30 minutes      Medications        Summary of your Discharge Medications      Take these Medications      Details   blood glucose test strip   Test blood sugar 1 times daily as directed. Meter: One Touch Ultra 2  Comment: Diagnosis: DMII     dilTIAZem 120 MG 24 hr capsule  Commonly known as: Tiazac   Take 1 capsule by mouth daily.     metFORMIN  500 MG tablet  Commonly known as: GLUCOPHAGE   Take 1 tablet by mouth 2 times daily (with meals).     metoPROLOL succinate 50 MG 24 hr tablet  Commonly known as: TOPROL-XL   Take 1 tablet by mouth daily.     Pharmacist Choice Autocode Sys w/Device Kit   Test blood sugar 1 times daily as directed. Meter:  One Touch Ultra 2  Comment: Diagnosis:DMII e11.9     Pharmacist Choice Lancets Misc   Test blood sugar one times daily as directed. Meter: One Touch Ultra 2  Comment: Diagnosis: DMII     rivaroxaban 20 MG Tab  Commonly known as: XARELTO   Take 1 tablet by mouth daily (with breakfast). Do not start before May 14, 2022.  Comment: Please use 30 day free coupon - BIN: [id number] GROUP: [id number] ID: PCN: If required use \"PDMI\"            CMS Best Practices - MI - HF - STROKE                    Quality Indicators     AMI With Heart Failure with Reduced LVEF (<40%)? no  Heart failure?  No  Stroke measures indicated? no  AMI? No  DVT/VTE Prophylaxis:  VTE Pharmacologic Prophylaxis: Yes  VTE Mechanical Prophylaxis: Yes  Severe sepsis with septic shock?  No      ____________________________  Kalen Obregno MD 5/23/2022 12:32 AM           [facility] DC Summary; Dept. of Internal Medicine [facility]/ [facility]. Rev. 5.8; 9/23/15.   Support/ Technical Difficulties: [email or web address]

## 2022-05-26 ENCOUNTER — TELEPHONE (OUTPATIENT)
Dept: INTERNAL MEDICINE | Facility: CLINIC | Age: 87
End: 2022-05-26
Payer: MEDICARE

## 2022-05-26 NOTE — TELEPHONE ENCOUNTER
----- Message from Dillan Ruiz sent at 5/26/2022 10:54 AM CDT -----  Contact: pt  Pt stated her pharmacy has been requesting refills for her medications she is almost out and would like to get those Rx called into the pharmacy.Pt does not know which medications but she did state request were sent in.Please advise

## 2022-06-03 ENCOUNTER — OFFICE VISIT (OUTPATIENT)
Dept: INTERNAL MEDICINE | Facility: CLINIC | Age: 87
End: 2022-06-03
Payer: MEDICARE

## 2022-06-03 ENCOUNTER — LAB VISIT (OUTPATIENT)
Dept: LAB | Facility: HOSPITAL | Age: 87
End: 2022-06-03
Attending: INTERNAL MEDICINE
Payer: MEDICARE

## 2022-06-03 VITALS
WEIGHT: 128.31 LBS | DIASTOLIC BLOOD PRESSURE: 71 MMHG | HEIGHT: 57 IN | BODY MASS INDEX: 27.68 KG/M2 | OXYGEN SATURATION: 97 % | SYSTOLIC BLOOD PRESSURE: 155 MMHG | HEART RATE: 81 BPM

## 2022-06-03 DIAGNOSIS — I35.0 NONRHEUMATIC AORTIC VALVE STENOSIS: ICD-10-CM

## 2022-06-03 DIAGNOSIS — G47.9 TROUBLE IN SLEEPING: ICD-10-CM

## 2022-06-03 DIAGNOSIS — I63.412 EMBOLIC STROKE INVOLVING LEFT MIDDLE CEREBRAL ARTERY: ICD-10-CM

## 2022-06-03 DIAGNOSIS — I10 ESSENTIAL HYPERTENSION: Primary | ICD-10-CM

## 2022-06-03 DIAGNOSIS — I10 ESSENTIAL HYPERTENSION: ICD-10-CM

## 2022-06-03 DIAGNOSIS — E03.9 ACQUIRED HYPOTHYROIDISM: ICD-10-CM

## 2022-06-03 LAB
ALBUMIN SERPL BCP-MCNC: 4 G/DL (ref 3.5–5.2)
ALP SERPL-CCNC: 59 U/L (ref 55–135)
ALT SERPL W/O P-5'-P-CCNC: 17 U/L (ref 10–44)
ANION GAP SERPL CALC-SCNC: 13 MMOL/L (ref 8–16)
AST SERPL-CCNC: 16 U/L (ref 10–40)
BASOPHILS # BLD AUTO: 0.08 K/UL (ref 0–0.2)
BASOPHILS NFR BLD: 1 % (ref 0–1.9)
BILIRUB SERPL-MCNC: 0.4 MG/DL (ref 0.1–1)
BUN SERPL-MCNC: 8 MG/DL (ref 8–23)
CALCIUM SERPL-MCNC: 9.8 MG/DL (ref 8.7–10.5)
CHLORIDE SERPL-SCNC: 102 MMOL/L (ref 95–110)
CHOLEST SERPL-MCNC: 198 MG/DL (ref 120–199)
CHOLEST/HDLC SERPL: 5.1 {RATIO} (ref 2–5)
CO2 SERPL-SCNC: 26 MMOL/L (ref 23–29)
CREAT SERPL-MCNC: 0.7 MG/DL (ref 0.5–1.4)
DIFFERENTIAL METHOD: ABNORMAL
EOSINOPHIL # BLD AUTO: 0.1 K/UL (ref 0–0.5)
EOSINOPHIL NFR BLD: 1 % (ref 0–8)
ERYTHROCYTE [DISTWIDTH] IN BLOOD BY AUTOMATED COUNT: 14.6 % (ref 11.5–14.5)
EST. GFR  (AFRICAN AMERICAN): >60 ML/MIN/1.73 M^2
EST. GFR  (NON AFRICAN AMERICAN): >60 ML/MIN/1.73 M^2
GLUCOSE SERPL-MCNC: 105 MG/DL (ref 70–110)
HCT VFR BLD AUTO: 34.2 % (ref 37–48.5)
HDLC SERPL-MCNC: 39 MG/DL (ref 40–75)
HDLC SERPL: 19.7 % (ref 20–50)
HGB BLD-MCNC: 11.1 G/DL (ref 12–16)
IMM GRANULOCYTES # BLD AUTO: 0.11 K/UL (ref 0–0.04)
IMM GRANULOCYTES NFR BLD AUTO: 1.3 % (ref 0–0.5)
LDLC SERPL CALC-MCNC: 114.6 MG/DL (ref 63–159)
LYMPHOCYTES # BLD AUTO: 1.2 K/UL (ref 1–4.8)
LYMPHOCYTES NFR BLD: 14.8 % (ref 18–48)
MCH RBC QN AUTO: 28.3 PG (ref 27–31)
MCHC RBC AUTO-ENTMCNC: 32.5 G/DL (ref 32–36)
MCV RBC AUTO: 87 FL (ref 82–98)
MONOCYTES # BLD AUTO: 0.6 K/UL (ref 0.3–1)
MONOCYTES NFR BLD: 7.8 % (ref 4–15)
NEUTROPHILS # BLD AUTO: 6.1 K/UL (ref 1.8–7.7)
NEUTROPHILS NFR BLD: 74.1 % (ref 38–73)
NONHDLC SERPL-MCNC: 159 MG/DL
NRBC BLD-RTO: 0 /100 WBC
PLATELET # BLD AUTO: 259 K/UL (ref 150–450)
PMV BLD AUTO: 10.2 FL (ref 9.2–12.9)
POTASSIUM SERPL-SCNC: 3.6 MMOL/L (ref 3.5–5.1)
PROT SERPL-MCNC: 8 G/DL (ref 6–8.4)
RBC # BLD AUTO: 3.92 M/UL (ref 4–5.4)
SODIUM SERPL-SCNC: 141 MMOL/L (ref 136–145)
T4 FREE SERPL-MCNC: 0.9 NG/DL (ref 0.71–1.51)
T4 SERPL-MCNC: 8.3 UG/DL (ref 4.5–11.5)
TRIGL SERPL-MCNC: 222 MG/DL (ref 30–150)
TSH SERPL DL<=0.005 MIU/L-ACNC: 9.9 UIU/ML (ref 0.4–4)
WBC # BLD AUTO: 8.24 K/UL (ref 3.9–12.7)

## 2022-06-03 PROCEDURE — 99999 PR PBB SHADOW E&M-EST. PATIENT-LVL III: ICD-10-PCS | Mod: PBBFAC,,, | Performed by: INTERNAL MEDICINE

## 2022-06-03 PROCEDURE — 99215 PR OFFICE/OUTPT VISIT, EST, LEVL V, 40-54 MIN: ICD-10-PCS | Mod: S$PBB,,, | Performed by: INTERNAL MEDICINE

## 2022-06-03 PROCEDURE — 84439 ASSAY OF FREE THYROXINE: CPT | Performed by: INTERNAL MEDICINE

## 2022-06-03 PROCEDURE — 99213 OFFICE O/P EST LOW 20 MIN: CPT | Mod: PBBFAC | Performed by: INTERNAL MEDICINE

## 2022-06-03 PROCEDURE — 84436 ASSAY OF TOTAL THYROXINE: CPT | Performed by: INTERNAL MEDICINE

## 2022-06-03 PROCEDURE — 36415 COLL VENOUS BLD VENIPUNCTURE: CPT | Performed by: INTERNAL MEDICINE

## 2022-06-03 PROCEDURE — 99999 PR PBB SHADOW E&M-EST. PATIENT-LVL III: CPT | Mod: PBBFAC,,, | Performed by: INTERNAL MEDICINE

## 2022-06-03 PROCEDURE — 80053 COMPREHEN METABOLIC PANEL: CPT | Performed by: INTERNAL MEDICINE

## 2022-06-03 PROCEDURE — 99215 OFFICE O/P EST HI 40 MIN: CPT | Mod: S$PBB,,, | Performed by: INTERNAL MEDICINE

## 2022-06-03 PROCEDURE — 84443 ASSAY THYROID STIM HORMONE: CPT | Performed by: INTERNAL MEDICINE

## 2022-06-03 PROCEDURE — 85025 COMPLETE CBC W/AUTO DIFF WBC: CPT | Performed by: INTERNAL MEDICINE

## 2022-06-03 PROCEDURE — 80061 LIPID PANEL: CPT | Performed by: INTERNAL MEDICINE

## 2022-06-03 RX ORDER — CLOPIDOGREL BISULFATE 75 MG/1
75 TABLET ORAL DAILY
Qty: 90 TABLET | Refills: 3 | Status: SHIPPED | OUTPATIENT
Start: 2022-06-03 | End: 2023-03-06

## 2022-06-03 RX ORDER — LISINOPRIL 5 MG/1
5 TABLET ORAL DAILY
Qty: 90 TABLET | Refills: 3 | Status: SHIPPED | OUTPATIENT
Start: 2022-06-03 | End: 2023-03-06

## 2022-06-03 RX ORDER — AMLODIPINE BESYLATE 5 MG/1
5 TABLET ORAL DAILY
Qty: 90 TABLET | Refills: 3 | Status: SHIPPED | OUTPATIENT
Start: 2022-06-03 | End: 2023-06-06

## 2022-06-03 RX ORDER — PANTOPRAZOLE SODIUM 40 MG/1
40 TABLET, DELAYED RELEASE ORAL DAILY
Qty: 90 TABLET | Refills: 3 | Status: SHIPPED | OUTPATIENT
Start: 2022-06-03 | End: 2023-03-06

## 2022-06-03 RX ORDER — ATORVASTATIN CALCIUM 80 MG/1
80 TABLET, FILM COATED ORAL DAILY
Qty: 90 TABLET | Refills: 3 | Status: SHIPPED | OUTPATIENT
Start: 2022-06-03 | End: 2023-03-06

## 2022-06-03 RX ORDER — LEVOTHYROXINE SODIUM 88 UG/1
88 TABLET ORAL
Qty: 90 TABLET | Refills: 3 | Status: SHIPPED | OUTPATIENT
Start: 2022-06-03 | End: 2022-06-08 | Stop reason: SDUPTHER

## 2022-06-03 RX ORDER — TRAZODONE HYDROCHLORIDE 50 MG/1
50 TABLET ORAL NIGHTLY PRN
Qty: 90 TABLET | Refills: 3 | Status: SHIPPED | OUTPATIENT
Start: 2022-06-03 | End: 2023-03-06

## 2022-06-03 RX ORDER — GABAPENTIN 300 MG/1
CAPSULE ORAL
Qty: 90 CAPSULE | Refills: 3 | Status: SHIPPED | OUTPATIENT
Start: 2022-06-03 | End: 2022-06-08 | Stop reason: SDUPTHER

## 2022-06-04 NOTE — PROGRESS NOTES
CC:  Annual exam    HPI:  The patient is an 87-year-old female with hypertension, hyperlipidemia, aortic stenosis, vitamin-D deficiency, hypothyroidism, history of right middle cerebral artery stroke who presents today for follow-up.  We have not seen her well over a year.  She is currently living with her youngest son who works nights.  She does have a personal care attendant who stays with her during the day.  The patient has no voice complaints.    ROS:  She reports her weight is staying the same.  She was diagnosed with macular degeneration.  She has not followed up with her ophthalmologist in some time.  No auditory changes.  No dysphagia.  She has good appetite.  No chest pain.  No shortness of breath.  No nausea vomiting.  No abdominal pain.  No bowel changes.  Her personal care attendant does report that sometimes she strains.  She denies this and reports no problems with hemorrhoids.  No blood in the stool.  No weakness in arms or legs.  No skin changes.  No breast changes.  No numbness or tingling arms or legs.    Physical exam:  General appearance:  No acute distress  HEENT:  Conjunctiva is clear.  Pupils appear to be equal.  TMs were clear.  Nasal septum is midline without discharge.  Oropharynx was without erythema.  Trachea is midline without JVD.  Pulmonary:  Good inspiratory, expiratory breath sounds are heard.  Lungs are clear to auscultation.  Cardiovascular:  S1-S2, rhythm appear to be normal.  Nontender, nondistended without hepatosplenomegaly  GI:  Abdomen had normal sounds.   Comments:  Did discuss with the patient about palliative care.  Patient declined.    Assessment:  1. Hypertension  2. Hyperlipidemia  3. Aortic stenosis  4. Hypothyroidism  5. History of right middle cerebral artery stroke    Plan:  1. Will schedule the CBC, CMP, lipid panel, TSH, T4  2. The patient will follow up pending test results.

## 2022-06-07 ENCOUNTER — TELEPHONE (OUTPATIENT)
Dept: INTERNAL MEDICINE | Facility: CLINIC | Age: 87
End: 2022-06-07
Payer: MEDICARE

## 2022-06-07 NOTE — TELEPHONE ENCOUNTER
I called pt but there was no answer. I left a message on her recorder to return call. She is not on the Portal.

## 2022-06-07 NOTE — TELEPHONE ENCOUNTER
----- Message from Gala Shrestha sent at 6/7/2022  2:32 PM CDT -----  Contact: Wade Ortiz   Wade Ortiz would like a call back about the script for Gabapentin 300 mg there is an issue with the quantity

## 2022-06-07 NOTE — TELEPHONE ENCOUNTER
----- Message from Yakov Barrett MD sent at 6/7/2022  9:51 AM CDT -----  Please see if she had been out of her thyroid medication or missed some doses. Her thyroid level was a little low.

## 2022-06-07 NOTE — TELEPHONE ENCOUNTER
----- Message from Cyndee Franklin sent at 6/7/2022 11:44 AM CDT -----  Contact: [phone] (Angel)  Pt's family member called to advise that they went to the store to  the medication and was told it was too early. Pt needs the medication and is currently having issues with chills and feeling unwell. They are unsure what to do at this time as they are unsure why the medication is out and the pharmacy is not giving the medication. Please Advise     gabapentin (NEURONTIN) 300 MG capsule      [facility]/pharmacy #[id number] - [place], [place] - [address] [facility]  [address]  [facility] [address]  Phone: [phone] Fax: [phone]

## 2022-06-07 NOTE — TELEPHONE ENCOUNTER
----- Message from Henny DIAN Louise sent at 6/7/2022 11:03 AM CDT -----  Contact: Melody CareTaker - [phone]  Caller: Melody CareTaker - [phone]    Reason: requesting to speak with nurse regarding last week's visit - scripts were supposed to be all updated - needing refills on medication however pharmacy states not due for refill until 6/28    gabapentin (NEURONTIN) 300 MG capsule 90 capsule       [facility]/pharmacy #[id number] - [place], [place] - [address] [facility]  [address]  [facility] [address]  Phone: [phone] Fax: [phone]

## 2022-06-07 NOTE — TELEPHONE ENCOUNTER
Spoke with pharmacist and confirmed that a prescription for Gabapentin was picked up on 04/02/2022 for a 90 day supply. Spoke with pt's care giver informed that the pharmacy refilled the rx and July is the next  date. Pt's care giver informed that she believe the pharmacy shorted her. I informed care giver that she need to contact pharmacy about concern so that they can check. Care giver verbalized understanding but also asked if the Doctor can send in another refill to cover from now until July 2, 2022.    Please advise,  Thank You.

## 2022-06-07 NOTE — TELEPHONE ENCOUNTER
Per Angel pharmacist requesting another Gabepentin rx for a 1 month supply until the July 2022 rx is ready for dispense. Angel states that the pharmacy shorted the medication and pharmacy confirmed that they gave him 90 day supply.

## 2022-06-08 RX ORDER — GABAPENTIN 300 MG/1
CAPSULE ORAL
Qty: 30 CAPSULE | Refills: 0 | Status: SHIPPED | OUTPATIENT
Start: 2022-06-08 | End: 2023-05-05

## 2022-06-08 RX ORDER — LEVOTHYROXINE SODIUM 88 UG/1
88 TABLET ORAL
Qty: 90 TABLET | Refills: 3 | Status: SHIPPED | OUTPATIENT
Start: 2022-06-08 | End: 2023-06-22

## 2022-06-08 NOTE — TELEPHONE ENCOUNTER
Spoke with pt son, he stated that the pt is out of Levothyroxine and has missed doses. Pt also understand that they will have to pay out of pocket for Gabapentin.    Refill Request.

## 2022-06-08 NOTE — TELEPHONE ENCOUNTER
No new care gaps identified.  Health system Embedded Care Gaps. Reference number: [id number]. 6/08/2022   10:51:06 AM ELIAS

## 2022-06-08 NOTE — TELEPHONE ENCOUNTER
----- Message from Nan Pierce sent at 6/8/2022 10:25 AM CDT -----  Contact: [phone]  Pt missed a call from the office and would like a call back.

## 2022-09-01 ENCOUNTER — PES CALL (OUTPATIENT)
Dept: ADMINISTRATIVE | Facility: CLINIC | Age: 87
End: 2022-09-01
Payer: MEDICARE

## 2022-09-06 ENCOUNTER — TELEPHONE (OUTPATIENT)
Dept: INTERNAL MEDICINE | Facility: CLINIC | Age: 87
End: 2022-09-06
Payer: MEDICARE

## 2022-09-06 NOTE — TELEPHONE ENCOUNTER
----- Message from Dulce Maria Arechiga sent at 9/6/2022  3:00 PM CDT -----  Contact: Arturo(son) [phone]  Pt son is requesting orders for a home health aid thru [facility] for the pt to assist with baths. Please call and advise.

## 2022-09-08 ENCOUNTER — TELEPHONE (OUTPATIENT)
Dept: INTERNAL MEDICINE | Facility: CLINIC | Age: 87
End: 2022-09-08
Payer: MEDICARE

## 2022-09-22 ENCOUNTER — OFFICE VISIT (OUTPATIENT)
Dept: INTERNAL MEDICINE | Facility: CLINIC | Age: 87
End: 2022-09-22
Payer: MEDICARE

## 2022-09-22 ENCOUNTER — LAB VISIT (OUTPATIENT)
Dept: LAB | Facility: HOSPITAL | Age: 87
End: 2022-09-22
Attending: INTERNAL MEDICINE
Payer: MEDICARE

## 2022-09-22 VITALS
SYSTOLIC BLOOD PRESSURE: 142 MMHG | HEART RATE: 75 BPM | HEIGHT: 57 IN | OXYGEN SATURATION: 96 % | DIASTOLIC BLOOD PRESSURE: 70 MMHG | WEIGHT: 145.94 LBS | BODY MASS INDEX: 31.49 KG/M2

## 2022-09-22 DIAGNOSIS — E03.9 ACQUIRED HYPOTHYROIDISM: ICD-10-CM

## 2022-09-22 DIAGNOSIS — I35.0 NONRHEUMATIC AORTIC VALVE STENOSIS: ICD-10-CM

## 2022-09-22 DIAGNOSIS — I10 ESSENTIAL HYPERTENSION: ICD-10-CM

## 2022-09-22 DIAGNOSIS — R60.9 EDEMA, UNSPECIFIED TYPE: ICD-10-CM

## 2022-09-22 DIAGNOSIS — I63.412 EMBOLIC STROKE INVOLVING LEFT MIDDLE CEREBRAL ARTERY: Primary | ICD-10-CM

## 2022-09-22 LAB
ANION GAP SERPL CALC-SCNC: 9 MMOL/L (ref 8–16)
BASOPHILS # BLD AUTO: 0.07 K/UL (ref 0–0.2)
BASOPHILS NFR BLD: 0.8 % (ref 0–1.9)
BUN SERPL-MCNC: 6 MG/DL (ref 8–23)
CALCIUM SERPL-MCNC: 9.2 MG/DL (ref 8.7–10.5)
CHLORIDE SERPL-SCNC: 99 MMOL/L (ref 95–110)
CO2 SERPL-SCNC: 31 MMOL/L (ref 23–29)
CREAT SERPL-MCNC: 0.7 MG/DL (ref 0.5–1.4)
DIFFERENTIAL METHOD: ABNORMAL
EOSINOPHIL # BLD AUTO: 0.2 K/UL (ref 0–0.5)
EOSINOPHIL NFR BLD: 1.6 % (ref 0–8)
ERYTHROCYTE [DISTWIDTH] IN BLOOD BY AUTOMATED COUNT: 12.9 % (ref 11.5–14.5)
EST. GFR  (NO RACE VARIABLE): >60 ML/MIN/1.73 M^2
GLUCOSE SERPL-MCNC: 110 MG/DL (ref 70–110)
HCT VFR BLD AUTO: 34 % (ref 37–48.5)
HGB BLD-MCNC: 10.8 G/DL (ref 12–16)
IMM GRANULOCYTES # BLD AUTO: 0.08 K/UL (ref 0–0.04)
IMM GRANULOCYTES NFR BLD AUTO: 0.9 % (ref 0–0.5)
LYMPHOCYTES # BLD AUTO: 1.2 K/UL (ref 1–4.8)
LYMPHOCYTES NFR BLD: 13.3 % (ref 18–48)
MCH RBC QN AUTO: 27.6 PG (ref 27–31)
MCHC RBC AUTO-ENTMCNC: 31.8 G/DL (ref 32–36)
MCV RBC AUTO: 87 FL (ref 82–98)
MONOCYTES # BLD AUTO: 1 K/UL (ref 0.3–1)
MONOCYTES NFR BLD: 10.3 % (ref 4–15)
NEUTROPHILS # BLD AUTO: 6.8 K/UL (ref 1.8–7.7)
NEUTROPHILS NFR BLD: 73.1 % (ref 38–73)
NRBC BLD-RTO: 0 /100 WBC
PLATELET # BLD AUTO: 270 K/UL (ref 150–450)
PMV BLD AUTO: 10.7 FL (ref 9.2–12.9)
POTASSIUM SERPL-SCNC: 3.1 MMOL/L (ref 3.5–5.1)
RBC # BLD AUTO: 3.92 M/UL (ref 4–5.4)
SODIUM SERPL-SCNC: 139 MMOL/L (ref 136–145)
T4 SERPL-MCNC: 12.9 UG/DL (ref 4.5–11.5)
TSH SERPL DL<=0.005 MIU/L-ACNC: 1.24 UIU/ML (ref 0.4–4)
WBC # BLD AUTO: 9.29 K/UL (ref 3.9–12.7)

## 2022-09-22 PROCEDURE — 84436 ASSAY OF TOTAL THYROXINE: CPT | Performed by: INTERNAL MEDICINE

## 2022-09-22 PROCEDURE — 85025 COMPLETE CBC W/AUTO DIFF WBC: CPT | Performed by: INTERNAL MEDICINE

## 2022-09-22 PROCEDURE — 99214 PR OFFICE/OUTPT VISIT, EST, LEVL IV, 30-39 MIN: ICD-10-PCS | Mod: S$PBB,,, | Performed by: INTERNAL MEDICINE

## 2022-09-22 PROCEDURE — 99999 PR PBB SHADOW E&M-EST. PATIENT-LVL IV: CPT | Mod: PBBFAC,,, | Performed by: INTERNAL MEDICINE

## 2022-09-22 PROCEDURE — 99214 OFFICE O/P EST MOD 30 MIN: CPT | Mod: PBBFAC | Performed by: INTERNAL MEDICINE

## 2022-09-22 PROCEDURE — 99214 OFFICE O/P EST MOD 30 MIN: CPT | Mod: S$PBB,,, | Performed by: INTERNAL MEDICINE

## 2022-09-22 PROCEDURE — 36415 COLL VENOUS BLD VENIPUNCTURE: CPT | Performed by: INTERNAL MEDICINE

## 2022-09-22 PROCEDURE — 80048 BASIC METABOLIC PNL TOTAL CA: CPT | Performed by: INTERNAL MEDICINE

## 2022-09-22 PROCEDURE — 99999 PR PBB SHADOW E&M-EST. PATIENT-LVL IV: ICD-10-PCS | Mod: PBBFAC,,, | Performed by: INTERNAL MEDICINE

## 2022-09-22 PROCEDURE — 84443 ASSAY THYROID STIM HORMONE: CPT | Performed by: INTERNAL MEDICINE

## 2022-09-22 NOTE — PROGRESS NOTES
CC:  Follow-up     HPI:  The patient is 87-year-old female with hypertension, hyperlipidemia, aortic stenosis, vitamin-D deficiency, hypothyroidism, history of right middle cerebral artery stroke who presents today for follow-up.  The patient's family is also requesting home health.  Patient does have trouble using hands.  She needs help bathing.    ROS:  Patient does report some weight gain.  Does have problems with macular degeneration.  She cannot read small print but March print.  No auditory changes.  No dysphagia.  No chest pain.  No shortness of breath.  The patient's lytes echo was in 2019.  It showed mild aortic stenosis as well as aortic regurg.  EF was 65%.  No nausea vomiting.  No abdominal pain.  No bowel changes.  No bladder changes.  She does have a pain left leg.  No numbness or tingling arms or legs.      Physical exam:   General appearance:  No acute distress   HEENT:  Conjunctiva is clear.  Pupils equal.  TMs are clear.  Nasal septum is midline without discharge.  Oropharynx without erythema.  Trachea is midline without JVD.    Pulmonary:  Good inspiratory, expiratory breath sounds are heard.  Lungs clear auscultation.    Cardiovascular:  S1-S2, patient had a 2 to 3/6 systolic ejection murmur heard best at the left upper sternal border, rhythm appear to be normal.  Extremities with trace to 1+ edema.    GI: Abdomen is nontender, nondistended without hepatosplenomegaly    Assessment:  1. Aortic stenosis  2.  Hypertension  3.  Lower extremity edema  4.  Hypothyroidism     Plan:  1. Will schedule a CBC, BMP, TSH, T4  2.  Will see about arrange home health with PT as well as a 8 for ADLs.

## 2022-09-23 PROCEDURE — G0180 PR HOME HEALTH MD CERTIFICATION: ICD-10-PCS | Mod: ,,, | Performed by: INTERNAL MEDICINE

## 2022-09-23 PROCEDURE — G0180 MD CERTIFICATION HHA PATIENT: HCPCS | Mod: ,,, | Performed by: INTERNAL MEDICINE

## 2022-10-05 ENCOUNTER — DOCUMENT SCAN (OUTPATIENT)
Dept: HOME HEALTH SERVICES | Facility: HOSPITAL | Age: 87
End: 2022-10-05
Payer: MEDICARE

## 2022-10-11 ENCOUNTER — DOCUMENT SCAN (OUTPATIENT)
Dept: HOME HEALTH SERVICES | Facility: HOSPITAL | Age: 87
End: 2022-10-11
Payer: MEDICARE

## 2022-10-11 ENCOUNTER — EXTERNAL HOME HEALTH (OUTPATIENT)
Dept: HOME HEALTH SERVICES | Facility: HOSPITAL | Age: 87
End: 2022-10-11
Payer: MEDICARE

## 2022-10-12 ENCOUNTER — DOCUMENT SCAN (OUTPATIENT)
Dept: HOME HEALTH SERVICES | Facility: HOSPITAL | Age: 87
End: 2022-10-12
Payer: MEDICARE

## 2022-12-16 ENCOUNTER — PES CALL (OUTPATIENT)
Dept: ADMINISTRATIVE | Facility: CLINIC | Age: 87
End: 2022-12-16
Payer: MEDICARE

## 2023-02-03 ENCOUNTER — DOCUMENT SCAN (OUTPATIENT)
Dept: HOME HEALTH SERVICES | Facility: HOSPITAL | Age: 88
End: 2023-02-03
Payer: MEDICARE

## 2023-02-06 NOTE — DISCHARGE SUMMARY
"[facility] Hospital Medicine  Discharge Summary      Patient Name: Linda Hylton  MRN: [id number]  Admission Date: 12/27/2017  Hospital Length of Stay: 0 days  Discharge Date and Time: 12/29/2017  3:31 PM  Attending Physician: No att. providers found   Discharging Provider: Angella Vivar PA-C  Primary Care Provider: Yakov Barrett MD  [facility] Medicine Team: [facility] HOSP MED F Angella Vivar PA-C    HPI:   82 year old female with a PMHx of HTN, HLD, R cerebral vermal ischemic stroke in 3/2017, extrensive vertebral basilar disease, hypothyroidism, and iron deficiency anemia presenting to the ED for evaluation of fatigue. Pt c/o fatigue, weakness, blurry vision, and urinary frequency. Pt cannot recall how she ended up in ER. Pt alert and oriented to self, place, year and president. Pt reports her son lives with her. Pt has no other complaints; denies chest pain, SOB, abdominal pain, N/V/D, fever, chills, decreased PO intake, headache, and recent illness.    Of note, patient was recently admitted 12/18-12/20 with similar presentation and was evaluated by vascular neurology. Per notes, suspect patient's symptoms were 2/2 "patient most likely had presyncope as prior to admit patient was having poor oral intake with nausea and diarrhea and likely got hypovolemic and with her known vertebrobasilar insufficiency likely triggered a neurologic response due to brain hypoperfusion". Sx resolved with fluid resuscitation. No acute findings of labs or imaging.    In the ED, pt is hypertensive. Afebrile without leukocytosis. >95% on RA. UA shows 3+ leukocytes and 20 WBC. Pt received 1 dose of CTX and 1L of IVF.    * No surgery found *      Hospital Course:   Pt admitted to observation for fatigue/confusion found to have UTI. Recent admission 12/18-12/20 with similar presentation: mild disorientation and lethargy on admit suspected to be 2/2 dehydration in patient with known vertebrobasilar insufficiency causing " transient neurologic symptoms (EEG with no evidence of seizure; MRI of brain with no evidence of acute stroke) CTH on admit shows no evidence of acute hemorrhage or major vascular territory infarction; generalized cerebral volume loss with moderate chronic microvascular ischemic changes similar to prior exam. CXR unremarkable. Procalcitonin WNL. UA reveals 20 WBC, rare bacteria. Urine cx showing Morganella Morganii and Ecoli. Cdiff and stool cx negative. Pt discharged home with C to complete 7 day course of cipro. Pt to follow up with PCP as an outpatient.        * Acute cystitis without hematuria    - UA reveals 20 WBC, rare bacteria  - Received CTX x1 in the ED  - Urine cx showing Morganella Morganii and Ecoli (<50,000)  - Remained afebrile without leukocytosis   - Pt discharged home with C to complete 7 day course of cipro        Fatigue    Likely exacerbated by UTI  - Alert and oriented x4; unable to report how/why she got to hospital  - CTH shows no evidence of acute hemorrhage or major vascular territory infarction; generalized cerebral volume loss with moderate chronic microvascular ischemic changes similar to prior exam.  - Nonfocal neuro exam  - Able to follow commands  - Recent admission 12/18-12/20 with similar presentation: mild disorientation and lethargy on admit suspected to be 2/2 dehydration in patient with known vertebrobasilar insufficiency causing transient neurologic symptoms. EEG with no evidence of seizure and MRI of brain with no evidence of acute stroke   - CXR unremarkable, procalcitonin WNL  - Resolved 12/28        Vertebral basilar insufficiency    - Known VBI and intracranial stenosis  - Allow permissive HTN with -160        Intracranial atherosclerosis    - Continue DAPT with ASA, plavix         Essential hypertension    - Hypertensive on admit  - Resumed home Toprol 50 mg daily and lisinopril 40 mg daily  - Per [facility] neuro notes from last admission, allow for permissive HTN  2/2 known vertebrobasilar dz and intracranial stenosis        Iron deficiency anemia    - H/H stable  - No s/s overt bleeding  - Continue home iron supplementation        Mixed hyperlipidemia    - Continue home lipitor 40 mg daily        Acquired hypothyroidism    - Continue home synthroid          Final Active Diagnoses:    Diagnosis Date Noted POA    PRINCIPAL PROBLEM:  Acute cystitis without hematuria [N30.00] 12/28/2017 Yes    Fatigue [R53.83] 12/27/2017 Yes    Vertebral basilar insufficiency [G45.0] 12/19/2017 Yes    Intracranial atherosclerosis [I67.2] 03/24/2017 Yes    Essential hypertension [I10]  Yes    Iron deficiency anemia [D50.9] 12/27/2017 Yes    Mixed hyperlipidemia [E78.2] 12/18/2017 Yes    Acquired hypothyroidism [E03.9]  Yes      Problems Resolved During this Admission:    Diagnosis Date Noted Date Resolved POA       Discharged Condition: stable    Disposition: Home or Self Care    Follow Up:  Follow-up Information     Yakov Barrett MD On 1/10/2018.    Specialty:  Internal Medicine  Why:  at 10:00 AM  Contact information:  [address]  [facility] [address]  [phone]             [facility].    Specialty:  Home Health Services  Why:  Home Health: The nurse will see the patient tomorrow.  Contact information:  [address] [facility] [address]  [facility] [address]  [phone]                 Patient Instructions:     Activity as tolerated     Notify your health care provider if you experience any of the following:  redness, tenderness, or signs of infection (pain, swelling, redness, odor or green/yellow discharge around incision site)     Notify your health care provider if you experience any of the following:  temperature >100.4     Notify your health care provider if you experience any of the following:  persistent nausea and vomiting or diarrhea         Significant Diagnostic Studies: Microbiology:   Urine Culture    Lab Results   Component Value Date    LABURIN  MORGANELLA MORGANII  >100,000 cfu/ml   12/27/2017    LABURIN ESCHERICHIA COLI  10,000 - 49,999 cfu/ml   12/27/2017    and Cdiff  Radiology: X-Ray: CXR: X-Ray Chest PA and Lateral (CXR):   Results for orders placed or performed during the hospital encounter of 12/27/17   X-Ray Chest PA And Lateral    Narrative    EXAM:  2 VIEW CHEST RADIOGRAPH.     COMPARISON: 03/24/2017.    TECHNIQUE:  Two views of the chest were obtained.     FINDINGS: Cardiac silhouette is mildly enlarged. Mildly tortuous thoracic aorta with atherosclerotic calcifications. No focal consolidation. No pleural effusion. No detrimental change in lung aeration when compared with 03/24/2017.    Impression     No acute finding or detrimental change when compared with 03/24/2017.      Electronically signed by: Negro Frances  Date:     12/28/17  Time:    04:29      CT scan: CT Head    Pending Diagnostic Studies:     None         Medications:  Reconciled Home Medications:   Discharge Medication List as of 12/29/2017  2:14 PM      START taking these medications    Details   ciprofloxacin HCl (CIPRO) 250 MG tablet Take 1 tablet (250 mg total) by mouth every 12 (twelve) hours., Starting Fri 12/29/2017, Normal      loperamide (IMODIUM A-D) 2 mg Tab Take 1 tablet (2 mg total) by mouth 4 (four) times daily as needed., Starting Fri 12/29/2017, Until Mon 1/8/2018, OTC         CONTINUE these medications which have NOT CHANGED    Details   ALPRAZolam (XANAX) 0.5 MG tablet TAKE 1 TABLET (0.5 MG TOTAL) BY MOUTH 2 (TWO) TIMES DAILY AS NEEDED FOR ANXIETY., Starting Thu 12/21/2017, Until Sat 1/20/2018, Normal      aspirin (ECOTRIN) 325 MG EC tablet Take 1 tablet (325 mg total) by mouth once daily., Starting 3/21/2017, Until Wed 3/21/18, OTC      atorvastatin (LIPITOR) 40 MG tablet Take 1 tablet (40 mg total) by mouth once daily., Starting 3/21/2017, Until Wed 3/21/18, Normal      cholecalciferol, vitamin D3, 5,000 unit Tab Take 5,000 Units by mouth once daily., Until  Discontinued, Historical Med      clopidogrel (PLAVIX) 75 mg tablet Take 1 tablet (75 mg total) by mouth once daily., Starting 3/25/2017, Until Sun 3/25/18, Normal      fenofibrate 160 MG Tab Take 1 tablet (160 mg total) by mouth once daily., Starting Tue 11/29/2016, Until Fri 12/22/2017, Normal      ferrous sulfate 325 (65 FE) MG EC tablet Take 325 mg by mouth once daily., Until Discontinued, Historical Med      levothyroxine (SYNTHROID) 75 MCG tablet TAKE 1 TABLET (75 MCG TOTAL) BY MOUTH ONCE DAILY., Starting Thu 11/23/2017, Normal      lisinopril (PRINIVIL,ZESTRIL) 40 MG tablet Take 40 mg by mouth once daily. , Starting 7/5/2016, Until Discontinued, Historical Med      meclizine (ANTIVERT) 25 mg tablet Take 1 tablet (25 mg total) by mouth 3 (three) times daily as needed for Dizziness., Starting 4/10/2017, Until Discontinued, Normal      metoprolol succinate (TOPROL-XL) 50 MG 24 hr tablet Take 50 mg by mouth once daily. , Starting 7/25/2016, Until Discontinued, Historical Med      ondansetron (ZOFRAN-ODT) 8 MG TbDL Take 1 tablet (8 mg total) by mouth every 8 (eight) hours as needed., Starting 4/10/2017, Until Discontinued, Normal             Indwelling Lines/Drains at time of discharge:   Lines/Drains/Airways          No matching active lines, drains, or airways          Time spent on the discharge of patient: 30 minutes  Patient was seen and examined on the date of discharge and determined to be suitable for discharge.         Angella Vivar PA-C  Department of Hospital Medicine  [facility]  Discussed with staff: Dr. Landa   -3

## 2023-05-04 NOTE — TELEPHONE ENCOUNTER
Care Due:                  Date            Visit Type   Department     Provider  --------------------------------------------------------------------------------                                EP -                              PRIMARY      [facility] INTERNAL  Last Visit: 09-      CARE ([facility])   MEDICINE       RORY OGDEN  Next Visit: None Scheduled  None         None Found                                                            Last  Test          Frequency    Reason                     Performed    Due Date  --------------------------------------------------------------------------------    CMP.........  12 months..  atorvastatin.............  06- 05-    Lipid Panel.  12 months..  atorvastatin.............  06- 05-    [employer] Embedded Care Due Messages. Reference number: [id number].   5/04/2023 3:31:19 PM CDT

## 2023-05-05 RX ORDER — GABAPENTIN 300 MG/1
CAPSULE ORAL
Qty: 90 CAPSULE | Refills: 3 | Status: SHIPPED | OUTPATIENT
Start: 2023-05-05

## 2023-05-19 ENCOUNTER — PES CALL (OUTPATIENT)
Dept: ADMINISTRATIVE | Facility: CLINIC | Age: 88
End: 2023-05-19
Payer: MEDICARE

## 2023-06-05 NOTE — TELEPHONE ENCOUNTER
No care due was identified.  [facility] Embedded Care Due Messages. Reference number: [id number].   6/05/2023 3:39:56 PM CDT

## 2023-06-06 RX ORDER — ATORVASTATIN CALCIUM 80 MG/1
80 TABLET, FILM COATED ORAL DAILY
Qty: 90 TABLET | Refills: 0 | Status: SHIPPED | OUTPATIENT
Start: 2023-06-06 | End: 2023-11-14

## 2023-06-06 RX ORDER — AMLODIPINE BESYLATE 5 MG/1
TABLET ORAL
Qty: 90 TABLET | Refills: 3 | Status: SHIPPED | OUTPATIENT
Start: 2023-06-06

## 2023-06-06 NOTE — TELEPHONE ENCOUNTER
Refill Routing Note   Medication(s) are not appropriate for processing by [facility] for the following reason(s):      Required labs outdated  Required vitals abnormal    [facility] action(s):  Defer None identified          Appointments  past 12m or future 3m with PCP    Date Provider   Last Visit   9/22/2022 Yakov Barrett MD   Next Visit   Visit date not found Yakov Barrett MD   ED visits in past 90 days: 0        Note composed:11:34 AM 06/06/2023

## 2023-06-09 ENCOUNTER — PATIENT MESSAGE (OUTPATIENT)
Dept: ADMINISTRATIVE | Facility: HOSPITAL | Age: 88
End: 2023-06-09
Payer: MEDICARE

## 2023-06-09 ENCOUNTER — PATIENT OUTREACH (OUTPATIENT)
Dept: ADMINISTRATIVE | Facility: HOSPITAL | Age: 88
End: 2023-06-09
Payer: MEDICARE

## 2023-06-22 DIAGNOSIS — R42 DIZZINESS: ICD-10-CM

## 2023-06-22 RX ORDER — LEVOTHYROXINE SODIUM 88 UG/1
TABLET ORAL
Qty: 90 TABLET | Refills: 0 | Status: SHIPPED | OUTPATIENT
Start: 2023-06-22 | End: 2023-11-13 | Stop reason: SDUPTHER

## 2023-06-22 NOTE — TELEPHONE ENCOUNTER
Care Due:                  Date            Visit Type   Department     Provider  --------------------------------------------------------------------------------                                EP -                              PRIMARY      [facility] INTERNAL  Last Visit: 09-      CARE ([facility])   MEDICINE       RORY OGDEN  Next Visit: None Scheduled  None         None Found                                                            Last  Test          Frequency    Reason                     Performed    Due Date  --------------------------------------------------------------------------------    Office Visit  12 months..  atorvastatin,              09- 09-                             levothyroxine,                             lisinopriL, traZODone....    TSH.........  12 months..  levothyroxine............  09- 09-    [facility] Embedded Care Due Messages. Reference number: [id number].   6/22/2023 1:50:28 PM CDT

## 2023-06-22 NOTE — TELEPHONE ENCOUNTER
Refill Routing Note   Medication(s) are not appropriate for processing by [facility] for the following reason(s):      Medication outside of protocol    [facility] action(s):  Route  Approve Care Due:  Appointment due  Labs due   Medication Therapy Plan: route meclizine; approve levothyroxine      Appointments  past 12m or future 3m with PCP    Date Provider   Last Visit   9/22/2022 Yakov Barrett MD   Next Visit   Visit date not found Yakov Barrett MD   ED visits in past 90 days: 0        Note composed:5:08 PM 06/22/2023

## 2023-06-23 RX ORDER — MECLIZINE HYDROCHLORIDE 25 MG/1
TABLET ORAL
Qty: 30 TABLET | Refills: 0 | Status: SHIPPED | OUTPATIENT
Start: 2023-06-23 | End: 2023-11-13 | Stop reason: SDUPTHER

## 2023-10-22 PROCEDURE — 99285 EMERGENCY DEPT VISIT HI MDM: CPT

## 2023-10-23 ENCOUNTER — HOSPITAL ENCOUNTER (INPATIENT)
Facility: HOSPITAL | Age: 88
LOS: 1 days | Discharge: HOME | DRG: 640 | End: 2023-10-24
Attending: EMERGENCY MEDICINE | Admitting: INTERNAL MEDICINE
Payer: MEDICARE

## 2023-10-23 DIAGNOSIS — E78.2 MIXED HYPERLIPIDEMIA: ICD-10-CM

## 2023-10-23 DIAGNOSIS — G93.41 METABOLIC ENCEPHALOPATHY: ICD-10-CM

## 2023-10-23 DIAGNOSIS — Z86.73 HISTORY OF STROKE: ICD-10-CM

## 2023-10-23 DIAGNOSIS — N30.00 ACUTE CYSTITIS WITHOUT HEMATURIA: ICD-10-CM

## 2023-10-23 DIAGNOSIS — R07.9 CHEST PAIN: ICD-10-CM

## 2023-10-23 DIAGNOSIS — I10 ESSENTIAL HYPERTENSION: ICD-10-CM

## 2023-10-23 DIAGNOSIS — E87.1 HYPONATREMIA: Primary | ICD-10-CM

## 2023-10-23 DIAGNOSIS — M54.32 SCIATICA OF LEFT SIDE: ICD-10-CM

## 2023-10-23 DIAGNOSIS — E03.9 ACQUIRED HYPOTHYROIDISM: ICD-10-CM

## 2023-10-23 PROBLEM — I63.412 EMBOLIC STROKE INVOLVING LEFT MIDDLE CEREBRAL ARTERY: Status: RESOLVED | Noted: 2019-02-20 | Resolved: 2023-10-23

## 2023-10-23 PROBLEM — Z90.49 S/P LAPAROSCOPIC CHOLECYSTECTOMY: Status: RESOLVED | Noted: 2019-07-30 | Resolved: 2023-10-23

## 2023-10-23 PROBLEM — G93.6 CYTOTOXIC CEREBRAL EDEMA: Status: RESOLVED | Noted: 2019-02-20 | Resolved: 2023-10-23

## 2023-10-23 PROBLEM — R53.81 DEBILITY: Status: RESOLVED | Noted: 2019-07-29 | Resolved: 2023-10-23

## 2023-10-23 PROBLEM — N39.0 UTI (URINARY TRACT INFECTION): Status: ACTIVE | Noted: 2023-10-23

## 2023-10-23 PROBLEM — K92.1 MELENA: Status: RESOLVED | Noted: 2019-02-25 | Resolved: 2023-10-23

## 2023-10-23 PROBLEM — G93.40 ACUTE ENCEPHALOPATHY: Status: RESOLVED | Noted: 2017-12-18 | Resolved: 2023-10-23

## 2023-10-23 PROBLEM — R42 DIZZINESS: Status: RESOLVED | Noted: 2017-12-15 | Resolved: 2023-10-23

## 2023-10-23 PROBLEM — R11.2 NAUSEA & VOMITING: Status: RESOLVED | Noted: 2017-03-20 | Resolved: 2023-10-23

## 2023-10-23 PROBLEM — R53.83 FATIGUE: Status: RESOLVED | Noted: 2017-12-27 | Resolved: 2023-10-23

## 2023-10-23 LAB
ALBUMIN SERPL BCP-MCNC: 3.8 G/DL (ref 3.5–5.2)
ALP SERPL-CCNC: 54 U/L (ref 55–135)
ALT SERPL W/O P-5'-P-CCNC: 13 U/L (ref 10–44)
ANION GAP SERPL CALC-SCNC: 13 MMOL/L (ref 8–16)
AST SERPL-CCNC: 23 U/L (ref 10–40)
BACTERIA #/AREA URNS AUTO: ABNORMAL /HPF
BASOPHILS # BLD AUTO: 0.07 K/UL (ref 0–0.2)
BASOPHILS NFR BLD: 0.6 % (ref 0–1.9)
BILIRUB SERPL-MCNC: 0.8 MG/DL (ref 0.1–1)
BILIRUB UR QL STRIP: NEGATIVE
BUN SERPL-MCNC: 13 MG/DL (ref 8–23)
CALCIUM SERPL-MCNC: 9.7 MG/DL (ref 8.7–10.5)
CHLORIDE SERPL-SCNC: 93 MMOL/L (ref 95–110)
CLARITY UR REFRACT.AUTO: ABNORMAL
CO2 SERPL-SCNC: 22 MMOL/L (ref 23–29)
COLOR UR AUTO: ABNORMAL
CREAT SERPL-MCNC: 0.8 MG/DL (ref 0.5–1.4)
DIFFERENTIAL METHOD: ABNORMAL
EOSINOPHIL # BLD AUTO: 0.1 K/UL (ref 0–0.5)
EOSINOPHIL NFR BLD: 0.4 % (ref 0–8)
ERYTHROCYTE [DISTWIDTH] IN BLOOD BY AUTOMATED COUNT: 13.3 % (ref 11.5–14.5)
EST. GFR  (NO RACE VARIABLE): >60 ML/MIN/1.73 M^2
GLUCOSE SERPL-MCNC: 102 MG/DL (ref 70–110)
GLUCOSE UR QL STRIP: NEGATIVE
HCT VFR BLD AUTO: 37.8 % (ref 37–48.5)
HCV AB SERPL QL IA: NORMAL
HGB BLD-MCNC: 13 G/DL (ref 12–16)
HGB UR QL STRIP: ABNORMAL
HIV 1+2 AB+HIV1 P24 AG SERPL QL IA: NORMAL
HYALINE CASTS UR QL AUTO: 0 /LPF
IMM GRANULOCYTES # BLD AUTO: 0.04 K/UL (ref 0–0.04)
IMM GRANULOCYTES NFR BLD AUTO: 0.3 % (ref 0–0.5)
KETONES UR QL STRIP: NEGATIVE
LEUKOCYTE ESTERASE UR QL STRIP: ABNORMAL
LYMPHOCYTES # BLD AUTO: 1.6 K/UL (ref 1–4.8)
LYMPHOCYTES NFR BLD: 12.7 % (ref 18–48)
MCH RBC QN AUTO: 28.6 PG (ref 27–31)
MCHC RBC AUTO-ENTMCNC: 34.4 G/DL (ref 32–36)
MCV RBC AUTO: 83 FL (ref 82–98)
MICROSCOPIC COMMENT: ABNORMAL
MONOCYTES # BLD AUTO: 0.9 K/UL (ref 0.3–1)
MONOCYTES NFR BLD: 7 % (ref 4–15)
NEUTROPHILS # BLD AUTO: 10 K/UL (ref 1.8–7.7)
NEUTROPHILS NFR BLD: 79 % (ref 38–73)
NITRITE UR QL STRIP: NEGATIVE
NRBC BLD-RTO: 0 /100 WBC
OSMOLALITY SERPL: 279 MOSM/KG (ref 275–295)
OSMOLALITY SERPL: 279 MOSM/KG (ref 275–295)
PH UR STRIP: 6 [PH] (ref 5–8)
PLATELET # BLD AUTO: 297 K/UL (ref 150–450)
PMV BLD AUTO: 10.1 FL (ref 9.2–12.9)
POTASSIUM SERPL-SCNC: 4 MMOL/L (ref 3.5–5.1)
PROT SERPL-MCNC: 7.2 G/DL (ref 6–8.4)
PROT UR QL STRIP: ABNORMAL
RBC # BLD AUTO: 4.54 M/UL (ref 4–5.4)
RBC #/AREA URNS AUTO: 77 /HPF (ref 0–4)
SODIUM SERPL-SCNC: 127 MMOL/L (ref 136–145)
SODIUM SERPL-SCNC: 128 MMOL/L (ref 136–145)
SODIUM SERPL-SCNC: 128 MMOL/L (ref 136–145)
SP GR UR STRIP: 1.02 (ref 1–1.03)
SQUAMOUS #/AREA URNS AUTO: 5 /HPF
TSH SERPL DL<=0.005 MIU/L-ACNC: 0.81 UIU/ML (ref 0.4–4)
URN SPEC COLLECT METH UR: ABNORMAL
WBC # BLD AUTO: 12.63 K/UL (ref 3.9–12.7)
WBC #/AREA URNS AUTO: 50 /HPF (ref 0–5)

## 2023-10-23 PROCEDURE — 87186 SC STD MICRODIL/AGAR DIL: CPT | Performed by: EMERGENCY MEDICINE

## 2023-10-23 PROCEDURE — 87086 URINE CULTURE/COLONY COUNT: CPT | Performed by: EMERGENCY MEDICINE

## 2023-10-23 PROCEDURE — 84443 ASSAY THYROID STIM HORMONE: CPT

## 2023-10-23 PROCEDURE — 87077 CULTURE AEROBIC IDENTIFY: CPT | Performed by: EMERGENCY MEDICINE

## 2023-10-23 PROCEDURE — 25000003 PHARM REV CODE 250

## 2023-10-23 PROCEDURE — 86803 HEPATITIS C AB TEST: CPT | Performed by: EMERGENCY MEDICINE

## 2023-10-23 PROCEDURE — 85025 COMPLETE CBC W/AUTO DIFF WBC: CPT | Performed by: EMERGENCY MEDICINE

## 2023-10-23 PROCEDURE — 12000002 HC ACUTE/MED SURGE SEMI-PRIVATE ROOM

## 2023-10-23 PROCEDURE — 80053 COMPREHEN METABOLIC PANEL: CPT | Performed by: EMERGENCY MEDICINE

## 2023-10-23 PROCEDURE — 25000003 PHARM REV CODE 250: Performed by: STUDENT IN AN ORGANIZED HEALTH CARE EDUCATION/TRAINING PROGRAM

## 2023-10-23 PROCEDURE — 63600175 PHARM REV CODE 636 W HCPCS: Performed by: STUDENT IN AN ORGANIZED HEALTH CARE EDUCATION/TRAINING PROGRAM

## 2023-10-23 PROCEDURE — 83930 ASSAY OF BLOOD OSMOLALITY: CPT | Performed by: STUDENT IN AN ORGANIZED HEALTH CARE EDUCATION/TRAINING PROGRAM

## 2023-10-23 PROCEDURE — 25000003 PHARM REV CODE 250: Performed by: INTERNAL MEDICINE

## 2023-10-23 PROCEDURE — 25000003 PHARM REV CODE 250: Performed by: EMERGENCY MEDICINE

## 2023-10-23 PROCEDURE — 87389 HIV-1 AG W/HIV-1&-2 AB AG IA: CPT | Performed by: EMERGENCY MEDICINE

## 2023-10-23 PROCEDURE — 87088 URINE BACTERIA CULTURE: CPT | Performed by: EMERGENCY MEDICINE

## 2023-10-23 PROCEDURE — 84295 ASSAY OF SERUM SODIUM: CPT

## 2023-10-23 PROCEDURE — 81001 URINALYSIS AUTO W/SCOPE: CPT | Performed by: EMERGENCY MEDICINE

## 2023-10-23 RX ORDER — ACETAMINOPHEN 500 MG
1000 TABLET ORAL EVERY 8 HOURS PRN
Status: DISCONTINUED | OUTPATIENT
Start: 2023-10-23 | End: 2023-10-24 | Stop reason: HOSPADM

## 2023-10-23 RX ORDER — CLOPIDOGREL BISULFATE 75 MG/1
75 TABLET ORAL DAILY
Status: DISCONTINUED | OUTPATIENT
Start: 2023-10-23 | End: 2023-10-24 | Stop reason: HOSPADM

## 2023-10-23 RX ORDER — ATORVASTATIN CALCIUM 40 MG/1
80 TABLET, FILM COATED ORAL DAILY
Status: DISCONTINUED | OUTPATIENT
Start: 2023-10-24 | End: 2023-10-24 | Stop reason: HOSPADM

## 2023-10-23 RX ORDER — LEVOTHYROXINE SODIUM 88 UG/1
88 TABLET ORAL
Status: DISCONTINUED | OUTPATIENT
Start: 2023-10-23 | End: 2023-10-24 | Stop reason: HOSPADM

## 2023-10-23 RX ORDER — HYDROXYZINE HYDROCHLORIDE 25 MG/1
25 TABLET, FILM COATED ORAL NIGHTLY PRN
Status: DISCONTINUED | OUTPATIENT
Start: 2023-10-23 | End: 2023-10-24

## 2023-10-23 RX ORDER — CEPHALEXIN 500 MG/1
500 CAPSULE ORAL
Status: COMPLETED | OUTPATIENT
Start: 2023-10-23 | End: 2023-10-23

## 2023-10-23 RX ORDER — SODIUM CHLORIDE 1 G/1
1000 TABLET ORAL 3 TIMES DAILY
Status: DISCONTINUED | OUTPATIENT
Start: 2023-10-23 | End: 2023-10-24

## 2023-10-23 RX ORDER — CEPHALEXIN 500 MG/1
500 CAPSULE ORAL EVERY 12 HOURS
Status: DISCONTINUED | OUTPATIENT
Start: 2023-10-23 | End: 2023-10-23

## 2023-10-23 RX ORDER — AMLODIPINE BESYLATE 5 MG/1
5 TABLET ORAL DAILY
Status: DISCONTINUED | OUTPATIENT
Start: 2023-10-23 | End: 2023-10-24 | Stop reason: HOSPADM

## 2023-10-23 RX ORDER — GABAPENTIN 300 MG/1
300 CAPSULE ORAL NIGHTLY
Status: DISCONTINUED | OUTPATIENT
Start: 2023-10-23 | End: 2023-10-24 | Stop reason: HOSPADM

## 2023-10-23 RX ORDER — NALOXONE HCL 0.4 MG/ML
0.02 VIAL (ML) INJECTION
Status: DISCONTINUED | OUTPATIENT
Start: 2023-10-23 | End: 2023-10-24 | Stop reason: HOSPADM

## 2023-10-23 RX ORDER — SODIUM CHLORIDE 0.9 % (FLUSH) 0.9 %
10 SYRINGE (ML) INJECTION EVERY 12 HOURS PRN
Status: DISCONTINUED | OUTPATIENT
Start: 2023-10-23 | End: 2023-10-24 | Stop reason: HOSPADM

## 2023-10-23 RX ORDER — TIZANIDINE 4 MG/1
4 TABLET ORAL EVERY 8 HOURS PRN
Status: DISCONTINUED | OUTPATIENT
Start: 2023-10-23 | End: 2023-10-24 | Stop reason: HOSPADM

## 2023-10-23 RX ORDER — PANTOPRAZOLE SODIUM 40 MG/1
40 TABLET, DELAYED RELEASE ORAL DAILY
Status: DISCONTINUED | OUTPATIENT
Start: 2023-10-24 | End: 2023-10-24 | Stop reason: HOSPADM

## 2023-10-23 RX ORDER — IBUPROFEN 200 MG
24 TABLET ORAL
Status: DISCONTINUED | OUTPATIENT
Start: 2023-10-23 | End: 2023-10-24 | Stop reason: HOSPADM

## 2023-10-23 RX ORDER — CEFPODOXIME PROXETIL 100 MG/1
100 TABLET, FILM COATED ORAL EVERY 12 HOURS
Status: DISCONTINUED | OUTPATIENT
Start: 2023-10-24 | End: 2023-10-23

## 2023-10-23 RX ORDER — GLUCAGON 1 MG
1 KIT INJECTION
Status: DISCONTINUED | OUTPATIENT
Start: 2023-10-23 | End: 2023-10-24 | Stop reason: HOSPADM

## 2023-10-23 RX ORDER — IBUPROFEN 200 MG
16 TABLET ORAL
Status: DISCONTINUED | OUTPATIENT
Start: 2023-10-23 | End: 2023-10-24 | Stop reason: HOSPADM

## 2023-10-23 RX ADMIN — AMLODIPINE BESYLATE 5 MG: 5 TABLET ORAL at 05:10

## 2023-10-23 RX ADMIN — SODIUM CHLORIDE 1000 MG: 1 TABLET ORAL at 06:10

## 2023-10-23 RX ADMIN — CEFTRIAXONE 2 G: 2 INJECTION, POWDER, FOR SOLUTION INTRAMUSCULAR; INTRAVENOUS at 12:10

## 2023-10-23 RX ADMIN — SODIUM CHLORIDE 1000 ML: 9 INJECTION, SOLUTION INTRAVENOUS at 10:10

## 2023-10-23 RX ADMIN — HYDROXYZINE HYDROCHLORIDE 25 MG: 25 TABLET, FILM COATED ORAL at 11:10

## 2023-10-23 RX ADMIN — SODIUM CHLORIDE 1000 ML: 9 INJECTION, SOLUTION INTRAVENOUS at 03:10

## 2023-10-23 RX ADMIN — CLOPIDOGREL BISULFATE 75 MG: 75 TABLET ORAL at 10:10

## 2023-10-23 RX ADMIN — TIZANIDINE 4 MG: 2 TABLET ORAL at 01:10

## 2023-10-23 RX ADMIN — CEPHALEXIN 500 MG: 500 CAPSULE ORAL at 03:10

## 2023-10-23 RX ADMIN — SODIUM CHLORIDE 1000 MG: 1 TABLET ORAL at 10:10

## 2023-10-23 RX ADMIN — GABAPENTIN 300 MG: 300 CAPSULE ORAL at 10:10

## 2023-10-23 RX ADMIN — LEVOTHYROXINE SODIUM 88 MCG: 88 TABLET ORAL at 07:10

## 2023-10-23 RX ADMIN — ACETAMINOPHEN 1000 MG: 500 TABLET ORAL at 01:10

## 2023-10-23 NOTE — ED NOTES
Bed: [facility]  Expected date:   Expected time:   Means of arrival:   Comments:

## 2023-10-23 NOTE — ASSESSMENT & PLAN NOTE
Pt with recent symptoms of urinary urgency and dysuria. UA and micro positive for UTI, cultures pending. HDS, afebrile.     --Continue Cephalexin for UTI.   --f/u urine cultures

## 2023-10-23 NOTE — ED PROVIDER NOTES
Encounter Date: 10/22/2023       History     Chief Complaint   Patient presents with    Altered Mental Status     EMS activated by family. Family concerned that pt has UTI. Pt provided incorrect address and became verbally combative with EMS. Pt AO x4 upon arrival - calm and cooperative with staff.      Linda Hylton is an 80-year-old female past medical history of hypertension, hypothyroidism, previous stroke presenting today for possible UTI.  EMS was called by family.  She does report urinary urgency as well as mild cloudiness with dysuria.  This has been ongoing for couple days.  She denies fever or chills.  No cough.  No abdominal pain.  She does report feeling weak.  States she has not had an appetite.  Per EMS, was angry but currently calm on arrival.      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 OR [address] [place] [address];  Service: General;  Laterality: N/A;    PARATHYROIDECTOMY Right 2012    THYROID SURGERY Right 2012    Partial thyroidectomy     Family History   Problem Relation Age of Onset    Heart disease Father     Heart disease Sister     Heart disease Brother      Diabetes Son      Social History     Tobacco Use    Smoking status: Never    Smokeless tobacco: Never   Substance Use Topics    Drug use: No     Review of Systems    Physical Exam     Initial Vitals [10/22/23 2350]   BP Pulse Resp Temp SpO2   132/76 98 18 99.1 °F (37.3 °C) 98 %      MAP       --         Physical Exam    Nursing note and vitals reviewed.  Constitutional: She appears well-developed and well-nourished. No distress.   HENT:   Mouth/Throat: Oropharynx is clear and moist.   Eyes: Conjunctivae are normal. No scleral icterus.   Neck: No JVD present.   Cardiovascular:  Normal rate, regular rhythm and intact distal pulses.           Pulmonary/Chest: Breath sounds normal. No respiratory distress. She has no wheezes. She has no rales.   Abdominal: Abdomen is soft. Bowel sounds are normal. She exhibits no distension. There is no abdominal tenderness. There is no rebound.   Musculoskeletal:         General: No edema.     Lymphadenopathy:     She has no cervical adenopathy.   Neurological: She is alert and oriented to person, place, and time.   Skin: Skin is warm. No rash noted.         ED Course   Procedures  Labs Reviewed   CBC W/ AUTO DIFFERENTIAL - Abnormal; Notable for the following components:       Result Value    Gran # (ANC) 10.0 (*)     Gran % 79.0 (*)     Lymph % 12.7 (*)     All other components within normal limits   COMPREHENSIVE METABOLIC PANEL - Abnormal; Notable for the following components:    Sodium 128 (*)     Chloride 93 (*)     CO2 22 (*)     Alkaline Phosphatase 54 (*)     All other components within normal limits   URINALYSIS, REFLEX TO URINE CULTURE - Abnormal; Notable for the following components:    Appearance, UA Cloudy (*)     Protein, UA 2+ (*)     Occult Blood UA 2+ (*)     Leukocytes, UA 1+ (*)     All other components within normal limits    Narrative:     Specimen Source->Urine   URINALYSIS MICROSCOPIC - Abnormal; Notable for the following components:    RBC, UA 77 (*)     WBC, UA  50 (*)     Bacteria Many (*)     All other components within normal limits    Narrative:     Specimen Source->Urine   CULTURE, URINE   HIV 1 / 2 ANTIBODY    Narrative:     Release to patient->Immediate   HEPATITIS C ANTIBODY    Narrative:     Release to patient->Immediate   SODIUM          Imaging Results    None          Medications   sodium chloride 0.9% flush 10 mL (has no administration in time range)   naloxone 0.4 mg/mL injection 0.02 mg (has no administration in time range)   glucose chewable tablet 16 g (has no administration in time range)   glucose chewable tablet 24 g (has no administration in time range)   glucagon (human recombinant) injection 1 mg (has no administration in time range)   dextrose 10% bolus 125 mL 125 mL (has no administration in time range)   dextrose 10% bolus 250 mL 250 mL (has no administration in time range)   cephALEXin capsule 500 mg (500 mg Oral Given 10/23/23 0327)     Medical Decision Making  Emergent evaluation of a 88-year-old female presenting today for suspected UTI.    Vital signs stable.  Well-appearing, no distress.  A&O x4.  Calm and cooperative.    Abdomen soft, nontender.    Differential includes but not limited to KISHOR, UTI, electrolyte derangement    Plan for basic labs, UA.    Amount and/or Complexity of Data Reviewed  Labs: ordered. Decision-making details documented in ED Course.  Discussion of management or test interpretation with external provider(s): Hospital Medicine    Risk  Prescription drug management.  Decision regarding hospitalization.               ED Course as of 10/23/23 0540   Mon Oct 23, 2023   0234 WBC, UA(!): 50 [GM]   0234 Bacteria, UA(!): Many [GM]   0302 Sodium(!): 128 [GM]   0302 BUN: 13 [GM]   0302 Creatinine: 0.8 [GM]   0302 WBC: 12.63 [GM]   0304 Labs reviewed with no leukocytosis.  Hemoglobin stable.  CMP with mild hyponatremia 128, chloride 93, bicarb 22.  Creatinine stable.  UA with 50 wbc's, 77 RBCs, many bacteria, 5 squamous  epithelial cells, nitrite negative.      Discussed results with patient at bedside.  Will admit to Hospital Medicine for acute hyponatremia with weakness and urinary tract infection.  She expresses understanding. [GM]      ED Course User Index  [GM] Zofia Justice MD                    Clinical Impression:   Final diagnoses:  [N30.00] Acute cystitis without hematuria (Primary)  [E87.1] Hyponatremia        ED Disposition Condition    Admit Stable                Zofia Justice MD  10/23/23 6281

## 2023-10-23 NOTE — SUBJECTIVE & OBJECTIVE
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    Vertebral basilar insufficiency 12/19/2017    Vitamin D deficiency        Past Surgical History:   Procedure Laterality Date    APPENDECTOMY      ESOPHAGOGASTRODUODENOSCOPY N/A 3/1/2019    Procedure: EGD (ESOPHAGOGASTRODUODENOSCOPY);  Surgeon: Simon Ortiz MD;  Location: [facility] ([address] [place] [address]);  Service: Endoscopy;  Laterality: N/A;    HIP SURGERY Left 01/12/2016    [facility]    LAPAROSCOPIC CHOLECYSTECTOMY N/A 7/29/2019    Procedure: CHOLECYSTECTOMY, LAPAROSCOPIC;  Surgeon: Tre Alejandra MD;  Location: [facility] OR [address] [place] [address];  Service: General;  Laterality: N/A;    PARATHYROIDECTOMY Right 2012    THYROID SURGERY Right 2012    Partial thyroidectomy       Review of patient's allergies indicates:   Allergen Reactions    Sulfa (sulfonamide antibiotics) Hives       No current facility-administered medications on file prior to encounter.     Current Outpatient Medications on File Prior to Encounter   Medication Sig    amLODIPine (NORVASC) 5 MG tablet TAKE 1 TABLET BY MOUTH EVERY DAY    atorvastatin (LIPITOR) 80 MG tablet TAKE 1 TABLET (80 MG TOTAL) BY MOUTH ONCE DAILY.    cholecalciferol, vitamin D3, 5,000 unit Tab Take 1 tablet (5,000 Units total) by mouth once daily.    clopidogreL (PLAVIX) 75 mg tablet TAKE 1 TABLET BY MOUTH EVERY DAY    clotrimazole-betamethasone 1-0.05% (LOTRISONE) cream Apply topically 2 (two) times daily.    ferrous sulfate 325 (65 FE) MG EC tablet Take 1 tablet (325 mg total) by mouth once daily.    gabapentin (NEURONTIN) 300 MG capsule TAKE 1 CAPSULE BY MOUTH EVERY DAY AT NIGHT    levothyroxine (SYNTHROID) 88 MCG tablet TAKE 1 TABLET BY MOUTH BEFORE BREAKFAST.    lidocaine (LIDODERM) 5 % Place 2 patches onto the skin once  daily. Remove & Discard patch within 12 hours or as directed by MD    lisinopriL (PRINIVIL,ZESTRIL) 5 MG tablet TAKE 1 TABLET BY MOUTH EVERY DAY    meclizine (ANTIVERT) 25 mg tablet TAKE 1 TABLET BY MOUTH THREE TIMES A DAY AS NEEDED FOR DIZZINESS    miconazole nitrate 2% (MICOTIN) 2 % Oint Apply topically 2 (two) times daily.    nystatin (MYCOSTATIN) powder Apply to affected area 3 times daily    pantoprazole (PROTONIX) 40 MG tablet TAKE 1 TABLET BY MOUTH EVERY DAY    traZODone (DESYREL) 50 MG tablet TAKE 1 TABLET BY MOUTH NIGHTLY AS NEEDED FOR INSOMNIA.     Family History       Problem Relation (Age of Onset)    Diabetes Son    Heart disease Father, Sister, Brother          Tobacco Use    Smoking status: Never    Smokeless tobacco: Never   Substance and Sexual Activity    Alcohol use: Not on file    Drug use: No    Sexual activity: Never     Review of Systems   Constitutional:  Positive for appetite change. Negative for chills, fatigue and fever.   HENT:  Negative for congestion, rhinorrhea and sore throat.    Eyes:  Negative for visual disturbance.   Respiratory:  Negative for cough, shortness of breath and wheezing.    Cardiovascular:  Negative for chest pain, palpitations and leg swelling.   Gastrointestinal:  Negative for abdominal pain, constipation, diarrhea, nausea and vomiting.   Genitourinary:  Positive for dysuria and urgency. Negative for difficulty urinating, frequency and pelvic pain.   Musculoskeletal:  Positive for back pain. Negative for arthralgias and myalgias.   Neurological:  Positive for dizziness, weakness and light-headedness. Negative for headaches.     Objective:     Vital Signs (Most Recent):  Temp: 99 °F (37.2 °C) (10/23/23 0218)  Pulse: 90 (10/23/23 0628)  Resp: 17 (10/23/23 0628)  BP: 127/76 (10/23/23 0628)  SpO2: 97 % (10/23/23 0628) Vital Signs (24h Range):  Temp:  [99 °F (37.2 °C)-99.1 °F (37.3 °C)] 99 °F (37.2 °C)  Pulse:  [90-98] 90  Resp:  [17-18] 17  SpO2:  [97 %-98 %] 97 %  BP:  (127-132)/(76-77) 127/76        There is no height or weight on file to calculate BMI.     Physical Exam  Vitals and nursing note reviewed.   Constitutional:       General: She is not in acute distress.     Appearance: Normal appearance. She is not ill-appearing or toxic-appearing.      Comments: Calm, cooperative.    HENT:      Head: Normocephalic and atraumatic.      Nose: Nose normal.   Cardiovascular:      Rate and Rhythm: Normal rate and regular rhythm.      Pulses: Normal pulses.      Heart sounds: Murmur heard.      No friction rub. No gallop.   Pulmonary:      Effort: Pulmonary effort is normal. No respiratory distress.      Breath sounds: Normal breath sounds. No wheezing, rhonchi or rales.   Abdominal:      General: Abdomen is flat. There is no distension.      Palpations: Abdomen is soft.      Tenderness: There is no abdominal tenderness. There is no guarding or rebound.   Musculoskeletal:         General: No swelling or tenderness.      Right lower leg: No edema.      Left lower leg: No edema.   Skin:     General: Skin is warm and dry.      Coloration: Skin is not jaundiced.      Findings: No bruising or lesion.   Neurological:      General: No focal deficit present.      Mental Status: She is alert and oriented to person, place, and time.      Cranial Nerves: No cranial nerve deficit.      Motor: No weakness.                Significant Labs: All pertinent labs within the past 24 hours have been reviewed.  CBC:   Recent Labs   Lab 10/23/23  0233   WBC 12.63   HGB 13.0   HCT 37.8        CMP:   Recent Labs   Lab 10/23/23  0233   *   K 4.0   CL 93*   CO2 22*      BUN 13   CREATININE 0.8   CALCIUM 9.7   PROT 7.2   ALBUMIN 3.8   BILITOT 0.8   ALKPHOS 54*   AST 23   ALT 13   ANIONGAP 13     Urine Studies:   Recent Labs   Lab 10/23/23  0152   COLORU Hannah   APPEARANCEUA Cloudy*   PHUR 6.0   SPECGRAV 1.020   PROTEINUA 2+*   GLUCUA Negative   KETONESU Negative   BILIRUBINUA Negative   OCCULTUA  2+*   NITRITE Negative   LEUKOCYTESUR 1+*   RBCUA 77*   WBCUA 50*   BACTERIA Many*   SQUAMEPITHEL 5   HYALINECASTS 0

## 2023-10-23 NOTE — H&P
[facility] - Emergency Dept  Hospital Medicine  History & Physical    Patient Name: Linda Hylton  MRN: [id number]  Patient Class: IP- Inpatient  Admission Date: 10/23/2023  Attending Physician: Carloz Marroquin MD   Primary Care Provider: Yakov Barrett MD    Patient information was obtained from patient and ER records.     Subjective:     Principal Problem:UTI (urinary tract infection)    Chief Complaint:   Chief Complaint   Patient presents with    Altered Mental Status     EMS activated by family. Family concerned that pt has UTI. Pt provided incorrect address and became verbally combative with EMS. Pt AO x4 upon arrival - calm and cooperative with staff.         HPI: Ms. Linda Hylton is a 88 year old female with hx of HTN, hypothyroidism, previous stroke who presented to the ED via EMS for possible UTI. Pt reports sx of dysuria and urinary urgency for the past couple of days. Also with associated weakness or lightheadedness, feeling faint. She denies fever/chills, abdominal pain. Pt reportedly combative while en route to the hospital but calm on presentation. While in the ED, patient remained HDS, afebrile. Labs were notable for hyponatremia with sodium 128, BUN/creatinine wnl. CBC unremarkable. UA positive for UTI, patient was started on cephalexin. Calm, cooperative. Alert & orientated x4 on exam. Admitted to Hospital Medicine for further management of UTI and hyponatremia.       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    Vertebral basilar insufficiency 12/19/2017    Vitamin D deficiency        Past Surgical History:   Procedure Laterality Date    APPENDECTOMY      ESOPHAGOGASTRODUODENOSCOPY N/A 3/1/2019    Procedure: EGD (ESOPHAGOGASTRODUODENOSCOPY);  Surgeon: Simon  MD Angel;  Location: [facility] (2ND FLR);  Service: Endoscopy;  Laterality: N/A;    HIP SURGERY Left 01/12/2016    [facility]    LAPAROSCOPIC CHOLECYSTECTOMY N/A 7/29/2019    Procedure: CHOLECYSTECTOMY, LAPAROSCOPIC;  Surgeon: Tre Alejandra MD;  [facility];  Service: General;  Laterality: N/A;    PARATHYROIDECTOMY Right 2012    THYROID SURGERY Right 2012    Partial thyroidectomy       Review of patient's allergies indicates:   Allergen Reactions    Sulfa (sulfonamide antibiotics) Hives       No current facility-administered medications on file prior to encounter.     Current Outpatient Medications on File Prior to Encounter   Medication Sig    amLODIPine (NORVASC) 5 MG tablet TAKE 1 TABLET BY MOUTH EVERY DAY    atorvastatin (LIPITOR) 80 MG tablet TAKE 1 TABLET (80 MG TOTAL) BY MOUTH ONCE DAILY.    cholecalciferol, vitamin D3, 5,000 unit Tab Take 1 tablet (5,000 Units total) by mouth once daily.    clopidogreL (PLAVIX) 75 mg tablet TAKE 1 TABLET BY MOUTH EVERY DAY    clotrimazole-betamethasone 1-0.05% (LOTRISONE) cream Apply topically 2 (two) times daily.    ferrous sulfate 325 (65 FE) MG EC tablet Take 1 tablet (325 mg total) by mouth once daily.    gabapentin (NEURONTIN) 300 MG capsule TAKE 1 CAPSULE BY MOUTH EVERY DAY AT NIGHT    levothyroxine (SYNTHROID) 88 MCG tablet TAKE 1 TABLET BY MOUTH BEFORE BREAKFAST.    lidocaine (LIDODERM) 5 % Place 2 patches onto the skin once daily. Remove & Discard patch within 12 hours or as directed by MD    lisinopriL (PRINIVIL,ZESTRIL) 5 MG tablet TAKE 1 TABLET BY MOUTH EVERY DAY    meclizine (ANTIVERT) 25 mg tablet TAKE 1 TABLET BY MOUTH THREE TIMES A DAY AS NEEDED FOR DIZZINESS    miconazole nitrate 2% (MICOTIN) 2 % Oint Apply topically 2 (two) times daily.    nystatin (MYCOSTATIN) powder Apply to affected area 3 times daily    pantoprazole (PROTONIX) 40 MG tablet TAKE 1 TABLET BY MOUTH EVERY DAY    traZODone (DESYREL) 50 MG tablet TAKE 1 TABLET BY MOUTH NIGHTLY AS  NEEDED FOR INSOMNIA.     Family History       Problem Relation (Age of Onset)    Diabetes Son    Heart disease Father, Sister, Brother          Tobacco Use    Smoking status: Never    Smokeless tobacco: Never   Substance and Sexual Activity    Alcohol use: Not on file    Drug use: No    Sexual activity: Never     Review of Systems   Constitutional:  Positive for appetite change. Negative for chills, fatigue and fever.   HENT:  Negative for congestion, rhinorrhea and sore throat.    Eyes:  Negative for visual disturbance.   Respiratory:  Negative for cough, shortness of breath and wheezing.    Cardiovascular:  Negative for chest pain, palpitations and leg swelling.   Gastrointestinal:  Negative for abdominal pain, constipation, diarrhea, nausea and vomiting.   Genitourinary:  Positive for dysuria and urgency. Negative for difficulty urinating, frequency and pelvic pain.   Musculoskeletal:  Positive for back pain. Negative for arthralgias and myalgias.   Neurological:  Positive for dizziness, weakness and light-headedness. Negative for headaches.     Objective:     Vital Signs (Most Recent):  Temp: 99 °F (37.2 °C) (10/23/23 0218)  Pulse: 90 (10/23/23 0628)  Resp: 17 (10/23/23 0628)  BP: 127/76 (10/23/23 0628)  SpO2: 97 % (10/23/23 0628) Vital Signs (24h Range):  Temp:  [99 °F (37.2 °C)-99.1 °F (37.3 °C)] 99 °F (37.2 °C)  Pulse:  [90-98] 90  Resp:  [17-18] 17  SpO2:  [97 %-98 %] 97 %  BP: (127-132)/(76-77) 127/76        There is no height or weight on file to calculate BMI.     Physical Exam  Vitals and nursing note reviewed.   Constitutional:       General: She is not in acute distress.     Appearance: Normal appearance. She is not ill-appearing or toxic-appearing.      Comments: Calm, cooperative.    HENT:      Head: Normocephalic and atraumatic.      Nose: Nose normal.   Cardiovascular:      Rate and Rhythm: Normal rate and regular rhythm.      Pulses: Normal pulses.      Heart sounds: Murmur heard.      No friction  rub. No gallop.   Pulmonary:      Effort: Pulmonary effort is normal. No respiratory distress.      Breath sounds: Normal breath sounds. No wheezing, rhonchi or rales.   Abdominal:      General: Abdomen is flat. There is no distension.      Palpations: Abdomen is soft.      Tenderness: There is no abdominal tenderness. There is no guarding or rebound.   Musculoskeletal:         General: No swelling or tenderness.      Right lower leg: No edema.      Left lower leg: No edema.   Skin:     General: Skin is warm and dry.      Coloration: Skin is not jaundiced.      Findings: No bruising or lesion.   Neurological:      General: No focal deficit present.      Mental Status: She is alert and oriented to person, place, and time.      Cranial Nerves: No cranial nerve deficit.      Motor: No weakness.                Significant Labs: All pertinent labs within the past 24 hours have been reviewed.  CBC:   Recent Labs   Lab 10/23/23  0233   WBC 12.63   HGB 13.0   HCT 37.8        CMP:   Recent Labs   Lab 10/23/23  0233   *   K 4.0   CL 93*   CO2 22*      BUN 13   CREATININE 0.8   CALCIUM 9.7   PROT 7.2   ALBUMIN 3.8   BILITOT 0.8   ALKPHOS 54*   AST 23   ALT 13   ANIONGAP 13     Urine Studies:   Recent Labs   Lab 10/23/23  0152   COLORU Hannah   APPEARANCEUA Cloudy*   PHUR 6.0   SPECGRAV 1.020   PROTEINUA 2+*   GLUCUA Negative   KETONESU Negative   BILIRUBINUA Negative   OCCULTUA 2+*   NITRITE Negative   LEUKOCYTESUR 1+*   RBCUA 77*   WBCUA 50*   BACTERIA Many*   SQUAMEPITHEL 5   HYALINECASTS 0         Assessment/Plan:     * UTI (urinary tract infection)  Pt with recent symptoms of urinary urgency and dysuria. UA and micro positive for UTI, cultures pending. HDS, afebrile.     --Continue Cephalexin for UTI.   --f/u urine cultures     Hyponatremia  CMP with hyponatremia (Na 128). Unclear if acute vs chronic hyponatremia. A&O x4 on exam.     --strict I/Os, fluid restriction  --f/u sodium levels  --continue to  monitor    History of stroke  Patient on clopidogrel at home.     --continue as appropriate.    Sciatica of left side  --home gabapentin       Acquired hypothyroidism  --continue home levothyroxine       Essential hypertension  --resume home antihypertensives when appropriate        VTE Risk Mitigation (From admission, onward)           Ordered     Reason for No Pharmacological VTE Prophylaxis  Once        Question:  Reasons:  Answer:  Already adequately anticoagulated on oral Anticoagulants    10/23/23 0452     IP VTE HIGH RISK PATIENT  Once         10/23/23 0452     Place sequential compression device  Until discontinued         10/23/23 0452                       On 10/23/2023, patient should be placed in hospital inpatient services under my care in collaboration with Dr. Nieves.      Jordana Dixon MD  Department of Hospital Medicine  [facility] - Emergency Dept

## 2023-10-23 NOTE — ASSESSMENT & PLAN NOTE
CMP with hyponatremia (Na 128). Unclear if acute vs chronic hyponatremia. A&O x4 on exam.     --strict I/Os, fluid restriction  --f/u sodium levels  --continue to monitor

## 2023-10-23 NOTE — ED NOTES
Linda Hylton, a 88 y.o. female presents to the ED via EMS from home. EMS activated by family. Family concerned that pt has UTI. Pt provided incorrect address and became verbally combative with EMS. Pt AO x4 upon arrival - calm and cooperative with staff.     Triage note:  Chief Complaint   Patient presents with    Altered Mental Status     EMS activated by family. Family concerned that pt has UTI. Pt provided incorrect address and became verbally combative with EMS. Pt AO x4 upon arrival - calm and cooperative with staff.      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

## 2023-10-23 NOTE — HPI
Ms. Linda Hylton is a 88 year old female with hx of HTN, hypothyroidism, previous stroke who presented to the ED via EMS for possible UTI. Pt reports sx of dysuria and urinary urgency for the past couple of days. Also with associated weakness or lightheadedness, feeling faint. She denies fever/chills, abdominal pain. Pt reportedly combative while en route to the hospital but calm on presentation. While in the ED, patient remained HDS, afebrile. Labs were notable for hyponatremia with sodium 128, BUN/creatinine wnl. CBC unremarkable. UA positive for UTI, patient was started on cephalexin. Calm, cooperative. Alert & orientated x4 on exam. Admitted to Hospital Medicine for further management of UTI and hyponatremia.

## 2023-10-24 VITALS
TEMPERATURE: 99 F | DIASTOLIC BLOOD PRESSURE: 65 MMHG | HEART RATE: 65 BPM | SYSTOLIC BLOOD PRESSURE: 144 MMHG | OXYGEN SATURATION: 95 % | RESPIRATION RATE: 18 BRPM | WEIGHT: 140.88 LBS | HEIGHT: 57 IN | BODY MASS INDEX: 30.39 KG/M2

## 2023-10-24 LAB
ALBUMIN SERPL BCP-MCNC: 3.3 G/DL (ref 3.5–5.2)
ALP SERPL-CCNC: 40 U/L (ref 55–135)
ALT SERPL W/O P-5'-P-CCNC: 12 U/L (ref 10–44)
ANION GAP SERPL CALC-SCNC: 12 MMOL/L (ref 8–16)
AST SERPL-CCNC: 19 U/L (ref 10–40)
BASOPHILS # BLD AUTO: 0.09 K/UL (ref 0–0.2)
BASOPHILS NFR BLD: 1 % (ref 0–1.9)
BILIRUB SERPL-MCNC: 0.4 MG/DL (ref 0.1–1)
BUN SERPL-MCNC: 9 MG/DL (ref 8–23)
CALCIUM SERPL-MCNC: 9 MG/DL (ref 8.7–10.5)
CHLORIDE SERPL-SCNC: 105 MMOL/L (ref 95–110)
CO2 SERPL-SCNC: 20 MMOL/L (ref 23–29)
CREAT SERPL-MCNC: 0.7 MG/DL (ref 0.5–1.4)
DIFFERENTIAL METHOD: ABNORMAL
EOSINOPHIL # BLD AUTO: 0.2 K/UL (ref 0–0.5)
EOSINOPHIL NFR BLD: 1.7 % (ref 0–8)
ERYTHROCYTE [DISTWIDTH] IN BLOOD BY AUTOMATED COUNT: 13.7 % (ref 11.5–14.5)
EST. GFR  (NO RACE VARIABLE): >60 ML/MIN/1.73 M^2
GLUCOSE SERPL-MCNC: 94 MG/DL (ref 70–110)
HCT VFR BLD AUTO: 33.4 % (ref 37–48.5)
HGB BLD-MCNC: 11.2 G/DL (ref 12–16)
IMM GRANULOCYTES # BLD AUTO: 0.04 K/UL (ref 0–0.04)
IMM GRANULOCYTES NFR BLD AUTO: 0.4 % (ref 0–0.5)
LYMPHOCYTES # BLD AUTO: 1.6 K/UL (ref 1–4.8)
LYMPHOCYTES NFR BLD: 17.6 % (ref 18–48)
MAGNESIUM SERPL-MCNC: 1.7 MG/DL (ref 1.6–2.6)
MCH RBC QN AUTO: 28.9 PG (ref 27–31)
MCHC RBC AUTO-ENTMCNC: 33.5 G/DL (ref 32–36)
MCV RBC AUTO: 86 FL (ref 82–98)
MONOCYTES # BLD AUTO: 0.9 K/UL (ref 0.3–1)
MONOCYTES NFR BLD: 9.5 % (ref 4–15)
NEUTROPHILS # BLD AUTO: 6.2 K/UL (ref 1.8–7.7)
NEUTROPHILS NFR BLD: 69.8 % (ref 38–73)
NRBC BLD-RTO: 0 /100 WBC
PHOSPHATE SERPL-MCNC: 3.9 MG/DL (ref 2.7–4.5)
PLATELET # BLD AUTO: 278 K/UL (ref 150–450)
PMV BLD AUTO: 10.6 FL (ref 9.2–12.9)
POTASSIUM SERPL-SCNC: 3.8 MMOL/L (ref 3.5–5.1)
PROT SERPL-MCNC: 6.3 G/DL (ref 6–8.4)
RBC # BLD AUTO: 3.88 M/UL (ref 4–5.4)
SODIUM SERPL-SCNC: 137 MMOL/L (ref 136–145)
WBC # BLD AUTO: 8.94 K/UL (ref 3.9–12.7)

## 2023-10-24 PROCEDURE — 25000003 PHARM REV CODE 250

## 2023-10-24 PROCEDURE — 83735 ASSAY OF MAGNESIUM: CPT

## 2023-10-24 PROCEDURE — 25000242 PHARM REV CODE 250 ALT 637 W/ HCPCS: Performed by: STUDENT IN AN ORGANIZED HEALTH CARE EDUCATION/TRAINING PROGRAM

## 2023-10-24 PROCEDURE — 84100 ASSAY OF PHOSPHORUS: CPT

## 2023-10-24 PROCEDURE — 80053 COMPREHEN METABOLIC PANEL: CPT

## 2023-10-24 PROCEDURE — 36415 COLL VENOUS BLD VENIPUNCTURE: CPT

## 2023-10-24 PROCEDURE — 25000003 PHARM REV CODE 250: Performed by: INTERNAL MEDICINE

## 2023-10-24 PROCEDURE — 85025 COMPLETE CBC W/AUTO DIFF WBC: CPT

## 2023-10-24 RX ORDER — CEFPODOXIME PROXETIL 100 MG/1
100 TABLET, FILM COATED ORAL EVERY 12 HOURS
Qty: 7 TABLET | Refills: 0 | Status: SHIPPED | OUTPATIENT
Start: 2023-10-24

## 2023-10-24 RX ORDER — CEFPODOXIME PROXETIL 100 MG/1
100 TABLET, FILM COATED ORAL EVERY 12 HOURS
Qty: 11 TABLET | Refills: 0 | Status: SHIPPED | OUTPATIENT
Start: 2023-10-24 | End: 2023-10-24 | Stop reason: SDUPTHER

## 2023-10-24 RX ORDER — HYDROXYZINE HYDROCHLORIDE 10 MG/1
10 TABLET, FILM COATED ORAL DAILY PRN
Status: DISCONTINUED | OUTPATIENT
Start: 2023-10-24 | End: 2023-10-24 | Stop reason: HOSPADM

## 2023-10-24 RX ORDER — CEFPODOXIME PROXETIL 100 MG/1
100 TABLET, FILM COATED ORAL EVERY 12 HOURS
Status: DISCONTINUED | OUTPATIENT
Start: 2023-10-24 | End: 2023-10-24 | Stop reason: HOSPADM

## 2023-10-24 RX ORDER — HYDROXYZINE HYDROCHLORIDE 10 MG/1
10 TABLET, FILM COATED ORAL NIGHTLY PRN
Status: DISCONTINUED | OUTPATIENT
Start: 2023-10-24 | End: 2023-10-24

## 2023-10-24 RX ORDER — PANTOPRAZOLE SODIUM 40 MG/1
40 TABLET, DELAYED RELEASE ORAL DAILY
Qty: 90 TABLET | Refills: 1 | Status: SHIPPED | OUTPATIENT
Start: 2023-10-24

## 2023-10-24 RX ORDER — CALCIUM CARBONATE 200(500)MG
500 TABLET,CHEWABLE ORAL DAILY PRN
Status: DISCONTINUED | OUTPATIENT
Start: 2023-10-24 | End: 2023-10-24 | Stop reason: HOSPADM

## 2023-10-24 RX ORDER — HYDROXYZINE HYDROCHLORIDE 10 MG/1
10 TABLET, FILM COATED ORAL DAILY PRN
Qty: 30 TABLET | Refills: 0 | Status: SHIPPED | OUTPATIENT
Start: 2023-10-24 | End: 2023-11-13 | Stop reason: SDUPTHER

## 2023-10-24 RX ADMIN — CALCIUM CARBONATE (ANTACID) CHEW TAB 500 MG 500 MG: 500 CHEW TAB at 12:10

## 2023-10-24 RX ADMIN — TIZANIDINE 4 MG: 2 TABLET ORAL at 12:10

## 2023-10-24 RX ADMIN — LEVOTHYROXINE SODIUM 88 MCG: 88 TABLET ORAL at 06:10

## 2023-10-24 RX ADMIN — AMLODIPINE BESYLATE 5 MG: 5 TABLET ORAL at 10:10

## 2023-10-24 RX ADMIN — CEFPODOXIME PROXETIL 100 MG: 100 TABLET, FILM COATED ORAL at 10:10

## 2023-10-24 RX ADMIN — PANTOPRAZOLE SODIUM 40 MG: 40 TABLET, DELAYED RELEASE ORAL at 10:10

## 2023-10-24 RX ADMIN — HYDROXYZINE HYDROCHLORIDE 10 MG: 10 TABLET ORAL at 10:10

## 2023-10-24 RX ADMIN — ATORVASTATIN CALCIUM 80 MG: 40 TABLET, FILM COATED ORAL at 10:10

## 2023-10-24 RX ADMIN — CLOPIDOGREL BISULFATE 75 MG: 75 TABLET ORAL at 10:10

## 2023-10-24 NOTE — DISCHARGE SUMMARY
[facility] - Intensive Care ([place] [address])  [facility] Medicine  Discharge Summary      Patient Name: Linda Hylton  MRN: [id number]  JUAN CARLOS: [id number]  Patient Class: IP- Inpatient  Admission Date: 10/23/2023  Hospital Length of Stay: 1 days  Discharge Date and Time:  10/24/2023 11:12 AM  Attending Physician: Carloz Marroquin MD   Discharging Provider: Daily Sanders MD  Primary Care Provider: Yakov Barrett MD  [facility] Medicine Team: [facility] 4 Daily Sanders MD  Primary Care Team: [facility] 4    HPI:   Ms. Linda Hylton is a 88 year old female with hx of HTN, hypothyroidism, previous stroke who presented to the ED via EMS for possible UTI. Pt reports sx of dysuria and urinary urgency for the past couple of days. Also with associated weakness or lightheadedness, feeling faint. She denies fever/chills, abdominal pain. Pt reportedly combative while en route to the hospital but calm on presentation. While in the ED, patient remained HDS, afebrile. Labs were notable for hyponatremia with sodium 128, BUN/creatinine wnl. CBC unremarkable. UA positive for UTI, patient was started on cephalexin. Calm, cooperative. Alert & orientated x4 on exam. Admitted to Hospital Medicine for further management of UTI and hyponatremia.       * No surgery found *      Hospital Course:   Patient treated for UTI with 1x IV rocephin and keflex, as well as d/t hyponatremia to 127 on admission. Initially c/f for chronic hyponatremia d/t medical condition, but likely hyponatremia was prerenal as the patient's Na recovered more quickly than expected from 127 to 137 with salt tabs and NS infusion. Lisinopril and trazodone stopped as both may affect Na levels. Amlodipine 5mg continued, may be increased to 10mg per PCP for better blood pressure control as needed. Hydroxyzine 10 added PRN nightly for sleep. Patient stable for discharge on PO abx, to continue PO Vantin for 5 more days for a total antibiotic course of 7 days.        Goals of Care  Treatment Preferences:  Code Status: Full Code      Consults:     Neuro  History of stroke  Patient on clopidogrel at home.     --continue as appropriate.    Cardiac/Vascular  Essential hypertension  - stop lisinopril d/t hypoNa  - continue amlopidine 5, may increase to 10 if needed per PCP      Renal/  * Hyponatremia  CMP with hyponatremia 127 on admission, increased to 137 with salt tabs. Unclear if acute vs chronic hyponatremia. A&O x4 on exam.     --strict I/Os, fluid restriction  -- salt tabs   -- stop lisinopril and trazodone as can affect Na levels  -- f/u with PCP    UTI (urinary tract infection)  Pt with recent symptoms of urinary urgency and dysuria. UA and micro positive for UTI, cultures pending. HDS, afebrile.     -- patient received 1x IV rocephin followed by keflex  -- discharge with PO Vantin BID for 5 more days for a total of 7 days of abx  --f/u urine cultures     Endocrine  Acquired hypothyroidism  --continue home levothyroxine       Orthopedic  Sciatica of left side  --home gabapentin         Final Active Diagnoses:    Diagnosis Date Noted POA    PRINCIPAL PROBLEM:  Hyponatremia [E87.1] 02/20/2019 Yes    UTI (urinary tract infection) [N39.0] 10/23/2023 Yes    Metabolic encephalopathy [G93.41] 10/23/2023 Yes    History of stroke [Z86.73] 02/20/2019 Not Applicable    Mixed hyperlipidemia [E78.2] 12/18/2017 Yes    Essential hypertension [I10]  Yes    Acquired hypothyroidism [E03.9]  Yes    Sciatica of left side [M54.32]  Yes      Problems Resolved During this Admission:       Discharged Condition: stable    Disposition: Home or Self Care    Follow Up:   Follow-up Information       Yakov Barrett MD. Call in 1 week(s).    Specialty: Internal Medicine  Contact information:  [address]  [place] [address] [phone]                           Patient Instructions:   No discharge procedures on file.    Significant Diagnostic Studies: Labs:   CMP   Recent Labs   Lab 10/23/23  7468  10/23/23  0628 10/23/23  1139 10/24/23  0440   * 128* 127* 137   K 4.0  --   --  3.8   CL 93*  --   --  105   CO2 22*  --   --  20*     --   --  94   BUN 13  --   --  9   CREATININE 0.8  --   --  0.7   CALCIUM 9.7  --   --  9.0   PROT 7.2  --   --  6.3   ALBUMIN 3.8  --   --  3.3*   BILITOT 0.8  --   --  0.4   ALKPHOS 54*  --   --  40*   AST 23  --   --  19   ALT 13  --   --  12   ANIONGAP 13  --   --  12   , CBC   Recent Labs   Lab 10/23/23  0233 10/24/23  0440   WBC 12.63 8.94   HGB 13.0 11.2*   HCT 37.8 33.4*    278    and All labs within the past 24 hours have been reviewed    Medications:  Reconciled Home Medications:      Medication List        START taking these medications      cefpodoxime 100 MG tablet  Commonly known as: VANTIN  Take 1 tablet (100 mg total) by mouth every 12 (twelve) hours.     hydrOXYzine HCL 10 MG Tab  Commonly known as: ATARAX  Take 1 tablet (10 mg total) by mouth daily as needed (Sleep).            CHANGE how you take these medications      pantoprazole 40 MG tablet  Commonly known as: PROTONIX  Take 1 tablet (40 mg total) by mouth once daily. HOLD until antibiotics are complete  What changed:   when to take this  additional instructions            CONTINUE taking these medications      amLODIPine 5 MG tablet  Commonly known as: NORVASC  TAKE 1 TABLET BY MOUTH EVERY DAY     atorvastatin 80 MG tablet  Commonly known as: LIPITOR  TAKE 1 TABLET (80 MG TOTAL) BY MOUTH ONCE DAILY.     cholecalciferol (vitamin D3) 125 mcg (5,000 unit) Tab  Take 1 tablet (5,000 Units total) by mouth once daily.     clopidogreL 75 mg tablet  Commonly known as: PLAVIX  TAKE 1 TABLET BY MOUTH EVERY DAY     clotrimazole-betamethasone 1-0.05% cream  Commonly known as: LOTRISONE  Apply topically 2 (two) times daily.     ferrous sulfate 325 (65 FE) MG EC tablet  Take 1 tablet (325 mg total) by mouth once daily.     gabapentin 300 MG capsule  Commonly known as: NEURONTIN  TAKE 1 CAPSULE BY MOUTH  EVERY DAY AT NIGHT     levothyroxine 88 MCG tablet  Commonly known as: SYNTHROID  TAKE 1 TABLET BY MOUTH BEFORE BREAKFAST.     LIDOcaine 5 %  Commonly known as: LIDODERM  Place 2 patches onto the skin once daily. Remove & Discard patch within 12 hours or as directed by MD     meclizine 25 mg tablet  Commonly known as: ANTIVERT  TAKE 1 TABLET BY MOUTH THREE TIMES A DAY AS NEEDED FOR DIZZINESS     miconazole nitrate 2% 2 % Oint  Commonly known as: MICOTIN  Apply topically 2 (two) times daily.     nystatin powder  Commonly known as: MYCOSTATIN  Apply to affected area 3 times daily              Indwelling Lines/Drains at time of discharge:   Lines/Drains/Airways       Drain  Duration             Female External Urinary Catheter 10/23/23 2200 <1 day                    Time spent on the discharge of patient: >60 minutes         Daily Sanders MD  Department of Hospital Medicine  [facility] - Intensive Care ([place]-16)

## 2023-10-24 NOTE — ASSESSMENT & PLAN NOTE
Pt with recent symptoms of urinary urgency and dysuria. UA and micro positive for UTI, cultures pending. HDS, afebrile.     -- patient received 1x IV rocephin followed by keflex  -- discharge with PO Vantin BID for 5 more days for a total of 7 days of abx  --f/u urine cultures

## 2023-10-24 NOTE — PLAN OF CARE
[facility] scheduled pcp f/u  Future Appointments   Date Time Provider Department Center   11/3/2023 10:00 AM Yakov Barrett MD [facility]    Pt was put on waiting list

## 2023-10-24 NOTE — PLAN OF CARE
New admit from ED. Pt. only oriented to self. Telesitter in place. Bradycardic. PRN medication administered for heartburn, insomnia, and muscle twitching. Two BMs. No adverse events reported during this shift.    Problem: Adult Inpatient Plan of Care  Goal: Plan of Care Review  Outcome: Ongoing, Progressing  Flowsheets (Taken 10/24/2023 0351)  Plan of Care Reviewed With: patient  Goal: Absence of Hospital-Acquired Illness or Injury  Outcome: Ongoing, Progressing  Intervention: Identify and Manage Fall Risk  Flowsheets (Taken 10/24/2023 0351)  Safety Promotion/Fall Prevention:   assistive device/personal item within reach   medications reviewed   nonskid shoes/socks when out of bed   side rails raised x 3   Supervised toileting - stay within arms reach   bed alarm set   /camera at bedside  Goal: Optimal Comfort and Wellbeing  Outcome: Ongoing, Progressing  Intervention: Provide Person-Centered Care  Flowsheets (Taken 10/24/2023 0351)  Trust Relationship/Rapport:   care explained   choices provided   questions answered   questions encouraged   thoughts/feelings acknowledged     Problem: Skin Injury Risk Increased  Goal: Skin Health and Integrity  Outcome: Ongoing, Progressing     Problem: Fall Injury Risk  Goal: Absence of Fall and Fall-Related Injury  Outcome: Ongoing, Progressing  Intervention: Identify and Manage Contributors  Flowsheets (Taken 10/24/2023 0351)  Self-Care Promotion:   independence encouraged   BADL personal objects within reach  Medication Review/Management: medications reviewed     Problem: Impaired Wound Healing  Goal: Optimal Wound Healing  Outcome: Ongoing, Progressing  Intervention: Promote Wound Healing  Flowsheets (Taken 10/24/2023 0351)  Sleep/Rest Enhancement:   awakenings minimized   noise level reduced   regular sleep/rest pattern promoted   room darkened

## 2023-10-24 NOTE — PLAN OF CARE
[facility] - [facility] ([place] [address])  Initial Discharge Assessment       Primary Care Provider: Yakov Barrett MD    Admission Diagnosis: Mixed hyperlipidemia [E78.2]  Metabolic encephalopathy [G93.41]  Hyponatremia [E87.1]  History of stroke [Z86.73]  Sciatica of left side [M54.32]  Acute cystitis without hematuria [N30.00]  Acquired hypothyroidism [E03.9]  Essential hypertension [I10]  Chest pain [R07.9]    Admission Date: 10/23/2023  Expected Discharge Date: 10/24/2023    Transition of Care Barriers: (P) None    Payor: MEDICARE / Plan: MEDICARE PART A & B / Product Type: Government /     Extended Emergency Contact Information  Primary Emergency Contact: Angel Hylton  Address: [address] [place] [address] [facility]  Home Phone: [phone]  Mobile Phone: [phone]  Relation: Son  Secondary Emergency Contact: Michael Hylton   [place]  Mobile Phone: [phone]  Relation: Son    Discharge Plan A: (P) Home  Discharge Plan B: (P) Home with family      [id number] [place], [place] - [address] [facility]  [address] [facility] [address]  Phone: [phone] Fax: [phone]    [facility] [employer] [facility] [address] [facility]  [address] [facility] [address]  Phone: [phone] Fax: [phone]      Initial Assessment (most recent)       Adult Discharge Assessment - 10/24/23 1251          Discharge Assessment    Assessment Type Discharge Planning Assessment (P)      Confirmed/corrected address, phone number and insurance Yes (P)      Confirmed Demographics Correct on Facesheet (P)      Source of Information patient (P)      Communicated JAZMINE with patient/caregiver Yes (P)      Reason For Admission Hyponatremia (P)      People in Home child(smitha), adult (P)      Facility Arrived From: home (P)      Do you expect to return to your current living situation? Yes (P)      Do you have help at home or someone to help you  manage your care at home? Yes (P)      Who are your caregiver(s) and their phone number(s)? Wade Ortiz [phone] (P)      Prior to hospitilization cognitive status: Unable to Assess (P)      Current cognitive status: Alert/Oriented (P)      Dressing/Bathing bathing difficulty, assistance 1 person;dressing difficulty, assistance 1 person (P)      Dressing/Bathing Management [facility] comes in (P)      Home Layout Able to live on 1st floor (P)      Equipment Currently Used at Home walker, rolling;wheelchair (P)      Readmission within 30 days? No (P)      Do you currently have service(s) that help you manage your care at home? Yes (P)      Name and Contact number of agency [facility] comes in to assist with bathing, dressing (P)      Is the pt/caregiver preference to resume services with current agency Yes (P)      Do you take prescription medications? Yes (P)      Do you have prescription coverage? Yes (P)      Coverage [employer] (P)      Do you have any problems affording any of your prescribed medications? No (P)      Who is going to help you get home at discharge? Wade Ortiz (P)      How do you get to doctors appointments? family or friend will provide (P)      Are you on dialysis? No (P)      Do you take coumadin? No (P)      DME Needed Upon Discharge  none (P)      Discharge Plan discussed with: Patient (P)      Transition of Care Barriers None (P)      Discharge Plan A Home (P)      Discharge Plan B Home with family (P)         Physical Activity    On average, how many days per week do you engage in moderate to strenuous exercise (like a brisk walk)? 0 days (P)      On average, how many minutes do you engage in exercise at this level? 0 min (P)         Financial Resource Strain    How hard is it for you to pay for the very basics like food, housing, medical care, and heating? Not very hard (P)         Housing Stability    In the last 12 months, was there a time when you were not able to pay the mortgage or rent on  time? No (P)      In the last 12 months, how many places have you lived? 1 (P)      In the last 12 months, was there a time when you did not have a steady place to sleep or slept in a shelter (including now)? No (P)         Transportation Needs    In the past 12 months, has lack of transportation kept you from medical appointments or from getting medications? No (P)      In the past 12 months, has lack of transportation kept you from meetings, work, or from getting things needed for daily living? No (P)         Food Insecurity    Within the past 12 months, you worried that your food would run out before you got the money to buy more. Never true (P)      Within the past 12 months, the food you bought just didn't last and you didn't have money to get more. Never true (P)         Stress    Do you feel stress - tense, restless, nervous, or anxious, or unable to sleep at night because your mind is troubled all the time - these days? Rather much (P)         Social Connections    In a typical week, how many times do you talk on the phone with family, friends, or neighbors? Once a week (P)      How often do you get together with friends or relatives? Once a week (P)      How often do you attend Sabianist or Jain services? Never (P)      Do you belong to any clubs or organizations such as Sabianist groups, unions, fraternal or athletic groups, or school groups? No (P)      How often do you attend meetings of the clubs or organizations you belong to? Never (P)      Are you , , , , never , or living with a partner?  (P)         Alcohol Use    Q1: How often do you have a drink containing alcohol? Never (P)      Q2: How many drinks containing alcohol do you have on a typical day when you are drinking? Patient does not drink (P)      Q3: How often do you have six or more drinks on one occasion? Never (P)         OTHER    Name(s) of People in Home Son Angel (P)                  ORQUIDEA spoke  with pt in room, she states she will be leaving in just a little while.  She lives with son Angel in 2 story home, bed and bath downstairs.  Came from home.  Her son Angel will give her a ride home and takes to MD appts.  No 30D readmission.  No Hh, coumadin, HD.  DME RW, w/c.  She needs assistance with bathing, dressing; she has a lady CG who comes and helps her.    YULISSA Chavez, BS, RN, CCM

## 2023-10-24 NOTE — HOSPITAL COURSE
Patient treated for UTI with 1x IV rocephin and keflex, as well as d/t hyponatremia to 127 on admission. Initially c/f for chronic hyponatremia d/t medical condition, but likely hyponatremia was prerenal as the patient's Na recovered more quickly than expected from 127 to 137 with salt tabs and NS infusion. Lisinopril and trazodone stopped as both may affect Na levels. Amlodipine 5mg continued, may be increased to 10mg per PCP for better blood pressure control as needed. Hydroxyzine 10 added PRN nightly for sleep. Patient stable for discharge on PO abx, to continue PO Vantin for 5 more days for a total antibiotic course of 7 days.

## 2023-10-24 NOTE — NURSING
Nurses Note -- 4 Eyes      10/24/2023   2:10 AM      Skin assessed during: Admit      [] No Altered Skin Integrity Present    []Prevention Measures Documented      [x] Yes- Altered Skin Integrity Present or Discovered   [x] LDA Added if Not in Epic (Describe Wound)   [x] New Altered Skin Integrity was Present on Admit and Documented in LDA   [x] Wound Image Taken    Wound Care Consulted? Yes    Attending Nurse:  Roge Sena RN     Second RN/Staff Member:   Carlos Levine RN

## 2023-10-24 NOTE — PLAN OF CARE
[facility] - Intensive Care ([facility]-16)  Discharge Final Note    Primary Care Provider: Yakov Barrett MD    Expected Discharge Date: 10/24/2023    Final Discharge Note (most recent)       Final Note - 10/24/23 1522          Final Note    Assessment Type Final Discharge Note (P)      Anticipated Discharge Disposition Home or Self Care (P)         Post-Acute Status    Discharge Delays None known at this time (P)                      Important Message from Medicare             Contact Info       Yakov Barrett MD   Specialty: Internal Medicine   Relationship: PCP - General    [facility] [address]   Phone: [phone]       Next Steps: Call in 1 week(s)        Pt d/c'd to home.    MARAH ChavezN, BS, RN, CCM

## 2023-10-24 NOTE — NURSING
"Pt appeared distress and anxious. "I'm so anxious". Nurse secure chat med team 4 to request anxiety med. No new order received.  "

## 2023-10-24 NOTE — ASSESSMENT & PLAN NOTE
CMP with hyponatremia 127 on admission, increased to 137 with salt tabs. Unclear if acute vs chronic hyponatremia. A&O x4 on exam.     --strict I/Os, fluid restriction  -- salt tabs   -- stop lisinopril and trazodone as can affect Na levels  -- f/u with PCP

## 2023-10-24 NOTE — DISCHARGE INSTRUCTIONS
Please stop taking pantoprazole while taking Vantin (cepodoxime). Can continue pantoprazole once antibiotics are finished. If you have heartburn/indigestion use alternatives such as Tums    Please STOP taking lisinopril and trazodone as those can affect blood sodium levels. Continue amlodipine 5mg for blood pressure control as instructed. Hydroxyzine 10mg was added as needed for sleep instead of trazodone.     Please follow up with your PCP in 1-2 weeks.

## 2023-10-25 ENCOUNTER — PATIENT OUTREACH (OUTPATIENT)
Dept: ADMINISTRATIVE | Facility: CLINIC | Age: 88
End: 2023-10-25
Payer: MEDICARE

## 2023-10-25 LAB — BACTERIA UR CULT: ABNORMAL

## 2023-10-25 NOTE — PROGRESS NOTES
C3 nurse attempted to contact Linda Hylton  for a TCC post hospital discharge follow up call. No answer. Left voicemail with callback information. Also, spoke with daughter in law who asked for callback to son in the afternoon. The patient has a scheduled HOSFU appointment with Yakov Barrett MD on 11/3/23 @ 10am.

## 2023-10-25 NOTE — PROGRESS NOTES
2nd attempt to make TCC Call. Left voicemail. Please call [phone] leave your first and last name and date of birth for Zhanna. I will return your call.

## 2023-10-25 NOTE — PROGRESS NOTES
Pt's daughter in law returned call in TCM box. Attempted callback; left message with TCM information to call back

## 2023-10-27 ENCOUNTER — NURSE TRIAGE (OUTPATIENT)
Dept: ADMINISTRATIVE | Facility: CLINIC | Age: 88
End: 2023-10-27
Payer: MEDICARE

## 2023-10-27 ENCOUNTER — TELEPHONE (OUTPATIENT)
Dept: INTERNAL MEDICINE | Facility: CLINIC | Age: 88
End: 2023-10-27
Payer: MEDICARE

## 2023-10-27 NOTE — TELEPHONE ENCOUNTER
Spoke with Angel Hylton, he stated that he is returning a call to Zhanna Obregon. The message was sent to PCP Dr. Barrett's office by mistake. Pt's son was informed of the hospital follow up visit and this is the soonest available with PCP.

## 2023-10-27 NOTE — TELEPHONE ENCOUNTER
----- Message from Mel Sharma sent at 10/27/2023  2:33 PM CDT -----    Patient Returning Call        Who Called:pt daughter  Who Left Message for Patient:Zhanna Obregon  Does the patient know what this is regarding?:returning call about mom discharge after surgery   Would the patient rather a call back or a response via [facility]? call  Best Call Back Number:[phone]  Additional Information: call back

## 2023-10-28 NOTE — TELEPHONE ENCOUNTER
Attempted to contact the patient x 2. First attempt: caller had a phone connection issue. Second attempt: no answer. Both attempts the phone number was verified.     Reason for Disposition   Second attempt to contact caller AND no contact made. Phone number verified.    Protocols used: No Contact or Duplicate Contact Call-A-

## 2023-11-08 ENCOUNTER — TELEPHONE (OUTPATIENT)
Dept: INTERNAL MEDICINE | Facility: CLINIC | Age: 88
End: 2023-11-08
Payer: MEDICARE

## 2023-11-08 NOTE — TELEPHONE ENCOUNTER
----- Message from Maeve Cheney sent at 11/7/2023  2:42 PM CST -----  Contact: [phone]  Caller is requesting an earlier appointment then we can schedule.  Caller is requesting a message be sent to the provider.    When is the next available 11/15  Patient preference of timeframe to be scheduled:    Would the patient like a call back, or a response through their [facility] portal?:   call back  Comments:   Patient son is in town and a doctor and would like something this week , hosp f/u

## 2023-11-13 DIAGNOSIS — R42 DIZZINESS: ICD-10-CM

## 2023-11-13 NOTE — TELEPHONE ENCOUNTER
Care Due:                  Date            Visit Type   Department     Provider  --------------------------------------------------------------------------------                                EP -                              PRIMARY      [facility] INTER[facility]  Last Visit: 09-      CARE ([facility])   MEDICINE       [facility]  Next Visit: None Scheduled  None         None Found                                                            Last  Test          Frequency    Reason                     Performed    Due Date  --------------------------------------------------------------------------------    Office Visit  15 months..  atorvastatin.............  09- 12-    Lipid Panel.  12 months..  atorvastatin.............  06- 05-    [employer] Embedded Care Due Messages. Reference number: [id number].   11/13/2023 12:11:39 AM CST

## 2023-11-14 RX ORDER — ATORVASTATIN CALCIUM 80 MG/1
80 TABLET, FILM COATED ORAL DAILY
Qty: 90 TABLET | Refills: 0 | Status: SHIPPED | OUTPATIENT
Start: 2023-11-14 | End: 2024-02-29

## 2023-11-14 RX ORDER — HYDROXYZINE HYDROCHLORIDE 10 MG/1
10 TABLET, FILM COATED ORAL DAILY PRN
Qty: 30 TABLET | Refills: 0 | Status: SHIPPED | OUTPATIENT
Start: 2023-11-14

## 2023-11-14 RX ORDER — MECLIZINE HYDROCHLORIDE 25 MG/1
TABLET ORAL
Qty: 30 TABLET | Refills: 0 | Status: SHIPPED | OUTPATIENT
Start: 2023-11-14

## 2023-11-14 RX ORDER — LEVOTHYROXINE SODIUM 88 UG/1
88 TABLET ORAL
Qty: 90 TABLET | Refills: 0 | Status: SHIPPED | OUTPATIENT
Start: 2023-11-14 | End: 2024-02-29

## 2023-11-14 NOTE — TELEPHONE ENCOUNTER
Refill Routing Note   Medication(s) are not appropriate for processing by [facility] for the following reason(s):      Patient seen in ED/Hospital since LOV with provider  Required labs outdated    [facility] action(s):  Defer Care Due:  Appointment due  Labs due            Appointments  past 12m or future 3m with PCP    Date Provider   Last Visit   9/22/2022 Yakov Barrett MD   Next Visit   Visit date not found Yakov Barrett MD   ED visits in past 90 days: 0        Note composed:8:49 AM 11/14/2023

## 2023-11-15 RX ORDER — HYDROXYZINE HYDROCHLORIDE 10 MG/1
10 TABLET, FILM COATED ORAL DAILY PRN
Qty: 90 TABLET | Refills: 1 | OUTPATIENT
Start: 2023-11-15

## 2023-11-28 DIAGNOSIS — G47.9 TROUBLE IN SLEEPING: ICD-10-CM

## 2023-11-28 RX ORDER — TRAZODONE HYDROCHLORIDE 50 MG/1
TABLET ORAL
Qty: 90 TABLET | Refills: 1 | OUTPATIENT
Start: 2023-11-28

## 2023-11-28 NOTE — TELEPHONE ENCOUNTER
No care due was identified.  Health [facility] Embedded Care Due Messages. Reference number: [id number].   11/28/2023 12:13:23 AM CST

## 2023-11-28 NOTE — TELEPHONE ENCOUNTER
Trazadone  The original prescription was discontinued on 10/23/2023 by Carloz Marroquin MD. Renewing this prescription may not be appropriate.

## 2023-12-26 RX ORDER — HYDROXYZINE HYDROCHLORIDE 10 MG/1
10 TABLET, FILM COATED ORAL DAILY PRN
Qty: 90 TABLET | Refills: 1 | OUTPATIENT
Start: 2023-12-26

## 2024-01-11 DIAGNOSIS — Z00.00 ENCOUNTER FOR MEDICARE ANNUAL WELLNESS EXAM: ICD-10-CM

## 2024-01-22 PROBLEM — N39.0 UTI (URINARY TRACT INFECTION): Status: RESOLVED | Noted: 2023-10-23 | Resolved: 2024-01-22

## 2024-02-09 ENCOUNTER — PATIENT MESSAGE (OUTPATIENT)
Dept: ADMINISTRATIVE | Facility: HOSPITAL | Age: 89
End: 2024-02-09
Payer: MEDICARE

## 2024-02-09 ENCOUNTER — PATIENT OUTREACH (OUTPATIENT)
Dept: ADMINISTRATIVE | Facility: HOSPITAL | Age: 89
End: 2024-02-09
Payer: MEDICARE

## 2024-02-09 NOTE — PROGRESS NOTES

## 2024-02-28 DIAGNOSIS — G47.9 TROUBLE IN SLEEPING: ICD-10-CM

## 2024-02-29 RX ORDER — ATORVASTATIN CALCIUM 80 MG/1
80 TABLET, FILM COATED ORAL DAILY
Qty: 90 TABLET | Refills: 0 | Status: SHIPPED | OUTPATIENT
Start: 2024-02-29 | End: 2024-03-30

## 2024-02-29 RX ORDER — LEVOTHYROXINE SODIUM 88 UG/1
88 TABLET ORAL
Qty: 90 TABLET | Refills: 0 | Status: SHIPPED | OUTPATIENT
Start: 2024-02-29 | End: 2024-03-30

## 2024-02-29 RX ORDER — TRAZODONE HYDROCHLORIDE 50 MG/1
TABLET ORAL
Qty: 90 TABLET | Refills: 0 | Status: SHIPPED | OUTPATIENT
Start: 2024-02-29 | End: 2024-05-27

## 2024-02-29 NOTE — TELEPHONE ENCOUNTER
Care Due:                  Date            Visit Type   Department     Provider  --------------------------------------------------------------------------------                                EP -                              PRIMARY      [facility] INTERNAL  Last Visit: 09-      CARE ([facility])   MEDICINE       R[facility] OGDEN  Next Visit: None Scheduled  None         None Found                                                            Last  Test          Frequency    Reason                     Performed    Due Date  --------------------------------------------------------------------------------    Office Visit  15 months..  atorvastatin,              09- 12-                             levothyroxine............    Lipid Panel.  12 months..  atorvastatin.............  06- 05-    Health [facility] Embedded Care Due Messages. Reference number: [id number].   2/28/2024 7:00:06 PM CST

## 2024-03-01 ENCOUNTER — TELEPHONE (OUTPATIENT)
Dept: INTERNAL MEDICINE | Facility: CLINIC | Age: 89
End: 2024-03-01
Payer: MEDICARE

## 2024-03-01 NOTE — TELEPHONE ENCOUNTER
----- Message from Yakov Barrett MD sent at 2/29/2024  2:58 PM CST -----  Regarding: Follow up  Please contact patient. She has not been seen in well over a year. She needs a follow up appointment . I cannot refill any more of her prescriptions without being seen.

## 2024-03-27 ENCOUNTER — LAB VISIT (OUTPATIENT)
Dept: LAB | Facility: HOSPITAL | Age: 89
End: 2024-03-27
Payer: MEDICARE

## 2024-03-27 ENCOUNTER — OFFICE VISIT (OUTPATIENT)
Dept: INTERNAL MEDICINE | Facility: CLINIC | Age: 89
End: 2024-03-27
Payer: MEDICARE

## 2024-03-27 VITALS
DIASTOLIC BLOOD PRESSURE: 70 MMHG | HEART RATE: 58 BPM | WEIGHT: 121.5 LBS | SYSTOLIC BLOOD PRESSURE: 110 MMHG | HEIGHT: 57 IN | OXYGEN SATURATION: 99 % | BODY MASS INDEX: 26.21 KG/M2

## 2024-03-27 DIAGNOSIS — I35.0 NONRHEUMATIC AORTIC VALVE STENOSIS: ICD-10-CM

## 2024-03-27 DIAGNOSIS — I10 ESSENTIAL HYPERTENSION: ICD-10-CM

## 2024-03-27 DIAGNOSIS — I63.412 EMBOLIC STROKE INVOLVING LEFT MIDDLE CEREBRAL ARTERY: Primary | ICD-10-CM

## 2024-03-27 DIAGNOSIS — K64.9 HEMORRHOIDS, UNSPECIFIED HEMORRHOID TYPE: ICD-10-CM

## 2024-03-27 DIAGNOSIS — E03.9 ACQUIRED HYPOTHYROIDISM: ICD-10-CM

## 2024-03-27 LAB
ALBUMIN SERPL BCP-MCNC: 3.8 G/DL (ref 3.5–5.2)
ALP SERPL-CCNC: 63 U/L (ref 55–135)
ALT SERPL W/O P-5'-P-CCNC: 18 U/L (ref 10–44)
ANION GAP SERPL CALC-SCNC: 11 MMOL/L (ref 8–16)
AST SERPL-CCNC: 23 U/L (ref 10–40)
BASOPHILS # BLD AUTO: 0.09 K/UL (ref 0–0.2)
BASOPHILS NFR BLD: 1.2 % (ref 0–1.9)
BILIRUB SERPL-MCNC: 0.4 MG/DL (ref 0.1–1)
BUN SERPL-MCNC: 13 MG/DL (ref 8–23)
CALCIUM SERPL-MCNC: 10 MG/DL (ref 8.7–10.5)
CHLORIDE SERPL-SCNC: 95 MMOL/L (ref 95–110)
CHOLEST SERPL-MCNC: 119 MG/DL (ref 120–199)
CHOLEST/HDLC SERPL: 2.6 {RATIO} (ref 2–5)
CO2 SERPL-SCNC: 25 MMOL/L (ref 23–29)
CREAT SERPL-MCNC: 0.8 MG/DL (ref 0.5–1.4)
DIFFERENTIAL METHOD BLD: ABNORMAL
EOSINOPHIL # BLD AUTO: 0.1 K/UL (ref 0–0.5)
EOSINOPHIL NFR BLD: 1.6 % (ref 0–8)
ERYTHROCYTE [DISTWIDTH] IN BLOOD BY AUTOMATED COUNT: 12.7 % (ref 11.5–14.5)
EST. GFR  (NO RACE VARIABLE): >60 ML/MIN/1.73 M^2
GLUCOSE SERPL-MCNC: 88 MG/DL (ref 70–110)
HCT VFR BLD AUTO: 38.6 % (ref 37–48.5)
HDLC SERPL-MCNC: 45 MG/DL (ref 40–75)
HDLC SERPL: 37.8 % (ref 20–50)
HGB BLD-MCNC: 12.9 G/DL (ref 12–16)
IMM GRANULOCYTES # BLD AUTO: 0.04 K/UL (ref 0–0.04)
IMM GRANULOCYTES NFR BLD AUTO: 0.5 % (ref 0–0.5)
LDLC SERPL CALC-MCNC: 54 MG/DL (ref 63–159)
LYMPHOCYTES # BLD AUTO: 1.4 K/UL (ref 1–4.8)
LYMPHOCYTES NFR BLD: 17.5 % (ref 18–48)
MCH RBC QN AUTO: 29.5 PG (ref 27–31)
MCHC RBC AUTO-ENTMCNC: 33.4 G/DL (ref 32–36)
MCV RBC AUTO: 88 FL (ref 82–98)
MONOCYTES # BLD AUTO: 0.8 K/UL (ref 0.3–1)
MONOCYTES NFR BLD: 10.8 % (ref 4–15)
NEUTROPHILS # BLD AUTO: 5.3 K/UL (ref 1.8–7.7)
NEUTROPHILS NFR BLD: 68.4 % (ref 38–73)
NONHDLC SERPL-MCNC: 74 MG/DL
NRBC BLD-RTO: 0 /100 WBC
PLATELET # BLD AUTO: 285 K/UL (ref 150–450)
PMV BLD AUTO: 9.5 FL (ref 9.2–12.9)
POTASSIUM SERPL-SCNC: 4.1 MMOL/L (ref 3.5–5.1)
PROT SERPL-MCNC: 7.1 G/DL (ref 6–8.4)
RBC # BLD AUTO: 4.38 M/UL (ref 4–5.4)
SODIUM SERPL-SCNC: 131 MMOL/L (ref 136–145)
T4 FREE SERPL-MCNC: 1.63 NG/DL (ref 0.71–1.51)
T4 SERPL-MCNC: 11.6 UG/DL (ref 4.5–11.5)
TRIGL SERPL-MCNC: 100 MG/DL (ref 30–150)
TSH SERPL DL<=0.005 MIU/L-ACNC: 0.3 UIU/ML (ref 0.4–4)
WBC # BLD AUTO: 7.71 K/UL (ref 3.9–12.7)

## 2024-03-27 PROCEDURE — 99214 OFFICE O/P EST MOD 30 MIN: CPT | Mod: S$PBB,,, | Performed by: INTERNAL MEDICINE

## 2024-03-27 PROCEDURE — 84436 ASSAY OF TOTAL THYROXINE: CPT | Performed by: INTERNAL MEDICINE

## 2024-03-27 PROCEDURE — 99999 PR PBB SHADOW E&M-EST. PATIENT-LVL IV: CPT | Mod: PBBFAC,,, | Performed by: INTERNAL MEDICINE

## 2024-03-27 PROCEDURE — 36415 COLL VENOUS BLD VENIPUNCTURE: CPT | Performed by: INTERNAL MEDICINE

## 2024-03-27 PROCEDURE — 80061 LIPID PANEL: CPT | Performed by: INTERNAL MEDICINE

## 2024-03-27 PROCEDURE — 85025 COMPLETE CBC W/AUTO DIFF WBC: CPT | Performed by: INTERNAL MEDICINE

## 2024-03-27 PROCEDURE — 84439 ASSAY OF FREE THYROXINE: CPT | Performed by: INTERNAL MEDICINE

## 2024-03-27 PROCEDURE — 99214 OFFICE O/P EST MOD 30 MIN: CPT | Mod: PBBFAC | Performed by: INTERNAL MEDICINE

## 2024-03-27 PROCEDURE — 80053 COMPREHEN METABOLIC PANEL: CPT | Performed by: INTERNAL MEDICINE

## 2024-03-27 PROCEDURE — 84443 ASSAY THYROID STIM HORMONE: CPT | Performed by: INTERNAL MEDICINE

## 2024-03-27 RX ORDER — HYDROCORTISONE ACETATE 25 MG/1
25 SUPPOSITORY RECTAL 2 TIMES DAILY
Qty: 20 SUPPOSITORY | Refills: 0 | Status: SHIPPED | OUTPATIENT
Start: 2024-03-27 | End: 2024-04-06

## 2024-03-27 NOTE — PROGRESS NOTES
CC:  Follow-up     HPI:  The patient is an 89-year-old female with hypertension, hyperlipidemia, aortic stenosis, vitamin-D deficiency, hypothyroidism, history of a right middle cerebral artery stroke presents today for follow-up.  The patient complains of feeling bad.  He feels good in the morning when she 1st gets up; but, toward the afternoon around 5:00 p.m. or later she reports she feels bad.  She can not further elaborate than that.    ROS:  No fever chills.  She reports no visual auditory changes but her son indicates that she can not see the TV nor read anymore.  No trouble swallowing.  She has got a good appetite.  Denies any chest pain or shortness of breath.  No nausea vomiting.  Sometimes she has to strain to have a bowel movement.  Her son reports that she has problems with hemorrhoids.  They tried preparation H without much success.    Physical exam:   General appearance:  No acute distress   HEENT: Conjunctiva is clear.  Pupils are equal.  He is bilateral cataracts.  TMs are clear.  Nasal septum is midline without discharge.  Oropharynx without erythema.  She has multiple carious teeth.  Trachea is midline without JVD.    Pulmonary:  Good inspiratory, expiratory breath sounds heard.  Lungs are clear to auscultation.  Cardiovascular:  S1-S2, rhythm is regular.  The patient had a 2 6 systolic ejection murmur best at the left upper sternal border.  Extremities without edema.  GI:  She would normoactive bowel sounds.  Her abdomen was nontender.  :  The patient's rectum was evaluated.  The patient had multiple pea-sized hemorrhoids.    Assessment:  1. Hypertension  2.  Hyperlipidemia   3. History of a right middle cerebral artery stroke   4. Aortic stenosis   5.  Hemorrhoids  6.  Hypothyroidism    Plan:  1. Will check a CBC, CMP, TSH, T4 and lipid panel  2. Will prescribe Anusol rectal suppositories

## 2024-03-30 ENCOUNTER — DOCUMENTATION ONLY (OUTPATIENT)
Dept: INTERNAL MEDICINE | Facility: CLINIC | Age: 89
End: 2024-03-30
Payer: MEDICARE

## 2024-03-30 DIAGNOSIS — I70.0 AORTIC ARCH ATHEROSCLEROSIS: Chronic | ICD-10-CM

## 2024-03-30 DIAGNOSIS — E03.9 ACQUIRED HYPOTHYROIDISM: Primary | ICD-10-CM

## 2024-03-30 DIAGNOSIS — E87.1 HYPONATREMIA: ICD-10-CM

## 2024-03-30 RX ORDER — LEVOTHYROXINE SODIUM 75 UG/1
75 TABLET ORAL
Qty: 30 TABLET | Refills: 11 | Status: SHIPPED | OUTPATIENT
Start: 2024-03-30 | End: 2025-03-30

## 2024-03-30 RX ORDER — ATORVASTATIN CALCIUM 40 MG/1
40 TABLET, FILM COATED ORAL DAILY
Qty: 90 TABLET | Refills: 3 | Status: SHIPPED | OUTPATIENT
Start: 2024-03-30 | End: 2025-03-30

## 2024-03-30 NOTE — PROGRESS NOTES
Will decrease levothyroxine from 88 mcg to 75 mcg. Will decrease atorvastatin from 80 mg to 40 mg. Will repeat a blood test in 3 months.

## 2024-04-01 ENCOUNTER — TELEPHONE (OUTPATIENT)
Dept: INTERNAL MEDICINE | Facility: CLINIC | Age: 89
End: 2024-04-01

## 2024-05-26 DIAGNOSIS — I10 ESSENTIAL HYPERTENSION: ICD-10-CM

## 2024-05-26 RX ORDER — LISINOPRIL 5 MG/1
TABLET ORAL
Qty: 90 TABLET | Refills: 3 | OUTPATIENT
Start: 2024-05-26

## 2024-05-26 RX ORDER — CLOPIDOGREL BISULFATE 75 MG/1
75 TABLET ORAL DAILY
Qty: 90 TABLET | Refills: 3 | Status: SHIPPED | OUTPATIENT
Start: 2024-05-26

## 2024-05-26 NOTE — TELEPHONE ENCOUNTER
No care due was identified.  [facility] Embedded Care Due Messages. Reference number: [id number].   5/26/2024 7:10:59 AM CDT

## 2024-05-26 NOTE — TELEPHONE ENCOUNTER
Refill Decision Note   Linda Concetta  is requesting a refill authorization.  Brief Assessment and Rationale for Refill:  Approve     Medication Therapy Plan:         Comments:     Note composed:4:12 PM 05/26/2024

## 2024-05-27 DIAGNOSIS — G47.9 TROUBLE IN SLEEPING: ICD-10-CM

## 2024-05-27 RX ORDER — LEVOTHYROXINE SODIUM 88 UG/1
88 TABLET ORAL
Qty: 90 TABLET | Refills: 0 | OUTPATIENT
Start: 2024-05-27

## 2024-05-27 RX ORDER — TRAZODONE HYDROCHLORIDE 50 MG/1
TABLET ORAL
Qty: 90 TABLET | Refills: 3 | Status: SHIPPED | OUTPATIENT
Start: 2024-05-27

## 2024-05-27 NOTE — TELEPHONE ENCOUNTER
Refill Decision Note   Linda Hylton  is requesting a refill authorization.  Brief Assessment and Rationale for Refill:  Quick Discontinue  Approve     Medication Therapy Plan:  prescription was discontinued on 3/30/2024 by Yakov Barrett MD. DOSE DECREASED TO 75 MCG      Comments:     Note composed:12:48 PM 05/27/2024

## 2024-05-27 NOTE — TELEPHONE ENCOUNTER
No care due was identified.  [facility] Embedded Care Due Messages. Reference number: [id number].   5/27/2024 12:21:15 AM CDT

## 2024-05-29 RX ORDER — ATORVASTATIN CALCIUM 80 MG/1
80 TABLET, FILM COATED ORAL DAILY
Qty: 90 TABLET | Refills: 0 | OUTPATIENT
Start: 2024-05-29

## 2024-05-29 RX ORDER — AMLODIPINE BESYLATE 5 MG/1
TABLET ORAL
Qty: 90 TABLET | Refills: 3 | Status: SHIPPED | OUTPATIENT
Start: 2024-05-29

## 2024-05-29 NOTE — TELEPHONE ENCOUNTER
No care due was identified.  [facility] Embedded Care Due Messages. Reference number: [id number].   5/29/2024 11:38:26 AM CDT

## 2024-05-29 NOTE — TELEPHONE ENCOUNTER
Refill Routing Note   Medication(s) are not appropriate for processing by [facility] for the following reason(s):        Outside of protocol  Drug-disease interaction    [facility] action(s):  Defer  Route  Quick Discontinue        Medication Therapy Plan: amLODIPine and Aortic stenosis ;atorvastatin (LIPITOR) 80 MG tablet  was discontinued on 3/30/2024 by Yakov Barrett; patient is on 40 mg lipitor    Pharmacist review requested: Yes     Appointments  past 12m or future 3m with PCP    Date Provider   Last Visit   3/27/2024 Yakov Barrett MD   Next Visit   Visit date not found Yakov Barrett MD   ED visits in past 90 days: 0        Note composed:2:35 PM 05/29/2024

## 2024-05-30 RX ORDER — GABAPENTIN 300 MG/1
CAPSULE ORAL
Qty: 90 CAPSULE | Refills: 3 | Status: SHIPPED | OUTPATIENT
Start: 2024-05-30

## 2024-05-30 NOTE — TELEPHONE ENCOUNTER
Refill Routing Note   Medication(s) are not appropriate for processing by [facility] for the following reason(s):        Outside of protocol    [facility] action(s):  Route  Quick Discontinue  Approve        Medication Therapy Plan: atorvastatin (LIPITOR) 80 MG tablet  was discontinued on 3/30/2024 by Yakov Barrett; patient is on 40 mg lipitor    Pharmacist review requested: Yes   Extended chart review required: Yes     Appointments  past 12m or future 3m with PCP    Date Provider   Last Visit   3/27/2024 Yakov Barrett MD   Next Visit   Visit date not found Yakov Barrett MD   ED visits in past 90 days: 0        Note composed:7:33 PM 05/29/2024

## 2024-06-05 NOTE — TELEPHONE ENCOUNTER
Son Angel called back. Every evening she states she is feeling bad, it may last 2-5 hours but then it stops. It is more after she eats. She is having difficulty expressing how she is feeling but it is better for right now.   
[de-identified] : Procedure: Trigger point injection Preoperative Diagnosis: Myofascial Pain Post Procedure Diagnosis: Myofascial Pain Findings: Radiating trigger points Complications: None Anesthetic: Lidocaine 1% 1 ml per site Indication:  hx of myofascial pain.  Informed consent and Time Out performed prior to initiation of procedure. Technical details: Sterilization with alcohol to skin sites. Injection into the trigger point with 1.25 inch 27G needle using needling technique to elicit twitch response. Muscle groups: bilateral lumbar paraspinals, bilateral QL Total injections: 10 Pre-procedure pain: "12"/10 Post-procedure pain: 0/10 Dispo: The patient tolerated the procedure well. Instructed to follow up in 3 months

## 2024-06-10 ENCOUNTER — PATIENT MESSAGE (OUTPATIENT)
Dept: INTERNAL MEDICINE | Facility: CLINIC | Age: 89
End: 2024-06-10
Payer: MEDICARE

## 2024-06-11 NOTE — PROGRESS NOTES
Health Maintenance Due   Topic Date Due    Shingles Vaccine (2 of 3) 12/08/2016    Pneumococcal Vaccines (Age 65+) (2 - PPSV23 if available, else PCV20) 05/20/2017    COVID-19 Vaccine (3 - Moderna series) 06/11/2021       HM updated. Chart reviewed for MSSP. Outreached to schedule appointment.    Monica Novak LPN   Clinical Care Coordinator  Primary Care and Wellness      
[Patient presents for infusion] : infusion therapy as documented below

## 2024-07-27 NOTE — TELEPHONE ENCOUNTER
No care due was identified.  [facility] Embedded Care Due Messages. Reference number: [id number].   7/27/2024 3:38:15 PM CDT

## 2024-07-29 RX ORDER — LEVOTHYROXINE SODIUM 88 UG/1
88 TABLET ORAL
Qty: 90 TABLET | Refills: 0 | OUTPATIENT
Start: 2024-07-29

## 2024-07-29 NOTE — TELEPHONE ENCOUNTER
Refill Decision Note   Linda Hylton  is requesting a refill authorization.  Brief Assessment and Rationale for Refill:  Quick Discontinue     Medication Therapy Plan:  Dose decreased to 75 mg daily; QDC      Comments:     Note composed:1:25 PM 07/29/2024

## 2024-08-25 RX ORDER — LEVOTHYROXINE SODIUM 88 UG/1
88 TABLET ORAL
Qty: 90 TABLET | Refills: 0 | OUTPATIENT
Start: 2024-08-25

## 2024-08-25 NOTE — TELEPHONE ENCOUNTER
No care due was identified.  [facility] Embedded Care Due Messages. Reference number: [id number].   8/25/2024 5:19:04 PM CDT

## 2024-08-26 NOTE — TELEPHONE ENCOUNTER
Refill Decision Note   Linda Concetta  is requesting a refill authorization.  Brief Assessment and Rationale for Refill:  Quick Discontinue     Medication Therapy Plan:  Pt is now on 75mg    Medication Reconciliation Completed: No   Comments:     No Care Gaps recommended.     Note composed:8:08 PM 08/25/2024

## 2024-10-02 ENCOUNTER — TELEPHONE (OUTPATIENT)
Dept: INTERNAL MEDICINE | Facility: CLINIC | Age: 89
End: 2024-10-02
Payer: MEDICARE

## 2024-10-02 ENCOUNTER — PATIENT MESSAGE (OUTPATIENT)
Dept: RESEARCH | Facility: CLINIC | Age: 89
End: 2024-10-02
Payer: MEDICARE

## 2024-10-02 DIAGNOSIS — L60.0 INGROWN NAIL: ICD-10-CM

## 2024-10-02 DIAGNOSIS — Z76.89 ENCOUNTER FOR NAIL CARE: Primary | ICD-10-CM

## 2024-10-02 NOTE — TELEPHONE ENCOUNTER
Attempted to contact pt and son Tavo (who initiated referral request) left voice msg with number to call and schedule Podiatry for pt with [facility]. Also sent portal msg.

## 2024-10-02 NOTE — TELEPHONE ENCOUNTER
----- Message from Yakov Barrett MD sent at 10/2/2024  2:52 PM CDT -----  Regarding: RE: please advise  Contact: [phone]   A referral to podiatry is in.  Please contact patient  ----- Message -----  From: Cori Stubbs MA  Sent: 9/27/2024  12:36 PM CDT  To: Yakov Barrett MD  Subject: please advise                                      ----- Message -----  From: Nan Pierce  Sent: 9/27/2024  12:32 PM CDT  To: Arnold BEASLEY Staff    [email or web address]     Patient is calling for Medical Advice regarding: Pt said she needs a call back about getting a podiatry cause she needs her nails cut and have ingrowing toe nail     How long has patient had these symptoms:    Pharmacy name and phone#:    Patient wants a call back or [facility]: geovanna back     Comments:    Please advise patient replies from provider may take up to 48 hours.

## 2024-10-16 RX ORDER — LEVOTHYROXINE SODIUM 88 UG/1
88 TABLET ORAL
Qty: 90 TABLET | Refills: 0 | OUTPATIENT
Start: 2024-10-16

## 2024-10-16 NOTE — TELEPHONE ENCOUNTER
Refill Decision Note  Flor DC. Inappropriate Request     Linda Hylton  is requesting a refill authorization.  Brief Assessment and Rationale for Refill:  Quick Discontinue     Medication Therapy Plan:  prescription was discontinued on 3/30/2024 by Yakov Barrett MD.    Medication Reconciliation Completed: No   Comments:           Note composed:4:14 PM 10/16/2024

## 2024-10-16 NOTE — TELEPHONE ENCOUNTER
No care due was identified.  [facility] Embedded Care Due Messages. Reference number: [id number].   10/16/2024 11:16:59 AM CDT

## 2024-10-17 NOTE — HPI
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 Reports chills, dry heaving, poor PO intake but denies fever, vomiting. States pain is severe, constant and was mostly over periumbilical and upper abdominal area. She never had similar pain in the past.      Work up in ED revealed biliary pancreatitis. Labs significant for WBC 21, abnormal LFT and elevated lipase >1000. CT abdomen showed mild gallbladder distention with pericholecystic fluid and dilated CBD ~8 mm. Blood cultures sent and received empiric zosyn. Also given 1500 cc IVF. She reports feeling much better now and her pain resolved.      She was seen by general surgery in ED with concern for acute cholecystitis with plan for interval cholecystectomy.     Alert and oriented to person, place and time/Patient baseline mental status/Awake/Symptoms improved

## 2024-10-22 RX ORDER — LEVOTHYROXINE SODIUM 88 UG/1
88 TABLET ORAL
Qty: 90 TABLET | Refills: 0 | OUTPATIENT
Start: 2024-10-22

## 2024-10-22 NOTE — TELEPHONE ENCOUNTER
Refill Decision Note   Linda Hylton  is requesting a refill authorization.  Brief Assessment and Rationale for Refill:        Medication Therapy Plan:  discontinued 3/30/2024 by Yakov Barrett MD; dose adjustment      Comments:     Note composed:6:33 PM 10/22/2024

## 2024-10-22 NOTE — TELEPHONE ENCOUNTER
No care due was identified.  [facility] Embedded Care Due Messages. Reference number: [id number].   10/22/2024 6:28:47 PM CDT

## 2024-10-25 DIAGNOSIS — E03.9 ACQUIRED HYPOTHYROIDISM: ICD-10-CM

## 2024-10-25 NOTE — TELEPHONE ENCOUNTER
No care due was identified.  [facility] Embedded Care Due Messages. Reference number: [id number].   10/25/2024 1:22:16 PM CDT

## 2024-10-25 NOTE — TELEPHONE ENCOUNTER
----- Message from Chelita sent at 10/25/2024 11:05 AM CDT -----  Contact: [phone]  Requesting an RX refill or new RX.    Is this a refill or new RX: refill     RX name and strength (levothyroxine (SYNTHROID) 75 MCG tablet):      Is this a 30 day or 90 day RX: 90    Pharmacy name and phone #       [id number] - [place] - [address] [place] [id number]  [facility] [address]  Phone: [phone] Fax: [phone]

## 2024-10-26 RX ORDER — LEVOTHYROXINE SODIUM 75 UG/1
75 TABLET ORAL
Qty: 90 TABLET | Refills: 1 | Status: SHIPPED | OUTPATIENT
Start: 2024-10-26

## 2024-10-26 NOTE — TELEPHONE ENCOUNTER
Refill Authorization Note     Refill Decision Note   Linda Hylton  is requesting a refill authorization.  Brief Assessment and Rationale for Refill:  Approve     Medication Therapy Plan:       Medication Reconciliation Completed: No   Comments:     No Care Gaps recommended.     Note composed:4:27 PM 10/26/2024

## 2024-10-31 RX ORDER — PANTOPRAZOLE SODIUM 40 MG/1
40 TABLET, DELAYED RELEASE ORAL DAILY
Qty: 90 TABLET | Refills: 1 | Status: SHIPPED | OUTPATIENT
Start: 2024-10-31

## 2024-11-25 DIAGNOSIS — E87.1 HYPONATREMIA: ICD-10-CM

## 2024-11-25 DIAGNOSIS — I70.0 AORTIC ARCH ATHEROSCLEROSIS: Chronic | ICD-10-CM

## 2024-11-25 RX ORDER — LEVOTHYROXINE SODIUM 88 UG/1
88 TABLET ORAL
Qty: 90 TABLET | Refills: 0 | OUTPATIENT
Start: 2024-11-25

## 2024-11-25 RX ORDER — ATORVASTATIN CALCIUM 40 MG/1
40 TABLET, FILM COATED ORAL
Qty: 90 TABLET | Refills: 1 | Status: SHIPPED | OUTPATIENT
Start: 2024-11-25

## 2024-11-25 RX ORDER — LISINOPRIL 5 MG/1
5 TABLET ORAL
Qty: 90 TABLET | Refills: 3 | OUTPATIENT
Start: 2024-11-25

## 2024-11-25 NOTE — TELEPHONE ENCOUNTER
No care due was identified.  [facility] Embedded Care Due Messages. Reference number: [id number].   11/25/2024 9:41:55 AM CST

## 2024-11-25 NOTE — TELEPHONE ENCOUNTER
Refill Decision Note   Linda Concetta  is requesting a refill authorization.  Brief Assessment and Rationale for Refill:  Approve     Medication Therapy Plan:         Comments:     Note composed:4:20 AM 11/25/2024

## 2024-11-25 NOTE — TELEPHONE ENCOUNTER
No care due was identified.  [facility] Embedded Care Due Messages. Reference number: [id number].   11/25/2024 12:17:43 AM CST

## 2024-11-25 NOTE — TELEPHONE ENCOUNTER
Refill Decision Note   Linda Concetta  is requesting a refill authorization.  Brief Assessment and Rationale for Refill:  Quick Discontinue     Medication Therapy Plan:  LISINOPRIL 5MG WAS DISCONTINUED ON 10/23/23;  LEVOTHYROXINE 88MCG WAS DISCONTINUED ON 03/30/24      Comments:     Note composed:12:30 PM 11/25/2024

## 2024-12-12 NOTE — PLAN OF CARE
Problem: Physical Therapy Goal  Goal: Physical Therapy Goal  Goals to be met by: 2019    Patient will increase functional independence with mobility by performin. Supine to sit with Stand-by Assistance  2. Sit to supine with Stand-by Assistance  3. Sit to stand transfer with Supervision  4. Gait  x 100 feet with Supervision using Rolling Walker.         Outcome: Ongoing (interventions implemented as appropriate)  PT evaluation completed, goals established and plan of care reviewed with patient.      Julián Banda PT, DPT  2019  Pager #: [phone]          normal respiratory pattern

## 2025-01-28 RX ORDER — PANTOPRAZOLE SODIUM 40 MG/1
40 TABLET, DELAYED RELEASE ORAL DAILY
Qty: 90 TABLET | Refills: 0 | Status: SHIPPED | OUTPATIENT
Start: 2025-01-28

## 2025-01-28 NOTE — TELEPHONE ENCOUNTER
Care Due:                  Date            Visit Type   Department     Provider  --------------------------------------------------------------------------------                                EP -                              PRIMARY      [facility] INTER[facility]  Last Visit: 03-      CARE ([facility])   MEDICINE       [facility]GD[facility]  Next Visit: None Scheduled  None         None Found                                                            Last  Test          Frequency    Reason                     Performed    Due Date  --------------------------------------------------------------------------------    CBC.........  12 months..  clopidogreL..............  03- 03-    CMP.........  12 months..  atorvastatin.............  03- 03-    Lipid Panel.  12 months..  atorvastatin.............  03- 03-    TSH.........  12 months..  levothyroxine............  03- 03-    Health [facility] Embedded Care Due Messages. Reference number: [id number].   1/28/2025 3:50:17 PM CST

## 2025-01-29 NOTE — TELEPHONE ENCOUNTER
Provider Staff:  Action required for this patient    Requires labs      Please see care gap opportunities below in Care Due Message.    Thanks!  [facility]     Appointments      Date Provider   Last Visit   3/27/2024 Yakov Barrett MD   Next Visit   Visit date not found Yakov Barrett MD     Refill Decision Note   iLnda Hylton  is requesting a refill authorization.  Brief Assessment and Rationale for Refill:  Approve     Medication Therapy Plan:         Comments:     Note composed:6:50 PM 01/28/2025

## 2025-01-29 NOTE — PROGRESS NOTES
CHIEF COMPLAINT:   Endoscopy during hospitalization with esophageal biopsies for Sanchez's     HISTORY OF PRESENT ILLNESS:  The patient is an 84-year-old female who had a right middle cerebral artery stroke as well as   transfusion for suspected GI bleed (melena).  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  Prior endoscopy in 2007 review long segment Sanchez's.  She denies any dysphagia, odynophagia, nausea vomiting or any change in appetite.  She does have dark stools secondary to using iron supplementation daily.    Prior colonoscopy was reviewed as well.      Past medical, surgical, social and family history reviewed in epic    Medication allergies reviewed in epic.  Patient continues to take pantoprazole 40 mg daily.       REVIEW OF SYSTEMS:    Constitutional: no fever, no chills, no weight loss, appetite is stable  Cardiovascular:  No angina or palpitation  Respiratory:  No shortness breath or wheezing  Genitourinary:  No dysuria frequency  Neurologic:  Complains of occasional dizziness, difficulty falling asleep and staying asleep.  Gastrointestinal:  See HPI, no constipation or diarrhea, no bright red blood per rectum or maroon stools.     PHYSICAL EXAMINATION:  Awake, alert, oriented x3, in no acute distress. Patient is in a wheelchair.  No physical exam done today, 15 min spent with the patient in a 50% in counseling.     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    Recommendation:  1. Continue pantoprazole 40 mg daily  2. Repeat endoscopy in 3 years, follow-up in GI clinic in 1 year to review symptoms.   HX updated. Referral ordered -- Service to General Surgery.     RUQ US result letter sent to patient through portal including # to call to schedule consult appointment with surgery.

## 2025-02-20 RX ORDER — LEVOTHYROXINE SODIUM 88 UG/1
88 TABLET ORAL
Qty: 90 TABLET | Refills: 0 | OUTPATIENT
Start: 2025-02-20

## 2025-02-21 NOTE — TELEPHONE ENCOUNTER
No care due was identified.  [facility] Embedded Care Due Messages. Reference number: [id number].   2/20/2025 7:31:44 PM CST

## 2025-02-21 NOTE — TELEPHONE ENCOUNTER
Refill Routing Note   Medication(s) are not appropriate for processing by [facility] for the following reason(s):        No active prescription written by provider    [facility] action(s):  Quick Discontinue  Defer               Appointments  past 12m or future 3m with PCP    Date Provider   Last Visit   3/27/2024 Yakov Barrett MD   Next Visit   Visit date not found Yakov Barrett MD   ED visits in past 90 days: 0        Note composed:9:26 PM 02/20/2025

## 2025-02-22 DIAGNOSIS — Z00.00 ENCOUNTER FOR MEDICARE ANNUAL WELLNESS EXAM: ICD-10-CM

## 2025-02-24 RX ORDER — LISINOPRIL 5 MG/1
5 TABLET ORAL
Qty: 90 TABLET | Refills: 0 | Status: SHIPPED | OUTPATIENT
Start: 2025-02-24

## 2025-04-28 NOTE — TELEPHONE ENCOUNTER
Refill Routing Note   Medication(s) are not appropriate for processing by [facility] for the following reason(s):        Required labs outdated  Outside of protocol  Required vitals outdated    [facility] action(s):  Defer  Route               Appointments  past 12m or future 3m with PCP    Date Provider   Last Visit   3/27/2024 Yakov Barrett MD   Next Visit   Visit date not found Yakov Barrett MD   ED visits in past 90 days: 0        Note composed:2:09 PM 04/28/2025

## 2025-04-28 NOTE — TELEPHONE ENCOUNTER
Care Due:                  Date            Visit Type   Department     Provider  --------------------------------------------------------------------------------                                EP -                              PRIMARY      [facility]  Last Visit: 03-      CARE ([facility])   MEDICINE       Yakov Barrett  Next Visit: None Scheduled  None         None Found                                                            Last  Test          Frequency    Reason                     Performed    Due Date  --------------------------------------------------------------------------------    Office Visit  15 months..  atorvastatin,              03- 06-                             clopidogreL,                             levothyroxine,                             lisinopriL, pantoprazole,                             traZODone................    CBC.........  12 months..  clopidogreL..............  03- 03-    CMP.........  12 months..  atorvastatin, lisinopriL.  03- 03-    Lipid Panel.  12 months..  atorvastatin.............  03- 03-    TSH.........  12 months..  levothyroxine............  03- 03-    [employer] Embedded Care Due Messages. Reference number: [id number].   4/28/2025 12:23:32 PM CDT

## 2025-04-30 RX ORDER — AMLODIPINE BESYLATE 5 MG/1
5 TABLET ORAL
Qty: 90 TABLET | Refills: 0 | Status: SHIPPED | OUTPATIENT
Start: 2025-04-30

## 2025-04-30 RX ORDER — GABAPENTIN 300 MG/1
300 CAPSULE ORAL NIGHTLY
Qty: 90 CAPSULE | Refills: 0 | Status: SHIPPED | OUTPATIENT
Start: 2025-04-30

## 2025-04-30 RX ORDER — CLOPIDOGREL BISULFATE 75 MG/1
75 TABLET ORAL
Qty: 90 TABLET | Refills: 0 | Status: SHIPPED | OUTPATIENT
Start: 2025-04-30

## 2025-05-20 DIAGNOSIS — G47.9 TROUBLE IN SLEEPING: ICD-10-CM

## 2025-05-20 RX ORDER — TRAZODONE HYDROCHLORIDE 50 MG/1
50 TABLET ORAL NIGHTLY
Qty: 90 TABLET | Refills: 0 | Status: SHIPPED | OUTPATIENT
Start: 2025-05-20

## 2025-05-20 NOTE — TELEPHONE ENCOUNTER
Refill Decision Note   Linda Concetta  is requesting a refill authorization.  Brief Assessment and Rationale for Refill:  Approve     Medication Therapy Plan:         Comments:     Note composed:7:47 AM 05/20/2025

## 2025-05-20 NOTE — TELEPHONE ENCOUNTER
No care due was identified.  [facility] Embedded Care Due Messages. Reference number: [id number].   5/20/2025 12:16:02 AM CDT

## 2025-05-22 ENCOUNTER — TELEPHONE (OUTPATIENT)
Dept: INTERNAL MEDICINE | Facility: CLINIC | Age: OVER 89
End: 2025-05-22
Payer: MEDICARE

## 2025-05-22 RX ORDER — LISINOPRIL 5 MG/1
5 TABLET ORAL
Qty: 90 TABLET | Refills: 0 | Status: SHIPPED | OUTPATIENT
Start: 2025-05-22

## 2025-05-22 NOTE — TELEPHONE ENCOUNTER
spoke to daughter in law Rosa . she stated pt may not be able to physically come in. she's sleeping a lot more, not eating, and seems like she's giving up. in case the pt isn't able to come in physically is it possible to do a virtual appt

## 2025-05-22 NOTE — TELEPHONE ENCOUNTER
----- Message from Yakov Barrett MD sent at 5/22/2025 12:57 PM CDT -----  Regarding: Follow up appointment  Please contact patient and schedule a follow up appointment. She has not been seen in a year.

## 2025-06-02 ENCOUNTER — PATIENT MESSAGE (OUTPATIENT)
Dept: INTERNAL MEDICINE | Facility: CLINIC | Age: OVER 89
End: 2025-06-02
Payer: MEDICARE

## 2025-07-18 DIAGNOSIS — E03.9 ACQUIRED HYPOTHYROIDISM: ICD-10-CM

## 2025-07-18 NOTE — TELEPHONE ENCOUNTER
Care Due:                  Date            Visit Type   Department     Provider  --------------------------------------------------------------------------------                                EP -                              PRIMARY      [facility] INTERNAL  Last Visit: 03-      CARE ([facility])   MEDICINE       Yakov Barrett  Next Visit: None Scheduled  None         None Found                                                            Last  Test          Frequency    Reason                     Performed    Due Date  --------------------------------------------------------------------------------    Office Visit  15 months..  amLODIPine, atorvastatin,   03- 06-                             clopidogreL,                             levothyroxine,                             lisinopriL, pantoprazole,                             traZODone................    CBC.........  12 months..  clopidogreL..............  03- 03-    CMP.........  12 months..  atorvastatin, lisinopriL.  03- 03-    Lipid Panel.  12 months..  atorvastatin.............  03- 03-    TSH.........  12 months..  levothyroxine............  03- 03-    Health [facility] Embedded Care Due Messages. Reference number: [id number].   7/18/2025 12:04:51 PM CDT

## 2025-07-18 NOTE — TELEPHONE ENCOUNTER
Refill Routing Note   Medication(s) are not appropriate for processing by [facility] for the following reason(s):        Patient not seen by provider within 15 months  Required labs outdated    [facility] action(s):  Defer   Requires appointment : Yes   Requires labs : Yes             Appointments  past 12m or future 3m with PCP    Date Provider   Last Visit   3/27/2024 Yakov Barrett MD   Next Visit   Visit date not found Yakov Barrett MD   ED visits in past 90 days: 0        Note composed:4:43 PM 07/18/2025

## 2025-07-19 RX ORDER — CLOPIDOGREL BISULFATE 75 MG/1
75 TABLET ORAL
Qty: 90 TABLET | Refills: 0 | Status: SHIPPED | OUTPATIENT
Start: 2025-07-19

## 2025-07-19 RX ORDER — LEVOTHYROXINE SODIUM 75 UG/1
75 TABLET ORAL
Qty: 90 TABLET | Refills: 1 | Status: SHIPPED | OUTPATIENT
Start: 2025-07-19

## 2025-07-19 RX ORDER — PANTOPRAZOLE SODIUM 40 MG/1
40 TABLET, DELAYED RELEASE ORAL DAILY
Qty: 90 TABLET | Refills: 0 | Status: SHIPPED | OUTPATIENT
Start: 2025-07-19

## 2025-07-22 DIAGNOSIS — E87.1 HYPONATREMIA: ICD-10-CM

## 2025-07-22 DIAGNOSIS — I70.0 AORTIC ARCH ATHEROSCLEROSIS: Chronic | ICD-10-CM

## 2025-07-22 RX ORDER — ATORVASTATIN CALCIUM 40 MG/1
40 TABLET, FILM COATED ORAL
Qty: 90 TABLET | Refills: 0 | Status: SHIPPED | OUTPATIENT
Start: 2025-07-22

## 2025-07-22 NOTE — TELEPHONE ENCOUNTER
No care due was identified.  [facility] Embedded Care Due Messages. Reference number: [id number].   7/22/2025 12:15:38 AM CDT

## 2025-07-28 RX ORDER — GABAPENTIN 300 MG/1
300 CAPSULE ORAL NIGHTLY
Qty: 90 CAPSULE | Refills: 0 | Status: SHIPPED | OUTPATIENT
Start: 2025-07-28

## 2025-07-28 NOTE — TELEPHONE ENCOUNTER
No care due was identified.  [facility] Embedded Care Due Messages. Reference number: [id number].   7/28/2025 12:10:04 AM CDT

## (undated) DEVICE — TUBING HF INSUFFLATION W/ FLTR

## (undated) DEVICE — BLADE SURG CARBON STEEL SZ11

## (undated) DEVICE — SEE MEDLINE ITEM 152622

## (undated) DEVICE — SCISSOR 5MMX35CM DIRECT DRIVE

## (undated) DEVICE — IRRIGATOR ENDOSCOPY DISP.

## (undated) DEVICE — TROCAR ENDOPATH XCEL 5MM 7.5CM

## (undated) DEVICE — BAG TISS RETRV MONARCH 10MM

## (undated) DEVICE — NDL HYPO REG 25G X 1 1/2

## (undated) DEVICE — SOL NS 1000CC

## (undated) DEVICE — TUBING NEPTUNE 2 SMOKE 10IN

## (undated) DEVICE — SUT MCRYL PLUS 4-0 PS2 27IN

## (undated) DEVICE — TROCAR ENDOPATH XCEL 5X75MM

## (undated) DEVICE — DRAPE STERI INSTRUMENT 1018

## (undated) DEVICE — KIT ANTIFOG

## (undated) DEVICE — SYR 30CC LUER LOCK

## (undated) DEVICE — ELECTRODE REM PLYHSV RETURN 9

## (undated) DEVICE — DRAPE ABDOMINAL TIBURON 14X11

## (undated) DEVICE — NDL 18GA X1 1/2 REG BEVEL

## (undated) DEVICE — CLIP HEMO-LOK ML

## (undated) DEVICE — WARMER DRAPE STERILE LF

## (undated) DEVICE — ADHESIVE DERMABOND ADVANCED

## (undated) DEVICE — TRAY MINOR GEN SURG